# Patient Record
Sex: MALE | Race: WHITE | Employment: OTHER | RURAL
[De-identification: names, ages, dates, MRNs, and addresses within clinical notes are randomized per-mention and may not be internally consistent; named-entity substitution may affect disease eponyms.]

---

## 2017-03-07 RX ORDER — GABAPENTIN 600 MG/1
600 TABLET ORAL 3 TIMES DAILY
Qty: 90 TAB | Refills: 5 | Status: SHIPPED | OUTPATIENT
Start: 2017-03-07 | End: 2017-07-10 | Stop reason: SDUPTHER

## 2017-03-08 ENCOUNTER — TELEPHONE (OUTPATIENT)
Dept: FAMILY MEDICINE CLINIC | Age: 72
End: 2017-03-08

## 2017-03-08 NOTE — TELEPHONE ENCOUNTER
Patient takes Gabapentin 300 mg Capsules but 600 mg tablets were filled. Pt has no idea why its being changed. Please resend corrected  prescription into [facility] pharmacy.

## 2017-03-09 NOTE — TELEPHONE ENCOUNTER
He can break the tablets in half if he needs to blood from his complaints and registered with me he might actually need a higher dose of medication and the proper dose for what he is taking it for his 600 mg 3 times a day.

## 2017-03-10 DIAGNOSIS — F43.10 PTSD (POST-TRAUMATIC STRESS DISORDER): ICD-10-CM

## 2017-03-10 RX ORDER — CLONAZEPAM 1 MG/1
1 TABLET ORAL 2 TIMES DAILY
Qty: 180 TAB | Refills: 0 | OUTPATIENT
Start: 2017-03-10

## 2017-03-10 NOTE — TELEPHONE ENCOUNTER
Tried to call patient again and unable to reach at this time.   Refill request has been sent to Dr. Sarah Acevedo for clonazepam per wife's request.

## 2017-03-28 RX ORDER — TRAMADOL HYDROCHLORIDE 50 MG/1
50 TABLET ORAL
Qty: 30 TAB | Refills: 0 | OUTPATIENT
Start: 2017-03-28

## 2017-03-31 ENCOUNTER — OFFICE VISIT (OUTPATIENT)
Dept: FAMILY MEDICINE CLINIC | Age: 72
End: 2017-03-31

## 2017-03-31 VITALS
OXYGEN SATURATION: 98 % | RESPIRATION RATE: 18 BRPM | SYSTOLIC BLOOD PRESSURE: 139 MMHG | HEART RATE: 64 BPM | WEIGHT: 175 LBS | TEMPERATURE: 97.2 F | DIASTOLIC BLOOD PRESSURE: 78 MMHG | HEIGHT: 69 IN | BODY MASS INDEX: 25.92 KG/M2

## 2017-03-31 DIAGNOSIS — L60.0 INGROWN LEFT BIG TOENAIL: Primary | ICD-10-CM

## 2017-03-31 DIAGNOSIS — F43.10 PTSD (POST-TRAUMATIC STRESS DISORDER): ICD-10-CM

## 2017-03-31 DIAGNOSIS — M48.061 LUMBAR SPINAL STENOSIS: ICD-10-CM

## 2017-03-31 RX ORDER — NAPROXEN 500 MG/1
500 TABLET ORAL 2 TIMES DAILY WITH MEALS
Qty: 60 TAB | Refills: 5 | Status: SHIPPED | OUTPATIENT
Start: 2017-03-31 | End: 2017-07-10 | Stop reason: SDUPTHER

## 2017-03-31 RX ORDER — DULOXETIN HYDROCHLORIDE 60 MG/1
60 CAPSULE, DELAYED RELEASE ORAL DAILY
Qty: 30 CAP | Refills: 5 | Status: SHIPPED | OUTPATIENT
Start: 2017-03-31 | End: 2017-07-10 | Stop reason: SDUPTHER

## 2017-03-31 RX ORDER — PRAZOSIN HYDROCHLORIDE 1 MG/1
1 CAPSULE ORAL
Qty: 30 CAP | Refills: 5 | Status: SHIPPED | OUTPATIENT
Start: 2017-03-31 | End: 2017-07-10 | Stop reason: SDUPTHER

## 2017-03-31 RX ORDER — TRIPROLIDINE/PSEUDOEPHEDRINE 2.5MG-60MG
1500 TABLET ORAL DAILY
Qty: 180 TAB | Refills: 3
Start: 2017-03-31 | End: 2017-07-10 | Stop reason: SDUPTHER

## 2017-03-31 NOTE — MR AVS SNAPSHOT
Visit Information Date & Time Provider Department Dept. Phone Encounter #  
 3/31/2017  3:20 PM MD [facility] [phone] [id number] Your Appointments 6/13/2017  1:20 PM  
Follow Up with MD [facility]) Appt Note: 6 mo f/u  
 [address] [facility] [phone]  
  
   
 [address] [facility] [address] [phone] Upcoming Health Maintenance Date Due Hepatitis C Screening 1945 DTaP/Tdap/Td series (1 - Tdap) 6/20/1967 FOBT Q 1 YEAR AGE 50-75 6/20/1996 ZOSTER VACCINE AGE 60> 6/20/2006 GLAUCOMA SCREENING Q2Y 6/20/2011 Pneumococcal 65+ Low/Medium Risk (1 of 2 - PCV13) 6/20/2011 MEDICARE YEARLY EXAM 6/20/2011 INFLUENZA AGE 9 TO ADULT 8/1/2016 Allergies as of 3/31/2017  Review Complete On: 3/31/2017 By: Charley Talbot MD  
 Not on File Current Immunizations  Never Reviewed No immunizations on file. Not reviewed this visit Vitals BP Pulse Temp Resp Height(growth percentile) Weight(growth percentile) 139/78 (BP 1 Location: Left arm, BP Patient Position: Sitting) 64 97.2 °F (36.2 °C) 18 5' 8.5\" (1.74 m) 175 lb (79.4 kg) SpO2 BMI Smoking Status 98% 26.22 kg/m2 Current Every Day Smoker Vitals History BMI and BSA Data Body Mass Index Body Surface Area  
 26.22 kg/m 2 1.96 m 2 Preferred Pharmacy Pharmacy Name Phone [address], [address] AT [facility] OF  [address] [facility]. [facility] [phone] Your Updated Medication List  
  
   
This list is accurate as of: 3/31/17  4:29 PM.  Always use your most recent med list.  
  
  
  
  
 clonazePAM 1 mg tablet Commonly known as:  Marko Pineda Take 1 Tab by mouth two (2) times a day. Max Daily Amount: 2 mg. DULoxetine 60 mg capsule Commonly known as:  CYMBALTA Take 60 mg by mouth daily. gabapentin 600 mg tablet Commonly known as:  NEURONTIN Take 1 Tab by mouth three (3) times daily. prazosin 1 mg capsule Commonly known as:  MINIPRESS Take 1 Cap by mouth nightly. Indications: CHRONIC PTSD WITH TRAUMA NIGHTMARES  
  
 traMADol 50 mg tablet Commonly known as:  ULTRAM  
Take 50 mg by mouth every six (6) hours as needed for Pain. Introducing [facility]! Lila Brian introduces [employer] patient portal. Now you can access parts of your medical record, email your doctor's office, and request medication refills online. 1. In your internet browser, go to [email or web address] 2. Click on the First Time User? Click Here link in the Sign In box. You will see the New Member Sign Up page. 3. Enter your [employer] Access Code exactly as it appears below. You will not need to use this code after youve completed the sign-up process. If you do not sign up before the expiration date, you must request a new code. · [employer] Access Code: [email or web address] Expires: 6/29/2017  4:29 PM 
 
4. Enter the last four digits of your Social Security Number (xxxx) and Date of Birth (mm/dd/yyyy) as indicated and click Submit. You will be taken to the next sign-up page. 5. Create a [employer] ID. This will be your [employer] login ID and cannot be changed, so think of one that is secure and easy to remember. 6. Create a [employer] password. You can change your password at any time. 7. Enter your Password Reset Question and Answer. This can be used at a later time if you forget your password. 8. Enter your e-mail address. You will receive e-mail notification when new information is available in [address] 9. Click Sign Up. You can now view and download portions of your medical record. 10. Click the Download Summary menu link to download a portable copy of your medical information. If you have questions, please visit the Frequently Asked Questions section of the [facility] website. Remember, [facility] is NOT to be used for urgent needs. For medical emergencies, dial 911. Now available from your iPhone and Android! Please provide this summary of care documentation to your next provider. Your primary care clinician is listed as Radha Pizarro. If you have any questions after today's visit, please call [phone].

## 2017-03-31 NOTE — PROGRESS NOTES
Isreal Mejia is a 80 y.o. male who presents with the following:  Chief Complaint   Patient presents with    Skin Problem     on top of both hands    Back Pain    Anxiety    Arthritis       Skin Problem   The history is provided by the patient (Patient has an ingrown nail on the left foot would like to see a podiatrist). Pertinent negatives include no chest pain, no abdominal pain, no headaches and no shortness of breath. Back Pain    The history is provided by the patient (Patient has a history of lumbar spinal stenosis and chronic back pain and [facility] but told to get more to keep him on it especially with his past history of alcohol problems). Pertinent negatives include no chest pain, no fever, no numbness, no weight loss, no headaches, no abdominal pain, no abdominal swelling, no bowel incontinence, no perianal numbness, no bladder incontinence, no dysuria, no pelvic pain, no leg pain, no paresthesias, no paresis, no tingling and no weakness. Anxiety   The history is provided by the patient (Patient has chronic PTSD with anxiety and is currently on duloxetine and prazosin for which he needs refills. He still has nightmares. ). Pertinent negatives include no chest pain, no abdominal pain, no headaches and no shortness of breath. Arthritis   The history is provided by the patient (Patient has diffuse osteoarthritis and is rather stiff in the morning and achy but after he gets going he does fairly well). Pertinent negatives include no chest pain, no abdominal pain, no headaches and no shortness of breath. Not on File    Current Outpatient Prescriptions   Medication Sig    naproxen (NAPROSYN) 500 mg tablet Take 1 Tab by mouth two (2) times daily (with meals). Indications: Pain    Glucosamine HCl 1,500 mg tab Take 1,500 mg by mouth daily.  prazosin (MINIPRESS) 1 mg capsule Take 1 Cap by mouth nightly.  Indications: CHRONIC PTSD WITH TRAUMA NIGHTMARES    DULoxetine (CYMBALTA) 60 mg capsule Take 1 Cap by mouth daily.  gabapentin (NEURONTIN) 600 mg tablet Take 1 Tab by mouth three (3) times daily.  clonazePAM (KLONOPIN) 1 mg tablet Take 1 Tab by mouth two (2) times a day. Max Daily Amount: 2 mg. No current facility-administered medications for this visit. Past Medical History:   Diagnosis Date    Anxiety     Back pain     Blurred vision     Chest pain     Depression     Dizziness     Fast heart beat     Frequent headaches     Frequent urination     Joint pain     Joint swelling     Muscle pain     Recent change in weight     Sinus problem     Skin disease     SOB (shortness of breath)     Sore throat     Stiffness of joints, multiple sites     Stress     Tiredness     Trouble in sleeping     Weakness        Past Surgical History:   Procedure Laterality Date    HX HIP REPLACEMENT Bilateral 1993       Family History   Problem Relation Age of Onset    Cancer Mother     Cancer Maternal Aunt        Social History     Social History    Marital status: SINGLE     Spouse name: N/A    Number of children: N/A    Years of education: N/A     Social History Main Topics    Smoking status: Current Every Day Smoker     Packs/day: 1.00     Years: 50.00    Smokeless tobacco: Not on file    Alcohol use 16.8 oz/week     28 Cans of beer per week    Drug use: No    Sexual activity: No     Other Topics Concern    Not on file     Social History Narrative    No narrative on file       Review of Systems   Constitutional: Negative for chills, fever, malaise/fatigue and weight loss. HENT: Negative for congestion, hearing loss, sore throat and tinnitus. Eyes: Negative for blurred vision, pain and discharge. Respiratory: Negative for cough, shortness of breath and wheezing. Cardiovascular: Negative for chest pain, palpitations, orthopnea, claudication and leg swelling.    Gastrointestinal: Negative for abdominal pain, bowel incontinence, constipation and heartburn. Genitourinary: Negative for bladder incontinence, dysuria, frequency, pelvic pain and urgency. Musculoskeletal: Positive for back pain and joint pain. Negative for falls, myalgias and neck pain. Skin: Negative for itching and rash. Neurological: Negative for dizziness, tingling, tremors, sensory change, weakness, numbness, headaches and paresthesias. Endo/Heme/Allergies: Negative for environmental allergies and polydipsia. Psychiatric/Behavioral: Negative for depression and substance abuse. The patient is nervous/anxious. Visit Vitals    /78 (BP 1 Location: Left arm, BP Patient Position: Sitting)    Pulse 64    Temp 97.2 °F (36.2 °C)    Resp 18    Ht 5' 8.5\" (1.74 m)    Wt 175 lb (79.4 kg)    SpO2 98%    BMI 26.22 kg/m2     Physical Exam   Constitutional: He is oriented to person, place, and time and well-developed, well-nourished, and in no distress. HENT:   Head: Normocephalic and atraumatic. Nose: Nose normal.   Mouth/Throat: Oropharynx is clear and moist.   Eyes: Conjunctivae and EOM are normal. Pupils are equal, round, and reactive to light. Neck: Normal range of motion. Neck supple. No JVD present. No tracheal deviation present. No thyromegaly present. Cardiovascular: Normal rate, regular rhythm, normal heart sounds and intact distal pulses. Exam reveals no gallop and no friction rub. No murmur heard. Pulmonary/Chest: Effort normal and breath sounds normal. No respiratory distress. He has no wheezes. He has no rales. He exhibits no tenderness. Abdominal: Soft. Bowel sounds are normal. He exhibits no distension and no mass. There is no tenderness. There is no rebound and no guarding. Musculoskeletal: Normal range of motion. He exhibits no edema or tenderness.    The patient is quite stiff when he gets up from the chair and walks bent over at the waist.  His joints exhibit spurring but no gross swelling at this point in time although several can be palpated to have crepitus. Lymphadenopathy:     He has no cervical adenopathy. Neurological: He is alert and oriented to person, place, and time. He has normal reflexes. No cranial nerve deficit. He exhibits normal muscle tone. Gait normal. Coordination normal.   Skin: Skin is warm and dry. No rash noted. No erythema. Patient has left ingrown toenail on the big toenail   Psychiatric: Mood, memory, affect and judgment normal.   Vitals reviewed. ICD-10-CM ICD-9-CM    1. Ingrown left big toenail L60.0 703.0 REFERRAL TO PODIATRY   2. Lumbar spinal stenosis M48.06 724.02 naproxen (NAPROSYN) 500 mg tablet      Glucosamine HCl 1,500 mg tab   3. PTSD (post-traumatic stress disorder) F43.10 309.81 prazosin (MINIPRESS) 1 mg capsule      DULoxetine (CYMBALTA) 60 mg capsule       Orders Placed This Encounter    REFERRAL TO PODIATRY     Standing Status:   Future     Standing Expiration Date:   12/31/2017     Referral Priority:   Routine     Referral Type:   Consultation     Referral Reason:   Specialty Services Required    naproxen (NAPROSYN) 500 mg tablet     Sig: Take 1 Tab by mouth two (2) times daily (with meals). Indications: Pain     Dispense:  60 Tab     Refill:  5    Glucosamine HCl 1,500 mg tab     Sig: Take 1,500 mg by mouth daily. Dispense:  180 Tab     Refill:  3    prazosin (MINIPRESS) 1 mg capsule     Sig: Take 1 Cap by mouth nightly. Indications: CHRONIC PTSD WITH TRAUMA NIGHTMARES     Dispense:  30 Cap     Refill:  5    DULoxetine (CYMBALTA) 60 mg capsule     Sig: Take 1 Cap by mouth daily.      Dispense:  30 Cap     Refill:  5       Follow-up Disposition: Not on Flory Partida MD

## 2017-04-10 DIAGNOSIS — F43.10 PTSD (POST-TRAUMATIC STRESS DISORDER): ICD-10-CM

## 2017-04-11 ENCOUNTER — DOCUMENTATION ONLY (OUTPATIENT)
Dept: FAMILY MEDICINE CLINIC | Age: 72
End: 2017-04-11

## 2017-04-11 RX ORDER — CLONAZEPAM 1 MG/1
1 TABLET ORAL 2 TIMES DAILY
Qty: 180 TAB | Refills: 0 | OUTPATIENT
Start: 2017-04-11

## 2017-04-11 NOTE — PROGRESS NOTES
Spoke with Tay Holt regarding the patient's medication refill for Klonopin noticing that the patient has been compliant with his appts and he can have lab work when he comes back for his 3 month f/u appt in June and she stated to call in the medication with no refills. This was done.

## 2017-04-24 ENCOUNTER — TELEPHONE (OUTPATIENT)
Dept: FAMILY MEDICINE CLINIC | Age: 72
End: 2017-04-24
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
## 2017-04-25 ENCOUNTER — OFFICE VISIT (OUTPATIENT)
Dept: FAMILY MEDICINE CLINIC | Age: 72
End: 2017-04-25

## 2017-04-25 VITALS
WEIGHT: 171.2 LBS | SYSTOLIC BLOOD PRESSURE: 106 MMHG | OXYGEN SATURATION: 90 % | RESPIRATION RATE: 20 BRPM | HEIGHT: 69 IN | DIASTOLIC BLOOD PRESSURE: 95 MMHG | TEMPERATURE: 96 F | HEART RATE: 74 BPM | BODY MASS INDEX: 25.36 KG/M2

## 2017-04-25 DIAGNOSIS — R41.0 CONFUSION: ICD-10-CM

## 2017-04-25 DIAGNOSIS — J44.1 COPD EXACERBATION (HCC): Primary | ICD-10-CM

## 2017-04-25 DIAGNOSIS — F43.10 PTSD (POST-TRAUMATIC STRESS DISORDER): ICD-10-CM

## 2017-04-25 RX ORDER — LEVOFLOXACIN 500 MG/1
500 TABLET, FILM COATED ORAL DAILY
Qty: 10 TAB | Refills: 0 | Status: SHIPPED | OUTPATIENT
Start: 2017-04-25 | End: 2017-05-05

## 2017-04-25 RX ORDER — PREDNISONE 20 MG/1
TABLET ORAL
Qty: 30 TAB | Refills: 0 | Status: SHIPPED | OUTPATIENT
Start: 2017-04-25 | End: 2017-06-15 | Stop reason: ALTCHOICE

## 2017-04-25 RX ORDER — GUAIFENESIN 600 MG/1
1200 TABLET, EXTENDED RELEASE ORAL 2 TIMES DAILY
Qty: 40 TAB | Refills: 0 | Status: SHIPPED | OUTPATIENT
Start: 2017-04-25 | End: 2017-09-15 | Stop reason: ALTCHOICE

## 2017-04-25 NOTE — PROGRESS NOTES
Alona Buitrago is a 80 y.o. male who presents with the following:  Chief Complaint   Patient presents with    Dizziness    Cough    Altered mental status       Dizziness    The history is provided by the patient (Patient with long smoking history and COPD came back from her trip from [place] and developed a very deep wet cough with some chest discomfort and no fever but he has been confused as of late). Associated symptoms include chest pain, clumsiness, confusion, malaise/fatigue, congestion, dizziness and light-headedness. Pertinent negatives include no visual change, no palpitations, no fever, no abdominal pain, no bowel incontinence, no bladder incontinence, no headaches, no back pain, no focal weakness, no seizures, no slurred speech, no melena, no anal bleeding and no head injury. Cough   The history is provided by the patient (Patient has had cough for about a week and is now getting deeper and wetter). Associated symptoms include chest pain. Pertinent negatives include no abdominal pain, no headaches and no shortness of breath. Altered mental status    The history is provided by the patient (Patient developed confusion about the same time as he developed cough). Associated symptoms include confusion. Pertinent negatives include no seizures and no tingling. Not on File    Current Outpatient Prescriptions   Medication Sig    levoFLOXacin (LEVAQUIN) 500 mg tablet Take 1 Tab by mouth daily for 10 days.  predniSONE (DELTASONE) 20 mg tablet 5 po every day x 4 days then 4 on day 5, 3 on day 6, 2 on day 7, and 1 on day 8    guaiFENesin ER (MUCINEX) 600 mg ER tablet Take 2 Tabs by mouth two (2) times a day. Take 2 tabs bid po    naproxen (NAPROSYN) 500 mg tablet Take 1 Tab by mouth two (2) times daily (with meals). Indications: Pain    Glucosamine HCl 1,500 mg tab Take 1,500 mg by mouth daily.  prazosin (MINIPRESS) 1 mg capsule Take 1 Cap by mouth nightly.  Indications: CHRONIC PTSD WITH TRAUMA NIGHTMARES    DULoxetine (CYMBALTA) 60 mg capsule Take 1 Cap by mouth daily.  gabapentin (NEURONTIN) 600 mg tablet Take 1 Tab by mouth three (3) times daily.  clonazePAM (KLONOPIN) 1 mg tablet Take 1 Tab by mouth two (2) times a day. Max Daily Amount: 2 mg. No current facility-administered medications for this visit. Past Medical History:   Diagnosis Date    Anxiety     Back pain     Blurred vision     Chest pain     Depression     Dizziness     Fast heart beat     Frequent headaches     Frequent urination     Joint pain     Joint swelling     Muscle pain     Recent change in weight     Sinus problem     Skin disease     SOB (shortness of breath)     Sore throat     Stiffness of joints, multiple sites     Stress     Tiredness     Trouble in sleeping     Weakness        Past Surgical History:   Procedure Laterality Date    HX HIP REPLACEMENT Bilateral 1993       Family History   Problem Relation Age of Onset    Cancer Mother     Cancer Maternal Aunt        Social History     Social History    Marital status: SINGLE     Spouse name: N/A    Number of children: N/A    Years of education: N/A     Social History Main Topics    Smoking status: Current Every Day Smoker     Packs/day: 1.00     Years: 50.00    Smokeless tobacco: None    Alcohol use 16.8 oz/week     28 Cans of beer per week    Drug use: No    Sexual activity: No     Other Topics Concern    None     Social History Narrative       Review of Systems   Constitutional: Positive for malaise/fatigue. Negative for fever. HENT: Positive for congestion. Respiratory: Positive for cough. Negative for shortness of breath. Cardiovascular: Positive for chest pain. Negative for palpitations. Gastrointestinal: Negative for abdominal pain, anal bleeding, bowel incontinence and melena. Genitourinary: Negative for bladder incontinence. Musculoskeletal: Negative for back pain.    Neurological: Positive for dizziness and light-headedness. Negative for tingling, tremors, sensory change, speech change, focal weakness, seizures and headaches. Psychiatric/Behavioral: Positive for confusion. Visit Vitals    BP (!) 106/95 (BP 1 Location: Left arm, BP Patient Position: Sitting)    Pulse 74    Temp 96 °F (35.6 °C) (Oral)    Resp 20    Ht 5' 8.5\" (1.74 m)    Wt 171 lb 3.2 oz (77.7 kg)    SpO2 90%    BMI 25.65 kg/m2     Physical Exam   Constitutional: He is oriented to person, place, and time and well-developed, well-nourished, and in no distress. HENT:   Head: Normocephalic and atraumatic. Nose: Nose normal.   Mouth/Throat: Oropharynx is clear and moist.   Eyes: Conjunctivae and EOM are normal. Pupils are equal, round, and reactive to light. Neck: Normal range of motion. Neck supple. No JVD present. No tracheal deviation present. No thyromegaly present. Cardiovascular: Normal rate, regular rhythm, normal heart sounds and intact distal pulses. Exam reveals no gallop and no friction rub. No murmur heard. Pulmonary/Chest: Effort normal. No respiratory distress. He has wheezes. He has no rales. He exhibits no tenderness. Abdominal: Soft. Bowel sounds are normal. He exhibits no distension and no mass. There is no tenderness. There is no rebound and no guarding. Musculoskeletal: Normal range of motion. He exhibits no edema or tenderness. Lymphadenopathy:     He has no cervical adenopathy. Neurological: He is alert and oriented to person, place, and time. He has normal reflexes. No cranial nerve deficit. He exhibits normal muscle tone. Gait normal. Coordination normal.   Skin: Skin is warm and dry. No rash noted. No erythema. Psychiatric: Mood, memory, affect and judgment normal.   Vitals reviewed. ICD-10-CM ICD-9-CM    1.  COPD exacerbation (HCC) J44.1 491.21 XR CHEST PA LAT      levoFLOXacin (LEVAQUIN) 500 mg tablet      predniSONE (DELTASONE) 20 mg tablet      guaiFENesin ER (MUCINEX) 600 mg ER tablet   2. Confusion R41.0 298.9 levoFLOXacin (LEVAQUIN) 500 mg tablet      predniSONE (DELTASONE) 20 mg tablet      guaiFENesin ER (MUCINEX) 600 mg ER tablet   3. PTSD (post-traumatic stress disorder) F43.10 309.81 levoFLOXacin (LEVAQUIN) 500 mg tablet      predniSONE (DELTASONE) 20 mg tablet      guaiFENesin ER (MUCINEX) 600 mg ER tablet       Orders Placed This Encounter    XR CHEST PA LAT     Standing Status:   Future     Number of Occurrences:   1     Standing Expiration Date:   2018     Order Specific Question:   Reason for Exam     Answer:   copd exacerbation-cough-confusion     Order Specific Question:   Is Patient Allergic to Contrast Dye? Answer:   No    levoFLOXacin (LEVAQUIN) 500 mg tablet     Sig: Take 1 Tab by mouth daily for 10 days. Dispense:  10 Tab     Refill:  0    predniSONE (DELTASONE) 20 mg tablet     Si po every day x 4 days then 4 on day 5, 3 on day 6, 2 on day 7, and 1 on day 8     Dispense:  30 Tab     Refill:  0    guaiFENesin ER (MUCINEX) 600 mg ER tablet     Sig: Take 2 Tabs by mouth two (2) times a day. Take 2 tabs bid po     Dispense:  40 Tab     Refill:  0    if his symptoms do not resolve with antibiotics and prednisone and then pursue an MRI looking for a possibly small stroke. The patient has been advised to stop smoking. I will also talk to the patient and his wife about reducing his clonazepam during the daytime and possibly switching it to buspirone and just leave the bedtime dose taking it 1 2 nights and off 1 to avoid accumulation in his system.     Follow-up Disposition: Not on Critical access hospital, MD        We currently plan to get a CT of his head

## 2017-04-25 NOTE — MR AVS SNAPSHOT
Visit Information Date & Time Provider Department Dept. Phone Encounter #  
 4/25/2017 11:00 AM Pete La MD [facility] [phone] [id number] Your Appointments 6/13/2017  1:20 PM  
Follow Up with Pete La MD  
[facility] [address] [facility]) Appt Note: 6 mo f/u  
 [address] [facility] 67 [id number] [phone]  
  
   
 [address] [place] [id number] Upcoming Health Maintenance Date Due Hepatitis C Screening 1945 DTaP/Tdap/Td series (1 - Tdap) 6/20/1967 FOBT Q 1 YEAR AGE 50-75 6/20/1996 ZOSTER VACCINE AGE 60> 6/20/2006 GLAUCOMA SCREENING Q2Y 6/20/2011 Pneumococcal 65+ Low/Medium Risk (1 of 2 - PCV13) 6/20/2011 INFLUENZA AGE 9 TO ADULT 8/1/2016 MEDICARE YEARLY EXAM 4/26/2018 Allergies as of 4/25/2017  Review Complete On: 4/25/2017 By: Jennifer Mckeon LPN Not on File Current Immunizations  Never Reviewed No immunizations on file. Not reviewed this visit You Were Diagnosed With   
  
 Codes Comments COPD exacerbation ([facility]    -  Primary ICD-10-CM: J44.1 ICD-9-CM: 491.21 Confusion     ICD-10-CM: R41.0 ICD-9-CM: 298.9 PTSD (post-traumatic stress disorder)     ICD-10-CM: F43.10 ICD-9-CM: 309.81 Vitals BP Pulse Temp Resp Height(growth percentile) Weight(growth percentile) (!) 106/95 (BP 1 Location: Left arm, BP Patient Position: Sitting) 74 96 °F (35.6 °C) (Oral) 20 5' 8.5\" (1.74 m) 171 lb 3.2 oz (77.7 kg) SpO2 BMI Smoking Status 90% 25.65 kg/m2 Current Every Day Smoker BMI and BSA Data Body Mass Index Body Surface Area  
 25.65 kg/m 2 1.94 m 2 Preferred Pharmacy Pharmacy Name Phone [address], [address] [facility] AT [facility] OF  3 & HOLLI Calderon [phone] Your Updated Medication List  
  
   
 This list is accurate as of: 4/25/17 12:01 PM.  Always use your most recent med list.  
  
  
  
  
 clonazePAM 1 mg tablet Commonly known as:  Cheryn Brooms Take 1 Tab by mouth two (2) times a day. Max Daily Amount: 2 mg. DULoxetine 60 mg capsule Commonly known as:  CYMBALTA Take 1 Cap by mouth daily. gabapentin 600 mg tablet Commonly known as:  NEURONTIN Take 1 Tab by mouth three (3) times daily. Glucosamine HCl 1,500 mg Tab Take 1,500 mg by mouth daily. naproxen 500 mg tablet Commonly known as:  NAPROSYN Take 1 Tab by mouth two (2) times daily (with meals). Indications: Pain  
  
 prazosin 1 mg capsule Commonly known as:  MINIPRESS Take 1 Cap by mouth nightly. Indications: CHRONIC PTSD WITH TRAUMA NIGHTMARES To-Do List   
 06/25/2017 Imaging:  XR CHEST PA LAT Introducing [facility] & HE[facility]! Beba Yates introduces [employer] patient portal. Now you can access parts of your medical record, email your doctor's office, and request medication refills online. 1. In your internet browser, go to [email or web address] 2. Click on the First Time User? Click Here link in the Sign In box. You will see the New Member Sign Up page. 3. Enter your mPowa Access Code exactly as it appears below. You will not need to use this code after youve completed the sign-up process. If you do not sign up before the expiration date, you must request a new code. · mPowa Access Code: [email or web address] Expires: 6/29/2017  4:29 PM 
 
4. Enter the last four digits of your Social Security Number (xxxx) and Date of Birth (mm/dd/yyyy) as indicated and click Submit. You will be taken to the next sign-up page. 5. Create a Cognitive Matcht ID. This will be your mPowa login ID and cannot be changed, so think of one that is secure and easy to remember. 6. Create a mPowa password. You can change your password at any time. 7. Enter your Password Reset Question and Answer. This can be used at a later time if you forget your password. 8. Enter your e-mail address. You will receive e-mail notification when new information is available in [address] 9. Click Sign Up. You can now view and download portions of your medical record. 10. Click the Download Summary menu link to download a portable copy of your medical information. If you have questions, please visit the Frequently Asked Questions section of the [employer] website. Remember, [employer] is NOT to be used for urgent needs. For medical emergencies, dial 911. Now available from your iPhone and Android! Please provide this summary of care documentation to your next provider. Your primary care clinician is listed as Pallavi Hill. If you have any questions after today's visit, please call [phone].

## 2017-06-15 ENCOUNTER — OFFICE VISIT (OUTPATIENT)
Dept: FAMILY MEDICINE CLINIC | Age: 72
End: 2017-06-15

## 2017-06-15 VITALS
WEIGHT: 175 LBS | DIASTOLIC BLOOD PRESSURE: 76 MMHG | RESPIRATION RATE: 16 BRPM | BODY MASS INDEX: 25.92 KG/M2 | SYSTOLIC BLOOD PRESSURE: 136 MMHG | HEIGHT: 69 IN | HEART RATE: 63 BPM | TEMPERATURE: 96.3 F | OXYGEN SATURATION: 99 %

## 2017-06-15 DIAGNOSIS — F43.10 PTSD (POST-TRAUMATIC STRESS DISORDER): ICD-10-CM

## 2017-06-15 RX ORDER — CLONAZEPAM 1 MG/1
1 TABLET ORAL 2 TIMES DAILY
Qty: 180 TAB | Refills: 0 | Status: SHIPPED | OUTPATIENT
Start: 2017-06-15 | End: 2017-07-10 | Stop reason: SDUPTHER

## 2017-06-15 NOTE — PROGRESS NOTES
Esther Ayala is a 80 y.o. male who presents with the following:  Chief Complaint   Patient presents with    Anxiety       Anxiety   The history is provided by the patient (The patient suffers from chronic anxiety related to PTSD from [employer] duty. And he has been doing well on Klonopin 1 mg twice a day and has been met without any signs of toxicity or any symptoms of abuse. ). Pertinent negatives include no chest pain, no abdominal pain, no headaches and no shortness of breath. Not on File    Current Outpatient Prescriptions   Medication Sig    clonazePAM (KLONOPIN) 1 mg tablet Take 1 Tab by mouth two (2) times a day. Max Daily Amount: 2 mg.  guaiFENesin ER (MUCINEX) 600 mg ER tablet Take 2 Tabs by mouth two (2) times a day. Take 2 tabs bid po    naproxen (NAPROSYN) 500 mg tablet Take 1 Tab by mouth two (2) times daily (with meals). Indications: Pain    Glucosamine HCl 1,500 mg tab Take 1,500 mg by mouth daily.  prazosin (MINIPRESS) 1 mg capsule Take 1 Cap by mouth nightly. Indications: CHRONIC PTSD WITH TRAUMA NIGHTMARES    DULoxetine (CYMBALTA) 60 mg capsule Take 1 Cap by mouth daily.  gabapentin (NEURONTIN) 600 mg tablet Take 1 Tab by mouth three (3) times daily. No current facility-administered medications for this visit.         Past Medical History:   Diagnosis Date    Anxiety     Back pain     Blurred vision     Chest pain     Depression     Dizziness     Fast heart beat     Frequent headaches     Frequent urination     Joint pain     Joint swelling     Muscle pain     Recent change in weight     Sinus problem     Skin disease     SOB (shortness of breath)     Sore throat     Stiffness of joints, multiple sites     Stress     Tiredness     Trouble in sleeping     Weakness        Past Surgical History:   Procedure Laterality Date    HX HIP REPLACEMENT Bilateral 1993       Family History   Problem Relation Age of Onset    Cancer Mother     Cancer Maternal Aunt Social History     Social History    Marital status: SINGLE     Spouse name: N/A    Number of children: N/A    Years of education: N/A     Social History Main Topics    Smoking status: Current Every Day Smoker     Packs/day: 1.00     Years: 50.00    Smokeless tobacco: None    Alcohol use 16.8 oz/week     28 Cans of beer per week    Drug use: No    Sexual activity: No     Other Topics Concern    None     Social History Narrative       Review of Systems   Constitutional: Negative for malaise/fatigue. Respiratory: Negative for shortness of breath. Cardiovascular: Negative for chest pain. Gastrointestinal: Negative for abdominal pain. Neurological: Negative for dizziness, tremors, focal weakness, weakness and headaches. Psychiatric/Behavioral: Negative for substance abuse and suicidal ideas. The patient is nervous/anxious (Patient states he cannot get by without his clonazepam and we discussed dropping it back to half a milligram twice a day because of his age. ). Visit Vitals    /76 (BP 1 Location: Left arm, BP Patient Position: Sitting)    Pulse 63    Temp 96.3 °F (35.7 °C)    Resp 16    Ht 5' 8.5\" (1.74 m)    Wt 175 lb (79.4 kg)    SpO2 99%    BMI 26.22 kg/m2     Physical Exam   Constitutional: He is oriented to person, place, and time and well-developed, well-nourished, and in no distress. HENT:   Head: Normocephalic and atraumatic. Nose: Nose normal.   Mouth/Throat: Oropharynx is clear and moist.   Eyes: Conjunctivae and EOM are normal. Pupils are equal, round, and reactive to light. Neck: Normal range of motion. Neck supple. No JVD present. No tracheal deviation present. No thyromegaly present. Cardiovascular: Normal rate, regular rhythm, normal heart sounds and intact distal pulses. Exam reveals no gallop and no friction rub. No murmur heard. Pulmonary/Chest: Effort normal and breath sounds normal. No respiratory distress. He has no wheezes.  He has no rales. He exhibits no tenderness. Abdominal: Soft. Bowel sounds are normal. He exhibits no distension and no mass. There is no tenderness. There is no rebound and no guarding. Musculoskeletal: Normal range of motion. He exhibits no edema or tenderness. Lymphadenopathy:     He has no cervical adenopathy. Neurological: He is alert and oriented to person, place, and time. He has normal reflexes. No cranial nerve deficit. He exhibits normal muscle tone. Gait normal. Coordination normal.   Skin: Skin is warm and dry. No rash noted. No erythema. Psychiatric: Mood, memory and judgment normal.   Patient became very anxious when we started talking about reducing his clonazepam.   Vitals reviewed. ICD-10-CM ICD-9-CM    1. PTSD (post-traumatic stress disorder) F43.10 309.81 clonazePAM (KLONOPIN) 1 mg tablet       Orders Placed This Encounter    clonazePAM (KLONOPIN) 1 mg tablet     Sig: Take 1 Tab by mouth two (2) times a day. Max Daily Amount: 2 mg. Dispense:  180 Tab     Refill:  0    patient states he wants to start coming here to get his refills on his clonazepam as the trips to the [facility] become more more taxing. The  was reviewed and the patient has been getting his medication at an appropriate time a regular basis from the [facility] in the [facility]. The patient is an alcoholic but it is in remission and he has not had any more drinks.     Follow-up Disposition: Not on Priyanka Carcamo MD

## 2017-06-15 NOTE — MR AVS SNAPSHOT
Visit Information Date & Time Provider Department Dept. Phone Encounter #  
 6/15/2017 10:00 AM Viral Delgado MD [facility] [phone] [id number] Upcoming Health Maintenance Date Due Hepatitis C Screening 1945 DTaP/Tdap/Td series (1 - Tdap) 6/20/1967 FOBT Q 1 YEAR AGE 50-75 6/20/1996 ZOSTER VACCINE AGE 60> 6/20/2006 GLAUCOMA SCREENING Q2Y 6/20/2011 Pneumococcal 65+ Low/Medium Risk (1 of 2 - PCV13) 6/20/2011 INFLUENZA AGE 9 TO ADULT 8/1/2017 MEDICARE YEARLY EXAM 4/26/2018 Allergies as of 6/15/2017  Review Complete On: 6/15/2017 By: Viral Delgado MD  
 Not on File Current Immunizations  Never Reviewed No immunizations on file. Not reviewed this visit You Were Diagnosed With   
  
 Codes Comments PTSD (post-traumatic stress disorder)     ICD-10-CM: F43.10 ICD-9-CM: 309.81 Vitals BP Pulse Temp Resp Height(growth percentile) Weight(growth percentile) 136/76 (BP 1 Location: Left arm, BP Patient Position: Sitting) 63 96.3 °F (35.7 °C) 16 5' 8.5\" (1.74 m) 175 lb (79.4 kg) SpO2 BMI Smoking Status 99% 26.22 kg/m2 Current Every Day Smoker BMI and BSA Data Body Mass Index Body Surface Area  
 26.22 kg/m 2 1.96 m 2 Preferred Pharmacy Pharmacy Name Phone [address], [address] AT [facility] OF  3 & HOLLI RASHID [facility]. Mateus Deal [phone] Your Updated Medication List  
  
   
This list is accurate as of: 6/15/17 11:33 AM.  Always use your most recent med list.  
  
  
  
  
 clonazePAM 1 mg tablet Commonly known as:  Cheryn Brooms Take 1 Tab by mouth two (2) times a day. Max Daily Amount: 2 mg. DULoxetine 60 mg capsule Commonly known as:  CYMBALTA Take 1 Cap by mouth daily. gabapentin 600 mg tablet Commonly known as:  NEURONTIN Take 1 Tab by mouth three (3) times daily. Glucosamine HCl 1,500 mg Tab Take 1,500 mg by mouth daily. guaiFENesin  mg ER tablet Commonly known as:  [address] Take 2 Tabs by mouth two (2) times a day. Take 2 tabs bid po  
  
 naproxen 500 mg tablet Commonly known as:  NAPROSYN Take 1 Tab by mouth two (2) times daily (with meals). Indications: Pain  
  
 prazosin 1 mg capsule Commonly known as:  MINIPRESS Take 1 Cap by mouth nightly. Indications: CHRONIC PTSD WITH TRAUMA NIGHTMARES Prescriptions Printed Refills  
 clonazePAM (KLONOPIN) 1 mg tablet 0 Sig: Take 1 Tab by mouth two (2) times a day. Max Daily Amount: 2 mg. Class: Print Route: Oral  
  
Introducing [facility]! [facility] introduces [employer] patient portal. Now you can access parts of your medical record, email your doctor's office, and request medication refills online. 1. In your internet browser, go to [email or web address] 2. Click on the First Time User? Click Here link in the Sign In box. You will see the New Member Sign Up page. 3. Enter your [employer] Access Code exactly as it appears below. You will not need to use this code after youve completed the sign-up process. If you do not sign up before the expiration date, you must request a new code. · [employer] Access Code: [email or web address] Expires: 6/29/2017  4:29 PM 
 
4. Enter the last four digits of your Social Security Number (xxxx) and Date of Birth (mm/dd/yyyy) as indicated and click Submit. You will be taken to the next sign-up page. 5. Create a Insportantt ID. This will be your [employer] login ID and cannot be changed, so think of one that is secure and easy to remember. 6. Create a [employer] password. You can change your password at any time. 7. Enter your Password Reset Question and Answer. This can be used at a later time if you forget your password. 8. Enter your e-mail address. You will receive e-mail notification when new information is available in [address] 9. Click Sign Up. You can now view and download portions of your medical record. 10. Click the Download Summary menu link to download a portable copy of your medical information. If you have questions, please visit the Frequently Asked Questions section of the SKY [employer] website. Remember, [employer] is NOT to be used for urgent needs. For medical emergencies, dial 911. Now available from your iPhone and Android! Please provide this summary of care documentation to your next provider. Your primary care clinician is listed as Gracia Fraga. If you have any questions after today's visit, please call [phone].

## 2017-06-18 ENCOUNTER — TELEPHONE (OUTPATIENT)
Dept: FAMILY MEDICINE CLINIC | Age: 72
End: 2017-06-18

## 2017-06-18 NOTE — TELEPHONE ENCOUNTER
Caretaker called  Pt today disoriented, dizzy and not walking right, almost falling  Clonazepam was decreased at last visit per caretaker and she thinks sy are from that  On review of chart Clonazepam was not decreased from what I can see  advised her to monitor pt, make sure he is well hydrated  And if he is getting worse bring him to ER or to office tomorrow

## 2017-06-20 ENCOUNTER — OFFICE VISIT (OUTPATIENT)
Dept: FAMILY MEDICINE CLINIC | Age: 72
End: 2017-06-20

## 2017-06-20 VITALS
SYSTOLIC BLOOD PRESSURE: 123 MMHG | HEIGHT: 68 IN | RESPIRATION RATE: 18 BRPM | HEART RATE: 60 BPM | DIASTOLIC BLOOD PRESSURE: 65 MMHG | OXYGEN SATURATION: 98 % | WEIGHT: 176.25 LBS | BODY MASS INDEX: 26.71 KG/M2

## 2017-06-20 DIAGNOSIS — R42 DIZZINESS: Primary | ICD-10-CM

## 2017-06-20 NOTE — PROGRESS NOTES
HISTORY OF PRESENT ILLNESS  Toy Read is a 71 y.o. male. HPI  He is here with c/o dizziness for the past 2 days. Feels off balanced and feels that he has to hold on to ambulate. Room will also spin at times. No nausea or vomiting. Occ headache. His significant other has noticed that his speech is not as strong and is at times a little garbled but not slurring. No facial droop noted. He has had hip problems since childhood and walks with a limp. He has noticed that he is having more difficulty than usual ambulating. Has been using a walker at times. Had had bilateral hip replacements. His orthopedic at the [place] has recommended PT but he needs the referral done by his PCP for insurance to cover. Has a history of OA and chronic back problems. Review of Systems   Constitutional: Positive for malaise/fatigue. Negative for chills and fever. HENT: Negative for congestion and sore throat. Respiratory: Negative for cough. Cardiovascular: Negative for chest pain and palpitations. Gastrointestinal: Negative for abdominal pain and heartburn. Musculoskeletal: Positive for back pain and joint pain. Negative for myalgias. Neurological: Positive for dizziness and speech change. Negative for headaches. Past Medical History:   Diagnosis Date    Anxiety     Back pain     Blurred vision     Chest pain     Depression     Dizziness     Fast heart beat     Frequent headaches     Frequent urination     Joint pain     Joint swelling     Muscle pain     Recent change in weight     Sinus problem     Skin disease     SOB (shortness of breath)     Sore throat     Stiffness of joints, multiple sites     Stress     Tiredness     Trouble in sleeping     Weakness      Past Surgical History:   Procedure Laterality Date    HX HIP REPLACEMENT Bilateral 1993     No Known Allergies  Blood pressure 123/65, pulse 60, resp.  rate 18, height 5' 8\" (1.727 m), weight 176 lb 4 oz (79.9 kg), SpO2 98 %.      Physical Exam   Constitutional: He is oriented to person, place, and time. He appears well-developed and well-nourished. No distress. HENT:   Head: Normocephalic. Right Ear: External ear normal.   Nose: Nose normal.   Mouth/Throat: Oropharynx is clear and moist.   Left ear canal occluded with wax   Eyes: Conjunctivae are normal.   Neck: Neck supple. No carotid bruits   Cardiovascular: Normal rate, regular rhythm and normal heart sounds. Pulmonary/Chest: Effort normal and breath sounds normal. No respiratory distress. Abdominal: Soft. Musculoskeletal:   Difficulty getting from a sitting position to standing. When he walks he has an ataxic like gait and a limp is present. He has difficulty picking up his right foot but this is not a new finding. Legs appear to be unequal in length   Lymphadenopathy:     He has no cervical adenopathy. Neurological: He is alert and oriented to person, place, and time. No cranial nerve deficit. Finger to nose normal. Alternating hand movements are normal.    Vitals reviewed.       ASSESSMENT and PLAN  Dizziness   Schedule CT of the Head and Carotid Dopplers   RTO after testing for follow up   Start ASA 81 mg daily   Change positions slowly   RTO if no improvement   To the ER if any worsening symptoms  OA hips and spine   Schedule PT with [facility]   May benefit from a lift in the shoe

## 2017-06-20 NOTE — MR AVS SNAPSHOT
Visit Information Date & Time Provider Department Dept. Phone Encounter #  
 6/20/2017  6:00 PM MD [facility] [phone] [id number] Your Appointments 9/15/2017 11:00 AM  
Follow Up with MD [facility] [address]) Appt Note: 3 month f/u  
 [address] [facility] 67 [address] [phone]  
  
   
 [address] [facility] 67 [address] Upcoming Health Maintenance Date Due Hepatitis C Screening 1945 DTaP/Tdap/Td series (1 - Tdap) 6/20/1967 FOBT Q 1 YEAR AGE 50-75 6/20/1996 ZOSTER VACCINE AGE 60> 6/20/2006 GLAUCOMA SCREENING Q2Y 6/20/2011 Pneumococcal 65+ Low/Medium Risk (1 of 2 - PCV13) 6/20/2011 INFLUENZA AGE 9 TO ADULT 8/1/2017 MEDICARE YEARLY EXAM 4/26/2018 Allergies as of 6/20/2017  Review Complete On: 6/20/2017 By: Luis Hooker MD  
 No Known Allergies Current Immunizations  Never Reviewed No immunizations on file. Not reviewed this visit You Were Diagnosed With   
  
 Codes Comments Dizziness    -  Primary ICD-10-CM: R89 ICD-9-CM: 780.4 Vitals BP Pulse Resp Height(growth percentile) Weight(growth percentile) SpO2  
 123/65 60 18 5' 8\" (1.727 m) 176 lb 4 oz (79.9 kg) 98% BMI Smoking Status 26.8 kg/m2 Current Every Day Smoker Vitals History BMI and BSA Data Body Mass Index Body Surface Area  
 26.8 kg/m 2 1.96 m 2 Preferred Pharmacy Pharmacy Name Phone [address], [address] AT [facility] OF  3 & [address] RASHID [address]. Raji Winters [phone] Your Updated Medication List  
  
   
This list is accurate as of: 6/20/17  6:27 PM.  Always use your most recent med list.  
  
  
  
  
 clonazePAM 1 mg tablet Commonly known as:  Faith Flores Take 1 Tab by mouth two (2) times a day. Max Daily Amount: 2 mg. DULoxetine 60 mg capsule Commonly known as:  CYMBALTA Take 1 Cap by mouth daily. gabapentin 600 mg tablet Commonly known as:  NEURONTIN Take 1 Tab by mouth three (3) times daily. Glucosamine HCl 1,500 mg Tab Take 1,500 mg by mouth daily. guaiFENesin  mg ER tablet Commonly known as:  [employer] Take 2 Tabs by mouth two (2) times a day. Take 2 tabs bid po  
  
 naproxen 500 mg tablet Commonly known as:  NAPROSYN Take 1 Tab by mouth two (2) times daily (with meals). Indications: Pain  
  
 prazosin 1 mg capsule Commonly known as:  MINIPRESS Take 1 Cap by mouth nightly. Indications: CHRONIC PTSD WITH TRAUMA NIGHTMARES We Performed the Following CBC WITH AUTOMATED DIFF [25677 CPT(R)] METABOLIC PANEL, COMPREHENSIVE [54546 CPT(R)] TSH 3RD GENERATION [52439 CPT(R)] To-Do List   
 06/20/2017 Imaging:  CT HEAD WO CONT   
  
 06/20/2017 Imaging:  DUPLEX CAROTID BILATERAL Introducing [facility] & HEALTH SERVICES! Alanna Edouard introduces [employer] patient portal. Now you can access parts of your medical record, email your doctor's office, and request medication refills online. 1. In your internet browser, go to [email or web address] 2. Click on the First Time User? Click Here link in the Sign In box. You will see the New Member Sign Up page. 3. Enter your [employer] Access Code exactly as it appears below. You will not need to use this code after youve completed the sign-up process. If you do not sign up before the expiration date, you must request a new code. · [employer] Access Code: [email or web address] Expires: 6/29/2017  4:29 PM 
 
4. Enter the last four digits of your Social Security Number (xxxx) and Date of Birth (mm/dd/yyyy) as indicated and click Submit. You will be taken to the next sign-up page. 5. Create a [employer] ID.  This will be your [employer] login ID and cannot be changed, so think of one that is secure and easy to remember. 6. Create a B[employer] password. You can change your password at any time. 7. Enter your Password Reset Question and Answer. This can be used at a later time if you forget your password. 8. Enter your e-mail address. You will receive e-mail notification when new information is available in [address] 9. Click Sign Up. You can now view and download portions of your medical record. 10. Click the Download Summary menu link to download a portable copy of your medical information. If you have questions, please visit the Frequently Asked Questions section of the [employer] website. Remember, [employer] is NOT to be used for urgent needs. For medical emergencies, dial 911. Now available from your iPhone and Android! Please provide this summary of care documentation to your next provider. Your primary care clinician is listed as César Saab. If you have any questions after today's visit, please call [phone].

## 2017-06-21 LAB
ALBUMIN SERPL-MCNC: 4 G/DL (ref 3.5–4.8)
ALBUMIN/GLOB SERPL: 1.7 {RATIO} (ref 1.2–2.2)
ALP SERPL-CCNC: 89 IU/L (ref 39–117)
ALT SERPL-CCNC: 10 IU/L (ref 0–44)
AST SERPL-CCNC: 11 IU/L (ref 0–40)
BASOPHILS # BLD AUTO: 0 X10E3/UL (ref 0–0.2)
BASOPHILS NFR BLD AUTO: 1 %
BILIRUB SERPL-MCNC: 0.3 MG/DL (ref 0–1.2)
BUN SERPL-MCNC: 20 MG/DL (ref 8–27)
BUN/CREAT SERPL: 22 (ref 10–24)
CALCIUM SERPL-MCNC: 8.8 MG/DL (ref 8.6–10.2)
CHLORIDE SERPL-SCNC: 104 MMOL/L (ref 96–106)
CO2 SERPL-SCNC: 22 MMOL/L (ref 18–29)
CREAT SERPL-MCNC: 0.92 MG/DL (ref 0.76–1.27)
EOSINOPHIL # BLD AUTO: 0.2 X10E3/UL (ref 0–0.4)
EOSINOPHIL NFR BLD AUTO: 3 %
ERYTHROCYTE [DISTWIDTH] IN BLOOD BY AUTOMATED COUNT: 14 % (ref 12.3–15.4)
GLOBULIN SER CALC-MCNC: 2.3 G/DL (ref 1.5–4.5)
GLUCOSE SERPL-MCNC: 130 MG/DL (ref 65–99)
HCT VFR BLD AUTO: 40.4 % (ref 37.5–51)
HGB BLD-MCNC: 13.6 G/DL (ref 12.6–17.7)
IMM GRANULOCYTES # BLD: 0 X10E3/UL (ref 0–0.1)
IMM GRANULOCYTES NFR BLD: 1 %
LYMPHOCYTES # BLD AUTO: 1.5 X10E3/UL (ref 0.7–3.1)
LYMPHOCYTES NFR BLD AUTO: 22 %
MCH RBC QN AUTO: 32.2 PG (ref 26.6–33)
MCHC RBC AUTO-ENTMCNC: 33.7 G/DL (ref 31.5–35.7)
MCV RBC AUTO: 96 FL (ref 79–97)
MONOCYTES # BLD AUTO: 0.3 X10E3/UL (ref 0.1–0.9)
MONOCYTES NFR BLD AUTO: 5 %
NEUTROPHILS # BLD AUTO: 4.4 X10E3/UL (ref 1.4–7)
NEUTROPHILS NFR BLD AUTO: 68 %
PLATELET # BLD AUTO: 202 X10E3/UL (ref 150–379)
POTASSIUM SERPL-SCNC: 4.4 MMOL/L (ref 3.5–5.2)
PROT SERPL-MCNC: 6.3 G/DL (ref 6–8.5)
RBC # BLD AUTO: 4.23 X10E6/UL (ref 4.14–5.8)
SODIUM SERPL-SCNC: 144 MMOL/L (ref 134–144)
TSH SERPL DL<=0.005 MIU/L-ACNC: 0.68 UIU/ML (ref 0.45–4.5)
WBC # BLD AUTO: 6.5 X10E3/UL (ref 3.4–10.8)

## 2017-06-23 LAB
HBA1C MFR BLD: 6.1 % (ref 4.8–5.6)
SPECIMEN STATUS REPORT, ROLRST: NORMAL

## 2017-06-24 NOTE — PROGRESS NOTES
His A1c was borderline high so he is pre diabetic.  Need to watch his sugar intake and recheck in 3 months

## 2017-06-28 ENCOUNTER — DOCUMENTATION ONLY (OUTPATIENT)
Dept: FAMILY MEDICINE CLINIC | Age: 72
End: 2017-06-28

## 2017-06-28 NOTE — PROGRESS NOTES
Spoke with Misti Gregory at [facility] and since the patient is further than 40 mile radius he is approved for any and all tests with in the time frame of authorization originally given to the practice. Auth # is [id number] good until 08/07/17.

## 2017-07-10 DIAGNOSIS — M48.061 LUMBAR SPINAL STENOSIS: ICD-10-CM

## 2017-07-10 DIAGNOSIS — F43.10 PTSD (POST-TRAUMATIC STRESS DISORDER): ICD-10-CM

## 2017-07-10 RX ORDER — DULOXETIN HYDROCHLORIDE 60 MG/1
60 CAPSULE, DELAYED RELEASE ORAL DAILY
Qty: 30 CAP | Refills: 5 | Status: SHIPPED | OUTPATIENT
Start: 2017-07-10 | End: 2017-11-20 | Stop reason: SDUPTHER

## 2017-07-10 RX ORDER — NAPROXEN 500 MG/1
500 TABLET ORAL 2 TIMES DAILY WITH MEALS
Qty: 60 TAB | Refills: 5 | Status: SHIPPED | OUTPATIENT
Start: 2017-07-10 | End: 2017-12-21 | Stop reason: SDUPTHER

## 2017-07-10 RX ORDER — GABAPENTIN 600 MG/1
600 TABLET ORAL 3 TIMES DAILY
Qty: 90 TAB | Refills: 5 | Status: SHIPPED | OUTPATIENT
Start: 2017-07-10 | End: 2017-12-21 | Stop reason: SDUPTHER

## 2017-07-10 RX ORDER — PRAZOSIN HYDROCHLORIDE 1 MG/1
1 CAPSULE ORAL
Qty: 30 CAP | Refills: 5 | Status: SHIPPED | OUTPATIENT
Start: 2017-07-10 | End: 2017-09-15 | Stop reason: SINTOL

## 2017-07-10 RX ORDER — TRIPROLIDINE/PSEUDOEPHEDRINE 2.5MG-60MG
1500 TABLET ORAL DAILY
Qty: 180 TAB | Refills: 3 | Status: SHIPPED | OUTPATIENT
Start: 2017-07-10 | End: 2017-09-15

## 2017-07-11 RX ORDER — CLONAZEPAM 1 MG/1
1 TABLET ORAL 2 TIMES DAILY
Qty: 180 TAB | Refills: 0 | Status: SHIPPED | OUTPATIENT
Start: 2017-07-11 | End: 2017-09-17 | Stop reason: SDUPTHER

## 2017-09-15 ENCOUNTER — OFFICE VISIT (OUTPATIENT)
Dept: FAMILY MEDICINE CLINIC | Age: 72
End: 2017-09-15

## 2017-09-15 VITALS
SYSTOLIC BLOOD PRESSURE: 130 MMHG | RESPIRATION RATE: 18 BRPM | HEIGHT: 68 IN | OXYGEN SATURATION: 95 % | DIASTOLIC BLOOD PRESSURE: 76 MMHG | BODY MASS INDEX: 26.16 KG/M2 | HEART RATE: 61 BPM | WEIGHT: 172.6 LBS | TEMPERATURE: 96.5 F

## 2017-09-15 DIAGNOSIS — I73.9 CLAUDICATION OF RIGHT LOWER EXTREMITY (HCC): ICD-10-CM

## 2017-09-15 DIAGNOSIS — F43.10 PTSD (POST-TRAUMATIC STRESS DISORDER): Primary | ICD-10-CM

## 2017-09-15 NOTE — MR AVS SNAPSHOT
Visit Information Date & Time Provider Department Dept. Phone Encounter #  
 9/15/2017 11:00 AM Era Hale MD [facility] Primary Care [phone] [id number] Upcoming Health Maintenance Date Due Hepatitis C Screening 1945 DTaP/Tdap/Td series (1 - Tdap) 6/20/1967 FOBT Q 1 YEAR AGE 50-75 6/20/1996 ZOSTER VACCINE AGE 60> 4/20/2006 GLAUCOMA SCREENING Q2Y 6/20/2011 Pneumococcal 65+ Low/Medium Risk (1 of 2 - PCV13) 6/20/2011 INFLUENZA AGE 9 TO ADULT 8/1/2017 MEDICARE YEARLY EXAM 4/26/2018 Allergies as of 9/15/2017  Review Complete On: 9/15/2017 By: Era Hale MD  
 No Known Allergies Current Immunizations  Never Reviewed No immunizations on file. Not reviewed this visit Vitals BP Pulse Temp Resp Height(growth percentile) Weight(growth percentile) 130/76 (BP 1 Location: Left arm, BP Patient Position: Sitting) 61 96.5 °F (35.8 °C) (Oral) 18 5' 8\" (1.727 m) 172 lb 9.6 oz (78.3 kg) SpO2 BMI Smoking Status 95% 26.24 kg/m2 Current Every Day Smoker Vitals History BMI and BSA Data Body Mass Index Body Surface Area  
 26.24 kg/m 2 1.94 m 2 Preferred Pharmacy Pharmacy Name Phone [address], [address] AT [facility] OF  [address] [phone] Your Updated Medication List  
  
   
This list is accurate as of: 9/15/17 11:24 AM.  Always use your most recent med list.  
  
  
  
  
 clonazePAM 1 mg tablet Commonly known as:  Jerrlyn Heys Take 1 Tab by mouth two (2) times a day. Max Daily Amount: 2 mg. DULoxetine 60 mg capsule Commonly known as:  CYMBALTA Take 1 Cap by mouth daily. gabapentin 600 mg tablet Commonly known as:  NEURONTIN Take 1 Tab by mouth three (3) times daily. Glucosamine HCl 1,500 mg Tab Take 1,500 mg by mouth daily. naproxen 500 mg tablet Commonly known as:  NAPROSYN  
 Take 1 Tab by mouth two (2) times daily (with meals). Indications: Pain  
  
 prazosin 1 mg capsule Commonly known as:  MINIPRESS Take 1 Cap by mouth nightly. Indications: CHRONIC PTSD WITH TRAUMA NIGHTMARES Introducing [facility]! Jerrica Summers introduces OSSIANIX patient portal. Now you can access parts of your medical record, email your doctor's office, and request medication refills online. 1. In your internet browser, go to [email or web address] 2. Click on the First Time User? Click Here link in the Sign In box. You will see the New Member Sign Up page. 3. Enter your OSSIANIX Access Code exactly as it appears below. You will not need to use this code after youve completed the sign-up process. If you do not sign up before the expiration date, you must request a new code. · OSSIANIX Access Code: [email or web address] Expires: 9/28/2017  7:53 AM 
 
4. Enter the last four digits of your Social Security Number (xxxx) and Date of Birth (mm/dd/yyyy) as indicated and click Submit. You will be taken to the next sign-up page. 5. Create a OSSIANIX ID. This will be your OSSIANIX login ID and cannot be changed, so think of one that is secure and easy to remember. 6. Create a OSSIANIX password. You can change your password at any time. 7. Enter your Password Reset Question and Answer. This can be used at a later time if you forget your password. 8. Enter your e-mail address. You will receive e-mail notification when new information is available in [address] 9. Click Sign Up. You can now view and download portions of your medical record. 10. Click the Download Summary menu link to download a portable copy of your medical information. If you have questions, please visit the Frequently Asked Questions section of the OSSIANIX website. Remember, OSSIANIX is NOT to be used for urgent needs. For medical emergencies, dial 911. Now available from your iPhone and Android! Please provide this summary of care documentation to your next provider. Your primary care clinician is listed as Taisha Cruz. If you have any questions after today's visit, please call [phone].

## 2017-09-15 NOTE — PROGRESS NOTES
Jorje Turcios is a 71 y.o. male who presents with the following:  Chief Complaint   Patient presents with    COPD    Post Traumatic Stress Disorder       COPD   The history is provided by the patient (Patient continues to smoke does have COPD but is not coughing or wheezing at this time). Pertinent negatives include no chest pain, no abdominal pain, no headaches and no shortness of breath. Post Traumatic Stress Disorder   The history is provided by the patient (Patient states the prazosin was giving him nightmares so he stopped it and is doing better on just the clonazepam twice a day. The patient is in a month early ). Pertinent negatives include no chest pain, no abdominal pain, no headaches and no shortness of breath. No Known Allergies    Current Outpatient Prescriptions   Medication Sig    clonazePAM (KLONOPIN) 1 mg tablet Take 1 Tab by mouth two (2) times a day. Max Daily Amount: 2 mg.  naproxen (NAPROSYN) 500 mg tablet Take 1 Tab by mouth two (2) times daily (with meals). Indications: Pain    DULoxetine (CYMBALTA) 60 mg capsule Take 1 Cap by mouth daily.  gabapentin (NEURONTIN) 600 mg tablet Take 1 Tab by mouth three (3) times daily.  prazosin (MINIPRESS) 1 mg capsule Take 1 Cap by mouth nightly. Indications: CHRONIC PTSD WITH TRAUMA NIGHTMARES    Glucosamine HCl 1,500 mg tab Take 1,500 mg by mouth daily. No current facility-administered medications for this visit.         Past Medical History:   Diagnosis Date    Anxiety     Back pain     Blurred vision     Chest pain     Depression     Dizziness     Fast heart beat     Frequent headaches     Frequent urination     Joint pain     Joint swelling     Muscle pain     Recent change in weight     Sinus problem     Skin disease     SOB (shortness of breath)     Sore throat     Stiffness of joints, multiple sites     Stress     Tiredness     Trouble in sleeping     Weakness        Past Surgical History:   Procedure Laterality Date    HX HIP REPLACEMENT Bilateral 1993       Family History   Problem Relation Age of Onset    Cancer Mother     Cancer Maternal Aunt        Social History     Social History    Marital status: SINGLE     Spouse name: N/A    Number of children: N/A    Years of education: N/A     Social History Main Topics    Smoking status: Current Every Day Smoker     Packs/day: 1.00     Years: 50.00    Smokeless tobacco: Never Used    Alcohol use 16.8 oz/week     28 Cans of beer per week    Drug use: No    Sexual activity: No     Other Topics Concern    None     Social History Narrative       Review of Systems   Constitutional: Negative for malaise/fatigue. HENT: Negative for congestion. Respiratory: Negative for cough and shortness of breath. Cardiovascular: Negative for chest pain. Gastrointestinal: Negative for abdominal pain. Musculoskeletal: Positive for myalgias (Patient complains of cramping especially in the right calf after he has been walking a ways and he still smoking). Neurological: Negative for headaches. Visit Vitals    /76 (BP 1 Location: Left arm, BP Patient Position: Sitting)    Pulse 61    Temp 96.5 °F (35.8 °C) (Oral)    Resp 18    Ht 5' 8\" (1.727 m)    Wt 172 lb 9.6 oz (78.3 kg)    SpO2 95%    BMI 26.24 kg/m2     Physical Exam   Constitutional: He is oriented to person, place, and time and well-developed, well-nourished, and in no distress. HENT:   Head: Normocephalic and atraumatic. Nose: Nose normal.   Mouth/Throat: Oropharynx is clear and moist.   Eyes: Conjunctivae and EOM are normal. Pupils are equal, round, and reactive to light. Neck: Normal range of motion. Neck supple. No JVD present. No tracheal deviation present. No thyromegaly present. Cardiovascular: Normal rate, regular rhythm, normal heart sounds and intact distal pulses. Exam reveals no gallop and no friction rub. No murmur heard.   Pulmonary/Chest: Effort normal and breath sounds normal. No respiratory distress. He has no wheezes. He has no rales. He exhibits no tenderness. Abdominal: Soft. Bowel sounds are normal. He exhibits no distension and no mass. There is no tenderness. There is no rebound and no guarding. Musculoskeletal: Normal range of motion. He exhibits no edema or tenderness. Pulses are decreased in the lower extremities   Lymphadenopathy:     He has no cervical adenopathy. Neurological: He is alert and oriented to person, place, and time. He has normal reflexes. No cranial nerve deficit. He exhibits normal muscle tone. Gait normal. Coordination normal.   Skin: Skin is warm and dry. No rash noted. No erythema. Psychiatric: Mood, affect and judgment normal.   Vitals reviewed. ICD-10-CM ICD-9-CM    1. PTSD (post-traumatic stress disorder) F43.10 309.81 DUPLEX LOWER EXT ARTERY BILAT   2.  Claudication of right lower extremity (HCC) I73.9 443.9 DUPLEX LOWER EXT ARTERY BILAT       Orders Placed This Encounter    DUPLEX LOWER EXT ARTERY BILAT     Standing Status:   Future     Standing Expiration Date:   10/15/2018   I told the patient to give us a call when he is running out of his clonazepam in October or when he comes in for his flu shot in October and we will lined him up for 2 months of clonazepam which will get him back on track    Follow-up Disposition: Not on Asmita Richardson MD

## 2017-09-17 DIAGNOSIS — F43.10 PTSD (POST-TRAUMATIC STRESS DISORDER): ICD-10-CM

## 2017-09-17 DIAGNOSIS — M48.061 LUMBAR SPINAL STENOSIS: ICD-10-CM

## 2017-09-19 RX ORDER — CLONAZEPAM 1 MG/1
TABLET ORAL
Qty: 180 TAB | Refills: 0 | Status: SHIPPED | OUTPATIENT
Start: 2017-09-19 | End: 2017-09-30 | Stop reason: SDUPTHER

## 2017-09-19 RX ORDER — NAPROXEN 500 MG/1
TABLET ORAL
Qty: 60 TAB | Refills: 0 | Status: SHIPPED | OUTPATIENT
Start: 2017-09-19 | End: 2017-10-17 | Stop reason: SDUPTHER

## 2017-09-20 ENCOUNTER — DOCUMENTATION ONLY (OUTPATIENT)
Dept: FAMILY MEDICINE CLINIC | Age: 72
End: 2017-09-20

## 2017-09-30 DIAGNOSIS — F43.10 PTSD (POST-TRAUMATIC STRESS DISORDER): ICD-10-CM

## 2017-10-02 ENCOUNTER — TELEPHONE (OUTPATIENT)
Dept: FAMILY MEDICINE CLINIC | Age: 72
End: 2017-10-02

## 2017-10-02 RX ORDER — CLONAZEPAM 1 MG/1
TABLET ORAL
Qty: 180 TAB | Refills: 0 | Status: SHIPPED | OUTPATIENT
Start: 2017-10-02 | End: 2017-10-02

## 2017-10-02 NOTE — TELEPHONE ENCOUNTER
Pts wife called, we sent RX for Clonazepam to [facility] on 9/20/17 for refill. He has not received it yet and will be out tomorrow, told wife we will call them and see if they received fax and if it is in route to them. She also wanted a refill on Prazosin, I told her on 9/15 he told Dr. Rogers Mccullough he wasn't taking that anymore because of the nightmares, she states he has started again because he is not sleeping.

## 2017-10-03 NOTE — TELEPHONE ENCOUNTER
Spoke with the [place] and they stated they did not have the patient on file.  was checked and patient has not filled since Aug. 2017.   Faxed rx to [facility] for patient to make aware

## 2017-10-17 DIAGNOSIS — M48.061 LUMBAR SPINAL STENOSIS: ICD-10-CM

## 2017-10-17 RX ORDER — NAPROXEN 500 MG/1
TABLET ORAL
Qty: 60 TAB | Refills: 0 | Status: SHIPPED | OUTPATIENT
Start: 2017-10-17 | End: 2017-12-03 | Stop reason: SDUPTHER

## 2017-11-01 ENCOUNTER — OFFICE VISIT (OUTPATIENT)
Dept: FAMILY MEDICINE CLINIC | Age: 72
End: 2017-11-01

## 2017-11-01 VITALS
OXYGEN SATURATION: 96 % | DIASTOLIC BLOOD PRESSURE: 62 MMHG | HEIGHT: 68 IN | TEMPERATURE: 96.3 F | RESPIRATION RATE: 18 BRPM | HEART RATE: 95 BPM | BODY MASS INDEX: 25.76 KG/M2 | SYSTOLIC BLOOD PRESSURE: 116 MMHG | WEIGHT: 170 LBS

## 2017-11-01 DIAGNOSIS — Z71.6 ENCOUNTER FOR TOBACCO USE CESSATION COUNSELING: ICD-10-CM

## 2017-11-01 DIAGNOSIS — Z72.0 TOBACCO ABUSE: Primary | ICD-10-CM

## 2017-11-01 RX ORDER — VARENICLINE TARTRATE 1 MG/1
TABLET, FILM COATED ORAL
Qty: 60 TAB | Refills: 7 | Status: SHIPPED | OUTPATIENT
Start: 2017-11-01 | End: 2017-12-21 | Stop reason: ALTCHOICE

## 2017-11-01 RX ORDER — VARENICLINE TARTRATE 25 MG
KIT ORAL
Qty: 1 DOSE PACK | Refills: 0 | Status: SHIPPED | OUTPATIENT
Start: 2017-11-01 | End: 2017-12-21 | Stop reason: ALTCHOICE

## 2017-11-01 NOTE — PROGRESS NOTES
Mely Booth is a 71 y.o. male who presents with the following:  Chief Complaint   Patient presents with    Nicotine Dependence     wants to discuss smoking cestation       Nicotine Dependence   The history is provided by the patient (Patient desires counseling for stopping nicotine products and would like to try Chantix). Pertinent negatives include no chest pain, no abdominal pain, no headaches and no shortness of breath. No Known Allergies    Current Outpatient Prescriptions   Medication Sig    varenicline (CHANTIX STARTER MAHAD) 0.5 mg (11)- 1 mg (42) DsPk Take as directed by the instructions sheet    varenicline (CHANTIX) 1 mg tablet 1 pill in the morning and 1 pill in the evening    naproxen (NAPROSYN) 500 mg tablet TAKE 1 TABLET BY MOUTH TWICE DAILY WITH MEALS FOR PAIN    gabapentin (NEURONTIN) 600 mg tablet TAKE 1 TABLET BY MOUTH THREE TIMES DAILY    naproxen (NAPROSYN) 500 mg tablet Take 1 Tab by mouth two (2) times daily (with meals). Indications: Pain    DULoxetine (CYMBALTA) 60 mg capsule Take 1 Cap by mouth daily.  gabapentin (NEURONTIN) 600 mg tablet Take 1 Tab by mouth three (3) times daily. No current facility-administered medications for this visit.         Past Medical History:   Diagnosis Date    Anxiety     Back pain     Blurred vision     Chest pain     Depression     Dizziness     Fast heart beat     Frequent headaches     Frequent urination     Joint pain     Joint swelling     Muscle pain     Recent change in weight     Sinus problem     Skin disease     SOB (shortness of breath)     Sore throat     Stiffness of joints, multiple sites     Stress     Tiredness     Trouble in sleeping     Weakness        Past Surgical History:   Procedure Laterality Date    HX HIP REPLACEMENT Bilateral 1993       Family History   Problem Relation Age of Onset    Cancer Mother     Cancer Maternal Aunt        Social History     Social History    Marital status: SINGLE Spouse name: N/A    Number of children: N/A    Years of education: N/A     Social History Main Topics    Smoking status: Current Every Day Smoker     Packs/day: 1.00     Years: 50.00    Smokeless tobacco: Never Used    Alcohol use 16.8 oz/week     28 Cans of beer per week    Drug use: No    Sexual activity: No     Other Topics Concern    None     Social History Narrative       Review of Systems   Respiratory: Negative for shortness of breath. Cardiovascular: Negative for chest pain. Gastrointestinal: Negative for abdominal pain. Neurological: Negative for headaches. Visit Vitals    /62 (BP 1 Location: Left arm)    Pulse 95    Temp 96.3 °F (35.7 °C)    Resp 18    Ht 5' 8\" (1.727 m)    Wt 170 lb (77.1 kg)    SpO2 96%    BMI 25.85 kg/m2     Physical Exam   Constitutional: He is oriented to person, place, and time and well-developed, well-nourished, and in no distress. HENT:   Head: Normocephalic and atraumatic. Nose: Nose normal.   Mouth/Throat: Oropharynx is clear and moist.   Eyes: Conjunctivae and EOM are normal. Pupils are equal, round, and reactive to light. Neck: Normal range of motion. Neck supple. No JVD present. No tracheal deviation present. No thyromegaly present. Cardiovascular: Normal rate, regular rhythm, normal heart sounds and intact distal pulses. Exam reveals no gallop and no friction rub. No murmur heard. Pulmonary/Chest: Effort normal and breath sounds normal. No respiratory distress. He has no wheezes. He has no rales. He exhibits no tenderness. Abdominal: Soft. Bowel sounds are normal. He exhibits no distension and no mass. There is no tenderness. There is no rebound and no guarding. Musculoskeletal: Normal range of motion. He exhibits no edema or tenderness. Lymphadenopathy:     He has no cervical adenopathy. Neurological: He is alert and oriented to person, place, and time. He has normal reflexes. No cranial nerve deficit.  He exhibits normal muscle tone. Gait normal. Coordination normal.   Skin: Skin is warm and dry. No rash noted. No erythema. Psychiatric: Mood, memory, affect and judgment normal.   Vitals reviewed. ICD-10-CM ICD-9-CM    1. Tobacco abuse Z72.0 305.1 varenicline (CHANTIX STARTER MAHAD) 0.5 mg (11)- 1 mg (42) DsPk      varenicline (CHANTIX) 1 mg tablet   2. Encounter for tobacco use cessation counseling Z71.6 V65.42        Orders Placed This Encounter    varenicline (CHANTIX STARTER MAHAD) 0.5 mg (11)- 1 mg (42) DsPk     Sig: Take as directed by the instructions sheet     Dispense:  1 Dose Pack     Refill:  0    varenicline (CHANTIX) 1 mg tablet     Si pill in the morning and 1 pill in the evening     Dispense:  60 Tab     Refill:  7   Patient was counseled on various methods of stopping smoking and he is elected to try Chantix. I have gone over with him the instructions for the starter pack and how to take the first 11 white pills which are 0.5 mg each and after completing these to go to the 1 mg Blue tablets and remain on these until he is confident that he will not need to smoke again. The patient is told that he may smoke the first 7 days he is on the medication but starting on day 8 he is to stop all smoking. We also discussed the patient's PTSD and that could contribute to him having more nightmares for which we would have to try prazosin to stop these. Additionally, the patient was told should he develop severe depression on the medication he would have to stop it.   Follow-up Disposition: Not on Odalis Toledo MD

## 2017-11-01 NOTE — MR AVS SNAPSHOT
Visit Information Date & Time Provider Department Dept. Phone Encounter #  
 11/1/2017  1:00 PM William Tovar MD [place] [id number] Primary Care [phone] [id number] Upcoming Health Maintenance Date Due Hepatitis C Screening 1945 FOBT Q 1 YEAR AGE 50-75 6/20/1996 ZOSTER VACCINE AGE 60> 4/20/2006 GLAUCOMA SCREENING Q2Y 6/20/2011 MEDICARE YEARLY EXAM 4/26/2018 Pneumococcal 65+ Low/Medium Risk (2 of 2 - PPSV23) 9/15/2018 DTaP/Tdap/Td series (2 - Td) 9/15/2027 Allergies as of 11/1/2017  Review Complete On: 11/1/2017 By: William Tovar MD  
 No Known Allergies Current Immunizations  Never Reviewed No immunizations on file. Not reviewed this visit You Were Diagnosed With   
  
 Codes Comments Tobacco abuse    -  Primary ICD-10-CM: Z72.0 ICD-9-CM: 305.1 Vitals BP Pulse Temp Resp Height(growth percentile) Weight(growth percentile) 116/62 (BP 1 Location: Left arm) 95 96.3 °F (35.7 °C) 18 5' 8\" (1.727 m) 170 lb (77.1 kg) SpO2 BMI Smoking Status 96% 25.85 kg/m2 Current Every Day Smoker Vitals History BMI and BSA Data Body Mass Index Body Surface Area  
 25.85 kg/m 2 1.92 m 2 Preferred Pharmacy Pharmacy Name Phone [address], [address] AT [facility] OF  [address]. Shiva Bender [phone] Your Updated Medication List  
  
   
This list is accurate as of: 11/1/17  1:44 PM.  Always use your most recent med list.  
  
  
  
  
 DULoxetine 60 mg capsule Commonly known as:  CYMBALTA Take 1 Cap by mouth daily. * gabapentin 600 mg tablet Commonly known as:  NEURONTIN Take 1 Tab by mouth three (3) times daily. * gabapentin 600 mg tablet Commonly known as:  NEURONTIN  
TAKE 1 TABLET BY MOUTH THREE TIMES DAILY  
  
 * naproxen 500 mg tablet Commonly known as:  NAPROSYN  
 Take 1 Tab by mouth two (2) times daily (with meals). Indications: Pain * naproxen 500 mg tablet Commonly known as:  NAPROSYN  
TAKE 1 TABLET BY MOUTH TWICE DAILY WITH MEALS FOR PAIN  
  
 * varenicline 0.5 mg (11)- 1 mg (42) Dspk Commonly known as:  CHANTIX STARTER MAHAD Take as directed by the instructions sheet * varenicline 1 mg tablet Commonly known as:  [place] Julianne 1 pill in the morning and 1 pill in the evening * Notice: This list has 6 medication(s) that are the same as other medications prescribed for you. Read the directions carefully, and ask your doctor or other care provider to review them with you. Prescriptions Printed Refills  
 varenicline (CHANTIX STARTER MAHAD) 0.5 mg (11)- 1 mg (42) DsPk 0 Sig: Take as directed by the instructions sheet Class: Print  
 varenicline (CHANTIX) 1 mg tablet 7 Si pill in the morning and 1 pill in the evening Class: Print Introducing [facility]! Lyndsey Bruce introduces Sparkbrowser patient portal. Now you can access parts of your medical record, email your doctor's office, and request medication refills online. 1. In your internet browser, go to [email or web address] 2. Click on the First Time User? Click Here link in the Sign In box. You will see the New Member Sign Up page. 3. Enter your Sparkbrowser Access Code exactly as it appears below. You will not need to use this code after youve completed the sign-up process. If you do not sign up before the expiration date, you must request a new code. · Sparkbrowser Access Code: [email or web address] Expires: 2018  8:56 AM 
 
4. Enter the last four digits of your Social Security Number (xxxx) and Date of Birth (mm/dd/yyyy) as indicated and click Submit. You will be taken to the next sign-up page. 5. Create a Sparkbrowser ID. This will be your Sparkbrowser login ID and cannot be changed, so think of one that is secure and easy to remember. 6. Create a Lola Pirindola password. You can change your password at any time. 7. Enter your Password Reset Question and Answer. This can be used at a later time if you forget your password. 8. Enter your e-mail address. You will receive e-mail notification when new information is available in [address] 9. Click Sign Up. You can now view and download portions of your medical record. 10. Click the Download Summary menu link to download a portable copy of your medical information. If you have questions, please visit the Frequently Asked Questions section of the Lola Pirindola website. Remember, Lola Pirindola is NOT to be used for urgent needs. For medical emergencies, dial 911. Now available from your iPhone and Android! Please provide this summary of care documentation to your next provider. Your primary care clinician is listed as Brianda Tomas. If you have any questions after today's visit, please call [phone].

## 2017-11-03 DIAGNOSIS — F43.10 PTSD (POST-TRAUMATIC STRESS DISORDER): ICD-10-CM

## 2017-11-07 RX ORDER — PRAZOSIN HYDROCHLORIDE 1 MG/1
CAPSULE ORAL
Qty: 30 CAP | Refills: 0 | Status: SHIPPED | OUTPATIENT
Start: 2017-11-07 | End: 2017-12-09 | Stop reason: SDUPTHER

## 2017-11-20 DIAGNOSIS — F43.10 PTSD (POST-TRAUMATIC STRESS DISORDER): ICD-10-CM

## 2017-11-20 RX ORDER — DULOXETIN HYDROCHLORIDE 60 MG/1
CAPSULE, DELAYED RELEASE ORAL
Qty: 30 CAP | Refills: 0 | Status: SHIPPED | OUTPATIENT
Start: 2017-11-20 | End: 2017-12-24 | Stop reason: SDUPTHER

## 2017-12-09 DIAGNOSIS — F43.10 PTSD (POST-TRAUMATIC STRESS DISORDER): ICD-10-CM

## 2017-12-12 RX ORDER — PRAZOSIN HYDROCHLORIDE 1 MG/1
CAPSULE ORAL
Qty: 30 CAP | Refills: 0 | Status: SHIPPED | OUTPATIENT
Start: 2017-12-12 | End: 2017-12-21 | Stop reason: SINTOL

## 2017-12-21 ENCOUNTER — OFFICE VISIT (OUTPATIENT)
Dept: FAMILY MEDICINE CLINIC | Age: 72
End: 2017-12-21

## 2017-12-21 VITALS
RESPIRATION RATE: 18 BRPM | SYSTOLIC BLOOD PRESSURE: 94 MMHG | DIASTOLIC BLOOD PRESSURE: 53 MMHG | TEMPERATURE: 96.8 F | OXYGEN SATURATION: 97 % | HEIGHT: 68 IN | WEIGHT: 172.5 LBS | HEART RATE: 61 BPM | BODY MASS INDEX: 26.14 KG/M2

## 2017-12-21 DIAGNOSIS — Z98.890 S/P ROTATOR CUFF REPAIR: ICD-10-CM

## 2017-12-21 DIAGNOSIS — R55 VASOVAGAL SYNCOPE: ICD-10-CM

## 2017-12-21 DIAGNOSIS — M25.511 ACUTE PAIN OF RIGHT SHOULDER: ICD-10-CM

## 2017-12-21 DIAGNOSIS — Z91.81 RISK FOR FALLS: ICD-10-CM

## 2017-12-21 DIAGNOSIS — F43.10 PTSD (POST-TRAUMATIC STRESS DISORDER): ICD-10-CM

## 2017-12-21 DIAGNOSIS — F32.9 REACTIVE DEPRESSION: Primary | ICD-10-CM

## 2017-12-21 RX ORDER — GABAPENTIN 600 MG/1
600 TABLET ORAL 2 TIMES DAILY
Qty: 90 TAB | Refills: 1
Start: 2017-12-21 | End: 2018-09-10

## 2017-12-21 RX ORDER — CLONAZEPAM 1 MG/1
1 TABLET ORAL
Qty: 90 TAB | Refills: 1
Start: 2017-12-21 | End: 2018-01-31 | Stop reason: SDUPTHER

## 2017-12-21 NOTE — PROGRESS NOTES
Geraldo Knowles is a 71 y.o. male who presents with the following:  Chief Complaint   Patient presents with    Loss of Consciousness     passed out (X 3 over last couple of weeks), had rotator cuff surgery in Nov, Followed by Neuro and will be doing MRI    Dizziness     weakness and unsteady gait    Anxiety    Memory Loss     recently    Sleep Problem     increased sleep    Depression       Loss of Consciousness    The history is provided by the patient (syncopal spell that was orthostatic and vasovagal anound lunch in past few days). Associated symptoms include malaise/fatigue, dizziness and light-headedness. Pertinent negatives include no visual change, no chest pain, no palpitations, no clumsiness, no fever, no abdominal pain, no bladder incontinence, no congestion, no headaches, no seizures, no slurred speech and no head injury. Dizziness    The history is provided by the patient (lightheaded-related to pain and prazocin). Associated symptoms include malaise/fatigue, dizziness and light-headedness. Pertinent negatives include no visual change, no chest pain, no palpitations, no clumsiness, no fever, no abdominal pain, no bladder incontinence, no congestion, no headaches, no seizures, no slurred speech and no head injury. Anxiety   The history is provided by the patient (pt with ptsd was placed on clonazepam bid). Pertinent negatives include no chest pain, no abdominal pain, no headaches and no shortness of breath. Memory Loss    The history is provided by the patient (worsened on bid clonazepam). Pertinent negatives include no seizures and no tingling. Sleep Problem   The history is provided by the patient (trouble getting and staying asleep). Pertinent negatives include no chest pain, no abdominal pain, no headaches and no shortness of breath. Depression   The history is provided by the patient (pt feeling a little blue post surgery not resolving as fast as he hoped).  Pertinent negatives include no chest pain, no abdominal pain, no headaches and no shortness of breath. No Known Allergies    Current Outpatient Prescriptions   Medication Sig    gabapentin (NEURONTIN) 600 mg tablet Take 1 Tab by mouth two (2) times a day.  clonazePAM (KLONOPIN) 1 mg tablet Take 1 Tab by mouth nightly. Max Daily Amount: 1 mg.  naproxen (NAPROSYN) 500 mg tablet TAKE 1 TABLET BY MOUTH TWICE DAILY WITH MEALS FOR PAIN    DULoxetine (CYMBALTA) 60 mg capsule TAKE 1 CAPSULE BY MOUTH DAILY     No current facility-administered medications for this visit. Past Medical History:   Diagnosis Date    Anxiety     Back pain     Blurred vision     Chest pain     Depression     Dizziness     Fast heart beat     Frequent headaches     Frequent urination     Joint pain     Joint swelling     Muscle pain     Recent change in weight     Sinus problem     Skin disease     SOB (shortness of breath)     Sore throat     Stiffness of joints, multiple sites     Stress     Tiredness     Trouble in sleeping     Weakness        Past Surgical History:   Procedure Laterality Date    HX HIP REPLACEMENT Bilateral 1993    HX ROTATOR CUFF REPAIR Right 11/16/2017       Family History   Problem Relation Age of Onset    Cancer Mother     Cancer Maternal Aunt        Social History     Social History    Marital status: SINGLE     Spouse name: N/A    Number of children: N/A    Years of education: N/A     Social History Main Topics    Smoking status: Former Smoker     Packs/day: 1.00     Years: 50.00     Types: Cigarettes     Quit date: 11/23/2017    Smokeless tobacco: Never Used    Alcohol use 16.8 oz/week     28 Cans of beer per week    Drug use: No    Sexual activity: No     Other Topics Concern    None     Social History Narrative       Review of Systems   Constitutional: Positive for malaise/fatigue. Negative for chills, fever and weight loss.    HENT: Negative for congestion, hearing loss, sore throat and tinnitus. Eyes: Negative for blurred vision, pain and discharge. Respiratory: Negative for cough, shortness of breath and wheezing. Cardiovascular: Negative for chest pain, palpitations, orthopnea, claudication and leg swelling. Gastrointestinal: Negative for abdominal pain, constipation and heartburn. Genitourinary: Negative for bladder incontinence, dysuria, frequency and urgency. Musculoskeletal: Negative for falls, joint pain and myalgias. Skin: Negative for itching and rash. Neurological: Positive for dizziness, loss of consciousness and light-headedness. Negative for tingling, tremors, seizures and headaches. Endo/Heme/Allergies: Negative for environmental allergies and polydipsia. Psychiatric/Behavioral: Positive for depression and memory loss. Negative for substance abuse. The patient is nervous/anxious. Visit Vitals    BP 94/53 (BP 1 Location: Left arm, BP Patient Position: Standing)    Pulse 61    Temp 96.8 °F (36 °C) (Oral)    Resp 18    Ht 5' 8\" (1.727 m)    Wt 172 lb 8 oz (78.2 kg)    SpO2 97%    BMI 26.23 kg/m2     Physical Exam   Constitutional: He is oriented to person, place, and time and well-developed, well-nourished, and in no distress. HENT:   Head: Normocephalic and atraumatic. Nose: Nose normal.   Mouth/Throat: Oropharynx is clear and moist.   Eyes: Conjunctivae and EOM are normal. Pupils are equal, round, and reactive to light. Neck: Normal range of motion. Neck supple. No JVD present. No tracheal deviation present. No thyromegaly present. Cardiovascular: Normal rate, regular rhythm, normal heart sounds and intact distal pulses. Exam reveals no gallop and no friction rub. No murmur heard. Pulmonary/Chest: Effort normal and breath sounds normal. No respiratory distress. He has no wheezes. He has no rales. He exhibits no tenderness. Abdominal: Soft. Bowel sounds are normal. He exhibits no distension and no mass. There is no tenderness.  There is no rebound and no guarding. Musculoskeletal: Normal range of motion. He exhibits deformity (right arm in a sling). He exhibits no edema or tenderness. Lymphadenopathy:     He has no cervical adenopathy. Neurological: He is alert and oriented to person, place, and time. He has normal reflexes. No cranial nerve deficit. He exhibits normal muscle tone. Gait normal. Coordination normal.   Skin: Skin is warm and dry. No rash noted. No erythema. Psychiatric: Mood, memory, affect and judgment normal.   Vitals reviewed. ICD-10-CM ICD-9-CM    1. Reactive depression F32.9 300.4 clonazePAM (KLONOPIN) 1 mg tablet   2. Vasovagal syncope R55 780.2    3. Acute pain of right shoulder M25.511 719.41 gabapentin (NEURONTIN) 600 mg tablet   4. S/P rotator cuff repair Z98.890 V45.89    5. PTSD (post-traumatic stress disorder) F43.10 309.81    6. Risk for falls Z91.81 V15.88        Orders Placed This Encounter    gabapentin (NEURONTIN) 600 mg tablet     Sig: Take 1 Tab by mouth two (2) times a day. Dispense:  90 Tab     Refill:  1    clonazePAM (KLONOPIN) 1 mg tablet     Sig: Take 1 Tab by mouth nightly. Max Daily Amount: 1 mg.      Dispense:  90 Tab     Refill:  1   continue other meds as noted    Follow-up Disposition: Not on Jr Horton MD

## 2017-12-21 NOTE — MR AVS SNAPSHOT
Visit Information Date & Time Provider Department Dept. Phone Encounter #  
 12/21/2017  1:20 PM Nando Otoole MD [facility] [phone] [id number] Upcoming Health Maintenance Date Due Hepatitis C Screening 1945 FOBT Q 1 YEAR AGE 50-75 6/20/1996 ZOSTER VACCINE AGE 60> 4/20/2006 GLAUCOMA SCREENING Q2Y 6/20/2011 MEDICARE YEARLY EXAM 4/26/2018 Pneumococcal 65+ Low/Medium Risk (2 of 2 - PPSV23) 9/15/2018 DTaP/Tdap/Td series (2 - Td) 9/15/2027 Allergies as of 12/21/2017  Review Complete On: 12/21/2017 By: Nando Otoole MD  
 No Known Allergies Current Immunizations  Never Reviewed No immunizations on file. Not reviewed this visit You Were Diagnosed With   
  
 Codes Comments Reactive depression    -  Primary ICD-10-CM: F32.9 ICD-9-CM: 300.4 Vasovagal syncope     ICD-10-CM: R55 
ICD-9-CM: 780. 2 Vitals BP Pulse Temp Resp Height(growth percentile) Weight(growth percentile) 94/53 (BP 1 Location: Left arm, BP Patient Position: Standing) 61 96.8 °F (36 °C) (Oral) 18 5' 8\" (1.727 m) 172 lb 8 oz (78.2 kg) SpO2 BMI Smoking Status 97% 26.23 kg/m2 Former Smoker Vitals History BMI and BSA Data Body Mass Index Body Surface Area  
 26.23 kg/m 2 1.94 m 2 Preferred Pharmacy Pharmacy Name Phone [address], [address] AT [facility]  [address] Joanna Calderon [phone] Your Updated Medication List  
  
   
This list is accurate as of: 12/21/17  2:11 PM.  Always use your most recent med list.  
  
  
  
  
 DULoxetine 60 mg capsule Commonly known as:  CYMBALTA TAKE 1 CAPSULE BY MOUTH DAILY * gabapentin 600 mg tablet Commonly known as:  NEURONTIN Take 1 Tab by mouth three (3) times daily. * gabapentin 600 mg tablet Commonly known as:  NEURONTIN  
TAKE 1 TABLET BY MOUTH THREE TIMES DAILY * naproxen 500 mg tablet Commonly known as:  NAPROSYN Take 1 Tab by mouth two (2) times daily (with meals). Indications: Pain * naproxen 500 mg tablet Commonly known as:  NAPROSYN  
TAKE 1 TABLET BY MOUTH TWICE DAILY WITH MEALS FOR PAIN  
  
 prazosin 1 mg capsule Commonly known as:  MINIPRESS  
TAKE 1 CAPSULE BY MOUTH NIGHLTY * varenicline 0.5 mg (11)- 1 mg (42) Dspk Commonly known as:  CHANTIX STARTER MAHAD Take as directed by the instructions sheet * varenicline 1 mg tablet Commonly known as:  Ezio Barge 1 pill in the morning and 1 pill in the evening * Notice: This list has 6 medication(s) that are the same as other medications prescribed for you. Read the directions carefully, and ask your doctor or other care provider to review them with you. Introducing [facility]! Yamil Carter introduces Tem[employer] AI patient portal. Now you can access parts of your medical record, email your doctor's office, and request medication refills online. 1. In your internet browser, go to [email or web address] 2. Click on the First Time User? Click Here link in the Sign In box. You will see the New Member Sign Up page. 3. Enter your Tempo AI Access Code exactly as it appears below. You will not need to use this code after youve completed the sign-up process. If you do not sign up before the expiration date, you must request a new code. · Tempo AI Access Code: [email or web address] Expires: 1/24/2018  7:56 AM 
 
4. Enter the last four digits of your Social Security Number (xxxx) and Date of Birth (mm/dd/yyyy) as indicated and click Submit. You will be taken to the next sign-up page. 5. Create a Tempo AI ID. This will be your Tempo AI login ID and cannot be changed, so think of one that is secure and easy to remember. 6. Create a Tempo AI password. You can change your password at any time. 7. Enter your Password Reset Question and Answer.  This can be used at a later time if you forget your password. 8. Enter your e-mail address. You will receive e-mail notification when new information is available in [address] 9. Click Sign Up. You can now view and download portions of your medical record. 10. Click the Download Summary menu link to download a portable copy of your medical information. If you have questions, please visit the Frequently Asked Questions section of the Iddiction website. Remember, Iddiction is NOT to be used for urgent needs. For medical emergencies, dial 911. Now available from your iPhone and Android! Please provide this summary of care documentation to your next provider. Your primary care clinician is listed as Lady Blair. If you have any questions after today's visit, please call [phone].

## 2018-01-18 ENCOUNTER — OFFICE VISIT (OUTPATIENT)
Dept: FAMILY MEDICINE CLINIC | Age: 73
End: 2018-01-18

## 2018-01-18 VITALS
TEMPERATURE: 96.2 F | HEIGHT: 68 IN | OXYGEN SATURATION: 67 % | WEIGHT: 176.38 LBS | BODY MASS INDEX: 26.73 KG/M2 | DIASTOLIC BLOOD PRESSURE: 85 MMHG | RESPIRATION RATE: 18 BRPM | SYSTOLIC BLOOD PRESSURE: 132 MMHG | HEART RATE: 67 BPM

## 2018-01-18 DIAGNOSIS — F32.9 REACTIVE DEPRESSION: Primary | ICD-10-CM

## 2018-01-18 DIAGNOSIS — F10.21 ALCOHOLISM IN REMISSION (HCC): ICD-10-CM

## 2018-01-18 DIAGNOSIS — F17.200 SMOKER: ICD-10-CM

## 2018-01-18 PROBLEM — J44.1 COPD EXACERBATION (HCC): Status: RESOLVED | Noted: 2017-04-25 | Resolved: 2018-01-18

## 2018-01-18 NOTE — MR AVS SNAPSHOT
[address] [id number] [phone] Patient: Vega Cisneros MRN: [id number] Visit Information Date & Time Provider Department Dept. Phone Encounter #  
 1/18/2018 10:40 AM Maggie Krishnan MD [facility] [phone] [id number] Upcoming Health Maintenance Date Due Hepatitis C Screening 1945 FOBT Q 1 YEAR AGE 50-75 6/20/1996 GLAUCOMA SCREENING Q2Y 6/20/2011 MEDICARE YEARLY EXAM 4/26/2018 Pneumococcal 65+ Low/Medium Risk (2 of 2 - PPSV23) 9/15/2018 DTaP/Tdap/Td series (2 - Td) 9/15/2027 Allergies as of 1/18/2018  Review Complete On: 1/18/2018 By: Maggie Krishnan MD  
 No Known Allergies Current Immunizations  Never Reviewed No immunizations on file. Not reviewed this visit You Were Diagnosed With   
  
 Codes Comments Reactive depression    -  Primary ICD-10-CM: F32.9 ICD-9-CM: 300.4 Vitals BP Pulse Temp Resp Height(growth percentile) Weight(growth percentile) 132/85 (BP 1 Location: Left arm, BP Patient Position: Sitting) 67 96.2 °F (35.7 °C) (Oral) 18 5' 8\" (1.727 m) 176 lb 6 oz (80 kg) SpO2 BMI Smoking Status (!) 67% 26.82 kg/m2 Former Smoker Vitals History BMI and BSA Data Body Mass Index Body Surface Area  
 26.82 kg/m 2 1.96 m 2 Preferred Pharmacy Pharmacy Name Phone [address], [address] [facility] AT [facility] OF  [address] & [address] [phone] Your Updated Medication List  
  
   
This list is accurate as of: 1/18/18 11:25 AM.  Always use your most recent med list.  
  
  
  
  
 clonazePAM 1 mg tablet Commonly known as:  Scott Radish Take 1 Tab by mouth nightly. Max Daily Amount: 1 mg. DULoxetine 60 mg capsule Commonly known as:  CYMBALTA TAKE 1 CAPSULE BY MOUTH DAILY  
  
 gabapentin 600 mg tablet Commonly known as:  NEURONTIN  
 Take 1 Tab by mouth two (2) times a day. naproxen 500 mg tablet Commonly known as:  NAPROSYN  
TAKE 1 TABLET BY MOUTH TWICE DAILY WITH MEALS FOR PAIN Introducing [facility]! Pratibha Rizzo introduces Ungalli patient portal. Now you can access parts of your medical record, email your doctor's office, and request medication refills online. 1. In your internet browser, go to [email or web address] 2. Click on the First Time User? Click Here link in the Sign In box. You will see the New Member Sign Up page. 3. Enter your Ungalli Access Code exactly as it appears below. You will not need to use this code after youve completed the sign-up process. If you do not sign up before the expiration date, you must request a new code. · Ungalli Access Code: [email or web address] Expires: 1/24/2018  7:56 AM 
 
4. Enter the last four digits of your Social Security Number (xxxx) and Date of Birth (mm/dd/yyyy) as indicated and click Submit. You will be taken to the next sign-up page. 5. Create a Ungalli ID. This will be your Ungalli login ID and cannot be changed, so think of one that is secure and easy to remember. 6. Create a Ungalli password. You can change your password at any time. 7. Enter your Password Reset Question and Answer. This can be used at a later time if you forget your password. 8. Enter your e-mail address. You will receive e-mail notification when new information is available in [address] 9. Click Sign Up. You can now view and download portions of your medical record. 10. Click the Download Summary menu link to download a portable copy of your medical information. If you have questions, please visit the Frequently Asked Questions section of the Ungalli website. Remember, Ungalli is NOT to be used for urgent needs. For medical emergencies, dial 911. Now available from your iPhone and Android! Please provide this summary of care documentation to your next provider. Your primary care clinician is listed as Nile Stanton. If you have any questions after today's visit, please call [phone].

## 2018-01-18 NOTE — PROGRESS NOTES
Broderick No is a 71 y.o. male who presents with the following:  Chief Complaint   Patient presents with    Post Traumatic Stress Disorder     4 week F/U    Shoulder Pain    Nicotine Dependence     discuss chantix       Post Traumatic Stress Disorder   The history is provided by the patient (Patient is doing much better from his depressive symptoms on the duloxetine and the reduced dose of clonazepam down to 1 nightly. Patient is having no despondency and no crying and is not drinking.). Pertinent negatives include no chest pain, no abdominal pain, no headaches and no shortness of breath. Shoulder Pain    The history is provided by the patient (Patient's right shoulder pain from a rotator cuff injury is doing better working with physical therapy and will try to improve the range of motion and this as it is decreased. ). Nicotine Dependence   The history is provided by the patient (Patient is stuck at 4 cigarettes a day and cannot get below this. Have talked to him about alternative methods from the Chantix to try to get him off cigarettes because of his COPD.). Pertinent negatives include no chest pain, no abdominal pain, no headaches and no shortness of breath. No Known Allergies    Current Outpatient Prescriptions   Medication Sig    DULoxetine (CYMBALTA) 60 mg capsule TAKE 1 CAPSULE BY MOUTH DAILY    gabapentin (NEURONTIN) 600 mg tablet Take 1 Tab by mouth two (2) times a day.  clonazePAM (KLONOPIN) 1 mg tablet Take 1 Tab by mouth nightly. Max Daily Amount: 1 mg.  naproxen (NAPROSYN) 500 mg tablet TAKE 1 TABLET BY MOUTH TWICE DAILY WITH MEALS FOR PAIN     No current facility-administered medications for this visit.         Past Medical History:   Diagnosis Date    Anxiety     Back pain     Blurred vision     Chest pain     Depression     Dizziness     Fast heart beat     Frequent headaches     Frequent urination     Joint pain     Joint swelling     Muscle pain     Recent change in weight     Sinus problem     Skin disease     SOB (shortness of breath)     Sore throat     Stiffness of joints, multiple sites     Stress     Tiredness     Trouble in sleeping     Weakness        Past Surgical History:   Procedure Laterality Date    HX HIP REPLACEMENT Bilateral 1993    HX ROTATOR CUFF REPAIR Right 11/16/2017       Family History   Problem Relation Age of Onset    Cancer Mother     Cancer Maternal Aunt        Social History     Social History    Marital status: SINGLE     Spouse name: N/A    Number of children: N/A    Years of education: N/A     Social History Main Topics    Smoking status: Former Smoker     Packs/day: 1.00     Years: 50.00     Types: Cigarettes     Quit date: 11/23/2017    Smokeless tobacco: Never Used    Alcohol use 16.8 oz/week     28 Cans of beer per week    Drug use: No    Sexual activity: No     Other Topics Concern    None     Social History Narrative       Review of Systems   Respiratory: Positive for cough. Negative for hemoptysis, shortness of breath and wheezing. Cardiovascular: Negative for chest pain. Gastrointestinal: Negative for abdominal pain. Neurological: Negative for headaches. Visit Vitals    /85 (BP 1 Location: Left arm, BP Patient Position: Sitting)    Pulse 67    Temp 96.2 °F (35.7 °C) (Oral)    Resp 18    Ht 5' 8\" (1.727 m)    Wt 176 lb 6 oz (80 kg)    SpO2 (!) 67%    BMI 26.82 kg/m2     Physical Exam   Constitutional: He is oriented to person, place, and time and well-developed, well-nourished, and in no distress. HENT:   Head: Normocephalic and atraumatic. Nose: Nose normal.   Mouth/Throat: Oropharynx is clear and moist.   Eyes: Conjunctivae and EOM are normal. Pupils are equal, round, and reactive to light. Neck: Normal range of motion. Neck supple. No JVD present. No tracheal deviation present. No thyromegaly present.    Cardiovascular: Normal rate, regular rhythm, normal heart sounds and intact distal pulses. Exam reveals no gallop and no friction rub. No murmur heard. Pulmonary/Chest: Effort normal. No respiratory distress. He has no wheezes. He has no rales. He exhibits no tenderness. Abdominal: Soft. Bowel sounds are normal. He exhibits no distension and no mass. There is no tenderness. There is no rebound and no guarding. Musculoskeletal: Normal range of motion. He exhibits no edema or tenderness. Lymphadenopathy:     He has no cervical adenopathy. Neurological: He is alert and oriented to person, place, and time. He has normal reflexes. No cranial nerve deficit. He exhibits normal muscle tone. Gait normal. Coordination normal.   Skin: Skin is warm and dry. No rash noted. No erythema. Psychiatric: Mood, memory, affect and judgment normal.   Vitals reviewed. ICD-10-CM ICD-9-CM    1. Reactive depression F32.9 300.4    2. Smoker F17.200 305.1    3. [facility] F10.21 303.93        No orders of the defined types were placed in this encounter. Spoken with the patient about smoking cessation using Nicorette gum to totally replace all smoking and to keep it as low a dose as possible i.e. 4 per day. After 8 months the patient is to contact me about starting Chantix again and the first week he will chew gum normally using the Nicorette but starting the second week he will switch to dentine entirely and finished the month of Chantix.     Follow-up Disposition: Not on Solis Adamson MD

## 2018-01-31 DIAGNOSIS — F32.9 REACTIVE DEPRESSION: ICD-10-CM

## 2018-01-31 RX ORDER — CLONAZEPAM 1 MG/1
TABLET ORAL
Qty: 180 TAB | Refills: 0 | Status: SHIPPED | OUTPATIENT
Start: 2018-01-31 | End: 2018-07-31 | Stop reason: SDUPTHER

## 2018-02-02 DIAGNOSIS — M48.061 LUMBAR SPINAL STENOSIS: ICD-10-CM

## 2018-02-02 RX ORDER — NAPROXEN 500 MG/1
TABLET ORAL
Qty: 60 TAB | Refills: 0 | Status: SHIPPED | OUTPATIENT
Start: 2018-02-02 | End: 2018-03-02 | Stop reason: SDUPTHER

## 2018-02-28 ENCOUNTER — OFFICE VISIT (OUTPATIENT)
Dept: FAMILY MEDICINE CLINIC | Age: 73
End: 2018-02-28

## 2018-02-28 VITALS
WEIGHT: 172.8 LBS | DIASTOLIC BLOOD PRESSURE: 76 MMHG | RESPIRATION RATE: 18 BRPM | HEART RATE: 63 BPM | SYSTOLIC BLOOD PRESSURE: 154 MMHG | HEIGHT: 68 IN | OXYGEN SATURATION: 100 % | BODY MASS INDEX: 26.19 KG/M2

## 2018-02-28 DIAGNOSIS — H61.23 CERUMEN DEBRIS ON TYMPANIC MEMBRANE OF BOTH EARS: Primary | ICD-10-CM

## 2018-02-28 NOTE — MR AVS SNAPSHOT
[address] [phone] Patient: Abram Dyer MRN: [id number] AIZ:[id number]/[id number] Visit Information Date & Time Provider Department Dept. Phone Encounter #  
 2/28/2018  1:00 PM Augustus Castañeda MD [address] [place] [address] [id number] Your Appointments 4/18/2018  1:20 PM  
Follow Up with Augustus Castañeda MD  
[address] [facility] CARE [facility] ([address]) Appt Note: 3 mo f/u  
 [address]  
[phone] [address] Upcoming Health Maintenance Date Due Hepatitis C Screening 1945 FOBT Q 1 YEAR AGE 50-75 6/20/1996 GLAUCOMA SCREENING Q2Y 6/20/2011 MEDICARE YEARLY EXAM 4/26/2018 Pneumococcal 65+ Low/Medium Risk (2 of 2 - PPSV23) 9/15/2018 DTaP/Tdap/Td series (2 - Td) 9/15/2027 Allergies as of 2/28/2018  Review Complete On: 2/28/2018 By: Augustus Castañeda MD  
 No Known Allergies Current Immunizations  Never Reviewed No immunizations on file. Not reviewed this visit You Were Diagnosed With   
  
 Codes Comments Cerumen debris on tympanic membrane of both ears    -  Primary ICD-10-CM: H61.23 
ICD-9-CM: 380.4 Vitals BP Pulse Resp Height(growth percentile) Weight(growth percentile) SpO2  
 154/76 (BP 1 Location: Left arm, BP Patient Position: Sitting) 63 18 5' 8\" (1.727 m) 172 lb 12.8 oz (78.4 kg) 100% BMI Smoking Status 26.27 kg/m2 Former Smoker Vitals History BMI and BSA Data Body Mass Index Body Surface Area  
 26.27 kg/m 2 1.94 m 2 Preferred Pharmacy Pharmacy Name Phone [address], [address] AT [facility] OF  [address] [place] [phone] Your Updated Medication List  
  
   
 This list is accurate as of 2/28/18  2:05 PM.  Always use your most recent med list.  
  
  
  
  
 clonazePAM 1 mg tablet Commonly known as:  KlonoPIN  
TAKE 1 TABLET BY MOUTH TWICE DAILY. MAX DAILY AMOUNT 2MG DULoxetine 60 mg capsule Commonly known as:  CYMBALTA TAKE 1 CAPSULE BY MOUTH DAILY  
  
 gabapentin 600 mg tablet Commonly known as:  NEURONTIN Take 1 Tab by mouth two (2) times a day. naproxen 500 mg tablet Commonly known as:  NAPROSYN  
TAKE 1 TABLET BY MOUTH TWICE DAILY WITH MEALS FOR PAIN We Performed the Following REMOVAL IMPACTED CERUMEN IRRIGATION/LVG UNILAT [id number] CPT(R)] Introducing [facility] & HEALTH SERVICES! Ann Marie Cottrell introduces Connesta patient portal. Now you can access parts of your medical record, email your doctor's office, and request medication refills online. 1. In your internet browser, go to [email or web address] 2. Click on the First Time User? Click Here link in the Sign In box. You will see the New Member Sign Up page. 3. Enter your Connesta Access Code exactly as it appears below. You will not need to use this code after youve completed the sign-up process. If you do not sign up before the expiration date, you must request a new code. · Connesta Access Code: [email or web address] Expires: 5/29/2018  2:05 PM 
 
4. Enter the last four digits of your Social Security Number (xxxx) and Date of Birth (mm/dd/yyyy) as indicated and click Submit. You will be taken to the next sign-up page. 5. Create a Cyber-Raint ID. This will be your Connesta login ID and cannot be changed, so think of one that is secure and easy to remember. 6. Create a Connesta password. You can change your password at any time. 7. Enter your Password Reset Question and Answer. This can be used at a later time if you forget your password. 8. Enter your e-mail address. You will receive e-mail notification when new information is available in [address] 9. Click Sign Up. You can now view and download portions of your medical record. 10. Click the Download Summary menu link to download a portable copy of your medical information. If you have questions, please visit the Frequently Asked Questions section of the Meldium website. Remember, Meldium is NOT to be used for urgent needs. For medical emergencies, dial 911. Now available from your iPhone and Android! Please provide this summary of care documentation to your next provider. Your primary care clinician is listed as Wylene Mcardle. If you have any questions after today's visit, please call [phone].

## 2018-02-28 NOTE — PROGRESS NOTES
Martha Hernandez is a 71 y.o. male who presents with the following:  Chief Complaint   Patient presents with    Ear Fullness     x1wk       Ear Fullness    The history is provided by the patient (Patient states he has a full feeling in each ear and decreased hearing out of the left suggesting wax buildup). Associated symptoms include hearing loss. Pertinent negatives include no abdominal pain and no cough. No Known Allergies    Current Outpatient Prescriptions   Medication Sig    naproxen (NAPROSYN) 500 mg tablet TAKE 1 TABLET BY MOUTH TWICE DAILY WITH MEALS FOR PAIN    clonazePAM (KLONOPIN) 1 mg tablet TAKE 1 TABLET BY MOUTH TWICE DAILY. MAX DAILY AMOUNT 2MG    DULoxetine (CYMBALTA) 60 mg capsule TAKE 1 CAPSULE BY MOUTH DAILY    gabapentin (NEURONTIN) 600 mg tablet Take 1 Tab by mouth two (2) times a day. No current facility-administered medications for this visit.         Past Medical History:   Diagnosis Date    Anxiety     Back pain     Blurred vision     Chest pain     Depression     Dizziness     Fast heart beat     Frequent headaches     Frequent urination     Joint pain     Joint swelling     Muscle pain     Recent change in weight     Sinus problem     Skin disease     SOB (shortness of breath)     Sore throat     Stiffness of joints, multiple sites     Stress     Tiredness     Trouble in sleeping     Weakness        Past Surgical History:   Procedure Laterality Date    HX HIP REPLACEMENT Bilateral 1993    HX ROTATOR CUFF REPAIR Right 11/16/2017       Family History   Problem Relation Age of Onset    Cancer Mother     Cancer Maternal Aunt        Social History     Social History    Marital status: SINGLE     Spouse name: N/A    Number of children: N/A    Years of education: N/A     Social History Main Topics    Smoking status: Former Smoker     Packs/day: 1.00     Years: 50.00     Types: Cigarettes     Quit date: 11/23/2017    Smokeless tobacco: Never Used    Alcohol use 16.8 oz/week     28 Cans of beer per week    Drug use: No    Sexual activity: No     Other Topics Concern    None     Social History Narrative       Review of Systems   Constitutional: Negative for fever. HENT: Positive for hearing loss. Negative for ear pain. Respiratory: Negative for cough. Cardiovascular: Negative for chest pain. Gastrointestinal: Negative for abdominal pain. Neurological: Negative for dizziness. Visit Vitals    /76 (BP 1 Location: Left arm, BP Patient Position: Sitting)    Pulse 63    Resp 18    Ht 5' 8\" (1.727 m)    Wt 172 lb 12.8 oz (78.4 kg)    SpO2 100%    BMI 26.27 kg/m2     Physical Exam   Constitutional: He is oriented to person, place, and time and well-developed, well-nourished, and in no distress. HENT:   Head: Normocephalic and atraumatic. Nose: Nose normal.   Mouth/Throat: Oropharynx is clear and moist.   Patient with bilateral cerumen impactions   Eyes: Conjunctivae and EOM are normal. Pupils are equal, round, and reactive to light. Neck: Normal range of motion. Neck supple. No JVD present. No tracheal deviation present. No thyromegaly present. Cardiovascular: Normal rate, regular rhythm, normal heart sounds and intact distal pulses. Exam reveals no gallop and no friction rub. No murmur heard. Pulmonary/Chest: Effort normal and breath sounds normal. No respiratory distress. He has no wheezes. He has no rales. He exhibits no tenderness. Abdominal: Soft. Bowel sounds are normal. He exhibits no distension and no mass. There is no tenderness. There is no rebound and no guarding. Musculoskeletal: Normal range of motion. He exhibits no edema or tenderness. Lymphadenopathy:     He has no cervical adenopathy. Neurological: He is alert and oriented to person, place, and time. He has normal reflexes. No cranial nerve deficit. He exhibits normal muscle tone. Gait normal. Coordination normal.   Skin: Skin is warm and dry. No rash noted. No erythema. Psychiatric: Mood, memory, affect and judgment normal.   Vitals reviewed. ICD-10-CM ICD-9-CM    1. Cerumen debris on tympanic membrane of both ears H61.23 380.4 REMOVAL IMPACTED CERUMEN IRRIGATION/LVG UNILAT       Orders Placed This Encounter    REMOVAL IMPACTED CERUMEN IRRIGATION/LVG UNILAT   Patient's ears were both irrigated free of excessive wax.     Follow-up Disposition: Not on Fawad Msoes MD

## 2018-03-02 DIAGNOSIS — M48.061 LUMBAR SPINAL STENOSIS: ICD-10-CM

## 2018-03-07 RX ORDER — NAPROXEN 500 MG/1
TABLET ORAL
Qty: 60 TAB | Refills: 0 | Status: SHIPPED | OUTPATIENT
Start: 2018-03-07 | End: 2018-04-05 | Stop reason: SDUPTHER

## 2018-04-19 ENCOUNTER — OFFICE VISIT (OUTPATIENT)
Dept: FAMILY MEDICINE CLINIC | Age: 73
End: 2018-04-19

## 2018-04-19 VITALS
OXYGEN SATURATION: 97 % | WEIGHT: 171 LBS | RESPIRATION RATE: 18 BRPM | HEIGHT: 68 IN | BODY MASS INDEX: 25.91 KG/M2 | DIASTOLIC BLOOD PRESSURE: 65 MMHG | HEART RATE: 67 BPM | SYSTOLIC BLOOD PRESSURE: 126 MMHG | TEMPERATURE: 97.8 F

## 2018-04-19 DIAGNOSIS — F43.10 PTSD (POST-TRAUMATIC STRESS DISORDER): Primary | ICD-10-CM

## 2018-04-19 DIAGNOSIS — N40.1 BPH WITH OBSTRUCTION/LOWER URINARY TRACT SYMPTOMS: ICD-10-CM

## 2018-04-19 DIAGNOSIS — N13.8 BPH WITH OBSTRUCTION/LOWER URINARY TRACT SYMPTOMS: ICD-10-CM

## 2018-04-19 DIAGNOSIS — F10.21 ALCOHOLISM IN REMISSION (HCC): ICD-10-CM

## 2018-04-19 RX ORDER — FINASTERIDE 5 MG/1
5 TABLET, FILM COATED ORAL DAILY
Qty: 90 TAB | Refills: 1 | Status: SHIPPED | OUTPATIENT
Start: 2018-04-19 | End: 2018-10-09 | Stop reason: SDUPTHER

## 2018-04-19 RX ORDER — PRAZOSIN HYDROCHLORIDE 1 MG/1
1 CAPSULE ORAL
Qty: 90 CAP | Refills: 1 | Status: ON HOLD | OUTPATIENT
Start: 2018-04-19 | End: 2018-10-16

## 2018-04-19 RX ORDER — DONEPEZIL HYDROCHLORIDE 5 MG/1
5 TABLET, FILM COATED ORAL
COMMUNITY
Start: 2018-02-08 | End: 2018-08-07

## 2018-04-19 RX ORDER — TAMSULOSIN HYDROCHLORIDE 0.4 MG/1
0.4 CAPSULE ORAL DAILY
Qty: 90 CAP | Refills: 1 | Status: SHIPPED | OUTPATIENT
Start: 2018-04-19 | End: 2018-09-14 | Stop reason: SINTOL

## 2018-04-19 NOTE — MR AVS SNAPSHOT
[address] [facility] 
 
 
 [address] [place] [address] [phone] Patient: Dea Morton MRN: [id number] PRINCE:[birth year] Visit Information Date & Time Provider Department Dept. Phone Encounter #  
 4/19/2018  3:20 PM Basil Campbell MD [address] [place] [address] [id number] Your Appointments 7/19/2018 11:00 AM  
Follow Up with Basil Campbell MD  
[facility] ([address]) Appt Note: 3 month f/u  
 [address]  
[phone] [address] Upcoming Health Maintenance Date Due Hepatitis C Screening 1945 FOBT Q 1 YEAR AGE 50-75 6/20/1996 GLAUCOMA SCREENING Q2Y 6/20/2011 MEDICARE YEARLY EXAM 4/26/2018 Pneumococcal 65+ Low/Medium Risk (2 of 2 - PPSV23) 9/15/2018 DTaP/Tdap/Td series (2 - Td) 9/15/2027 Allergies as of 4/19/2018  Review Complete On: 4/19/2018 By: Kimberly Swanson RN No Known Allergies Current Immunizations  Never Reviewed No immunizations on file. Not reviewed this visit You Were Diagnosed With   
  
 Codes Comments PTSD (post-traumatic stress disorder)    -  Primary ICD-10-CM: F43.10 ICD-9-CM: 309.81 Alcoholism in remission [facility])     ICD-10-CM: G72.17 ICD-9-CM: 303.93   
 BPH with obstruction/lower urinary tract symptoms     ICD-10-CM: N40.1, N13.8 ICD-9-CM: 600.01, 599.69 Vitals BP Pulse Temp Resp Height(growth percentile) Weight(growth percentile) 126/65 (BP 1 Location: Left arm) 67 97.8 °F (36.6 °C) 18 5' 8\" (1.727 m) 171 lb (77.6 kg) SpO2 BMI Smoking Status 97% 26 kg/m2 Former Smoker Vitals History BMI and BSA Data Body Mass Index Body Surface Area  
 26 kg/m 2 1.93 m 2 Preferred Pharmacy Pharmacy Name Phone [address] [facility] AT [facility] OF [address] [facility] [phone] Your Updated Medication List  
  
   
This list is accurate as of 4/19/18  4:31 PM.  Always use your most recent med list.  
  
  
  
  
 clonazePAM 1 mg tablet Commonly known as:  KlonoPIN  
TAKE 1 TABLET BY MOUTH TWICE DAILY. MAX DAILY AMOUNT 2MG  
  
 donepezil 5 mg tablet Commonly known as:  ARICEPT Take 5 mg by mouth. DULoxetine 60 mg capsule Commonly known as:  CYMBALTA TAKE 1 CAPSULE BY MOUTH DAILY  
  
 finasteride 5 mg tablet Commonly known as:  PROSCAR Take 1 Tab by mouth daily. For the prostate  
  
 gabapentin 600 mg tablet Commonly known as:  NEURONTIN Take 1 Tab by mouth two (2) times a day. naproxen 500 mg tablet Commonly known as:  NAPROSYN  
TAKE 1 TABLET BY MOUTH TWICE DAILY WITH MEALS FOR PAIN  
  
 prazosin 1 mg capsule Commonly known as:  MINIPRESS Take 1 Cap by mouth nightly. Indications: CHRONIC PTSD WITH TRAUMA NIGHTMARES  
  
 tamsulosin 0.4 mg capsule Commonly known as:  FLOMAX Take 1 Cap by mouth daily. for enlarged prostate Prescriptions Sent to Pharmacy Refills  
 tamsulosin (FLOMAX) 0.4 mg capsule 1 Sig: Take 1 Cap by mouth daily. for enlarged prostate Class: Normal  
 Pharmacy: [facility] [address]  Ph #: [phone] Route: Oral  
 finasteride (PROSCAR) 5 mg tablet 1 Sig: Take 1 Tab by mouth daily. For the prostate Class: Normal  
 Pharmacy: [facility] [address]  Ph #: [phone] Route: Oral  
 prazosin (MINIPRESS) 1 mg capsule 1 Sig: Take 1 Cap by mouth nightly. Indications: CHRONIC PTSD WITH TRAUMA NIGHTMARES  Class: Normal  
 Pharmacy: [facility] [address] [facility] [address] [facility] #: [phone] Route: Oral  
  
We Performed the Following COLLECTION VENOUS BLOOD,VENIPUNCTURE [id number] CPT(R)] PSA W/ REFLX FREE PSA [92692 CPT(R)] Introducing [facility] & HEALTH SERVICES! [employer] introduces Dolphin Geeks patient portal. Now you can access parts of your medical record, email your doctor's office, and request medication refills online. 1. In your internet browser, go to [email or web address] 2. Click on the First Time User? Click Here link in the Sign In box. You will see the New Member Sign Up page. 3. Enter your Dolphin Geeks Access Code exactly as it appears below. You will not need to use this code after youve completed the sign-up process. If you do not sign up before the expiration date, you must request a new code. · Dolphin Geeks Access Code: [email or web address] Expires: 5/29/2018  3:05 PM 
 
4. Enter the last four digits of your Social Security Number (xxxx) and Date of Birth (mm/dd/yyyy) as indicated and click Submit. You will be taken to the next sign-up page. 5. Create a Dolphin Geeks ID. This will be your Dolphin Geeks login ID and cannot be changed, so think of one that is secure and easy to remember. 6. Create a Dolphin Geeks password. You can change your password at any time. 7. Enter your Password Reset Question and Answer. This can be used at a later time if you forget your password. 8. Enter your e-mail address. You will receive e-mail notification when new information is available in [address] 9. Click Sign Up. You can now view and download portions of your medical record. 10. Click the Download Summary menu link to download a portable copy of your medical information. If you have questions, please visit the Frequently Asked Questions section of the Dolphin Geeks website. Remember, Dolphin Geeks is NOT to be used for urgent needs. For medical emergencies, dial 911. Now available from your iPhone and Android! Please provide this summary of care documentation to your next provider. Your primary care clinician is listed as Lusi Thurston. If you have any questions after today's visit, please call [phone].

## 2018-04-19 NOTE — PROGRESS NOTES
Celsa Blair is a 71 y.o. male who presents with the following:  Chief Complaint   Patient presents with    Depression    Anxiety    Urinary Frequency       Depression   The history is provided by the patient (Patient is still having PTSD nightmares but he has not taken his prazosin because he started getting lightheaded. He is off the medicine and still getting lightheaded. ). Pertinent negatives include no chest pain, no abdominal pain, no headaches and no shortness of breath. Anxiety   The history is provided by the patient (Patient is still taking his 1 mg clonazepam twice daily which helps moderate his anxiety but does not help stop the dreams at night). Pertinent negatives include no chest pain, no abdominal pain, no headaches and no shortness of breath. Urinary Frequency    The history is provided by the patient (The patient is now started having to get up 4 times at night with a very small urinary stream.). Associated symptoms include frequency and urgency. Pertinent negatives include no chills, no hematuria and no abdominal pain. No Known Allergies    Current Outpatient Prescriptions   Medication Sig    donepezil (ARICEPT) 5 mg tablet Take 5 mg by mouth.  tamsulosin (FLOMAX) 0.4 mg capsule Take 1 Cap by mouth daily. for enlarged prostate    finasteride (PROSCAR) 5 mg tablet Take 1 Tab by mouth daily. For the prostate    prazosin (MINIPRESS) 1 mg capsule Take 1 Cap by mouth nightly. Indications: CHRONIC PTSD WITH TRAUMA NIGHTMARES    naproxen (NAPROSYN) 500 mg tablet TAKE 1 TABLET BY MOUTH TWICE DAILY WITH MEALS FOR PAIN    clonazePAM (KLONOPIN) 1 mg tablet TAKE 1 TABLET BY MOUTH TWICE DAILY. MAX DAILY AMOUNT 2MG    DULoxetine (CYMBALTA) 60 mg capsule TAKE 1 CAPSULE BY MOUTH DAILY    gabapentin (NEURONTIN) 600 mg tablet Take 1 Tab by mouth two (2) times a day. No current facility-administered medications for this visit.         Past Medical History:   Diagnosis Date    Anxiety     Back pain     Blurred vision     Chest pain     Depression     Dizziness     Fast heart beat     Frequent headaches     Frequent urination     Joint pain     Joint swelling     Muscle pain     Recent change in weight     Sinus problem     Skin disease     SOB (shortness of breath)     Sore throat     Stiffness of joints, multiple sites     Stress     Tiredness     Trouble in sleeping     Weakness        Past Surgical History:   Procedure Laterality Date    HX HIP REPLACEMENT Bilateral 1993    HX ROTATOR CUFF REPAIR Right 11/16/2017       Family History   Problem Relation Age of Onset    Cancer Mother     Cancer Maternal Aunt        Social History     Social History    Marital status: SINGLE     Spouse name: N/A    Number of children: N/A    Years of education: N/A     Social History Main Topics    Smoking status: Former Smoker     Packs/day: 1.00     Years: 50.00     Types: Cigarettes     Quit date: 11/23/2017    Smokeless tobacco: Never Used    Alcohol use 16.8 oz/week     28 Cans of beer per week    Drug use: No    Sexual activity: No     Other Topics Concern    None     Social History Narrative       Review of Systems   Constitutional: Negative for chills, fever, malaise/fatigue and weight loss. HENT: Negative for congestion, hearing loss, sore throat and tinnitus. Eyes: Negative for blurred vision, pain and discharge. Respiratory: Negative for cough, shortness of breath and wheezing. Cardiovascular: Negative for chest pain, palpitations, orthopnea, claudication and leg swelling. Gastrointestinal: Negative for abdominal pain, constipation and heartburn. Genitourinary: Positive for frequency and urgency. Negative for dysuria and hematuria. Musculoskeletal: Negative for falls, joint pain and myalgias. Skin: Negative for itching and rash. Neurological: Negative for dizziness, tingling, tremors and headaches.    Endo/Heme/Allergies: Negative for environmental allergies and polydipsia. Psychiatric/Behavioral: Positive for depression. Negative for substance abuse. The patient is nervous/anxious. Patient still has nightmares. Visit Vitals    /65 (BP 1 Location: Left arm)    Pulse 67    Temp 97.8 °F (36.6 °C)    Resp 18    Ht 5' 8\" (1.727 m)    Wt 171 lb (77.6 kg)    SpO2 97%    BMI 26 kg/m2     Physical Exam   Constitutional: He is oriented to person, place, and time and well-developed, well-nourished, and in no distress. HENT:   Head: Normocephalic and atraumatic. Nose: Nose normal.   Mouth/Throat: Oropharynx is clear and moist.   Eyes: Conjunctivae and EOM are normal. Pupils are equal, round, and reactive to light. Neck: Normal range of motion. Neck supple. No JVD present. No tracheal deviation present. No thyromegaly present. Cardiovascular: Normal rate, regular rhythm, normal heart sounds and intact distal pulses. Exam reveals no gallop and no friction rub. No murmur heard. Pulmonary/Chest: Effort normal and breath sounds normal. No respiratory distress. He has no wheezes. He has no rales. He exhibits no tenderness. Abdominal: Soft. Bowel sounds are normal. He exhibits no distension and no mass. There is no tenderness. There is no rebound and no guarding. Genitourinary: Rectum normal and penis normal. No discharge found. Genitourinary Comments: Patient does have some BPH but no particularly hard nodules. Musculoskeletal: Normal range of motion. He exhibits no edema or tenderness. Lymphadenopathy:     He has no cervical adenopathy. Neurological: He is alert and oriented to person, place, and time. He has normal reflexes. No cranial nerve deficit. He exhibits normal muscle tone. Gait normal. Coordination normal.   Skin: Skin is warm and dry. No rash noted. No erythema. Psychiatric: Mood, memory, affect and judgment normal.   Vitals reviewed. ICD-10-CM ICD-9-CM    1.  PTSD (post-traumatic stress disorder) F43.10 309.81    2. Alcoholism in remission ([facility] [address] F10.21 303.93    3. BPH with obstruction/lower urinary tract symptoms N40.1 600.01 PSA W/ REFLX FREE PSA    N13.8 599.69        Orders Placed This Encounter    PSA W/ REFLX FREE PSA    donepezil (ARICEPT) 5 mg tablet     Sig: Take 5 mg by mouth.  tamsulosin (FLOMAX) 0.4 mg capsule     Sig: Take 1 Cap by mouth daily. for enlarged prostate     Dispense:  90 Cap     Refill:  1    finasteride (PROSCAR) 5 mg tablet     Sig: Take 1 Tab by mouth daily. For the prostate     Dispense:  90 Tab     Refill:  1    prazosin (MINIPRESS) 1 mg capsule     Sig: Take 1 Cap by mouth nightly. Indications: CHRONIC PTSD WITH TRAUMA NIGHTMARES     Dispense:  90 Cap     Refill:  1   The patient is still smoking but he is not drinking any.     Follow-up Disposition: Not on Yohana Douglas MD

## 2018-04-20 LAB
PSA SERPL-MCNC: 1.1 NG/ML (ref 0–4)
REFLEX CRITERIA: NORMAL

## 2018-05-29 ENCOUNTER — OFFICE VISIT (OUTPATIENT)
Dept: FAMILY MEDICINE CLINIC | Age: 73
End: 2018-05-29

## 2018-05-29 VITALS
TEMPERATURE: 95.8 F | HEIGHT: 68 IN | SYSTOLIC BLOOD PRESSURE: 106 MMHG | BODY MASS INDEX: 25.46 KG/M2 | DIASTOLIC BLOOD PRESSURE: 61 MMHG | OXYGEN SATURATION: 96 % | RESPIRATION RATE: 18 BRPM | HEART RATE: 65 BPM | WEIGHT: 168 LBS

## 2018-05-29 DIAGNOSIS — F43.10 PTSD (POST-TRAUMATIC STRESS DISORDER): ICD-10-CM

## 2018-05-29 DIAGNOSIS — F10.21 ALCOHOLISM IN REMISSION (HCC): ICD-10-CM

## 2018-05-29 DIAGNOSIS — Z91.81 RISK FOR FALLS: ICD-10-CM

## 2018-05-29 DIAGNOSIS — M48.061 SPINAL STENOSIS OF LUMBAR REGION, UNSPECIFIED WHETHER NEUROGENIC CLAUDICATION PRESENT: Primary | ICD-10-CM

## 2018-05-29 RX ORDER — OFLOXACIN 3 MG/ML
1 SOLUTION/ DROPS OPHTHALMIC
COMMUNITY
Start: 2018-05-13 | End: 2018-07-19 | Stop reason: SDUPTHER

## 2018-05-29 RX ORDER — PREDNISOLONE ACETATE 10 MG/ML
1 SUSPENSION/ DROPS OPHTHALMIC
COMMUNITY
Start: 2018-05-13 | End: 2018-07-19 | Stop reason: SDUPTHER

## 2018-05-29 RX ORDER — BROMFENAC SODIUM 0.7 MG/ML
SOLUTION/ DROPS OPHTHALMIC
Refills: 0 | COMMUNITY
Start: 2018-05-07 | End: 2018-09-10

## 2018-05-29 RX ORDER — OFLOXACIN 3 MG/ML
SOLUTION/ DROPS OPHTHALMIC
Refills: 0 | COMMUNITY
Start: 2018-05-07 | End: 2018-09-10

## 2018-05-29 RX ORDER — PREDNISOLONE ACETATE 10 MG/ML
SUSPENSION/ DROPS OPHTHALMIC
Refills: 1 | COMMUNITY
Start: 2018-05-07 | End: 2018-09-10

## 2018-05-29 NOTE — PROGRESS NOTES
David Delong is a 71 y.o. male who presents with the following:  Chief Complaint   Patient presents with    Form Completion     dmv form       LOW BACK PAIN   The history is provided by the patient (With lumbar spinal stenosis with chronic low back pain which makes it difficult to walk is requesting a handicap sticker to make shopping easier. ). Pertinent negatives include no chest pain and no abdominal pain. 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 Associated symptoms include confusion and weakness. Pertinent negatives include no somnolence, no seizures, no unresponsiveness and no agitation. No Known Allergies    Current Outpatient Prescriptions   Medication Sig    prednisoLONE acetate (PRED FORTE) 1 % ophthalmic suspension 1 Drop.  ofloxacin (FLOXIN) 0.3 % ophthalmic solution 1 Drop.  donepezil (ARICEPT) 5 mg tablet Take 5 mg by mouth.  tamsulosin (FLOMAX) 0.4 mg capsule Take 1 Cap by mouth daily. for enlarged prostate    finasteride (PROSCAR) 5 mg tablet Take 1 Tab by mouth daily. For the prostate    prazosin (MINIPRESS) 1 mg capsule Take 1 Cap by mouth nightly. Indications: CHRONIC PTSD WITH TRAUMA NIGHTMARES    naproxen (NAPROSYN) 500 mg tablet TAKE 1 TABLET BY MOUTH TWICE DAILY WITH MEALS FOR PAIN    clonazePAM (KLONOPIN) 1 mg tablet TAKE 1 TABLET BY MOUTH TWICE DAILY. MAX DAILY AMOUNT 2MG    DULoxetine (CYMBALTA) 60 mg capsule TAKE 1 CAPSULE BY MOUTH DAILY    gabapentin (NEURONTIN) 600 mg tablet Take 1 Tab by mouth two (2) times a day.  PROLENSA 0.07 % ophthalmic solution INSTILL 1 DROP IN THE OD QD    prednisoLONE acetate (PRED FORTE) 1 % ophthalmic suspension SHAKE LQ AND INT 1 GTT IN OD QID    ofloxacin (FLOXIN) 0.3 % ophthalmic solution INT 1 GTT IN OD QID     No current facility-administered medications for this visit.         Past Medical History: Diagnosis Date    Anxiety     Back pain     Blurred vision     Chest pain     Depression     Dizziness     Fast heart beat     Frequent headaches     Frequent urination     Joint pain     Joint swelling     Muscle pain     Recent change in weight     Sinus problem     Skin disease     SOB (shortness of breath)     Sore throat     Stiffness of joints, multiple sites     Stress     Tiredness     Trouble in sleeping     Weakness        Past Surgical History:   Procedure Laterality Date    HX HIP REPLACEMENT Bilateral 1993    HX ROTATOR CUFF REPAIR Right 11/16/2017       Family History   Problem Relation Age of Onset    Cancer Mother     Cancer Maternal Aunt        Social History     Social History    Marital status: SINGLE     Spouse name: N/A    Number of children: N/A    Years of education: N/A     Social History Main Topics    Smoking status: Former Smoker     Packs/day: 1.00     Years: 50.00     Types: Cigarettes     Quit date: 11/23/2017    Smokeless tobacco: Never Used    Alcohol use 16.8 oz/week     28 Cans of beer per week    Drug use: No    Sexual activity: No     Other Topics Concern    None     Social History Narrative       Review of Systems   Constitutional: Negative for fever and weight loss. HENT: Negative for congestion. Respiratory: Negative for cough. Cardiovascular: Negative for chest pain. Gastrointestinal: Negative for abdominal pain. Musculoskeletal: Positive for back pain. Skin: Negative for rash. Neurological: Positive for weakness. Negative for seizures. Psychiatric/Behavioral: Positive for confusion and memory loss. Negative for agitation. The patient is nervous/anxious.         Visit Vitals    /61 (BP 1 Location: Left arm)    Pulse 65    Temp 95.8 °F (35.4 °C)    Resp 18    Ht 5' 8\" (1.727 m)    Wt 168 lb (76.2 kg)    SpO2 96%    BMI 25.54 kg/m2     Physical Exam   Constitutional: He is oriented to person, place, and time and well-developed, well-nourished, and in no distress. HENT:   Head: Normocephalic and atraumatic. Nose: Nose normal.   Mouth/Throat: Oropharynx is clear and moist.   Eyes: Conjunctivae and EOM are normal. Pupils are equal, round, and reactive to light. Neck: Normal range of motion. Neck supple. No JVD present. No tracheal deviation present. No thyromegaly present. Cardiovascular: Normal rate, regular rhythm, normal heart sounds and intact distal pulses. Exam reveals no gallop and no friction rub. No murmur heard. Pulmonary/Chest: Effort normal and breath sounds normal. No respiratory distress. He has no wheezes. He has no rales. He exhibits no tenderness. Abdominal: Soft. Bowel sounds are normal. He exhibits no distension and no mass. There is no tenderness. There is no rebound and no guarding. Musculoskeletal: Normal range of motion. He exhibits tenderness (Lumbar area). He exhibits no edema. Lymphadenopathy:     He has no cervical adenopathy. Neurological: He is alert and oriented to person, place, and time. He has normal reflexes. No cranial nerve deficit. He exhibits normal muscle tone. Coordination normal.   Skin: Skin is warm and dry. No rash noted. No erythema. Psychiatric: Mood and affect normal.   Vitals reviewed. ICD-10-CM ICD-9-CM    1. Spinal stenosis of lumbar region, unspecified whether neurogenic claudication present M48.061 724.02    2. Risk for falls Z91.81 V15.88    3. PTSD (post-traumatic stress disorder) F43.10 309.81    4. [facility] in [facility] ([facility] F10.21 303.93        Orders Placed This Encounter    PROLENSA 0.07 % ophthalmic solution     Sig: INSTILL 1 DROP IN THE OD QD     Refill:  0    prednisoLONE acetate (PRED FORTE) 1 % ophthalmic suspension     Sig: SHAKE LQ AND INT 1 GTT IN OD QID     Refill:  1    prednisoLONE acetate (PRED FORTE) 1 % ophthalmic suspension     Si Drop.     ofloxacin (FLOXIN) 0.3 % ophthalmic solution     Sig: INT 1 GTT IN OD QID Refill:  0    ofloxacin (FLOXIN) 0.3 % ophthalmic solution     Si Drop.        Follow-up Disposition: Not on Prudy MD Steven

## 2018-05-29 NOTE — PROGRESS NOTES
1. Have you been to the ER, urgent care clinic since your last visit? Hospitalized since your last visit?no    2. Have you seen or consulted any other health care providers outside of the [facility] since your last visit? Include any pap smears or colon screening.  yes

## 2018-05-29 NOTE — MR AVS SNAPSHOT
[address] [phone] Patient: Pieter Leslie MRN: [id number] XEC:[id number] Visit Information Date & Time Provider Department Dept. Phone Encounter #  
 5/29/2018  9:40 AM Ricardo Melissa  [address] [facility] [phone] [id number] Your Appointments 7/19/2018 11:00 AM  
Follow Up with Ricardo Melissa MD  
[facility] ([facility]) Appt Note: 3 month f/u  
 [address] [facility]  
[phone] [facility] Upcoming Health Maintenance Date Due Hepatitis C Screening 1945 FOBT Q 1 YEAR AGE 50-75 6/20/1996 GLAUCOMA SCREENING Q2Y 6/20/2011 Influenza Age 5 to Adult 8/1/2018 Pneumococcal 65+ Low/Medium Risk (2 of 2 - PPSV23) 9/15/2018 DTaP/Tdap/Td series (2 - Td) 9/15/2027 Allergies as of 5/29/2018  Review Complete On: 5/29/2018 By: Ricardo Melissa MD  
 No Known Allergies Current Immunizations  Never Reviewed No immunizations on file. Not reviewed this visit Vitals BP Pulse Temp Resp Height(growth percentile) Weight(growth percentile) 106/61 (BP 1 Location: Left arm) 65 95.8 °F (35.4 °C) 18 5' 8\" (1.727 m) 168 lb (76.2 kg) SpO2 BMI Smoking Status 96% 25.54 kg/m2 Former Smoker Vitals History BMI and BSA Data Body Mass Index Body Surface Area 25.54 kg/m 2 1.91 m 2 Preferred Pharmacy Pharmacy Name Phone [address], [address] AT [facility] OF  [address] [phone] Your Updated Medication List  
  
   
This list is accurate as of 5/29/18 10:24 AM.  Always use your most recent med list.  
  
  
  
  
 clonazePAM 1 mg tablet Commonly known as:  KlonoPIN  
TAKE 1 TABLET BY MOUTH TWICE DAILY. MAX DAILY AMOUNT 2MG donepezil 5 mg tablet Commonly known as:  ARICEPT Take 5 mg by mouth. DULoxetine 60 mg capsule Commonly known as:  CYMBALTA TAKE 1 CAPSULE BY MOUTH DAILY  
  
 finasteride 5 mg tablet Commonly known as:  PROSCAR Take 1 Tab by mouth daily. For the prostate  
  
 gabapentin 600 mg tablet Commonly known as:  NEURONTIN Take 1 Tab by mouth two (2) times a day. naproxen 500 mg tablet Commonly known as:  NAPROSYN  
TAKE 1 TABLET BY MOUTH TWICE DAILY WITH MEALS FOR PAIN  
  
 * ofloxacin 0.3 % ophthalmic solution Commonly known as:  FLOXIN  
INT 1 GTT IN OD QID * ofloxacin 0.3 % ophthalmic solution Commonly known as:  FLOXIN  
1 Drop.  
  
 prazosin 1 mg capsule Commonly known as:  MINIPRESS Take 1 Cap by mouth nightly. Indications: CHRONIC PTSD WITH TRAUMA NIGHTMARES * prednisoLONE acetate 1 % ophthalmic suspension Commonly known as:  PRED FORTE SHAKE LQ AND INT 1 GTT IN OD QID * prednisoLONE acetate 1 % ophthalmic suspension Commonly known as:  PRED FORTE  
1 Drop. PROLENSA 0.07 % ophthalmic solution Generic drug:  bromfenac INSTILL 1 DROP IN THE OD QD  
  
 tamsulosin 0.4 mg capsule Commonly known as:  FLOMAX Take 1 Cap by mouth daily. for enlarged prostate * Notice: This list has 4 medication(s) that are the same as other medications prescribed for you. Read the directions carefully, and ask your doctor or other care provider to review them with you. Introducing [facility]! [employer] introduces [employer] patient portal. Now you can access parts of your medical record, email your doctor's office, and request medication refills online. 1. In your internet browser, go to [email or web address] 2. Click on the First Time User? Click Here link in the Sign In box. You will see the New Member Sign Up page. 3. Enter your [employer] Access Code exactly as it appears below.  You will not need to use this code after youve completed the sign-up process. If you do not sign up before the expiration date, you must request a new code. · [employer] Access Code: [email or web address] Expires: 5/29/2018  3:05 PM 
 
4. Enter the last four digits of your Social Security Number (xxxx) and Date of Birth (mm/dd/yyyy) as indicated and click Submit. You will be taken to the next sign-up page. 5. Create a Integrate ID. This will be your Integr[employer] login ID and cannot be changed, so think of one that is secure and easy to remember. 6. Create a [employer] password. You can change your password at any time. 7. Enter your Password Reset Question and Answer. This can be used at a later time if you forget your password. 8. Enter your e-mail address. You will receive e-mail notification when new information is available in [address]. 9. Click Sign Up. You can now view and download portions of your medical record. 10. Click the Download Summary menu link to download a portable copy of your medical information. If you have questions, please visit the Frequently Asked Questions section of the Integr[employer] website. Remember, [employer] is NOT to be used for urgent needs. For medical emergencies, dial 911. Now available from your iPhone and Android! Please provide this summary of care documentation to your next provider. Your primary care clinician is listed as Rose Jenkins. If you have any questions after today's visit, please call [phone].

## 2018-06-26 RX ORDER — GABAPENTIN 600 MG/1
TABLET ORAL
Qty: 90 TAB | Refills: 0 | Status: SHIPPED | OUTPATIENT
Start: 2018-06-26 | End: 2018-07-19 | Stop reason: SDUPTHER

## 2018-07-10 ENCOUNTER — OFFICE VISIT (OUTPATIENT)
Dept: FAMILY MEDICINE CLINIC | Age: 73
End: 2018-07-10

## 2018-07-10 ENCOUNTER — TELEPHONE (OUTPATIENT)
Dept: FAMILY MEDICINE CLINIC | Age: 73
End: 2018-07-10

## 2018-07-10 VITALS
DIASTOLIC BLOOD PRESSURE: 63 MMHG | TEMPERATURE: 98 F | SYSTOLIC BLOOD PRESSURE: 110 MMHG | HEART RATE: 59 BPM | HEIGHT: 68 IN | WEIGHT: 172.38 LBS | BODY MASS INDEX: 26.13 KG/M2 | OXYGEN SATURATION: 96 %

## 2018-07-10 DIAGNOSIS — R55 VASOVAGAL SYNCOPE: Primary | ICD-10-CM

## 2018-07-10 NOTE — TELEPHONE ENCOUNTER
Dental office called patient is there and upon checking in he proceeded to fall backwards and passed out. She said bp was 126/65 and 10 minutes later was 122/69. She wanted to know if they should proceed with dental appt and I told her to advise patient to go to er.

## 2018-07-10 NOTE — PROGRESS NOTES
1. Have you been to the ER, urgent care clinic since your last visit? Hospitalized since your last visit? No    2. Have you seen or consulted any other health care providers outside of the [address] [place] [address] since your last visit? Include any pap smears or colon screening.  Yes - Ophthalmology - Dr Ally Alfred

## 2018-07-10 NOTE — PROGRESS NOTES
Geraldo Knowles is a 68 y.o. male who presents with the following:  Chief Complaint   Patient presents with    Loss of Consciousness     Today at Dental office along with multiple syncope episodes        Loss of Consciousness    The history is provided by the patient (While in the dentist office the patient jerked his head around and then became syncopal.  Patient is having arthritic symptoms in his neck with radiculopathy into his hands). He lost consciousness for a period of 1 to 5 minutes (The patient seems to have this in common with each episode that he suddenly turns his head when his neck with rapidity resulting in a short syncopal episode. ). Pertinent negatives include no chest pain, no palpitations, no clumsiness, no confusion, no diaphoresis, no malaise/fatigue, no abdominal pain, no congestion, no headaches, no seizures and no slurred speech. No Known Allergies    Current Outpatient Prescriptions   Medication Sig    DULoxetine (CYMBALTA) 60 mg capsule TAKE 1 CAPSULE BY MOUTH DAILY    gabapentin (NEURONTIN) 600 mg tablet TAKE 1 TABLET BY MOUTH THREE TIMES DAILY    PROLENSA 0.07 % ophthalmic solution INSTILL 1 DROP IN THE OD QD    prednisoLONE acetate (PRED FORTE) 1 % ophthalmic suspension SHAKE LQ AND INT 1 GTT IN OD QID    prednisoLONE acetate (PRED FORTE) 1 % ophthalmic suspension 1 Drop.  ofloxacin (FLOXIN) 0.3 % ophthalmic solution INT 1 GTT IN OD QID    ofloxacin (FLOXIN) 0.3 % ophthalmic solution 1 Drop.  donepezil (ARICEPT) 5 mg tablet Take 5 mg by mouth.  tamsulosin (FLOMAX) 0.4 mg capsule Take 1 Cap by mouth daily. for enlarged prostate    finasteride (PROSCAR) 5 mg tablet Take 1 Tab by mouth daily. For the prostate    naproxen (NAPROSYN) 500 mg tablet TAKE 1 TABLET BY MOUTH TWICE DAILY WITH MEALS FOR PAIN    clonazePAM (KLONOPIN) 1 mg tablet TAKE 1 TABLET BY MOUTH TWICE DAILY. MAX DAILY AMOUNT 2MG    gabapentin (NEURONTIN) 600 mg tablet Take 1 Tab by mouth two (2) times a day.  prazosin (MINIPRESS) 1 mg capsule Take 1 Cap by mouth nightly. Indications: CHRONIC PTSD WITH TRAUMA NIGHTMARES     No current facility-administered medications for this visit. Past Medical History:   Diagnosis Date    Anxiety     Back pain     Blurred vision     Chest pain     Depression     Dizziness     Fast heart beat     Frequent headaches     Frequent urination     Joint pain     Joint swelling     Muscle pain     Recent change in weight     Sinus problem     Skin disease     SOB (shortness of breath)     Sore throat     Stiffness of joints, multiple sites     Stress     Tiredness     Trouble in sleeping     Weakness        Past Surgical History:   Procedure Laterality Date    HX HIP REPLACEMENT Bilateral 1993    HX ROTATOR CUFF REPAIR Right 11/16/2017       Family History   Problem Relation Age of Onset    Cancer Mother     Cancer Maternal Aunt        Social History     Social History    Marital status: SINGLE     Spouse name: N/A    Number of children: N/A    Years of education: N/A     Social History Main Topics    Smoking status: Former Smoker     Packs/day: 1.00     Years: 50.00     Types: Cigarettes     Quit date: 11/23/2017    Smokeless tobacco: Never Used    Alcohol use 16.8 oz/week     28 Cans of beer per week    Drug use: No    Sexual activity: No     Other Topics Concern    None     Social History Narrative       Review of Systems   Constitutional: Negative for diaphoresis and malaise/fatigue. HENT: Negative for congestion. Cardiovascular: Negative for chest pain and palpitations. Gastrointestinal: Negative for abdominal pain. Skin: Negative for rash. Neurological: Positive for tingling (Into his hands) and loss of consciousness. Negative for seizures and headaches. Psychiatric/Behavioral: Negative for confusion.        Visit Vitals    /63    Pulse (!) 59    Temp 98 °F (36.7 °C) (Oral)    Ht 5' 8\" (1.727 m)    Wt 172 lb 6 oz (78.2 kg)    SpO2 96%    BMI 26.21 kg/m2     Physical Exam   Constitutional: He is oriented to person, place, and time and well-developed, well-nourished, and in no distress. HENT:   Head: Normocephalic and atraumatic. Nose: Nose normal.   Mouth/Throat: Oropharynx is clear and moist.   Eyes: Conjunctivae and EOM are normal. Pupils are equal, round, and reactive to light. Neck: Normal range of motion. Neck supple. No JVD present. No tracheal deviation present. No thyromegaly present. Cardiovascular: Normal rate, regular rhythm, normal heart sounds and intact distal pulses. Exam reveals no gallop and no friction rub. No murmur heard. Pulmonary/Chest: Effort normal and breath sounds normal. No respiratory distress. He has no wheezes. He has no rales. He exhibits no tenderness. Abdominal: Soft. Bowel sounds are normal. He exhibits no distension and no mass. There is no tenderness. There is no rebound and no guarding. Musculoskeletal: Normal range of motion. He exhibits no edema or tenderness. Lymphadenopathy:     He has no cervical adenopathy. Neurological: He is alert and oriented to person, place, and time. He has normal reflexes. No cranial nerve deficit. He exhibits normal muscle tone. Gait normal. Coordination normal.   Skin: Skin is warm and dry. No rash noted. No erythema. Psychiatric: Mood, memory, affect and judgment normal.   Vitals reviewed. ICD-10-CM ICD-9-CM    1. Vasovagal syncope R55 780.2 HEPATITIS C AB      METABOLIC PANEL, COMPREHENSIVE      CBC WITH AUTOMATED DIFF      AMB POC EKG ROUTINE W/ 12 LEADS, INTER & REP   I am wondering if the rapid movement in his neck considering the arthritis he has there and the radiculopathy may also be affecting his vertebral arteries inducing a short period of syncope.   The patient is going to see a neurologist at [facility] and there is a planned MRI and I hope they are able to get the neck as well as the head    Orders Placed This Encounter  HEPATITIS C AB    METABOLIC PANEL, COMPREHENSIVE    CBC WITH AUTOMATED DIFF    AMB POC EKG ROUTINE W/ 12 LEADS, INTER & REP     Order Specific Question:   Reason for Exam:     Answer:   Syncopal episodes       Follow-up Disposition: Not on Jaime Martínez MD

## 2018-07-11 LAB
ALBUMIN SERPL-MCNC: 3.9 G/DL (ref 3.5–4.8)
ALBUMIN/GLOB SERPL: 1.9 {RATIO} (ref 1.2–2.2)
ALP SERPL-CCNC: 99 IU/L (ref 39–117)
ALT SERPL-CCNC: 15 IU/L (ref 0–44)
AST SERPL-CCNC: 15 IU/L (ref 0–40)
BASOPHILS # BLD AUTO: 0.1 X10E3/UL (ref 0–0.2)
BASOPHILS NFR BLD AUTO: 1 %
BILIRUB SERPL-MCNC: 0.3 MG/DL (ref 0–1.2)
BUN SERPL-MCNC: 16 MG/DL (ref 8–27)
BUN/CREAT SERPL: 19 (ref 10–24)
CALCIUM SERPL-MCNC: 8.8 MG/DL (ref 8.6–10.2)
CHLORIDE SERPL-SCNC: 104 MMOL/L (ref 96–106)
CO2 SERPL-SCNC: 25 MMOL/L (ref 20–29)
CREAT SERPL-MCNC: 0.86 MG/DL (ref 0.76–1.27)
EOSINOPHIL # BLD AUTO: 0.1 X10E3/UL (ref 0–0.4)
EOSINOPHIL NFR BLD AUTO: 2 %
ERYTHROCYTE [DISTWIDTH] IN BLOOD BY AUTOMATED COUNT: 14.2 % (ref 12.3–15.4)
GLOBULIN SER CALC-MCNC: 2.1 G/DL (ref 1.5–4.5)
GLUCOSE SERPL-MCNC: 95 MG/DL (ref 65–99)
HCT VFR BLD AUTO: 38.6 % (ref 37.5–51)
HCV AB S/CO SERPL IA: <0.1 S/CO RATIO (ref 0–0.9)
HGB BLD-MCNC: 12.7 G/DL (ref 13–17.7)
IMM GRANULOCYTES # BLD: 0 X10E3/UL (ref 0–0.1)
IMM GRANULOCYTES NFR BLD: 0 %
LYMPHOCYTES # BLD AUTO: 1.6 X10E3/UL (ref 0.7–3.1)
LYMPHOCYTES NFR BLD AUTO: 27 %
MCH RBC QN AUTO: 31.4 PG (ref 26.6–33)
MCHC RBC AUTO-ENTMCNC: 32.9 G/DL (ref 31.5–35.7)
MCV RBC AUTO: 95 FL (ref 79–97)
MONOCYTES # BLD AUTO: 0.3 X10E3/UL (ref 0.1–0.9)
MONOCYTES NFR BLD AUTO: 5 %
NEUTROPHILS # BLD AUTO: 3.7 X10E3/UL (ref 1.4–7)
NEUTROPHILS NFR BLD AUTO: 65 %
PLATELET # BLD AUTO: 187 X10E3/UL (ref 150–379)
POTASSIUM SERPL-SCNC: 4.5 MMOL/L (ref 3.5–5.2)
PROT SERPL-MCNC: 6 G/DL (ref 6–8.5)
RBC # BLD AUTO: 4.05 X10E6/UL (ref 4.14–5.8)
SODIUM SERPL-SCNC: 141 MMOL/L (ref 134–144)
WBC # BLD AUTO: 5.7 X10E3/UL (ref 3.4–10.8)

## 2018-07-19 ENCOUNTER — DOCUMENTATION ONLY (OUTPATIENT)
Dept: FAMILY MEDICINE CLINIC | Age: 73
End: 2018-07-19

## 2018-07-19 ENCOUNTER — OFFICE VISIT (OUTPATIENT)
Dept: FAMILY MEDICINE CLINIC | Age: 73
End: 2018-07-19

## 2018-07-19 VITALS
DIASTOLIC BLOOD PRESSURE: 61 MMHG | RESPIRATION RATE: 18 BRPM | SYSTOLIC BLOOD PRESSURE: 97 MMHG | OXYGEN SATURATION: 96 % | BODY MASS INDEX: 25.37 KG/M2 | HEART RATE: 63 BPM | HEIGHT: 68 IN | WEIGHT: 167.38 LBS

## 2018-07-19 DIAGNOSIS — F43.10 PTSD (POST-TRAUMATIC STRESS DISORDER): Primary | ICD-10-CM

## 2018-07-19 DIAGNOSIS — F32.9 REACTIVE DEPRESSION: ICD-10-CM

## 2018-07-19 DIAGNOSIS — M79.603 UPPER EXTREMITY PAIN, INFERIOR, UNSPECIFIED LATERALITY: ICD-10-CM

## 2018-07-19 DIAGNOSIS — F10.21 ALCOHOLISM IN REMISSION (HCC): ICD-10-CM

## 2018-07-19 NOTE — PROGRESS NOTES
Tacho Fallon is a 68 y.o. male who presents with the following:  Chief Complaint   Patient presents with    Muscle Pain     right arm    Anxiety       Muscle Pain    The history is provided by the patient (Patient is having some mild pain in the right shoulder that he is just finished physical therapy postoperative for his rotator cuff and he continues to have recurrent falls such that he was concerned he had reinjured the rotator cuff.). The problem has been gradually improving. The pain is present in the right elbow. Pertinent negatives include no numbness, full range of motion, no stiffness and no tingling. Associated symptoms comments: Patient is having pain in the right shoulder but he has full range of motion with a to any increased pain. He does have tenderness over the lateral epicondyles of the right elbow possibly related to 1 of his falls but he is seeing a neurosurgeon and her relation to this. .   Anxiety   The history is provided by the patient (Patient states his PTSD is doing about the same and is managing. ). Pertinent negatives include no chest pain, no abdominal pain, no headaches and no shortness of breath. No Known Allergies    Current Outpatient Prescriptions   Medication Sig    DULoxetine (CYMBALTA) 60 mg capsule TAKE 1 CAPSULE BY MOUTH DAILY    PROLENSA 0.07 % ophthalmic solution INSTILL 1 DROP IN THE OD QD    prednisoLONE acetate (PRED FORTE) 1 % ophthalmic suspension SHAKE LQ AND INT 1 GTT IN OD QID    ofloxacin (FLOXIN) 0.3 % ophthalmic solution INT 1 GTT IN OD QID    donepezil (ARICEPT) 5 mg tablet Take 5 mg by mouth.  tamsulosin (FLOMAX) 0.4 mg capsule Take 1 Cap by mouth daily. for enlarged prostate    finasteride (PROSCAR) 5 mg tablet Take 1 Tab by mouth daily. For the prostate    prazosin (MINIPRESS) 1 mg capsule Take 1 Cap by mouth nightly.  Indications: CHRONIC PTSD WITH TRAUMA NIGHTMARES    naproxen (NAPROSYN) 500 mg tablet TAKE 1 TABLET BY MOUTH TWICE DAILY WITH MEALS FOR PAIN    clonazePAM (KLONOPIN) 1 mg tablet TAKE 1 TABLET BY MOUTH TWICE DAILY. MAX DAILY AMOUNT 2MG    gabapentin (NEURONTIN) 600 mg tablet Take 1 Tab by mouth two (2) times a day. No current facility-administered medications for this visit. Past Medical History:   Diagnosis Date    Anxiety     Back pain     Blurred vision     Chest pain     Depression     Dizziness     Fast heart beat     Frequent headaches     Frequent urination     Joint pain     Joint swelling     Muscle pain     Recent change in weight     Sinus problem     Skin disease     SOB (shortness of breath)     Sore throat     Stiffness of joints, multiple sites     Stress     Tiredness     Trouble in sleeping     Weakness        Past Surgical History:   Procedure Laterality Date    HX HIP REPLACEMENT Bilateral 1993    HX ROTATOR CUFF REPAIR Right 11/16/2017       Family History   Problem Relation Age of Onset    Cancer Mother     Cancer Maternal Aunt        Social History     Social History    Marital status: SINGLE     Spouse name: N/A    Number of children: N/A    Years of education: N/A     Social History Main Topics    Smoking status: Former Smoker     Packs/day: 1.00     Years: 50.00     Types: Cigarettes     Quit date: 11/23/2017    Smokeless tobacco: Never Used    Alcohol use 16.8 oz/week     28 Cans of beer per week    Drug use: No    Sexual activity: No     Other Topics Concern    None     Social History Narrative       Review of Systems   Constitutional: Negative for chills, fever and malaise/fatigue. Respiratory: Negative for shortness of breath. Cardiovascular: Negative for chest pain. Gastrointestinal: Negative for abdominal pain. Musculoskeletal: Positive for joint pain. Negative for stiffness. Neurological: Negative for tingling, numbness and headaches.        Visit Vitals    BP 97/61 (BP 1 Location: Left arm)    Pulse 63    Resp 18    Ht 5' 8\" (1.727 m)    Wt 167 lb 6 oz (75.9 kg)    SpO2 96%    BMI 25.45 kg/m2     Physical Exam   Constitutional: He is oriented to person, place, and time and well-developed, well-nourished, and in no distress. HENT:   Head: Normocephalic and atraumatic. Nose: Nose normal.   Mouth/Throat: Oropharynx is clear and moist.   Eyes: Conjunctivae and EOM are normal. Pupils are equal, round, and reactive to light. Neck: Normal range of motion. Neck supple. No JVD present. No tracheal deviation present. No thyromegaly present. Cardiovascular: Normal rate, regular rhythm, normal heart sounds and intact distal pulses. Exam reveals no gallop and no friction rub. No murmur heard. Pulmonary/Chest: Effort normal and breath sounds normal. No respiratory distress. He has no wheezes. He has no rales. He exhibits no tenderness. Abdominal: Soft. Bowel sounds are normal. He exhibits no distension and no mass. There is no tenderness. There is no rebound and no guarding. Musculoskeletal: Normal range of motion. He exhibits tenderness (Patient is tender over the lateral epicondyles of the right elbow and is somewhat slightly tender in the infra spinatus area but he has full range of motion of both the elbow and shoulder on the right.). He exhibits no edema. Lymphadenopathy:     He has no cervical adenopathy. Neurological: He is alert and oriented to person, place, and time. He has normal reflexes. No cranial nerve deficit. He exhibits normal muscle tone. Gait normal. Coordination normal.   Skin: Skin is warm and dry. No rash noted. No erythema. Psychiatric: Mood, memory, affect and judgment normal.   Vitals reviewed. ICD-10-CM ICD-9-CM    1. PTSD (post-traumatic stress disorder) F43.10 309.81    2. [facility] F10.21 303.93    3. 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 Upper extremity pain, inferior, unspecified laterality M79.603 729.5        No orders of the defined types were placed in this encounter.   I have advised the patient again that it would be in his best interest to use a walker to help steady himself and prevent falls. I reminded him again if he is having warning symptoms such as lightheadedness or dizziness he needs to sit down in the floor or in a chair to avoid a fall.     Follow-up Disposition: Not on Michelle Bread, MD

## 2018-07-19 NOTE — MR AVS SNAPSHOT
[address] [phone] Patient: Adarsh Dallas MRN: [id number] Visit Information Date & Time Provider Department Dept. Phone Encounter #  
 7/19/2018 11:00 AM Bonita Espinosa MD [address] [place] [address] [id number] Your Appointments 10/19/2018 10:40 AM  
Follow Up with Bonita Espinosa MD  
[facility] Appt Note: 3 month f/u  
 [address] [facility]  
[phone] [address] [facility] Upcoming Health Maintenance Date Due FOBT Q 1 YEAR AGE 50-75 6/20/1996 GLAUCOMA SCREENING Q2Y 6/20/2011 Influenza Age 5 to Adult 8/1/2018 Pneumococcal 65+ Low/Medium Risk (2 of 2 - PPSV23) 9/15/2018 DTaP/Tdap/Td series (2 - Td) 9/15/2027 Allergies as of 7/19/2018  Review Complete On: 7/19/2018 By: Bonita Espinosa MD  
 No Known Allergies Current Immunizations  Never Reviewed No immunizations on file. Not reviewed this visit You Were Diagnosed With   
  
 Codes Comments PTSD (post-traumatic stress disorder)    -  Primary ICD-10-CM: F43.10 ICD-9-CM: 309.81 Alcoholism in remission [facility])     ICD-10-CM: U23.26 ICD-9-CM: 303.93 Reactive depression     ICD-10-CM: F32.9 ICD-9-CM: 300.4 Upper extremity pain, inferior, unspecified laterality     ICD-10-CM: M79.603 ICD-9-CM: 729.5 Vitals BP Pulse Resp Height(growth percentile) Weight(growth percentile) SpO2  
 97/61 (BP 1 Location: Left arm) 63 18 5' 8\" (1.727 m) 167 lb 6 oz (75.9 kg) 96% BMI Smoking Status 25.45 kg/m2 Former Smoker Vitals History BMI and BSA Data Body Mass Index Body Surface Area  
 25.45 kg/m 2 1.91 m 2 Preferred Pharmacy Pharmacy Name Phone [facility] [facility] AT [facility] [address] [facility] [phone] Your Updated Medication List  
  
   
This list is accurate as of 7/19/18 11:46 AM.  Always use your most recent med list.  
  
  
  
  
 clonazePAM 1 mg tablet Commonly known as:  KlonoPIN  
TAKE 1 TABLET BY MOUTH TWICE DAILY. MAX DAILY AMOUNT 2MG  
  
 donepezil 5 mg tablet Commonly known as:  ARICEPT Take 5 mg by mouth. DULoxetine 60 mg capsule Commonly known as:  CYMBALTA TAKE 1 CAPSULE BY MOUTH DAILY  
  
 finasteride 5 mg tablet Commonly known as:  PROSCAR Take 1 Tab by mouth daily. For the prostate  
  
 gabapentin 600 mg tablet Commonly known as:  NEURONTIN Take 1 Tab by mouth two (2) times a day. naproxen 500 mg tablet Commonly known as:  NAPROSYN  
TAKE 1 TABLET BY MOUTH TWICE DAILY WITH MEALS FOR PAIN  
  
 ofloxacin 0.3 % ophthalmic solution Commonly known as:  FLOXIN  
INT 1 GTT IN OD QID  
  
 prazosin 1 mg capsule Commonly known as:  MINIPRESS Take 1 Cap by mouth nightly. Indications: CHRONIC PTSD WITH TRAUMA NIGHTMARES  
  
 prednisoLONE acetate 1 % ophthalmic suspension Commonly known as:  PRED FORTE SHAKE LQ AND INT 1 GTT IN OD QID  
  
 PROLENSA 0.07 % ophthalmic solution Generic drug:  bromfenac INSTILL 1 DROP IN THE OD QD  
  
 tamsulosin 0.4 mg capsule Commonly known as:  FLOMAX Take 1 Cap by mouth daily. for enlarged prostate Introducing [facility]! [facility] introduces mYwindow patient portal. Now you can access parts of your medical record, email your doctor's office, and request medication refills online. 1. In your internet browser, go to [email or web address] 2. Click on the First Time User? Click Here link in the Sign In box. You will see the New Member Sign Up page. 3. Enter your mYwindow Access Code exactly as it appears below.  You will not need to use this code after youve completed the sign-up process. If you do not sign up before the expiration date, you must request a new code. · [employer] Access Code: [email or web address] Expires: 10/17/2018 11:46 AM 
 
4. Enter the last four digits of your Social Security Number (xxxx) and Date of Birth (mm/dd/yyyy) as indicated and click Submit. You will be taken to the next sign-up page. 5. Create a [employer] ID. This will be your [employer] login ID and cannot be changed, so think of one that is secure and easy to remember. 6. Create a [employer] password. You can change your password at any time. 7. Enter your Password Reset Question and Answer. This can be used at a later time if you forget your password. 8. Enter your e-mail address. You will receive e-mail notification when new information is available in [address] 9. Click Sign Up. You can now view and download portions of your medical record. 10. Click the Download Summary menu link to download a portable copy of your medical information. If you have questions, please visit the Frequently Asked Questions section of the [employer] website. Remember, [employer] is NOT to be used for urgent needs. For medical emergencies, dial 911. Now available from your iPhone and Android! Please provide this summary of care documentation to your next provider. Your primary care clinician is listed as Isaac Wise. If you have any questions after today's visit, please call [phone].

## 2018-07-24 RX ORDER — GABAPENTIN 600 MG/1
TABLET ORAL
Qty: 90 TAB | Refills: 0 | Status: SHIPPED | OUTPATIENT
Start: 2018-07-24 | End: 2018-09-10

## 2018-07-31 DIAGNOSIS — F32.9 REACTIVE DEPRESSION: ICD-10-CM

## 2018-07-31 RX ORDER — CLONAZEPAM 1 MG/1
TABLET ORAL
Qty: 180 TAB | Refills: 0 | Status: SHIPPED | OUTPATIENT
Start: 2018-07-31 | End: 2018-11-08 | Stop reason: DRUGHIGH

## 2018-09-11 ENCOUNTER — OFFICE VISIT (OUTPATIENT)
Dept: CARDIOLOGY CLINIC | Age: 73
End: 2018-09-11

## 2018-09-11 ENCOUNTER — DOCUMENTATION ONLY (OUTPATIENT)
Dept: CARDIOLOGY CLINIC | Age: 73
End: 2018-09-11

## 2018-09-11 VITALS
SYSTOLIC BLOOD PRESSURE: 110 MMHG | WEIGHT: 162 LBS | OXYGEN SATURATION: 98 % | HEIGHT: 71 IN | DIASTOLIC BLOOD PRESSURE: 62 MMHG | HEART RATE: 56 BPM | BODY MASS INDEX: 22.68 KG/M2 | RESPIRATION RATE: 16 BRPM

## 2018-09-11 DIAGNOSIS — M48.061 SPINAL STENOSIS OF LUMBAR REGION, UNSPECIFIED WHETHER NEUROGENIC CLAUDICATION PRESENT: ICD-10-CM

## 2018-09-11 DIAGNOSIS — F43.10 PTSD (POST-TRAUMATIC STRESS DISORDER): ICD-10-CM

## 2018-09-11 DIAGNOSIS — R56.9 SEIZURE (HCC): ICD-10-CM

## 2018-09-11 DIAGNOSIS — G62.9 NEUROPATHY: ICD-10-CM

## 2018-09-11 DIAGNOSIS — R41.3 MEMORY DEFICIT: ICD-10-CM

## 2018-09-11 DIAGNOSIS — I95.1 ORTHOSTATIC HYPOTENSION: ICD-10-CM

## 2018-09-11 DIAGNOSIS — F17.200 SMOKER: ICD-10-CM

## 2018-09-11 DIAGNOSIS — R00.1 BRADYCARDIA: ICD-10-CM

## 2018-09-11 DIAGNOSIS — R55 SYNCOPE, UNSPECIFIED SYNCOPE TYPE: Primary | ICD-10-CM

## 2018-09-11 DIAGNOSIS — N40.1 BPH WITH OBSTRUCTION/LOWER URINARY TRACT SYMPTOMS: ICD-10-CM

## 2018-09-11 DIAGNOSIS — N13.8 BPH WITH OBSTRUCTION/LOWER URINARY TRACT SYMPTOMS: ICD-10-CM

## 2018-09-11 RX ORDER — FLUDROCORTISONE ACETATE 0.1 MG/1
0.1 TABLET ORAL DAILY
Qty: 30 TAB | Refills: 3 | Status: SHIPPED | OUTPATIENT
Start: 2018-09-11 | End: 2018-10-27

## 2018-09-11 NOTE — PROGRESS NOTES
Hien Joseph is a 68 y.o. male is here for cardiac evaluation, referred from hospitals  ER from visit there yesterday with recurrent syncope (vs seizure). Followed by Dr. Haroon Aguilera and Dr. Shae Monet (Neurology in [place]). Also seen at [facility], [facility], and orthopedics. Complex medical hx: Major concern for today's visit  syncope. He had muliple episodes of sudden loss of consciousness without any warning or aura lasting for 10-15 seconds and immediate return to baseline without any impairment. No seizure like activity. His last event occurred at dentist office while checking in where he suddenly looked towards right and passed out. Similar event occurred at home while lifting the groceries. He also complains of random dizziness and lightheaded. Denies any chest pain or any history of cardiac disease or stroke-like symptoms. Memory Impairment is stable and Tolerating Aricept well. Seen several times by Dr Shae Monet for neuropathy, gait disturbance, memory issues/alzheimer's with w/u including EMG's ( CTS and generalized peripheral neuropathy), MRI with generalized atrophy and chronic microvascular changes. . Paraesthesia are not well controlled on Gabapentin. Neurology has ordered out-pt Holter monitor and Echo, not yet done. ? Recent carotid dopplers. Seen in ER yesterday at hospitals after recurrent episode of syncope. EKG with sinus bradycardia HR 51, otherwise normal. Was orthostatic in ER and given IVF's, recurrent episode while in ED. Wife at bedside who explains when he was standing with her he started shaking and starring off. She was able to direct him to get to the ground and she states he never was completely unconscious but had bilateral generalized shaking for about one minute. The patient denies chest pain/ shortness of breath, orthopnea, PND, LE edema, palpitations. Patient Active Problem List  
 Diagnosis Date Noted  Syncope 09/11/2018  Seizure ([address] 09/11/2018  Neuropathy 09/11/2018  Memory deficit 09/11/2018  BPH with obstruction/lower urinary tract symptoms 04/19/2018  Smoker 01/18/2018  Reactive depression 12/21/2017  Ingrown left big toenail 03/31/2017  PTSD (post-traumatic stress disorder) 12/13/2016  Lumbar spinal stenosis 12/13/2016  Alcoholism in remission ([facility] 12/13/2016  Risk for falls 12/13/2016 Karissa Carlson MD 
Past Medical History:  
Diagnosis Date  Anxiety  Back pain  Blurred vision  Chest pain  Depression  Dizziness  Fast heart beat  Frequent headaches  Frequent urination  Joint pain  Joint swelling  Muscle pain  Neuropathy  Recent change in weight  Sinus problem  Skin disease  SOB (shortness of breath)  Sore throat  Stiffness of joints, multiple sites  Stress  Syncope  Tiredness  Trouble in sleeping  Weakness Past Surgical History:  
Procedure Laterality Date  HX HIP REPLACEMENT Bilateral 1993  HX ROTATOR CUFF REPAIR Right 11/16/2017 No Known Allergies Family History Problem Relation Age of Onset  Cancer Mother  Cancer Maternal Aunt Social History Social History  Marital status: SINGLE Spouse name: N/A  
 Number of children: N/A  
 Years of education: N/A Occupational History  Not on file. Social History Main Topics  Smoking status: Former Smoker Years: 50.00 Types: Cigarettes  Smokeless tobacco: Never Used Comment: 0.5-1 pack per day  Alcohol use 16.8 oz/week 28 Cans of beer per week  Drug use: No  
 Sexual activity: No  
 
Other Topics Concern  Not on file Social History Narrative Current Outpatient Prescriptions Medication Sig  
 gabapentin (NEURONTIN) 600 mg tablet Take 600 mg by mouth daily.  clonazePAM (KLONOPIN) 1 mg tablet TAKE 1 TABLET BY MOUTH TWICE DAILY  DULoxetine (CYMBALTA) 60 mg capsule TAKE 1 CAPSULE BY MOUTH DAILY  tamsulosin (FLOMAX) 0.4 mg capsule Take 1 Cap by mouth daily. for enlarged prostate  finasteride (PROSCAR) 5 mg tablet Take 1 Tab by mouth daily. For the prostate  prazosin (MINIPRESS) 1 mg capsule Take 1 Cap by mouth nightly. Indications: CHRONIC PTSD WITH TRAUMA NIGHTMARES  naproxen (NAPROSYN) 500 mg tablet TAKE 1 TABLET BY MOUTH TWICE DAILY WITH MEALS FOR PAIN No current facility-administered medications for this visit. Review of Symptoms: 
 
CONST  No weight change. No fever, chills, sweats ENT No visual changes, URI sx, sore throat CV  See HPI  
RESP  No cough, or sputum, wheezing. Also see HPI  
GI  No abdominal pain or change in bowel habits. No heartburn or dysphagia. No melena or rectal bleeding.   No dysuria, urgency, frequency, hematuria MSKEL  No joint pain, swelling. No muscle pain. SKIN  No rash or lesions. NEURO  No headache, syncope, or seizure. No weakness, loss of sensation, or paresthesias. PSYCH  No low mood or depression No anxiety. HE/LYMPH  No easy bruising, abnormal bleeding, or enlarged glands. Physical ExamPhysical Exam:   
Visit Vitals  Resp 16  
 Ht 5' 11\" (1.803 m)  Wt 162 lb (73.5 kg)  BMI 22.59 kg/m2 Extended / Orthostatic Vitals: 
Patient Position 2: Supine BP 2: 162/80 Pulse 2: 62 Patient Position 3: Standing BP 3: (!) 76/50 Pulse 3: 76 Gen: NAD HEENT:  PERRL, throat clear Neck: no adenopathy, no thyromegaly, no JVD Heart:  Regular,Nl S1S2,  no murmur, gallop or rub.  
Lungs:  clear Abdomen:   Soft, non-tender, bowel sounds are active.  
Extremities:  No edema Pulse: symmetric Neuro: A&O times 3, No focal neuro deficits Cardiographics ECG: from yesterday--sinus bradycardia HR 51, otherwise normal 
 
Labs:  
Lab Results Component Value Date/Time  Sodium 141 09/10/2018 03:15 PM  
 Sodium 141 07/10/2018 03:24 PM  
 Sodium 144 06/20/2017 06:28 PM  
 Potassium 4.7 09/10/2018 03:15 PM  
 Potassium 4.5 07/10/2018 03:24 PM  
 Potassium 4.4 06/20/2017 06:28 PM  
 Chloride 105 09/10/2018 03:15 PM  
 Chloride 104 07/10/2018 03:24 PM  
 Chloride 104 06/20/2017 06:28 PM  
 CO2 27 09/10/2018 03:15 PM  
 CO2 25 07/10/2018 03:24 PM  
 CO2 22 06/20/2017 06:28 PM  
 Anion gap 9 09/10/2018 03:15 PM  
 Glucose 75 09/10/2018 03:15 PM  
 Glucose 95 07/10/2018 03:24 PM  
 Glucose 130 (H) 06/20/2017 06:28 PM  
 BUN 21 (H) 09/10/2018 03:15 PM  
 BUN 16 07/10/2018 03:24 PM  
 BUN 20 06/20/2017 06:28 PM  
 Creatinine 1.13 09/10/2018 03:15 PM  
 Creatinine 0.86 07/10/2018 03:24 PM  
 Creatinine 0.92 06/20/2017 06:28 PM  
 BUN/Creatinine ratio 19 09/10/2018 03:15 PM  
 BUN/Creatinine ratio 19 07/10/2018 03:24 PM  
 BUN/Creatinine ratio 22 06/20/2017 06:28 PM  
 GFR est AA >60 09/10/2018 03:15 PM  
 GFR est  07/10/2018 03:24 PM  
 GFR est AA 96 06/20/2017 06:28 PM  
 GFR est non-AA >60 09/10/2018 03:15 PM  
 GFR est non-AA 87 07/10/2018 03:24 PM  
 GFR est non-AA 83 06/20/2017 06:28 PM  
 Calcium 8.4 (L) 09/10/2018 03:15 PM  
 Calcium 8.8 07/10/2018 03:24 PM  
 Calcium 8.8 06/20/2017 06:28 PM  
 Bilirubin, total 0.6 09/10/2018 03:15 PM  
 Bilirubin, total 0.3 07/10/2018 03:24 PM  
 Bilirubin, total 0.3 06/20/2017 06:28 PM  
 AST (SGOT) 13 (L) 09/10/2018 03:15 PM  
 AST (SGOT) 15 07/10/2018 03:24 PM  
 AST (SGOT) 11 06/20/2017 06:28 PM  
 Alk. phosphatase 79 09/10/2018 03:15 PM  
 Alk. phosphatase 99 07/10/2018 03:24 PM  
 Alk.  phosphatase 89 06/20/2017 06:28 PM  
 Protein, total 6.4 09/10/2018 03:15 PM  
 Protein, total 6.0 07/10/2018 03:24 PM  
 Protein, total 6.3 06/20/2017 06:28 PM  
 Albumin 3.7 09/10/2018 03:15 PM  
 Albumin 3.9 07/10/2018 03:24 PM  
 Albumin 4.0 06/20/2017 06:28 PM  
 Globulin 2.7 09/10/2018 03:15 PM  
 A-G Ratio 1.4 09/10/2018 03:15 PM  
 A-G Ratio 1.9 07/10/2018 03:24 PM  
 A-G Ratio 1.7 06/20/2017 06:28 PM  
 ALT (SGPT) 18 09/10/2018 03:15 PM  
 ALT (SGPT) 15 07/10/2018 03:24 PM  
 ALT (SGPT) 10 06/20/2017 06:28 PM  
 
 
Assessment:  
  
  
Patient Active Problem List  
 Diagnosis Date Noted  Syncope 09/11/2018  Seizure ([facility] 09/11/2018  Neuropathy 09/11/2018  Memory deficit 09/11/2018  BPH with obstruction/lower urinary tract symptoms 04/19/2018  Smoker 01/18/2018  Reactive depression 12/21/2017  Ingrown left big toenail 03/31/2017  PTSD (post-traumatic stress disorder) 12/13/2016  Lumbar spinal stenosis 12/13/2016  Alcoholism in remission ([facility] 12/13/2016  Risk for falls 12/13/2016  
 
 here for cardiac evaluation, referred from [facility]  ER from visit there yesterday with recurrent syncope (vs seizure). Followed by Dr. Gracie Mesa and Dr. Clay Ryder (Neurology in [place]). Also seen at [facility], [facility], and orthopedics. Complex medical hx: Major concern for today's visit  syncope. He had muliple episodes of sudden loss of consciousness without any warning or aura lasting for 10-15 seconds and immediate return to baseline without any impairment. No seizure like activity. His last event occurred at dentist office while checking in where he suddenly looked towards right and passed out. Similar event occurred at home while lifting the groceries. He also complains of random dizziness and lightheaded. Denies any chest pain or any history of cardiac disease or stroke-like symptoms. Memory Impairment is stable and Tolerating Aricept well. Seen several times by Dr Clay Ryder for neuropathy, gait disturbance, memory issues/alzheimer's with w/u including EMG's ( CTS and generalized peripheral neuropathy), MRI with generalized atrophy and chronic microvascular changes. . Paraesthesia are not well controlled on Gabapentin. Neurology has ordered out-pt Holter monitor and Echo, not yet done. ? Recent carotid dopplers. Seen in ER yesterday at [facility] after recurrent episode of syncope.   EKG with sinus bradycardia HR 51, otherwise normal. Was orthostatic in ER and given IVF's, recurrent episode while in ED. Wife at bedside who explains when he was standing with her he started shaking and starring off. She was able to direct him to get to the ground and she states he never was completely unconscious but had bilateral generalized shaking for about one minute. Has orthostatic hypotension, r/o neurocardiogenic, r/o severe lv, autonomic dysfunction. Is on multiple meds which may be contributing. Plan:  
 
Echo/doppler Carotid dopples Holter monitor x 48 hrs Push fluids, avoid strenous activities Florinef 0.1mg every day Discussed at length with pt and spouse Sandhya Wilson MD

## 2018-09-11 NOTE — MR AVS SNAPSHOT
[address] [facility] [address] 
 
 
 [address] [facility] 67 [address] [phone] Patient: Yoselin Howard MRN: [id number] Visit Information Date & Time Provider Department Dept. Phone Encounter #  
 9/11/2018  9:20 AM Ángel Giraldo, [address] [place] [address] Cardiology [facility] [phone] [id number] Your Appointments 9/12/2018  4:00 PM  
PROCEDURE with [address], [facility] [address] ([address]) Appt Note: 48 hr holter per hawk $cp  
 [address] [facility] 67 [address] [phone]  
  
   
 [address] [address]  
  
    
 10/19/2018 10:40 AM  
Follow Up with Marya Corey MD  
[facility] PRIMARY CARE AT [address]) Appt Note: 3 month f/u; 3 mo f/u  
 Zach Richardson [address] 67 [id number] [phone] 3000 [facility] Upcoming Health Maintenance Date Due FOBT Q 1 YEAR AGE 50-75 6/20/1996 GLAUCOMA SCREENING Q2Y 6/20/2011 Influenza Age 5 to Adult 8/1/2018 Pneumococcal 65+ Low/Medium Risk (2 of 2 - PPSV23) 9/15/2018 DTaP/Tdap/Td series (2 - Td) 9/15/2027 Allergies as of 9/11/2018  Review Complete On: 9/11/2018 By: Ángel Giraldo MD  
 No Known Allergies Current Immunizations  Never Reviewed No immunizations on file. Not reviewed this visit You Were Diagnosed With   
  
 Codes Comments Syncope, unspecified syncope type    -  Primary ICD-10-CM: R55 
ICD-9-CM: 780.2 Orthostatic hypotension     ICD-10-CM: I95.1 ICD-9-CM: 458.0 Bradycardia     ICD-10-CM: R00.1 ICD-9-CM: 427.89 Seizure ([address]     ICD-10-CM: R56.9 ICD-9-CM: 780.39 PTSD (post-traumatic stress disorder)     ICD-10-CM: F43.10 ICD-9-CM: 309.81 Memory deficit     ICD-10-CM: R41.3 ICD-9-CM: 780.93 Neuropathy     ICD-10-CM: G62.9 ICD-9-CM: 355.9 BPH with obstruction/lower urinary tract symptoms     ICD-10-CM: N40.1, N13.8 ICD-9-CM: 600.01, 599.69 Smoker     ICD-10-CM: H15.549 ICD-9-CM: 305.1 Spinal stenosis of lumbar region, unspecified whether neurogenic claudication present     ICD-10-CM: M48.061 
ICD-9-CM: 724.02 Vitals BP Pulse Resp Height(growth percentile) Weight(growth percentile) SpO2  
 110/62 (BP 1 Location: Left arm, BP Patient Position: Sitting) (!) 56 16 5' 11\" (1.803 m) 162 lb (73.5 kg) 98% BMI Smoking Status 22.59 kg/m2 Former Smoker Vitals History BMI and BSA Data Body Mass Index Body Surface Area  
 22.59 kg/m 2 1.92 m 2 Preferred Pharmacy Pharmacy Name Phone [facility], [address] AT [facility] OF  [address] & [address] Herb Maria [phone] Your Updated Medication List  
  
   
This list is accurate as of 9/11/18 10:46 AM.  Always use your most recent med list.  
  
  
  
  
 clonazePAM 1 mg tablet Commonly known as:  KlonoPIN  
TAKE 1 TABLET BY MOUTH TWICE DAILY DULoxetine 60 mg capsule Commonly known as:  CYMBALTA TAKE 1 CAPSULE BY MOUTH DAILY  
  
 finasteride 5 mg tablet Commonly known as:  PROSCAR Take 1 Tab by mouth daily. For the prostate  
  
 fludrocortisone 0.1 mg tablet Commonly known as:  FLORINEF Take 1 Tab by mouth daily. gabapentin 600 mg tablet Commonly known as:  NEURONTIN Take 600 mg by mouth daily. naproxen 500 mg tablet Commonly known as:  NAPROSYN  
TAKE 1 TABLET BY MOUTH TWICE DAILY WITH MEALS FOR PAIN  
  
 prazosin 1 mg capsule Commonly known as:  MINIPRESS Take 1 Cap by mouth nightly. Indications: CHRONIC PTSD WITH TRAUMA NIGHTMARES  
  
 tamsulosin 0.4 mg capsule Commonly known as:  FLOMAX Take 1 Cap by mouth daily. for enlarged prostate Prescriptions Sent to Pharmacy  Refills  
 fludrocortisone (FLORINEF) 0.1 mg tablet 3  
 Sig: Take 1 Tab by mouth daily. Class: Normal  
 Pharmacy: [email or web address] [address] Ph #: [phone] Route: Oral  
  
To-Do List   
 Around 09/11/2018 ECHO:  2D ECHO COMPLETE ADULT (TTE) W OR WO CONTR Around 09/11/2018 Imaging:  DUPLEX CAROTID BILATERAL Around 09/11/2018 ECG:  ECG HOLTER MONITOR, UP TO 48 HRS   
  
 09/18/2018 2:00 PM  
  Appointment with [address] at [place] [address] ([phone]) GENERAL INSTRUCTIONS - If you have a hand-written prescription for this exam, you must bring it with you on the day of your exam. - Bring all relevant prior images from facilities outside of [facility]. Failure to provide images may delay reading by Radiologist. - Children under the age of 15 may not be left unattended. - [address] [place] [address] patients must have an armband and be accompanied by a caregiver or family member. APPOINTMENT CANCELLATION - To reschedule or cancel an appointment, please contact the Scheduling Department at [phone].  
  
 09/18/2018 3:00 PM  
  Appointment with [address] at [place] [address] ([phone]) GENERAL INSTRUCTIONS - If you have a hand-written prescription for this exam, you must bring it with you on the day of your exam. - Bring all relevant prior images from facilities outside of [facility]. Failure to provide images may delay reading by Radiologist. - Children under the age of 15 may not be left unattended. - [address] [place] [address] patients must have an armband and be accompanied by a caregiver or family member. APPOINTMENT CANCELLATION - To reschedule or cancel an appointment, please contact the Scheduling Department at [phone]. Introducing [facility] & HEALTH SERVICES!    
 [facility] introduces [employer] patient portal. Now you can access parts of your medical record, email your doctor's office, and request medication refills online. 1. In your internet browser, go to [email or web address] [email or web address] 2. Click on the First Time User? Click Here link in the Sign In box. You will see the New Member Sign Up page. 3. Enter your [employer] Access Code exactly as it appears below. You will not need to use this code after youve completed the sign-up process. If you do not sign up before the expiration date, you must request a new code. · [employer] Access Code: [email or web address] Expires: 10/17/2018 11:46 AM 
 
4. Enter the last four digits of your Social Security Number (xxxx) and Date of Birth (mm/dd/yyyy) as indicated and click Submit. You will be taken to the next sign-up page. 5. Create a [employer] ID. This will be your [employer] login ID and cannot be changed, so think of one that is secure and easy to remember. 6. Create a [employer] password. You can change your password at any time. 7. Enter your Password Reset Question and Answer. This can be used at a later time if you forget your password. 8. Enter your e-mail address. You will receive e-mail notification when new information is available in [address] 9. Click Sign Up. You can now view and download portions of your medical record. 10. Click the Download Summary menu link to download a portable copy of your medical information. If you have questions, please visit the Frequently Asked Questions section of the [employer] website. Remember, [employer] is NOT to be used for urgent needs. For medical emergencies, dial 911. Now available from your iPhone and Android! Please provide this summary of care documentation to your next provider. Your primary care clinician is listed as Rhunette Bence. If you have any questions after today's visit, please call [phone].

## 2018-09-11 NOTE — PROGRESS NOTES
Verified patient with two patient identifiers. Medications reviewed/approved by Dr. Butch Hope. Chief Complaint Patient presents with  Slow Heart Rate New patient evaluation  Dizziness 1. Have you been to the ER, urgent care clinic since your last visit? Hospitalized since your last visit? New pt. 
 
2. Have you seen or consulted any other health care providers outside of the [address] [place] [address] since your last visit? Include any pap smears or colon screening. New pt.

## 2018-09-12 ENCOUNTER — CLINICAL SUPPORT (OUTPATIENT)
Dept: CARDIOLOGY CLINIC | Age: 73
End: 2018-09-12

## 2018-09-12 DIAGNOSIS — R00.1 BRADYCARDIA: ICD-10-CM

## 2018-09-12 DIAGNOSIS — R55 SYNCOPE, UNSPECIFIED SYNCOPE TYPE: ICD-10-CM

## 2018-09-12 DIAGNOSIS — I95.1 ORTHOSTATIC HYPOTENSION: ICD-10-CM

## 2018-09-12 RX ORDER — DONEPEZIL HYDROCHLORIDE 5 MG/1
5 TABLET, FILM COATED ORAL
COMMUNITY
End: 2018-12-12 | Stop reason: SDUPTHER

## 2018-09-14 PROBLEM — H35.3130 AGE-RELATED MACULAR DEGENERATION, DRY, BOTH EYES: Status: ACTIVE | Noted: 2018-09-14

## 2018-09-14 PROBLEM — Z96.643 S/P HIP REPLACEMENT, BILATERAL: Status: ACTIVE | Noted: 2017-10-18

## 2018-09-14 PROBLEM — Z98.890 S/P RIGHT ROTATOR CUFF REPAIR: Status: ACTIVE | Noted: 2017-12-01

## 2018-09-14 PROBLEM — F32.9 REACTIVE DEPRESSION: Status: RESOLVED | Noted: 2017-12-21 | Resolved: 2018-09-14

## 2018-09-18 ENCOUNTER — TELEPHONE (OUTPATIENT)
Dept: CARDIOLOGY CLINIC | Age: 73
End: 2018-09-18

## 2018-09-18 NOTE — TELEPHONE ENCOUNTER
----- Message from David Barney MD sent at 9/18/2018  2:05 PM EDT -----  Regarding: Holter  Bradycardia with up to 4.05 sec pauses--with his recurrent syncope needs referral to Dr. Theodora Hernández for possible PPM.  Thanks [facility]  Advise echo shows normal LV pumping function, some \"stiffness\" of pumping muscle, valves with only mild abnormalities.  Carotid dopplers show mild bilateral plaque only. [facility]     Spoke with the patient and the wife. Verified patient with two patient identifiers. Results given and questions answered. Patient and his wife verbalized understanding. The pt and the wife would like that I wait on the referral to Dr. Theodora Hernández until the pt is seen by Dr. Oliver Johnson on Thursday. I clarified the immediate attention for this matter. NO DRIVING. Pt and the pts wife verbalized understanding.

## 2018-09-20 ENCOUNTER — OFFICE VISIT (OUTPATIENT)
Dept: CARDIOLOGY CLINIC | Age: 73
End: 2018-09-20

## 2018-09-20 VITALS
RESPIRATION RATE: 12 BRPM | OXYGEN SATURATION: 96 % | WEIGHT: 166 LBS | HEART RATE: 56 BPM | SYSTOLIC BLOOD PRESSURE: 108 MMHG | HEIGHT: 71 IN | BODY MASS INDEX: 23.24 KG/M2 | DIASTOLIC BLOOD PRESSURE: 58 MMHG

## 2018-09-20 DIAGNOSIS — G62.9 NEUROPATHY: ICD-10-CM

## 2018-09-20 DIAGNOSIS — I95.1 ORTHOSTATIC HYPOTENSION: ICD-10-CM

## 2018-09-20 DIAGNOSIS — M48.061 SPINAL STENOSIS OF LUMBAR REGION, UNSPECIFIED WHETHER NEUROGENIC CLAUDICATION PRESENT: ICD-10-CM

## 2018-09-20 DIAGNOSIS — R00.1 BRADYCARDIA: ICD-10-CM

## 2018-09-20 DIAGNOSIS — I49.5 SINOATRIAL NODE DYSFUNCTION (HCC): ICD-10-CM

## 2018-09-20 DIAGNOSIS — R55 SYNCOPE, UNSPECIFIED SYNCOPE TYPE: Primary | ICD-10-CM

## 2018-09-20 RX ORDER — ASPIRIN 325 MG
325 TABLET, DELAYED RELEASE (ENTERIC COATED) ORAL
COMMUNITY
End: 2019-09-03 | Stop reason: ALTCHOICE

## 2018-09-20 NOTE — MR AVS SNAPSHOT
[address] [facility] [address] 
 
 
 [address] [facility] 67 [address] [phone] Patient: Tacho Fallon MRN: [id number] Visit Information Date & Time Provider Department Dept. Phone Encounter #  
 9/20/2018  9:40 AM Vonnie Taryn [address] Cardiology [facility]  Your Appointments 10/19/2018 10:40 AM  
Follow Up with Sherwin Cedeño MD  
[facility] PRIMARY CARE AT [address] [facility]) Appt Note: 3 month f/u; 3 mo f/u  
 [facility] [place] [address] [facility] [phone] [id number] [phone] 3000 32[facility] Upcoming Health Maintenance Date Due FOBT Q 1 YEAR AGE 50-75 6/20/1996 GLAUCOMA SCREENING Q2Y 6/20/2011 Pneumococcal 65+ Low/Medium Risk (2 of 2 - PPSV23) 9/15/2018 Influenza Age 5 to Adult 3/14/2019* DTaP/Tdap/Td series (2 - Td) 9/15/2027 *Topic was postponed. The date shown is not the original due date. Allergies as of 9/20/2018  Review Complete On: 9/20/2018 By: Olga Christian No Known Allergies Current Immunizations  Never Reviewed No immunizations on file. Not reviewed this visit You Were Diagnosed With   
  
 Codes Comments Syncope, unspecified syncope type    -  Primary ICD-10-CM: R55 
ICD-9-CM: 780.2 Bradycardia     ICD-10-CM: R00.1 ICD-9-CM: 427.89 Sinoatrial node dysfunction (HCC)     ICD-10-CM: I49.5 ICD-9-CM: 427.81 Orthostatic hypotension     ICD-10-CM: I95.1 ICD-9-CM: 458.0 Neuropathy     ICD-10-CM: G62.9 ICD-9-CM: 355.9 Spinal stenosis of lumbar region, unspecified whether neurogenic claudication present     ICD-10-CM: M48.061 
ICD-9-CM: 724.02 Vitals BP Pulse Resp Height(growth percentile) Weight(growth percentile) SpO2  
 108/58 (BP 1 Location: Left arm, BP Patient Position: Sitting) (!) 56 12 5' 11\" (1.803 m) 166 lb (75.3 kg) 96% BMI Smoking Status 23.15 kg/m2 Current Every Day Smoker Vitals History BMI and BSA Data Body Mass Index Body Surface Area  
 23.15 kg/m 2 1.94 m 2 Preferred Pharmacy Pharmacy Name Phone [facility], [address] AT [facility] OF  [address] & [address] [phone] Your Updated Medication List  
  
   
This list is accurate as of 9/20/18 10:02 AM.  Always use your most recent med list.  
  
  
  
  
 aspirin delayed-release 325 mg tablet Take  by mouth three (3) days a week. clonazePAM 1 mg tablet Commonly known as:  KlonoPIN  
TAKE 1 TABLET BY MOUTH TWICE DAILY  
  
 donepezil 5 mg tablet Commonly known as:  ARICEPT Take  by mouth nightly. DULoxetine 60 mg capsule Commonly known as:  CYMBALTA TAKE 1 CAPSULE BY MOUTH DAILY  
  
 finasteride 5 mg tablet Commonly known as:  PROSCAR Take 1 Tab by mouth daily. For the prostate  
  
 fludrocortisone 0.1 mg tablet Commonly known as:  FLORINEF Take 1 Tab by mouth daily. gabapentin 600 mg tablet Commonly known as:  NEURONTIN Take 600 mg by mouth daily. naproxen 500 mg tablet Commonly known as:  NAPROSYN  
TAKE 1 TABLET BY MOUTH TWICE DAILY WITH MEALS FOR PAIN  
  
 prazosin 1 mg capsule Commonly known as:  MINIPRESS Take 1 Cap by mouth nightly. Indications: CHRONIC PTSD WITH TRAUMA NIGHTMARES  
  
 VITAMIN B-12 PO Take  by mouth daily. Introducing [facility] & [facility] SERVICES! [employer] introduces [employer] patient portal. Now you can access parts of your medical record, email your doctor's office, and request medication refills online. 1. In your internet browser, go to [email or web address] 2. Click on the First Time User? Click Here link in the Sign In box. You will see the New Member Sign Up page. 3. Enter your [employer] Access Code exactly as it appears below.  You will not need to use this code after youve completed the sign-up process. If you do not sign up before the expiration date, you must request a new code. · [employer] Access Code: [email or web address] Expires: 10/17/2018 11:46 AM 
 
4. Enter the last four digits of your Social Security Number (xxxx) and Date of Birth (mm/dd/yyyy) as indicated and click Submit. You will be taken to the next sign-up page. 5. Create a [employer] ID. This will be your [employer] login ID and cannot be changed, so think of one that is secure and easy to remember. 6. Create a [employer] password. You can change your password at any time. 7. Enter your Password Reset Question and Answer. This can be used at a later time if you forget your password. 8. Enter your e-mail address. You will receive e-mail notification when new information is available in [address] 9. Click Sign Up. You can now view and download portions of your medical record. 10. Click the Download Summary menu link to download a portable copy of your medical information. If you have questions, please visit the Frequently Asked Questions section of the [employer] website. Remember, [employer] is NOT to be used for urgent needs. For medical emergencies, dial 911. Now available from your iPhone and Android! Please provide this summary of care documentation to your next provider. Your primary care clinician is listed as Geovani Yang. If you have any questions after today's visit, please call [phone].

## 2018-09-20 NOTE — PROGRESS NOTES
PATIENT ID VERIFIED WITH TWO PATIENT IDENTIFIERS. PATIENT MEDICATIONS REVIEWED AND APPROVED BY DR. Felipa Skiff. MEDICATIONS THAT WERE REMOVED FROM THIS VISIT HAVE BEEN APPROVED BY DR. Felipa Skiff. Chief Complaint   Patient presents with    Results     Follow up cardiac testing--echo and 48 hr holter monitor       1. Have you been to the ER, urgent care clinic since your last visit? Hospitalized since your last visit? NO      2. Have you seen or consulted any other health care providers outside of the [facility] since your last visit?   NO

## 2018-09-20 NOTE — PROGRESS NOTES
Yasmin Martin is a 68 y.o. male is here for f/u to discuss test results. Recurrent syncope (vs seizure). HOLTER monitor with sinus lv, 3 pauses > 3 secs with one pause >4 secs. ECHO with LVEF 60-65, valves ok. Followed by Dr. Robin Aguilar and Dr. Tru Pedroza (Neurology in [place]). Also seen at [facility], [facility], and orthopedics. Complex medical hx: Major concern for today's visit  syncope. He had muliple episodes of sudden loss of consciousness without any warning or aura lasting for 10-15 seconds and immediate return to baseline without any impairment. No seizure like activity. His last event occurred at dentist office while checking in where he suddenly looked towards right and passed out. Similar event occurred at home while lifting the groceries. He also complains of random dizziness and lightheaded. Denies any chest pain or any history of cardiac disease or stroke-like symptoms. Memory Impairment is stable and Tolerating Aricept well. Seen several times by Dr Tru Pedroza for neuropathy, gait disturbance, memory issues/alzheimer's with w/u including EMG's ( CTS and generalized peripheral neuropathy), MRI with generalized atrophy and chronic microvascular changes. . Paraesthesia are not well controlled on Gabapentin. Neurology has ordered out-pt Holter monitor and Echo, not yet done. ? Recent carotid dopplers. Seen in ER yesterday at [facility] after recurrent episode of syncope. EKG with sinus bradycardia HR 51, otherwise normal. Was orthostatic in ER and given IVF's, recurrent episode while in ED. Wife at bedside who explains when he was standing with her he started shaking and starring off. She was able to direct him to get to the ground and she states he never was completely unconscious but had bilateral generalized shaking for about one minute. The patient denies chest pain/ shortness of breath, orthopnea, PND, LE edema, palpitations, syncope, presyncope or fatigue.        Patient Active Problem List    Diagnosis Date Noted    Age-related macular degeneration, dry, both eyes 09/14/2018    Syncope 09/11/2018    Neuropathy 09/11/2018    Memory deficit 09/11/2018    BPH with obstruction/lower urinary tract symptoms 04/19/2018    Smoker 01/18/2018    S/P right rotator cuff repair 12/01/2017    S/P hip replacement, bilateral 10/18/2017    Ingrown left big toenail 03/31/2017    PTSD (post-traumatic stress disorder) 12/13/2016    Lumbar spinal stenosis 12/13/2016    Alcoholism in remission ([facility] [address] 12/13/2016    Risk for falls 12/13/2016      Angela Farnsworth MD  Past Medical History:   Diagnosis Date    Anxiety     Back pain     Blurred vision     Chest pain     Depression     Dizziness     Fast heart beat     Frequent headaches     Frequent urination     Joint pain     Joint swelling     Muscle pain     Neuropathy     Recent change in weight     Sinus problem     Skin disease     SOB (shortness of breath)     Sore throat     Stiffness of joints, multiple sites     Stress     Syncope     Tiredness     Trouble in sleeping     Weakness       Past Surgical History:   Procedure Laterality Date    HX HIP REPLACEMENT Bilateral 1993    HX ROTATOR CUFF REPAIR Right 11/16/2017     No Known Allergies   Family History   Problem Relation Age of Onset    Cancer Mother     Cancer Maternal Aunt       Social History     Social History    Marital status: SINGLE     Spouse name: N/A    Number of children: N/A    Years of education: N/A     Occupational History    Not on file.      Social History Main Topics    Smoking status: Current Every Day Smoker     Years: 50.00     Types: Cigarettes    Smokeless tobacco: Never Used      Comment: 0.5-1 pack per day    Alcohol use 16.8 oz/week     28 Cans of beer per week    Drug use: No    Sexual activity: No     Other Topics Concern    Not on file     Social History Narrative      Current Outpatient Prescriptions   Medication Sig    cyanocobalamin, vitamin B-12, (VITAMIN B-12 PO) Take  by mouth daily.  aspirin delayed-release 325 mg tablet Take  by mouth three (3) days a week.  donepezil (ARICEPT) 5 mg tablet Take  by mouth nightly.  fludrocortisone (FLORINEF) 0.1 mg tablet Take 1 Tab by mouth daily.  gabapentin (NEURONTIN) 600 mg tablet Take 600 mg by mouth daily.  clonazePAM (KLONOPIN) 1 mg tablet TAKE 1 TABLET BY MOUTH TWICE DAILY (Patient taking differently: --PATIENT ONLY TAKING ONCE A DAY)    DULoxetine (CYMBALTA) 60 mg capsule TAKE 1 CAPSULE BY MOUTH DAILY    finasteride (PROSCAR) 5 mg tablet Take 1 Tab by mouth daily. For the prostate    prazosin (MINIPRESS) 1 mg capsule Take 1 Cap by mouth nightly. Indications: CHRONIC PTSD WITH TRAUMA NIGHTMARES    naproxen (NAPROSYN) 500 mg tablet TAKE 1 TABLET BY MOUTH TWICE DAILY WITH MEALS FOR PAIN (Patient taking differently: PATIENT ONLY TAKING ONCE A DAY)     No current facility-administered medications for this visit. Review of Symptoms:    CONST  No weight change. No fever, chills, sweats    ENT No visual changes, URI sx, sore throat    CV  See HPI   RESP  No cough, or sputum, wheezing. Also see HPI   GI  No abdominal pain or change in bowel habits. No heartburn or dysphagia. No melena or rectal bleeding.   No dysuria, urgency, frequency, hematuria   MSKEL  No joint pain, swelling. No muscle pain. SKIN  No rash or lesions. NEURO  No headache, syncope, or seizure. No weakness, loss of sensation, or paresthesias. PSYCH  No low mood or depression  No anxiety. HE/LYMPH  No easy bruising, abnormal bleeding, or enlarged glands.         Physical ExamPhysical Exam:    Visit Vitals    /58 (BP 1 Location: Left arm, BP Patient Position: Sitting)    Pulse (!) 56    Resp 12    Ht 5' 11\" (1.803 m)    Wt 166 lb (75.3 kg)    SpO2 96%    BMI 23.15 kg/m2     Gen: NAD  HEENT:  PERRL, throat clear  Neck: no adenopathy, no thyromegaly, no JVD   Heart:  Regular,Nl S1S2,  no murmur, gallop or rub.   Lungs:  clear  Abdomen:   Soft, non-tender, bowel sounds are active.   Extremities:  No edema  Pulse: symmetric  Neuro: A&O times 3, No focal neuro deficits    Cardiographics    l  Labs:     Lab Results   Component Value Date/Time    Sodium 141 09/10/2018 03:15 PM    Sodium 141 07/10/2018 03:24 PM    Sodium 144 06/20/2017 06:28 PM    Potassium 4.7 09/10/2018 03:15 PM    Potassium 4.5 07/10/2018 03:24 PM    Potassium 4.4 06/20/2017 06:28 PM    Chloride 105 09/10/2018 03:15 PM    Chloride 104 07/10/2018 03:24 PM    Chloride 104 06/20/2017 06:28 PM    CO2 27 09/10/2018 03:15 PM    CO2 25 07/10/2018 03:24 PM    CO2 22 06/20/2017 06:28 PM    Anion gap 9 09/10/2018 03:15 PM    Glucose 75 09/10/2018 03:15 PM    Glucose 95 07/10/2018 03:24 PM    Glucose 130 (H) 06/20/2017 06:28 PM    BUN 21 (H) 09/10/2018 03:15 PM    BUN 16 07/10/2018 03:24 PM    BUN 20 06/20/2017 06:28 PM    Creatinine 1.13 09/10/2018 03:15 PM    Creatinine 0.86 07/10/2018 03:24 PM    Creatinine 0.92 06/20/2017 06:28 PM    BUN/Creatinine ratio 19 09/10/2018 03:15 PM    BUN/Creatinine ratio 19 07/10/2018 03:24 PM    BUN/Creatinine ratio 22 06/20/2017 06:28 PM    GFR est AA >60 09/10/2018 03:15 PM    GFR est  07/10/2018 03:24 PM    GFR est AA 96 06/20/2017 06:28 PM    GFR est non-AA >60 09/10/2018 03:15 PM    GFR est non-AA 87 07/10/2018 03:24 PM    GFR est non-AA 83 06/20/2017 06:28 PM    Calcium 8.4 (L) 09/10/2018 03:15 PM    Calcium 8.8 07/10/2018 03:24 PM    Calcium 8.8 06/20/2017 06:28 PM    Bilirubin, total 0.6 09/10/2018 03:15 PM    Bilirubin, total 0.3 07/10/2018 03:24 PM    Bilirubin, total 0.3 06/20/2017 06:28 PM    AST (SGOT) 13 (L) 09/10/2018 03:15 PM    AST (SGOT) 15 07/10/2018 03:24 PM    AST (SGOT) 11 06/20/2017 06:28 PM    Alk. phosphatase 79 09/10/2018 03:15 PM    Alk. phosphatase 99 07/10/2018 03:24 PM    Alk.  phosphatase 89 06/20/2017 06:28 PM    Protein, total 6.4 09/10/2018 03:15 PM    Protein, total 6.0 07/10/2018 03:24 PM    Protein, total 6.3 06/20/2017 06:28 PM    Albumin 3.7 09/10/2018 03:15 PM    Albumin 3.9 07/10/2018 03:24 PM    Albumin 4.0 06/20/2017 06:28 PM    Globulin 2.7 09/10/2018 03:15 PM    A-G Ratio 1.4 09/10/2018 03:15 PM    A-G Ratio 1.9 07/10/2018 03:24 PM    A-G Ratio 1.7 06/20/2017 06:28 PM    ALT (SGPT) 18 09/10/2018 03:15 PM    ALT (SGPT) 15 07/10/2018 03:24 PM    ALT (SGPT) 10 06/20/2017 06:28 PM     No results found for: CPK, CPKX, CPX  No results found for: CHOL, CHOLX, CHLST, CHOLV, 843092, HDL, LDL, LDLC, DLDLP, TGLX, TRIGL, TRIGP, CHHD, CHHDX  No results found for this or any previous visit. Assessment:         Patient Active Problem List    Diagnosis Date Noted    Age-related macular degeneration, dry, both eyes 09/14/2018    Syncope 09/11/2018    Neuropathy 09/11/2018    Memory deficit 09/11/2018    BPH with obstruction/lower urinary tract symptoms 04/19/2018    Smoker 01/18/2018    S/P right rotator cuff repair 12/01/2017    S/P hip replacement, bilateral 10/18/2017    Ingrown left big toenail 03/31/2017    PTSD (post-traumatic stress disorder) 12/13/2016    Lumbar spinal stenosis 12/13/2016    Alcoholism in remission ([facility] [address] 12/13/2016    Risk for falls 12/13/2016     Recurrent syncope (vs seizure). HOLTER monitor with sinus lv, 3 pauses > 3 secs with one pause >4 secs. ECHO with LVEF 60-65, valves ok. Plan:     Refer to EP Dr. Estela Young 56 for evaluation for PPM--discussed with pt and spouse.   Discussed Holter findings and Echo    Sandhya Wilson MD

## 2018-10-02 ENCOUNTER — OFFICE VISIT (OUTPATIENT)
Dept: CARDIOLOGY CLINIC | Age: 73
End: 2018-10-02

## 2018-10-02 VITALS
WEIGHT: 165.6 LBS | HEART RATE: 46 BPM | SYSTOLIC BLOOD PRESSURE: 140 MMHG | OXYGEN SATURATION: 99 % | RESPIRATION RATE: 18 BRPM | BODY MASS INDEX: 23.18 KG/M2 | DIASTOLIC BLOOD PRESSURE: 70 MMHG | HEIGHT: 71 IN

## 2018-10-02 DIAGNOSIS — I49.5 SSS (SICK SINUS SYNDROME) (HCC): Primary | ICD-10-CM

## 2018-10-02 DIAGNOSIS — I10 ESSENTIAL HYPERTENSION: ICD-10-CM

## 2018-10-02 DIAGNOSIS — R55 SYNCOPE, UNSPECIFIED SYNCOPE TYPE: ICD-10-CM

## 2018-10-02 DIAGNOSIS — R00.1 BRADYCARDIA: ICD-10-CM

## 2018-10-02 RX ORDER — GUAIFENESIN 600 MG/1
600 TABLET, EXTENDED RELEASE ORAL 2 TIMES DAILY
COMMUNITY
End: 2018-10-27

## 2018-10-02 NOTE — PROGRESS NOTES
1. Have you been to the ER, urgent care clinic since your last visit? Hospitalized since your last visit? No    2. Have you seen or consulted any other health care providers outside of the [facility] since your last visit? Include any pap smears or colon screening. No    Chief Complaint   Patient presents with    Results     NP, ref by Dr. Rosalinda Brady, SSS.  C/O synope/seizure once.

## 2018-10-02 NOTE — MR AVS SNAPSHOT
[address] [facility] 
[phone] Patient: Michelle Su MRN: [id number] LEP:[birth year] Visit Information Date & Time Provider Department Dept. Phone Encounter #  
 10/2/2018  1:00 PM Dennis Moss, [address] [place] [address] [facility] [phone] [id number] Follow-up Instructions Return in about 4 weeks (around 10/30/2018). Routing History Follow-up and Disposition History Your Appointments 10/19/2018 10:40 AM  
Follow Up with Palma Zavala MD  
[facility] AT [address] [place] [address] [address] [facility]) Appt Note: 3 month f/u; 3 mo f/u  
 [facility] [place] [address] [facility] [phone]  
[phone] [address] [place] [address] [phone]  
  
    
 11/6/2018  3:00 PM  
PROCEDURE with PACEMAKER, [facility] [address] [facility]) Appt Note: Per Chandler, 3 week DCPM check. DOS 10/16/2018-[facility] [id number] [facility]  
[phone] [id number] [facility] Upcoming Health Maintenance Date Due Shingrix Vaccine Age 50> (1 of 2) 6/20/1996 FOBT Q 1 YEAR AGE 50-75 6/20/1996 GLAUCOMA SCREENING Q2Y 6/20/2011 Pneumococcal 65+ Low/Medium Risk (2 of 2 - PPSV23) 9/15/2018 Influenza Age 5 to Adult 3/14/2019* DTaP/Tdap/Td series (2 - Td) 9/15/2027 *Topic was postponed. The date shown is not the original due date. Allergies as of 10/2/2018  Review Complete On: 10/2/2018 By: Dennis Moss MD  
 No Known Allergies Current Immunizations  Never Reviewed No immunizations on file. Not reviewed this visit You Were Diagnosed With   
  
 Codes Comments SSS (sick sinus syndrome) (HCC)    -  Primary ICD-10-CM: I49.5 ICD-9-CM: 427.81 Bradycardia     ICD-10-CM: R00.1 ICD-9-CM: 427.89 Syncope, unspecified syncope type     ICD-10-CM: R55 
ICD-9-CM: 780.2 Essential hypertension     ICD-10-CM: I10 
ICD-9-CM: 401.9 Vitals BP Pulse Resp Height(growth percentile) Weight(growth percentile) SpO2  
 140/70 (BP 1 Location: Right arm, BP Patient Position: Sitting) (!) 46 18 5' 11\" (1.803 m) 165 lb 9.6 oz (75.1 kg) 99% BMI Smoking Status 23.1 kg/m2 Current Every Day Smoker Vitals History BMI and BSA Data Body Mass Index Body Surface Area  
 23.1 kg/m 2 1.94 m 2 Preferred Pharmacy Pharmacy Name Phone [address] [facility] AT [facility] [address] [facility] Margarito Walters [phone] Your Updated Medication List  
  
   
This list is accurate as of 10/2/18  2:00 PM.  Always use your most recent med list.  
  
  
  
  
 aspirin delayed-release 325 mg tablet Take  by mouth three (3) days a week. clonazePAM 1 mg tablet Commonly known as:  KlonoPIN  
TAKE 1 TABLET BY MOUTH TWICE DAILY  
  
 donepezil 5 mg tablet Commonly known as:  ARICEPT Take  by mouth nightly. DULoxetine 60 mg capsule Commonly known as:  CYMBALTA TAKE 1 CAPSULE BY MOUTH DAILY  
  
 finasteride 5 mg tablet Commonly known as:  PROSCAR Take 1 Tab by mouth daily. For the prostate  
  
 fludrocortisone 0.1 mg tablet Commonly known as:  FLORINEF Take 1 Tab by mouth daily. gabapentin 600 mg tablet Commonly known as:  NEURONTIN Take 600 mg by mouth daily. MUCINEX 600 mg ER tablet Generic drug:  guaiFENesin ER Take 600 mg by mouth two (2) times a day. naproxen 500 mg tablet Commonly known as:  NAPROSYN  
TAKE 1 TABLET BY MOUTH TWICE DAILY WITH MEALS FOR PAIN  
  
 prazosin 1 mg capsule Commonly known as:  MINIPRESS Take 1 Cap by mouth nightly. Indications: CHRONIC PTSD WITH TRAUMA NIGHTMARES  
  
 VITAMIN B-12 PO Take  by mouth daily. We Performed the Following AMB POC EKG ROUTINE W/ 12 LEADS, INTER & REP [72644 CPT(R)] CBC WITH AUTOMATED DIFF [68926 CPT(R)] METABOLIC PANEL, COMPREHENSIVE [55289 CPT(R)] PROTHROMBIN TIME + INR [32846 CPT(R)] Follow-up Instructions Return in about 4 weeks (around 10/30/2018). To-Do List   
 Around 10/02/2018 Imaging:  XR CHEST PA LAT   
  
 10/16/2018 8:00 AM  
  Appointment with [address] [place] [address] at [address] [facility] ([phone]) NPO AFTER MIDNIGHT! ROUTINE CASES:  Please arrive 2 hour prior to your scheduled appointment time. If your scheduled appointment is for 0730, 0800, 0815, please arrive by 0645. NON ROUTINE CASES:  PATIENTS WHO REQUIRE LABS, X-RAY, EKG, or MEDS:  PLEASE ARRIVE 3 HOURS PRIOR TO YOUR SCHEDULED APPOINTMENT. If you require hydration prior to your procedure, PLEASE ARRIVE 4 HOURS PRIOR TO YOUR APPOINTMENT  **** IT IS THE OFFICE SCHEDULARS RESPONSBILITY TO NOTIFY THE CATH LAB SCHEDULAR IF THE PATIENT WILL REQUIRE ANY ADDITIONAL TIME FOR PREP FROM ROUTINE CASE ***** Introducing \[facility]\"" & HEALTH SERVICES! [facility] introduces [employer] patient portal. Now you can access parts of your medical record, email your doctor's office, and request medication refills online. 1. In your internet browser, go to [email or web address] 2. Click on the First Time User? Click Here link in the Sign In box. You will see the New Member Sign Up page. 3. Enter your [employer] Access Code exactly as it appears below. You will not need to use this code after youve completed the sign-up process. If you do not sign up before the expiration date, you must request a new code. · [employer] Access Code: [email or web address] Expires: 10/17/2018 11:46 AM 
 
4. Enter the last four digits of your Social Security Number (xxxx) and Date of Birth (mm/dd/yyyy) as indicated and click Submit. You will be taken to the next sign-up page. 5. Create a [employer] ID.  This will be your [employer] login ID and cannot be changed, so think of one that is secure and easy to remember. 6. Create a [employer] password. You can change your password at any time. 7. Enter your Password Reset Question and Answer. This can be used at a later time if you forget your password. 8. Enter your e-mail address. You will receive e-mail notification when new information is available in [address] 9. Click Sign Up. You can now view and download portions of your medical record. 10. Click the Download Summary menu link to download a portable copy of your medical information. If you have questions, please visit the Frequently Asked Questions section of the [employer] website. Remember, [employer] is NOT to be used for urgent needs. For medical emergencies, dial 911. Now available from your iPhone and Android! Please provide this summary of care documentation to your next provider. Your primary care clinician is listed as Suyapa Barrera. If you have any questions after today's visit, please call [phone].

## 2018-10-02 NOTE — PROGRESS NOTES
Subjective:      Jazmine Lora is a 68 y.o. male is here for EP consult for SSS noted on recent holter monitor which was worn for syncope, dizziness and fatigue. The patient denies chest pain/ shortness of breath, orthopnea, PND, LE edema, palpitations.     Patient Active Problem List    Diagnosis Date Noted    Bradycardia 10/02/2018    Age-related macular degeneration, dry, both eyes 09/14/2018    Syncope 09/11/2018    Neuropathy 09/11/2018    Memory deficit 09/11/2018    BPH with obstruction/lower urinary tract symptoms 04/19/2018    Smoker 01/18/2018    S/P right rotator cuff repair 12/01/2017    S/P hip replacement, bilateral 10/18/2017    Ingrown left big toenail 03/31/2017    PTSD (post-traumatic stress disorder) 12/13/2016    Lumbar spinal stenosis 12/13/2016    Alcoholism in remission ([facility] [address] 12/13/2016    Risk for falls 12/13/2016      Chelsea Domingo MD  Past Medical History:   Diagnosis Date    Anxiety     Back pain     Blurred vision     Chest pain     Depression     Dizziness     Fast heart beat     Frequent headaches     Frequent urination     Joint pain     Joint swelling     Muscle pain     Neuropathy     Recent change in weight     Sinus problem     Skin disease     SOB (shortness of breath)     Sore throat     Stiffness of joints, multiple sites     Stress     Syncope     Tiredness     Trouble in sleeping     Weakness       Past Surgical History:   Procedure Laterality Date    HX HIP REPLACEMENT Bilateral 1993    HX ROTATOR CUFF REPAIR Right 11/16/2017     No Known Allergies   Family History   Problem Relation Age of Onset    Cancer Mother     Cancer Maternal Aunt     negative for cardiac disease  Social History     Social History    Marital status: SINGLE     Spouse name: N/A    Number of children: N/A    Years of education: N/A     Social History Main Topics    Smoking status: Current Every Day Smoker     Packs/day: 0.50     Years: 50.00 Types: Cigarettes    Smokeless tobacco: Never Used      Comment: 0.5-1 pack per day    Alcohol use 16.8 oz/week     28 Cans of beer per week      Comment: None now    Drug use: No    Sexual activity: No     Other Topics Concern    None     Social History Narrative     Current Outpatient Prescriptions   Medication Sig    guaiFENesin ER (MUCINEX) 600 mg ER tablet Take 600 mg by mouth two (2) times a day.  cyanocobalamin, vitamin B-12, (VITAMIN B-12 PO) Take  by mouth daily.  aspirin delayed-release 325 mg tablet Take  by mouth three (3) days a week.  donepezil (ARICEPT) 5 mg tablet Take  by mouth nightly.  gabapentin (NEURONTIN) 600 mg tablet Take 600 mg by mouth daily.  clonazePAM (KLONOPIN) 1 mg tablet TAKE 1 TABLET BY MOUTH TWICE DAILY (Patient taking differently: --PATIENT ONLY TAKING ONCE A DAY)    DULoxetine (CYMBALTA) 60 mg capsule TAKE 1 CAPSULE BY MOUTH DAILY    finasteride (PROSCAR) 5 mg tablet Take 1 Tab by mouth daily. For the prostate    naproxen (NAPROSYN) 500 mg tablet TAKE 1 TABLET BY MOUTH TWICE DAILY WITH MEALS FOR PAIN (Patient taking differently: PATIENT ONLY TAKING ONCE A DAY)    fludrocortisone (FLORINEF) 0.1 mg tablet Take 1 Tab by mouth daily.  prazosin (MINIPRESS) 1 mg capsule Take 1 Cap by mouth nightly. Indications: CHRONIC PTSD WITH TRAUMA NIGHTMARES     No current facility-administered medications for this visit. Vitals:    10/02/18 1255   BP: 140/70   Pulse: (!) 46   Resp: 18   SpO2: 99%   Weight: 165 lb 9.6 oz (75.1 kg)   Height: 5' 11\" (1.803 m)       I have reviewed the nurses notes, vitals, problem list, allergy list, medical history, family, social history and medications. Review of Symptoms:    General: +fatigue, Pt denies excessive weight gain or loss. Pt is able to conduct ADL's  HEENT: +dizziness, Denies blurred vision, headaches, epistaxis and difficulty swallowing.   Respiratory: Denies shortness of breath, PATEL, wheezing or stridor. Cardiovascular: Denies precordial pain, palpitations, edema or PND  Gastrointestinal: Denies poor appetite, indigestion, abdominal pain or blood in stool  Urinary: Denies dysuria, pyuria  Musculoskeletal: Denies pain or swelling from muscles or joints  Neurologic: Denies tremor, paresthesias, or sensory motor disturbance  Skin: Denies rash, itching or texture change. Psych: Denies depression      Physical Exam:      General: Well developed, in no acute distress. HEENT: Eyes - PERRL, no jvd  Heart:  Normal S1/S2 negative S3 or S4. Regular, no murmur, gallop or rub.   Respiratory: Clear bilaterally x 4, no wheezing or rales  Abdomen:   Soft, non-tender, bowel sounds are active.   Extremities:  No edema, normal cap refill, no cyanosis. Musculoskeletal: No clubbing  Neuro: A&Ox3, speech clear, gait stable. Skin: Skin color is normal. No rashes or lesions.  Non diaphoretic  Vascular: 2+ pulses symmetric in all extremities    Cardiographics    Ekg: sinus bradycardia    Results for orders placed or performed during the hospital encounter of 09/10/18   EKG, 12 LEAD, INITIAL   Result Value Ref Range    Ventricular Rate 51 BPM    Atrial Rate 51 BPM    P-R Interval 170 ms    QRS Duration 86 ms    Q-T Interval 430 ms    QTC Calculation (Bezet) 396 ms    Calculated P Axis 51 degrees    Calculated R Axis 71 degrees    Calculated T Axis 61 degrees    Diagnosis       Sinus bradycardia  Otherwise normal ECG  No previous ECGs available  Confirmed by Aramis Phillips M.D., --- ([id number]) on 9/13/2018 2:30:35   AM           Lab Results   Component Value Date/Time    WBC 5.8 09/10/2018 03:15 PM    HGB 13.1 09/10/2018 03:15 PM    HCT 39.4 09/10/2018 03:15 PM    PLATELET 847 23/89/3887 03:15 PM    MCV 98.0 09/10/2018 03:15 PM      Lab Results   Component Value Date/Time    Sodium 141 09/10/2018 03:15 PM    Potassium 4.7 09/10/2018 03:15 PM    Chloride 105 09/10/2018 03:15 PM    CO2 27 09/10/2018 03:15 PM    Anion gap 9 09/10/2018 03:15 PM    Glucose 75 09/10/2018 03:15 PM    BUN 21 (H) 09/10/2018 03:15 PM    Creatinine 1.13 09/10/2018 03:15 PM    BUN/Creatinine ratio 19 09/10/2018 03:15 PM    GFR est AA >60 09/10/2018 03:15 PM    GFR est non-AA >60 09/10/2018 03:15 PM    Calcium 8.4 (L) 09/10/2018 03:15 PM    Bilirubin, total 0.6 09/10/2018 03:15 PM    AST (SGOT) 13 (L) 09/10/2018 03:15 PM    Alk. phosphatase 79 09/10/2018 03:15 PM    Protein, total 6.4 09/10/2018 03:15 PM    Albumin 3.7 09/10/2018 03:15 PM    Globulin 2.7 09/10/2018 03:15 PM    A-G Ratio 1.4 09/10/2018 03:15 PM    ALT (SGPT) 18 09/10/2018 03:15 PM         Assessment:     Assessment:        ICD-10-CM ICD-9-CM    1. Bradycardia R00.1 427.89 AMB POC EKG ROUTINE W/ 12 LEADS, INTER & REP   2. Syncope, unspecified syncope type R55 780.2    3. SSS (sick sinus syndrome) ([facility]) I49.5 427.81    4. Essential hypertension I10 401.9      Orders Placed This Encounter    AMB POC EKG ROUTINE W/ 12 LEADS, INTER & REP     Order Specific Question:   Reason for Exam:     Answer:   routine    guaiFENesin ER (MUCINEX) 600 mg ER tablet     Sig: Take 600 mg by mouth two (2) times a day. Plan:   Mr. Jessi Hughes is here for EP consult for SSS. He wore holter monitor which was placed for recurrent syncope. Monitor demonstrated 4 second pauses and bradycardia. He denies syncope while wearing monitor but does report feeling fatigued and dizzy at times. EKG today demonstrates sinus bradycardia. He is a candidate for a pacemaker. I discussed the risks/benefits/alternatives of the procedure with the patient. Risks include (but are not limited to) bleeding, infection, cva/mi/tamponade/death. The patient understands and agrees to proceed. Thank you for this interesting consultation. We will schedule. Continue medical management for htn, syncope. Thank you for allowing me to participate in Tico Mcgee 's care. Abel Hoff, INDIRA    Patient seen and examined by me with nurse practitioner.   I personally performed all components of the history, physical, and medical decision making and agree with the assessment and plan with minor modifications as noted. Pt with syncope and 4 sec pauses. Candidate for a pacemaker.  Pt understands and agrees to proceed    Erum Watson MD, Amos Baumann

## 2018-10-09 RX ORDER — FINASTERIDE 5 MG/1
TABLET, FILM COATED ORAL
Qty: 90 TAB | Refills: 0 | Status: SHIPPED | OUTPATIENT
Start: 2018-10-09 | End: 2018-10-27

## 2018-10-16 ENCOUNTER — APPOINTMENT (OUTPATIENT)
Dept: GENERAL RADIOLOGY | Age: 73
End: 2018-10-16
Attending: INTERNAL MEDICINE
Payer: OTHER GOVERNMENT

## 2018-10-16 ENCOUNTER — HOSPITAL ENCOUNTER (OUTPATIENT)
Dept: NON INVASIVE DIAGNOSTICS | Age: 73
Discharge: HOME | End: 2018-10-16
Attending: INTERNAL MEDICINE | Admitting: INTERNAL MEDICINE
Payer: OTHER GOVERNMENT

## 2018-10-16 VITALS
OXYGEN SATURATION: 95 % | HEART RATE: 61 BPM | BODY MASS INDEX: 22.4 KG/M2 | SYSTOLIC BLOOD PRESSURE: 169 MMHG | TEMPERATURE: 97.8 F | RESPIRATION RATE: 12 BRPM | HEIGHT: 71 IN | WEIGHT: 160 LBS | DIASTOLIC BLOOD PRESSURE: 71 MMHG

## 2018-10-16 PROCEDURE — C1785 PMKR, DUAL, RATE-RESP: HCPCS

## 2018-10-16 PROCEDURE — A4565 SLINGS: HCPCS

## 2018-10-16 PROCEDURE — 71045 X-RAY EXAM CHEST 1 VIEW: CPT

## 2018-10-16 PROCEDURE — 77030018836 HC SOL IRR NACL ICUM -A

## 2018-10-16 PROCEDURE — 74011250636 HC RX REV CODE- 250/636: Performed by: INTERNAL MEDICINE

## 2018-10-16 PROCEDURE — C1894 INTRO/SHEATH, NON-LASER: HCPCS

## 2018-10-16 PROCEDURE — 77030002996 HC SUT SLK J&J -A

## 2018-10-16 PROCEDURE — 74011250636 HC RX REV CODE- 250/636

## 2018-10-16 PROCEDURE — 74011000250 HC RX REV CODE- 250

## 2018-10-16 PROCEDURE — C1898 LEAD, PMKR, OTHER THAN TRANS: HCPCS

## 2018-10-16 PROCEDURE — 77030018729 HC ELECTRD DEFIB PAD CARD -B

## 2018-10-16 PROCEDURE — 77030018673

## 2018-10-16 PROCEDURE — C1893 INTRO/SHEATH, FIXED,NON-PEEL: HCPCS

## 2018-10-16 PROCEDURE — 77030037713 HC CLOSR DEV INCIS ZIP STRY -B

## 2018-10-16 PROCEDURE — 77030031139 HC SUT VCRL2 J&J -A

## 2018-10-16 PROCEDURE — 33208 INSRT HEART PM ATRIAL & VENT: CPT

## 2018-10-16 RX ORDER — GABAPENTIN 600 MG/1
TABLET ORAL
Qty: 90 TAB | Refills: 0 | Status: SHIPPED | OUTPATIENT
Start: 2018-10-16 | End: 2018-11-08 | Stop reason: DRUGHIGH

## 2018-10-16 RX ORDER — MIDAZOLAM HYDROCHLORIDE 1 MG/ML
INJECTION, SOLUTION INTRAMUSCULAR; INTRAVENOUS
Status: COMPLETED
Start: 2018-10-16 | End: 2018-10-16

## 2018-10-16 RX ORDER — FENTANYL CITRATE 50 UG/ML
INJECTION, SOLUTION INTRAMUSCULAR; INTRAVENOUS
Status: COMPLETED
Start: 2018-10-16 | End: 2018-10-16

## 2018-10-16 RX ORDER — SODIUM CHLORIDE 0.9 % (FLUSH) 0.9 %
5-10 SYRINGE (ML) INJECTION EVERY 8 HOURS
Status: DISCONTINUED | OUTPATIENT
Start: 2018-10-16 | End: 2018-10-16 | Stop reason: HOSPADM

## 2018-10-16 RX ORDER — HEPARIN SODIUM 200 [USP'U]/100ML
1000 INJECTION, SOLUTION INTRAVENOUS ONCE
Status: COMPLETED | OUTPATIENT
Start: 2018-10-16 | End: 2018-10-16

## 2018-10-16 RX ORDER — ACETAMINOPHEN 325 MG/1
650 TABLET ORAL
Status: DISCONTINUED | OUTPATIENT
Start: 2018-10-16 | End: 2018-10-16 | Stop reason: HOSPADM

## 2018-10-16 RX ORDER — CEFAZOLIN SODIUM/WATER 2 G/20 ML
SYRINGE (ML) INTRAVENOUS
Status: COMPLETED
Start: 2018-10-16 | End: 2018-10-16

## 2018-10-16 RX ORDER — FENTANYL CITRATE 50 UG/ML
12.5-5 INJECTION, SOLUTION INTRAMUSCULAR; INTRAVENOUS
Status: DISCONTINUED | OUTPATIENT
Start: 2018-10-16 | End: 2018-10-16 | Stop reason: HOSPADM

## 2018-10-16 RX ORDER — CEFAZOLIN SODIUM/WATER 2 G/20 ML
2 SYRINGE (ML) INTRAVENOUS ONCE
Status: COMPLETED | OUTPATIENT
Start: 2018-10-16 | End: 2018-10-16

## 2018-10-16 RX ORDER — LIDOCAINE HYDROCHLORIDE 10 MG/ML
INJECTION INFILTRATION; PERINEURAL
Status: COMPLETED
Start: 2018-10-16 | End: 2018-10-16

## 2018-10-16 RX ORDER — PROPOFOL 10 MG/ML
INJECTION, EMULSION INTRAVENOUS
Status: DISCONTINUED
Start: 2018-10-16 | End: 2018-10-16 | Stop reason: HOSPADM

## 2018-10-16 RX ORDER — BACITRACIN 50000 [IU]/1
INJECTION, POWDER, FOR SOLUTION INTRAMUSCULAR
Status: COMPLETED
Start: 2018-10-16 | End: 2018-10-16

## 2018-10-16 RX ORDER — HYDROCODONE BITARTRATE AND ACETAMINOPHEN 5; 325 MG/1; MG/1
1 TABLET ORAL
Status: DISCONTINUED | OUTPATIENT
Start: 2018-10-16 | End: 2018-10-16 | Stop reason: HOSPADM

## 2018-10-16 RX ORDER — SODIUM CHLORIDE 0.9 % (FLUSH) 0.9 %
5-10 SYRINGE (ML) INJECTION AS NEEDED
Status: DISCONTINUED | OUTPATIENT
Start: 2018-10-16 | End: 2018-10-16 | Stop reason: HOSPADM

## 2018-10-16 RX ORDER — MIDAZOLAM HYDROCHLORIDE 1 MG/ML
1-5 INJECTION, SOLUTION INTRAMUSCULAR; INTRAVENOUS
Status: DISCONTINUED | OUTPATIENT
Start: 2018-10-16 | End: 2018-10-16 | Stop reason: HOSPADM

## 2018-10-16 RX ORDER — LIDOCAINE HYDROCHLORIDE 10 MG/ML
1-40 INJECTION INFILTRATION; PERINEURAL
Status: DISCONTINUED | OUTPATIENT
Start: 2018-10-16 | End: 2018-10-16 | Stop reason: HOSPADM

## 2018-10-16 RX ORDER — NALOXONE HYDROCHLORIDE 0.4 MG/ML
0.4 INJECTION, SOLUTION INTRAMUSCULAR; INTRAVENOUS; SUBCUTANEOUS AS NEEDED
Status: DISCONTINUED | OUTPATIENT
Start: 2018-10-16 | End: 2018-10-16 | Stop reason: HOSPADM

## 2018-10-16 RX ORDER — BACITRACIN 50000 [IU]/1
50000 INJECTION, POWDER, FOR SOLUTION INTRAMUSCULAR ONCE
Status: COMPLETED | OUTPATIENT
Start: 2018-10-16 | End: 2018-10-16

## 2018-10-16 RX ADMIN — MIDAZOLAM HYDROCHLORIDE 2 MG: 1 INJECTION, SOLUTION INTRAMUSCULAR; INTRAVENOUS at 08:25

## 2018-10-16 RX ADMIN — MIDAZOLAM HYDROCHLORIDE 1 MG: 1 INJECTION, SOLUTION INTRAMUSCULAR; INTRAVENOUS at 08:10

## 2018-10-16 RX ADMIN — HEPARIN SODIUM 1000 UNITS: 200 INJECTION, SOLUTION INTRAVENOUS at 08:22

## 2018-10-16 RX ADMIN — MIDAZOLAM 2 MG: 1 INJECTION INTRAMUSCULAR; INTRAVENOUS at 08:25

## 2018-10-16 RX ADMIN — Medication 2 G: at 08:15

## 2018-10-16 RX ADMIN — LIDOCAINE HYDROCHLORIDE 30 ML: 10 INJECTION INFILTRATION; PERINEURAL at 08:18

## 2018-10-16 RX ADMIN — MIDAZOLAM HYDROCHLORIDE 2 MG: 1 INJECTION, SOLUTION INTRAMUSCULAR; INTRAVENOUS at 08:00

## 2018-10-16 RX ADMIN — FENTANYL CITRATE 50 MCG: 50 INJECTION, SOLUTION INTRAMUSCULAR; INTRAVENOUS at 08:15

## 2018-10-16 RX ADMIN — MIDAZOLAM HYDROCHLORIDE 3 MG: 1 INJECTION, SOLUTION INTRAMUSCULAR; INTRAVENOUS at 08:20

## 2018-10-16 RX ADMIN — FENTANYL CITRATE 50 MCG: 50 INJECTION, SOLUTION INTRAMUSCULAR; INTRAVENOUS at 08:20

## 2018-10-16 RX ADMIN — MIDAZOLAM 2 MG: 1 INJECTION INTRAMUSCULAR; INTRAVENOUS at 08:32

## 2018-10-16 RX ADMIN — BACITRACIN 50000 UNITS: 5000 INJECTION, POWDER, FOR SOLUTION INTRAMUSCULAR at 08:30

## 2018-10-16 RX ADMIN — MIDAZOLAM 3 MG: 1 INJECTION INTRAMUSCULAR; INTRAVENOUS at 08:20

## 2018-10-16 RX ADMIN — MIDAZOLAM HYDROCHLORIDE 2 MG: 1 INJECTION, SOLUTION INTRAMUSCULAR; INTRAVENOUS at 08:15

## 2018-10-16 RX ADMIN — MIDAZOLAM HYDROCHLORIDE 2 MG: 1 INJECTION, SOLUTION INTRAMUSCULAR; INTRAVENOUS at 08:32

## 2018-10-16 RX ADMIN — BACITRACIN 50000 UNITS: 50000 INJECTION, POWDER, FOR SOLUTION INTRAMUSCULAR at 08:30

## 2018-10-16 RX ADMIN — LIDOCAINE HYDROCHLORIDE 30 ML: 10 INJECTION, SOLUTION INFILTRATION; PERINEURAL at 08:18

## 2018-10-16 NOTE — DISCHARGE INSTRUCTIONS
[address] [place] [address]  [phone]        NEW PACEMAKER IMPLANT DISCHARGE INSTRUCTIONS    Patient ID:  Yesi Mcmillan  [id number]  87 y.o.  1945    Admit Date: 10/16/2018    Discharge Date: 10/16/2018     Admitting Physician: Mely Philip MD     Discharge Physician: Mely Philip MD    Admission Diagnoses:   sss    Discharge Diagnoses: Active Problems:    Bradycardia (10/2/2018)        Discharge Condition: Good    Cardiology Procedures this Admission:  Pacemaker insertion. Disposition: home    Reference discharge instructions provided by nursing for diet and activity. Follow-up with device clinic in two weeks. Call [phone] to make an appointment. Signed:  CINTHIA Mcclain  10/16/2018  9:24 AM      DISCHARGE INSTRUCTIONS FOR PATIENTS WITH PACEMAKERS    1. Remember to call for an appointment for 2 weeks [phone] to check healing and implant programming. 2. Medic Alert Bracelets are available from your pharmacist to wear at all times if you choose to wear one. 3. Carry your ID card for pacemaker with you at all times. This card will be given to you in the hospital or mailed to you. 4. The pacemaker will bulge slightly under your skin. The bulge will decrease in size over the next few weeks. Please notify the doctor's office if you notice any of the following around your site:   A.  A bruise that does not go away. B.  Soreness or yellow, green, or brown drainage from the site. C. Any swelling from the site. D. If you have a fever of 100 degrees or higher that lasts for a few days. INCISION CARE       1.  Leave dressing over your site until it starts to fall off, usually in a few weeks. 2.  You may shower after 3 days as long as your incision isnt submerged or directly sprayed upon until well healed. 3.  For comfort, wear loose fitting clothing.   4.  Report any signs of infection, fever, pain, swelling, redness, oozing, or heat at site especially if these symptoms increase after the first 3 to 4 days. ACTIVITY PRECAUTIONS     1. Avoid rough contact with the implant site. 2. No driving for 14 days. 3. Avoid lifting your arm over your head, carrying anything on the affected side, or lifting over 10 pounds for 30 days. For the first 2 days only bend your arm at the elbow. 4. Any extreme activity such as golf, weight lifting or exercise biking should be restricted for 60 days. 5. Do not carry objects by holding them against your implant site. 6.  No shooting rifles or any type of gun with the affected shoulder permanently. SPECIAL PRECAUTIONS     1. You should avoid all strong magnetic fields, such as arc welding, large transformers, large motors. 2.  You may or may not (depending on your device) have an MRI which uses a strong magnet to take pictures. 3.  Treatments or surgery that requires diathermy or electrocautery should be discussed with your doctor before scheduled. 4. Avoid radio frequency transmitters, including radar. 5. Advise dentist or other medical personnel you see that you have a pacemaker. 6.  Cell phones and microwave oven use is okay. 7.  If you plan to move or take a trip to a new area, the doctor's office will give you a name of a doctor to contact for any problems. ANTIBIOTIC THERAPY    During the first 8 weeks after your pacemaker insertion, you may need antibiotics before any dental work or certain tests or operations. Let the dentist or doctor who is caring for you know that you have had an implanted device.

## 2018-10-16 NOTE — IP AVS SNAPSHOT
[address] [facility] [address] 
[phone] Patient: Elma Armas MRN: [id number] Rastafari:[id number] About your hospitalization You were admitted on:  October 16, 2018 You last received care in the:  [facility] CARDIAC CATH LAB You were discharged on:  October 16, 2018 Why you were hospitalized Your primary diagnosis was:  Not on File Your diagnoses also included:  Bradycardia Follow-up Information None Your Scheduled Appointments Friday October 19, 2018 10:40 AM EDT Follow Up with Presley Leong MD  
[facility] PRIMARY CARE AT [address] [facility] [address] [place]) [facility] [place] [address] [id number] [phone] Tuesday November 06, 2018  3:00 PM EST PROCEDURE with PACEMAKER, [facility] [address] [place]) [address]  
[phone] Discharge Orders None A check bony indicates which time of day the medication should be taken. My Medications ASK your doctor about these medications Instructions Each Dose to Equal  
 Morning Noon Evening Bedtime  
 aspirin delayed-release 325 mg tablet Your last dose was: Your next dose is: Take  by mouth three (3) days a week. clonazePAM 1 mg tablet Commonly known as:  [place] Jain Your last dose was: Your next dose is: TAKE 1 TABLET BY MOUTH TWICE DAILY  
     
   
   
   
  
 donepezil 5 mg tablet Commonly known as:  ARICEPT Your last dose was: Your next dose is: Take  by mouth nightly. DULoxetine 60 mg capsule Commonly known as:  CYMBALTA Your last dose was: Your next dose is: TAKE 1 CAPSULE BY MOUTH DAILY  
     
   
   
   
  
 finasteride 5 mg tablet Commonly known as:  PROSCAR  
   
 Your last dose was: Your next dose is: TAKE 1 TABLET BY MOUTH DAILY(PROSTATE)  
     
   
   
   
  
 fludrocortisone 0.1 mg tablet Commonly known as:  FLORINEF Your last dose was: Your next dose is: Take 1 Tab by mouth daily. 0.1 mg  
    
   
   
   
  
 * gabapentin 600 mg tablet Commonly known as:  NEURONTIN What changed:  Another medication with the same name was added. Make sure you understand how and when to take each. Your last dose was: Your next dose is: Take 600 mg by mouth daily. 600 mg  
    
   
   
   
  
 * gabapentin 600 mg tablet Commonly known as:  NEURONTIN What changed: You were already taking a medication with the same name, and this prescription was added. Make sure you understand how and when to take each. Your last dose was: Your next dose is: TAKE 1 TABLET BY MOUTH THREE TIMES DAILY MUCINEX 600 mg ER tablet Generic drug:  guaiFENesin ER Your last dose was: Your next dose is: Take 600 mg by mouth two (2) times a day. 600 mg  
    
   
   
   
  
 naproxen 500 mg tablet Commonly known as:  NAPROSYN Your last dose was: Your next dose is: TAKE 1 TABLET BY MOUTH TWICE DAILY WITH MEALS FOR PAIN  
     
   
   
   
  
 VITAMIN B-12 PO Your last dose was: Your next dose is: Take  by mouth daily. * Notice: This list has 2 medication(s) that are the same as other medications prescribed for you. Read the directions carefully, and ask your doctor or other care provider to review them with you. Where to Get Your Medications These medications were sent to [address], [address] [place]  [address] [facility] [address] [address] Phone:  [phone] gabapentin 600 mg tablet Discharge Instructions [address] [facility], [address]  [phone] NEW PACEMAKER IMPLANT DISCHARGE INSTRUCTIONS Patient ID: Malou Garcia [id number] 
89 y.o. 
1945 Admit Date: 10/16/2018 Discharge Date: 10/16/2018 Admitting Physician: Melanie Washington MD  
 
Discharge Physician: Melanie Washington MD 
 
Admission Diagnoses:  
sss Discharge Diagnoses: Active Problems: 
  Bradycardia (10/2/2018) Discharge Condition: Good Cardiology Procedures this Admission:  Pacemaker insertion. Disposition: home Reference discharge instructions provided by nursing for diet and activity. Follow-up with device clinic in two weeks. Call [phone] to make an appointment. Signed: 
CINTHIA Styles 
10/16/2018 
9:24 AM 
 
 
DISCHARGE INSTRUCTIONS FOR PATIENTS WITH PACEMAKERS 1. Remember to call for an appointment for 2 weeks [phone] to check healing and implant programming. 2. Medic Alert Bracelets are available from your pharmacist to wear at all times if you choose to wear one. 3. Carry your ID card for pacemaker with you at all times. This card will be given to you in the hospital or mailed to you. 4. The pacemaker will bulge slightly under your skin. The bulge will decrease in size over the next few weeks. Please notify the doctor's office if you notice any of the following around your site: A.  A bruise that does not go away. B.  Soreness or yellow, green, or brown drainage from the site. C. Any swelling from the site. D. If you have a fever of 100 degrees or higher that lasts for a few days. INCISION CARE 1.  Leave dressing over your site until it starts to fall off, usually in a few weeks. 2.  You may shower after 3 days as long as your incision isnt submerged or directly sprayed upon until well healed. 3.  For comfort, wear loose fitting clothing. 4.  Report any signs of infection, fever, pain, swelling, redness, oozing, or heat at site especially if these symptoms increase after the first 3 to 4 days. ACTIVITY PRECAUTIONS 1. Avoid rough contact with the implant site. 2. No driving for 14 days. 3. Avoid lifting your arm over your head, carrying anything on the affected side, or lifting over 10 pounds for 30 days. For the first 2 days only bend your arm at the elbow. 4. Any extreme activity such as golf, weight lifting or exercise biking should be restricted for 60 days. 5. Do not carry objects by holding them against your implant site. 6.  No shooting rifles or any type of gun with the affected shoulder permanently. SPECIAL PRECAUTIONS 1. You should avoid all strong magnetic fields, such as arc welding, large transformers, large motors. 2.  You may or may not (depending on your device) have an MRI which uses a strong magnet to take pictures. 3.  Treatments or surgery that requires diathermy or electrocautery should be discussed with your doctor before scheduled. 4. Avoid radio frequency transmitters, including radar. 5. Advise dentist or other medical personnel you see that you have a pacemaker. 6.  Cell phones and microwave oven use is okay. 7.  If you plan to move or take a trip to a new area, the doctor's office will give you a name of a doctor to contact for any problems. ANTIBIOTIC THERAPY During the first 8 weeks after your pacemaker insertion, you may need antibiotics before any dental work or certain tests or operations. Let the dentist or doctor who is caring for you know that you have had an implanted device. Introducing [facility]! [employer] introduces Care.com patient portal. Now you can access parts of your medical record, email your doctor's office, and request medication refills online. 1. In your internet browser, go to [email or web address] My Friends. [employer]. [employer]/Meet My Friends 2. Click on the First Time User? Click Here link in the Sign In box. You will see the New Member Sign Up page. 3. Enter your [employer] Access Code exactly as it appears below. You will not need to use this code after youve completed the sign-up process. If you do not sign up before the expiration date, you must request a new code. · [employer] Access Code: [email or web address] Expires: 10/17/2018 11:46 AM 
 
4. Enter the last four digits of your Social Security Number (xxxx) and Date of Birth (mm/dd/yyyy) as indicated and click Submit. You will be taken to the next sign-up page. 5. Create a [employer] ID. This will be your [employer] login ID and cannot be changed, so think of one that is secure and easy to remember. 6. Create a [employer] password. You can change your password at any time. 7. Enter your Password Reset Question and Answer. This can be used at a later time if you forget your password. 8. Enter your e-mail address. You will receive e-mail notification when new information is available in [address] 9. Click Sign Up. You can now view and download portions of your medical record. 10. Click the Download Summary menu link to download a portable copy of your medical information. If you have questions, please visit the Frequently Asked Questions section of the [employer] website. Remember, [employer] is NOT to be used for urgent needs. For medical emergencies, dial 911. Now available from your iPhone and Android! Introducing Ander Julian As a [employer] patient, I wanted to make you aware of our electronic visit tool called Ander Julian. [employer] 24/7 allows you to connect within minutes with a medical provider 24 hours a day, seven days a week via a mobile device or tablet or logging into a secure website from your computer. You can access Ander Julian from anywhere in the [place].  
 
A virtual visit might be right for you when you have a simple condition and feel like you just dont want to get out of bed, or cant get away from work for an appointment, when your regular [facility] provider is not available (evenings, weekends or holidays), or when youre out of town and need minor care. Electronic visits cost only $49 and if the [facility] 24/7 provider determines a prescription is needed to treat your condition, one can be electronically transmitted to a nearby pharmacy*. Please take a moment to enroll today if you have not already done so. The enrollment process is free and takes just a few minutes. To enroll, please download the Thomas Kalpesh 24/7 jessie to your tablet or phone, or visit [email or web address] ([employer] org to enroll on your computer. And, as an [address] [place] [address] patient with a [employer] account, the results of your visits will be scanned into your electronic medical record and your primary care provider will be able to view the scanned results. We urge you to continue to see your regular [facility] provider for your ongoing medical care. And while your primary care provider may not be the one available when you seek a LK FREEMANaishafin virtual visit, the peace of mind you get from getting a real diagnosis real time can be priceless. For more information on localbacon, view our Frequently Asked Questions (FAQs) at [email or web address] ([employer] org. Sincerely, 
 
Alen Suggs MD 
Chief Medical Officer Agusto8 Allie Saenz *:  certain medications cannot be prescribed via localbacon Providers Seen During Your Hospitalization Provider Specialty Primary office phone Erum Watson MD Cardiology [phone] Your Primary Care Physician (PCP) Primary Care Physician Office Phone Office Fax Araceli Mata, [address] [facility] [phone] You are allergic to the following No active allergies Recent Documentation Height Weight BMI Smoking Status 1.803 m 72.6 kg 22.32 kg/m2 Current Every Day Smoker Emergency Contacts Name Discharge Info Relation Home Work Mobile Divya Ba DISCHARGE CAREGIVER [3] Other Relative [6] [phone] Patient Belongings The following personal items are in your possession at time of discharge: 
  Dental Appliances: None         Home Medications: None   Jewelry: None  Clothing: Footwear, Pants, Shirt, Undergarments    Other Valuables: None Please provide this summary of care documentation to your next provider. Signatures-by signing, you are acknowledging that this After Visit Summary has been reviewed with you and you have received a copy. Patient Signature:  ____________________________________________________________ Date:  ____________________________________________________________  
  
Surprise Poot Provider Signature:  ____________________________________________________________ Date:  ____________________________________________________________

## 2018-10-16 NOTE — PROGRESS NOTES
Ambulate with pt in palacios to BR. Gait steady. Left anterior chest wall dressing dry and intact. Pt denies c/o. DC instructions reviewed with pt and significant other. Both verbalize understanding. SL dc'd without difficulty. Pt dc'd to home with significant other via car.

## 2018-10-16 NOTE — PROGRESS NOTES
Pt received in CCL recovery s/p PPI placement with Dr Margrett Spatz. No bleeding, hematoma, or other complications noted at site . Pt denies pain at this time. Pt placed on monitor x 3, vss, ppp. Will continue to monitor.

## 2018-10-16 NOTE — H&P (VIEW-ONLY)
Subjective:  
  
Ita Larson is a 68 y.o. male is here for EP consult for SSS noted on recent holter monitor which was worn for syncope, dizziness and fatigue. The patient denies chest pain/ shortness of breath, orthopnea, PND, LE edema, palpitations. Patient Active Problem List  
 Diagnosis Date Noted  Bradycardia 10/02/2018  Age-related macular degeneration, dry, both eyes 09/14/2018  Syncope 09/11/2018  Neuropathy 09/11/2018  Memory deficit 09/11/2018  BPH with obstruction/lower urinary tract symptoms 04/19/2018  Smoker 01/18/2018  S/P right rotator cuff repair 12/01/2017  S/P hip replacement, bilateral 10/18/2017  Ingrown left big toenail 03/31/2017  PTSD (post-traumatic stress disorder) 12/13/2016  Lumbar spinal stenosis 12/13/2016  Alcoholism in remission ([facility] [address] 12/13/2016  Risk for falls 12/13/2016 Shawnee Arellano MD 
Past Medical History:  
Diagnosis Date  Anxiety  Back pain  Blurred vision  Chest pain  Depression  Dizziness  Fast heart beat  Frequent headaches  Frequent urination  Joint pain  Joint swelling  Muscle pain  Neuropathy  Recent change in weight  Sinus problem  Skin disease  SOB (shortness of breath)  Sore throat  Stiffness of joints, multiple sites  Stress  Syncope  Tiredness  Trouble in sleeping  Weakness Past Surgical History:  
Procedure Laterality Date  HX HIP REPLACEMENT Bilateral 1993  HX ROTATOR CUFF REPAIR Right 11/16/2017 No Known Allergies Family History Problem Relation Age of Onset  Cancer Mother  Cancer Maternal Aunt   
 negative for cardiac disease Social History Social History  Marital status: SINGLE Spouse name: N/A  
 Number of children: N/A  
 Years of education: N/A Social History Main Topics  Smoking status: Current Every Day Smoker Packs/day: 0.50 Years: 50.00 Types: Cigarettes  Smokeless tobacco: Never Used Comment: 0.5-1 pack per day  Alcohol use 16.8 oz/week 28 Cans of beer per week Comment: None now  Drug use: No  
 Sexual activity: No  
 
Other Topics Concern  None Social History Narrative Current Outpatient Prescriptions Medication Sig  
 guaiFENesin ER (MUCINEX) 600 mg ER tablet Take 600 mg by mouth two (2) times a day.  cyanocobalamin, vitamin B-12, (VITAMIN B-12 PO) Take  by mouth daily.  aspirin delayed-release 325 mg tablet Take  by mouth three (3) days a week.  donepezil (ARICEPT) 5 mg tablet Take  by mouth nightly.  gabapentin (NEURONTIN) 600 mg tablet Take 600 mg by mouth daily.  clonazePAM (KLONOPIN) 1 mg tablet TAKE 1 TABLET BY MOUTH TWICE DAILY (Patient taking differently: --PATIENT ONLY TAKING ONCE A DAY)  DULoxetine (CYMBALTA) 60 mg capsule TAKE 1 CAPSULE BY MOUTH DAILY  finasteride (PROSCAR) 5 mg tablet Take 1 Tab by mouth daily. For the prostate  naproxen (NAPROSYN) 500 mg tablet TAKE 1 TABLET BY MOUTH TWICE DAILY WITH MEALS FOR PAIN (Patient taking differently: PATIENT ONLY TAKING ONCE A DAY)  fludrocortisone (FLORINEF) 0.1 mg tablet Take 1 Tab by mouth daily.  prazosin (MINIPRESS) 1 mg capsule Take 1 Cap by mouth nightly. Indications: CHRONIC PTSD WITH TRAUMA NIGHTMARES No current facility-administered medications for this visit. Vitals:  
 10/02/18 1255 BP: 140/70 Pulse: (!) 46 Resp: 18 SpO2: 99% Weight: 165 lb 9.6 oz (75.1 kg) Height: 5' 11\" (1.803 m) I have reviewed the nurses notes, vitals, problem list, allergy list, medical history, family, social history and medications. Review of Symptoms: 
 
General: +fatigue, Pt denies excessive weight gain or loss. Pt is able to conduct ADL's HEENT: +dizziness, Denies blurred vision, headaches, epistaxis and difficulty swallowing. Respiratory: Denies shortness of breath, PATEL, wheezing or stridor. Cardiovascular: Denies precordial pain, palpitations, edema or PND Gastrointestinal: Denies poor appetite, indigestion, abdominal pain or blood in stool Urinary: Denies dysuria, pyuria Musculoskeletal: Denies pain or swelling from muscles or joints Neurologic: Denies tremor, paresthesias, or sensory motor disturbance Skin: Denies rash, itching or texture change. Psych: Denies depression Physical Exam:   
 
General: Well developed, in no acute distress. HEENT: Eyes - PERRL, no jvd Heart:  Normal S1/S2 negative S3 or S4. Regular, no murmur, gallop or rub.  
Respiratory: Clear bilaterally x 4, no wheezing or rales Abdomen:   Soft, non-tender, bowel sounds are active.  
Extremities:  No edema, normal cap refill, no cyanosis. Musculoskeletal: No clubbing Neuro: A&Ox3, speech clear, gait stable. Skin: Skin color is normal. No rashes or lesions. Non diaphoretic Vascular: 2+ pulses symmetric in all extremities Cardiographics Ekg: sinus bradycardia Results for orders placed or performed during the hospital encounter of 09/10/18 EKG, 12 LEAD, INITIAL Result Value Ref Range Ventricular Rate 51 BPM  
 Atrial Rate 51 BPM  
 P-R Interval 170 ms QRS Duration 86 ms  
 Q-T Interval 430 ms QTC Calculation (Bezet) 396 ms Calculated P Axis 51 degrees Calculated R Axis 71 degrees Calculated T Axis 61 degrees Diagnosis Sinus bradycardia Otherwise normal ECG No previous ECGs available Confirmed by Augustin Lux M.D., --- ([id number]) on 9/13/2018 2:30:35 AM 
  
 
 
 
Lab Results Component Value Date/Time WBC 5.8 09/10/2018 03:15 PM  
 HGB 13.1 09/10/2018 03:15 PM  
 HCT 39.4 09/10/2018 03:15 PM  
 PLATELET 632 78/77/2423 03:15 PM  
 MCV 98.0 09/10/2018 03:15 PM  
  
Lab Results Component Value Date/Time  Sodium 141 09/10/2018 03:15 PM  
 Potassium 4.7 09/10/2018 03:15 PM  
 Chloride 105 09/10/2018 03:15 PM  
 CO2 27 09/10/2018 03:15 PM  
 Anion gap 9 09/10/2018 03:15 PM  
 Glucose 75 09/10/2018 03:15 PM  
 BUN 21 (H) 09/10/2018 03:15 PM  
 Creatinine 1.13 09/10/2018 03:15 PM  
 BUN/Creatinine ratio 19 09/10/2018 03:15 PM  
 GFR est AA >60 09/10/2018 03:15 PM  
 GFR est non-AA >60 09/10/2018 03:15 PM  
 Calcium 8.4 (L) 09/10/2018 03:15 PM  
 Bilirubin, total 0.6 09/10/2018 03:15 PM  
 AST (SGOT) 13 (L) 09/10/2018 03:15 PM  
 Alk. phosphatase 79 09/10/2018 03:15 PM  
 Protein, total 6.4 09/10/2018 03:15 PM  
 Albumin 3.7 09/10/2018 03:15 PM  
 Globulin 2.7 09/10/2018 03:15 PM  
 A-G Ratio 1.4 09/10/2018 03:15 PM  
 ALT (SGPT) 18 09/10/2018 03:15 PM  
  
 
 Assessment: 
 
 Assessment: ICD-10-CM ICD-9-CM 1. Bradycardia R00.1 427.89 AMB POC EKG ROUTINE W/ 12 LEADS, INTER & REP 2. Syncope, unspecified syncope type R55 780.2 3. SSS (sick sinus syndrome) ([facility]) I49.5 427.81   
4. Essential hypertension I10 401.9 Orders Placed This Encounter  AMB POC EKG ROUTINE W/ 12 LEADS, INTER & REP Order Specific Question:   Reason for Exam: Answer:   routine  guaiFENesin ER (MUCINEX) 600 mg ER tablet Sig: Take 600 mg by mouth two (2) times a day. Plan: Mr. Kylah Martin is here for EP consult for SSS. He wore holter monitor which was placed for recurrent syncope. Monitor demonstrated 4 second pauses and bradycardia. He denies syncope while wearing monitor but does report feeling fatigued and dizzy at times. EKG today demonstrates sinus bradycardia. He is a candidate for a pacemaker. I discussed the risks/benefits/alternatives of the procedure with the patient. Risks include (but are not limited to) bleeding, infection, cva/mi/tamponade/death. The patient understands and agrees to proceed. Thank you for this interesting consultation. We will schedule. Continue medical management for htn, syncope. Thank you for allowing me to participate in Tricia Castillo 's care.  
 
Mandy Farrell NP 
 
 Patient seen and examined by me with nurse practitioner. I personally performed all components of the history, physical, and medical decision making and agree with the assessment and plan with minor modifications as noted. Pt with syncope and 4 sec pauses. Candidate for a pacemaker. Pt understands and agrees to proceed Benton Pineda MD, Gina Latif

## 2018-10-16 NOTE — PROCEDURES
[address] [place] [address]  [phone]    Indications and Pre-Procedure Diagnosis:  Adan Pitt is a 68 y.o. male with sick sinus and near syncope is referred for dual chamber pacemaker. Post Procedure Diagnosis:    Sick sinus syndrome  Near syncope    Pacemaker Implant Procedure and Findings:  Informed consent was obtained and the patient was premedicated with cefazolin. The procedure was performed under local anesthesia. Continuous pulse oximetry and cuff pressure were monitored. During the procedure, the patient received Versed and Fentanyl for sedation per nursing personnel. The left deltopectoral area was prepped and draped in the usual sterile fashion and was liberally infiltrated with 1% lidocaine. An incision was made over the left subpectoral area and a generator pocket was manually dissected. Access was achieved in the left axillary vein under fluoroscopic guidance and using the seldinger technique. Through the left axillary vein, pacing leads were positioned in appropriate regions in the right heart chambers where satisfactory pacing and sensing parameters were measured. Stability of the leads was assessed with deep breathing and there was no diaphragmatic pacing at 10V output. The leads were anchored using the sleeves and a pulse generator pocket fashioned using blunt dissection. The leads were then connected to the pulse generator. The pulse generator pocket was then liberally infiltrated with bacitracin solution, and the device implanted with a single silk fixation suture in the header to prevent migration. The wound was closed in layers using intermittent 2-0 Vicryl and Zipline suture sleeve. A bio-occlusive dressing were applied to the skin. Fluoroscopy and total procedure times were 1 and 30 minutes respectively. Estimated blood loss <10 ml. Sharp count: correct. Specimen(s) collected: none. The following procedure related complication occurred: none.  The following problems were encountered: none. Findings: successful pacemaker placement. Device Data Measurements:  Lead Sensing (mV) Threshold (V)Pulse Width (ms) Impedance (Ohms)  RA 2  1.2  0.5   776  RV 7.3  0.6  0.5   1068      Final Programmed Parameters  Bradycardia pacing rate  60 bpm  Pacing Mode    AAIR-DDDR  Pacing Output    3.5 V@ 0.5 ms    Supplies Summary available in the chart  [employer]    Thank you for allowing me to participate in this patients care.     Margaret Muñiz MD, Jason Nassar

## 2018-10-16 NOTE — INTERVAL H&P NOTE
H&P Update: 
Marine Perdomo was seen and examined. History and physical has been reviewed. The patient has been examined.  There have been no significant clinical changes since the completion of the originally dated History and Physical. 
 
Signed By: Maximino Trujillo MD   
 October 16, 2018 8:07 AM

## 2018-10-16 NOTE — PROGRESS NOTES
Cardiac Cath Lab Recovery Arrival Note: 
 
 
Jenny Query arrived to Cardiac Cath Lab, Recovery Area. Staff introduced to patient. Patient identifiers verified with NAME and DATE OF BIRTH. Procedure verified with patient. Consent forms reviewed and signed by patient or authorized representative and verified. Allergies verified. Patient and family oriented to department. Patient and family informed of procedure and plan of care. Questions answered with review. Patient prepped for procedure, per orders from physician, prior to arrival. 
 
Patient on cardiac monitor, non-invasive blood pressure, SPO2 monitor. On RA. Patient is A&Ox 3. Patient reports no complaints. Patient in stretcher, in low position, with side rails up, call bell within reach, patient instructed to call if assistance as needed. Patient prep in: [address], [place] [address].   
Prep by: ALOK DIANA

## 2018-10-16 NOTE — IP AVS SNAPSHOT
[address] [facility] 
[phone] Patient: Moody Riggins MRN: [id number] IIJ:[id number] A check bony indicates which time of day the medication should be taken. My Medications ASK your doctor about these medications Instructions Each Dose to Equal  
 Morning Noon Evening Bedtime  
 aspirin delayed-release 325 mg tablet Your last dose was: Your next dose is: Take  by mouth three (3) days a week. clonazePAM 1 mg tablet Commonly known as:  Trula Brothers Your last dose was: Your next dose is: TAKE 1 TABLET BY MOUTH TWICE DAILY  
     
   
   
   
  
 donepezil 5 mg tablet Commonly known as:  ARICEPT Your last dose was: Your next dose is: Take  by mouth nightly. DULoxetine 60 mg capsule Commonly known as:  CYMBALTA Your last dose was: Your next dose is: TAKE 1 CAPSULE BY MOUTH DAILY  
     
   
   
   
  
 finasteride 5 mg tablet Commonly known as:  PROSCAR Your last dose was: Your next dose is: TAKE 1 TABLET BY MOUTH DAILY(PROSTATE)  
     
   
   
   
  
 fludrocortisone 0.1 mg tablet Commonly known as:  FLORINEF Your last dose was: Your next dose is: Take 1 Tab by mouth daily. 0.1 mg  
    
   
   
   
  
 * gabapentin 600 mg tablet Commonly known as:  NEURONTIN What changed:  Another medication with the same name was added. Make sure you understand how and when to take each. Your last dose was: Your next dose is: Take 600 mg by mouth daily. 600 mg  
    
   
   
   
  
 * gabapentin 600 mg tablet Commonly known as:  NEURONTIN What changed: You were already taking a medication with the same name, and this prescription was added.  Make sure you understand how and when to take each. Your last dose was: Your next dose is: TAKE 1 TABLET BY MOUTH THREE TIMES DAILY MUCINEX 600 mg ER tablet Generic drug:  guaiFENesin ER Your last dose was: Your next dose is: Take 600 mg by mouth two (2) times a day. 600 mg  
    
   
   
   
  
 naproxen 500 mg tablet Commonly known as:  NAPROSYN Your last dose was: Your next dose is: TAKE 1 TABLET BY MOUTH TWICE DAILY WITH MEALS FOR PAIN  
     
   
   
   
  
 VITAMIN B-12 PO Your last dose was: Your next dose is: Take  by mouth daily. * Notice: This list has 2 medication(s) that are the same as other medications prescribed for you. Read the directions carefully, and ask your doctor or other care provider to review them with you. Where to Get Your Medications These medications were sent to [address], [address] [id number] Phone:  [phone]  
  gabapentin 600 mg tablet

## 2018-10-24 ENCOUNTER — HOSPITAL ENCOUNTER (INPATIENT)
Age: 73
LOS: 1 days | Discharge: HOME | DRG: 921 | End: 2018-10-27
Attending: EMERGENCY MEDICINE | Admitting: INTERNAL MEDICINE
Payer: MEDICARE

## 2018-10-24 ENCOUNTER — APPOINTMENT (OUTPATIENT)
Dept: GENERAL RADIOLOGY | Age: 73
DRG: 921 | End: 2018-10-24
Attending: INTERNAL MEDICINE
Payer: MEDICARE

## 2018-10-24 DIAGNOSIS — T14.8XXA HEMATOMA: ICD-10-CM

## 2018-10-24 DIAGNOSIS — T82.9XXA PACEMAKER COMPLICATIONS, INITIAL ENCOUNTER: Primary | ICD-10-CM

## 2018-10-24 PROBLEM — Z87.898 HX OF SYNCOPE: Status: ACTIVE | Noted: 2018-10-24

## 2018-10-24 PROBLEM — Z95.0 PACEMAKER: Status: ACTIVE | Noted: 2018-10-24

## 2018-10-24 PROBLEM — T82.837A PACEMAKER POCKET HEMATOMA, INITIAL ENCOUNTER: Status: ACTIVE | Noted: 2018-10-24

## 2018-10-24 PROCEDURE — 99283 EMERGENCY DEPT VISIT LOW MDM: CPT

## 2018-10-24 PROCEDURE — 96374 THER/PROPH/DIAG INJ IV PUSH: CPT

## 2018-10-24 PROCEDURE — 74011250636 HC RX REV CODE- 250/636: Performed by: NURSE PRACTITIONER

## 2018-10-24 PROCEDURE — 74011250636 HC RX REV CODE- 250/636: Performed by: EMERGENCY MEDICINE

## 2018-10-24 PROCEDURE — 71046 X-RAY EXAM CHEST 2 VIEWS: CPT

## 2018-10-24 PROCEDURE — 74011250636 HC RX REV CODE- 250/636: Performed by: INTERNAL MEDICINE

## 2018-10-24 PROCEDURE — 74011250637 HC RX REV CODE- 250/637: Performed by: INTERNAL MEDICINE

## 2018-10-24 PROCEDURE — 96375 TX/PRO/DX INJ NEW DRUG ADDON: CPT

## 2018-10-24 RX ORDER — FENTANYL CITRATE 50 UG/ML
100 INJECTION, SOLUTION INTRAMUSCULAR; INTRAVENOUS
Status: COMPLETED | OUTPATIENT
Start: 2018-10-24 | End: 2018-10-24

## 2018-10-24 RX ORDER — MORPHINE SULFATE 10 MG/ML
2 INJECTION, SOLUTION INTRAMUSCULAR; INTRAVENOUS
Status: DISCONTINUED | OUTPATIENT
Start: 2018-10-24 | End: 2018-10-27 | Stop reason: HOSPADM

## 2018-10-24 RX ORDER — FINASTERIDE 5 MG/1
5 TABLET, FILM COATED ORAL DAILY
Status: DISCONTINUED | OUTPATIENT
Start: 2018-10-25 | End: 2018-10-25

## 2018-10-24 RX ORDER — ACETAMINOPHEN 325 MG/1
650 TABLET ORAL
Status: DISCONTINUED | OUTPATIENT
Start: 2018-10-24 | End: 2018-10-27 | Stop reason: HOSPADM

## 2018-10-24 RX ORDER — CLONAZEPAM 0.5 MG/1
1 TABLET ORAL 2 TIMES DAILY
Status: DISCONTINUED | OUTPATIENT
Start: 2018-10-24 | End: 2018-10-24

## 2018-10-24 RX ORDER — HYDROMORPHONE HYDROCHLORIDE 1 MG/ML
1 INJECTION, SOLUTION INTRAMUSCULAR; INTRAVENOUS; SUBCUTANEOUS
Status: COMPLETED | OUTPATIENT
Start: 2018-10-24 | End: 2018-10-24

## 2018-10-24 RX ORDER — DONEPEZIL HYDROCHLORIDE 5 MG/1
5 TABLET, FILM COATED ORAL
Status: DISCONTINUED | OUTPATIENT
Start: 2018-10-24 | End: 2018-10-27 | Stop reason: HOSPADM

## 2018-10-24 RX ORDER — GABAPENTIN 300 MG/1
600 CAPSULE ORAL 3 TIMES DAILY
Status: DISCONTINUED | OUTPATIENT
Start: 2018-10-24 | End: 2018-10-27 | Stop reason: HOSPADM

## 2018-10-24 RX ORDER — DULOXETIN HYDROCHLORIDE 30 MG/1
60 CAPSULE, DELAYED RELEASE ORAL DAILY
Status: DISCONTINUED | OUTPATIENT
Start: 2018-10-25 | End: 2018-10-27 | Stop reason: HOSPADM

## 2018-10-24 RX ORDER — OXYCODONE AND ACETAMINOPHEN 5; 325 MG/1; MG/1
1 TABLET ORAL
Status: DISCONTINUED | OUTPATIENT
Start: 2018-10-24 | End: 2018-10-27 | Stop reason: HOSPADM

## 2018-10-24 RX ORDER — CLONAZEPAM 1 MG/1
1 TABLET ORAL DAILY
Status: DISCONTINUED | OUTPATIENT
Start: 2018-10-25 | End: 2018-10-27 | Stop reason: HOSPADM

## 2018-10-24 RX ORDER — MORPHINE SULFATE 4 MG/ML
2 INJECTION INTRAVENOUS ONCE
Status: COMPLETED | OUTPATIENT
Start: 2018-10-24 | End: 2018-10-24

## 2018-10-24 RX ADMIN — FENTANYL CITRATE 100 MCG: 50 INJECTION, SOLUTION INTRAMUSCULAR; INTRAVENOUS at 12:32

## 2018-10-24 RX ADMIN — MORPHINE SULFATE 2 MG: 4 INJECTION INTRAVENOUS at 14:43

## 2018-10-24 RX ADMIN — HYDROMORPHONE HYDROCHLORIDE 1 MG: 1 INJECTION, SOLUTION INTRAMUSCULAR; INTRAVENOUS; SUBCUTANEOUS at 13:39

## 2018-10-24 RX ADMIN — OXYCODONE AND ACETAMINOPHEN 1 TABLET: 5; 325 TABLET ORAL at 15:40

## 2018-10-24 RX ADMIN — GABAPENTIN 600 MG: 300 CAPSULE ORAL at 21:00

## 2018-10-24 RX ADMIN — MORPHINE SULFATE 2 MG: 10 INJECTION INTRAVENOUS at 20:52

## 2018-10-24 RX ADMIN — GABAPENTIN 600 MG: 300 CAPSULE ORAL at 15:40

## 2018-10-24 RX ADMIN — OXYCODONE AND ACETAMINOPHEN 1 TABLET: 5; 325 TABLET ORAL at 19:52

## 2018-10-24 NOTE — ED NOTES
TRANSFER - OUT REPORT: 
 
Verbal report given to Alejandrina Perez RN (name) on Shahab Clemons  being transferred to [facility] (unit) for routine progression of care Report consisted of patients Situation, Background, Assessment and  
Recommendations(SBAR). Information from the following report(s) SBAR was reviewed with the receiving nurse. Lines:  
Peripheral IV 10/24/18 Right Antecubital (Active) Site Assessment Clean, dry, & intact 10/24/2018 12:21 PM  
Phlebitis Assessment 0 10/24/2018 12:21 PM  
Infiltration Assessment 0 10/24/2018 12:21 PM  
Dressing Status Clean, dry, & intact 10/24/2018 12:21 PM  
  
 
Opportunity for questions and clarification was provided. Pt transported with cardiac monitor and accompanied by RN.

## 2018-10-24 NOTE — ED NOTES
Assumed care of patient. Patient placed in position of comfort. Call bell in reach. Skin warm and dry. Respirations even and unlabored. In no apparent distress at this time. Presents to the ED via EMS from OSH due to large hematoma to Lt chest pacemaker site (approximately 16 cm x 18 cm). Pacemaker placed last week-swelling started today. Bleeding appears controlled at this time-pressure dressing in place upon arrival. Pressure dressing removed and ice applied by Dr. Noy Castanon not replace dressing at this time, per provider. Hematoma border marked. Pt initially reported 8/10 pain, after transferring from EMS stretcher to ED stretcher-reports 6/10 pain with ice pack in place. A&O x 4. Cardiac monitor x 3. Will continue to monitor.

## 2018-10-24 NOTE — H&P
Cardiac Electrophysiology Progress Note [address], [place], [address] [facility]  [phone] 
 
10/24/2018 12:57 PM 
 
Admit Date: 10/24/2018 Admit Diagnosis: No admission diagnoses are documented for this encounter. Subjective:  
 
Adan Pitt  Is a pleasant 68 y.o. male who is s/p dual chamber PM  10/16/18 at [id number] [address] for SSS, syncope who  presented by ambulance to the [facility] ED with cc of swelling and bleeding at site of his pacemaker. He and his SO said he was fine last night but awoke this morning with  Significant swelling and pain over the pacemaker site, and just as the ambulance arrived the he began to bleed spontaneously from the wound. He notes that he did wake up at night with his left arm behind his head. When EMS arrived, his SBP was > 200 mmHg. He was transferred to [address] [place] [address]. He notes discomfort around PM site, no dyspnea, chest pain, dizziness, syncope or edema. Visit Vitals /82 (BP 1 Location: Right arm, BP Patient Position: At rest) Pulse 67 Temp 98 °F (36.7 °C) Resp 20 SpO2 97% No current facility-administered medications for this encounter. Current Outpatient Medications Medication Sig  
 gabapentin (NEURONTIN) 600 mg tablet TAKE 1 TABLET BY MOUTH THREE TIMES DAILY  finasteride (PROSCAR) 5 mg tablet TAKE 1 TABLET BY MOUTH DAILY(PROSTATE)  guaiFENesin ER (MUCINEX) 600 mg ER tablet Take 600 mg by mouth two (2) times a day.  cyanocobalamin, vitamin B-12, (VITAMIN B-12 PO) Take  by mouth daily.  aspirin delayed-release 325 mg tablet Take  by mouth three (3) days a week.  donepezil (ARICEPT) 5 mg tablet Take  by mouth nightly.  fludrocortisone (FLORINEF) 0.1 mg tablet Take 1 Tab by mouth daily.  gabapentin (NEURONTIN) 600 mg tablet Take 600 mg by mouth daily.  clonazePAM (KLONOPIN) 1 mg tablet TAKE 1 TABLET BY MOUTH TWICE DAILY (Patient taking differently: --PATIENT ONLY TAKING ONCE A DAY)  DULoxetine (CYMBALTA) 60 mg capsule TAKE 1 CAPSULE BY MOUTH DAILY  naproxen (NAPROSYN) 500 mg tablet TAKE 1 TABLET BY MOUTH TWICE DAILY WITH MEALS FOR PAIN (Patient taking differently: PATIENT ONLY TAKING ONCE A DAY) Objective:  
  
Visit Vitals /82 (BP 1 Location: Right arm, BP Patient Position: At rest) Pulse 67 Temp 98 °F (36.7 °C) Resp 20 SpO2 97% Physical Exam: Abdomen: soft, non-tender Extremities: extremities normal 
Heart: regular rate and rhythm Chest: left subclavian pm site, with Zipline intact, slight blood discharge at both ends of zip line. Large softball size hematoma noted around PM site. Lungs: clear to auscultation bilaterally Pulses: 2+ and symmetric upper extremities. Data Review:  
Labs:   
Recent Labs 10/24/18 
2874 WBC 8.1 HGB 13.0 HCT 40.0 * Recent Labs 10/24/18 
3224   
K 4.3  CO2 29 * BUN 21* CREA 0.98  
CA 8.9 ALB 3.5 TBILI 0.5 SGOT 16 ALT 18 INR 1.0 No results for input(s): TROIQ, CPK, CKMB in the last 72 hours. No intake or output data in the 24 hours ending 10/24/18 1301 Telemetry: sinus rhythm, ventricular rate 69 Assessment:  
 
Active Problems: PTSD (post-traumatic stress disorder) (12/13/2016) Pacemaker pocket hematoma, initial encounter (10/24/2018) Pacemaker (10/24/2018) Hx of syncope (10/24/2018) Plan:  
 
Rachael Harvey is a pleasant 68year old male with hx of syncope and SSS, S/P dual chamber [employer] PM 10/16/18. Large hematoma noted around left subclavian PM site. Normal H/H. Will admit to obs, obtain chest xray and interrogate device. Will apply pressure dressing. CINTHIA Mcmahan Patient seen and examined by me with nurse practitioner.   I personally performed all components of the history, physical, and medical decision making and agree with the assessment and plan with minor modifications as noted. Woke up this am with high bp and bleeding/hematoma from the site. H/h stable. Will admit to obs and apply pressure dressing. Repeat h/h in the am. Cont supportive care Gertrudis Piper MD, [facility] 
 
10/24/2018 12:57 PM

## 2018-10-24 NOTE — ED PROVIDER NOTES
EMERGENCY DEPARTMENT HISTORY AND PHYSICAL EXAM 
 
 
Date: 10/24/2018 Patient Name: Kvng Hardin History of Presenting Illness Chief Complaint Patient presents with  Pacemaker Problem Presents to the ED via EMS from [facility] due to large hematoma to Lt chest pacemaker site. Pacemaker placed last week-swelling started today. History Provided By: Patient and Records HPI: Kvng Hardin, 68 y.o. male with PMHx significant for pacemaker and anxiety, presents via EMS from [facility] to the ED with cc of bleeding from pacemaker site with expanding hematoma. Pt was seen at [facility] this morning, and was transferred to [facility] ED due to progressive expansion of hematoma; pt's Cardiologist aware. At time of examination, pt reports persistent pain over pacemaker site. Per records, pt had device placed (10/16/18). Pt specifically denies SOB, nausea, vomiting, fever, or chills. There are no other complaints, changes, or physical findings at this time. PCP: Iza Damon MD 
 
Current Facility-Administered Medications Medication Dose Route Frequency Provider Last Rate Last Dose  donepezil (ARICEPT) tablet 5 mg  5 mg Oral QHS Saran Mcdermott MD      
 [START ON 10/25/2018] DULoxetine (CYMBALTA) capsule 60 mg  60 mg Oral DAILY Saran Mcdermott MD      
 gabapentin (NEURONTIN) capsule 600 mg  600 mg Oral TID Maricel Mcdermott MD      
 [START ON 10/25/2018] finasteride (PROSCAR) tablet 5 mg  5 mg Oral DAILY Saran Mcdermott MD      
 [START ON 10/25/2018] clonazePAM (KlonoPIN) tablet 1 mg  1 mg Oral DAILY Malou Lopez DO      
 HYDROmorphone (PF) (DILAUDID) injection 1 mg  1 mg IntraVENous NOW Malou Lopez DO      
 
Current Outpatient Medications Medication Sig Dispense Refill  gabapentin (NEURONTIN) 600 mg tablet TAKE 1 TABLET BY MOUTH THREE TIMES DAILY 90 Tab 0  
 finasteride (PROSCAR) 5 mg tablet TAKE 1 TABLET BY MOUTH DAILY(PROSTATE) 90 Tab 0  
  guaiFENesin ER (MUCINEX) 600 mg ER tablet Take 600 mg by mouth two (2) times a day.  cyanocobalamin, vitamin B-12, (VITAMIN B-12 PO) Take  by mouth daily.  aspirin delayed-release 325 mg tablet Take  by mouth three (3) days a week.  donepezil (ARICEPT) 5 mg tablet Take  by mouth nightly.  fludrocortisone (FLORINEF) 0.1 mg tablet Take 1 Tab by mouth daily. 30 Tab 3  
 gabapentin (NEURONTIN) 600 mg tablet Take 600 mg by mouth daily.  clonazePAM (KLONOPIN) 1 mg tablet TAKE 1 TABLET BY MOUTH TWICE DAILY (Patient taking differently: --PATIENT ONLY TAKING ONCE A DAY) 180 Tab 0  
 DULoxetine (CYMBALTA) 60 mg capsule TAKE 1 CAPSULE BY MOUTH DAILY 30 Cap 5  
 naproxen (NAPROSYN) 500 mg tablet TAKE 1 TABLET BY MOUTH TWICE DAILY WITH MEALS FOR PAIN (Patient taking differently: PATIENT ONLY TAKING ONCE A DAY) 60 Tab 5 Past History Past Medical History: 
Past Medical History:  
Diagnosis Date  Anxiety  Back pain  Blurred vision  Chest pain  Depression  Dizziness  Fast heart beat  Frequent headaches  Frequent urination  Joint pain  Joint swelling  Muscle pain  Neuropathy  Recent change in weight  Sinus problem  Skin disease  SOB (shortness of breath)  Sore throat  SSS (sick sinus syndrome) ([facility] [address]  Stiffness of joints, multiple sites  Stress  Syncope  Tiredness  Trouble in sleeping  Weakness Past Surgical History: 
Past Surgical History:  
Procedure Laterality Date  HX HIP REPLACEMENT Bilateral 1993  HX PACEMAKER PLACEMENT  10/16/2018  HX ROTATOR CUFF REPAIR Right 11/16/2017 Family History: 
Family History Problem Relation Age of Onset  Cancer Mother  Cancer Maternal Aunt Social History: 
Social History Tobacco Use  Smoking status: Current Every Day Smoker Packs/day: 0.50 Years: 50.00 Pack years: 25.00 Types: Cigarettes  Smokeless tobacco: Never Used  Tobacco comment: 0.5-1 pack per day Substance Use Topics  Alcohol use: Yes Alcohol/week: 16.8 oz Types: 28 Cans of beer per week Comment: None now  Drug use: No  
 
 
Allergies: 
No Known Allergies Review of Systems Review of Systems Constitutional: Negative. Negative for appetite change, chills, fatigue and fever. HENT: Negative. Negative for congestion, rhinorrhea, sinus pressure and sore throat. Eyes: Negative. Respiratory: Negative. Negative for cough, choking, chest tightness, shortness of breath and wheezing. Cardiovascular: Negative. Negative for chest pain, palpitations and leg swelling.  
     +pain, hematoma, and bleeding over pacer site Gastrointestinal: Negative for abdominal pain, constipation, diarrhea, nausea and vomiting. Endocrine: Negative. Genitourinary: Negative. Negative for difficulty urinating, dysuria, flank pain and urgency. Musculoskeletal: Negative. Skin: Negative. Neurological: Negative. Negative for dizziness, speech difficulty, weakness, light-headedness, numbness and headaches. Psychiatric/Behavioral: Negative. All other systems reviewed and are negative. Physical Exam  
Physical Exam  
Constitutional: He is oriented to person, place, and time. He appears well-developed and well-nourished. He appears distressed (appears uncomfortable). HENT:  
Head: Normocephalic and atraumatic. Mouth/Throat: Oropharynx is clear and moist. No oropharyngeal exudate. Eyes: Conjunctivae and EOM are normal. Pupils are equal, round, and reactive to light. Neck: Normal range of motion. Neck supple. No JVD present. No tracheal deviation present. Cardiovascular: Normal rate, regular rhythm, normal heart sounds and intact distal pulses. No murmur heard. Pulmonary/Chest: Effort normal and breath sounds normal. No stridor. No respiratory distress. He has no wheezes. He has no rales.  He exhibits tenderness. Surgical site- dry, dried blood to skin, large hematoma underlying about size of small(mini) football, + ttp Abdominal: Soft. He exhibits no distension. There is no tenderness. There is no rebound and no guarding. Musculoskeletal: Normal range of motion. He exhibits no edema or tenderness. Neurological: He is alert and oriented to person, place, and time. No cranial nerve deficit. No gross motor or sensory deficits Skin: Skin is warm and dry. He is not diaphoretic. Psychiatric: He has a normal mood and affect. His behavior is normal.  
Nursing note and vitals reviewed. Diagnostic Study Results Labs - Recent Results (from the past 12 hour(s)) CBC W/O DIFF Collection Time: 10/24/18  9:06 AM  
Result Value Ref Range WBC 8.1 4.1 - 11.1 K/uL  
 RBC 4.06 (L) 4.10 - 5.70 M/uL  
 HGB 13.0 12.1 - 17.0 g/dL HCT 40.0 36.6 - 50.3 % MCV 98.5 80.0 - 99.0 FL  
 MCH 32.0 26.0 - 34.0 PG  
 MCHC 32.5 30.0 - 36.5 g/dL  
 RDW 13.1 11.5 - 14.5 % PLATELET 816 (L) 089 - 400 K/uL MPV 9.2 8.9 - 12.9 FL  
 NRBC 0.0 0  WBC ABSOLUTE NRBC 0.00 0.00 - 0.01 K/uL METABOLIC PANEL, COMPREHENSIVE Collection Time: 10/24/18  9:06 AM  
Result Value Ref Range Sodium 143 136 - 145 mmol/L Potassium 4.3 3.5 - 5.1 mmol/L Chloride 106 97 - 108 mmol/L  
 CO2 29 21 - 32 mmol/L Anion gap 8 5 - 15 mmol/L Glucose 108 (H) 65 - 100 mg/dL BUN 21 (H) 6 - 20 MG/DL Creatinine 0.98 0.70 - 1.30 MG/DL  
 BUN/Creatinine ratio 21 (H) 12 - 20 GFR est AA >60 >60 ml/min/1.73m2 GFR est non-AA >60 >60 ml/min/1.73m2 Calcium 8.9 8.5 - 10.1 MG/DL Bilirubin, total 0.5 0.2 - 1.0 MG/DL  
 ALT (SGPT) 18 12 - 78 U/L  
 AST (SGOT) 16 15 - 37 U/L Alk. phosphatase 91 45 - 117 U/L Protein, total 6.5 6.4 - 8.2 g/dL Albumin 3.5 3.5 - 5.0 g/dL Globulin 3.0 2.0 - 4.0 g/dL A-G Ratio 1.2 1.1 - 2.2 PTT Collection Time: 10/24/18  9:06 AM  
Result Value Ref Range aPTT 23.5 22.1 - 32.0 sec  
 aPTT, therapeutic range     58.0 - 77.0 SECS  
PROTHROMBIN TIME + INR Collection Time: 10/24/18  9:06 AM  
Result Value Ref Range INR 1.0 0.9 - 1.1 Prothrombin time 10.1 9.0 - 11.1 sec Medical Decision Making I am the first provider for this patient. I reviewed the vital signs, available nursing notes, past medical history, past surgical history, family history and social history. Vital Signs-Reviewed the patient's vital signs. Patient Vitals for the past 12 hrs: 
 Temp Pulse Resp BP SpO2  
10/24/18 1231  67 20 144/82 97 % 10/24/18 1205 98 °F (36.7 °C) 73 22 161/83 96 % Pulse Oximetry Analysis - 96% on RA Records Reviewed: Nursing Notes, Old Medical Records, Previous Radiology Studies and Previous Laboratory Studies Provider Notes (Medical Decision Making): DDx: expanding hematoma, post-op complication ED Course:  
Initial assessment performed. The patients presenting problems have been discussed, and they are in agreement with the care plan formulated and outlined with them. I have encouraged them to ask questions as they arise throughout their visit. Consult Note: 
1:08 PM 
Jeanine Wasserman DO spoke with Sly Del Rio MD 
Specialty: Cardiology Discussed pts hx, disposition, and available diagnostic and imaging results. Reviewed care plans. Consultant agrees with plans as outlined. He will admit pt. Disposition: 
ADMIT NOTE: 
1:08 PM 
The patient is being admitted to the hospital.  The results of their tests and reasons for their admission have been discussed with the patient and/or available family. They convey agreement and understanding for the need to be admitted and for their admission diagnosis. PLAN: 
1. Admit to Cardiology Diagnosis Clinical Impression: 1. Pacemaker complications, initial encounter 2. Hematoma Attestations: This note is prepared by Jovanni Del Rio, acting as Scribe for Enmanuel Apodaca DO. Enmanuel Apodaca DO: The scribe's documentation has been prepared under my direction and personally reviewed by me in its entirety. I confirm that the note above accurately reflects all work, treatment, procedures, and medical decision making performed by me.

## 2018-10-25 LAB
ALBUMIN SERPL-MCNC: 3.5 G/DL (ref 3.5–5)
ALBUMIN/GLOB SERPL: 1.3 {RATIO} (ref 1.1–2.2)
ALP SERPL-CCNC: 73 U/L (ref 45–117)
ALT SERPL-CCNC: 15 U/L (ref 12–78)
ANION GAP SERPL CALC-SCNC: 8 MMOL/L (ref 5–15)
AST SERPL-CCNC: 15 U/L (ref 15–37)
BASOPHILS # BLD: 0 K/UL (ref 0–0.1)
BASOPHILS NFR BLD: 0 % (ref 0–1)
BILIRUB SERPL-MCNC: 0.9 MG/DL (ref 0.2–1)
BUN SERPL-MCNC: 26 MG/DL (ref 6–20)
BUN/CREAT SERPL: 24 (ref 12–20)
CALCIUM SERPL-MCNC: 8.2 MG/DL (ref 8.5–10.1)
CHLORIDE SERPL-SCNC: 103 MMOL/L (ref 97–108)
CO2 SERPL-SCNC: 26 MMOL/L (ref 21–32)
CREAT SERPL-MCNC: 1.09 MG/DL (ref 0.7–1.3)
DIFFERENTIAL METHOD BLD: ABNORMAL
EOSINOPHIL # BLD: 0.1 K/UL (ref 0–0.4)
EOSINOPHIL NFR BLD: 1 % (ref 0–7)
ERYTHROCYTE [DISTWIDTH] IN BLOOD BY AUTOMATED COUNT: 13.2 % (ref 11.5–14.5)
GLOBULIN SER CALC-MCNC: 2.6 G/DL (ref 2–4)
GLUCOSE SERPL-MCNC: 134 MG/DL (ref 65–100)
HCT VFR BLD AUTO: 32.8 % (ref 36.6–50.3)
HGB BLD-MCNC: 11 G/DL (ref 12.1–17)
IMM GRANULOCYTES # BLD: 0.1 K/UL (ref 0–0.04)
IMM GRANULOCYTES NFR BLD AUTO: 1 % (ref 0–0.5)
LYMPHOCYTES # BLD: 2 K/UL (ref 0.8–3.5)
LYMPHOCYTES NFR BLD: 23 % (ref 12–49)
MCH RBC QN AUTO: 32.6 PG (ref 26–34)
MCHC RBC AUTO-ENTMCNC: 33.5 G/DL (ref 30–36.5)
MCV RBC AUTO: 97.3 FL (ref 80–99)
MONOCYTES # BLD: 0.7 K/UL (ref 0–1)
MONOCYTES NFR BLD: 7 % (ref 5–13)
NEUTS SEG # BLD: 6 K/UL (ref 1.8–8)
NEUTS SEG NFR BLD: 69 % (ref 32–75)
NRBC # BLD: 0 K/UL (ref 0–0.01)
NRBC BLD-RTO: 0 PER 100 WBC
PLATELET # BLD AUTO: 157 K/UL (ref 150–400)
PMV BLD AUTO: 10.1 FL (ref 8.9–12.9)
POTASSIUM SERPL-SCNC: 4.1 MMOL/L (ref 3.5–5.1)
PROT SERPL-MCNC: 6.1 G/DL (ref 6.4–8.2)
RBC # BLD AUTO: 3.37 M/UL (ref 4.1–5.7)
SODIUM SERPL-SCNC: 137 MMOL/L (ref 136–145)
WBC # BLD AUTO: 8.8 K/UL (ref 4.1–11.1)

## 2018-10-25 PROCEDURE — 80053 COMPREHEN METABOLIC PANEL: CPT | Performed by: INTERNAL MEDICINE

## 2018-10-25 PROCEDURE — 74011250637 HC RX REV CODE- 250/637: Performed by: INTERNAL MEDICINE

## 2018-10-25 PROCEDURE — 36415 COLL VENOUS BLD VENIPUNCTURE: CPT | Performed by: INTERNAL MEDICINE

## 2018-10-25 PROCEDURE — 74011250636 HC RX REV CODE- 250/636: Performed by: INTERNAL MEDICINE

## 2018-10-25 PROCEDURE — 99218 HC RM OBSERVATION: CPT

## 2018-10-25 PROCEDURE — 85025 COMPLETE CBC W/AUTO DIFF WBC: CPT | Performed by: INTERNAL MEDICINE

## 2018-10-25 PROCEDURE — 74011250637 HC RX REV CODE- 250/637: Performed by: EMERGENCY MEDICINE

## 2018-10-25 RX ORDER — SODIUM CHLORIDE 9 MG/ML
100 INJECTION, SOLUTION INTRAVENOUS CONTINUOUS
Status: DISPENSED | OUTPATIENT
Start: 2018-10-25 | End: 2018-10-25

## 2018-10-25 RX ADMIN — GABAPENTIN 600 MG: 300 CAPSULE ORAL at 17:50

## 2018-10-25 RX ADMIN — CLONAZEPAM 1 MG: 1 TABLET ORAL at 09:04

## 2018-10-25 RX ADMIN — SODIUM CHLORIDE 100 ML/HR: 900 INJECTION, SOLUTION INTRAVENOUS at 09:06

## 2018-10-25 RX ADMIN — ACETAMINOPHEN 650 MG: 325 TABLET ORAL at 07:25

## 2018-10-25 RX ADMIN — DONEPEZIL HYDROCHLORIDE 5 MG: 5 TABLET, FILM COATED ORAL at 22:41

## 2018-10-25 RX ADMIN — GABAPENTIN 600 MG: 300 CAPSULE ORAL at 09:04

## 2018-10-25 RX ADMIN — OXYCODONE AND ACETAMINOPHEN 1 TABLET: 5; 325 TABLET ORAL at 02:28

## 2018-10-25 RX ADMIN — DULOXETINE 60 MG: 30 CAPSULE, DELAYED RELEASE ORAL at 09:04

## 2018-10-25 RX ADMIN — SODIUM CHLORIDE 250 ML: 900 INJECTION, SOLUTION INTRAVENOUS at 09:06

## 2018-10-25 RX ADMIN — GABAPENTIN 600 MG: 300 CAPSULE ORAL at 22:41

## 2018-10-25 NOTE — PROGRESS NOTES
1415: Pt arrived to the floor via stretcher. Pt has hematoma to left chest. Pain 8/10, VSS. Family at bedside. 1450: Pain med given at 1445. Jennifer, NP at bedside. Hand held pressure applied x10 mins and pressure dsg. applied. Pt tolerated procedure well. Pt now resting comfortably. Will continue to monitor.

## 2018-10-25 NOTE — PROGRESS NOTES
Bedside and Verbal shift change report given to Martine Doe RN (oncoming nurse) by Radha Pryor RN (offgoing nurse). Report included the following information SBAR, Kardex, Procedure Summary, Intake/Output, MAR, Accordion, Recent Results and Cardiac Rhythm NSR.

## 2018-10-25 NOTE — PROGRESS NOTES
Cardiac Electrophysiology Progress Note [id number] [address]  [phone] 
 
10/25/2018 8:23 AM 
 
Admit Date: 10/24/2018 Admit Diagnosis: Bradycardia Subjective:  
 
Julia Horvath   denies chest pain, chest pressure/discomfort, dyspnea, lower extremity edema. His pain at PM site is 2-3/10 which is much improved. Was orthostatic last night. Hemoglobin down to 11 this am. Chest remains wrapped. Visit Vitals BP 91/59 (BP 1 Location: Right arm, BP Patient Position: At rest) Pulse 60 Temp 97.2 °F (36.2 °C) Resp 18 SpO2 94% Current Facility-Administered Medications Medication Dose Route Frequency  donepezil (ARICEPT) tablet 5 mg  5 mg Oral QHS  DULoxetine (CYMBALTA) capsule 60 mg  60 mg Oral DAILY  gabapentin (NEURONTIN) capsule 600 mg  600 mg Oral TID  clonazePAM (KlonoPIN) tablet 1 mg  1 mg Oral DAILY  oxyCODONE-acetaminophen (PERCOCET) 5-325 mg per tablet 1 Tab  1 Tab Oral Q4H PRN  
 acetaminophen (TYLENOL) tablet 650 mg  650 mg Oral Q4H PRN  
 morphine 10 mg/ml injection 2 mg  2 mg IntraVENous Q4H PRN Objective:  
  
Visit Vitals BP 91/59 (BP 1 Location: Right arm, BP Patient Position: At rest) Pulse 60 Temp 97.2 °F (36.2 °C) Resp 18 SpO2 94% Physical Exam: Abdomen: soft, non-tender Extremities: extremities normal 
Heart: regular rate and rhythm Chest: pressure dressing intact with ACE wrap around torso. Lungs: clear to auscultation bilaterally Pulses: 2+ and symmetric Data Review:  
Labs:   
Recent Labs 10/25/18 
0247 10/24/18 
9810 WBC 8.8 8.1 HGB 11.0* 13.0 HCT 32.8* 40.0  143* Recent Labs 10/25/18 
0247 10/24/18 
9370  143  
K 4.1 4.3  106 CO2 26 29 * 108* BUN 26* 21* CREA 1.09 0.98  
CA 8.2* 8.9 ALB 3.5 3.5 TBILI 0.9 0.5 SGOT 15 16 ALT 15 18 INR  --  1.0 No results for input(s): TROIQ, CPK, CKMB in the last 72 hours. Intake/Output Summary (Last 24 hours) at 10/25/2018 3440 Last data filed at 10/25/2018 1025 Gross per 24 hour Intake 720 ml Output  Net 720 ml Telemetry: A paced 60 Assessment:  
 
Active Problems: PTSD (post-traumatic stress disorder) (12/13/2016) Bradycardia (10/2/2018) Pacemaker pocket hematoma, initial encounter (10/24/2018) Pacemaker (10/24/2018) Hx of syncope (10/24/2018) Plan:  
 
Mary Alexander is a pleasant 68year old male with hx of syncope and SSS, S/P dual chamber Medtronic PM 10/16/18. Large hematoma noted around left subclavian PM site. Chest xray with no pneumothorax or pleural effusion; pm in place. Was orthostatic last night. Hemoglobin down to 11 this am (from 13). Will give 250cc bolus of NS and follow with a liter. Will leave pressure dressing intact at this time. Continue to observe over night. CINTHIA Stinson Patient seen and examined by me with nurse practitioner. I personally performed all components of the history, physical, and medical decision making and agree with the assessment and plan with minor modifications as noted. Will continue pressure dressing for another 24 hours. Hydrate for hypotension. Observe another 24 hours Isabela Hamilton MD, [facility] 
 
 
 
10/25/2018 
8:23 AM

## 2018-10-25 NOTE — PROGRESS NOTES
0215-Patient rang out to go to the bathroom and upon standing and beginning to walk the patient experienced severe dizziness and his knees began to weaken. Assisted by another RN getting the patient back to bed as patient started to collapse. Once patient was able to sit up in the bed vials were assessed BP 96/54 HR 71 sitting, Standing  BP 63/41 HR 74.  Will notify MD.

## 2018-10-25 NOTE — PROGRESS NOTES
Bedside and Verbal shift change report given to Ander Li (oncoming nurse) by Miles Alberts (offgoing nurse). Report included the following information SBAR, Kardex, Intake/Output, MAR, Accordion and Recent Results.

## 2018-10-25 NOTE — PROGRESS NOTES
1200: orthostatic blood pressures 112/60 at rest 
 
126/78 sitting 82/46 standing, no dizzyness, no nausea, no lightheadedness Pt slightly wobbly on feet, stand-by assist. Pt was adamant about ambulating to the bathroom to void. Pt was able to walk to the bathroom and void 500mL with no symptoms. Pt sat in chair while we made his bed then got back into bed. Pt stated he felt fine other than feeling a little unsteady on his feet 1640: BP 83/66. Ambulated to bathroom, no complaints or symptoms other than BP. PT was wobbly on feet, but pt and wife states that is his basline.

## 2018-10-25 NOTE — PROGRESS NOTES
Problem: Falls - Risk of 
Goal: *Absence of Falls Document Ginny Quintanillaeens Fall Risk and appropriate interventions in the flowsheet. Outcome: Progressing Towards Goal 
Fall Risk Interventions: 
  
 
  
 
Medication Interventions: Patient to call before getting OOB, Teach patient to arise slowly

## 2018-10-26 LAB
ANION GAP SERPL CALC-SCNC: 8 MMOL/L (ref 5–15)
BUN SERPL-MCNC: 21 MG/DL (ref 6–20)
BUN/CREAT SERPL: 28 (ref 12–20)
CALCIUM SERPL-MCNC: 8.2 MG/DL (ref 8.5–10.1)
CHLORIDE SERPL-SCNC: 109 MMOL/L (ref 97–108)
CO2 SERPL-SCNC: 26 MMOL/L (ref 21–32)
CREAT SERPL-MCNC: 0.76 MG/DL (ref 0.7–1.3)
ERYTHROCYTE [DISTWIDTH] IN BLOOD BY AUTOMATED COUNT: 13.2 % (ref 11.5–14.5)
GLUCOSE SERPL-MCNC: 105 MG/DL (ref 65–100)
HCT VFR BLD AUTO: 29.5 % (ref 36.6–50.3)
HGB BLD-MCNC: 9.9 G/DL (ref 12.1–17)
MCH RBC QN AUTO: 32.7 PG (ref 26–34)
MCHC RBC AUTO-ENTMCNC: 33.6 G/DL (ref 30–36.5)
MCV RBC AUTO: 97.4 FL (ref 80–99)
NRBC # BLD: 0 K/UL (ref 0–0.01)
NRBC BLD-RTO: 0 PER 100 WBC
PLATELET # BLD AUTO: 124 K/UL (ref 150–400)
PMV BLD AUTO: 10.1 FL (ref 8.9–12.9)
POTASSIUM SERPL-SCNC: 4.1 MMOL/L (ref 3.5–5.1)
RBC # BLD AUTO: 3.03 M/UL (ref 4.1–5.7)
SODIUM SERPL-SCNC: 143 MMOL/L (ref 136–145)
WBC # BLD AUTO: 6.6 K/UL (ref 4.1–11.1)

## 2018-10-26 PROCEDURE — 99218 HC RM OBSERVATION: CPT

## 2018-10-26 PROCEDURE — 65660000000 HC RM CCU STEPDOWN

## 2018-10-26 PROCEDURE — 74011250637 HC RX REV CODE- 250/637: Performed by: INTERNAL MEDICINE

## 2018-10-26 PROCEDURE — 74011250637 HC RX REV CODE- 250/637: Performed by: EMERGENCY MEDICINE

## 2018-10-26 PROCEDURE — 85027 COMPLETE CBC AUTOMATED: CPT | Performed by: NURSE PRACTITIONER

## 2018-10-26 PROCEDURE — L3650 SO 8 ABD RESTRAINT PRE OTS: HCPCS

## 2018-10-26 PROCEDURE — 80048 BASIC METABOLIC PNL TOTAL CA: CPT | Performed by: NURSE PRACTITIONER

## 2018-10-26 PROCEDURE — 36415 COLL VENOUS BLD VENIPUNCTURE: CPT | Performed by: NURSE PRACTITIONER

## 2018-10-26 RX ADMIN — DULOXETINE 60 MG: 30 CAPSULE, DELAYED RELEASE ORAL at 08:30

## 2018-10-26 RX ADMIN — GABAPENTIN 600 MG: 300 CAPSULE ORAL at 21:11

## 2018-10-26 RX ADMIN — GABAPENTIN 600 MG: 300 CAPSULE ORAL at 08:30

## 2018-10-26 RX ADMIN — OXYCODONE AND ACETAMINOPHEN 1 TABLET: 5; 325 TABLET ORAL at 08:30

## 2018-10-26 RX ADMIN — GABAPENTIN 600 MG: 300 CAPSULE ORAL at 15:43

## 2018-10-26 RX ADMIN — ACETAMINOPHEN 650 MG: 325 TABLET ORAL at 15:43

## 2018-10-26 RX ADMIN — CLONAZEPAM 1 MG: 1 TABLET ORAL at 08:30

## 2018-10-26 RX ADMIN — DONEPEZIL HYDROCHLORIDE 5 MG: 5 TABLET, FILM COATED ORAL at 21:11

## 2018-10-26 NOTE — PROGRESS NOTES
Cardiac Electrophysiology Progress Note [address] [facility], [place], [address] [facility]  [phone] 
 
10/26/2018 8:42 AM 
 
Admit Date: 10/24/2018 Admit Diagnosis: Bradycardia Bradycardia, pacemaker hematoma Subjective:  
 
Jenny Query   denies chest pain, chest pressure/discomfort, dyspnea, lower extremity edema. Is not having any pain. Bps improved with NS bolus yesterday. H/H still trending down. Visit Vitals /73 (BP 1 Location: Right arm, BP Patient Position: At rest) Pulse 66 Temp 97.2 °F (36.2 °C) Resp 16 SpO2 97% Current Facility-Administered Medications Medication Dose Route Frequency  donepezil (ARICEPT) tablet 5 mg  5 mg Oral QHS  DULoxetine (CYMBALTA) capsule 60 mg  60 mg Oral DAILY  gabapentin (NEURONTIN) capsule 600 mg  600 mg Oral TID  clonazePAM (KlonoPIN) tablet 1 mg  1 mg Oral DAILY  oxyCODONE-acetaminophen (PERCOCET) 5-325 mg per tablet 1 Tab  1 Tab Oral Q4H PRN  
 acetaminophen (TYLENOL) tablet 650 mg  650 mg Oral Q4H PRN  
 morphine 10 mg/ml injection 2 mg  2 mg IntraVENous Q4H PRN Objective:  
  
Visit Vitals /73 (BP 1 Location: Right arm, BP Patient Position: At rest) Pulse 66 Temp 97.2 °F (36.2 °C) Resp 16 SpO2 97% Physical Exam: Abdomen: soft, non-tender Extremities: extremities normal 
Heart: regular rate and rhythm Lungs: clear to auscultation bilaterally Pulses: 2+ and symmetric Data Review:  
Labs:   
Recent Labs 10/26/18 
0405 10/25/18 
0247 10/24/18 
4820 WBC 6.6 8.8 8.1 HGB 9.9* 11.0* 13.0 HCT 29.5* 32.8* 40.0 * 157 143* Recent Labs 10/26/18 
0405 10/25/18 
0247 10/24/18 
3996  137 143  
K 4.1 4.1 4.3 * 103 106 CO2 26 26 29 * 134* 108* BUN 21* 26* 21* CREA 0.76 1.09 0.98  
CA 8.2* 8.2* 8.9 ALB  --  3.5 3.5 TBILI  --  0.9 0.5 SGOT  --  15 16 ALT  --  15 18 INR  --   --  1.0 No results for input(s): TROIQ, CPK, CKMB in the last 72 hours. Intake/Output Summary (Last 24 hours) at 10/26/2018 5373 Last data filed at 10/26/2018 0193 Gross per 24 hour Intake 1770 ml Output 2400 ml Net -630 ml Telemetry: SR 63, occ A paced. Assessment:  
 
Active Problems: PTSD (post-traumatic stress disorder) (12/13/2016) Bradycardia (10/2/2018) Pacemaker pocket hematoma, initial encounter (10/24/2018) Pacemaker (10/24/2018) Hx of syncope (10/24/2018) Plan:  
 
Vincenzo Quinones is a pleasant 68year old male with hx of syncope and SSS, S/P dual chamber Medtronic PM 10/16/18.  Large hematoma noted around left subclavian PM site 10/24/18. Chest xray with no pneumothorax or pleural effusion; pm in place. He is without pain this morning. Bps improved with NS bolus yesterday. Hemoglobin continues to trend down. Will take pressure dressing off today. Check H/H mildred am. If he continues to drop, will transfuse. Discussed with patient and significant other. Jennifer Anna, CINTHIA Patient seen and examined by me with nurse practitioner. I personally performed all components of the history, physical, and medical decision making and agree with the assessment and plan with minor modifications as noted. Continue to observe h/h.   
 
Casimiro Kirby MD, [facility] 
 
 
10/26/2018 
8:42 AM

## 2018-10-26 NOTE — PHYSICIAN ADVISORY
Letter of Status Determination:  
Recommend hospitalization status upgraded from OBSERVATION  to INPATIENT  Status Pt Name:  Cinthia Coronado MR#  
[facility]ICA # [id number] / 
[id number] Payor: Kristina Solitario / Plan: Kassi Mehta / Product Type: [email or web address] /   
[employer]#  [id number] [facility]  2159/01  @ [facility] Hospitalization date  10/24/2018 11:53 AM  
Current Attending Physician  Jennifer Weiner, CINTHIA Principal diagnosis  <principal problem not specified> Clinicals  68 y.o. y.o  male hospitalized with above diagnosis Cinthia Coronado, 68 y.o. male with PMHx significant for pacemaker and anxiety, presents via EMS from [facility] to the ED with cc of bleeding from pacemaker site with expanding hematoma. Pt was seen at [facility] this morning, and was transferred to [facility] ED due to progressive expansion of hematoma; pt's Cardiologist aware. At time of examination, pt reports persistent pain over pacemaker site. Per records, pt had device placed (10/16/18). Pt with a hematoma size of a \"mini football\". When EMS arrived, his SBP was > 200 mmHg. Large hematoma noted around left subclavian PM site. Per nursing note below 10/26/-Patient rang out to go to the bathroom and upon standing and beginning to walk the patient experienced severe dizziness and his knees began to weaken. Assisted by another RN getting the patient back to bed as patient started to collapse. Once patient was able to sit up in the bed vials were assessed BP 96/54 HR 71 sitting, Standing  BP 63/41 HR 74. Will notify MD. 
 
10/25/18 1200: orthostatic blood pressures 
  
112/60 at rest 
  
126/78 sitting 
  
82/46 standing,  
  
Pt slightly wobbly on feet, stand-by assist. Pt was adamant about ambulating to the bathroom to void.   
1640: BP 83/66. Ambulated to bathroom with assitance H and H continue to drop, from 13 on admission to 9.9 on 10/26 am  
May need transfusion [place] (MCG) criteria Does  NOT apply symptomatic anemia STATUS DETERMINATION  INPATIENT The final decision of the patient's hospitalization status depends on the attending physician's judgment Additional comments Payor: Shea Torres / Plan: Jovita Whyte / Product Type: [employer] Programs /   
  
 
Mónica Tucker MD 
Cell: [phone] Physician Advisor

## 2018-10-26 NOTE — PROGRESS NOTES
2020-  Assisted pt to sitting on side of bed. Then ambulated with pt to bathroom to void. This nurse held on to pt while ambulating due to pt having unsteady gait. Pt held on to grab bar in bathroom while voiding. Pt then was ambulated mendoza to bed. Pt's wife remains at bedside. Pressure dressing noted to left subclavian region. Will continue to monitor.

## 2018-10-27 VITALS
OXYGEN SATURATION: 98 % | DIASTOLIC BLOOD PRESSURE: 69 MMHG | SYSTOLIC BLOOD PRESSURE: 139 MMHG | RESPIRATION RATE: 18 BRPM | HEART RATE: 61 BPM | TEMPERATURE: 97.7 F

## 2018-10-27 LAB
ALBUMIN SERPL-MCNC: 3 G/DL (ref 3.5–5)
ALBUMIN/GLOB SERPL: 1 {RATIO} (ref 1.1–2.2)
ALP SERPL-CCNC: 99 U/L (ref 45–117)
ALT SERPL-CCNC: 19 U/L (ref 12–78)
ANION GAP SERPL CALC-SCNC: 7 MMOL/L (ref 5–15)
AST SERPL-CCNC: 17 U/L (ref 15–37)
BILIRUB SERPL-MCNC: 0.3 MG/DL (ref 0.2–1)
BUN SERPL-MCNC: 16 MG/DL (ref 6–20)
BUN/CREAT SERPL: 23 (ref 12–20)
CALCIUM SERPL-MCNC: 8.1 MG/DL (ref 8.5–10.1)
CHLORIDE SERPL-SCNC: 110 MMOL/L (ref 97–108)
CO2 SERPL-SCNC: 26 MMOL/L (ref 21–32)
CREAT SERPL-MCNC: 0.69 MG/DL (ref 0.7–1.3)
ERYTHROCYTE [DISTWIDTH] IN BLOOD BY AUTOMATED COUNT: 13.2 % (ref 11.5–14.5)
GLOBULIN SER CALC-MCNC: 2.9 G/DL (ref 2–4)
GLUCOSE SERPL-MCNC: 126 MG/DL (ref 65–100)
HCT VFR BLD AUTO: 28.7 % (ref 36.6–50.3)
HGB BLD-MCNC: 9.4 G/DL (ref 12.1–17)
MCH RBC QN AUTO: 33 PG (ref 26–34)
MCHC RBC AUTO-ENTMCNC: 32.8 G/DL (ref 30–36.5)
MCV RBC AUTO: 100.7 FL (ref 80–99)
NRBC # BLD: 0 K/UL (ref 0–0.01)
NRBC BLD-RTO: 0 PER 100 WBC
PLATELET # BLD AUTO: 122 K/UL (ref 150–400)
PMV BLD AUTO: 10.6 FL (ref 8.9–12.9)
POTASSIUM SERPL-SCNC: 3.8 MMOL/L (ref 3.5–5.1)
PROT SERPL-MCNC: 5.9 G/DL (ref 6.4–8.2)
RBC # BLD AUTO: 2.85 M/UL (ref 4.1–5.7)
SODIUM SERPL-SCNC: 143 MMOL/L (ref 136–145)
WBC # BLD AUTO: 5.5 K/UL (ref 4.1–11.1)

## 2018-10-27 PROCEDURE — 36415 COLL VENOUS BLD VENIPUNCTURE: CPT | Performed by: NURSE PRACTITIONER

## 2018-10-27 PROCEDURE — 74011250637 HC RX REV CODE- 250/637: Performed by: INTERNAL MEDICINE

## 2018-10-27 PROCEDURE — 85027 COMPLETE CBC AUTOMATED: CPT | Performed by: NURSE PRACTITIONER

## 2018-10-27 PROCEDURE — 80053 COMPREHEN METABOLIC PANEL: CPT | Performed by: NURSE PRACTITIONER

## 2018-10-27 PROCEDURE — 74011250637 HC RX REV CODE- 250/637: Performed by: EMERGENCY MEDICINE

## 2018-10-27 RX ADMIN — GABAPENTIN 600 MG: 300 CAPSULE ORAL at 08:36

## 2018-10-27 RX ADMIN — CLONAZEPAM 1 MG: 1 TABLET ORAL at 08:36

## 2018-10-27 RX ADMIN — DULOXETINE 60 MG: 30 CAPSULE, DELAYED RELEASE ORAL at 08:36

## 2018-10-27 NOTE — DISCHARGE SUMMARY
[id number] [address]  [phone]     Cardiology Discharge Summary     Patient ID:  Brad Gutiérrez  [id number]  17 y.o.  1945    Admit Date: 10/24/2018    Discharge Date: 10/27/2018     Admitting Physician: Carlos Manuel Varela MD     Discharge Physician: Carlos Manuel Varela MD    Admission Diagnoses:   Bradycardia;Bradycardia;Bradycardia    Discharge Diagnoses:    Active Problems:    PTSD (post-traumatic stress disorder) (12/13/2016)      Bradycardia (10/2/2018)      Pacemaker pocket hematoma, initial encounter (10/24/2018)      Pacemaker (10/24/2018)      Hx of syncope (10/24/2018)        Discharge Condition: Good    Cardiology Procedures this Admission:  none    Patient Active Problem List    Diagnosis Date Noted    Pacemaker pocket hematoma, initial encounter 10/24/2018    Pacemaker 10/24/2018    Hx of syncope 10/24/2018    Bradycardia 10/02/2018    Age-related macular degeneration, dry, both eyes 09/14/2018    Syncope 09/11/2018    Neuropathy 09/11/2018    Memory deficit 09/11/2018    BPH with obstruction/lower urinary tract symptoms 04/19/2018    Smoker 01/18/2018    S/P right rotator cuff repair 12/01/2017    S/P hip replacement, bilateral 10/18/2017    Ingrown left big toenail 03/31/2017    PTSD (post-traumatic stress disorder) 12/13/2016    Lumbar spinal stenosis 12/13/2016    Alcoholism in remission ([facility] [address] 12/13/2016    Risk for falls 12/13/2016      Past Medical History:   Diagnosis Date    Anxiety     Back pain     Blurred vision     Chest pain     Depression     Dizziness     Fast heart beat     Frequent headaches     Frequent urination     Joint pain     Joint swelling     Muscle pain     Neuropathy     Recent change in weight     Sinus problem     Skin disease     SOB (shortness of breath)     Sore throat     SSS (sick sinus syndrome) (HCC)     Stiffness of joints, multiple sites     Stress     Syncope     Tiredness     Trouble in sleeping     Weakness       Social History     Socioeconomic History    Marital status: SINGLE     Spouse name: Not on file    Number of children: Not on file    Years of education: Not on file    Highest education level: Not on file   Social Needs    Financial resource strain: Not on file    Food insecurity - worry: Not on file    Food insecurity - inability: Not on file    Transportation needs - medical: Not on file   [employer] needs - non-medical: Not on file   Occupational History    Not on file   Tobacco Use    Smoking status: Current Every Day Smoker     Packs/day: 0.50     Years: 50.00     Pack years: 25.00     Types: Cigarettes    Smokeless tobacco: Never Used    Tobacco comment: 0.5-1 pack per day   Substance and Sexual Activity    Alcohol use: Yes     Alcohol/week: 16.8 oz     Types: 28 Cans of beer per week     Comment: None now    Drug use: No    Sexual activity: No   Other Topics Concern    Not on file   Social History Narrative    Not on file     No Known Allergies   Family History   Problem Relation Age of Onset    Cancer Mother     Cancer Maternal [facility] Course: pt had a pacemaker implanted earlier this month and woke up a week later with bleeding from the pacemaker site and a hematoma. He was taken to the [facility] ER and transferred here. He had orthostatic hypotension and was hydrated. He had a pressure dressing placed for over 48 hours and was observed. His h/h did drop to 9.4 but he is no longer hypotensive and is now asymptomatic. His hematoma is smaller. Consults: None    Visit Vitals  /69   Pulse 61   Temp 97.7 °F (36.5 °C)   Resp 18   SpO2 98%       Physical Exam  Abdomen: soft, non-tender.  Bowel sounds normal.   Extremities: extremities normal, no edema, pulses   Heart: regular rate and rhythm, no murmur, S3/JVD  Lungs: clear to auscultation bilaterally  Neck: supple, symmetrical, trachea midline,   Neurologic: Grossly normal  Chest - tennis ball sized hematoma under pacer site with ecchymoses on the left arm    Labs:   Recent Labs     10/27/18  0244 10/26/18  0405 10/25/18  0247   WBC 5.5 6.6 8.8   HGB 9.4* 9.9* 11.0*   HCT 28.7* 29.5* 32.8*   * 124* 157     Recent Labs     10/27/18  0244 10/26/18  0405 10/25/18  0247 10/24/18  0906    143 137 143   K 3.8 4.1 4.1 4.3   * 109* 103 106   CO2 26 26 26 29   * 105* 134* 108*   BUN 16 21* 26* 21*   CREA 0.69* 0.76 1.09 0.98   CA 8.1* 8.2* 8.2* 8.9   ALB 3.0*  --  3.5 3.5   TBILI 0.3  --  0.9 0.5   SGOT 17  --  15 16   ALT 19  --  15 18   INR  --   --   --  1.0       No results for input(s): TROIQ, CPK, CKMB in the last 72 hours. EKG: a paced    Disposition: home    Patient Instructions:      Medication List      CHANGE how you take these medications    clonazePAM 1 mg tablet  Commonly known as:  KlonoPIN  TAKE 1 TABLET BY MOUTH TWICE DAILY  What changed:  See the new instructions. naproxen 500 mg tablet  Commonly known as:  NAPROSYN  TAKE 1 TABLET BY MOUTH TWICE DAILY WITH MEALS FOR PAIN  What changed:  See the new instructions. CONTINUE taking these medications    aspirin delayed-release 325 mg tablet     donepezil 5 mg tablet  Commonly known as:  ARICEPT     DULoxetine 60 mg capsule  Commonly known as:  CYMBALTA  TAKE 1 CAPSULE BY MOUTH DAILY     gabapentin 600 mg tablet  Commonly known as:  NEURONTIN  TAKE 1 TABLET BY MOUTH THREE TIMES DAILY     VITAMIN B-12 PO        STOP taking these medications    finasteride 5 mg tablet  Commonly known as:  PROSCAR     fludrocortisone 0.1 mg tablet  Commonly known as:  FLORINEF     MUCINEX 600 mg ER tablet  Generic drug:  guaiFENesin ER            Referenced discharge instructions provided by nursing for diet and activity.     Follow up with: f/u next week    Signed:  Fernando Pro MD  10/27/2018  8:36 AM

## 2018-10-27 NOTE — PROGRESS NOTES
Placed immobilizer sling on patient. Have assisted him to the bathroom x2 thus far. Wife staying in room. Area over pacemaker has no new swelling and reportedly is reduced from prior size. Patient alert and w/o complaint.

## 2018-10-27 NOTE — DISCHARGE INSTRUCTIONS
Pacemaker Placement: What to Expect at [address] [facility] placement is surgery to put a pacemaker in your chest. This surgery may be done if you have bradycardia (a slow heart rate). A pacemaker is a small, battery-powered device. It sends electrical signals to the heart. These signals work to keep the heartbeat steady. Thin wires, called leads, carry the signals from the pacemaker to the heart. A pacemaker can prevent or reduce dizziness, fainting, and shortness of breath caused by a slow or unsteady heartbeat. Your chest may be sore where the doctor made the cut (incision) and put in the pacemaker. You also may have a bruise and mild swelling. These symptoms usually get better in 1 to 2 weeks. You may feel a hard ridge along the incision. This usually gets softer in the months after surgery. You may be able to see or feel the outline of the pacemaker under your skin. You will probably be able to go back to work or your usual routine 1 to 2 weeks after surgery. Pacemaker batteries usually last 5 to 15 years. Your doctor will talk to you about how often you will need to have your pacemaker checked. You can safely use most household and office electronics. This includes things such as kitchen appliances, electric power tools, and computers. You will need to stay away from things with strong magnetic and electrical fields. This includes things such as an MRI machine (unless your pacemaker is safe for an MRI), welding equipment, and power generators. You can use a cell phone, but keep it at least 6 inches away from your pacemaker. Check with your doctor about what you need to stay away from, what you need to use with care, and what is okay to use. This care sheet gives you a general idea about how long it will take for you to recover. But each person recovers at a different pace. Follow the steps below to get better as quickly as possible. How can you care for yourself at home?   Activity    · Rest when you feel tired.     · Be active. Walking is a good choice.     · For 4 to 6 weeks:  ? Avoid activities that strain your chest or upper arm muscles. This includes pushing a  or vacuum, or mopping floors. It also includes swimming, or swinging a golf club or tennis racquet. ? Do not raise your arm (the one on the side of your body where the pacemaker is located) above your shoulder. ? Allow your body to heal. Don't move quickly or lift anything heavy until you are feeling better.     · Many people are able to return to work within 1 to 2 weeks after surgery.     · Ask your doctor when it is okay for you to have sex. Diet    · You can eat your normal diet. If your stomach is upset, try bland, low-fat foods like plain rice, broiled chicken, toast, and yogurt. Medicines    · Your doctor will tell you if and when you can restart your medicines. He or she will also give you instructions about taking any new medicines.     · If you take aspirin or some other blood thinner, be sure to talk to your doctor. He or she will tell you if and when to start taking this medicine again. Make sure that you understand exactly what your doctor wants you to do.     · Be safe with medicines. Read and follow all instructions on the label. ? If the doctor gave you a prescription medicine for pain, take it as prescribed. ? If you are not taking a prescription pain medicine, ask your doctor if you can take an over-the-counter medicine. ? Do not take aspirin, ibuprofen (Advil, Motrin), naproxen (Aleve), or other nonsteroidal anti-inflammatory drugs (NSAIDs) unless your doctor says it is okay.     · If your doctor prescribed antibiotics, take them as directed. Do not stop taking them just because you feel better. You need to take the full course of antibiotics.    Incision care    · If you have strips of tape on the incision, leave the tape on for a week or until it falls off.     · Keep the incision dry while it heals. Your doctor may recommend sponge baths for about 7 days, but do not get the incision wet. Your doctor will let you know when you may take showers. After a shower, pat the incision dry.     · Don't use hydrogen peroxide or alcohol on the incision, which can slow healing. You may cover the area with a gauze bandage if it oozes fluid or rubs against clothing. Change the bandage every day.     · Do not take a bath or get into a hot tub for the first 2 weeks, or until your doctor tells you it is okay. Other instructions    · Keep a medical ID card with you at all times that says you have a pacemaker. The card should include the  and model information.     · Wear medical alert jewelry stating that you have a pacemaker. You can buy this at most [employer].     · Check your pulse as directed by your doctor.     · Have your pacemaker checked as often as your doctor recommends. In some cases, this may be done over the phone or the Internet. Your doctor will give you instructions about how to do this. Follow-up care is a key part of your treatment and safety. Be sure to make and go to all appointments, and call your doctor if you are having problems. It's also a good idea to know your test results and keep a list of the medicines you take. When should you call for help? Call 911 anytime you think you may need emergency care.  For example, call if:    · You passed out (lost consciousness).     · You have trouble breathing.    Call your doctor now or seek immediate medical care if:    · You are dizzy or light-headed, or you feel like you may faint.     · You have pain that does not get better after you take pain medicine.     · You hear an alarm or feel a vibration from your pacemaker.     · You have loose stitches, or your incision comes open.     · Bright red blood has soaked through the bandage over your incision.     · You have signs of infection, such as:  ? Increased pain, swelling, warmth, or redness. ? Red streaks leading from the incision. ? Pus draining from the incision. ? A fever.    Watch closely for changes in your health, and be sure to contact your doctor if:    · You have any problems with your pacemaker. Where can you learn more? Go to http://gris-fariha.info/. Enter G550 in the search box to learn more about \"Pacemaker Placement: What to Expect at Home. \"  Current as of: December 6, 2017  Content Version: 11.8  © 0433-3210 Healthwise, [employer]. Care instructions adapted under license by [employer] (which disclaims liability or warranty for this information). If you have questions about a medical condition or this instruction, always ask your healthcare professional. [address] any warranty or liability for your use of this information.

## 2018-10-27 NOTE — PROGRESS NOTES
Bedside shift change report given to [address] [place] [address] (oncoming nurse) by Fide Gudino RN (offgoing nurse). Report included the following information SBAR, Kardex, OR Summary, Procedure Summary, Intake/Output, MAR, Accordion, Recent Results, Med Rec Status and Cardiac Rhythm SR, Paced.

## 2018-11-01 ENCOUNTER — CLINICAL SUPPORT (OUTPATIENT)
Dept: CARDIOLOGY CLINIC | Age: 73
End: 2018-11-01

## 2018-11-01 ENCOUNTER — OFFICE VISIT (OUTPATIENT)
Dept: CARDIOLOGY CLINIC | Age: 73
End: 2018-11-01

## 2018-11-01 VITALS
OXYGEN SATURATION: 98 % | HEIGHT: 71 IN | DIASTOLIC BLOOD PRESSURE: 84 MMHG | SYSTOLIC BLOOD PRESSURE: 138 MMHG | HEART RATE: 78 BPM | WEIGHT: 172.4 LBS | BODY MASS INDEX: 24.14 KG/M2 | RESPIRATION RATE: 18 BRPM

## 2018-11-01 DIAGNOSIS — I10 ESSENTIAL HYPERTENSION: ICD-10-CM

## 2018-11-01 DIAGNOSIS — Z95.0 CARDIAC PACEMAKER IN SITU: Primary | ICD-10-CM

## 2018-11-01 DIAGNOSIS — I49.5 SSS (SICK SINUS SYNDROME) (HCC): ICD-10-CM

## 2018-11-01 DIAGNOSIS — R00.1 BRADYCARDIA: ICD-10-CM

## 2018-11-01 DIAGNOSIS — R55 SYNCOPE, UNSPECIFIED SYNCOPE TYPE: Primary | ICD-10-CM

## 2018-11-01 DIAGNOSIS — I49.5 SINOATRIAL NODE DYSFUNCTION (HCC): ICD-10-CM

## 2018-11-01 NOTE — PROGRESS NOTES
Subjective:      Kvng Hardin is a 68 y.o. male is here for 2 week follow up from PM.  The patient denies chest pain/ shortness of breath, orthopnea, PND, LE edema, palpitations, syncope, presyncope or fatigue.      Patient Active Problem List    Diagnosis Date Noted    Pacemaker pocket hematoma, initial encounter 10/24/2018    Pacemaker 10/24/2018    Hx of syncope 10/24/2018    Bradycardia 10/02/2018    Age-related macular degeneration, dry, both eyes 09/14/2018    Syncope 09/11/2018    Neuropathy 09/11/2018    Memory deficit 09/11/2018    BPH with obstruction/lower urinary tract symptoms 04/19/2018    Smoker 01/18/2018    S/P right rotator cuff repair 12/01/2017    S/P hip replacement, bilateral 10/18/2017    Ingrown left big toenail 03/31/2017    PTSD (post-traumatic stress disorder) 12/13/2016    Lumbar spinal stenosis 12/13/2016    Alcoholism in remission ([facility] [address] 12/13/2016    Risk for falls 12/13/2016      Homar Molina MD  Past Medical History:   Diagnosis Date    Anxiety     Back pain     Blurred vision     Chest pain     Depression     Dizziness     Fast heart beat     Frequent headaches     Frequent urination     Joint pain     Joint swelling     Muscle pain     Neuropathy     Recent change in weight     Sinus problem     Skin disease     SOB (shortness of breath)     Sore throat     SSS (sick sinus syndrome) (HCC)     Stiffness of joints, multiple sites     Stress     Syncope     Tiredness     Trouble in sleeping     Weakness       Past Surgical History:   Procedure Laterality Date    HX HIP REPLACEMENT Bilateral 1993    HX PACEMAKER PLACEMENT  10/16/2018    HX ROTATOR CUFF REPAIR Right 11/16/2017     No Known Allergies   Family History   Problem Relation Age of Onset    Cancer Mother     Cancer Maternal Aunt     negative for cardiac disease  Social History     Socioeconomic History    Marital status: SINGLE     Spouse name: Not on file    Number of children: Not on file    Years of education: Not on file    Highest education level: Not on file   Social Needs    Financial resource strain: Not on file    Food insecurity - worry: Not on file    Food insecurity - inability: Not on file    Transportation needs - medical: Not on file   [employer] needs - non-medical: Not on file   Occupational History    Not on file   Tobacco Use    Smoking status: Current Every Day Smoker     Packs/day: 0.50     Years: 50.00     Pack years: 25.00     Types: Cigarettes    Smokeless tobacco: Never Used    Tobacco comment: 0.5-1 pack per day   Substance and Sexual Activity    Alcohol use: Yes     Alcohol/week: 16.8 oz     Types: 28 Cans of beer per week     Comment: None now    Drug use: No    Sexual activity: No   Other Topics Concern    Not on file   Social History Narrative    Not on file     Current Outpatient Medications   Medication Sig    gabapentin (NEURONTIN) 600 mg tablet TAKE 1 TABLET BY MOUTH THREE TIMES DAILY    cyanocobalamin, vitamin B-12, (VITAMIN B-12 PO) Take  by mouth daily.  donepezil (ARICEPT) 5 mg tablet Take  by mouth nightly.  clonazePAM (KLONOPIN) 1 mg tablet TAKE 1 TABLET BY MOUTH TWICE DAILY (Patient taking differently: --PATIENT ONLY TAKING ONCE A DAY)    DULoxetine (CYMBALTA) 60 mg capsule TAKE 1 CAPSULE BY MOUTH DAILY    naproxen (NAPROSYN) 500 mg tablet TAKE 1 TABLET BY MOUTH TWICE DAILY WITH MEALS FOR PAIN (Patient taking differently: PATIENT ONLY TAKING ONCE A DAY)    aspirin delayed-release 325 mg tablet Take  by mouth three (3) days a week. No current facility-administered medications for this visit. Vitals:    11/01/18 1309   BP: 138/84   Pulse: 78   Resp: 18   SpO2: 98%   Weight: 172 lb 6.4 oz (78.2 kg)   Height: 5' 11\" (1.803 m)       I have reviewed the nurses notes, vitals, problem list, allergy list, medical history, family, social history and medications.     Review of Symptoms:    General: Pt denies excessive weight gain or loss. Pt is able to conduct ADL's  HEENT: Denies blurred vision, headaches, epistaxis and difficulty swallowing. Respiratory: Denies shortness of breath, PATEL, wheezing or stridor. Cardiovascular: Denies precordial pain, palpitations, edema or PND  Gastrointestinal: Denies poor appetite, indigestion, abdominal pain or blood in stool  Urinary: Denies dysuria, pyuria  Musculoskeletal: Denies pain or swelling from muscles or joints  Neurologic: Denies tremor, paresthesias, or sensory motor disturbance  Skin: Denies rash, itching or texture change. Psych: Denies depression      Physical Exam:      General: Well developed, in no acute distress. HEENT: Eyes - PERRL, no jvd  Heart:  Normal S1/S2 negative S3 or S4. Regular, no murmur, gallop or rub.   Respiratory: Clear bilaterally x 4, no wheezing or rales  Extremities:  No edema, normal cap refill, no cyanosis. Musculoskeletal: No clubbing  Neuro: A&Ox3, speech clear, gait stable. Skin: Skin color is normal. No rashes or lesions.  Non diaphoretic  Vascular: 2+ pulses symmetric in all extremities    Cardiographics      Results for orders placed or performed during the hospital encounter of 09/10/18   EKG, 12 LEAD, INITIAL   Result Value Ref Range    Ventricular Rate 51 BPM    Atrial Rate 51 BPM    P-R Interval 170 ms    QRS Duration 86 ms    Q-T Interval 430 ms    QTC Calculation (Bezet) 396 ms    Calculated P Axis 51 degrees    Calculated R Axis 71 degrees    Calculated T Axis 61 degrees    Diagnosis       Sinus bradycardia  Otherwise normal ECG  No previous ECGs available  Confirmed by Viet Mcgill M.D., --- ([id number]) on 9/13/2018 2:30:35   AM           Lab Results   Component Value Date/Time    WBC 5.5 10/27/2018 02:44 AM    HGB 9.4 (L) 10/27/2018 02:44 AM    HCT 28.7 (L) 10/27/2018 02:44 AM    PLATELET 071 (L) 81/69/3574 02:44 AM    .7 (H) 10/27/2018 02:44 AM      Lab Results   Component Value Date/Time    Sodium 143 10/27/2018 02:44 AM    Potassium 3.8 10/27/2018 02:44 AM    Chloride 110 (H) 10/27/2018 02:44 AM    CO2 26 10/27/2018 02:44 AM    Anion gap 7 10/27/2018 02:44 AM    Glucose 126 (H) 10/27/2018 02:44 AM    BUN 16 10/27/2018 02:44 AM    Creatinine 0.69 (L) 10/27/2018 02:44 AM    BUN/Creatinine ratio 23 (H) 10/27/2018 02:44 AM    GFR est AA >60 10/27/2018 02:44 AM    GFR est non-AA >60 10/27/2018 02:44 AM    Calcium 8.1 (L) 10/27/2018 02:44 AM    Bilirubin, total 0.3 10/27/2018 02:44 AM    AST (SGOT) 17 10/27/2018 02:44 AM    Alk. phosphatase 99 10/27/2018 02:44 AM    Protein, total 5.9 (L) 10/27/2018 02:44 AM    Albumin 3.0 (L) 10/27/2018 02:44 AM    Globulin 2.9 10/27/2018 02:44 AM    A-G Ratio 1.0 (L) 10/27/2018 02:44 AM    ALT (SGPT) 19 10/27/2018 02:44 AM      Lab Results   Component Value Date/Time    TSH 0.680 06/20/2017 06:28 PM        Assessment:             ICD-10-CM ICD-9-CM    1. Syncope, unspecified syncope type R55 780.2    2. Bradycardia R00.1 427.89    3. Essential hypertension I10 401.9    4. Sinoatrial node dysfunction (HCC) I49.5 427.81      No orders of the defined types were placed in this encounter. Plan:     Georgia Oropeza a pleasant 68year old male with hx of syncope and SSS, S/P dual chamber [employer] PM 10/16/18. He is 46% AV paced and <0.1% RVP.   Large hematoma noted around left subclavian PM site 10/24/18.  Chest xray with no pneumothorax or pleural effusion; pm in place. He was kept inpatient x 2 days with pressure dressing/ace wrap. Since discharge he is feeling better and his left subclavian pm site is approximated well, no drainage. Size of hematoma (half a softball) is still significant but improving. Ok to drive. We will follow up remotely and see him back in 1 year. Continue medical management for PM, HTN and SSS. Thank you for allowing me to participate in Flavia Aguilera 's care.       Canelo Qureshi NP

## 2018-11-01 NOTE — PROGRESS NOTES
1. Have you been to the ER, urgent care clinic since your last visit? Hospitalized since your last visit? ED [facility] 10-24-18, hematoma at PM pocket. 2. Have you seen or consulted any other health care providers outside of the [facility] since your last visit? Include any pap smears or colon screening.  No

## 2018-11-08 ENCOUNTER — APPOINTMENT (OUTPATIENT)
Dept: CT IMAGING | Age: 73
DRG: 262 | End: 2018-11-08
Attending: EMERGENCY MEDICINE
Payer: MEDICARE

## 2018-11-08 ENCOUNTER — HOSPITAL ENCOUNTER (INPATIENT)
Age: 73
LOS: 5 days | Discharge: NURSING, REHAB OR LONG-TERM CARE | DRG: 262 | End: 2018-11-14
Attending: EMERGENCY MEDICINE | Admitting: INTERNAL MEDICINE
Payer: MEDICARE

## 2018-11-08 DIAGNOSIS — M48.02 DEGENERATIVE CERVICAL SPINAL STENOSIS: ICD-10-CM

## 2018-11-08 DIAGNOSIS — T14.8XXA HEMATOMA: ICD-10-CM

## 2018-11-08 DIAGNOSIS — R27.8 SENSORY ATAXIA: ICD-10-CM

## 2018-11-08 DIAGNOSIS — R26.89 STUMBLING GAIT: ICD-10-CM

## 2018-11-08 DIAGNOSIS — R27.0 ATAXIA: ICD-10-CM

## 2018-11-08 DIAGNOSIS — M48.061 DEGENERATIVE LUMBAR SPINAL STENOSIS: ICD-10-CM

## 2018-11-08 DIAGNOSIS — G60.8 IDIOPATHIC SMALL AND LARGE FIBER SENSORY NEUROPATHY: ICD-10-CM

## 2018-11-08 DIAGNOSIS — F10.21 ALCOHOLISM IN REMISSION (HCC): ICD-10-CM

## 2018-11-08 DIAGNOSIS — Z71.89 COUNSELING REGARDING ADVANCED CARE PLANNING AND GOALS OF CARE: ICD-10-CM

## 2018-11-08 DIAGNOSIS — Z98.890 HISTORY OF LUMBAR LAMINECTOMY: ICD-10-CM

## 2018-11-08 DIAGNOSIS — R41.3 MEMORY DISTURBANCE: ICD-10-CM

## 2018-11-08 DIAGNOSIS — M48.061 SPINAL STENOSIS OF LUMBAR REGION, UNSPECIFIED WHETHER NEUROGENIC CLAUDICATION PRESENT: ICD-10-CM

## 2018-11-08 DIAGNOSIS — R41.82 ALTERED MENTAL STATUS, UNSPECIFIED ALTERED MENTAL STATUS TYPE: ICD-10-CM

## 2018-11-08 DIAGNOSIS — T82.9XXD PACEMAKER COMPLICATIONS, SUBSEQUENT ENCOUNTER: Primary | ICD-10-CM

## 2018-11-08 DIAGNOSIS — R41.3 MEMORY DEFICIT: ICD-10-CM

## 2018-11-08 DIAGNOSIS — R00.1 BRADYCARDIA: ICD-10-CM

## 2018-11-08 LAB — UR CULT HOLD, URHOLD: NORMAL

## 2018-11-08 PROCEDURE — 81001 URINALYSIS AUTO W/SCOPE: CPT

## 2018-11-08 PROCEDURE — 74011250636 HC RX REV CODE- 250/636: Performed by: NURSE PRACTITIONER

## 2018-11-08 PROCEDURE — 70450 CT HEAD/BRAIN W/O DYE: CPT

## 2018-11-08 PROCEDURE — 74011250637 HC RX REV CODE- 250/637: Performed by: NURSE PRACTITIONER

## 2018-11-08 PROCEDURE — 99218 HC RM OBSERVATION: CPT

## 2018-11-08 PROCEDURE — 99285 EMERGENCY DEPT VISIT HI MDM: CPT

## 2018-11-08 RX ORDER — DONEPEZIL HYDROCHLORIDE 5 MG/1
5 TABLET, FILM COATED ORAL
Status: DISCONTINUED | OUTPATIENT
Start: 2018-11-08 | End: 2018-11-14 | Stop reason: HOSPADM

## 2018-11-08 RX ORDER — GABAPENTIN 600 MG/1
600 TABLET ORAL DAILY
COMMUNITY
End: 2018-12-12 | Stop reason: SDUPTHER

## 2018-11-08 RX ORDER — SODIUM CHLORIDE 0.9 % (FLUSH) 0.9 %
5-10 SYRINGE (ML) INJECTION EVERY 8 HOURS
Status: DISCONTINUED | OUTPATIENT
Start: 2018-11-08 | End: 2018-11-14 | Stop reason: HOSPADM

## 2018-11-08 RX ORDER — DULOXETIN HYDROCHLORIDE 30 MG/1
60 CAPSULE, DELAYED RELEASE ORAL DAILY
Status: DISCONTINUED | OUTPATIENT
Start: 2018-11-09 | End: 2018-11-14 | Stop reason: HOSPADM

## 2018-11-08 RX ORDER — CLONAZEPAM 1 MG/1
1 TABLET ORAL 2 TIMES DAILY
Status: DISCONTINUED | OUTPATIENT
Start: 2018-11-08 | End: 2018-11-12

## 2018-11-08 RX ORDER — NAPROXEN 500 MG/1
500 TABLET ORAL DAILY
COMMUNITY
End: 2018-11-28 | Stop reason: SDUPTHER

## 2018-11-08 RX ORDER — ACETAMINOPHEN 500 MG
1000 TABLET ORAL
COMMUNITY
End: 2018-11-28 | Stop reason: ALTCHOICE

## 2018-11-08 RX ORDER — OXYCODONE AND ACETAMINOPHEN 5; 325 MG/1; MG/1
1 TABLET ORAL
Status: DISCONTINUED | OUTPATIENT
Start: 2018-11-08 | End: 2018-11-14 | Stop reason: HOSPADM

## 2018-11-08 RX ORDER — GABAPENTIN 300 MG/1
600 CAPSULE ORAL DAILY
Status: DISCONTINUED | OUTPATIENT
Start: 2018-11-09 | End: 2018-11-14 | Stop reason: HOSPADM

## 2018-11-08 RX ORDER — CLONAZEPAM 1 MG/1
1 TABLET ORAL DAILY
COMMUNITY
End: 2018-12-12 | Stop reason: SDUPTHER

## 2018-11-08 RX ORDER — LANOLIN ALCOHOL/MO/W.PET/CERES
500 CREAM (GRAM) TOPICAL DAILY
Status: DISCONTINUED | OUTPATIENT
Start: 2018-11-09 | End: 2018-11-14 | Stop reason: HOSPADM

## 2018-11-08 RX ORDER — FENTANYL CITRATE 50 UG/ML
25 INJECTION, SOLUTION INTRAMUSCULAR; INTRAVENOUS ONCE
Status: COMPLETED | OUTPATIENT
Start: 2018-11-08 | End: 2018-11-08

## 2018-11-08 RX ORDER — SODIUM CHLORIDE 0.9 % (FLUSH) 0.9 %
5-10 SYRINGE (ML) INJECTION AS NEEDED
Status: DISCONTINUED | OUTPATIENT
Start: 2018-11-08 | End: 2018-11-14 | Stop reason: HOSPADM

## 2018-11-08 RX ORDER — ACETAMINOPHEN 325 MG/1
650 TABLET ORAL
Status: DISCONTINUED | OUTPATIENT
Start: 2018-11-08 | End: 2018-11-14 | Stop reason: HOSPADM

## 2018-11-08 RX ADMIN — DONEPEZIL HYDROCHLORIDE 5 MG: 5 TABLET, FILM COATED ORAL at 22:00

## 2018-11-08 RX ADMIN — Medication 10 ML: at 15:45

## 2018-11-08 RX ADMIN — Medication 10 ML: at 19:27

## 2018-11-08 RX ADMIN — CLONAZEPAM 1 MG: 1 TABLET ORAL at 18:11

## 2018-11-08 RX ADMIN — FENTANYL CITRATE 25 MCG: 50 INJECTION, SOLUTION INTRAMUSCULAR; INTRAVENOUS at 15:42

## 2018-11-08 NOTE — PROGRESS NOTES
Date of previous inpatient admission/ ED visit? Pt arrived via AMB transfer from [facility].   
 
10/16/18: Outpatient Bradycardia visit. 10/24/18 to 10/27/18 Inpatient Stay at [facility] for Dx of Pacemaker Pocket Hematoma. What brought the patient back to ED? Pacemaker Problem. Pt had Pacemaker Placed 3 weeks ago and had bleeding that started last night. Hematoma enlarged since he returned home and it was painful. Did patient decline recommended services during last admission/ ED visit (if yes, what)? No  
 
Has patient seen a provider since their last inpatient admission/ED visit (if yes, when)? Pt did go to Cardiology Follow up on 11/1/18. CM Interventions: 
From previous inpatient admission/ED visit:  Pt was not seen by CM per chart. Pt discharged on Saturday, 10/27/18. From current inpatient admission/ED visit:  CM will continue to monitor discharge plan and assist MD as needed. [place], [facility]  
[phone]

## 2018-11-08 NOTE — ED PROVIDER NOTES
EMERGENCY DEPARTMENT HISTORY AND PHYSICAL EXAM 
 
 
Date: 11/8/2018 Patient Name: Kelsie Perdomo History of Presenting Illness Chief Complaint Patient presents with  Bleeding/Bruising History Provided By: Patient HPI: Kelsie Perdomo, 68 y.o. male with PMHx significant for SSS, s/p pacemaker placement, anxiety, presents via EMS as transfer from [facility] to the ED for evaluation of pacemaker problem. Per chart review, pt had a pacemaker placed to the left upper chest on 10/16/218. Pt was seen at [facility] today for expanding hematoma at the pacemaker site with bleeding that began last night. He was transferred to [facility] for cardiology consult. Per chart review, pt was admitted to [facility] on 10/24 for the same complaint and was discharged home after cardiology consult and the hematoma improved. Pt reports mild nausea en route to the ED, but attributes this to facing backwards while in the ambulance. He otherwise denies any recent illness of fever, chills, cough, rhinorrhea, or sore throat. Per sig other pt has seemed to be slightly more confused currently on Aricept. Low grade temp but no recent cough, urinary issues, or other sources of infection. There are no other complaints, changes, or physical findings at this time. PCP: Jennifer Moore., MD 
 
Current Facility-Administered Medications Medication Dose Route Frequency Provider Last Rate Last Dose  fentaNYL citrate (PF) injection 25 mcg  25 mcg IntraVENous ONCE Celena Oconnor NP      
 sodium chloride (NS) flush 5-10 mL  5-10 mL IntraVENous Q8H Celena Oconnor NP      
 sodium chloride (NS) flush 5-10 mL  5-10 mL IntraVENous PRN Celena Moody NP      
 acetaminophen (TYLENOL) tablet 650 mg  650 mg Oral Q4H PRN Mary Rajput NP Current Outpatient Medications Medication Sig Dispense Refill  gabapentin (NEURONTIN) 600 mg tablet TAKE 1 TABLET BY MOUTH THREE TIMES DAILY 90 Tab 0  
  cyanocobalamin, vitamin B-12, (VITAMIN B-12 PO) Take  by mouth daily.  aspirin delayed-release 325 mg tablet Take  by mouth three (3) days a week.  donepezil (ARICEPT) 5 mg tablet Take  by mouth nightly.  clonazePAM (KLONOPIN) 1 mg tablet TAKE 1 TABLET BY MOUTH TWICE DAILY (Patient taking differently: --PATIENT ONLY TAKING ONCE A DAY) 180 Tab 0  
 DULoxetine (CYMBALTA) 60 mg capsule TAKE 1 CAPSULE BY MOUTH DAILY 30 Cap 5  
 naproxen (NAPROSYN) 500 mg tablet TAKE 1 TABLET BY MOUTH TWICE DAILY WITH MEALS FOR PAIN (Patient taking differently: PATIENT ONLY TAKING ONCE A DAY) 60 Tab 5 Past History Past Medical History: 
Past Medical History:  
Diagnosis Date  Anxiety  Back pain  Blurred vision  Chest pain  Depression  Dizziness  Fast heart beat  Frequent headaches  Frequent urination  Joint pain  Joint swelling  Muscle pain  Neuropathy  Recent change in weight  Sinus problem  Skin disease  SOB (shortness of breath)  Sore throat  SSS (sick sinus syndrome) ([address]  Stiffness of joints, multiple sites  Stress  Syncope  Tiredness  Trouble in sleeping  Weakness Past Surgical History: 
Past Surgical History:  
Procedure Laterality Date  HX HIP REPLACEMENT Bilateral 1993  HX PACEMAKER PLACEMENT  10/16/2018  HX ROTATOR CUFF REPAIR Right 11/16/2017 Family History: 
Family History Problem Relation Age of Onset  Cancer Mother  Cancer Maternal Aunt Social History: 
Social History Tobacco Use  Smoking status: Current Every Day Smoker Packs/day: 0.50 Years: 50.00 Pack years: 25.00 Types: Cigarettes  Smokeless tobacco: Never Used  Tobacco comment: 0.5-1 pack per day Substance Use Topics  Alcohol use: Yes Alcohol/week: 16.8 oz Types: 28 Cans of beer per week Comment: None now  Drug use: No  
 
 
Allergies: 
No Known Allergies Review of Systems Review of Systems Constitutional: Negative. Negative for appetite change, chills, fatigue and fever. HENT: Negative. Negative for congestion, rhinorrhea, sinus pressure and sore throat. Eyes: Negative. Respiratory: Negative. Negative for cough, choking, chest tightness, shortness of breath and wheezing. Cardiovascular: Negative. Negative for chest pain, palpitations and leg swelling. Gastrointestinal: Positive for nausea. Negative for abdominal pain, constipation, diarrhea and vomiting. Endocrine: Negative. Genitourinary: Negative. Negative for difficulty urinating, dysuria, flank pain and urgency. Musculoskeletal:  
     + hematoma left upper chest  
Skin: Negative. Neurological: Negative. Negative for dizziness, speech difficulty, weakness, light-headedness, numbness and headaches. Psychiatric/Behavioral: Negative. All other systems reviewed and are negative. Physical Exam  
Physical Exam  
Constitutional: He is oriented to person, place, and time. He appears well-developed and well-nourished. No distress. Appears fatigued HENT:  
Head: Normocephalic and atraumatic. Mouth/Throat: Oropharynx is clear and moist. No oropharyngeal exudate. Eyes: Conjunctivae and EOM are normal. Pupils are equal, round, and reactive to light. Neck: Normal range of motion. Neck supple. No JVD present. No tracheal deviation present. Cardiovascular: Normal rate, regular rhythm, normal heart sounds and intact distal pulses. No murmur heard. Pulmonary/Chest: Effort normal and breath sounds normal. No stridor. No respiratory distress. He has no wheezes. He has no rales. He exhibits tenderness. Pacemaker site small area of dehiscence with dark blood oozing from the site with underlying hematoma approx size of softball, no warmth or surrounding erythema Abdominal: Soft. He exhibits no distension. There is no tenderness. There is no rebound and no guarding. Musculoskeletal: Normal range of motion. He exhibits no edema or tenderness. Neurological: He is alert and oriented to person, place, and time. No cranial nerve deficit. No gross motor or sensory deficits Skin: Skin is warm and dry. He is not diaphoretic. Psychiatric: He has a normal mood and affect. His behavior is normal.  
Nursing note and vitals reviewed. Diagnostic Study Results Labs -Reviewed from [facility] Results (from the past 12 hour(s)) CBC WITH AUTOMATED DIFF Collection Time: 11/08/18  7:39 AM  
Result Value Ref Range WBC 4.9 4.1 - 11.1 K/uL  
 RBC 3.43 (L) 4.10 - 5.70 M/uL  
 HGB 11.2 (L) 12.1 - 17.0 g/dL HCT 34.2 (L) 36.6 - 50.3 % MCV 99.7 (H) 80.0 - 99.0 FL  
 MCH 32.7 26.0 - 34.0 PG  
 MCHC 32.7 30.0 - 36.5 g/dL  
 RDW 13.8 11.5 - 14.5 % PLATELET 902 208 - 645 K/uL MPV 9.2 8.9 - 12.9 FL  
 NRBC 0.0 0  WBC ABSOLUTE NRBC 0.00 0.00 - 0.01 K/uL NEUTROPHILS 69 32 - 75 % LYMPHOCYTES 16 12 - 49 % MONOCYTES 12 5 - 13 % EOSINOPHILS 2 0 - 7 % BASOPHILS 0 0 - 1 % IMMATURE GRANULOCYTES 1 (H) 0.0 - 0.5 % ABS. NEUTROPHILS 3.3 1.8 - 8.0 K/UL  
 ABS. LYMPHOCYTES 0.8 0.8 - 3.5 K/UL  
 ABS. MONOCYTES 0.6 0.0 - 1.0 K/UL  
 ABS. EOSINOPHILS 0.1 0.0 - 0.4 K/UL  
 ABS. BASOPHILS 0.0 0.0 - 0.1 K/UL  
 ABS. IMM. GRANS. 0.0 0.00 - 0.04 K/UL  
 DF AUTOMATED    
PTT Collection Time: 11/08/18  7:39 AM  
Result Value Ref Range aPTT 25.3 22.1 - 32.0 sec  
 aPTT, therapeutic range     58.0 - 77.0 SECS  
PROTHROMBIN TIME + INR Collection Time: 11/08/18  7:39 AM  
Result Value Ref Range INR 1.0 0.9 - 1.1 Prothrombin time 10.1 9.0 - 11.1 sec METABOLIC PANEL, COMPREHENSIVE Collection Time: 11/08/18  7:39 AM  
Result Value Ref Range Sodium 141 136 - 145 mmol/L Potassium 3.9 3.5 - 5.1 mmol/L Chloride 105 97 - 108 mmol/L  
 CO2 28 21 - 32 mmol/L Anion gap 8 5 - 15 mmol/L Glucose 109 (H) 65 - 100 mg/dL BUN 15 6 - 20 MG/DL Creatinine 0.80 0.70 - 1.30 MG/DL  
 BUN/Creatinine ratio 19 12 - 20 GFR est AA >60 >60 ml/min/1.73m2 GFR est non-AA >60 >60 ml/min/1.73m2 Calcium 8.5 8.5 - 10.1 MG/DL Bilirubin, total 0.7 0.2 - 1.0 MG/DL  
 ALT (SGPT) 19 12 - 78 U/L  
 AST (SGOT) 14 (L) 15 - 37 U/L Alk. phosphatase 92 45 - 117 U/L Protein, total 6.5 6.4 - 8.2 g/dL Albumin 3.3 (L) 3.5 - 5.0 g/dL Globulin 3.2 2.0 - 4.0 g/dL A-G Ratio 1.0 (L) 1.1 - 2.2 Radiologic Studies -  
CT Results  (Last 48 hours) 11/08/18 1502  CT HEAD WO CONT Final result Impression:  IMPRESSION: No acute intracranial process identified. Narrative:  EXAM:  CT HEAD WO CONT INDICATION:   Confusion/delirium, altered LOC, unexplained COMPARISON: None. CONTRAST:  None. TECHNIQUE: Unenhanced CT of the head was performed using 5 mm images. Brain and  
bone windows were generated. CT dose reduction was achieved through use of a  
standardized protocol tailored for this examination and automatic exposure  
control for dose modulation. FINDINGS:  
The ventricles and sulci are normal in size, shape and configuration and  
midline. There is no significant white matter disease. There is minimal  
bifrontal volume loss. There is no intracranial hemorrhage, extra-axial  
collection, mass, mass effect or midline shift. The basilar cisterns are open. No acute infarct is identified. The bone windows demonstrate no abnormalities. The visualized portions of the paranasal sinuses and mastoid air cells are  
clear. CXR Results  (Last 48 hours) 11/08/18 0804  XR CHEST PORT Final result Impression:  Impression: No acute process. Narrative: Indication: Chest pain, recent pacemaker placement Comparison: 10/24/2018 Portable exam of the chest obtained at 755 demonstrates normal heart size. Pacer leads are unchanged in position. There is no acute process in the lung fields. Degenerative changes are seen in the thoracic spine. Medical Decision Making I am the first provider for this patient. I reviewed the vital signs, available nursing notes, past medical history, past surgical history, family history and social history. Vital Signs-Reviewed the patient's vital signs. Patient Vitals for the past 12 hrs: 
 Temp Pulse Resp BP SpO2  
11/08/18 1530  60 14 160/75 94 % 11/08/18 1400 98.8 °F (37.1 °C) 60 16 126/78 95 % 11/08/18 1330  60 13 128/76 94 % 11/08/18 1315    144/84 96 % 11/08/18 1311 98.9 °F (37.2 °C) 75 16 144/84 96 % Pulse Oximetry Analysis - 96% on RA Cardiac Monitor:  
Rate: 75 bpm 
Rhythm: Paced Records Reviewed: Nursing Notes, Old Medical Records, Previous electrocardiograms, Ambulance Run Sheet, Previous Radiology Studies and Previous Laboratory Studies Provider Notes (Medical Decision Making): DDx: hematoma ED Course:  
Initial assessment performed. The patients presenting problems have been discussed, and they are in agreement with the care plan formulated and outlined with them. I have encouraged them to ask questions as they arise throughout their visit. CONSULT NOTE:  
2:00 PM 
Jef Watts DO spoke with Mayra Hoskins MD  
Specialty: cardiology Discussed pt's hx, disposition, and available diagnostic and imaging results. Reviewed care plans. Consultant agrees with plans as outlined. Dr. Nathalie Gaviria will come evaluate. Written by Emil Simon ED Scribe, as dictated by Jef Watts DO. 
 
Critical Care Time: 0 Disposition: 
ADMIT NOTE: 
3:43 PM 
The patient is being admitted to the hospital by Mayra Hoskins MD.  The results of their tests and reasons for their admission have been discussed with the patient and/or available family.   They convey agreement and understanding for the need to be admitted and for their admission diagnosis. PLAN: 
1. Admit to cardiology Diagnosis Clinical Impression: 1. Pacemaker complications, subsequent encounter 2. Hematoma Attestations: This note is prepared by Charleen Muñiz, acting as Scribe for Annelise Dockery, [address] DO: The scribe's documentation has been prepared under my direction and personally reviewed by me in its entirety. I confirm that the note above accurately reflects all work, treatment, procedures, and medical decision making performed by me.

## 2018-11-08 NOTE — ED NOTES
TRANSFER - OUT REPORT: 
 
Verbal report given to Divina Ortiz RN (name) on Ajay Blackwell  being transferred to [facility](unit) for routine progression of care Report consisted of patients Situation, Background, Assessment and  
Recommendations(SBAR). Information from the following report(s) SBAR, Kardex, ED Summary, [facility] and Recent Results was reviewed with the receiving nurse. Lines:  
Peripheral IV 11/08/18 Right Antecubital (Active) Site Assessment Clean, dry, & intact 11/8/2018  6:02 PM  
Phlebitis Assessment 0 11/8/2018  6:02 PM  
Infiltration Assessment 0 11/8/2018  6:02 PM  
Dressing Status Clean, dry, & intact 11/8/2018  6:02 PM  
Dressing Type Transparent 11/8/2018  6:02 PM  
Hub Color/Line Status Pink;Flushed 11/8/2018  6:02 PM  
  
 
Opportunity for questions and clarification was provided. Patient transported with: 
 Monitor Registered Nurse

## 2018-11-08 NOTE — ED NOTES
RN from [facility] at the bedside to apply pressure dressing to pt's upper left chest area.

## 2018-11-08 NOTE — PROGRESS NOTES
Pharmacy Clarification of the Prior to Admission Medication Regimen Retrospective to the Admission Medication Reconciliation The patient was interviewed regarding clarification of the prior to admission medication regimen and was questioned regarding use of any other inhalers, topical products, over the counter medications, herbal medications, vitamin products or ophthalmic/nasal/otic medication use. Information Obtained From: Yolis Nunez Recommendations/Findings: The following amendments were made to the patient's active medication list on file at [facility]:  
 
1) Additions:  
? acetaminophen (TYLENOL) 500 mg tablet 2) Removals: NONE 3) Changes: 
? clonazePAM (KLONOPIN) 1 mg tablet (Old regimen: 1 mg BID /New regimen: 1 mg daily) ? gabapentin (NEURONTIN) 600 mg tablet (Old regimen: 600 mg TID /New regimen: 600 mg daily) ? naproxen (NAPROSYN) 500 mg tablet (Old regimen: 500 mg BID /New regimen: 500 mg daily) 4) Pertinent Pharmacy Findings: 
? aspirin delayed-release 325 mg tablet: Patient stated he tool his 'first one yesterday (11/7/18)', but was unable to confirm which 3 days of the week he takes this agent. PTA medication list was corrected to the following:  
 
Prior to Admission Medications Prescriptions Last Dose Informant Patient Reported? Taking? DULoxetine (CYMBALTA) 60 mg capsule 11/7/2018 at Unknown time Self No Yes Sig: TAKE 1 CAPSULE BY MOUTH DAILY  
acetaminophen (TYLENOL) 500 mg tablet 11/5/2018 at Unknown time Self Yes Yes Sig: Take 1,000 mg by mouth every six (6) hours as needed for Pain. aspirin delayed-release 325 mg tablet 11/7/2018 at Unknown time Self Yes Yes Sig: Take 325 mg by mouth three (3) days a week. clonazePAM (KLONOPIN) 1 mg tablet 11/7/2018 at Unknown time Self Yes Yes Sig: Take 1 mg by mouth daily. cyanocobalamin, vitamin B-12, (VITAMIN B-12 PO) 11/7/2018 at Unknown time Self Yes Yes Sig: Take 1 Tab by mouth daily. donepezil (ARICEPT) 5 mg tablet 11/7/2018 at Unknown time Self Yes Yes Sig: Take 5 mg by mouth nightly.  
gabapentin (NEURONTIN) 600 mg tablet 11/7/2018 at Unknown time Self Yes Yes Sig: Take 600 mg by mouth daily. naproxen (NAPROSYN) 500 mg tablet 11/7/2018 at Unknown time Self Yes Yes Sig: Take 500 mg by mouth daily. Facility-Administered Medications: None Thank you, 
Josef Delaney CPhT Medication History Pharmacy Technician

## 2018-11-08 NOTE — PROGRESS NOTES
Patient settled from the ED. VSS. On call paged for PRN pain medicine to help get through the night. Currently 8/10 shooting, sharp pain. No pain medicine ordered at this time.

## 2018-11-08 NOTE — ED NOTES
Patient resting comfortably in stretcher with call bell in reach, side rails x1. No further needs expressed. Wife at bedside.

## 2018-11-08 NOTE — ED NOTES
Patient resting comfortably in stretcher with call bell in reach, side rails x1. No further needs expressed.

## 2018-11-08 NOTE — H&P
[address]  [phone] CARDIOLOGY HISTORY AND PHYSICAL Date of  Admission: 11/8/2018  1:01 PM  
 
Admission type:Emergency Subjective:  
  
Shahab Clemons is a 68 y.o. male with PMH SSS s/p pacemaker, neuropathy, anxiety, depression who was transferred from [facility] for pacemaker problem. Mr. Chaim Cadena presented to [facility] for increasing hematoma and bleeding from his pacemaker site. He had a pacemaker placed on 10/16/18 and was discharged without difficulty. His significant other states Mr. Lee started having swelling 8 days after placement. He was admitted to the hospital for 3 days for pacemaker hematoma from 10/24-10/27. This morning 11/8, the patient began bleeding from his pacemaker site. Mr. Salvador Waite significant other also states that the patient was stumbling when walking yesterday, memory difficulty, and today and has had cottonmouth speech. Mr. Chaim Cadena endorses general weakness, but has no focal weakness. He has soreness at his pacemaker site and occasionally feels like it is warm. No chest pain, SOB, palpitations, leg swelling. Mr. Chaim Cadena follows with Dr. Kale Wilson and Dr. Fadumo Hennessy. Last ECHO 09/18 with EF 60-65%; NWMA; grade 2 diastolic dysfunction. Patient Active Problem List  
 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 
Past Medical History:  
Diagnosis Date  Anxiety  Back pain  Blurred vision  Chest pain  Depression  Dizziness  Fast heart beat  Frequent headaches  Frequent urination  Joint pain  Joint swelling  Muscle pain  Neuropathy  Recent change in weight  Sinus problem  Skin disease  SOB (shortness of breath)  Sore throat  SSS (sick sinus syndrome) ([email or web address]  Stiffness of joints, multiple sites  Stress  Syncope  Tiredness  Trouble in sleeping  Weakness Social History Socioeconomic History  Marital status: SINGLE Spouse name: Not on file  Number of children: Not on file  Years of education: Not on file  Highest education level: Not on file Social Needs  Financial resource strain: Not on file  Food insecurity - worry: Not on file  Food insecurity - inability: Not on file  Transportation needs - medical: Not on file  Transportation needs - non-medical: Not on file Occupational History  Not on file Tobacco Use  Smoking status: Current Every Day Smoker Packs/day: 0.50 Years: 50.00 Pack years: 25.00 Types: Cigarettes  Smokeless tobacco: Never Used  Tobacco comment: 0.5-1 pack per day Substance and Sexual Activity  Alcohol use: Yes Alcohol/week: 16.8 oz Types: 28 Cans of beer per week Comment: None now  Drug use: No  
 Sexual activity: No  
Other Topics Concern  Not on file Social History Narrative  Not on file No Known Allergies Family History Problem Relation Age of Onset  Cancer Mother  Cancer Maternal Aunt Current Facility-Administered Medications Medication Dose Route Frequency  fentaNYL citrate (PF) injection 25 mcg  25 mcg IntraVENous ONCE  
 
 Current Outpatient Medications Medication Sig  
 gabapentin (NEURONTIN) 600 mg tablet TAKE 1 TABLET BY MOUTH THREE TIMES DAILY  cyanocobalamin, vitamin B-12, (VITAMIN B-12 PO) Take  by mouth daily.  aspirin delayed-release 325 mg tablet Take  by mouth three (3) days a week.  donepezil (ARICEPT) 5 mg tablet Take  by mouth nightly.  clonazePAM (KLONOPIN) 1 mg tablet TAKE 1 TABLET BY MOUTH TWICE DAILY (Patient taking differently: --PATIENT ONLY TAKING ONCE A DAY)  DULoxetine (CYMBALTA) 60 mg capsule TAKE 1 CAPSULE BY MOUTH DAILY  naproxen (NAPROSYN) 500 mg tablet TAKE 1 TABLET BY MOUTH TWICE DAILY WITH MEALS FOR PAIN (Patient taking differently: PATIENT ONLY TAKING ONCE A DAY) Review of Symptoms: 
Constitutional: lethargic Eyes: negative Ears, nose, mouth, throat, and face: R eye itches Respiratory: negative Cardiovascular: L pacemaker site swelling, pain, bleeding Gastrointestinal: negative Genitourinary:frequent urination Musculoskeletal:left arm muscle spasm Neurological: cottonmouth speech Behvioral/Psych: anxious Endocrine: negative Objective:  
Physical Exam: 
General Appearance:  pleasant, elderly  male resting in bed in NAD. R eyelid appears slightly dropped/swollen and R eye red Chest:   Clear Cardiovascular:  Regular rate and rhythm, no murmur.  
Abdomen:   Soft, non-tender, bowel sounds are active.  
Extremities: palpable distal pulses. No edema. Skin:  Warm and dry. L infraclavicular site with large hematoma. Ecchymosis to left axilla. Dark red blood draining from incision. No redness or warmth at site. Visit Vitals /78 (BP Patient Position: At rest) Pulse 60 Temp 98.8 °F (37.1 °C) Resp 16 Ht 5' 11\" (1.803 m) Wt 72.6 kg (160 lb 0 oz) SpO2 95% BMI 22.32 kg/m² Cardiographics Telemetry: SR 
 
Labs: 
Recent Labs 11/08/18 
6356 WBC 4.9 HGB 11.2* HCT 34.2*  
 Recent Labs 11/08/18 
7330   
K 3.9  CO2 28 * BUN 15  
CREA 0.80 CA 8.5 ALB 3.3* TBILI 0.7 SGOT 14* ALT 19 INR 1.0 No results for input(s): TROIQ, CPK, CKMB in the last 72 hours. Assessment:  
  
 Active Problems: 
  Pacemaker complications, subsequent encounter (11/8/2018) Stumbling gait (11/8/2018) Plan:  
 
Rachael Harvey is a 68 y.o. male who has had a recurrence of bleeding from his pacemaker site/hematoma. Site does not appear infected. Labs stable, with improvement in H/H from prior check. CXR negative. Also some c/o stumbling gait, speech changes (per significant other), memory concerns, and generalized weak feeling. · No focal weakness, but due to gait and speech complaint, CT ordered. · Consult hospitalist to help evaluate seemingly unrelated neurologic complaints · Pressure dressing to hematoma. Re-evaluate in the morning to determine if exploration needed. · Admit for observation. Continue home meds. Jasmin Coffey NP 
DNP, RN, [facility] Patient seen and examined by me with nurse practitioner. I personally performed all components of the history, physical, and medical decision making and agree with the assessment and plan with minor modifications as noted. Pt with hematoma with stable h/h but some drainage from the site. Had fever overnight. In light of drainage, mental status changes and fever, will proceed with device removal and pocket evacuation. I discussed the risks/benefits/alternatives of the procedure with the patient's POA and patient. Risks include (but are not limited to) bleeding, heart block, infection, cva/mi/tamponade/death. The patient's POA understands and agrees to proceed. Thank you for this interesting consultation.  
 
 
Mary Sahu MD, Jessica Parks

## 2018-11-08 NOTE — ED NOTES
Assumed care of patient from Pastora Gibbs RN. Patient resting comfortably on stretcher with call bell within reach. Rates pain 7/10. Patient's stretcher in lowest position, with side rails up x2. Patient on the monitor x3. Patient updated on plan of care at this time. Patient's belongings are in close proximity. Patient assessed for all personal needs that may be completed by RN. No further needs noted at this time.

## 2018-11-09 ENCOUNTER — APPOINTMENT (OUTPATIENT)
Dept: NON INVASIVE DIAGNOSTICS | Age: 73
DRG: 262 | End: 2018-11-09
Payer: MEDICARE

## 2018-11-09 ENCOUNTER — ANESTHESIA EVENT (OUTPATIENT)
Dept: NON INVASIVE DIAGNOSTICS | Age: 73
DRG: 262 | End: 2018-11-09
Payer: MEDICARE

## 2018-11-09 ENCOUNTER — APPOINTMENT (OUTPATIENT)
Dept: GENERAL RADIOLOGY | Age: 73
DRG: 262 | End: 2018-11-09
Attending: INTERNAL MEDICINE
Payer: MEDICARE

## 2018-11-09 ENCOUNTER — ANESTHESIA (OUTPATIENT)
Dept: NON INVASIVE DIAGNOSTICS | Age: 73
DRG: 262 | End: 2018-11-09
Payer: MEDICARE

## 2018-11-09 PROBLEM — M48.061 DEGENERATIVE LUMBAR SPINAL STENOSIS: Status: ACTIVE | Noted: 2018-11-09

## 2018-11-09 PROBLEM — M48.02 DEGENERATIVE CERVICAL SPINAL STENOSIS: Status: ACTIVE | Noted: 2018-11-09

## 2018-11-09 PROBLEM — R41.3 MEMORY DISTURBANCE: Status: ACTIVE | Noted: 2018-11-09

## 2018-11-09 PROBLEM — R50.9 FEVER: Status: ACTIVE | Noted: 2018-11-09

## 2018-11-09 PROBLEM — R27.0 ATAXIA: Status: ACTIVE | Noted: 2018-11-09

## 2018-11-09 PROBLEM — G60.8 IDIOPATHIC SMALL AND LARGE FIBER SENSORY NEUROPATHY: Status: ACTIVE | Noted: 2018-11-09

## 2018-11-09 PROBLEM — R27.8 SENSORY ATAXIA: Status: ACTIVE | Noted: 2018-11-09

## 2018-11-09 PROBLEM — R41.82 ALTERED MENTAL STATUS, UNSPECIFIED: Status: ACTIVE | Noted: 2018-11-09

## 2018-11-09 PROBLEM — Z98.890 HISTORY OF LUMBAR LAMINECTOMY: Status: ACTIVE | Noted: 2018-11-09

## 2018-11-09 LAB
ANION GAP SERPL CALC-SCNC: 9 MMOL/L (ref 5–15)
APPEARANCE UR: CLEAR
BACTERIA URNS QL MICRO: NEGATIVE /HPF
BILIRUB UR QL CFM: NEGATIVE
BUN SERPL-MCNC: 19 MG/DL (ref 6–20)
BUN/CREAT SERPL: 22 (ref 12–20)
CALCIUM SERPL-MCNC: 8.5 MG/DL (ref 8.5–10.1)
CHLORIDE SERPL-SCNC: 105 MMOL/L (ref 97–108)
CO2 SERPL-SCNC: 24 MMOL/L (ref 21–32)
COLOR UR: ABNORMAL
CREAT SERPL-MCNC: 0.87 MG/DL (ref 0.7–1.3)
EPITH CASTS URNS QL MICRO: ABNORMAL /LPF
ERYTHROCYTE [DISTWIDTH] IN BLOOD BY AUTOMATED COUNT: 13.7 % (ref 11.5–14.5)
GLUCOSE SERPL-MCNC: 122 MG/DL (ref 65–100)
GLUCOSE UR STRIP.AUTO-MCNC: 100 MG/DL
HCT VFR BLD AUTO: 34.1 % (ref 36.6–50.3)
HGB BLD-MCNC: 11.5 G/DL (ref 12.1–17)
HGB UR QL STRIP: ABNORMAL
HYALINE CASTS URNS QL MICRO: ABNORMAL /LPF (ref 0–5)
KETONES UR QL STRIP.AUTO: ABNORMAL MG/DL
LACTATE SERPL-SCNC: 1.2 MMOL/L (ref 0.4–2)
LEUKOCYTE ESTERASE UR QL STRIP.AUTO: NEGATIVE
MCH RBC QN AUTO: 33.1 PG (ref 26–34)
MCHC RBC AUTO-ENTMCNC: 33.7 G/DL (ref 30–36.5)
MCV RBC AUTO: 98.3 FL (ref 80–99)
NITRITE UR QL STRIP.AUTO: NEGATIVE
NRBC # BLD: 0 K/UL (ref 0–0.01)
NRBC BLD-RTO: 0 PER 100 WBC
PH UR STRIP: 6 [PH] (ref 5–8)
PLATELET # BLD AUTO: 171 K/UL (ref 150–400)
PMV BLD AUTO: 10.3 FL (ref 8.9–12.9)
POTASSIUM SERPL-SCNC: 4.1 MMOL/L (ref 3.5–5.1)
PROT UR STRIP-MCNC: 30 MG/DL
RBC # BLD AUTO: 3.47 M/UL (ref 4.1–5.7)
RBC #/AREA URNS HPF: ABNORMAL /HPF (ref 0–5)
SODIUM SERPL-SCNC: 138 MMOL/L (ref 136–145)
SP GR UR REFRACTOMETRY: 1.03 (ref 1–1.03)
UROBILINOGEN UR QL STRIP.AUTO: 1 EU/DL (ref 0.2–1)
WBC # BLD AUTO: 8.4 K/UL (ref 4.1–11.1)
WBC URNS QL MICRO: ABNORMAL /HPF (ref 0–4)

## 2018-11-09 PROCEDURE — 77030031139 HC SUT VCRL2 J&J -A

## 2018-11-09 PROCEDURE — 77030011992 HC AIRWY NASOPHGL TELE -A: Performed by: ANESTHESIOLOGY

## 2018-11-09 PROCEDURE — 76060000032 HC ANESTHESIA 0.5 TO 1 HR

## 2018-11-09 PROCEDURE — 77030013797 HC KT TRNSDUC PRSSR EDWD -A

## 2018-11-09 PROCEDURE — 74011250637 HC RX REV CODE- 250/637: Performed by: INTERNAL MEDICINE

## 2018-11-09 PROCEDURE — 87186 SC STD MICRODIL/AGAR DIL: CPT

## 2018-11-09 PROCEDURE — 77030018729 HC ELECTRD DEFIB PAD CARD -B

## 2018-11-09 PROCEDURE — 33233 REMOVAL OF PM GENERATOR: CPT

## 2018-11-09 PROCEDURE — 74011250636 HC RX REV CODE- 250/636: Performed by: INTERNAL MEDICINE

## 2018-11-09 PROCEDURE — 74011000250 HC RX REV CODE- 250

## 2018-11-09 PROCEDURE — 02PA3MZ REMOVAL OF CARDIAC LEAD FROM HEART, PERCUTANEOUS APPROACH: ICD-10-PCS | Performed by: INTERNAL MEDICINE

## 2018-11-09 PROCEDURE — 74011000258 HC RX REV CODE- 258: Performed by: INTERNAL MEDICINE

## 2018-11-09 PROCEDURE — 80048 BASIC METABOLIC PNL TOTAL CA: CPT

## 2018-11-09 PROCEDURE — 77030018836 HC SOL IRR NACL ICUM -A

## 2018-11-09 PROCEDURE — 99218 HC RM OBSERVATION: CPT

## 2018-11-09 PROCEDURE — 85027 COMPLETE CBC AUTOMATED: CPT

## 2018-11-09 PROCEDURE — 74011250636 HC RX REV CODE- 250/636

## 2018-11-09 PROCEDURE — 74011250636 HC RX REV CODE- 250/636: Performed by: NURSE PRACTITIONER

## 2018-11-09 PROCEDURE — 94760 N-INVAS EAR/PLS OXIMETRY 1: CPT

## 2018-11-09 PROCEDURE — 65660000000 HC RM CCU STEPDOWN

## 2018-11-09 PROCEDURE — 71045 X-RAY EXAM CHEST 1 VIEW: CPT

## 2018-11-09 PROCEDURE — A4565 SLINGS: HCPCS

## 2018-11-09 PROCEDURE — 0JPT3PZ REMOVAL OF CARDIAC RHYTHM RELATED DEVICE FROM TRUNK SUBCUTANEOUS TISSUE AND FASCIA, PERCUTANEOUS APPROACH: ICD-10-PCS | Performed by: INTERNAL MEDICINE

## 2018-11-09 PROCEDURE — 83605 ASSAY OF LACTIC ACID: CPT

## 2018-11-09 PROCEDURE — 87205 SMEAR GRAM STAIN: CPT

## 2018-11-09 PROCEDURE — 74011250637 HC RX REV CODE- 250/637: Performed by: NURSE PRACTITIONER

## 2018-11-09 PROCEDURE — 36415 COLL VENOUS BLD VENIPUNCTURE: CPT

## 2018-11-09 PROCEDURE — 33223 RELOCATE POCKET FOR DEFIB: CPT

## 2018-11-09 PROCEDURE — 77030018673

## 2018-11-09 PROCEDURE — 77010033678 HC OXYGEN DAILY

## 2018-11-09 PROCEDURE — 87040 BLOOD CULTURE FOR BACTERIA: CPT

## 2018-11-09 PROCEDURE — 87077 CULTURE AEROBIC IDENTIFY: CPT

## 2018-11-09 RX ORDER — KETAMINE HYDROCHLORIDE 10 MG/ML
INJECTION, SOLUTION INTRAMUSCULAR; INTRAVENOUS AS NEEDED
Status: DISCONTINUED | OUTPATIENT
Start: 2018-11-09 | End: 2018-11-09 | Stop reason: HOSPADM

## 2018-11-09 RX ORDER — SODIUM CHLORIDE 0.9 % (FLUSH) 0.9 %
5-10 SYRINGE (ML) INJECTION AS NEEDED
Status: DISCONTINUED | OUTPATIENT
Start: 2018-11-09 | End: 2018-11-14 | Stop reason: HOSPADM

## 2018-11-09 RX ORDER — SODIUM CHLORIDE 9 MG/ML
100 INJECTION, SOLUTION INTRAVENOUS CONTINUOUS
Status: DISCONTINUED | OUTPATIENT
Start: 2018-11-09 | End: 2018-11-11

## 2018-11-09 RX ORDER — SODIUM CHLORIDE 900 MG/100ML
INJECTION INTRAVENOUS
Status: DISCONTINUED
Start: 2018-11-09 | End: 2018-11-14 | Stop reason: HOSPADM

## 2018-11-09 RX ORDER — HEPARIN SODIUM 200 [USP'U]/100ML
INJECTION, SOLUTION INTRAVENOUS
Status: COMPLETED
Start: 2018-11-09 | End: 2018-11-10

## 2018-11-09 RX ORDER — LIDOCAINE HYDROCHLORIDE 10 MG/ML
INJECTION INFILTRATION; PERINEURAL
Status: COMPLETED
Start: 2018-11-09 | End: 2018-11-09

## 2018-11-09 RX ORDER — ATROPINE SULFATE 0.1 MG/ML
INJECTION INTRAVENOUS
Status: DISPENSED
Start: 2018-11-09 | End: 2018-11-10

## 2018-11-09 RX ORDER — MIDAZOLAM HYDROCHLORIDE 1 MG/ML
1-5 INJECTION, SOLUTION INTRAMUSCULAR; INTRAVENOUS
Status: CANCELLED | OUTPATIENT
Start: 2018-11-09

## 2018-11-09 RX ORDER — PROPOFOL 10 MG/ML
INJECTION, EMULSION INTRAVENOUS AS NEEDED
Status: DISCONTINUED | OUTPATIENT
Start: 2018-11-09 | End: 2018-11-09 | Stop reason: HOSPADM

## 2018-11-09 RX ORDER — SODIUM CHLORIDE 0.9 % (FLUSH) 0.9 %
5-10 SYRINGE (ML) INJECTION EVERY 8 HOURS
Status: DISCONTINUED | OUTPATIENT
Start: 2018-11-09 | End: 2018-11-14 | Stop reason: HOSPADM

## 2018-11-09 RX ORDER — FENTANYL CITRATE 50 UG/ML
INJECTION, SOLUTION INTRAMUSCULAR; INTRAVENOUS
Status: COMPLETED
Start: 2018-11-09 | End: 2018-11-09

## 2018-11-09 RX ORDER — VANCOMYCIN HYDROCHLORIDE 1 G/20ML
INJECTION, POWDER, LYOPHILIZED, FOR SOLUTION INTRAVENOUS
Status: COMPLETED
Start: 2018-11-09 | End: 2018-11-09

## 2018-11-09 RX ORDER — HEPARIN SODIUM 200 [USP'U]/100ML
500 INJECTION, SOLUTION INTRAVENOUS ONCE
Status: CANCELLED | OUTPATIENT
Start: 2018-11-09 | End: 2018-11-09

## 2018-11-09 RX ORDER — BACITRACIN 50000 [IU]/1
INJECTION, POWDER, FOR SOLUTION INTRAMUSCULAR
Status: COMPLETED
Start: 2018-11-09 | End: 2018-11-09

## 2018-11-09 RX ORDER — FENTANYL CITRATE 50 UG/ML
12.5-5 INJECTION, SOLUTION INTRAMUSCULAR; INTRAVENOUS
Status: CANCELLED | OUTPATIENT
Start: 2018-11-09

## 2018-11-09 RX ORDER — ACETAMINOPHEN 325 MG/1
650 TABLET ORAL
Status: DISCONTINUED | OUTPATIENT
Start: 2018-11-09 | End: 2018-11-14 | Stop reason: HOSPADM

## 2018-11-09 RX ORDER — HYDROCODONE BITARTRATE AND ACETAMINOPHEN 5; 325 MG/1; MG/1
1 TABLET ORAL
Status: DISCONTINUED | OUTPATIENT
Start: 2018-11-09 | End: 2018-11-14 | Stop reason: HOSPADM

## 2018-11-09 RX ORDER — MIDAZOLAM HYDROCHLORIDE 1 MG/ML
INJECTION, SOLUTION INTRAMUSCULAR; INTRAVENOUS AS NEEDED
Status: DISCONTINUED | OUTPATIENT
Start: 2018-11-09 | End: 2018-11-09 | Stop reason: HOSPADM

## 2018-11-09 RX ORDER — BACITRACIN 50000 [IU]/1
50000 INJECTION, POWDER, FOR SOLUTION INTRAMUSCULAR ONCE
Status: CANCELLED | OUTPATIENT
Start: 2018-11-09 | End: 2018-11-09

## 2018-11-09 RX ORDER — NALOXONE HYDROCHLORIDE 0.4 MG/ML
0.4 INJECTION, SOLUTION INTRAMUSCULAR; INTRAVENOUS; SUBCUTANEOUS AS NEEDED
Status: DISCONTINUED | OUTPATIENT
Start: 2018-11-09 | End: 2018-11-14 | Stop reason: HOSPADM

## 2018-11-09 RX ORDER — VANCOMYCIN/0.9 % SOD CHLORIDE 1.5G/250ML
1500 PLASTIC BAG, INJECTION (ML) INTRAVENOUS ONCE
Status: COMPLETED | OUTPATIENT
Start: 2018-11-09 | End: 2018-11-10

## 2018-11-09 RX ORDER — FENTANYL CITRATE 50 UG/ML
INJECTION, SOLUTION INTRAMUSCULAR; INTRAVENOUS AS NEEDED
Status: DISCONTINUED | OUTPATIENT
Start: 2018-11-09 | End: 2018-11-09 | Stop reason: HOSPADM

## 2018-11-09 RX ORDER — MIDAZOLAM HYDROCHLORIDE 1 MG/ML
INJECTION, SOLUTION INTRAMUSCULAR; INTRAVENOUS
Status: DISPENSED
Start: 2018-11-09 | End: 2018-11-09

## 2018-11-09 RX ORDER — PROPOFOL 10 MG/ML
INJECTION, EMULSION INTRAVENOUS
Status: DISCONTINUED | OUTPATIENT
Start: 2018-11-09 | End: 2018-11-09 | Stop reason: HOSPADM

## 2018-11-09 RX ORDER — LIDOCAINE HYDROCHLORIDE 10 MG/ML
1-40 INJECTION INFILTRATION; PERINEURAL
Status: CANCELLED | OUTPATIENT
Start: 2018-11-09

## 2018-11-09 RX ORDER — ONDANSETRON 2 MG/ML
4 INJECTION INTRAMUSCULAR; INTRAVENOUS
Status: DISCONTINUED | OUTPATIENT
Start: 2018-11-09 | End: 2018-11-14 | Stop reason: HOSPADM

## 2018-11-09 RX ADMIN — KETAMINE HYDROCHLORIDE 10 MG: 10 INJECTION, SOLUTION INTRAMUSCULAR; INTRAVENOUS at 10:49

## 2018-11-09 RX ADMIN — CLONAZEPAM 1 MG: 1 TABLET ORAL at 17:44

## 2018-11-09 RX ADMIN — HEPARIN SODIUM IN SODIUM CHLORIDE 1000 UNITS: 200 INJECTION INTRAVENOUS at 11:07

## 2018-11-09 RX ADMIN — FENTANYL CITRATE 50 MCG: 50 INJECTION, SOLUTION INTRAMUSCULAR; INTRAVENOUS at 11:10

## 2018-11-09 RX ADMIN — PIPERACILLIN SODIUM,TAZOBACTAM SODIUM 3.38 G: 3; .375 INJECTION, POWDER, FOR SOLUTION INTRAVENOUS at 21:00

## 2018-11-09 RX ADMIN — SODIUM CHLORIDE 250 ML: 900 INJECTION, SOLUTION INTRAVENOUS at 08:14

## 2018-11-09 RX ADMIN — GABAPENTIN 600 MG: 300 CAPSULE ORAL at 09:12

## 2018-11-09 RX ADMIN — LIDOCAINE HYDROCHLORIDE 30 ML: 10 INJECTION, SOLUTION INFILTRATION; PERINEURAL at 11:09

## 2018-11-09 RX ADMIN — FENTANYL CITRATE 50 MCG: 50 INJECTION, SOLUTION INTRAMUSCULAR; INTRAVENOUS at 10:50

## 2018-11-09 RX ADMIN — KETAMINE HYDROCHLORIDE 10 MG: 10 INJECTION, SOLUTION INTRAMUSCULAR; INTRAVENOUS at 10:51

## 2018-11-09 RX ADMIN — MIDAZOLAM HYDROCHLORIDE 1 MG: 1 INJECTION, SOLUTION INTRAMUSCULAR; INTRAVENOUS at 10:48

## 2018-11-09 RX ADMIN — KETAMINE HYDROCHLORIDE 10 MG: 10 INJECTION, SOLUTION INTRAMUSCULAR; INTRAVENOUS at 10:45

## 2018-11-09 RX ADMIN — PIPERACILLIN SODIUM,TAZOBACTAM SODIUM 3.38 G: 3; .375 INJECTION, POWDER, FOR SOLUTION INTRAVENOUS at 15:33

## 2018-11-09 RX ADMIN — FENTANYL CITRATE 50 MCG: 50 INJECTION, SOLUTION INTRAMUSCULAR; INTRAVENOUS at 11:08

## 2018-11-09 RX ADMIN — DONEPEZIL HYDROCHLORIDE 5 MG: 5 TABLET, FILM COATED ORAL at 23:47

## 2018-11-09 RX ADMIN — PROPOFOL 50 MG: 10 INJECTION, EMULSION INTRAVENOUS at 10:46

## 2018-11-09 RX ADMIN — CYANOCOBALAMIN TAB 500 MCG 500 MCG: 500 TAB at 09:12

## 2018-11-09 RX ADMIN — MIDAZOLAM HYDROCHLORIDE 1 MG: 1 INJECTION, SOLUTION INTRAMUSCULAR; INTRAVENOUS at 10:43

## 2018-11-09 RX ADMIN — ONDANSETRON 4 MG: 2 INJECTION INTRAMUSCULAR; INTRAVENOUS at 09:10

## 2018-11-09 RX ADMIN — FENTANYL CITRATE 50 MCG: 50 INJECTION, SOLUTION INTRAMUSCULAR; INTRAVENOUS at 10:47

## 2018-11-09 RX ADMIN — PROPOFOL 50 MG: 10 INJECTION, EMULSION INTRAVENOUS at 11:10

## 2018-11-09 RX ADMIN — OXYCODONE AND ACETAMINOPHEN 1 TABLET: 5; 325 TABLET ORAL at 01:29

## 2018-11-09 RX ADMIN — ACETAMINOPHEN 650 MG: 325 TABLET ORAL at 02:59

## 2018-11-09 RX ADMIN — PROPOFOL 50 MG: 10 INJECTION, EMULSION INTRAVENOUS at 11:07

## 2018-11-09 RX ADMIN — BACITRACIN 50000 UNITS: 5000 INJECTION, POWDER, FOR SOLUTION INTRAMUSCULAR at 11:20

## 2018-11-09 RX ADMIN — Medication 10 ML: at 01:30

## 2018-11-09 RX ADMIN — VANCOMYCIN HYDROCHLORIDE 1000 MG: 1 INJECTION, POWDER, LYOPHILIZED, FOR SOLUTION INTRAVENOUS at 10:45

## 2018-11-09 RX ADMIN — SODIUM CHLORIDE 250 ML: 900 INJECTION, SOLUTION INTRAVENOUS at 17:08

## 2018-11-09 RX ADMIN — CLONAZEPAM 1 MG: 1 TABLET ORAL at 09:12

## 2018-11-09 RX ADMIN — PROPOFOL 75 MCG/KG/MIN: 10 INJECTION, EMULSION INTRAVENOUS at 10:45

## 2018-11-09 RX ADMIN — DULOXETINE 60 MG: 30 CAPSULE, DELAYED RELEASE ORAL at 09:12

## 2018-11-09 RX ADMIN — VANCOMYCIN HYDROCHLORIDE 1500 MG: 10 INJECTION, POWDER, LYOPHILIZED, FOR SOLUTION INTRAVENOUS at 16:41

## 2018-11-09 RX ADMIN — SODIUM CHLORIDE 100 ML/HR: 900 INJECTION, SOLUTION INTRAVENOUS at 09:23

## 2018-11-09 RX ADMIN — KETAMINE HYDROCHLORIDE 10 MG: 10 INJECTION, SOLUTION INTRAMUSCULAR; INTRAVENOUS at 11:10

## 2018-11-09 NOTE — PROGRESS NOTES
Orders received, chart reviewed. Spoke to RN who requests therapy hold this AM, stating that pt is hypotensive, confused, and with plans to have PPM removed this afternoon. Will defer per RN request however continue to follow and initiate therapy evaluation as appropriate. Thank you Anna Tolentino, PT, DPT

## 2018-11-09 NOTE — PROGRESS NOTES
TRANSFER - IN REPORT: 
 
Verbal report received from MODESTA Rowland on Vincenzo Atkinsr  being received from EP for routine progression of care. Report consisted of patients Situation, Background, Assessment and Recommendations(SBAR). Information from the following report(s) SBAR, Procedure Summary and MAR was reviewed with the receiving clinician. Opportunity for questions and clarification was provided. Assessment completed upon patients arrival to [address] [place] [address] and care assumed. Cardiac Cath Lab Recovery Arrival Note: 
 
Vincenzo Quinones arrived to [facility] recovery area. Patient procedure= Pacemaker extraction. Patient on cardiac monitor, non-invasive blood pressure, SPO2 monitor. On  O2 @ 4 lpm via NC.  IV  of NS on pump at 20 ml/hr. Patient status doing well without problems. Patient is A&Ox 3. Patient reports no complaints. PROCEDURE SITE CHECK: 
 
Procedure site:without any bleeding and no pain/discomfort reported at procedure site. No change in patient status. Continue to monitor patient and status.

## 2018-11-09 NOTE — PROGRESS NOTES
Orders received, chart reviewed. Spoke to RN who requests therapy hold this AM, stating that pt is hypotensive, confused, and with plans to have PPM removed this afternoon. Will defer per RN request however continue to follow and initiate therapy evaluation as appropriate. Thank you

## 2018-11-09 NOTE — PROGRESS NOTES
1930 - Bedside report from [facility]. Percocet PO for 8/10 L upper chest hematoma pain. Voiding qs. Wife present, supportive. 2100- Pt anxious and restless, which is at times his norm as of the last several months per significant other. Pt has seen neurology in the past for investigation of this and other issues including mentation, gait issues and frequent falls. Voids often which is his norm. 2230 - Resting in bed without distress currently calm and pain has improved. Noted earlier that pt had few drops of bloody uop on commode rim, hx of prostate issues. Overall urine color is light yellow. Drinking large amounts of water. Pt has frequency and presented to ER this am and to unit this evening w low grade temps. In lieu of other mentation issues and wife complaints of pt having malaise and being \"out of it\", will obtain UA w reflex at next void. 0000 - NPO. Pt resting in bed. 
 
0130 - 100.7 temp, 8/10 pain L upper chest.  Percocet PO.   
 
0200 - Spoke w hospitalist regarding increasing low grade temps since admission, currently 100.7 orally. Pt having chills and rigors. Disoriented and impulsive. Order for blood cultures and lactic workup obtained. 80 - Labs sent. Dr Norris Covert in with pt for hospitalist consult. Temp now 102.3 orally. Additional tylenol 650 mg administered per dr Norris Covercrista.

## 2018-11-09 NOTE — PROGRESS NOTES
Hospitalist Progress Note NAME: Mary Alexander :  1945 MRN:  [id number] Assessment / Plan: 
Presumed infected PM pocket hematoma in setting of SSS with recent PM implantation: 
-fever up to 102, lactic acid normal 
Pt s/p PM implant 10/16 for SSS, then admitted 10/24- after extremely large hematoma developed (the size of a football per his significant other), now re-admitted due to spontaneous bleeding from PM pocket 
-PPM was taken out today 
-follow cultures 
-ID was consulted Encephalopathy / ataxia / speech changes on baseline dementia  
-started ~ 1 year ago when start having syncopal episodes but really got worse in the last 2-3 weeks Current underlying infection may be contributing Wife is very emotional and tearful.  
- Pt has been seeing Dr. Petra Castillo (neurology in [facility]) for this issues -PT/OT  
-head CT: normal  
-will ask neurology to help with management and work up  
-cont home Aricept  
- con't home cymbalta, klonipin, aricept neurontin 
 
h/o lumbar spinal stenosis H/o congenital hip dysplasia with bilateral replacements 
h/o EtOH use (none recently) 
 
 
 
 
  
Code Status: Full (significant other of >20yrs is decision maker if needed) DVT Prophylaxis: per primary team 
 
Recommended Disposition: TBD per primary team   
Need PT/OT for proper disposition. Wife is not sure if she will be able to handle him at home anymore Subjective: Chief Complaint / Reason for Physician Visit: following PPM site infection/ encephalopathy Found pt in bed. He was trying climb out of bed Wife at bedside. Pt is confused Discussed with RN events overnight. Review of Systems: 
Symptom Y/N Comments  Symptom Y/N Comments Fever/Chills    Chest Pain Poor Appetite    Edema Cough    Abdominal Pain Sputum    Joint Pain SOB/PATEL    Pruritis/Rash Nausea/vomit    Tolerating PT/OT Diarrhea    Tolerating Diet Constipation    Other Could NOT obtain due to: Confused Objective: VITALS:  
Last 24hrs VS reviewed since prior progress note. Most recent are: 
Patient Vitals for the past 24 hrs: 
 Temp Pulse Resp BP SpO2  
11/09/18 1435 98.1 °F (36.7 °C)      
11/09/18 1400  73 12 132/62   
11/09/18 1330  70 12 123/56 91 % 11/09/18 1300  71 12 118/60 95 % 11/09/18 1245  71 12 123/59 97 % 11/09/18 1230  74 13 107/74 94 % 11/09/18 1215  72 11 124/61 (!) 87 % 11/09/18 1200  73 20 147/76   
11/09/18 1155  74 14 150/79 100 % 11/09/18 1150  73 12 148/76 95 % 11/09/18 1145  74 15 131/80 94 % 11/09/18 1136 97 °F (36.1 °C) 77 12 147/85 95 % 11/09/18 1131  73 14 142/73 97 % 11/09/18 1027 97.5 °F (36.4 °C)  16  94 % 11/09/18 0915  65  108/60   
11/09/18 0910  64  119/47   
11/09/18 0905  65  (!) 70/34   
11/09/18 0900  63  113/56   
11/09/18 0855  64  109/53   
11/09/18 0850  63  98/54   
11/09/18 0845  62  95/46   
11/09/18 0840  61  104/50   
11/09/18 0835  65  120/44   
11/09/18 0830  62  (!) 84/45   
11/09/18 0825  62  94/45   
11/09/18 0823  62  (!) 84/42   
11/09/18 0813  64  (!) 86/52   
11/09/18 0812  62  (!) 81/45   
11/09/18 0718 99.1 °F (37.3 °C) 63 18 (!) 86/52 96 % 11/09/18 0434 (!) 101.8 °F (38.8 °C)      
11/09/18 0251 (!) 102.3 °F (39.1 °C) 77 18 108/62 96 % 11/09/18 0150 (!) 100.7 °F (38.2 °C) 70 18 112/67 96 % 11/08/18 2344 100.1 °F (37.8 °C) 69 18 107/57 95 % 11/08/18 1937 100 °F (37.8 °C) 68 18 133/87 98 % 11/08/18 1850 99.5 °F (37.5 °C) 70 18 (!) 122/95 97 % 11/08/18 1810 99 °F (37.2 °C) 72 18 168/88 96 % 11/08/18 1730  67 16  94 % 11/08/18 1700  65 16 174/89 94 % 11/08/18 1530  60 14 160/75 94 % Intake/Output Summary (Last 24 hours) at 11/9/2018 1456 Last data filed at 11/9/2018 8740 Gross per 24 hour Intake 1440 ml Output 1650 ml Net -210 ml PHYSICAL EXAM: 
 General: WD, WN. Alert, cooperative, no acute distress   
EENT:  EOMI. Anicteric sclerae. MMM Resp:  CTA bilaterally, no wheezing or rales. No accessory muscle use CV:  Regular  rhythm,  No edema GI:  Soft, Non distended, Non tender.  +Bowel sounds Neurologic:  Alert and oriented X 1, confused, trying to get out of bed Psych:   Poor insight. Not anxious nor agitated Skin:  No rashes. No jaundice Reviewed most current lab test results and cultures  YES Reviewed most current radiology test results   YES Review and summation of old records today    NO Reviewed patient's current orders and MAR    YES 
PMH/SH reviewed - no change compared to H&P 
________________________________________________________________________ Care Plan discussed with: 
  Comments Patient y Family  y Wife bedside RN y   
Care Manager Consultant Multidiciplinary team rounds were held today with , nursing, pharmacist and clinical coordinator. Patient's plan of care was discussed; medications were reviewed and discharge planning was addressed. ________________________________________________________________________ Total NON critical care TIME:  25  Minutes Total CRITICAL CARE TIME Spent:   Minutes non procedure based Comments >50% of visit spent in counseling and coordination of care    
________________________________________________________________________ Clement Stiles MD  
 
Procedures: see electronic medical records for all procedures/Xrays and details which were not copied into this note but were reviewed prior to creation of Plan. LABS: 
I reviewed today's most current labs and imaging studies. Pertinent labs include: 
Recent Labs 11/09/18 
9844 11/08/18 
4501 WBC 8.4 4.9 HGB 11.5* 11.2* HCT 34.1* 34.2*  
 164 Recent Labs 11/09/18 
0807 11/08/18 
4192  141  
K 4.1 3.9  105 CO2 24 28 * 109* BUN 19 15 CREA 0.87 0.80 CA 8.5 8.5 ALB  --  3.3* TBILI  --  0.7 SGOT  --  14* ALT  --  19 INR  --  1.0 Signed: Zoey Pizarro MD

## 2018-11-09 NOTE — CONSULTS
Infectious Disease Consult Note    Reason for Consult: bleeding from pacer site, possible infection   Date of Consultation: November 9, 2018  Date of Admission: 11/8/2018  Referring Physician: Amadeo Funk MD       HPI:      Mr Fernando Raymundo is a 68 chayo old gentleman with hx of hip replacement, lumbar surgery , rotator cuff surgery, who had dual chamber pacer inserted on 10/16/18 for SSS. His significant other says that he was fine post op for about 8 days. Noted swelling and bleeding from pacer site and came to the hospital again 10/24/18. Seen by EP and large hematoma noted around L subclavian pacer site per notes. Treated with pressure dressing. Pressure dressing removed 10/27/18 per notes. His partner says that the with the pressure dressing the swelling went down but it was significantly swollen on discharge. He had fevers last evening and all of sudden she saw blood all over the floor and rushed him to the ER. Today, he had pocket evacuation and device removal. Pocket was cultured per procedural note. \"The pocket hematoma was evacuated - approximately 100 cc of coagulum were removed\". During my evaluation he was sleeping and not recovered from anesthesia fully yet. Unable to get any history from him. His partner says that he did not have any antibiotics prior to admission. She denied any erythema being noted/pururlent discharge from site. From chart review, hypotensive in 02-20'R systolic 26/8 and febrile up to 102. 3.   Labs with slight anemia and without coagulopathy, normal renal/liver function, normal lactate           Past Medical History:  Past Medical History:   Diagnosis Date    Anxiety     Back pain     Blurred vision     Chest pain     Depression     Dizziness     Fast heart beat     Frequent headaches     Frequent urination     Hip dysplasia, congenital     Joint pain     Joint swelling     Lumbar spinal stenosis     Muscle pain     Neuropathy     Recent change in weight  Sinus problem     Skin disease     SOB (shortness of breath)     Sore throat     SSS (sick sinus syndrome) (HCC)     Stiffness of joints, multiple sites     Stress     Syncope     Tiredness     Trouble in sleeping     Weakness          Surgical History:  Past Surgical History:   Procedure Laterality Date    HX HIP REPLACEMENT Bilateral 1993    HX PACEMAKER PLACEMENT  10/16/2018    HX ROTATOR CUFF REPAIR Right 11/16/2017         Family History:   Family History   Problem Relation Age of Onset    Cancer Mother     Cancer Maternal Aunt          Social History:     · Living Situation: at home wt partner of >25 years   · Tobacco: + smoker   · Alcohol:Quit after last lumbar surgery per partner    Retired was in [employer] before per partner   Unable to obtain rest of social history now       Allergies:  No Known Allergies      Review of Systems:    Unable to obtain now     Medications:    Current Facility-Administered Medications:     ondansetron (ZOFRAN) injection 4 mg, 4 mg, IntraVENous, Q6H PRN, Verona Kamara MD, 4 mg at 11/09/18 0910    nitroglycerin (NITROBID) 2 % ointment 1 Inch, 1 Inch, Topical, Q6H PRN, Verona Kamara MD    0.9% sodium chloride infusion, 100 mL/hr, IntraVENous, CONTINUOUS, Jennifer Doyle, ANP, Last Rate: 100 mL/hr at 11/09/18 0923, 100 mL/hr at 11/09/18 0923    midazolam (PF) (VERSED) 1 mg/mL injection, , , ,     ADDaptor, , , ,     0.9% sodium chloride (MBP/ADV) infusion, , , ,     sodium chloride (NS) flush 5-10 mL, 5-10 mL, IntraVENous, Q8H, Saran Mcdermott MD    sodium chloride (NS) flush 5-10 mL, 5-10 mL, IntraVENous, PRN, Saran Mcdermott MD    naloxone (NARCAN) injection 0.4 mg, 0.4 mg, IntraVENous, PRN, Saran Mcdermott MD    acetaminophen (TYLENOL) tablet 650 mg, 650 mg, Oral, Q4H PRN, Saran Mcdermott MD    HYDROcodone-acetaminophen (NORCO) 5-325 mg per tablet 1 Tab, 1 Tab, Oral, Q4H PRN, Saran Mcdermott MD    atropine 0.1 mg/mL injection, , , ,     piperacillin-tazobactam (ZOSYN) 3.375 g in 0.9% sodium chloride (MBP/ADV) 100 mL, 3.375 g, IntraVENous, ONCE **FOLLOWED BY** piperacillin-tazobactam (ZOSYN) 3.375 g in 0.9% sodium chloride (MBP/ADV) 100 mL, 3.375 g, IntraVENous, Q8H, Saran Mcdermott MD    vancomycin (VANCOCIN) 1500 mg in  ml infusion, 1,500 mg, IntraVENous, ONCE, Saran Mcdermott MD    clonazePAM (KlonoPIN) tablet 1 mg, 1 mg, Oral, BID, Maddie Oconnora L, NP, 1 mg at 11/09/18 0912    cyanocobalamin (VITAMIN B12) tablet 500 mcg, 500 mcg, Oral, DAILY, Maddie Oconnora L, NP, 500 mcg at 11/09/18 0912    donepezil (ARICEPT) tablet 5 mg, 5 mg, Oral, QHS, Maddie Oconnora L, NP, 5 mg at 11/08/18 2200    DULoxetine (CYMBALTA) capsule 60 mg, 60 mg, Oral, DAILY, Angie Oconnornna L, NP, 60 mg at 11/09/18 0912    gabapentin (NEURONTIN) capsule 600 mg, 600 mg, Oral, DAILY, Maddie Oconnora L, NP, 600 mg at 11/09/18 0912    sodium chloride (NS) flush 5-10 mL, 5-10 mL, IntraVENous, Q8H, Maddie Oconnora L, NP, 10 mL at 11/09/18 0130    sodium chloride (NS) flush 5-10 mL, 5-10 mL, IntraVENous, PRN, Francisg Angie Santizonna L, NP    acetaminophen (TYLENOL) tablet 650 mg, 650 mg, Oral, Q4H PRN, Maddie Oconnora L, NP, 650 mg at 11/09/18 0259    oxyCODONE-acetaminophen (PERCOCET) 5-325 mg per tablet 1 Tab, 1 Tab, Oral, Q6H PRN, Rajeev Villatoro MD, 1 Tab at 11/09/18 0129      Physical Exam:    Vitals:   Patient Vitals for the past 24 hrs:   Temp Pulse Resp BP SpO2   11/09/18 1245  71 12 123/59 97 %   11/09/18 1230  74 13 107/74 94 %   11/09/18 1215  72 11 124/61 (!) 87 %   11/09/18 1200  73 20 147/76    11/09/18 1155  74 14 150/79 100 %   11/09/18 1150  73 12 148/76 95 %   11/09/18 1145  74 15 131/80 94 %   11/09/18 1136 97 °F (36.1 °C) 77 12 147/85 95 %   11/09/18 1131  73 14 142/73 97 %   11/09/18 1027 97.5 °F (36.4 °C)  16  94 %   11/09/18 0915  65  108/60    11/09/18 0910  64  119/47    11/09/18 0905  65  (!) 70/34    11/09/18 0900  63  113/56    11/09/18 0855  64  109/53    11/09/18 0850  63  98/54    11/09/18 0845  62  95/46    11/09/18 0840  61  104/50    11/09/18 0835  65  120/44    11/09/18 0830  62  (!) 84/45    11/09/18 0825  62  94/45    11/09/18 0823  62  (!) 84/42    11/09/18 0813  64  (!) 86/52    11/09/18 0812  62  (!) 81/45    11/09/18 0718 99.1 °F (37.3 °C) 63 18 (!) 86/52 96 %   11/09/18 0434 (!) 101.8 °F (38.8 °C)       11/09/18 0251 (!) 102.3 °F (39.1 °C) 77 18 108/62 96 %   11/09/18 0150 (!) 100.7 °F (38.2 °C) 70 18 112/67 96 %   11/08/18 2344 100.1 °F (37.8 °C) 69 18 107/57 95 %   11/08/18 1937 100 °F (37.8 °C) 68 18 133/87 98 %   11/08/18 1850 99.5 °F (37.5 °C) 70 18 (!) 122/95 97 %   11/08/18 1810 99 °F (37.2 °C) 72 18 168/88 96 %   11/08/18 1730  67 16  94 %   11/08/18 1700  65 16 174/89 94 %   11/08/18 1530  60 14 160/75 94 %   ·   · GEN: NAD,snoring loudly   · HEENT:  no scleral icterus,  no thrush, dry oral mucosa   · CV: S1, S2 heard regularly, + dressing over L chest wall, no expanding erythema beyond dressing, no blood noted on dressing during my exam Lungs: Clear to auscultation bilaterally  · Abdomen: soft, non distended, no facial grimace to palpation    · Extremities: no edema  · Neuro: unable to assess   · Skin no rash on visualized areas   · Psych: unable to assess         Labs:   Recent Results (from the past 24 hour(s))   URINALYSIS W/MICROSCOPIC    Collection Time: 11/08/18 11:38 PM   Result Value Ref Range    Color DARK YELLOW      Appearance CLEAR CLEAR      Specific gravity 1.027 1.003 - 1.030      pH (UA) 6.0 5.0 - 8.0      Protein 30 (A) NEG mg/dL    Glucose 100 (A) NEG mg/dL    Ketone TRACE (A) NEG mg/dL    Blood MODERATE (A) NEG      Urobilinogen 1.0 0.2 - 1.0 EU/dL    Nitrites NEGATIVE  NEG      Leukocyte Esterase NEGATIVE  NEG      WBC 0-4 0 - 4 /hpf    RBC 10-20 0 - 5 /hpf    Epithelial cells FEW FEW /lpf    Bacteria NEGATIVE  NEG /hpf    Hyaline cast 0-2 0 - 5 /lpf   URINE CULTURE HOLD SAMPLE    Collection Time: 11/08/18 11:38 PM   Result Value Ref Range    Urine culture hold        URINE ON HOLD IN MICROBIOLOGY DEPT FOR 3 DAYS. IF UNPRESERVED URINE IS SUBMITTED, IT CANNOT BE USED FOR ADDITIONAL TESTING AFTER 24 HRS, RECOLLECTION WILL BE REQUIRED. BILIRUBIN, CONFIRM    Collection Time: 11/08/18 11:38 PM   Result Value Ref Range    Bilirubin UA, confirm NEGATIVE  NEG     CBC W/O DIFF    Collection Time: 11/09/18  2:08 AM   Result Value Ref Range    WBC 8.4 4.1 - 11.1 K/uL    RBC 3.47 (L) 4.10 - 5.70 M/uL    HGB 11.5 (L) 12.1 - 17.0 g/dL    HCT 34.1 (L) 36.6 - 50.3 %    MCV 98.3 80.0 - 99.0 FL    MCH 33.1 26.0 - 34.0 PG    MCHC 33.7 30.0 - 36.5 g/dL    RDW 13.7 11.5 - 14.5 %    PLATELET 463 245 - 910 K/uL    MPV 10.3 8.9 - 12.9 FL    NRBC 0.0 0  WBC    ABSOLUTE NRBC 0.00 0.00 - 4.56 K/uL   METABOLIC PANEL, BASIC    Collection Time: 11/09/18  2:08 AM   Result Value Ref Range    Sodium 138 136 - 145 mmol/L    Potassium 4.1 3.5 - 5.1 mmol/L    Chloride 105 97 - 108 mmol/L    CO2 24 21 - 32 mmol/L    Anion gap 9 5 - 15 mmol/L    Glucose 122 (H) 65 - 100 mg/dL    BUN 19 6 - 20 MG/DL    Creatinine 0.87 0.70 - 1.30 MG/DL    BUN/Creatinine ratio 22 (H) 12 - 20      GFR est AA >60 >60 ml/min/1.73m2    GFR est non-AA >60 >60 ml/min/1.73m2    Calcium 8.5 8.5 - 10.1 MG/DL   CULTURE, BLOOD, PAIRED    Collection Time: 11/09/18  2:08 AM   Result Value Ref Range    Special Requests: NO SPECIAL REQUESTS      Culture result: NO GROWTH AFTER 4 HOURS     LACTIC ACID    Collection Time: 11/09/18  2:13 AM   Result Value Ref Range    Lactic acid 1.2 0.4 - 2.0 MMOL/L       Microbiology Data:       Blood: pending      Pacer pocket : pending         Imaging:   CXR 11/9  FINDINGS:  There is interval pacemaker removal with no apparent pneumothorax or other acute  finding or significant change.     The lungs are clear. Heart is normal in size.  There is no overt pulmonary edema. There is no evident adenopathy or pleural effusion. No new finding.     IMPRESSION  IMPRESSION: No pneumothorax. No acute disease    CT head 11/8  FINDINGS:  The ventricles and sulci are normal in size, shape and configuration and  midline. There is no significant white matter disease. There is minimal  bifrontal volume loss. There is no intracranial hemorrhage, extra-axial  collection, mass, mass effect or midline shift. The basilar cisterns are open. No acute infarct is identified. The bone windows demonstrate no abnormalities. The visualized portions of the paranasal sinuses and mastoid air cells are  clear.     IMPRESSION  IMPRESSION: No acute intracranial process identified. TTE 9/17/18   PROCEDURE: This was a routine study. The study included complete 2D  imaging, complete spectral Doppler, and color Doppler. The heart rate was  55 bpm, at the start of the study. Images were obtained from the  parasternal, apical, and subcostal acoustic windows. Image quality was  adequate. LEFT VENTRICLE: Size was normal. Systolic function was normal. Ejection  fraction was estimated in the range of 60 % to 65 %. There were no  regional wall motion abnormalities. Wall thickness was normal. DOPPLER:  Features were consistent with a pseudonormal left ventricular filling  pattern, with concomitant abnormal relaxation and increased filling  pressure (grade 2 diastolic dysfunction). RIGHT VENTRICLE: The size was normal. Systolic function was normal.    LEFT ATRIUM: The atrium was mildly dilated. ATRIAL SEPTUM: The atrial septum appeared intact. RIGHT ATRIUM: Size was normal.    MITRAL VALVE: Normal valve structure. No echocardiographic evidence for  prolapse. DOPPLER: The transmitral velocity was within the normal range. There was trivial regurgitation. AORTIC VALVE: Normal valve structure. DOPPLER: Transaortic velocity was  within the normal range. There was no stenosis.  There was no regurgitation. TRICUSPID VALVE: Normal valve structure. DOPPLER: There was trivial  regurgitation. Pulmonary artery systolic pressure was within the normal  range. There was no pulmonary hypertension. PULMONIC VALVE: Not well visualized, but normal Doppler findings. DOPPLER:  The transpulmonic velocity was within the normal range. There was no  stenosis. There was trivial regurgitation. AORTA: The root exhibited normal size. PERICARDIUM: There was no pericardial effusion. Carotid duplex  9/17/18  IMPRESSION  IMPRESSION:     1.  16-49% stenosis in the right ICA. 2.  16-49% stenosis in the left ICA. 3.  Bilateral antegrade vertebral artery flow. Procedures:   Pacer insertion 10/16/18  Pacer removal 11/9/18     Assessment / Plan:       Mr Piper Duran is a 68 chayo old gentleman with hx of hip replacement, lumbar surgery , rotator cuff surgery, who had dual chamber pacer inserted on 10/16/18 for SSS. His significant other says that he was fine post op for about 8 days. Noted swelling and bleeding from pacer site and came to the hospital again 10/24/18. Seen by EP and large hematoma noted around L subclavian pacer site per notes. Treated with pressure dressing. Pressure dressing removed 10/27/18 per notes. His partner says that the with the pressure dressing the swelling went down but it was significantly swollen on discharge. He had fevers last evening and all of sudden she saw blood all over the floor and rushed him to the ER. Today, he had pocket evacuation and device removal. Pocket was cultured per procedural note. \"The pocket hematoma was evacuated - approximately 100 cc of coagulum were removed\". During my evaluation he was sleeping and not recovered from anesthesia fully yet. Unable to get any history from him. His partner says that he did not have any antibiotics prior to admission. She denied any erythema being noted/pururlent discharge from site.       From chart review, hypotensive in 20-44'E systolic 18/0 and febrile up to 102. 3.   Labs with slight anemia and without coagulopathy, normal renal/liver function, normal lactate       1) Pacer hematoma/bleeding  Evaluating for possible pacer site/pocket/deeper infection   Await blood cx and cx from pacer pocket   Started Vancomycin + Zosyn ( renally dose by pharmacy)   May need MINH   Risk for aspiration     2) S/P hip replacement   Lumbar surgery and possible hardware ( unclear details)      3) Anemia: Being monitored closely     4) dVT px : not on any anticoagulation currently in the  setting of bleeding from pacer on presentation     Dell Oro DO  2:51 PM

## 2018-11-09 NOTE — ANESTHESIA PREPROCEDURE EVALUATION
Anesthetic History No history of anesthetic complications Review of Systems / Medical History Patient summary reviewed, nursing notes reviewed and pertinent labs reviewed Pulmonary Within defined limits Neuro/Psych Psychiatric history Cardiovascular Dysrhythmias Pacemaker Exercise tolerance: <4 METS 
  
GI/Hepatic/Renal 
Within defined limits Endo/Other Within defined limits Other Findings Comments: Hx alcoholism Physical Exam 
 
Airway Mallampati: II 
TM Distance: > 6 cm Neck ROM: normal range of motion Mouth opening: Normal 
 
 Cardiovascular Regular rate and rhythm,  S1 and S2 normal,  no murmur, click, rub, or gallop Dental 
No notable dental hx Pulmonary Breath sounds clear to auscultation Abdominal 
GI exam deferred Other Findings Anesthetic Plan ASA: 3 Anesthesia type: MAC Anesthetic plan and risks discussed with: Patient

## 2018-11-09 NOTE — PROGRESS NOTES
Elwin Nissen, RN recovered patient in room post device removal.   
 
Assumed care of leatha for post procedure care at [phone]

## 2018-11-09 NOTE — PROGRESS NOTES
Reason for Admission:   Pacemaker complications RRAT Score:     14 Do you (patient/family) have any concerns for transition/discharge? Pt declining physically Plan for utilizing home health:     TBD Likelihood of readmission? Moderate Transition of Care Plan:    TBD Likely rehab 
 
CM met with pt and his SO, Lisa Tomas, to complete assessment and discuss d/c planning. Pt is a 67 yo male admitted for pacemaker complications. Pt was previously admitted at [facility] 10/24-10/27 for his pacemaker. Pt lives with SO in 1 story home that is handicap accessible (wider doorways). Pt uses a RW but has had trouble with mobilty, according to SO. Pt requires assistance with bathing and dressing. Pt is not currently driving. SO works part time during the day. Pt and SO interested in rehab. PT/OT evals pending. CM will continue to follow and assist with d/c planning. Care Management Interventions PCP Verified by CM: Yes(Dr. Molina ) Mode of Transport at Discharge: Other (see comment) Transition of Care Consult (CM Consult): Discharge Planning Discharge Durable Medical Equipment: No 
Physical Therapy Consult: Yes Occupational Therapy Consult: Yes Speech Therapy Consult: No 
Current Support Network: Lives with Spouse, Own Home Confirm Follow Up Transport: Family Plan discussed with Pt/Family/Caregiver: Yes Discharge Location Discharge Placement: (TBD) PAVEL Sams Care Manager

## 2018-11-09 NOTE — PROGRESS NOTES
Cardiac Electrophysiology Progress Note [address], [place], [address] [facility]  [phone] 
 
11/9/2018 9:11 AM 
 
Admit Date: 11/8/2018 Admit Diagnosis: Pacemaker complications, subsequent encounter Fever Subjective:  
 
Jenny Barber was admitted yesterday for  increasing hematoma and bleeding from his pacemaker site. He had a pacemaker placed on 10/16/18 and was discharged without difficulty. His significant other states Mr. Lee started having swelling 8 days after placement. He was admitted to the hospital for 3 days for pacemaker hematoma from 10/24-10/27. Yesterday morning around 5AM,  the patient began bleeding from his pacemaker site. Mr. Simón Roca significant other also states that the patient was stumbling when walking yesterday, memory difficulty, and today and has had cottonmouth speech. Mr. Royden Saint endorses general weakness, but has no focal weakness. Jenny Query   denies chest pain, chest pressure/discomfort, dyspnea, palpitations, lower extremity edema. Visit Vitals /50 Pulse 61 Temp 99.1 °F (37.3 °C) Resp 18 Ht 5' 11\" (1.803 m) Wt 160 lb 0 oz (72.6 kg) SpO2 96% BMI 22.32 kg/m² Current Facility-Administered Medications Medication Dose Route Frequency  ondansetron (ZOFRAN) injection 4 mg  4 mg IntraVENous Q6H PRN  
 nitroglycerin (NITROBID) 2 % ointment 1 Inch  1 Inch Topical Q6H PRN  
 clonazePAM (KlonoPIN) tablet 1 mg  1 mg Oral BID  cyanocobalamin (VITAMIN B12) tablet 500 mcg  500 mcg Oral DAILY  donepezil (ARICEPT) tablet 5 mg  5 mg Oral QHS  DULoxetine (CYMBALTA) capsule 60 mg  60 mg Oral DAILY  gabapentin (NEURONTIN) capsule 600 mg  600 mg Oral DAILY  sodium chloride (NS) flush 5-10 mL  5-10 mL IntraVENous Q8H  
 sodium chloride (NS) flush 5-10 mL  5-10 mL IntraVENous PRN  
 acetaminophen (TYLENOL) tablet 650 mg  650 mg Oral Q4H PRN  
 oxyCODONE-acetaminophen (PERCOCET) 5-325 mg per tablet 1 Tab  1 Tab Oral Q6H PRN Objective:  
  
Visit Vitals /50 Pulse 61 Temp 99.1 °F (37.3 °C) Resp 18 Ht 5' 11\" (1.803 m) Wt 160 lb 0 oz (72.6 kg) SpO2 96% BMI 22.32 kg/m² Physical Exam: Abdomen: soft, non-tender Extremities: extremities normal, purple ecchymosis left arm, left chest, MAL Chest: wrapped with ACE bandage around thoracic region and left shoulder. Heart: regular rate and rhythm Lungs: clear to auscultation bilaterally Pulses: 2+ and symmetric Data Review:  
Labs:   
Recent Labs 11/09/18 
5932 11/08/18 
3475 WBC 8.4 4.9 HGB 11.5* 11.2* HCT 34.1* 34.2*  
 164 Recent Labs 11/09/18 
2826 11/08/18 
8114  141  
K 4.1 3.9  105 CO2 24 28 * 109* BUN 19 15 CREA 0.87 0.80 CA 8.5 8.5 ALB  --  3.3* TBILI  --  0.7 SGOT  --  14* ALT  --  19 INR  --  1.0 No results for input(s): TROIQ, CPK, CKMB in the last 72 hours. Intake/Output Summary (Last 24 hours) at 11/9/2018 2529 Last data filed at 11/9/2018 2120 Gross per 24 hour Intake 1440 ml Output 1650 ml Net -210 ml Telemetry: sinus 70s, occ A pacing Assessment:  
 
Active Problems: 
  Pacemaker complications, subsequent encounter (11/8/2018) Stumbling gait (11/8/2018) Fever (11/9/2018) Plan:  
 
Damon Merino was admitted yesterday for  increasing hematoma and bleeding from his pacemaker site, stumbling when walking yesterday, memory difficulty, and today and has had cottonmouth speech. He is currently febrile, hypotensive with no elevation in WBC, hemoglobin stable. Urine negative; blood cultures pending. At this time, we will consult ID and plan for device extraction today. Discussed with patient and significant other. Pt verbalizes understanding and is in agreement with the plan. Jennifer Linda, ANP Patient seen and examined by me with nurse practitioner.   I personally performed all components of the history, physical, and medical decision making and agree with the assessment and plan with minor modifications as noted. Will proceed with device extraction/pocket evacuation in light of fever overnight, mental status changes and hypotension. I discussed the risks/benefits/alternatives of the procedure with the patient. Risks include (but are not limited to) bleeding, heart block, infection, cva/mi/tamponade/death. The patient understands and agrees to proceed.   
Peter Pena MD, [facility] 
 
 
 
11/9/2018 
9:11 AM

## 2018-11-09 NOTE — CONSULTS
Hospitalist Consultation Note    NAME:  Jazlyn Leiva   :   1945   MRN:   [id number]     ATTENDING: Benton Pineda MD  PCP:  Mateo Mckeon MD    Date/Time:  2018 3:23 AM      Recommendations/Plan:     Presumed infected PM pocket hematoma in setting of SSS with recent PM implantation:  Temp 80 tonight with anxiety, not feeling well understandably. Pt s/p PM implant 10/16 for SSS, then admitted 10/24- after extremely large hematoma developed (the size of a football per his significant other), no re-admitted yesterday due to spontaneous bleeding from PM pocket  - CXR no acute process  - UA no bacteria  - have asked nursing to send paired blood cultures tonight  - would recommend that primary team obtain fluid culture from his PM hematoma first thing in the morning and then start Pt on antibiotics to cover likely infectious source. Consult for \"stumbling gait, speech changes (per significant other), memory concerns, and generalized weak feeling\" in setting of chronic gait instability (has h/o lumbar spinal stenosis and congenital hip dysplasia with bilateral replacements) and dementia with h/o EtOH use (none recently): feel that these are exacerbations of his chronic sx due to complicated post PM implant course and evolving infection. Pt has been seeing Dr. Sherrie Bellamy (neurology in [place] for this issues prior to admission and for greater than a year, Pt has appt with a new neurologist in [place] as well but has not been well enough post-hospitalization for f/u on chronic neuro issues. - CT head no acute intracranial process identified  - gait instability present for years; significant other would like Pt to be seen by PT/OT and referral for inpatient rehab to be placed for post-hospitalization care as they are uncomfortable with him going home given his complex health issues the last couple of weeks. Have placed PT/OT and CM consults. - con't home aricept.   Suspect any worsening of his chronic memory issues are due to active infection. - con't home cymbalta, klonipin, aricept neurontin  - consider neurology consultation if primary team would truly like additional w/u for these issues at this time but I really suspect that any worsening of his chronic issues is due to his infection and their anxiety about current health issues. Code Status: Full (significant other of >20yrs is decision maker if needed)  DVT Prophylaxis: per primary team        Subjective:   REQUESTING PHYSICIAN: [employer]:  \"stumbling gait, speech changes (per significant other), memory concerns, and generalized weak feeling\"    Anival Joshi is a 68 y.o.  male who I was asked to see for above. History obtained from both Pt and his significant other at bedside. They are both feeling anxious at this time due to his recent health changes since PM implantation. It was apparently complicated by a massive hematoma (SO says it looked like a football was sitting on his chest) for which he was admitted and had pressure dressing. He returned home after this readmission but continued to do poorly. He has had ongoing gait issues for >1 year and is already followed by neurology for this in [address] [place] [address]. He has also recently dealth with rotator cuff problems. He does complain of dry mouth. He has been a little more confused than baseline, but is fully awake and oriented, able to participate in discussions of his health issues at this time. He apparently work up early yesterday morning with a \"wet sensation\" and SO found him covered in blood - both bright and dark. He was readmitted for PM pocket complication. They are not aware of a fever prior to admission today. No recent cough or URI sx. He does have pain r/t the pacemaker pocket. No stool changes. No new edema. He endorses feeling very anxious at this time.     Past Medical History:   Diagnosis Date    Anxiety     Back pain     Blurred vision     Chest pain     Depression     Dizziness     Fast heart beat     Frequent headaches     Frequent urination     Hip dysplasia, congenital     Joint pain     Joint swelling     Lumbar spinal stenosis     Muscle pain     Neuropathy     Recent change in weight     Sinus problem     Skin disease     SOB (shortness of breath)     Sore throat     SSS (sick sinus syndrome) (HCC)     Stiffness of joints, multiple sites     Stress     Syncope     Tiredness     Trouble in sleeping     Weakness       Past Surgical History:   Procedure Laterality Date    HX HIP REPLACEMENT Bilateral 1993    HX PACEMAKER PLACEMENT  10/16/2018    HX ROTATOR CUFF REPAIR Right 11/16/2017     Social History     Tobacco Use    Smoking status: Current Every Day Smoker     Packs/day: 0.50     Years: 50.00     Pack years: 25.00     Types: Cigarettes    Smokeless tobacco: Never Used    Tobacco comment: 0.5-1 pack per day   Substance Use Topics    Alcohol use: Yes     Alcohol/week: 16.8 oz     Types: 28 Cans of beer per week     Comment: None now      Family History   Problem Relation Age of Onset    Cancer Mother     Cancer Maternal Aunt        No Known Allergies   Prior to Admission medications    Medication Sig Start Date End Date Taking? Authorizing Provider   clonazePAM (KLONOPIN) 1 mg tablet Take 1 mg by mouth daily. Yes Other, MD Yani   gabapentin (NEURONTIN) 600 mg tablet Take 600 mg by mouth daily. Yes Other, MD Yani   naproxen (NAPROSYN) 500 mg tablet Take 500 mg by mouth daily. Yes Yani Marquez MD   acetaminophen (TYLENOL) 500 mg tablet Take 1,000 mg by mouth every six (6) hours as needed for Pain. Yes Other, MD Yani   cyanocobalamin, vitamin B-12, (VITAMIN B-12 PO) Take 1 Tab by mouth daily. Yes Provider, Historical   aspirin delayed-release 325 mg tablet Take 325 mg by mouth three (3) days a week. Yes Provider, Historical   donepezil (ARICEPT) 5 mg tablet Take 5 mg by mouth nightly. Yes Provider, Historical   DULoxetine (CYMBALTA) 60 mg capsule TAKE 1 CAPSULE BY MOUTH DAILY 7/10/18  Yes Anjali Molina MD       REVIEW OF SYSTEMS:     Total of 12 systems reviewed as follows:   I am not able to complete the review of systems because: The patient is intubated and sedated    The patient has altered mental status due to his acute medical problems    The patient has baseline aphasia from prior stroke(s)    The patient has baseline dementia and is not reliable historian                 POSITIVE= underlined text  Negative = text not underlined  General:  fever, chills, sweats, generalized weakness, weight loss/gain,      loss of appetite   Eyes:    blurred vision, eye pain, loss of vision, double vision  ENT:    rhinorrhea, pharyngitis   Respiratory:   cough, sputum production, SOB, wheezing, PATEL, pleuritic pain   Cardiology:   chest pain, palpitations, orthopnea, PND, edema, syncope   Gastrointestinal:  abdominal pain , N/V, dysphagia, diarrhea, constipation, bleeding   Genitourinary:  frequency, urgency, dysuria, hematuria, incontinence   Muskuloskeletal :  arthralgia, myalgia   Hematology:  easy bruising, nose or gum bleeding, lymphadenopathy   Dermatological: rash, ulceration, pruritis   Endocrine:   hot flashes or polydipsia   Neurological:  headache, dizziness, confusion, focal weakness, paresthesia,     Speech difficulties, memory loss, gait disturbance  Psychological: Feelings of anxiety, depression, agitation    Objective:   VITALS:    Visit Vitals  /62 (BP 1 Location: Right arm, BP Patient Position: At rest)   Pulse 77   Temp (!) 102.3 °F (39.1 °C)   Resp 18   Ht 5' 11\" (1.803 m)   Wt 72.6 kg (160 lb 0 oz)   SpO2 96%   BMI 22.32 kg/m²     Temp (24hrs), Av.6 °F (37.6 °C), Min:98.1 °F (36.7 °C), Max:102.3 °F (39.1 °C)      PHYSICAL EXAM:   General:    Alert, cooperative, no distress, appears stated age.      HEENT: Atraumatic, anicteric sclerae, pink conjunctivae     No oral ulcers, mucosa moist, throat clear  Neck:  Supple, symmetrical,  thyroid: non tender  Lungs:   Clear to auscultation bilaterally. No Wheezing or Rhonchi. No rales. Chest wall:  Compression dressing over L upper chest. No Accessory muscle use. Heart:   Regular  rhythm,  No  murmur   No edema  Abdomen:   Soft, non-tender. Not distended. Bowel sounds normal  Extremities: No cyanosis. No clubbing  Skin:     Not pale. Not Jaundiced  No rashes. Scattered ecchymoses on L UE, L chest and L flank. Psych:  Fair insight. Not depressed. Not anxious or agitated. Neurologic: EOMs intact. No facial asymmetry. No aphasia or slurred speech. Symmetrical strength, Alert and oriented X 4.     _______________________________________________________________________  Care Plan discussed with:    Comments   Patient x    Family  x Significant other at bedside   RN x    Care Manager                    Consultant:      ____________________________________________________________________  TOTAL TIME:     50 mins    Comments    x Reviewed previous records   >50% of visit spent in counseling and coordination of care x Discussion with patient and/or family and questions answered       Critical Care Provided     Minutes non procedure based  ________________________________________________________________________  Signed: Padmini Cannon MD      Procedures: see electronic medical records for all procedures/Xrays and details which were not copied into this note but were reviewed prior to creation of Plan.     LAB DATA REVIEWED:    Recent Results (from the past 24 hour(s))   CBC WITH AUTOMATED DIFF    Collection Time: 11/08/18  7:39 AM   Result Value Ref Range    WBC 4.9 4.1 - 11.1 K/uL    RBC 3.43 (L) 4.10 - 5.70 M/uL    HGB 11.2 (L) 12.1 - 17.0 g/dL    HCT 34.2 (L) 36.6 - 50.3 %    MCV 99.7 (H) 80.0 - 99.0 FL    MCH 32.7 26.0 - 34.0 PG    MCHC 32.7 30.0 - 36.5 g/dL    RDW 13.8 11.5 - 14.5 %    PLATELET 136 495 - 820 K/uL    MPV 9.2 8.9 - 12.9 FL    NRBC 0.0 0  WBC    ABSOLUTE NRBC 0.00 0.00 - 0.01 K/uL    NEUTROPHILS 69 32 - 75 %    LYMPHOCYTES 16 12 - 49 %    MONOCYTES 12 5 - 13 %    EOSINOPHILS 2 0 - 7 %    BASOPHILS 0 0 - 1 %    IMMATURE GRANULOCYTES 1 (H) 0.0 - 0.5 %    ABS. NEUTROPHILS 3.3 1.8 - 8.0 K/UL    ABS. LYMPHOCYTES 0.8 0.8 - 3.5 K/UL    ABS. MONOCYTES 0.6 0.0 - 1.0 K/UL    ABS. EOSINOPHILS 0.1 0.0 - 0.4 K/UL    ABS. BASOPHILS 0.0 0.0 - 0.1 K/UL    ABS. IMM. GRANS. 0.0 0.00 - 0.04 K/UL    DF AUTOMATED     PTT    Collection Time: 11/08/18  7:39 AM   Result Value Ref Range    aPTT 25.3 22.1 - 32.0 sec    aPTT, therapeutic range     58.0 - 77.0 SECS   PROTHROMBIN TIME + INR    Collection Time: 11/08/18  7:39 AM   Result Value Ref Range    INR 1.0 0.9 - 1.1      Prothrombin time 10.1 9.0 - 29.2 sec   METABOLIC PANEL, COMPREHENSIVE    Collection Time: 11/08/18  7:39 AM   Result Value Ref Range    Sodium 141 136 - 145 mmol/L    Potassium 3.9 3.5 - 5.1 mmol/L    Chloride 105 97 - 108 mmol/L    CO2 28 21 - 32 mmol/L    Anion gap 8 5 - 15 mmol/L    Glucose 109 (H) 65 - 100 mg/dL    BUN 15 6 - 20 MG/DL    Creatinine 0.80 0.70 - 1.30 MG/DL    BUN/Creatinine ratio 19 12 - 20      GFR est AA >60 >60 ml/min/1.73m2    GFR est non-AA >60 >60 ml/min/1.73m2    Calcium 8.5 8.5 - 10.1 MG/DL    Bilirubin, total 0.7 0.2 - 1.0 MG/DL    ALT (SGPT) 19 12 - 78 U/L    AST (SGOT) 14 (L) 15 - 37 U/L    Alk.  phosphatase 92 45 - 117 U/L    Protein, total 6.5 6.4 - 8.2 g/dL    Albumin 3.3 (L) 3.5 - 5.0 g/dL    Globulin 3.2 2.0 - 4.0 g/dL    A-G Ratio 1.0 (L) 1.1 - 2.2     URINALYSIS W/MICROSCOPIC    Collection Time: 11/08/18 11:38 PM   Result Value Ref Range    Color DARK YELLOW      Appearance CLEAR CLEAR      Specific gravity 1.027 1.003 - 1.030      pH (UA) 6.0 5.0 - 8.0      Protein 30 (A) NEG mg/dL    Glucose 100 (A) NEG mg/dL    Ketone TRACE (A) NEG mg/dL    Blood MODERATE (A) NEG      Urobilinogen 1.0 0.2 - 1.0 EU/dL    Nitrites NEGATIVE  NEG Leukocyte Esterase NEGATIVE  NEG      WBC 0-4 0 - 4 /hpf    RBC 10-20 0 - 5 /hpf    Epithelial cells FEW FEW /lpf    Bacteria NEGATIVE  NEG /hpf    Hyaline cast 0-2 0 - 5 /lpf   URINE CULTURE HOLD SAMPLE    Collection Time: 11/08/18 11:38 PM   Result Value Ref Range    Urine culture hold        URINE ON HOLD IN MICROBIOLOGY DEPT FOR 3 DAYS. IF UNPRESERVED URINE IS SUBMITTED, IT CANNOT BE USED FOR ADDITIONAL TESTING AFTER 24 HRS, RECOLLECTION WILL BE REQUIRED.    BILIRUBIN, CONFIRM    Collection Time: 11/08/18 11:38 PM   Result Value Ref Range    Bilirubin UA, confirm NEGATIVE  NEG     CBC W/O DIFF    Collection Time: 11/09/18  2:08 AM   Result Value Ref Range    WBC 8.4 4.1 - 11.1 K/uL    RBC 3.47 (L) 4.10 - 5.70 M/uL    HGB 11.5 (L) 12.1 - 17.0 g/dL    HCT 34.1 (L) 36.6 - 50.3 %    MCV 98.3 80.0 - 99.0 FL    MCH 33.1 26.0 - 34.0 PG    MCHC 33.7 30.0 - 36.5 g/dL    RDW 13.7 11.5 - 14.5 %    PLATELET 449 060 - 084 K/uL    MPV 10.3 8.9 - 12.9 FL    NRBC 0.0 0  WBC    ABSOLUTE NRBC 0.00 0.00 - 5.08 K/uL   METABOLIC PANEL, BASIC    Collection Time: 11/09/18  2:08 AM   Result Value Ref Range    Sodium 138 136 - 145 mmol/L    Potassium 4.1 3.5 - 5.1 mmol/L    Chloride 105 97 - 108 mmol/L    CO2 24 21 - 32 mmol/L    Anion gap 9 5 - 15 mmol/L    Glucose 122 (H) 65 - 100 mg/dL    BUN 19 6 - 20 MG/DL    Creatinine 0.87 0.70 - 1.30 MG/DL    BUN/Creatinine ratio 22 (H) 12 - 20      GFR est AA >60 >60 ml/min/1.73m2    GFR est non-AA >60 >60 ml/min/1.73m2    Calcium 8.5 8.5 - 10.1 MG/DL   LACTIC ACID    Collection Time: 11/09/18  2:13 AM   Result Value Ref Range    Lactic acid 1.2 0.4 - 2.0 MMOL/L       _____________________________  Hospitalist: Anabela Jensen MD

## 2018-11-09 NOTE — PROGRESS NOTES
Pt hypotensive but un-symptomatic. Called Uzair Hicks NP received order for 250cc bolus. Will continue to monitor

## 2018-11-09 NOTE — ANESTHESIA POSTPROCEDURE EVALUATION
* No procedures listed *. Anesthesia Post Evaluation Patient location during evaluation: PACU Note status: Adequate. Level of consciousness: responsive to verbal stimuli and sleepy but conscious Pain management: satisfactory to patient Airway patency: patent Anesthetic complications: no 
Cardiovascular status: acceptable Respiratory status: acceptable Hydration status: acceptable Comments: +Post-Anesthesia Evaluation and Assessment Patient: Malou Garcia MRN: [id number]  SSN: [id number] YOB: 1945  Age: 68 y.o. Sex: male Cardiovascular Function/Vital Signs /59   Pulse 71   Temp 36.1 °C (97 °F)   Resp 12   Ht 5' 11\" (1.803 m)   Wt 72.6 kg (160 lb 0 oz)   SpO2 97%   BMI 22.32 kg/m² Patient is status post * No procedures listed *. Nausea/Vomiting: Controlled. Postoperative hydration reviewed and adequate. Pain: 
Pain Scale 1: Numeric (0 - 10) (11/09/18 1230) Pain Intensity 1: 0 (11/09/18 1230) Managed. Neurological Status: At baseline. Mental Status and Level of Consciousness: Arousable. Pulmonary Status:  
O2 Device: Nasal cannula (11/09/18 1200) Adequate oxygenation and airway patent. Complications related to anesthesia: None Post-anesthesia assessment completed. No concerns. Signed By: Melissa Louie MD  
 11/9/2018 Post anesthesia nausea and vomiting:  controlled Visit Vitals /59 Pulse 71 Temp 36.1 °C (97 °F) Resp 12 Ht 5' 11\" (1.803 m) Wt 72.6 kg (160 lb 0 oz) SpO2 97% BMI 22.32 kg/m²

## 2018-11-09 NOTE — PROGRESS NOTES
0813: Pt hypotensive, spoke with Jc Cuff, Np advise to give 250cc NS bolus. 0725: Bolus hung 
 
0820: Jc Cuff, NP in to see patient 0845: Will continue to monitor for changes. 4095: Pt BP dropped to 70/34. Pt placed in supine position with HOB flat. Pt c/o of nausea. 1978: Pt give Zofran 4mg IVP. Bolus completed and pt placed on maintenance IV fluids at 100/hr. 
 
 
0915: O2 sats 83%-88%. Pt placed in 2L o2 NC and O2 sats increased to 97%.

## 2018-11-09 NOTE — PROGRESS NOTES
Pharmacy Automatic Renal Dosing Protocol - Antimicrobials Indication for Antimicrobials: Possible pacer infection Current Regimen of Each Antimicrobial: 
Zosyn 3.375 mg IV every 6 hours (Start Date ; Day # 1) Vancomycin 1500 mg IV load followed by 750 mg IV every 8 hours (Start Date ; Day # 1) Previous Antimicrobial Therapy: 
None Goal Level: VANCOMYCIN TROUGH GOAL RANGE Vancomycin Trough: 15 - 20 mcg/mL Date Dose & Interval Measured (mcg/mL) Extrapolated (mcg/mL) Significant Cultures:  
 Blood: NGTD x 4 hours- pending  Wound: pending Paralysis, amputations, malnutrition: None documented Labs: 
Recent Labs 18 
3936 18 
8752 CREA 0.87 0.80 BUN 19 15 WBC 8.4 4.9 Temp (24hrs), Av.6 °F (37.6 °C), Min:97 °F (36.1 °C), Max:102.3 °F (39.1 °C) Creatinine Clearance (mL/min) or Dialysis: 79 mL/min Impression/Plan: · Will order vancomycin 1500 mg IV load followed by 750 mg IV every 8 hours for an estimated trough of 18.9 mcg/mL. · Will change Zosyn to every 8 hours extended infusion per protocol. · Antimicrobial stop date pending Pharmacy will follow daily and adjust medications as appropriate for renal function and/or serum levels. Thank you, Jennifer England, PHARMD 
 
Recommended duration of therapy 
[email or web address]//[facility]/Pharmacy/Clinical%20Companion/[email or web address]. docx Renal Dosing 
[email or web address]/[place]/[facility]/Pharmacy/Clinical%20Companion/Renal%[email or web address]. pdf

## 2018-11-09 NOTE — PROCEDURES
[address]  [phone]    Indications and Pre-Procedure Diagnosis:  Charito Perales is a 68 y.o. male with pacemaker complicated by hematoma, fever and mental status changes is referred for pocket evacuation and device removal of dual chamber pacemaker. Post Procedure Diagnosis:    Sick sinus syndrome  Hematoma  Post procedural fever  Mental status changes    Pacemaker Explant and pocket evacuation Procedure and Findings:  Informed consent was obtained and the patient was premedicated with vancomycin. The procedure was performed under local anesthesia. Continuous pulse oximetry and cuff pressure were monitored. During the procedure, the patient received Versed, Fentanyl, Propofol and Ketamine for sedation per anesthesia personnel. The left deltopectoral area was prepped and draped in the usual sterile fashion and was liberally infiltrated with 1% lidocaine. An incision was made over the left subpectoral area and the device was removed from the pocket. The pocket was cultured and sent to the lab. The pocket hematoma was evacuated - approximately 100 cc of coagulum were removed. The suture sleeves were cut and the leads were disconnected from the pulse generator. The pulse generator was removed from the body. A stylet was advanced to the tip of the right ventricular lead and the screw was retracted. The right ventricular lead was manually extracted with gentle traction under fluoroscopic guidance. A stylet was advanced to the tip of the right atrial lead and the screw was retracted. The right atrial lead was manually extracted with gentle traction under fluoroscopic guidance. The pulse generator pocket was then liberally infiltrated with bacitracin solution. The wound was closed in layers using intermittent 2-0 Vicryl and continuous 4-0 vicryl ending with a sub cuticular closure. A bio-occlusive dressing were applied to the skin.  Fluoroscopy and total procedure times were 0.5 and 20 minutes respectively. Estimated blood loss <10 ml. Sharp count: correct. Specimen(s) collected: none. The following procedure related complication occurred: none. The following problems were encountered: none. Findings: successful lead extraction/pacer removal and pocket evacuation. Supplies Summary available in the chart      Thank you for allowing me to participate in this patients care.     Sloane Varghese MD, Hector Randolph

## 2018-11-09 NOTE — PROGRESS NOTES
Bedside shift change report given to Maurice Nava RN (oncoming nurse) by Angela Rivas RN  (offgoing nurse). Report included the following information SBAR, Kardex and Recent Results.

## 2018-11-09 NOTE — PROGRESS NOTES
Problem: Patient Education: Go to Patient Education Activity Goal: Patient/Family Education Outcome: Progressing Towards Goal 
Plan of care discussed w pt and SO.

## 2018-11-10 ENCOUNTER — APPOINTMENT (OUTPATIENT)
Dept: GENERAL RADIOLOGY | Age: 73
DRG: 262 | End: 2018-11-10
Attending: INTERNAL MEDICINE
Payer: MEDICARE

## 2018-11-10 LAB
ANION GAP SERPL CALC-SCNC: 6 MMOL/L (ref 5–15)
ATRIAL RATE: 62 BPM
BUN SERPL-MCNC: 17 MG/DL (ref 6–20)
BUN/CREAT SERPL: 22 (ref 12–20)
CALCIUM SERPL-MCNC: 7.8 MG/DL (ref 8.5–10.1)
CALCULATED R AXIS, ECG10: 70 DEGREES
CALCULATED T AXIS, ECG11: 61 DEGREES
CHLORIDE SERPL-SCNC: 111 MMOL/L (ref 97–108)
CK SERPL-CCNC: 156 U/L (ref 39–308)
CO2 SERPL-SCNC: 25 MMOL/L (ref 21–32)
CREAT SERPL-MCNC: 0.76 MG/DL (ref 0.7–1.3)
DIAGNOSIS, 93000: NORMAL
ERYTHROCYTE [SEDIMENTATION RATE] IN BLOOD: 36 MM/HR (ref 0–20)
EST. AVERAGE GLUCOSE BLD GHB EST-MCNC: NORMAL MG/DL
FOLATE SERPL-MCNC: 15.6 NG/ML (ref 5–21)
GLUCOSE SERPL-MCNC: 116 MG/DL (ref 65–100)
HBA1C MFR BLD: 4.9 % (ref 4.2–6.3)
MAGNESIUM SERPL-MCNC: 2.1 MG/DL (ref 1.6–2.4)
POTASSIUM SERPL-SCNC: 4.4 MMOL/L (ref 3.5–5.1)
Q-T INTERVAL, ECG07: 426 MS
QRS DURATION, ECG06: 160 MS
QTC CALCULATION (BEZET), ECG08: 435 MS
SODIUM SERPL-SCNC: 142 MMOL/L (ref 136–145)
TSH SERPL DL<=0.05 MIU/L-ACNC: 0.5 UIU/ML (ref 0.36–3.74)
VENTRICULAR RATE, ECG03: 63 BPM
VIT B12 SERPL-MCNC: 636 PG/ML (ref 193–986)

## 2018-11-10 PROCEDURE — 74011000258 HC RX REV CODE- 258: Performed by: INTERNAL MEDICINE

## 2018-11-10 PROCEDURE — 87040 BLOOD CULTURE FOR BACTERIA: CPT

## 2018-11-10 PROCEDURE — 93005 ELECTROCARDIOGRAM TRACING: CPT

## 2018-11-10 PROCEDURE — 83520 IMMUNOASSAY QUANT NOS NONAB: CPT

## 2018-11-10 PROCEDURE — 83516 IMMUNOASSAY NONANTIBODY: CPT

## 2018-11-10 PROCEDURE — 83735 ASSAY OF MAGNESIUM: CPT

## 2018-11-10 PROCEDURE — 82607 VITAMIN B-12: CPT

## 2018-11-10 PROCEDURE — 82746 ASSAY OF FOLIC ACID SERUM: CPT

## 2018-11-10 PROCEDURE — 85652 RBC SED RATE AUTOMATED: CPT

## 2018-11-10 PROCEDURE — 74011250636 HC RX REV CODE- 250/636: Performed by: INTERNAL MEDICINE

## 2018-11-10 PROCEDURE — 74011250637 HC RX REV CODE- 250/637: Performed by: NURSE PRACTITIONER

## 2018-11-10 PROCEDURE — 86256 FLUORESCENT ANTIBODY TITER: CPT

## 2018-11-10 PROCEDURE — 83519 RIA NONANTIBODY: CPT

## 2018-11-10 PROCEDURE — 80048 BASIC METABOLIC PNL TOTAL CA: CPT

## 2018-11-10 PROCEDURE — 82306 VITAMIN D 25 HYDROXY: CPT

## 2018-11-10 PROCEDURE — 86038 ANTINUCLEAR ANTIBODIES: CPT

## 2018-11-10 PROCEDURE — 94760 N-INVAS EAR/PLS OXIMETRY 1: CPT

## 2018-11-10 PROCEDURE — 71045 X-RAY EXAM CHEST 1 VIEW: CPT

## 2018-11-10 PROCEDURE — 83036 HEMOGLOBIN GLYCOSYLATED A1C: CPT

## 2018-11-10 PROCEDURE — 82550 ASSAY OF CK (CPK): CPT

## 2018-11-10 PROCEDURE — 77010033678 HC OXYGEN DAILY

## 2018-11-10 PROCEDURE — 84443 ASSAY THYROID STIM HORMONE: CPT

## 2018-11-10 PROCEDURE — 74011250636 HC RX REV CODE- 250/636: Performed by: NURSE PRACTITIONER

## 2018-11-10 PROCEDURE — 82784 ASSAY IGA/IGD/IGG/IGM EACH: CPT

## 2018-11-10 PROCEDURE — 65660000000 HC RM CCU STEPDOWN

## 2018-11-10 PROCEDURE — 36415 COLL VENOUS BLD VENIPUNCTURE: CPT

## 2018-11-10 RX ADMIN — CLONAZEPAM 1 MG: 1 TABLET ORAL at 09:46

## 2018-11-10 RX ADMIN — Medication 10 ML: at 23:10

## 2018-11-10 RX ADMIN — SODIUM CHLORIDE 500 ML: 900 INJECTION, SOLUTION INTRAVENOUS at 00:15

## 2018-11-10 RX ADMIN — VANCOMYCIN HYDROCHLORIDE 750 MG: 750 INJECTION, POWDER, LYOPHILIZED, FOR SOLUTION INTRAVENOUS at 17:01

## 2018-11-10 RX ADMIN — SODIUM CHLORIDE 100 ML/HR: 900 INJECTION, SOLUTION INTRAVENOUS at 19:20

## 2018-11-10 RX ADMIN — GABAPENTIN 600 MG: 300 CAPSULE ORAL at 09:46

## 2018-11-10 RX ADMIN — PIPERACILLIN SODIUM,TAZOBACTAM SODIUM 3.38 G: 3; .375 INJECTION, POWDER, FOR SOLUTION INTRAVENOUS at 22:13

## 2018-11-10 RX ADMIN — Medication 10 ML: at 13:42

## 2018-11-10 RX ADMIN — VANCOMYCIN HYDROCHLORIDE 750 MG: 750 INJECTION, POWDER, LYOPHILIZED, FOR SOLUTION INTRAVENOUS at 09:51

## 2018-11-10 RX ADMIN — CLONAZEPAM 1 MG: 1 TABLET ORAL at 17:00

## 2018-11-10 RX ADMIN — CYANOCOBALAMIN TAB 500 MCG 500 MCG: 500 TAB at 09:46

## 2018-11-10 RX ADMIN — VANCOMYCIN HYDROCHLORIDE 750 MG: 750 INJECTION, POWDER, LYOPHILIZED, FOR SOLUTION INTRAVENOUS at 00:44

## 2018-11-10 RX ADMIN — DONEPEZIL HYDROCHLORIDE 5 MG: 5 TABLET, FILM COATED ORAL at 22:12

## 2018-11-10 RX ADMIN — PIPERACILLIN SODIUM,TAZOBACTAM SODIUM 3.38 G: 3; .375 INJECTION, POWDER, FOR SOLUTION INTRAVENOUS at 13:41

## 2018-11-10 RX ADMIN — DULOXETINE 60 MG: 30 CAPSULE, DELAYED RELEASE ORAL at 09:46

## 2018-11-10 RX ADMIN — PIPERACILLIN SODIUM,TAZOBACTAM SODIUM 3.38 G: 3; .375 INJECTION, POWDER, FOR SOLUTION INTRAVENOUS at 05:45

## 2018-11-10 RX ADMIN — Medication 10 ML: at 23:11

## 2018-11-10 NOTE — PROGRESS NOTES
Hospitalist Progress Note NAME: Geraldo Small :  1945 MRN:  [id number] Assessment / Plan: 
Infected PM pocket hematoma in setting of SSS with recent PM implantation Gram positive bacteremia  
-fever down. Pt s/p PM implant 10/16 for SSS, then admitted 10/24- after extremely large hematoma developed (the size of a football per his significant other), now re-admitted due to spontaneous bleeding from PM pocket 
-PPM was taken out today 
-follow cultures: [facility]  +, 11/10 NTD  
-ID following Encephalopathy / ataxia / speech changes on baseline dementia  
-started ~ 1 year ago when start having syncopal episodes but really got worse in the last 2-3 weeks 
-seen by neurology: Follow MRI and blood work - Pt has been seeing Dr. Anselm Soulier (neurology in [place] for this issues -PT/OT  
-head CT: normal  
-cont home Aricept  
- con't home cymbalta, klonipin, aricept neurontin 
 
h/o lumbar spinal stenosis H/o congenital hip dysplasia with bilateral replacements 
h/o EtOH use (none recently) 
 
 
 
 
  
Code Status: Full (significant other of >20yrs is decision maker if needed) DVT Prophylaxis: per primary team 
 
Recommended Disposition: TBD per primary team   
Need PT/OT for proper disposition. Wife is not sure if she will be able to handle him at home anymore Subjective: Chief Complaint / Reason for Physician Visit: following PPM site infection/ encephalopathy MS: much better today, aao x 3 , calm and cooperative L side chest TTP Discussed with RN events overnight. Review of Systems: 
Symptom Y/N Comments  Symptom Y/N Comments Fever/Chills n   Chest Pain n   
Poor Appetite    Edema Cough    Abdominal Pain n   
Sputum    Joint Pain SOB/PATEL n   Pruritis/Rash Nausea/vomit    Tolerating PT/OT Diarrhea    Tolerating Diet Constipation    Other Could NOT obtain due to:    
 
Objective: VITALS:  
 Last 24hrs VS reviewed since prior progress note. Most recent are: 
Patient Vitals for the past 24 hrs: 
 Temp Pulse Resp BP SpO2  
11/10/18 1130 98.2 °F (36.8 °C) 61 17 134/71 93 % 11/10/18 0800 98.8 °F (37.1 °C) 63 16 117/73 97 % 11/10/18 0708  64 14 120/59 96 % 11/10/18 0400 98.7 °F (37.1 °C) 65 16 109/72 93 % 11/10/18 0200  69 14 90/53 94 % 11/10/18 0040  69 15 111/63 92 % 11/10/18 0002  70 18 (!) 82/50 95 % 11/10/18 0001 100.3 °F (37.9 °C) 70 19 (!) 84/49 94 % 11/10/18 0000  70 18 (!) 88/46 95 % 11/09/18 2100    105/57   
11/09/18 2000  67 16 95/60 96 % 11/09/18 1903 99.9 °F (37.7 °C) 69 16 99/47 93 % 11/09/18 1837 99.9 °F (37.7 °C)      
11/09/18 1800  71 20 96/57 94 % 11/09/18 1713  69 15 94/49 (!) 86 % 11/09/18 1700  69 13 (!) 84/45 90 % 11/09/18 1636  73 15 104/55 91 % 11/09/18 1600  74 15 (!) 87/42 95 % 11/09/18 1500 98.1 °F (36.7 °C) 73 16 122/67 100 % 11/09/18 1435 98.1 °F (36.7 °C)      
11/09/18 1400  73 12 132/62   
11/09/18 1330  70 12 123/56 91 % 11/09/18 1300  71 12 118/60 95 % Intake/Output Summary (Last 24 hours) at 11/10/2018 1255 Last data filed at 11/10/2018 1035 Gross per 24 hour Intake 3481.67 ml Output 2275 ml Net 1206.67 ml PHYSICAL EXAM: 
General: WD, WN. Alert, cooperative, no acute distress   
EENT:  EOMI. Anicteric sclerae. MMM Resp:  CTA bilaterally, no wheezing or rales. No accessory muscle use CV:  Regular  rhythm,  No edema GI:  Soft, Non distended, Non tender.  +Bowel sounds Neurologic:  Alert and oriented X 3, normal speech Psych:   Fair/poor  insight. Not anxious nor agitated Skin:  No rashes. No jaundice Reviewed most current lab test results and cultures  YES Reviewed most current radiology test results   YES Review and summation of old records today    NO Reviewed patient's current orders and MAR    YES 
PMH/SH reviewed - no change compared to H&P 
 ________________________________________________________________________ Care Plan discussed with: 
  Comments Patient y Family RN y   
Care Manager Consultant Multidiciplinary team rounds were held today with , nursing, pharmacist and clinical coordinator. Patient's plan of care was discussed; medications were reviewed and discharge planning was addressed. ________________________________________________________________________ Total NON critical care TIME:  25  Minutes Total CRITICAL CARE TIME Spent:   Minutes non procedure based Comments >50% of visit spent in counseling and coordination of care    
________________________________________________________________________ Anuj Gar MD  
 
Procedures: see electronic medical records for all procedures/Xrays and details which were not copied into this note but were reviewed prior to creation of Plan. LABS: 
I reviewed today's most current labs and imaging studies. Pertinent labs include: 
Recent Labs 11/09/18 
3457 11/08/18 
1381 WBC 8.4 4.9 HGB 11.5* 11.2* HCT 34.1* 34.2*  
 164 Recent Labs 11/10/18 
18 11/09/18 
5808 11/08/18 
8195  138 141  
K 4.4 4.1 3.9 * 105 105 CO2 25 24 28 * 122* 109* BUN 17 19 15 CREA 0.76 0.87 0.80 CA 7.8* 8.5 8.5 MG 2.1  --   --   
ALB  --   --  3.3* TBILI  --   --  0.7 SGOT  --   --  14* ALT  --   --  19 INR  --   --  1.0 Signed: Anuj Gar MD

## 2018-11-10 NOTE — PROGRESS NOTES
Problem: Falls - Risk of 
Goal: *Absence of Falls Document Genevive Camera Fall Risk and appropriate interventions in the flowsheet. Outcome: Progressing Towards Goal 
Fall Risk Interventions: 
Mobility Interventions: Assess mobility with egress test, Bed/chair exit alarm, Communicate number of staff needed for ambulation/transfer, OT consult for ADLs, Patient to call before getting OOB, PT Consult for mobility concerns, PT Consult for assist device competence, Strengthening exercises (ROM-active/passive), Utilize walker, cane, or other assistive device Mentation Interventions: Adequate sleep, hydration, pain control, Bed/chair exit alarm, Door open when patient unattended, Evaluate medications/consider consulting pharmacy, Eyeglasses and hearing aids, Increase mobility, More frequent rounding, Reorient patient, Room close to nurse's station, Self-releasing belt, Toileting rounds, Update white board Medication Interventions: Assess postural VS orthostatic hypotension, Bed/chair exit alarm, Evaluate medications/consider consulting pharmacy, Patient to call before getting OOB, Teach patient to arise slowly Elimination Interventions: Bed/chair exit alarm, Call light in reach, Patient to call for help with toileting needs, Toilet paper/wipes in reach, Toileting schedule/hourly rounds, Urinal in reach History of Falls Interventions: Bed/chair exit alarm, Consult care management for discharge planning, Door open when patient unattended, Evaluate medications/consider consulting pharmacy, Investigate reason for fall, Room close to nurse's station

## 2018-11-10 NOTE — PROGRESS NOTES
11/10/2018 9:22 AM 
 
Admit Date: 11/8/2018 Admit Diagnosis: Pacemaker complications, subsequent encounter;Fever Subjective:  
 
Cinthia Screen   denies chest pain, chest pressure/discomfort, dyspnea, palpitations, irregular heart beats, near-syncope, syncope, fatigue, orthopnea. More awake. Overnight heart rate dropped in 30's transiently. Visit Vitals /73 (BP 1 Location: Right arm, BP Patient Position: At rest) Pulse 63 Temp 98.8 °F (37.1 °C) Resp 10 Ht 5' 11\" (1.803 m) Wt 160 lb 0 oz (72.6 kg) SpO2 97% BMI 22.32 kg/m² Current Facility-Administered Medications Medication Dose Route Frequency  ondansetron (ZOFRAN) injection 4 mg  4 mg IntraVENous Q6H PRN  
 nitroglycerin (NITROBID) 2 % ointment 1 Inch  1 Inch Topical Q6H PRN  
 0.9% sodium chloride infusion  100 mL/hr IntraVENous CONTINUOUS  
 ADDaptor      
 0.9% sodium chloride (MBP/ADV) infusion  sodium chloride (NS) flush 5-10 mL  5-10 mL IntraVENous Q8H  
 sodium chloride (NS) flush 5-10 mL  5-10 mL IntraVENous PRN  
 naloxone (NARCAN) injection 0.4 mg  0.4 mg IntraVENous PRN  
 acetaminophen (TYLENOL) tablet 650 mg  650 mg Oral Q4H PRN  
 HYDROcodone-acetaminophen (NORCO) 5-325 mg per tablet 1 Tab  1 Tab Oral Q4H PRN  piperacillin-tazobactam (ZOSYN) 3.375 g in 0.9% sodium chloride (MBP/ADV) 100 mL  3.375 g IntraVENous Q8H  
 vancomycin (VANCOCIN) 750 mg in 0.9% sodium chloride 250 mL ([id number])  750 mg IntraVENous Q8H  
 clonazePAM (KlonoPIN) tablet 1 mg  1 mg Oral BID  cyanocobalamin (VITAMIN B12) tablet 500 mcg  500 mcg Oral DAILY  donepezil (ARICEPT) tablet 5 mg  5 mg Oral QHS  DULoxetine (CYMBALTA) capsule 60 mg  60 mg Oral DAILY  gabapentin (NEURONTIN) capsule 600 mg  600 mg Oral DAILY  sodium chloride (NS) flush 5-10 mL  5-10 mL IntraVENous Q8H  
 sodium chloride (NS) flush 5-10 mL  5-10 mL IntraVENous PRN  
  acetaminophen (TYLENOL) tablet 650 mg  650 mg Oral Q4H PRN  
 oxyCODONE-acetaminophen (PERCOCET) 5-325 mg per tablet 1 Tab  1 Tab Oral Q6H PRN Objective:  
  
Visit Vitals /73 (BP 1 Location: Right arm, BP Patient Position: At rest) Pulse 63 Temp 98.8 °F (37.1 °C) Resp 10 Ht 5' 11\" (1.803 m) Wt 160 lb 0 oz (72.6 kg) SpO2 97% BMI 22.32 kg/m² Physical Exam: Abdomen: soft, non-tender. Bowel sounds normal.  
Extremities: no cyanosis or edema Heart: regular rate and rhythm, S1, S2 normal, no murmur, click, rub or gallop Lungs: clear to auscultation bilaterally Neurologic: Grossly normal 
 
Data Review:  
Labs:   
Recent Results (from the past 24 hour(s)) CULTURE, WOUND W GRAM STAIN Collection Time: 11/09/18 11:30 AM  
Result Value Ref Range Special Requests: NO SPECIAL REQUESTS    
 GRAM STAIN NO WBC'S SEEN    
 GRAM STAIN NO ORGANISMS SEEN Culture result: PENDING   
MYELIN ASSOC GLYCOPROTEIN AB, IGM Collection Time: 11/10/18 12:57 AM  
Result Value Ref Range Anti-MAG Ab, IgM PENDING Titer GM1 ANTIBODY IGG, IGM Collection Time: 11/10/18 12:57 AM  
Result Value Ref Range GM1 Ab, IgM PENDING titer GM1 IgG PENDING titer METABOLIC PANEL, BASIC Collection Time: 11/10/18  4:45 AM  
Result Value Ref Range Sodium 142 136 - 145 mmol/L Potassium 4.4 3.5 - 5.1 mmol/L Chloride 111 (H) 97 - 108 mmol/L  
 CO2 25 21 - 32 mmol/L Anion gap 6 5 - 15 mmol/L Glucose 116 (H) 65 - 100 mg/dL BUN 17 6 - 20 MG/DL Creatinine 0.76 0.70 - 1.30 MG/DL  
 BUN/Creatinine ratio 22 (H) 12 - 20 GFR est AA >60 >60 ml/min/1.73m2 GFR est non-AA >60 >60 ml/min/1.73m2 Calcium 7.8 (L) 8.5 - 10.1 MG/DL  
SED RATE (ESR) Collection Time: 11/10/18  4:45 AM  
Result Value Ref Range Sed rate, automated 36 (H) 0 - 20 mm/hr CK Collection Time: 11/10/18  4:45 AM  
Result Value Ref Range   39 - 308 U/L  
MAGNESIUM  
 Collection Time: 11/10/18  4:45 AM  
Result Value Ref Range Magnesium 2.1 1.6 - 2.4 mg/dL TSH 3RD GENERATION Collection Time: 11/10/18  4:45 AM  
Result Value Ref Range TSH 0.50 0.36 - 3.74 uIU/mL EKG, 12 LEAD, INITIAL Collection Time: 11/10/18  4:51 AM  
Result Value Ref Range Ventricular Rate 87 BPM  
 Atrial Rate 62 BPM  
 P-R Interval 48 ms QRS Duration 156 ms  
 Q-T Interval 500 ms QTC Calculation (Bezet) 601 ms Calculated R Axis 89 degrees Calculated T Axis 147 degrees Diagnosis Demand pacemaker, interpretation is based on intrinsic rhythm Sinus rhythm with short CA with premature supraventricular complexes with  
occasional and consecutive premature ventricular complexes and fusion 
complexes Nonspecific intraventricular block Lateral infarct , age undetermined Inferior infarct , age undetermined When compared with ECG of 10-SEP-2018 15:13, 
Significant changes have occurred Telemetry: normal sinus rhythm Assessment:  
 
Active Problems: 
  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  
 
Pocket hematoma, bacteremia: s/p PPM removal. On Abx. ID evaluation noted. Will schedule for [facility]  Monday. Ataxia: neurology following. Evaluation noted.

## 2018-11-10 NOTE — PROGRESS NOTES
Orders received and chart reviewed pt had bedrest orders and now has bedrest orders with activity allowed with OT/PT orders. Pt has had pacemaker removed yesterday and has SSS and there is no clarification from cardiology on if pt can mobilize with therapist.  Current orders for OT and PT are from the hospitalist.  Will need clearance from cardiology prior to start of OT services. Nurse notified. Will defer and follow up at a later time.

## 2018-11-10 NOTE — PROGRESS NOTES
Patient presenting more lethargic than earlier. VS as follows. Errol Calvert MD notified via telemed with prompt response and orders received for a 500cc one time bolus and Chest Xray for AM. Will continue to monitor. 11/10/18 0001 Vitals Temp 100.3 °F (37.9 °C) Temp Source Oral  
Pulse (Heart Rate) 70 Heart Rate Source Monitor Resp Rate 19  
O2 Sat (%) 94 % Level of Consciousness Responds to Voice BP (!) 84/49 MAP (Monitor) (!) 57 BP 1 Location Right arm BP Patient Position At rest  
Cardiac Rhythm NSR  
MEWS Score 3

## 2018-11-10 NOTE — PROGRESS NOTES
ID follow up note G+C in clusters in blood 4/4 bottles Recommend [facility] On Vancomycin + Zosyn for now with close renal monitoring Once cultures fully know, will taper antibiotics to target therapy Jose M Sullivan DO 
7:27 AM

## 2018-11-10 NOTE — PROGRESS NOTES
Bedside report received from Shu Sr RN Assessment, Background, Procedure summary, Intake/Output, MAR, and recent results discussed. Care assumed. Patient alert and  appropriate in conversation and answers all orientation questions correctly. Verified with Dr. Harry Andrade that patient able to get out of bed with physical therapy as long as sling remains in place to left arm . Verbal report given to oncoming nurse Amanda Duncan Report consisted of patients Situation, Background, Assessment, and  
Recommendations Information was reviewed with the receiving nurse. Opportunity for questions and clarification was provided.    
 
Steven Underwood RN

## 2018-11-10 NOTE — PROGRESS NOTES
-Please complete MRI History and Safety Screening Form for this patient using [facility] only under Orders Requiring a Screening Form: 
 

## 2018-11-10 NOTE — PROGRESS NOTES
Physical Therapy Orders received and chart reviewed pt had bedrest orders from cardiologist and now has bedrest orders with activity allowed with OT/PT orders from neurologist.  Pt has had pacemaker removed yesterday and has SSS and there is no clarification from cardiology on if pt can mobilize with therapist.  Current orders for OT and PT are from the hospitalist placed prior to both bedrest orders. Will need clearance from cardiology prior to start of PT services. Nurse notified and attempting to contact cardiology. Will defer and follow up at a later time. Thank you, Preston Matson PT

## 2018-11-10 NOTE — PROGRESS NOTES
Patient HR dropped into 30s (lowest noted 32) on tele. Patient pale c/o nausea. HR back up to 60s NSR. Marilyn Gee MD notified. Instructed RN to monitor at this time. VS following episode as follows: 
 
 11/10/18 5106 Vitals Pulse (Heart Rate) 64 Resp Rate 14  
O2 Sat (%) 96 % /59 MAP (Monitor) 72

## 2018-11-10 NOTE — PROGRESS NOTES
Critical result called from lab @2139 regarding blood cx drawn on 11/9 @0208: gram + cocci in clusters. Critical lab reported to Telemed @ [phone]

## 2018-11-10 NOTE — CONSULTS
Consult  REFERRED BY:  Mariela Molina MD    CHIEF COMPLAINT: Unsteady gait and frequent falls      Subjective:     Marine Perdomo is a 68 y.o. right-handed  male seen as a new patient to me at the request of Dr. Nga Rodriguez for evaluation of new problem of unsteady gait and frequent falls that seem to be slowly progressive over the last several months in addition to slowly progressive memory loss. Patient has a known history of unsteady gait for 3 or 4 years at least, for which he had a lumbar laminectomy done 2 years ago, without improvement in his gait. He is been to physical therapy, and tried on a cane but he cannot coordinate a cane due to his mental impairment. He has a history of dementia and is currently being evaluated by a Richard Dennis 1946 neurologist for Alzheimer's disease and is on Aricept 5 mg a day for the last year. His PCPs note 2 years ago showed that he was unsteady then, needing a walker to ambulate but not using it and falling frequently. He also complains of neck pain, and some headache, and had a normal CT of the head except for mild atrophic changes on admission yesterday. He does not give a history of clear strokes in the past.  He has risk factors for vascular disease including smoking, hypertension, cholesterol, but denies diabetes though his blood sugars are mildly elevated. He has no family history of similar problems, but does have a brother who has Alzheimer's disease. Patient has a history of heavy drinking for years in the past, but has been off the alcohol for 3 years. 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 His exam shows a mild neuropathy with more involvement of his position sensation so we will check his U52 levels and folic acid levels and anti-neuronal antibody titers.   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 Rule out treatable causes, so metabolic studies all sent off, MRI scans ordered of the cervical and lumbar spine and the brain, and he may need thoracic too, and PT and OT ordered in addition. Discussed this case with the patient and his wife in detail, and most of the information came from the wife since the patient is status post surgery and has a memory problems as above. Patient had pacemaker removed because of infection and hematoma at the pacemaker site, and is currently on antibiotic therapy for that and can get an MRI now. Past Medical History:   Diagnosis Date    Anxiety     Back pain     Blurred vision     Chest pain     Depression     Dizziness     Fast heart beat     Frequent headaches     Frequent urination     Hip dysplasia, congenital     Joint pain     Joint swelling     Lumbar spinal stenosis     Muscle pain     Neuropathy     Recent change in weight     Sinus problem     Skin disease     SOB (shortness of breath)     Sore throat     SSS (sick sinus syndrome) (HCC)     Stiffness of joints, multiple sites     Stress     Syncope     Tiredness     Trouble in sleeping     Weakness       Past Surgical History:   Procedure Laterality Date    HX HIP REPLACEMENT Bilateral 1993    HX PACEMAKER PLACEMENT  10/16/2018    HX ROTATOR CUFF REPAIR Right 11/16/2017     Family History   Problem Relation Age of Onset    Cancer Mother     Cancer Maternal Aunt       Social History     Tobacco Use    Smoking status: Current Every Day Smoker     Packs/day: 0.50     Years: 50.00     Pack years: 25.00     Types: Cigarettes    Smokeless tobacco: Never Used    Tobacco comment: 0.5-1 pack per day   Substance Use Topics    Alcohol use:  Yes     Alcohol/week: 16.8 oz     Types: 28 Cans of beer per week     Comment: None now         Current Facility-Administered Medications:     ondansetron (ZOFRAN) injection 4 mg, 4 mg, IntraVENous, Q6H PRN, Kathy Alvarez MD, 4 mg at 11/09/18 0910    nitroglycerin (NITROBID) 2 % ointment 1 Inch, 1 Inch, Topical, Q6H PRN, Kathy Alvarez MD    0.9% sodium chloride infusion, 100 mL/hr, IntraVENous, CONTINUOUS, Jennifer Doyle, ANP, Last Rate: 100 mL/hr at 11/09/18 0923, 100 mL/hr at 11/09/18 0923    midazolam (PF) (VERSED) 1 mg/mL injection, , , ,     ADDaptor, , , ,     0.9% sodium chloride (MBP/ADV) infusion, , , ,     sodium chloride (NS) flush 5-10 mL, 5-10 mL, IntraVENous, Q8H, Saran Mcdermott MD    sodium chloride (NS) flush 5-10 mL, 5-10 mL, IntraVENous, PRN, Saran Mcdermott MD    naloxone (NARCAN) injection 0.4 mg, 0.4 mg, IntraVENous, PRN, Charlee Mcdermott MD    acetaminophen (TYLENOL) tablet 650 mg, 650 mg, Oral, Q4H PRN, Charlee Mcdermott MD    HYDROcodone-acetaminophen (NORCO) 5-325 mg per tablet 1 Tab, 1 Tab, Oral, Q4H PRN, Saran Mcdermott MD    atropine 0.1 mg/mL injection, , , ,     [COMPLETED] piperacillin-tazobactam (ZOSYN) 3.375 g in 0.9% sodium chloride (MBP/ADV) 100 mL, 3.375 g, IntraVENous, ONCE, Last Rate: 200 mL/hr at 11/09/18 1533, 3.375 g at 11/09/18 1533 **FOLLOWED BY** piperacillin-tazobactam (ZOSYN) 3.375 g in 0.9% sodium chloride (MBP/ADV) 100 mL, 3.375 g, IntraVENous, Q8H, Saran Mcdermott MD    [START ON 11/10/2018] vancomycin (VANCOCIN) 750 mg in 0.9% sodium chloride 250 mL ([id number]), 750 mg, IntraVENous, Q8H, Fabienne Guadalupe R, DO    clonazePAM (KlonoPIN) tablet 1 mg, 1 mg, Oral, BID, Celena Oconnor L, NP, 1 mg at 11/09/18 1744    cyanocobalamin (VITAMIN B12) tablet 500 mcg, 500 mcg, Oral, DAILY, Celena Oconnor, NP, 500 mcg at 11/09/18 0912    donepezil (ARICEPT) tablet 5 mg, 5 mg, Oral, QHS, Celena Oconnor, NP, 5 mg at 11/08/18 2200    DULoxetine (CYMBALTA) capsule 60 mg, 60 mg, Oral, DAILY, Celena Oconnor, NP, 60 mg at 11/09/18 5444    gabapentin (NEURONTIN) capsule 600 mg, 600 mg, Oral, DAILY, Elvin, Celena L, NP, 600 mg at 11/09/18 0912    sodium chloride (NS) flush 5-10 mL, 5-10 mL, IntraVENous, Q8H, Elvin, Celena L, NP, 10 mL at 11/09/18 0130    sodium chloride (NS) flush 5-10 mL, 5-10 mL, IntraVENous, PRN, Para Anshu, Celena L, NP    acetaminophen (TYLENOL) tablet 650 mg, 650 mg, Oral, Q4H PRN, [place], Celena L, NP, 650 mg at 11/09/18 0259    oxyCODONE-acetaminophen (PERCOCET) 5-325 mg per tablet 1 Tab, 1 Tab, Oral, Q6H PRN, Rajeev Villatoro MD, 1 Tab at 11/09/18 0129        No Known Allergies   MRI Results (most recent):  No results found for this or any previous visit. No results found for this or any previous visit. Review of Systems:  Review of systems not obtained due to patient factors. Vitals:    11/09/18 1700 11/09/18 1713 11/09/18 1800 11/09/18 1837   BP: (!) 84/45 94/49 96/57    Pulse: 69 69 71    Resp: 13 15 20    Temp:    99.9 °F (37.7 °C)   SpO2: 90% (!) 86% 94%    Weight:       Height:         Objective:     I      NEUROLOGICAL EXAM:    Appearance: The patient is well developed, well nourished, provides a poor history and is in no acute distress, as patient is still coming out of surgery. Mental Status: Oriented to place and person, and the president, but not the date, cognitive function is only slow and probably mildly abnormal and speech is fluent and no aphasia or dysarthria. Mood and affect appropriate. Cranial Nerves:   Intact visual fields. Fundi are benign. KARINA, EOM's full, no nystagmus, no ptosis. Facial sensation is normal. Corneal reflexes are not tested. Facial movement is symmetric. Hearing is abnormal bilaterally. Palate is midline with normal sternocleidomastoid and trapezius muscles are normal. Tongue is midline.   Neck without meningismus or bruits  Temporal arteries are not tender or enlarged  TMJ areas are not tender on palpation   Motor:  4/5 strength in upper and lower proximal and distal muscles. Normal bulk and tone. No fasciculations. Reflexes:   Deep tendon reflexes 1+/4 and symmetrical.  No babinski or clonus present   Sensory:   Abnormal to touch, pinprick and vibration and temperature in both feet to midcalf level. DSS is intact   Gait:  Not testable gait since patient is postop and still sedated. Tremor:   No tremor noted. Cerebellar:  No cerebellar signs present on finger-nose-finger exam.  Could not test Romberg and tandem   Neurovascular:  Normal heart sounds and regular rhythm, peripheral pulses decreased, and no carotid bruits. Assessment:       ICD-10-CM ICD-9-CM    1. Pacemaker complications, subsequent encounter T82. 9XXD V58.89      996.72    2. Hematoma T14. 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)      Memory disturbance (11/9/2018)      Altered mental status, unspecified (11/9/2018)      Degenerative cervical spinal stenosis (11/9/2018)      History of lumbar laminectomy (11/9/2018)      Degenerative lumbar spinal stenosis (11/9/2018)        Plan:     Patient with complicated multifactorial gait disturbance most likely, we will try to rule out a treatable cause we will check MRI of the cervical spine, lumbar spine, brain, and complete metabolic screen for all metabolic parameters to rule out neuropathy or other causes of ataxia like B12 deficiency  Discussed with the patient and his wife in detail, they agree with plans and therapy. Have ordered PT and OT in addition he may need EMG studies eventually as an outpatient.   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 We will follow closely with you, and continue active medical care as you are.     Signed By: Misty Langston MD     November 9, 2018       CC: Dorota Womack MD  FAX: [phone]

## 2018-11-11 LAB
ANION GAP SERPL CALC-SCNC: 5 MMOL/L (ref 5–15)
ATRIAL RATE: 54 BPM
BACTERIA SPEC CULT: ABNORMAL
BUN SERPL-MCNC: 12 MG/DL (ref 6–20)
BUN/CREAT SERPL: 18 (ref 12–20)
CALCIUM SERPL-MCNC: 7.7 MG/DL (ref 8.5–10.1)
CALCULATED P AXIS, ECG09: 46 DEGREES
CALCULATED R AXIS, ECG10: 53 DEGREES
CALCULATED T AXIS, ECG11: 47 DEGREES
CHLORIDE SERPL-SCNC: 113 MMOL/L (ref 97–108)
CO2 SERPL-SCNC: 26 MMOL/L (ref 21–32)
CREAT SERPL-MCNC: 0.67 MG/DL (ref 0.7–1.3)
DATE LAST DOSE: ABNORMAL
DIAGNOSIS, 93000: NORMAL
GLUCOSE SERPL-MCNC: 99 MG/DL (ref 65–100)
GRAM STN SPEC: ABNORMAL
GRAM STN SPEC: ABNORMAL
P-R INTERVAL, ECG05: 166 MS
POTASSIUM SERPL-SCNC: 3.6 MMOL/L (ref 3.5–5.1)
Q-T INTERVAL, ECG07: 432 MS
QRS DURATION, ECG06: 84 MS
QTC CALCULATION (BEZET), ECG08: 409 MS
REPORTED DOSE,DOSE: ABNORMAL UNITS
REPORTED DOSE/TIME,TMG: 100
SERVICE CMNT-IMP: ABNORMAL
SODIUM SERPL-SCNC: 144 MMOL/L (ref 136–145)
VANCOMYCIN TROUGH SERPL-MCNC: 15.6 UG/ML (ref 5–10)
VENTRICULAR RATE, ECG03: 54 BPM

## 2018-11-11 PROCEDURE — 74011000258 HC RX REV CODE- 258: Performed by: INTERNAL MEDICINE

## 2018-11-11 PROCEDURE — 65660000000 HC RM CCU STEPDOWN

## 2018-11-11 PROCEDURE — 74011250637 HC RX REV CODE- 250/637: Performed by: NURSE PRACTITIONER

## 2018-11-11 PROCEDURE — 80202 ASSAY OF VANCOMYCIN: CPT

## 2018-11-11 PROCEDURE — 74011250636 HC RX REV CODE- 250/636: Performed by: INTERNAL MEDICINE

## 2018-11-11 PROCEDURE — 74011250637 HC RX REV CODE- 250/637: Performed by: INTERNAL MEDICINE

## 2018-11-11 PROCEDURE — 80048 BASIC METABOLIC PNL TOTAL CA: CPT

## 2018-11-11 PROCEDURE — 93005 ELECTROCARDIOGRAM TRACING: CPT

## 2018-11-11 PROCEDURE — 36415 COLL VENOUS BLD VENIPUNCTURE: CPT

## 2018-11-11 PROCEDURE — 74011250636 HC RX REV CODE- 250/636: Performed by: HOSPITALIST

## 2018-11-11 PROCEDURE — 74011250636 HC RX REV CODE- 250/636: Performed by: NURSE PRACTITIONER

## 2018-11-11 RX ORDER — LANOLIN ALCOHOL/MO/W.PET/CERES
9 CREAM (GRAM) TOPICAL
Status: DISCONTINUED | OUTPATIENT
Start: 2018-11-11 | End: 2018-11-14 | Stop reason: HOSPADM

## 2018-11-11 RX ORDER — LORAZEPAM 2 MG/ML
1 INJECTION INTRAMUSCULAR
Status: DISCONTINUED | OUTPATIENT
Start: 2018-11-11 | End: 2018-11-14 | Stop reason: HOSPADM

## 2018-11-11 RX ORDER — HALOPERIDOL 5 MG/ML
2 INJECTION INTRAMUSCULAR ONCE
Status: COMPLETED | OUTPATIENT
Start: 2018-11-11 | End: 2018-11-11

## 2018-11-11 RX ORDER — ZIPRASIDONE HYDROCHLORIDE 20 MG/1
20 CAPSULE ORAL
Status: DISPENSED | OUTPATIENT
Start: 2018-11-11 | End: 2018-11-12

## 2018-11-11 RX ADMIN — CLONAZEPAM 1 MG: 1 TABLET ORAL at 08:28

## 2018-11-11 RX ADMIN — GABAPENTIN 600 MG: 300 CAPSULE ORAL at 08:28

## 2018-11-11 RX ADMIN — ACETAMINOPHEN 650 MG: 325 TABLET ORAL at 06:48

## 2018-11-11 RX ADMIN — DULOXETINE 60 MG: 30 CAPSULE, DELAYED RELEASE ORAL at 08:28

## 2018-11-11 RX ADMIN — Medication 10 ML: at 14:30

## 2018-11-11 RX ADMIN — DONEPEZIL HYDROCHLORIDE 5 MG: 5 TABLET, FILM COATED ORAL at 22:23

## 2018-11-11 RX ADMIN — LORAZEPAM 1 MG: 2 INJECTION INTRAMUSCULAR; INTRAVENOUS at 20:05

## 2018-11-11 RX ADMIN — Medication 10 ML: at 01:42

## 2018-11-11 RX ADMIN — ACETAMINOPHEN 650 MG: 325 TABLET ORAL at 22:51

## 2018-11-11 RX ADMIN — Medication 10 ML: at 20:45

## 2018-11-11 RX ADMIN — CLONAZEPAM 1 MG: 1 TABLET ORAL at 17:27

## 2018-11-11 RX ADMIN — Medication 10 ML: at 22:22

## 2018-11-11 RX ADMIN — CYANOCOBALAMIN TAB 500 MCG 500 MCG: 500 TAB at 08:28

## 2018-11-11 RX ADMIN — VANCOMYCIN HYDROCHLORIDE 750 MG: 750 INJECTION, POWDER, LYOPHILIZED, FOR SOLUTION INTRAVENOUS at 17:00

## 2018-11-11 RX ADMIN — VANCOMYCIN HYDROCHLORIDE 750 MG: 750 INJECTION, POWDER, LYOPHILIZED, FOR SOLUTION INTRAVENOUS at 01:43

## 2018-11-11 RX ADMIN — VANCOMYCIN HYDROCHLORIDE 750 MG: 750 INJECTION, POWDER, LYOPHILIZED, FOR SOLUTION INTRAVENOUS at 08:27

## 2018-11-11 RX ADMIN — HALOPERIDOL LACTATE 2 MG: 5 INJECTION, SOLUTION INTRAMUSCULAR at 20:44

## 2018-11-11 RX ADMIN — LORAZEPAM 1 MG: 2 INJECTION INTRAMUSCULAR; INTRAVENOUS at 14:14

## 2018-11-11 RX ADMIN — ACETAMINOPHEN 650 MG: 325 TABLET ORAL at 12:44

## 2018-11-11 RX ADMIN — SODIUM CHLORIDE 100 ML/HR: 900 INJECTION, SOLUTION INTRAVENOUS at 01:41

## 2018-11-11 RX ADMIN — ZIPRASIDONE HYDROCHLORIDE 20 MG: 20 CAPSULE ORAL at 22:51

## 2018-11-11 RX ADMIN — Medication 10 ML: at 01:41

## 2018-11-11 RX ADMIN — PIPERACILLIN SODIUM,TAZOBACTAM SODIUM 3.38 G: 3; .375 INJECTION, POWDER, FOR SOLUTION INTRAVENOUS at 04:59

## 2018-11-11 NOTE — PROGRESS NOTES
Physical Therapy Received PT eval orders, pt cleared for mobility with sling. However, nurse recommending deferring pt at this time due to pt agitation. Will monitor and eval as appropriate.  
 
Brenda Clonts, PT

## 2018-11-11 NOTE — PROGRESS NOTES
Occupational Therapy Discussed patient with nursing in morning who cleared patient for mobility with sling on LUE per Dr. Rocio Huntley. When following up later in afternoon for OT eval, nurse present with patient in chair. Nursing recommend deferring pt at this time due to pt agitation. Will continue to follow for OT eval as appropriate. Thank you, Bela Stone, OT

## 2018-11-11 NOTE — PROGRESS NOTES
New PIV 20g inserted x 1 attempt, pt tolerated well 
 
3248 Patient assisted to bathroom 2001 Chg bath, linens and gown changed

## 2018-11-11 NOTE — PROGRESS NOTES
11/11/2018 7:24 AM 
 
Admit Date: 11/8/2018 Admit Diagnosis: Pacemaker complications, subsequent encounter;Fever Subjective:  
 
[place] [employer] [employer] overnight confused. Better this morning. No nursing events. Visit Vitals /74 Pulse (!) 56 Temp 98.1 °F (36.7 °C) Resp 20 Ht 5' 11\" (1.803 m) Wt 160 lb 0 oz (72.6 kg) SpO2 93% BMI 22.32 kg/m² Current Facility-Administered Medications Medication Dose Route Frequency  Vancomycin trough draw reminder note  1 Each Other ONCE  
 ondansetron (ZOFRAN) injection 4 mg  4 mg IntraVENous Q6H PRN  
 nitroglycerin (NITROBID) 2 % ointment 1 Inch  1 Inch Topical Q6H PRN  
 0.9% sodium chloride infusion  100 mL/hr IntraVENous CONTINUOUS  
 ADDaptor      
 0.9% sodium chloride (MBP/ADV) infusion  sodium chloride (NS) flush 5-10 mL  5-10 mL IntraVENous Q8H  
 sodium chloride (NS) flush 5-10 mL  5-10 mL IntraVENous PRN  
 naloxone (NARCAN) injection 0.4 mg  0.4 mg IntraVENous PRN  
 acetaminophen (TYLENOL) tablet 650 mg  650 mg Oral Q4H PRN  
 HYDROcodone-acetaminophen (NORCO) 5-325 mg per tablet 1 Tab  1 Tab Oral Q4H PRN  piperacillin-tazobactam (ZOSYN) 3.375 g in 0.9% sodium chloride (MBP/ADV) 100 mL  3.375 g IntraVENous Q8H  
 vancomycin (VANCOCIN) 750 mg in 0.9% sodium chloride 250 mL ([id number])  750 mg IntraVENous Q8H  
 clonazePAM (KlonoPIN) tablet 1 mg  1 mg Oral BID  cyanocobalamin (VITAMIN B12) tablet 500 mcg  500 mcg Oral DAILY  donepezil (ARICEPT) tablet 5 mg  5 mg Oral QHS  DULoxetine (CYMBALTA) capsule 60 mg  60 mg Oral DAILY  gabapentin (NEURONTIN) capsule 600 mg  600 mg Oral DAILY  sodium chloride (NS) flush 5-10 mL  5-10 mL IntraVENous Q8H  
 sodium chloride (NS) flush 5-10 mL  5-10 mL IntraVENous PRN  
 acetaminophen (TYLENOL) tablet 650 mg  650 mg Oral Q4H PRN  
 oxyCODONE-acetaminophen (PERCOCET) 5-325 mg per tablet 1 Tab  1 Tab Oral Q6H PRN Objective:  
  
Visit Vitals /74 Pulse (!) 56 Temp 98.1 °F (36.7 °C) Resp 20 Ht 5' 11\" (1.803 m) Wt 160 lb 0 oz (72.6 kg) SpO2 93% BMI 22.32 kg/m² Physical Exam: Abdomen: soft, non-tender. Bowel sounds normal.  
Extremities: no cyanosis or edema Heart: regular rate and rhythm, S1, S2 normal, no murmur, click, rub or gallop Lungs: clear to auscultation bilaterally Neurologic: Grossly normal 
 
Data Review:  
Labs:   
Recent Results (from the past 24 hour(s)) EKG, 12 LEAD, INITIAL Collection Time: 11/11/18  1:50 AM  
Result Value Ref Range Ventricular Rate 54 BPM  
 Atrial Rate 54 BPM  
 P-R Interval 166 ms  
 QRS Duration 84 ms Q-T Interval 432 ms QTC Calculation (Bezet) 409 ms Calculated P Axis 46 degrees Calculated R Axis 53 degrees Calculated T Axis 47 degrees Diagnosis Sinus bradycardia When compared with ECG of 10-NOV-2018 04:52, 
Previous ECG has undetermined rhythm, needs review Questionable change in QRS duration Criteria for Inferior infarct are no longer present METABOLIC PANEL, BASIC Collection Time: 11/11/18  2:00 AM  
Result Value Ref Range Sodium 144 136 - 145 mmol/L Potassium 3.6 3.5 - 5.1 mmol/L Chloride 113 (H) 97 - 108 mmol/L  
 CO2 26 21 - 32 mmol/L Anion gap 5 5 - 15 mmol/L Glucose 99 65 - 100 mg/dL BUN 12 6 - 20 MG/DL Creatinine 0.67 (L) 0.70 - 1.30 MG/DL  
 BUN/Creatinine ratio 18 12 - 20 GFR est AA >60 >60 ml/min/1.73m2 GFR est non-AA >60 >60 ml/min/1.73m2 Calcium 7.7 (L) 8.5 - 10.1 MG/DL Telemetry: normal sinus rhythm Assessment:  
 
Active Problems: 
  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  
 
Pocket hematoma, bacteremia: s/p PPM removal. On Abx. ID evaluation noted. Will schedule for [facility] tomorrow. DC IV fluid.

## 2018-11-11 NOTE — PROGRESS NOTES
ID note Spoke with micro lab staff to identify and work up CoNS in blood cultures DC Zosyn Continue renally dosed Vancomycin Antibiotics to be adjusted based on CoNS susceptibilities Await [facility] tomorrow Fabienne Guadalupe,  
10:04 AM

## 2018-11-11 NOTE — PROGRESS NOTES
Pharmacy Automatic Renal Dosing Protocol - Antimicrobials Indication for Antimicrobials: Possible pacer infection Current Regimen of Each Antimicrobial: 
Zosyn 3.375 mg IV every 6 hours (Start Date  Day # 3) Vancomycin 750 mg IV every 8 hours (Start Date  Day # 3) Previous Antimicrobial Therapy: 
None Vancomycin trough goal level:  15-20 Date Dose & Interval Measured (mcg/mL) Extrapolated (mcg/mL)  
 @ 8:38am 750mg IV q8h 15.6 15.8 Significant Cultures: 
 WCx - moderate Staph Epi - final 
11/10 BCx - ng - pending 11/10 /18 PBCx - possible Staph species CoN 
 UCx - held Paralysis, amputations, malnutrition: None documented Labs: 
Recent Labs 18 
0200 11/10/18 
0445 18 
0809 CREA 0.67* 0.76 0.87 BUN 12 17 19 WBC  --   --  8.4 Temp (24hrs), Av.9 °F (36.6 °C), Min:97.4 °F (36.3 °C), Max:98.2 °F (36.8 °C) Creatinine Clearance (mL/min) or Dialysis:  
Estimated Creatinine Clearance: 102.3 mL/min (A) (based on SCr of 0.67 mg/dL (L)). Estimated Creatinine Clearance (using IBW):106.1 mL/min Impression/Plan: · Afebrile, WBC wnl, SCr stable · Vancomycin therapeutic within goal range thus continue Vancomycin regimen · Zosyn discontinue based on culture results · BMP already ordered daily · Antimicrobial stop date pending Pharmacy will follow daily and adjust medications as appropriate for renal function and/or serum levels.  
 
Thank you, 
Carrie Officer, [facility]

## 2018-11-11 NOTE — PROGRESS NOTES
Hospitalist Progress Note NAME: Jenny Query :  1945 MRN:  [id number] Assessment / Plan: 
Infected PM pocket hematoma in setting of SSS with recent PM implantation Gram positive bacteremia  
-post PPM site looks puffier today/ not tender / no erythema or warmth Pt s/p PM implant 10/16 for SSS, then admitted 10/24- after extremely large hematoma developed (the size of a football per his significant other), now re-admitted due to spontaneous bleeding from PM pocket 
-PPM was taken out   
-follow cultures: [facility] [place]  coag negative staph, 11/10 NTD 
[facility] in am   
-ID following Encephalopathy / ataxia / speech changes on baseline dementia  
-started ~ 1 year ago when start having syncopal episodes but really got worse in the last 2-3 weeks -neurology follows  
-blood work: B12/folate/hba1c/tsh - normal 
Follow the rest of blood work send out by neuro - Pt has been seeing Dr. Bryan Cantu (ne[place]rology in [place] for this issues -PT/OT when cleared by cardiology  
-head CT: normal  
-cont home Aricept  
- con't home cymbalta, klonipin, aricept neurontin 
 
h/o lumbar spinal stenosis H/o congenital hip dysplasia with bilateral replacements 
h/o EtOH use (none recently) 
 
 
 
 
  
Code Status: Full (significant other of >20yrs is decision maker if needed) DVT Prophylaxis: per primary team 
 
Recommended Disposition: TBD per primary team   
Need PT/OT for proper disposition. Wife is not sure if she will be able to handle him at home anymore Subjective: Chief Complaint / Reason for Physician Visit: following PPM site infection/ encephalopathy MS: remain stable, aao x 3 , calm and cooperative PPM site looks puffier today Discussed with RN events overnight. Review of Systems: 
Symptom Y/N Comments  Symptom Y/N Comments Fever/Chills n   Chest Pain n   
Poor Appetite    Edema Cough    Abdominal Pain n   
Sputum    Joint Pain SOB/PATEL n   Pruritis/Rash Nausea/vomit    Tolerating PT/OT Diarrhea    Tolerating Diet Constipation    Other Could NOT obtain due to:    
 
Objective: VITALS:  
Last 24hrs VS reviewed since prior progress note. Most recent are: 
Patient Vitals for the past 24 hrs: 
 Temp Pulse Resp BP SpO2  
11/11/18 0400  (!) 56 20  93 % 11/11/18 0206 98.1 °F (36.7 °C) (!) 58 16 150/74 92 % 11/10/18 2311 98.2 °F (36.8 °C) (!) 59 14 168/73 93 % 11/10/18 1916 97.4 °F (36.3 °C) 61 18 (!) 133/96 95 % 11/10/18 1913 97.4 °F (36.3 °C)      
11/10/18 1600  65  126/73 92 % 11/10/18 1510 97.9 °F (36.6 °C) 64 18 108/59 96 % 11/10/18 1400  64  128/76 94 % 11/10/18 1200  60  131/63 93 % 11/10/18 1130 98.2 °F (36.8 °C) 61 17 134/71 93 % Intake/Output Summary (Last 24 hours) at 11/11/2018 1045 Last data filed at 11/11/2018 7456 Gross per 24 hour Intake 3270 ml Output 3075 ml Net 195 ml PHYSICAL EXAM: 
General: WD, WN. Alert, cooperative, no acute distress   
EENT:  EOMI. Anicteric sclerae. MMM Resp:  CTA bilaterally, no wheezing or rales. No accessory muscle use CV:  Regular  rhythm,  No edema GI:  Soft, Non distended, Non tender.  +Bowel sounds Neurologic:  Alert and oriented X 3, normal speech Psych:   Fair/poor  insight. Not anxious nor agitated Skin:  No rashes. No jaundice Reviewed most current lab test results and cultures  YES Reviewed most current radiology test results   YES Review and summation of old records today    NO Reviewed patient's current orders and MAR    YES 
PMH/SH reviewed - no change compared to H&P 
________________________________________________________________________ Care Plan discussed with: 
  Comments Patient y Family RN y   
Care Manager Consultant Multidiciplinary team rounds were held today with , nursing, pharmacist and clinical coordinator.   Patient's plan of care was discussed; medications were reviewed and discharge planning was addressed. ________________________________________________________________________ Total NON critical care TIME:  25  Minutes Total CRITICAL CARE TIME Spent:   Minutes non procedure based Comments >50% of visit spent in counseling and coordination of care    
________________________________________________________________________ Bel Chen MD  
 
Procedures: see electronic medical records for all procedures/Xrays and details which were not copied into this note but were reviewed prior to creation of Plan. LABS: 
I reviewed today's most current labs and imaging studies. Pertinent labs include: 
Recent Labs 11/09/18 
1401 WBC 8.4 HGB 11.5* HCT 34.1*  
 Recent Labs 11/11/18 
0200 11/10/18 
0445 11/09/18 
3391  142 138  
K 3.6 4.4 4.1 * 111* 105 CO2 26 25 24 GLU 99 116* 122* BUN 12 17 19 CREA 0.67* 0.76 0.87 CA 7.7* 7.8* 8.5 MG  --  2.1  --   
 
 
Signed: Bel Chen MD

## 2018-11-12 ENCOUNTER — APPOINTMENT (OUTPATIENT)
Dept: MRI IMAGING | Age: 73
DRG: 262 | End: 2018-11-12
Attending: PSYCHIATRY & NEUROLOGY
Payer: MEDICARE

## 2018-11-12 LAB
ANION GAP SERPL CALC-SCNC: 6 MMOL/L (ref 5–15)
BACTERIA SPEC CULT: ABNORMAL
BACTERIA SPEC CULT: ABNORMAL
BUN SERPL-MCNC: 10 MG/DL (ref 6–20)
BUN/CREAT SERPL: 18 (ref 12–20)
CALCIUM SERPL-MCNC: 8.6 MG/DL (ref 8.5–10.1)
CHLORIDE SERPL-SCNC: 112 MMOL/L (ref 97–108)
CO2 SERPL-SCNC: 27 MMOL/L (ref 21–32)
CREAT SERPL-MCNC: 0.56 MG/DL (ref 0.7–1.3)
ERYTHROCYTE [DISTWIDTH] IN BLOOD BY AUTOMATED COUNT: 13.2 % (ref 11.5–14.5)
GLUCOSE SERPL-MCNC: 105 MG/DL (ref 65–100)
HCT VFR BLD AUTO: 30.9 % (ref 36.6–50.3)
HGB BLD-MCNC: 10.3 G/DL (ref 12.1–17)
MCH RBC QN AUTO: 32.4 PG (ref 26–34)
MCHC RBC AUTO-ENTMCNC: 33.3 G/DL (ref 30–36.5)
MCV RBC AUTO: 97.2 FL (ref 80–99)
NRBC # BLD: 0 K/UL (ref 0–0.01)
NRBC BLD-RTO: 0 PER 100 WBC
PLATELET # BLD AUTO: 179 K/UL (ref 150–400)
PMV BLD AUTO: 10.4 FL (ref 8.9–12.9)
POTASSIUM SERPL-SCNC: 3.4 MMOL/L (ref 3.5–5.1)
RBC # BLD AUTO: 3.18 M/UL (ref 4.1–5.7)
SERVICE CMNT-IMP: ABNORMAL
SODIUM SERPL-SCNC: 145 MMOL/L (ref 136–145)
WBC # BLD AUTO: 4.4 K/UL (ref 4.1–11.1)

## 2018-11-12 PROCEDURE — 72148 MRI LUMBAR SPINE W/O DYE: CPT

## 2018-11-12 PROCEDURE — 72141 MRI NECK SPINE W/O DYE: CPT

## 2018-11-12 PROCEDURE — 99152 MOD SED SAME PHYS/QHP 5/>YRS: CPT

## 2018-11-12 PROCEDURE — 70553 MRI BRAIN STEM W/O & W/DYE: CPT

## 2018-11-12 PROCEDURE — 36415 COLL VENOUS BLD VENIPUNCTURE: CPT

## 2018-11-12 PROCEDURE — 74011250637 HC RX REV CODE- 250/637: Performed by: INTERNAL MEDICINE

## 2018-11-12 PROCEDURE — 74011250637 HC RX REV CODE- 250/637: Performed by: NURSE PRACTITIONER

## 2018-11-12 PROCEDURE — 85027 COMPLETE CBC AUTOMATED: CPT

## 2018-11-12 PROCEDURE — 74011250636 HC RX REV CODE- 250/636: Performed by: HOSPITALIST

## 2018-11-12 PROCEDURE — 80048 BASIC METABOLIC PNL TOTAL CA: CPT

## 2018-11-12 PROCEDURE — 94760 N-INVAS EAR/PLS OXIMETRY 1: CPT

## 2018-11-12 PROCEDURE — 77010033678 HC OXYGEN DAILY

## 2018-11-12 PROCEDURE — 99153 MOD SED SAME PHYS/QHP EA: CPT

## 2018-11-12 PROCEDURE — 65660000000 HC RM CCU STEPDOWN

## 2018-11-12 PROCEDURE — 74011250637 HC RX REV CODE- 250/637: Performed by: HOSPITALIST

## 2018-11-12 PROCEDURE — 74011250636 HC RX REV CODE- 250/636: Performed by: INTERNAL MEDICINE

## 2018-11-12 PROCEDURE — 74011000250 HC RX REV CODE- 250: Performed by: INTERNAL MEDICINE

## 2018-11-12 PROCEDURE — 74011250636 HC RX REV CODE- 250/636

## 2018-11-12 PROCEDURE — 74011000250 HC RX REV CODE- 250

## 2018-11-12 PROCEDURE — L3650 SO 8 ABD RESTRAINT PRE OTS: HCPCS

## 2018-11-12 PROCEDURE — 93325 DOPPLER ECHO COLOR FLOW MAPG: CPT

## 2018-11-12 RX ORDER — LIDOCAINE HYDROCHLORIDE 20 MG/ML
15 SOLUTION OROPHARYNGEAL ONCE
Status: COMPLETED | OUTPATIENT
Start: 2018-11-12 | End: 2018-11-12

## 2018-11-12 RX ORDER — CLONAZEPAM 1 MG/1
1 TABLET ORAL 2 TIMES DAILY
Status: DISCONTINUED | OUTPATIENT
Start: 2018-11-12 | End: 2018-11-14 | Stop reason: HOSPADM

## 2018-11-12 RX ORDER — FENTANYL CITRATE 50 UG/ML
INJECTION, SOLUTION INTRAMUSCULAR; INTRAVENOUS
Status: COMPLETED
Start: 2018-11-12 | End: 2018-11-12

## 2018-11-12 RX ORDER — MIDAZOLAM HYDROCHLORIDE 1 MG/ML
.5-2 INJECTION, SOLUTION INTRAMUSCULAR; INTRAVENOUS
Status: DISCONTINUED | OUTPATIENT
Start: 2018-11-12 | End: 2018-11-12

## 2018-11-12 RX ORDER — POTASSIUM CHLORIDE 750 MG/1
20 TABLET, FILM COATED, EXTENDED RELEASE ORAL
Status: COMPLETED | OUTPATIENT
Start: 2018-11-12 | End: 2018-11-12

## 2018-11-12 RX ORDER — MIDAZOLAM HYDROCHLORIDE 1 MG/ML
INJECTION, SOLUTION INTRAMUSCULAR; INTRAVENOUS
Status: COMPLETED
Start: 2018-11-12 | End: 2018-11-12

## 2018-11-12 RX ORDER — FENTANYL CITRATE 50 UG/ML
25-50 INJECTION, SOLUTION INTRAMUSCULAR; INTRAVENOUS
Status: DISCONTINUED | OUTPATIENT
Start: 2018-11-12 | End: 2018-11-12

## 2018-11-12 RX ORDER — LIDOCAINE HYDROCHLORIDE 20 MG/ML
SOLUTION OROPHARYNGEAL
Status: COMPLETED
Start: 2018-11-12 | End: 2018-11-12

## 2018-11-12 RX ORDER — IBUPROFEN 200 MG
1 TABLET ORAL EVERY 24 HOURS
Status: DISCONTINUED | OUTPATIENT
Start: 2018-11-12 | End: 2018-11-14 | Stop reason: HOSPADM

## 2018-11-12 RX ADMIN — GABAPENTIN 600 MG: 300 CAPSULE ORAL at 13:39

## 2018-11-12 RX ADMIN — VANCOMYCIN HYDROCHLORIDE 750 MG: 750 INJECTION, POWDER, LYOPHILIZED, FOR SOLUTION INTRAVENOUS at 20:06

## 2018-11-12 RX ADMIN — Medication 10 ML: at 23:15

## 2018-11-12 RX ADMIN — DULOXETINE 60 MG: 30 CAPSULE, DELAYED RELEASE ORAL at 13:39

## 2018-11-12 RX ADMIN — LIDOCAINE HYDROCHLORIDE 15 ML: 20 SOLUTION OROPHARYNGEAL at 09:41

## 2018-11-12 RX ADMIN — CYANOCOBALAMIN TAB 500 MCG 500 MCG: 500 TAB at 13:39

## 2018-11-12 RX ADMIN — MIDAZOLAM HYDROCHLORIDE 1 MG: 1 INJECTION, SOLUTION INTRAMUSCULAR; INTRAVENOUS at 10:07

## 2018-11-12 RX ADMIN — VANCOMYCIN HYDROCHLORIDE 750 MG: 750 INJECTION, POWDER, LYOPHILIZED, FOR SOLUTION INTRAVENOUS at 11:32

## 2018-11-12 RX ADMIN — MIDAZOLAM HYDROCHLORIDE 1 MG: 1 INJECTION, SOLUTION INTRAMUSCULAR; INTRAVENOUS at 09:54

## 2018-11-12 RX ADMIN — MIDAZOLAM HYDROCHLORIDE 1 MG: 1 INJECTION, SOLUTION INTRAMUSCULAR; INTRAVENOUS at 09:47

## 2018-11-12 RX ADMIN — CLONAZEPAM 1 MG: 1 TABLET ORAL at 23:14

## 2018-11-12 RX ADMIN — CLONAZEPAM 1 MG: 1 TABLET ORAL at 13:38

## 2018-11-12 RX ADMIN — MELATONIN TAB 3 MG 9 MG: 3 TAB at 23:13

## 2018-11-12 RX ADMIN — LORAZEPAM 1 MG: 2 INJECTION INTRAMUSCULAR; INTRAVENOUS at 20:29

## 2018-11-12 RX ADMIN — Medication 10 ML: at 13:39

## 2018-11-12 RX ADMIN — Medication 10 ML: at 03:34

## 2018-11-12 RX ADMIN — VANCOMYCIN HYDROCHLORIDE 750 MG: 750 INJECTION, POWDER, LYOPHILIZED, FOR SOLUTION INTRAVENOUS at 01:55

## 2018-11-12 RX ADMIN — LIDOCAINE HYDROCHLORIDE 15 ML: 20 SOLUTION ORAL; TOPICAL at 09:41

## 2018-11-12 RX ADMIN — DONEPEZIL HYDROCHLORIDE 5 MG: 5 TABLET, FILM COATED ORAL at 23:17

## 2018-11-12 RX ADMIN — FENTANYL CITRATE 25 MCG: 50 INJECTION, SOLUTION INTRAMUSCULAR; INTRAVENOUS at 09:48

## 2018-11-12 RX ADMIN — POTASSIUM CHLORIDE 20 MEQ: 750 TABLET, FILM COATED, EXTENDED RELEASE ORAL at 13:39

## 2018-11-12 RX ADMIN — BENZOCAINE, BUTAMBEN, AND TETRACAINE HYDROCHLORIDE 1 SPRAY: .028; .004; .004 AEROSOL, SPRAY TOPICAL at 09:42

## 2018-11-12 RX ADMIN — Medication 10 ML: at 03:33

## 2018-11-12 RX ADMIN — ACETAMINOPHEN 650 MG: 325 TABLET ORAL at 23:13

## 2018-11-12 RX ADMIN — Medication 10 ML: at 20:30

## 2018-11-12 NOTE — PROGRESS NOTES
Cardiac Electrophysiology Progress Note [address], [place], [address] [facility]  [phone] 
 
11/12/2018 10:30 AM 
 
Admit Date: 11/8/2018 Admit Diagnosis: Pacemaker complications, subsequent encounter Fever Subjective:  
 
Jazlyn Leiva  Currently sedated and unable to answer. Visit Vitals /66 Pulse (!) 57 Temp 98.7 °F (37.1 °C) Resp 16 Ht 5' 11\" (1.803 m) Wt 160 lb 0 oz (72.6 kg) SpO2 95% BMI 22.32 kg/m² Current Facility-Administered Medications Medication Dose Route Frequency  LORazepam (ATIVAN) injection 1 mg  1 mg IntraVENous Q6H PRN  
 melatonin tablet 9 mg  9 mg Oral QHS  ziprasidone (GEODON) capsule 20 mg  20 mg Oral Q12H PRN  
 ondansetron (ZOFRAN) injection 4 mg  4 mg IntraVENous Q6H PRN  
 nitroglycerin (NITROBID) 2 % ointment 1 Inch  1 Inch Topical Q6H PRN  
 ADDaptor      
 0.9% sodium chloride (MBP/ADV) infusion  sodium chloride (NS) flush 5-10 mL  5-10 mL IntraVENous Q8H  
 sodium chloride (NS) flush 5-10 mL  5-10 mL IntraVENous PRN  
 naloxone (NARCAN) injection 0.4 mg  0.4 mg IntraVENous PRN  
 acetaminophen (TYLENOL) tablet 650 mg  650 mg Oral Q4H PRN  
 HYDROcodone-acetaminophen (NORCO) 5-325 mg per tablet 1 Tab  1 Tab Oral Q4H PRN  
 vancomycin (VANCOCIN) 750 mg in 0.9% sodium chloride 250 mL ([id number])  750 mg IntraVENous Q8H  
 clonazePAM (KlonoPIN) tablet 1 mg  1 mg Oral BID  cyanocobalamin (VITAMIN B12) tablet 500 mcg  500 mcg Oral DAILY  donepezil (ARICEPT) tablet 5 mg  5 mg Oral QHS  DULoxetine (CYMBALTA) capsule 60 mg  60 mg Oral DAILY  gabapentin (NEURONTIN) capsule 600 mg  600 mg Oral DAILY  sodium chloride (NS) flush 5-10 mL  5-10 mL IntraVENous Q8H  
 sodium chloride (NS) flush 5-10 mL  5-10 mL IntraVENous PRN  
 acetaminophen (TYLENOL) tablet 650 mg  650 mg Oral Q4H PRN  
 oxyCODONE-acetaminophen (PERCOCET) 5-325 mg per tablet 1 Tab  1 Tab Oral Q6H PRN Objective: Visit Vitals /66 Pulse (!) 57 Temp 98.7 °F (37.1 °C) Resp 16 Ht 5' 11\" (1.803 m) Wt 160 lb 0 oz (72.6 kg) SpO2 95% BMI 22.32 kg/m² Physical Exam: Abdomen: soft, non-tender Extremities: extremities normal 
Chest: hematoma to left subclavian site. Heart: regular rate and rhythm Lungs: clear to auscultation bilaterally Pulses: 2+ and symmetric Data Review:  
Labs:   
No results for input(s): WBC, HGB, HCT, PLT, HGBEXT, HCTEXT, PLTEXT in the last 72 hours. Recent Labs 11/12/18 
0332 11/11/18 
0200 11/10/18 
0445  144 142  
K 3.4* 3.6 4.4  
* 113* 111* CO2 27 26 25 * 99 116* BUN 10 12 17 CREA 0.56* 0.67* 0.76 CA 8.6 7.7* 7.8*  
MG  --   --  2.1 Recent Labs 11/10/18 
0445  Intake/Output Summary (Last 24 hours) at 11/12/2018 1030 Last data filed at 11/12/2018 1022 Gross per 24 hour Intake 1230 ml Output 2275 ml Net -1045 ml Telemetry: SR 50s Assessment:  
 
Active Problems: 
  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  
 
Jenny Query is s/p PM 10/16/18 for syncope and pauses, with explant 11/9/18 for pocket hematoma and fevers/bacteremia. HR mostly 50- 60s however HR has dropped down into 30s this weekend. Per nursing staff, he was agitated this weekend. Currently followed by ID and Neuro. MINH this morning negative for vegetation. Will reapply pressure dressing. Continue to follow.   
 
Jennifer Cabrera Aas, ANP 
 
 Patient seen and examined by me with nurse practitioner. I personally performed all components of the history, physical, and medical decision making and agree with the assessment and plan with minor modifications as noted. MINH negative for vegation. Continue with neuro w/u. Appreciate ID input Callum Cueto MD, [facility] 
 
 
 
11/12/2018 10:30 AM

## 2018-11-12 NOTE — PROGRESS NOTES
Paged on call hospitalist, the patient has been confused since admission but tonight he has been combative. He has order for Ativan Q6 hour today, first dose given. About to give the second dose. The patient has been complaining of not getting sleep \"for three days and three nights\". \"All I want to sleep\". The other nurses  corroborated this. I took care of him last night and he didn't sleep at all. 2130 The patient is back in bed, vital signs stable, nasal cannula 2 liters per minute in place, patient denies any pain or shortness of breath. Sitter in room, 
 
7290 New PIV inserted to right FA 20g x 1 attempt, patient tolerated well, no complication. 4006 The patient won't cooperate with EKG test. The patient would not lie still and pull at the leads and refuse to have his EKG done

## 2018-11-12 NOTE — PROGRESS NOTES
Hospitalist Progress Note NAME: Ajay Blackwell :  1945 MRN:  [id number] Assessment / Plan: 
Infected PM pocket hematoma in setting of SSS with recent PM implantation Gram positive bacteremia  
-post PPM site looks even more puffier today/ not tender / no erythema or warmth Concern for bleeding, h/h stat Pt s/p PM implant 10/16 for SSS, then admitted 10/24- after extremely large hematoma developed (the size of a football per his significant other), now re-admitted due to spontaneous bleeding from PM pocket 
-PPM was taken out   
-follow cultures: [facility]  coag negative staph, 11/10 NTD 
MINH today  
-ID following  
-monitor h/h for bleeding Encephalopathy / ataxia / speech changes on baseline dementia  
-started ~ 1 year ago when start having syncopal episodes but really got worse in the last 2-3 weeks Significant agitation here  
-ativan prn for now 
-will ask psych to see to help with meds  
palliative care consulted to address goals of care and clarify B[facility]  
-neurology follows : need MRI  
-blood work: B12/folate/hba1c/tsh - normal 
Follow the rest of blood work send out by neuro - Pt has been seeing Dr. Raul Lora (ne[place]rology in [place] for this issues -PT/OT when cleared by primary team cardiology  
-head CT: normal  
- con't home cymbalta, klonipin, aricept neurontin Hypokalemia 
-supplement as needed 
-follow in am with Mg  
 
h/o lumbar spinal stenosis H/o congenital hip dysplasia with bilateral replacements 
h/o EtOH use (none recently) 
 
 
 
 
  
Code Status: Full (significant other of >20yrs is decision maker if needed) DVT Prophylaxis: per primary team 
 
Recommended Disposition: TBD per primary team   
Need PT/OT for proper disposition. He lives with significant other Chestine Gentle. She  is not sure if she will be able to handle him at home anymore Subjective: Chief Complaint / Reason for Physician Visit: following PPM site infection/ encephalopathy MS: got very agitated last night again , required ativan/ haldol/ Geodon PPM site looks even more puffier today Discussed with RN events overnight. Review of Systems: 
Symptom Y/N Comments  Symptom Y/N Comments Fever/Chills n   Chest Pain n   
Poor Appetite    Edema Cough    Abdominal Pain n   
Sputum    Joint Pain SOB/PATEL n   Pruritis/Rash Nausea/vomit    Tolerating PT/OT Diarrhea    Tolerating Diet Constipation    Other Could NOT obtain due to:    
 
Objective: VITALS:  
Last 24hrs VS reviewed since prior progress note. Most recent are: 
Patient Vitals for the past 24 hrs: 
 Temp Pulse Resp BP SpO2  
11/12/18 1030  (!) 57 14 117/69 95 % 11/12/18 1025  (!) 57 16 141/66 95 % 11/12/18 1020  (!) 58 15 151/64 95 % 11/12/18 1015  (!) 59 18 152/75 96 % 11/12/18 1010  (!) 56 18 151/72 98 % 11/12/18 1005  60 16 142/75 98 % 11/12/18 1000  (!) 59 16 150/68 97 % 11/12/18 0955  (!) 59 16 165/71 96 % 11/12/18 0950  61 16 165/81 96 % 11/12/18 0945  65 16 165/57 96 % 11/12/18 0930  (!) 59 15 161/61 94 % 11/12/18 0923  64 18 152/69 98 % 11/12/18 0722 98.7 °F (37.1 °C) (!) 48 14 126/71 99 % 11/12/18 0305 98.1 °F (36.7 °C) (!) 53 14 126/69 99 % 11/11/18 2311 98.2 °F (36.8 °C) (!) 58 18 148/59 100 % 11/11/18 2231 98.1 °F (36.7 °C) 63 16 131/66 97 % 11/11/18 2109  66  140/62 99 % 11/11/18 1930 97.9 °F (36.6 °C) 70 18 170/81 95 % 11/11/18 1740  73 18 158/75 96 % 11/11/18 1618 97.8 °F (36.6 °C) 65 20 190/82 95 % 11/11/18 1117 98.1 °F (36.7 °C) (!) 59 18 155/84 97 % Intake/Output Summary (Last 24 hours) at 11/12/2018 1036 Last data filed at 11/12/2018 1022 Gross per 24 hour Intake 1230 ml Output 2275 ml Net -1045 ml PHYSICAL EXAM: 
General: WD, WN. Alert, cooperative, no acute distress   
EENT:  EOMI. Anicteric sclerae. MMM Resp:  CTA bilaterally, no wheezing or rales. No accessory muscle use CV:  Regular  rhythm,  No edema Chest :            PPM site: swollen/ not erythema/warmth/tender GI:  Soft, Non distended, Non tender.  +Bowel sounds Neurologic:  Alert and oriented X 1, confused , normal speech Psych:   Fair/poor  insight. Not anxious nor agitated Skin:  No rashes. No jaundice Reviewed most current lab test results and cultures  YES Reviewed most current radiology test results   YES Review and summation of old records today    NO Reviewed patient's current orders and MAR    YES 
PMH/SH reviewed - no change compared to H&P 
________________________________________________________________________ Care Plan discussed with: 
  Comments Patient y Family RN y   
Care Manager Consultant Multidiciplinary team rounds were held today with , nursing, pharmacist and clinical coordinator. Patient's plan of care was discussed; medications were reviewed and discharge planning was addressed. ________________________________________________________________________ Total NON critical care TIME:  35  Minutes Total CRITICAL CARE TIME Spent:   Minutes non procedure based Comments >50% of visit spent in counseling and coordination of care y Coordination of care   
________________________________________________________________________ Zoey Pizarro MD  
 
Procedures: see electronic medical records for all procedures/Xrays and details which were not copied into this note but were reviewed prior to creation of Plan. LABS: 
I reviewed today's most current labs and imaging studies. Pertinent labs include: No results for input(s): WBC, HGB, HCT, PLT, HGBEXT, HCTEXT, PLTEXT, HGBEXT, HCTEXT, PLTEXT in the last 72 hours. Recent Labs 11/12/18 
0332 11/11/18 
0200 11/10/18 
0445  144 142  
K 3.4* 3.6 4.4  
* 113* 111* CO2 27 26 25 * 99 116* BUN 10 12 17 CREA 0.56* 0.67* 0.76 CA 8.6 7.7* 7.8*  
 MG  --   --  2.1 Signed: Naveen Mcarthur MD

## 2018-11-12 NOTE — PROGRESS NOTES
Bedside report received from [facility] Assessment, Background, Procedure summary, Intake/Output, MAR, and recent results discussed. Care assumed. Patient is much more confused today oriented to person and place. Requires reorientation frequently. Patient becoming increasingly agitated attempting to get out of bed several times. Patient is confused and unsteady and at high risk of falling. Bed alarm remains in place but patient has poor safety awareness, is impulsive and becoming increasingly difficult to reorient. Patient spoke with wife over the phone x3 through out the day. Attempted to obtain tele-sitter devices but devices were unavailable. Spoke with Nursing Supervisor Deborah Guerra and obtained sitter to remain at bedside. Patient continues to attempt to get out of bed. Patient up to hallway and speaking loudly, refuses to return to room/ bed. Patient very unsteady and disoriented and becoming very agitated Informed Dr. Katya Hameed of patient's situation. Also told her patient complaining of itching, disoriented, appears to be hallucinating at times and is very  Restless and pulling on lines/ tubes. Patient is only oriented to self at this time. Nursing Supervisor Melva walking by at the time and was told patient may become a code [place] shortly due to agitation and attempting to get OOB, pulling on lines/ tubes. Suggested patient may be ETOH due to symptoms and previous history. MD states the \"patient reports he does not drink. \" 1mg ativan IV q6 ordered and administered. If CIWA was being measured patient would be a 26 Slight improvement but no significantl effect on agitation following administration of ativan. CIWA would have improved to 17 Verbal report given to oncoming nurse Report consisted of patients Situation, Background, Assessment, and  
Recommendations Information was reviewed with the receiving nurse. Opportunity for questions and clarification was provided. Harman Cancino RN Following report patient became very agitated, removed clothing and started yelling in hallway. Refused to return to room or sit. Patient very unsteady tech/ nurse remains at patient''s side despite being told to go away and pushed aside. Dr. Camille Lindsay contacted Ativan and haldol administered by night shift nurse. Patient's wife contacted. Upon further conversation she revealed patietn drinks several ~4 non-alcoholic beers and occasional alcohol daily at home. Refrained from applying restraints due to patietn's increased agitation and RANDY incision. Dr. Bogdan Quiñones notified. CIWA scale initiated.

## 2018-11-12 NOTE — PROGRESS NOTES
Infectious Disease Progress Note Subjective: Ms Mehreen Cervantes had some bleeding over pacer site and pressure dressing being applied by team  
His friend says that has been anxious . ROS: 10 point ROS obtained and pertinent positives include above. All others negative. Objective: 
 
Vitals:  
Patient Vitals for the past 24 hrs: 
 Temp Pulse Resp BP SpO2  
11/12/18 1040  (!) 42 18 134/56 95 % 11/12/18 1035  (!) 57 14 134/79 95 % 11/12/18 1030  (!) 57 14 117/69 95 % 11/12/18 1025  (!) 57 16 141/66 95 % 11/12/18 1020  (!) 58 15 151/64 95 % 11/12/18 1015  (!) 59 18 152/75 96 % 11/12/18 1010  (!) 56 18 151/72 98 % 11/12/18 1005  60 16 142/75 98 % 11/12/18 1000  (!) 59 16 150/68 97 % 11/12/18 0955  (!) 59 16 165/71 96 % 11/12/18 0950  61 16 165/81 96 % 11/12/18 0945  65 16 165/57 96 % 11/12/18 0930  (!) 59 15 161/61 94 % 11/12/18 0923  64 18 152/69 98 % 11/12/18 0722 98.7 °F (37.1 °C) (!) 48 14 126/71 99 % 11/12/18 0305 98.1 °F (36.7 °C) (!) 53 14 126/69 99 % 11/11/18 2311 98.2 °F (36.8 °C) (!) 58 18 148/59 100 % 11/11/18 2231 98.1 °F (36.7 °C) 63 16 131/66 97 % 11/11/18 2109  66  140/62 99 % 11/11/18 1930 97.9 °F (36.6 °C) 70 18 170/81 95 % 11/11/18 1740  73 18 158/75 96 % 11/11/18 1618 97.8 °F (36.6 °C) 65 20 190/82 95 % Physical Exam: 
Gen: No apparent distress HEENT:  , no scleral icterus, no thrush, CV: S1,2 heard regularly, no lower extremity edema  , + dressing over chest 
Lungs: Clear to auscultation Abdomen: soft, non tender, non distended Skin: no rash Musculoskeletal:  No joint edema, erythema or tenderness noted knees , ankles Medications: 
 
Current Facility-Administered Medications:  
  potassium chloride SR (KLOR-CON 10) tablet 20 mEq, 20 mEq, Oral, NOW, Rebeka Petit MD 
  LORazepam (ATIVAN) injection 1 mg, 1 mg, IntraVENous, Q6H PRN, Beatriz Bonilla MD, 1 mg at 11/11/18 2005   melatonin tablet 9 mg, 9 mg, Oral, QHS, Shun Stuart MD 
  ziprasidone (GEODON) capsule 20 mg, 20 mg, Oral, Q12H PRN, Karyn Velázquez MD, 20 mg at 11/11/18 2251   ondansetron (ZOFRAN) injection 4 mg, 4 mg, IntraVENous, Q6H PRN, Raymond Adams MD, 4 mg at 11/09/18 0910 
  nitroglycerin (NITROBID) 2 % ointment 1 Inch, 1 Inch, Topical, Q6H PRN, Raymond Adams MD 
  ADDaptor, , , ,  
  0.9% sodium chloride (MBP/ADV) infusion, , , ,  
  sodium chloride (NS) flush 5-10 mL, 5-10 mL, IntraVENous, Q8H, Saran Mcdermott MD, 10 mL at 11/12/18 4627   sodium chloride (NS) flush 5-10 mL, 5-10 mL, IntraVENous, PRN, Saran Mcdermott MD, 10 mL at 11/10/18 2311 
  naloxone (NARCAN) injection 0.4 mg, 0.4 mg, IntraVENous, PRN, Tremaine Mcdermott MD 
  acetaminophen (TYLENOL) tablet 650 mg, 650 mg, Oral, Q4H PRN, Saran Mcdermott MD, 650 mg at 11/11/18 2251 
  HYDROcodone-acetaminophen (NORCO) 5-325 mg per tablet 1 Tab, 1 Tab, Oral, Q4H PRN, Saran Mcdermott MD 
  vancomycin (VANCOCIN) 750 mg in 0.9% sodium chloride 250 mL ([id number]), 750 mg, IntraVENous, Q8H, Fabienne Guadalupe DO, Last Rate: 125 mL/hr at 11/12/18 1132, 750 mg at 11/12/18 1132   clonazePAM (KlonoPIN) tablet 1 mg, 1 mg, Oral, BID, Celena Oconnor NP, 1 mg at 11/11/18 1727   cyanocobalamin (VITAMIN B12) tablet 500 mcg, 500 mcg, Oral, DAILY, [place] Oconnor NP, 500 mcg at 11/11/18 0027   donepezil (ARICEPT) tablet 5 mg, 5 mg, Oral, QHS, Maddie Oconnora L, NP, 5 mg at 11/11/18 2223   DULoxetine (CYMBALTA) capsule 60 mg, 60 mg, Oral, DAILY, [place], [place] L, NP, 60 mg at 11/11/18 0828 
  gabapentin (NEURONTIN) capsule 600 mg, 600 mg, Oral, DAILY, [place] Oconnor, NP, 600 mg at 11/11/18 3548   sodium chloride (NS) flush 5-10 mL, 5-10 mL, IntraVENous, Q8H, [place] Oconnor, NP, 10 mL at 11/12/18 8595   sodium chloride (NS) flush 5-10 mL, 5-10 mL, IntraVENous, PRN, Fernando Escalera, NP 
   acetaminophen (TYLENOL) tablet 650 mg, 650 mg, Oral, Q4H PRN, [place] Oconnor NP, 650 mg at 11/11/18 2067   oxyCODONE-acetaminophen (PERCOCET) 5-325 mg per tablet 1 Tab, 1 Tab, Oral, Q6H PRN, Tresea Dandy V, MD, 1 Tab at 11/09/18 0129 Labs: 
Recent Results (from the past 24 hour(s)) METABOLIC PANEL, BASIC Collection Time: 11/12/18  3:32 AM  
Result Value Ref Range Sodium 145 136 - 145 mmol/L Potassium 3.4 (L) 3.5 - 5.1 mmol/L Chloride 112 (H) 97 - 108 mmol/L  
 CO2 27 21 - 32 mmol/L Anion gap 6 5 - 15 mmol/L Glucose 105 (H) 65 - 100 mg/dL BUN 10 6 - 20 MG/DL Creatinine 0.56 (L) 0.70 - 1.30 MG/DL  
 BUN/Creatinine ratio 18 12 - 20 GFR est AA >60 >60 ml/min/1.73m2 GFR est non-AA >60 >60 ml/min/1.73m2 Calcium 8.6 8.5 - 10.1 MG/DL Micro:  
   
Blood 11/9/18 Component Value Ref Range & Units Status Special Requests: NO SPECIAL REQUESTS    Preliminary Culture result: (Abnormal)    Preliminary Abnormal  
POSSIBLE STAPHYLOCOCCUS SPECIES, COAGULASE NEGATIVE GROWING IN ALL 4 BOTTLES DRAWN (L AC AND R AC SITE) Culture result: (Abnormal)    Preliminary Wound 11/9/18 Special Requests: NO SPECIAL REQUESTS    Final  
GRAM STAIN NO WBC'S SEEN    Final  
GRAM STAIN NO ORGANISMS SEEN    Final  
Culture result: (Abnormal)    Final  
MODERATE STAPHYLOCOCCUS EPIDERMIDIS Abnormal    
Susceptibility Staphylococcus epidermidis AMANDA Ampicillin ($) <=2 ug/mL R Ampicillin/sulbactam ($) <=8/4 ug/mL S Cefazolin ($) <=4 ug/mL S Ciprofloxacin ($) <=1 ug/mL S Clindamycin ($) <=0.5 ug/mL S Daptomycin ($$$$$) <=0.5 ug/mL S Erythromycin ($$$$) <=0.5 ug/mL S Gentamicin ($) <=4 ug/mL S Levofloxacin ($) <=1 ug/mL S Linezolid ($$$$$) 2 ug/mL S Oxacillin <=0.25 ug/mL S Penicillin G ($$) 8 ug/mL R Rifampin ($$$$) <=1 ug/mL S1 Tetracycline <=4 ug/mL S Trimeth/Sulfa <=0.5/9.5 u... S Vancomycin ($) 2 ug/mL S   
 
 
 Blood 11/10 Component Value Ref Range & Units Status Special Requests: NO SPECIAL REQUESTS    Preliminary Culture result: NO GROWTH 2 DAYS    Preliminary Result History Imaging: CXR 11/9 FINDINGS: 
There is interval pacemaker removal with no apparent pneumothorax or other acute 
finding or significant change. 
  
The lungs are clear. Heart is normal in size. There is no overt pulmonary edema. There is no evident adenopathy or pleural effusion. No new finding. 
  
IMPRESSION IMPRESSION: No pneumothorax. No acute disease 
  
CT head 11/8 FINDINGS: 
The ventricles and sulci are normal in size, shape and configuration and 
midline. There is no significant white matter disease. There is minimal 
bifrontal volume loss. There is no intracranial hemorrhage, extra-axial 
collection, mass, mass effect or midline shift.  The basilar cisterns are open. No acute infarct is identified. The bone windows demonstrate no abnormalities. The visualized portions of the paranasal sinuses and mastoid air cells are 
clear. 
  
IMPRESSION IMPRESSION: No acute intracranial process identified. 
  
TTE 9/17/18 PROCEDURE: This was a routine study. The study included complete 2D 
imaging, complete spectral Doppler, and color Doppler. The heart rate was 55 bpm, at the start of the study. Images were obtained from the 
parasternal, apical, and subcostal acoustic windows. Image quality was 
adequate. LEFT VENTRICLE: Size was normal. Systolic function was normal. Ejection 
fraction was estimated in the range of 60 % to 65 %. There were no 
regional wall motion abnormalities. Wall thickness was normal. DOPPLER: 
Features were consistent with a pseudonormal left ventricular filling 
pattern, with concomitant abnormal relaxation and increased filling 
pressure (grade 2 diastolic dysfunction). RIGHT VENTRICLE: The size was normal. Systolic function was normal. 
 
LEFT ATRIUM: The atrium was mildly dilated. ATRIAL SEPTUM: The atrial septum appeared intact. RIGHT ATRIUM: Size was normal. 
 
MITRAL VALVE: Normal valve structure. No echocardiographic evidence for 
prolapse. DOPPLER: The transmitral velocity was within the normal range. There was trivial regurgitation. AORTIC VALVE: Normal valve structure. DOPPLER: Transaortic velocity was 
within the normal range. There was no stenosis. There was no regurgitation. TRICUSPID VALVE: Normal valve structure. DOPPLER: There was trivial 
regurgitation. Pulmonary artery systolic pressure was within the normal 
range. There was no pulmonary hypertension. PULMONIC VALVE: Not well visualized, but normal Doppler findings. DOPPLER: 
The transpulmonic velocity was within the normal range. There was no 
stenosis. There was trivial regurgitation. AORTA: The root exhibited normal size. PERICARDIUM: There was no pericardial effusion. 
  
  
Carotid duplex  9/17/18 IMPRESSION IMPRESSION: 
  
1.  16-49% stenosis in the right ICA. 2.  16-49% stenosis in the left ICA. 3.  Bilateral antegrade vertebral artery flow. 
  
Procedures:  
Pacer insertion 10/16/18 Pacer removal 11/9/18  
  
Assessment / Plan:  
  
  
Mr Eleni Sanches is a 68 chayo old gentleman with hx of hip replacement, lumbar surgery , rotator cuff surgery, who had dual chamber pacer inserted on 10/16/18 for SSS. His significant other says that he was fine post op for about 8 days. Noted swelling and bleeding from pacer site and came to the hospital again 10/24/18. Seen by EP and large hematoma noted around L subclavian pacer site per notes. Treated with pressure dressing. Pressure dressing removed 10/27/18 per notes. His partner says that the with the pressure dressing the swelling went down but it was significantly swollen on discharge. He had fevers last evening and all of sudden she saw blood all over the floor and rushed him to the ER.   Today, he had pocket evacuation and device removal. Pocket was cultured per procedural note. \"The pocket hematoma was evacuated - approximately 100 cc of coagulum were removed\". During my evaluation he was sleeping and not recovered from anesthesia fully yet. Unable to get any history from him. His partner says that he did not have any antibiotics prior to admission. She denied any erythema being noted/pururlent discharge from site.   
  
From chart review, hypotensive in 25-32'D systolic 41/3 and febrile up to 102. 3. Labs with slight anemia and without coagulopathy, normal renal/liver function, normal lactate  
  
  
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  
Vancomycin , awaiting ID on blood cx wt suscetibilities CoNS is a known pathogen in device and prosthesis infections Monitor renal function closely Pacer pocket wt MSSE Pacer removed Vancomycin trough goal 15-20, pharmacy to dose  
  
  
2) S/P hip replacement Lumbar surgery and possible hardware Risk for seeding  
  
 
D/W with his significant other today Lala Guerrier,   
11:47 AM

## 2018-11-12 NOTE — PROGRESS NOTES
OT note: 
Chart reviewed and spoke with nursing and pt remains agitated and is going for cardio version this am.  Will defer and continue to follow

## 2018-11-12 NOTE — ACP (ADVANCE CARE PLANNING)
Adv Care Plan Note    Patient and significant other, Marilia Epstein, report patient had a DDNR form in [place] but when they moved to [place] they never completed one here. We made plan to complete one prior to discharge home. Patient would like to complete AMD, appointing his significant other of 30 years, Marilia Epstein, as [facility]. We agreed to meet tomorrow at 10am to complete this paperwork.  (Patient has had a busy day, is tired, and prefers to wait until tomorrow)

## 2018-11-12 NOTE — PROGRESS NOTES
Call for Acute agitation Afebrile, normal WBC 11/9 , no acute electrolyte abnormalities Ct head 11/8 No acute intracranial process identified. ua done 11/8 No indication for ct head at this time no deficits Use Geodon for  now-  
Cont with ativan PRN per primary team. 
Cont with sitter.  
Have primary team  reassess in am

## 2018-11-12 NOTE — PROGRESS NOTES
ASA 3 and Mallampati 3 per Dr. Donell Awad 
 
Pt sedated with 3mg Versed and 25mcg Fentanyl for MINH (monitored sedation from 0947 to 1016)

## 2018-11-12 NOTE — PROGRESS NOTES
Palliative Medicine Family Meeting Participants:  Patient, patient partner Darnell Jones, Palliative Team (Dr. Nubia Hirsch, Heidi Mark) Discussion: Met with patient and partner/caregiver Chesley Osler in patient room. Mr. Citlali Cameron was lying in bed awake. 1. Introduced palliative team to patient. Chesley Osler was familiar - her mother had hospice at end of life. 2. Discussed GOC/AMD. Patient and Chesley Osler had both completed these documents when they lived in [place] but Chesley Osler wasn't sure where documents are now. Chesley Osler is MPOA. Mr. Citlali Cameron is a DNR. Agreed to renew paperwork tomorrow. 3. Social: Mr. Citlali Cameron and Chesley Osler both have kids from separate marriages. They have been together 30 years and live in [place]. None of their children are local. 
 
Outcome/Plan: 1. Change in-patient code status to DNR 
2. Follow up with patient and family tomorrow at 10 am to complete AMD/DDNR DAVON Lopez Palliative Medicine:  [phone])

## 2018-11-12 NOTE — ACP (ADVANCE CARE PLANNING)
Primary Decision Maker (Health Care Agent): Priti Mcwilliams  Relationship to patient:partner/significant other  Phone number:[phone]   [x] Named in a scanned document   [] Legal Next of Kin  [] Guardian      Patient has previously completed AMD paperwork but documents are not available. They appoint Camden Renae as MPOA and indicate that patient is DDNR.  Will renew this paper work on Tuesday with palliative team.

## 2018-11-12 NOTE — CONSULTS
Palliative Medicine Consult  [place]: [phone] ([phone])    Patient Name: Rachael Harvey  YOB: 1945    Date of Initial Consult: 11/12/18  Reason for Consult: care decisions/ AMD  Requesting Provider: Dr. Romi Alarcon  Primary Care Physician: Wilber Zepeda MD     SUMMARY:   Rachael Harvey is a 68 y.o. with a past history of early Alzheimer's Dementia (on aricept 1 year),  Lumbar laminectomy, gait disturbance (multifactorial), past hx heavy etoh abuse, bilateral hip replacements 1993, SSS s/p pacemaker placement 10/16/18 w hematoma in pocket 8 days later, who was admitted on 11/8/2018 from home for removal of pacemaker due to fever/ gram +bacteremia (staph epidermidis). Current medical issues leading to Palliative Medicine involvement include:  Asked to assist with completion of AMD, education about code status. Patient lives with significant other, Lukas Wilson over 30 years. Both have children from previous marriages. They live in [place] [id number] before that [facility]. PALLIATIVE DIAGNOSES:   1. Gram + bacteremia  2. S/p pacemaker removal  3. SSS, bradycardia  4. Early dementia (Alzheimer's, etoh)  5. Gait disturbance       PLAN:   1. Palliative Medicine introduced to patient and his significant other, Lukas Wilson at bedside. 1. Please see also Maddy Burdick note LCSW  2. Made plan to return tomorrow to complete paperwork when he has a bit more energy. 3. Patient had a DDNR in [place] and would like to complete one for [place]. He also plans to appoint Karrie Byrd as mPOA. (they had done this many years ago in [place] but aren't sure where paperwork is located)   2. Initial consult note routed to primary continuity provider  3.  Communicated plan of care with: Palliative IDT       GOALS OF CARE / TREATMENT PREFERENCES:     GOALS OF CARE:  Patient/Health Care Proxy Stated Goals: Rehabilitationrecovery from acute issues      TREATMENT PREFERENCES:   Code Status: DNR    Advance Care Planning:  Advance Care Planning 11/8/2018   Patient's Healthcare Decision Maker is: Legal Next of Kin   Primary Decision Maker Name -   Primary Decision Maker Relationship to Patient -   Confirm Advance Directive None   Patient Would Like to Complete Advance Directive No       Medical Interventions: Limited additional interventions   Other Instructions: Other:    As far as possible, the palliative care team has discussed with patient / health care proxy about goals of care / treatment preferences for patient. HISTORY:     History obtained from: chart, wife    CHIEF COMPLAINT: admitted with pacemaker infection    HPI/SUBJECTIVE:    The patient is:   [x] Verbal and participatory  [] Non-participatory due to:     68year old male admitted for removal of pacemaker. It was placed on 10/16/18-- patient initially did well for about 8-9 days. He then woke up one more with a huge hematoma in the pacemaker pocket and oozing out of the wound. He is unsure what happened in the night, but he did wake up with his arm up above his head. Patient then developed fever and was admitted for removal of the pacemaker.        Clinical Pain Assessment (nonverbal scale for severity on nonverbal patients):   Clinical Pain Assessment  Severity: 0          Duration: for how long has pt been experiencing pain (e.g., 2 days, 1 month, years)  Frequency: how often pain is an issue (e.g., several times per day, once every few days, constant)     FUNCTIONAL ASSESSMENT:     Palliative Performance Scale (PPS):  PPS: 50       PSYCHOSOCIAL/SPIRITUAL SCREENING:     Palliative IDT has assessed this patient for cultural preferences / practices and a referral made as appropriate to needs (Cultural Services, Patient Advocacy, Ethics, etc.)    Advance Care Planning:  Advance Care Planning 11/8/2018   Patient's Healthcare Decision Maker is: Legal Next of Kin   Primary Decision Maker Name -   Primary Decision Maker Relationship to Patient -   Confirm Advance Directive None   Patient Would Like to Complete Advance Directive No       Any spiritual / Gnosticist concerns:  [] Yes /  [x] No    Caregiver Burnout:  [] Yes /  [x] No /  [] No Caregiver Present      Anticipatory grief assessment:   [x] Normal  / [] Maladaptive       ESAS Anxiety: Anxiety: 0    ESAS Depression: Depression: 0        REVIEW OF SYSTEMS:     Positive and pertinent negative findings in ROS are noted above in HPI. The following systems were [] reviewed / [] unable to be reviewed as noted in HPI  Other findings are noted below. Systems: constitutional, ears/nose/mouth/throat, respiratory, gastrointestinal, genitourinary, musculoskeletal, integumentary, neurologic, psychiatric, endocrine. Positive findings noted below. Modified ESAS Completed by: provider   Fatigue: 8 Drowsiness: 5   Depression: 0 Pain: 0   Anxiety: 0 Nausea: 0   Anorexia: 0 Dyspnea: 0     Constipation: No     Stool Occurrence(s): 1        PHYSICAL EXAM:     From RN flowsheet:  Wt Readings from Last 3 Encounters:   11/10/18 72.6 kg (160 lb 0 oz)   11/12/18 72.6 kg (160 lb)   11/08/18 72.6 kg (160 lb)     Blood pressure 127/59, pulse (!) 57, temperature 98.5 °F (36.9 °C), resp. rate 16, height 5' 11\" (1.803 m), weight 72.6 kg (160 lb 0 oz), SpO2 99 %. Pain Scale 1: Numeric (0 - 10)  Pain Intensity 1: 0     Pain Location 1: Incisional  Pain Orientation 1: Anterior, Left  Pain Description 1: Aching  Pain Intervention(s) 1: Medication (see MAR)  Last bowel movement, if known:     Constitutional: sleepy but arousable NAD  Sling in place left arm  Dressing dry over pacemaker area  Patient is conversant  Oriented and shows insight into conversation  Expresses concern about our meeting time tomorrow so his wife won't miss her dental appointment.        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)      Memory disturbance (11/9/2018)      Altered mental status, unspecified (11/9/2018)      Degenerative cervical spinal stenosis (11/9/2018)      History of lumbar laminectomy (11/9/2018)      Degenerative lumbar spinal stenosis (11/9/2018)      Past Medical History:   Diagnosis Date    Anxiety     Back pain     Blurred vision     Chest pain     Depression     Dizziness     Fast heart beat     Frequent headaches     Frequent urination     Hip dysplasia, congenital     Joint pain     Joint swelling     Lumbar spinal stenosis     Muscle pain     Neuropathy     Recent change in weight     Sinus problem     Skin disease     SOB (shortness of breath)     Sore throat     SSS (sick sinus syndrome) (HCC)     Stiffness of joints, multiple sites     Stress     Syncope     Tiredness     Trouble in sleeping     Weakness       Past Surgical History:   Procedure Laterality Date    HX HIP REPLACEMENT Bilateral 1993    HX PACEMAKER PLACEMENT  10/16/2018    HX ROTATOR CUFF REPAIR Right 11/16/2017      Family History   Problem Relation Age of Onset    Cancer Mother     Cancer Maternal Aunt       History reviewed, no pertinent family history. Social History     Tobacco Use    Smoking status: Current Every Day Smoker     Packs/day: 0.50     Years: 50.00     Pack years: 25.00     Types: Cigarettes    Smokeless tobacco: Never Used    Tobacco comment: 0.5-1 pack per day   Substance Use Topics    Alcohol use:  Yes     Alcohol/week: 16.8 oz     Types: 28 Cans of beer per week     Comment: None now     No Known Allergies   Current Facility-Administered Medications   Medication Dose Route Frequency    LORazepam (ATIVAN) injection 1 mg  1 mg IntraVENous Q6H PRN    melatonin tablet 9 mg  9 mg Oral QHS    ziprasidone (GEODON) capsule 20 mg  20 mg Oral Q12H PRN    ondansetron (ZOFRAN) injection 4 mg  4 mg IntraVENous Q6H PRN    nitroglycerin (NITROBID) 2 % ointment 1 Inch  1 Inch Topical Q6H PRN    ADDaptor        0.9% sodium chloride (MBP/ADV) infusion        sodium chloride (NS) flush 5-10 mL  5-10 mL IntraVENous Q8H    sodium chloride (NS) flush 5-10 mL  5-10 mL IntraVENous PRN    naloxone (NARCAN) injection 0.4 mg  0.4 mg IntraVENous PRN    acetaminophen (TYLENOL) tablet 650 mg  650 mg Oral Q4H PRN    HYDROcodone-acetaminophen (NORCO) 5-325 mg per tablet 1 Tab  1 Tab Oral Q4H PRN    vancomycin (VANCOCIN) 750 mg in 0.9% sodium chloride 250 mL (Ofcl1Mim)  750 mg IntraVENous Q8H    clonazePAM (KlonoPIN) tablet 1 mg  1 mg Oral BID    cyanocobalamin (VITAMIN B12) tablet 500 mcg  500 mcg Oral DAILY    donepezil (ARICEPT) tablet 5 mg  5 mg Oral QHS    DULoxetine (CYMBALTA) capsule 60 mg  60 mg Oral DAILY    gabapentin (NEURONTIN) capsule 600 mg  600 mg Oral DAILY    sodium chloride (NS) flush 5-10 mL  5-10 mL IntraVENous Q8H    sodium chloride (NS) flush 5-10 mL  5-10 mL IntraVENous PRN    acetaminophen (TYLENOL) tablet 650 mg  650 mg Oral Q4H PRN    oxyCODONE-acetaminophen (PERCOCET) 5-325 mg per tablet 1 Tab  1 Tab Oral Q6H PRN          LAB AND IMAGING FINDINGS:     Lab Results   Component Value Date/Time    WBC 4.4 11/12/2018 11:36 AM    HGB 10.3 (L) 11/12/2018 11:36 AM    PLATELET 076 49/10/5379 11:36 AM     Lab Results   Component Value Date/Time    Sodium 145 11/12/2018 03:32 AM    Potassium 3.4 (L) 11/12/2018 03:32 AM    Chloride 112 (H) 11/12/2018 03:32 AM    CO2 27 11/12/2018 03:32 AM    BUN 10 11/12/2018 03:32 AM    Creatinine 0.56 (L) 11/12/2018 03:32 AM    Calcium 8.6 11/12/2018 03:32 AM    Magnesium 2.1 11/10/2018 04:45 AM      Lab Results   Component Value Date/Time    AST (SGOT) 14 (L) 11/08/2018 07:39 AM    Alk.  phosphatase 92 11/08/2018 07:39 AM    Protein, total 6.5 11/08/2018 07:39 AM    Albumin 3.3 (L) 11/08/2018 07:39 AM    Globulin 3.2 11/08/2018 07:39 AM     Lab Results Component Value Date/Time    INR 1.0 11/08/2018 07:39 AM    Prothrombin time 10.1 11/08/2018 07:39 AM    aPTT 25.3 11/08/2018 07:39 AM      No results found for: IRON, FE, TIBC, IBCT, PSAT, FERR   No results found for: PH, PCO2, PO2  No components found for: [place]   Lab Results   Component Value Date/Time     11/10/2018 04:45 AM                Total time:   Counseling / coordination time, spent as noted above:   > 50% counseling / coordination?:     Prolonged service was provided for  []30 min   []75 min in face to face time in the presence of the patient, spent as noted above. Time Start:   Time End:   Note: this can only be billed with 37600 (initial) or 48528 (follow up). If multiple start / stop times, list each separately.

## 2018-11-12 NOTE — PROGRESS NOTES
TRANSFER - OUT REPORT: 
 
Verbal report given to [facility] RN(name) on Cinthia Screen  being transferred to IVCU/pt room(unit) for routine progression of care  (post MINH) Report consisted of patients Situation, Background, Assessment and  
Recommendations(SBAR). Information from the following report(s) SBAR, Procedure Summary and MAR was reviewed with the receiving nurse. Lines:  
Peripheral IV 11/10/18 Left Forearm (Active) Site Assessment Clean, dry, & intact 11/12/2018  7:22 AM  
Phlebitis Assessment 0 11/12/2018  7:22 AM  
Infiltration Assessment 0 11/12/2018  7:22 AM  
Dressing Status Clean, dry, & intact 11/12/2018  7:22 AM  
Dressing Type Transparent 11/11/2018  7:30 PM  
Hub Color/Line Status Pink;Flushed;Patent 11/11/2018  7:30 PM  
   
Peripheral IV 11/10/18 Right Forearm (Active) Site Assessment Clean, dry, & intact 11/12/2018  7:22 AM  
Phlebitis Assessment 0 11/12/2018  7:22 AM  
Infiltration Assessment 0 11/12/2018  7:22 AM  
Dressing Status Clean, dry, & intact 11/12/2018  7:22 AM  
Dressing Type Transparent 11/11/2018  7:30 PM  
Hub Color/Line Status Pink;Flushed;Patent 11/11/2018  7:30 PM  
   
Peripheral IV 11/12/18 Right Forearm (Active) Site Assessment Clean, dry, & intact 11/12/2018  7:22 AM  
Phlebitis Assessment 0 11/12/2018  7:22 AM  
Infiltration Assessment 0 11/12/2018  7:22 AM  
Dressing Status Clean, dry, & intact 11/12/2018  7:22 AM  
Dressing Type Transparent 11/12/2018  3:35 AM  
Hub Color/Line Status Pink;Flushed;Patent 11/12/2018  3:35 AM  
  
 
Opportunity for questions and clarification was provided. Patient transported with: 
Sitter/ Tech

## 2018-11-12 NOTE — PROGRESS NOTES
Patient arrived to Non-Invasive Cardiology Lab for MINH Procedure. Staff introduced to patient. Patient identifiers verified with Name and Date of Birth. Procedure verified with patient. Consent forms reviewed and signed by patient or authorized representative and verified. Allergies verified. Patient informed of procedure and plan of care. Questions answered with review. Patient on cardiac monitor, non-invasive blood pressure, SPO2 monitor. On oxygen via nasal cannula at 2LPM. Patient is A&Ox3. Patient reports no complaints. Patient on stretcher, in low position, with side rails up. Patient instructed to call for assistance as needed. Family in waiting room.

## 2018-11-12 NOTE — PROGRESS NOTES
Physical Therapy Note: 
 
Orders acknowledged, chart reviewed, discussed with OT following OT conversation with RN. RN advising defer PT and OT visits due to pt agitation (several ATLAS codes overnight) and pt pending cardioversion. Will continue to follow and complete PT evaluation when pt stable and appropriate.

## 2018-11-13 LAB
ANION GAP SERPL CALC-SCNC: 7 MMOL/L (ref 5–15)
BUN SERPL-MCNC: 13 MG/DL (ref 6–20)
BUN/CREAT SERPL: 18 (ref 12–20)
CALCIUM SERPL-MCNC: 8.1 MG/DL (ref 8.5–10.1)
CHLORIDE SERPL-SCNC: 111 MMOL/L (ref 97–108)
CO2 SERPL-SCNC: 25 MMOL/L (ref 21–32)
CREAT SERPL-MCNC: 0.72 MG/DL (ref 0.7–1.3)
ERYTHROCYTE [DISTWIDTH] IN BLOOD BY AUTOMATED COUNT: 13.3 % (ref 11.5–14.5)
GLUCOSE SERPL-MCNC: 101 MG/DL (ref 65–100)
HCT VFR BLD AUTO: 30.8 % (ref 36.6–50.3)
HGB BLD-MCNC: 10.2 G/DL (ref 12.1–17)
MAGNESIUM SERPL-MCNC: 2.2 MG/DL (ref 1.6–2.4)
MCH RBC QN AUTO: 32.7 PG (ref 26–34)
MCHC RBC AUTO-ENTMCNC: 33.1 G/DL (ref 30–36.5)
MCV RBC AUTO: 98.7 FL (ref 80–99)
NRBC # BLD: 0 K/UL (ref 0–0.01)
NRBC BLD-RTO: 0 PER 100 WBC
PHOSPHATE SERPL-MCNC: 4.1 MG/DL (ref 2.6–4.7)
PLATELET # BLD AUTO: 173 K/UL (ref 150–400)
PMV BLD AUTO: 10.1 FL (ref 8.9–12.9)
POTASSIUM SERPL-SCNC: 3.7 MMOL/L (ref 3.5–5.1)
RBC # BLD AUTO: 3.12 M/UL (ref 4.1–5.7)
SODIUM SERPL-SCNC: 143 MMOL/L (ref 136–145)
WBC # BLD AUTO: 4.4 K/UL (ref 4.1–11.1)

## 2018-11-13 PROCEDURE — 85027 COMPLETE CBC AUTOMATED: CPT

## 2018-11-13 PROCEDURE — 36415 COLL VENOUS BLD VENIPUNCTURE: CPT

## 2018-11-13 PROCEDURE — 76450000000

## 2018-11-13 PROCEDURE — 84100 ASSAY OF PHOSPHORUS: CPT

## 2018-11-13 PROCEDURE — G8987 SELF CARE CURRENT STATUS: HCPCS

## 2018-11-13 PROCEDURE — 74011250637 HC RX REV CODE- 250/637: Performed by: NURSE PRACTITIONER

## 2018-11-13 PROCEDURE — 97161 PT EVAL LOW COMPLEX 20 MIN: CPT | Performed by: PHYSICAL THERAPIST

## 2018-11-13 PROCEDURE — 80048 BASIC METABOLIC PNL TOTAL CA: CPT

## 2018-11-13 PROCEDURE — 74011250637 HC RX REV CODE- 250/637: Performed by: INTERNAL MEDICINE

## 2018-11-13 PROCEDURE — 97535 SELF CARE MNGMENT TRAINING: CPT

## 2018-11-13 PROCEDURE — 97165 OT EVAL LOW COMPLEX 30 MIN: CPT

## 2018-11-13 PROCEDURE — 74011250636 HC RX REV CODE- 250/636: Performed by: INTERNAL MEDICINE

## 2018-11-13 PROCEDURE — 74011250637 HC RX REV CODE- 250/637: Performed by: HOSPITALIST

## 2018-11-13 PROCEDURE — 94760 N-INVAS EAR/PLS OXIMETRY 1: CPT

## 2018-11-13 PROCEDURE — 77010033678 HC OXYGEN DAILY

## 2018-11-13 PROCEDURE — 83735 ASSAY OF MAGNESIUM: CPT

## 2018-11-13 PROCEDURE — 97530 THERAPEUTIC ACTIVITIES: CPT | Performed by: PHYSICAL THERAPIST

## 2018-11-13 PROCEDURE — G8988 SELF CARE GOAL STATUS: HCPCS

## 2018-11-13 PROCEDURE — 65660000000 HC RM CCU STEPDOWN

## 2018-11-13 PROCEDURE — 74011250636 HC RX REV CODE- 250/636: Performed by: HOSPITALIST

## 2018-11-13 RX ADMIN — GABAPENTIN 600 MG: 300 CAPSULE ORAL at 10:49

## 2018-11-13 RX ADMIN — MELATONIN TAB 3 MG 9 MG: 3 TAB at 21:20

## 2018-11-13 RX ADMIN — LORAZEPAM 1 MG: 2 INJECTION INTRAMUSCULAR; INTRAVENOUS at 21:16

## 2018-11-13 RX ADMIN — DONEPEZIL HYDROCHLORIDE 5 MG: 5 TABLET, FILM COATED ORAL at 21:20

## 2018-11-13 RX ADMIN — CLONAZEPAM 1 MG: 1 TABLET ORAL at 10:49

## 2018-11-13 RX ADMIN — VANCOMYCIN HYDROCHLORIDE 750 MG: 750 INJECTION, POWDER, LYOPHILIZED, FOR SOLUTION INTRAVENOUS at 04:52

## 2018-11-13 RX ADMIN — Medication 10 ML: at 21:21

## 2018-11-13 RX ADMIN — CLONAZEPAM 1 MG: 1 TABLET ORAL at 18:35

## 2018-11-13 RX ADMIN — Medication 10 ML: at 04:57

## 2018-11-13 RX ADMIN — DULOXETINE 60 MG: 30 CAPSULE, DELAYED RELEASE ORAL at 10:49

## 2018-11-13 RX ADMIN — VANCOMYCIN HYDROCHLORIDE 750 MG: 750 INJECTION, POWDER, LYOPHILIZED, FOR SOLUTION INTRAVENOUS at 12:17

## 2018-11-13 RX ADMIN — CYANOCOBALAMIN TAB 500 MCG 500 MCG: 500 TAB at 10:49

## 2018-11-13 RX ADMIN — ACETAMINOPHEN 650 MG: 325 TABLET ORAL at 23:51

## 2018-11-13 NOTE — CONSULTS
Palliative Medicine Consult  [place]: [phone] ([phone])    Patient Name: Damon Merino  YOB: 1945    Date of Initial Consult: 11/12/18  Reason for Consult: care decisions/ AMD  Requesting Provider: Dr. Rhoda Cates  Primary Care Physician: Chong Hackett MD     SUMMARY:   Damon Merino is a 68 y.o. with a past history of early Alzheimer's Dementia (on aricept 1 year),  Lumbar laminectomy, gait disturbance (multifactorial), past hx heavy etoh abuse, bilateral hip replacements 1993, SSS s/p pacemaker placement 10/16/18 w hematoma in pocket 8 days later, who was admitted on 11/8/2018 from home for removal of pacemaker due to fever/ gram +bacteremia (staph epidermidis). Current medical issues leading to Palliative Medicine involvement include:  Asked to assist with completion of AMD, education about code status. Patient lives with significant other, Chrissie Sosa over 30 years. Both have children from previous marriages. They live in [place] [id number] before that [facility]. PALLIATIVE DIAGNOSES:   1. Gram + bacteremia  2. S/p pacemaker removal  3. SSS, bradycardia  4. Early dementia (Alzheimer's, etoh)  5. Gait disturbance       PLAN:   1. AMD completed today  1. Patient appointed his life partner, Chrissie MARS and his brother as alternate. 2. DDNR discussed, patient signed this for himself today. Answered questions from Jamaal Villegas about this -- she understands that DDNR is suspended sometimes during procedures. 2. Plans in place for rehab at [facility] prior to replacement of pacemaker.   3. Communicated plan of care with: Palliative IDT       GOALS OF CARE / TREATMENT PREFERENCES:     GOALS OF CARE:  Patient/Health Care Proxy Stated Goals: Rehabilitationrecovery from acute issues      TREATMENT PREFERENCES:   Code Status: DNR    Advance Care Planning:  Advance Care Planning 11/13/2018   Patient's Healthcare Decision Maker is: Named in scanned ACP document   Primary Decision Maker Name -   Primary Decision Maker Relationship to Patient -   Confirm Advance Directive Yes, on file   Patient Would Like to Complete Advance Directive -   Does the patient have other document types Do Not Resuscitate       Medical Interventions: Limited additional interventions   Other Instructions: Other:    As far as possible, the palliative care team has discussed with patient / health care proxy about goals of care / treatment preferences for patient. HISTORY:     History obtained from: chart, wife    CHIEF COMPLAINT: admitted with pacemaker infection    HPI/SUBJECTIVE:    The patient is:   [x] Verbal and participatory  [] Non-participatory due to:     68year old male admitted for removal of pacemaker. It was placed on 10/16/18-- patient initially did well for about 8-9 days. He then woke up one more with a huge hematoma in the pacemaker pocket and oozing out of the wound. He is unsure what happened in the night, but he did wake up with his arm up above his head. Patient then developed fever and was admitted for removal of the pacemaker.        Clinical Pain Assessment (nonverbal scale for severity on nonverbal patients):   Clinical Pain Assessment  Severity: 0          Duration: for how long has pt been experiencing pain (e.g., 2 days, 1 month, years)  Frequency: how often pain is an issue (e.g., several times per day, once every few days, constant)     FUNCTIONAL ASSESSMENT:     Palliative Performance Scale (PPS):  PPS: 50       PSYCHOSOCIAL/SPIRITUAL SCREENING:     Palliative IDT has assessed this patient for cultural preferences / practices and a referral made as appropriate to needs (Cultural Services, Patient Advocacy, Ethics, etc.)    Advance Care Planning:  Advance Care Planning 11/13/2018   Patient's Healthcare Decision Maker is: Named in scanned ACP document   Primary Decision Maker Name -   Primary Decision Maker Relationship to Patient -   Confirm Advance Directive Yes, on file Patient Would Like to Complete Advance Directive -   Does the patient have other document types Do Not Resuscitate       Any spiritual / Anabaptist concerns:  [] Yes /  [x] No    Caregiver Burnout:  [] Yes /  [x] No /  [] No Caregiver Present      Anticipatory grief assessment:   [x] Normal  / [] Maladaptive       ESAS Anxiety: Anxiety: 0    ESAS Depression: Depression: 0        REVIEW OF SYSTEMS:     Positive and pertinent negative findings in ROS are noted above in HPI. The following systems were [] reviewed / [] unable to be reviewed as noted in HPI  Other findings are noted below. Systems: constitutional, ears/nose/mouth/throat, respiratory, gastrointestinal, genitourinary, musculoskeletal, integumentary, neurologic, psychiatric, endocrine. Positive findings noted below. Modified ESAS Completed by: provider   Fatigue: 8 Drowsiness: 5   Depression: 0 Pain: 0   Anxiety: 0 Nausea: 0   Anorexia: 0 Dyspnea: 0     Constipation: No     Stool Occurrence(s): 1        PHYSICAL EXAM:     From RN flowsheet:  Wt Readings from Last 3 Encounters:   11/10/18 72.6 kg (160 lb 0 oz)   11/12/18 72.6 kg (160 lb)   11/08/18 72.6 kg (160 lb)     Blood pressure 92/55, pulse (!) 51, temperature 97.3 °F (36.3 °C), resp. rate 16, height 5' 11\" (1.803 m), weight 72.6 kg (160 lb 0 oz), SpO2 96 %. Pain Scale 1: Numeric (0 - 10)  Pain Intensity 1: 0     Pain Location 1: Incisional  Pain Orientation 1: Anterior, Left  Pain Description 1: Aching  Pain Intervention(s) 1: Medication (see MAR)  Last bowel movement, if known:     Constitutional: sleepy but arousable NAD  Sling in place left arm  Dressing dry over pacemaker area  Patient is conversant  Oriented and shows insight into conversation  Expresses concern about our meeting time tomorrow so his wife won't miss her dental appointment.        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)      Memory disturbance (11/9/2018)      Altered mental status, unspecified (11/9/2018)      Degenerative cervical spinal stenosis (11/9/2018)      History of lumbar laminectomy (11/9/2018)      Degenerative lumbar spinal stenosis (11/9/2018)      Past Medical History:   Diagnosis Date    Anxiety     Back pain     Blurred vision     Chest pain     Depression     Dizziness     Fast heart beat     Frequent headaches     Frequent urination     Hip dysplasia, congenital     Joint pain     Joint swelling     Lumbar spinal stenosis     Muscle pain     Neuropathy     Recent change in weight     Sinus problem     Skin disease     SOB (shortness of breath)     Sore throat     SSS (sick sinus syndrome) (HCC)     Stiffness of joints, multiple sites     Stress     Syncope     Tiredness     Trouble in sleeping     Weakness       Past Surgical History:   Procedure Laterality Date    HX HIP REPLACEMENT Bilateral 1993    HX PACEMAKER PLACEMENT  10/16/2018    HX ROTATOR CUFF REPAIR Right 11/16/2017      Family History   Problem Relation Age of Onset    Cancer Mother     Cancer Maternal Aunt       History reviewed, no pertinent family history. Social History     Tobacco Use    Smoking status: Current Every Day Smoker     Packs/day: 0.50     Years: 50.00     Pack years: 25.00     Types: Cigarettes    Smokeless tobacco: Never Used    Tobacco comment: 0.5-1 pack per day   Substance Use Topics    Alcohol use:  Yes     Alcohol/week: 16.8 oz     Types: 28 Cans of beer per week     Comment: None now     No Known Allergies   Current Facility-Administered Medications   Medication Dose Route Frequency    nicotine (NICODERM CQ) 21 mg/24 hr patch 1 Patch  1 Patch TransDERmal Q24H    clonazePAM (KlonoPIN) tablet 1 mg  1 mg Oral BID    LORazepam (ATIVAN) injection 1 mg  1 mg IntraVENous Q6H PRN    melatonin tablet 9 mg  9 mg Oral QHS    ondansetron (ZOFRAN) injection 4 mg  4 mg IntraVENous Q6H PRN    nitroglycerin (NITROBID) 2 % ointment 1 Inch  1 Inch Topical Q6H PRN    ADDaptor        0.9% sodium chloride (MBP/ADV) infusion        sodium chloride (NS) flush 5-10 mL  5-10 mL IntraVENous Q8H    sodium chloride (NS) flush 5-10 mL  5-10 mL IntraVENous PRN    naloxone (NARCAN) injection 0.4 mg  0.4 mg IntraVENous PRN    acetaminophen (TYLENOL) tablet 650 mg  650 mg Oral Q4H PRN    HYDROcodone-acetaminophen (NORCO) 5-325 mg per tablet 1 Tab  1 Tab Oral Q4H PRN    vancomycin (VANCOCIN) 750 mg in 0.9% sodium chloride 250 mL ([id number])  750 mg IntraVENous Q8H    cyanocobalamin (VITAMIN B12) tablet 500 mcg  500 mcg Oral DAILY    donepezil (ARICEPT) tablet 5 mg  5 mg Oral QHS    DULoxetine (CYMBALTA) capsule 60 mg  60 mg Oral DAILY    gabapentin (NEURONTIN) capsule 600 mg  600 mg Oral DAILY    sodium chloride (NS) flush 5-10 mL  5-10 mL IntraVENous Q8H    sodium chloride (NS) flush 5-10 mL  5-10 mL IntraVENous PRN    acetaminophen (TYLENOL) tablet 650 mg  650 mg Oral Q4H PRN    oxyCODONE-acetaminophen (PERCOCET) 5-325 mg per tablet 1 Tab  1 Tab Oral Q6H PRN          LAB AND IMAGING FINDINGS:     Lab Results   Component Value Date/Time    WBC 4.4 11/13/2018 04:58 AM    HGB 10.2 (L) 11/13/2018 04:58 AM    PLATELET 524 97/86/7896 04:58 AM     Lab Results   Component Value Date/Time    Sodium 143 11/13/2018 04:58 AM    Potassium 3.7 11/13/2018 04:58 AM    Chloride 111 (H) 11/13/2018 04:58 AM    CO2 25 11/13/2018 04:58 AM    BUN 13 11/13/2018 04:58 AM    Creatinine 0.72 11/13/2018 04:58 AM    Calcium 8.1 (L) 11/13/2018 04:58 AM    Magnesium 2.2 11/13/2018 04:58 AM    Phosphorus 4.1 11/13/2018 04:58 AM      Lab Results   Component Value Date/Time    AST (SGOT) 14 (L) 11/08/2018 07:39 AM    Alk.  phosphatase 92 11/08/2018 07:39 AM    Protein, total 6.5 11/08/2018 07:39 AM    Albumin 3.3 (L) 11/08/2018 07:39 AM    Globulin 3.2 11/08/2018 07:39 AM     Lab Results   Component Value Date/Time    INR 1.0 11/08/2018 07:39 AM    Prothrombin time 10.1 11/08/2018 07:39 AM    aPTT 25.3 11/08/2018 07:39 AM      No results found for: IRON, FE, TIBC, IBCT, PSAT, FERR   No results found for: PH, PCO2, PO2  No components found for: [place]   Lab Results   Component Value Date/Time     11/10/2018 04:45 AM                Total time:   Counseling / coordination time, spent as noted above:   > 50% counseling / coordination?:     Prolonged service was provided for  []30 min   []75 min in face to face time in the presence of the patient, spent as noted above. Time Start:   Time End:   Note: this can only be billed with 06920 (initial) or 52610 (follow up). If multiple start / stop times, list each separately.

## 2018-11-13 NOTE — PROGRESS NOTES
Cardiac Electrophysiology Progress Note [id number] [address]  [phone] 
 
11/13/2018 8:28 AM 
 
Admit Date: 11/8/2018 Admit Diagnosis: Pacemaker complications, subsequent encounter Fever Subjective:  
 
Dari Haji   denies chest pain, chest pressure/discomfort, dyspnea, irregular heart beats. His partner, Vernon Jean, states \"he's back today! \". Visit Vitals /62 (BP 1 Location: Right arm, BP Patient Position: At rest) Pulse (!) 51 Temp 97.3 °F (36.3 °C) Resp 16 Ht 5' 11\" (1.803 m) Wt 160 lb 0 oz (72.6 kg) SpO2 96% BMI 22.32 kg/m² Current Facility-Administered Medications Medication Dose Route Frequency  nicotine (NICODERM CQ) 21 mg/24 hr patch 1 Patch  1 Patch TransDERmal Q24H  clonazePAM (KlonoPIN) tablet 1 mg  1 mg Oral BID  LORazepam (ATIVAN) injection 1 mg  1 mg IntraVENous Q6H PRN  
 melatonin tablet 9 mg  9 mg Oral QHS  ondansetron (ZOFRAN) injection 4 mg  4 mg IntraVENous Q6H PRN  
 nitroglycerin (NITROBID) 2 % ointment 1 Inch  1 Inch Topical Q6H PRN  
 ADDaptor      
 0.9% sodium chloride (MBP/ADV) infusion  sodium chloride (NS) flush 5-10 mL  5-10 mL IntraVENous Q8H  
 sodium chloride (NS) flush 5-10 mL  5-10 mL IntraVENous PRN  
 naloxone (NARCAN) injection 0.4 mg  0.4 mg IntraVENous PRN  
 acetaminophen (TYLENOL) tablet 650 mg  650 mg Oral Q4H PRN  
 HYDROcodone-acetaminophen (NORCO) 5-325 mg per tablet 1 Tab  1 Tab Oral Q4H PRN  
 vancomycin (VANCOCIN) 750 mg in 0.9% sodium chloride 250 mL ([id number])  750 mg IntraVENous Q8H  
 cyanocobalamin (VITAMIN B12) tablet 500 mcg  500 mcg Oral DAILY  donepezil (ARICEPT) tablet 5 mg  5 mg Oral QHS  DULoxetine (CYMBALTA) capsule 60 mg  60 mg Oral DAILY  gabapentin (NEURONTIN) capsule 600 mg  600 mg Oral DAILY  sodium chloride (NS) flush 5-10 mL  5-10 mL IntraVENous Q8H  
 sodium chloride (NS) flush 5-10 mL  5-10 mL IntraVENous PRN  
  acetaminophen (TYLENOL) tablet 650 mg  650 mg Oral Q4H PRN  
 oxyCODONE-acetaminophen (PERCOCET) 5-325 mg per tablet 1 Tab  1 Tab Oral Q6H PRN Objective:  
  
Visit Vitals /62 (BP 1 Location: Right arm, BP Patient Position: At rest) Pulse (!) 51 Temp 97.3 °F (36.3 °C) Resp 16 Ht 5' 11\" (1.803 m) Wt 160 lb 0 oz (72.6 kg) SpO2 96% BMI 22.32 kg/m² Physical Exam: Abdomen: soft, non-tender Extremities: extremities normal 
Heart: regular rate and rhythm Lungs: clear to auscultation bilaterally Pulses: 2+ and symmetric Data Review:  
Labs:   
Recent Labs 11/13/18 0458 11/12/18 
1136 WBC 4.4 4.4 HGB 10.2* 10.3* HCT 30.8* 30.9*  
 179 Recent Labs 11/13/18 0458 11/12/18 
0332 11/11/18 
0200  145 144  
K 3.7 3.4* 3.6 * 112* 113* CO2 25 27 26 * 105* 99 BUN 13 10 12 CREA 0.72 0.56* 0.67* CA 8.1* 8.6 7.7* MG 2.2  --   --   
PHOS 4.1  --   -- No results for input(s): TROIQ, CPK, CKMB in the last 72 hours. Intake/Output Summary (Last 24 hours) at 11/13/2018 3431 Last data filed at 11/13/2018 1773 Gross per 24 hour Intake 600 ml Output 1375 ml Net -775 ml Telemetry: sinus bradycardia, ventricular rate 56 Assessment:  
 
Active Problems: 
  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  
 
Chandaa Nice is s/p PM 10/16/18 for syncope and pauses, with explant 11/9/18 for pocket hematoma and fevers/bacteremia.   HR mostly 50- 60s however HR has dropped down into 30s this weekend. No significant bradycardia last night. Currently followed by ID and Neuro. MINH 11/12/18 negative for vegetation. Has been afebrile x 24 hours. Labs stable. Met with Palliative Medicine yesterday. Will remove pressure dressing and observe today Continue to follow. CINTHIA Stephenson Patient seen and examined by me with nurse practitioner. I personally performed all components of the history, physical, and medical decision making and agree with the assessment and plan with minor modifications as noted. Doing much better. Will take off pressure dressing and observe overnight. Hopefully home tmrw Benton Rivas MD, [facility] 
 
 
  
11/13/2018 
8:28 AM

## 2018-11-13 NOTE — PROGRESS NOTES
The patient is off to MRI, the patient is resting in bed in NAD. 
 
2320 The patient is back from the MRI. CHG done; gown, bed pads changed. Bed alarm in place, patient's wife at the bedside

## 2018-11-13 NOTE — PROGRESS NOTES
Problem: Mobility Impaired (Adult and Pediatric) Goal: *Acute Goals and Plan of Care (Insert Text) Physical Therapy Goals Initiated 11/13/2018 1. Patient will move from supine to sit and sit to supine  in bed with supervision/set-up within 7 day(s). 2.  Patient will transfer from bed to chair and chair to bed with CGA using the least restrictive device within 7 day(s). 3.  Patient will perform sit to stand with supervision/set-up within 7 day(s). 4.  Patient will ambulate with contact guard assist for 50 feet with the least restrictive device within 7 day(s). physical Therapy EVALUATION Patient: Adan Pitt (28 y.o. male) Date: 11/13/2018 Primary Diagnosis: Pacemaker complications, subsequent encounter Fever Precautions:   Fall, Other (comment)(sling to L UE) ASSESSMENT : 
Based on the objective data described below, the patient presents with decreased functional mobility from baseline level of function. Prior to admit patient was independent with mobility and living with significant other. Per SO patient has baseline \"gait problems\" and was going to start PT for this soon. Patient to remain in sling at all times when up and moving and patient aware of precaution (though will likely need review) Currently needing modA for supine to sit and has good sitting balance. Sit to stand with Fredi x 2 and with initial posterior LOB when first rising to stand. Patient with reports of dizziness and noted BP drop:  121/66-->72/47 in standing. Returned to sitting and then transferred to chair where BP stabilized at 92/55. Mobilized on room air with SpO2 drop to 86% and replaced 2 L O2. Based on current level of function recommend inpt rehab setting to progress patient to more independent level of function. Patient has supportive family and can tolerate 3 hours of therapy a day. Patient will benefit from skilled intervention to address the above impairments. Patients rehabilitation potential is considered to be Good Factors which may influence rehabilitation potential include:  
[x]         None noted 
[]         Mental ability/status []         Medical condition 
[]         Home/family situation and support systems 
[]         Safety awareness 
[]         Pain tolerance/management 
[]         Other: PLAN : 
Recommendations and Planned Interventions: 
[x]           Bed Mobility Training             [x]    Neuromuscular Re-Education 
[x]           Transfer Training                   []    Orthotic/Prosthetic Training 
[x]           Gait Training                         []    Modalities [x]           Therapeutic Exercises           []    Edema Management/Control 
[x]           Therapeutic Activities            [x]    Patient and Family Training/Education 
[]           Other (comment): Frequency/Duration: Patient will be followed by physical therapy  5 times a week to address goals. Discharge Recommendations: Inpatient Rehab Further Equipment Recommendations for Discharge: TBD SUBJECTIVE:  
Patient stated I feel much better today.  OBJECTIVE DATA SUMMARY:  
HISTORY:   
Past Medical History:  
Diagnosis Date  Anxiety  Back pain  Blurred vision  Chest pain  Depression  Dizziness  Fast heart beat  Frequent headaches  Frequent urination  Hip dysplasia, congenital   
 Joint pain  Joint swelling  Lumbar spinal stenosis  Muscle pain  Neuropathy  Recent change in weight  Sinus problem  Skin disease  SOB (shortness of breath)  Sore throat  SSS (sick sinus syndrome) ([address]  Stiffness of joints, multiple sites  Stress  Syncope  Tiredness  Trouble in sleeping  Weakness Past Surgical History:  
Procedure Laterality Date  HX HIP REPLACEMENT Bilateral 1993  HX PACEMAKER PLACEMENT  10/16/2018  HX ROTATOR CUFF REPAIR Right 11/16/2017 Prior Level of Function/Home Situation: Independent with mobility but frequent falls at baseline and has baseline \"gait disturbance\" Personal factors and/or comorbidities impacting plan of care:  
 
Home Situation Home Environment: Private residence # Steps to Enter: 0 Wheelchair Ramp: Yes One/Two Story Residence: One story Living Alone: No 
Support Systems: Spouse/Significant Other/Partner Patient Expects to be Discharged to[de-identified] Rehabilitation facility Current DME Used/Available at Home: Walker, rolling Tub or Shower Type: Shower EXAMINATION/PRESENTATION/DECISION MAKING: Critical Behavior: 
Neurologic State: Alert Orientation Level: Disoriented to situation, Disoriented to time, Oriented to person, Oriented to place Cognition: Follows commands Hearing: Auditory Auditory Impairment: None Range Of Motion: 
AROM: Generally decreased, functional 
  
  
  
  
  
  
  
Strength:   
Strength: Generally decreased, functional 
  
  
  
  
  
  
Tone & Sensation:  
  
  
  
  
  
  
  
  
  
   
Coordination: 
  
Vision:  
  
Functional Mobility: 
Bed Mobility: 
  
Supine to Sit: Moderate assistance; Additional time;Assist x1 Scooting: Minimum assistance;Assist x1 Transfers: 
Sit to Stand: Minimum assistance;Assist x2(initial posterior LOB in standing) Stand to Sit: Minimum assistance;Assist x2 Balance:  
Sitting: Intact Standing: Impaired Standing - Static: Constant support; Fair 
Standing - Dynamic : PoorAmbulation/Gait Training:Distance (ft): 5 Feet (ft)(from bed to chair) Assistive Device: Gait belt; Other (comment)(HHA x 2) Ambulation - Level of Assistance: Minimal assistance;Assist x2 Gait Description (WDL): Exceptions to [facility] Gait Abnormalities: Ataxic;Decreased step clearance;Shuffling gait Base of Support: Narrowed Speed/Dhara: Pace decreased (<100 feet/min); Shuffled; Slow Step Length: Left shortened;Right shortened Functional Measure: Barthel Index: 
 
Bathin Bladder: 5 Bowels: 10 
Groomin Dressin Feedin Mobility: 0 Stairs: 0 Toilet Use: 5 Transfer (Bed to Chair and Back): 5 Total: 30 Barthel and G-code impairment scale: 
Percentage of impairment CH 
0% CI 
1-19% CJ 
20-39% CK 
40-59% CL 
60-79% CM 
80-99% CN 
100% Barthel Score 0-100 100 99-80 79-60 59-40 20-39 1-19 
 0 Barthel Score 0-20 20 17-19 13-16 9-12 5-8 1-4 0 The Barthel ADL Index: Guidelines 1. The index should be used as a record of what a patient does, not as a record of what a patient could do. 2. The main aim is to establish degree of independence from any help, physical or verbal, however minor and for whatever reason. 3. The need for supervision renders the patient not independent. 4. A patient's performance should be established using the best available evidence. Asking the patient, friends/relatives and nurses are the usual sources, but direct observation and common sense are also important. However direct testing is not needed. 5. Usually the patient's performance over the preceding 24-48 hours is important, but occasionally longer periods will be relevant. 6. Middle categories imply that the patient supplies over 50 per cent of the effort. 7. Use of aids to be independent is allowed. Ev Agosto., BarthelSULTANA. ([id number]). Functional evaluation: the Barthel Index. [address] [facility] (14)2. Beverly Priest, EARL.J.M.KLARISSA, Tiffany Myers., Iqra Chicas., [address] (). Measuring the change indisability after inpatient rehabilitation; comparison of the responsiveness of the Barthel Index and Functional [place] Measure. Journal of Neurology, Neurosurgery, and Psychiatry, 66(4), 110-123. TIMOTHY Chahal.EARL.A, LEONORA Gunderson, & Steven Dasilva M.A. (2004.) Assessment of post-stroke quality of life in cost-effectiveness studies: The usefulness of the Barthel Index and the EuroQoL-5D. [facility], 13, 314-32 G codes: In compliance with CMSs Claims Based Outcome Reporting, the following G-code set was chosen for this patient based on their primary functional limitation being treated: The outcome measure chosen to determine the severity of the functional limitation was the Barthel with a score of 30/100 which was correlated with the impairment scale. ? Mobility - Walking and Moving Around:  
  - CURRENT STATUS: CL - 60%-79% impaired, limited or restricted  - GOAL STATUS: CK - 40%-59% impaired, limited or restricted  - D/C STATUS:  ---------------To be determined---------------  
  
 
 
Pain: 
Pain Scale 1: Numeric (0 - 10) Pain Intensity 1: 0 Pain Location 1: Incisional 
  
Pain Description 1: Aching Pain Intervention(s) 1: Medication (see MAR) Activity Tolerance: Low BP in standing Please refer to the flowsheet for vital signs taken during this treatment. After treatment:  
[x]         Patient left in no apparent distress sitting up in chair 
[]         Patient left in no apparent distress in bed 
[x]         Call bell left within reach [x]         Nursing notified 
[x]         Caregiver present and sitter present in room 
[]         Bed alarm activated COMMUNICATION/EDUCATION:  
The patients plan of care was discussed with: Physical Therapist, Occupational Therapist and Registered Nurse. [x]         Fall prevention education was provided and the patient/caregiver indicated understanding. [x]         Patient/family have participated as able in goal setting and plan of care. [x]         Patient/family agree to work toward stated goals and plan of care. []         Patient understands intent and goals of therapy, but is neutral about his/her participation. []         Patient is unable to participate in goal setting and plan of care. Thank you for this referral. 
Tyler Ramos, PT, DPT Time Calculation: 28 mins

## 2018-11-13 NOTE — PROGRESS NOTES
Spiritual Care Assessment/Progress Note [address] 
 
 
NAME: Kvng Hardin      MRN: [id number] AGE: 68 y.o. SEX: male Adventism Affiliation: Amish Language: English  
 
11/13/2018     Total Time (in minutes): 7 Spiritual Assessment begun in [facility] through conversation with: 
  
    []Patient        [] Family    [] Friend(s) Spiritual beliefs: (Please include comment if needed) 
   [] Identifies with a momo tradition:     
   [] Supported by a momo community:        
   [] Claims no spiritual orientation:       
   [] Seeking spiritual identity:            
   [] Adheres to an individual form of spirituality:       
   [x] Not able to assess:                   
 
    
Identified resources for coping:  
   [] Prayer                           
   [] Music                  [] Guided Imagery 
   [] Family/friends                 [] Pet visits [] Devotional reading                         [x] Unknown 
   [] Other:                                     
 
 
Interventions offered during this visit: (See comments for more details) Plan of Care: 
 
 [] Support spiritual and/or cultural needs  
 [] Support AMD and/or advance care planning process    
 [] Support grieving process 
 [] Coordinate Rites and/or Rituals  
 [] Coordination with community clergy 
 [x] No spiritual needs identified at this time 
 [] Detailed Plan of Care below (See Comments)  [] Make referral to Music Therapy 
[] Make referral to Pet Therapy    
[] Make referral to Addiction services 
[] Make referral to [facility] 
[] Make referral to Spiritual Care Partner 
[] No future visits requested       
[x] Follow up visits as needed Comments: The patient presented in a chair with head tilted back appearing sound asleep. The nurse/helper sitter was in the room with the patient and confirmed that the patient is sleeping. [address] Mike Malik EdD, [facility] [place] Page [phone])

## 2018-11-13 NOTE — PROGRESS NOTES
Problem: Self Care Deficits Care Plan (Adult) Goal: *Acute Goals and Plan of Care (Insert Text) Occupational Therapy Goals Initiated 11/13/2018 1. Patient will perform grooming at the sink with supervision/set-up within 7 day(s). 2.  Patient will perform bathing at the sink with moderate assistance  within 7 day(s). 3.  Patient will perform lower body dressing with moderate assistance  within 7 day(s). 4.  Patient will perform toilet transfers with minimal assistance/contact guard assist within 7 day(s). 5.  Patient will perform all aspects of toileting with supervision/set-up within 7 day(s). 6.  Patient will participate in upper extremity therapeutic exercise/activities with supervision/set-up for 5 minutes within 7 day(s). 7.  Patient will utilize energy conservation techniques during functional activities with verbal cues within 7 day(s). Occupational Therapy EVALUATION Patient: William Mota (3 y.o. male) Date: 11/13/2018 Primary Diagnosis: Pacemaker complications, subsequent encounter Fever Precautions:   Fall, Other (comment)(sling to L UE) ASSESSMENT : 
Based on the objective data described below, the patient presents with generalized weakness, decreased endurance, strength, functional mobility, and ADLs. Pt was living at home with family and was independent with ADLs prior. Pt was cleared to be seen for therapy and all VSS. Pts sling was adjusted and pt was to keep his sling on when up and he was educated on this. Pt was able to come supine to sit, with min assist of 2 and was able to stand with min assist of 2 and has a posterior lean. And his BP dropped once pt stood, 72/47  and he stated that he was not feeling right but not dizzy. Pt was sat back down on bed and then BP was 92/55. Pt was also tried on RA and he did well at first and then O2% dropped to  86% and was put back on 2 liters.  Pt was left sitting in recliner and family in room and recommend that pt have further therapy at In pt rehab. Patient will benefit from skilled intervention to address the above impairments. Patients rehabilitation potential is considered to be Good Factors which may influence rehabilitation potential include:  
[x]             None noted []             Mental ability/status []             Medical condition []             Home/family situation and support systems []             Safety awareness []             Pain tolerance/management 
[]             Other: PLAN : 
Recommendations and Planned Interventions: 
[x]               Self Care Training                  []        Therapeutic Activities [x]               Functional Mobility Training    []        Cognitive Retraining 
[x]               Therapeutic Exercises           [x]        Endurance Activities [x]               Balance Training                   []        Neuromuscular Re-Education []               Visual/Perceptual Training     [x]   Home Safety Training 
[x]               Patient Education                 [x]        Family Training/Education []               Other (comment): Frequency/Duration: Patient will be followed by occupational therapy 4 times a week to address goals. Discharge Recommendations: In pt rehab Further Equipment Recommendations for Discharge: tbd SUBJECTIVE:  
Patient stated I feel funny but I dont feel dizzy.  OBJECTIVE DATA SUMMARY:  
HISTORY:  
Past Medical History:  
Diagnosis Date  Anxiety  Back pain  Blurred vision  Chest pain  Depression  Dizziness  Fast heart beat  Frequent headaches  Frequent urination  Hip dysplasia, congenital   
 Joint pain  Joint swelling  Lumbar spinal stenosis  Muscle pain  Neuropathy  Recent change in weight  Sinus problem  Skin disease  SOB (shortness of breath)  Sore throat  SSS (sick sinus syndrome) ([address]  Stiffness of joints, multiple sites  Stress  Syncope  Tiredness  Trouble in sleeping  Weakness Past Surgical History:  
Procedure Laterality Date  HX HIP REPLACEMENT Bilateral 1993  HX PACEMAKER PLACEMENT  10/16/2018  HX ROTATOR CUFF REPAIR Right 11/16/2017 Prior Level of Function/Environment/Context: pt lives with family and was independent with ADLs Occupations in which the patient is/was successful, what are the barriers preventing that success:  
Performance Patterns (routines, roles, habits, and rituals):  
Personal Interests and/or values:  
Expanded or extensive additional review of patient history:  
 
Home Situation Home Environment: Private residence # Steps to Enter: 0 Wheelchair Ramp: Yes One/Two Story Residence: One story Living Alone: No 
Support Systems: Spouse/Significant Other/Partner Patient Expects to be Discharged to[de-identified] Rehabilitation facility Current DME Used/Available at Home: Walker, rolling Tub or Shower Type: Shower Hand dominance: RightEXAMINATION OF PERFORMANCE DEFICITS: 
Cognitive/Behavioral Status: 
Neurologic State: Alert Orientation Level: Disoriented to situation;Disoriented to time Skin: in good health Edema: none noted Hearing: Auditory Auditory Impairment: None Vision/Perceptual:   
    
    
    
 intact Range of Motion: 
 
AROM: Generally decreased, functional 
  
  
  
  
  
  
  
Strength: 
 
Strength: Generally decreased, functional 
  
  
  
  
Coordination: 
  
Fine Motor Skills-Upper: Left Intact; Right Intact Gross Motor Skills-Upper: Left Intact; Right Intact Tone & Sensation: 
 
  
 intact Balance: 
Sitting: Intact Standing: Impaired Standing - Static: Constant support; Fair 
Standing - Dynamic : Poor Functional Mobility and Transfers for ADLs:Bed Mobility: 
Supine to Sit: Moderate assistance; Additional time;Assist x1 Scooting: Minimum assistance;Assist x1 
 Transfers: 
Sit to Stand: Minimum assistance;Assist x2(initial posterior LOB in standing) Stand to Sit: Minimum assistance;Assist x2 Bed to Chair: Minimum assistance;Assist x2 ADL Assessment: 
Feeding: Setup Oral Facial Hygiene/Grooming: Setup Bathing: Maximum assistance;Minimum assistance Upper Body Dressing: Maximum assistance;Minimum assistance Lower Body Dressing: Maximum assistance;Minimum assistance Functional Measure: 
Barthel Index: 
 
Bathin Bladder: 5 Bowels: 10 
Groomin Dressin Feedin Mobility: 0 Stairs: 0 Toilet Use: 5 Transfer (Bed to Chair and Back): 5 Total: 30 Barthel and G-code impairment scale: 
Percentage of impairment CH 
0% CI 
1-19% CJ 
20-39% CK 
40-59% CL 
60-79% CM 
80-99% CN 
100% Barthel Score 0-100 100 99-80 79-60 59-40 20-39 1-19 
 0 Barthel Score 0-20 20 17-19 13-16 9-12 5-8 1-4 0 The Barthel ADL Index: Guidelines 1. The index should be used as a record of what a patient does, not as a record of what a patient could do. 2. The main aim is to establish degree of independence from any help, physical or verbal, however minor and for whatever reason. 3. The need for supervision renders the patient not independent. 4. A patient's performance should be established using the best available evidence. Asking the patient, friends/relatives and nurses are the usual sources, but direct observation and common sense are also important. However direct testing is not needed. 5. Usually the patient's performance over the preceding 24-48 hours is important, but occasionally longer periods will be relevant. 6. Middle categories imply that the patient supplies over 50 per cent of the effort. 7. Use of aids to be independent is allowed. María Elena Paiz., Barthel, D.W. (2103). Functional evaluation: the Barthel Index. [address] [facility] (14)2.  
MIGUELINA La, Yesica Purcell, Chanda Mark.Juan. (1999). Measuring the change indisability after inpatient rehabilitation; comparison of the responsiveness of the Barthel Index and Functional [place] Measure. Journal of Neurology, Neurosurgery, and Psychiatry, 66(4), 278-929. CHERYL Arnold, LEONORA Gunderson, & Lolis Suarez M.A. (2004.) Assessment of post-stroke quality of life in cost-effectiveness studies: The usefulness of the Barthel Index and the EuroQoL-5D. [facility], 13 050-66 G codes: In compliance with CMSs Claims Based Outcome Reporting, the following G-code set was chosen for this patient based on their primary functional limitation being treated: The outcome measure chosen to determine the severity of the functional limitation was the Barthel with a score of 30/100 which was correlated with the impairment scale. ? Self Care:  
  - CURRENT STATUS: CL - 60%-79% impaired, limited or restricted  - GOAL STATUS: CK - 40%-59% impaired, limited or restricted  - D/C STATUS:  ---------------To be determined--------------- Occupational Therapy Evaluation Charge Determination History Examination Decision-Making MEDIUM Complexity : Expanded review of history including physical, cognitive and psychosocial  history  MEDIUM Complexity : 3-5 performance deficits relating to physical, cognitive , or psychosocial skils that result in activity limitations and / or participation restrictions MEDIUM Complexity : Patient may present with comorbidities that affect occupational performnce. Miniml to moderate modification of tasks or assistance (eg, physical or verbal ) with assesment(s) is necessary to enable patient to complete evaluation Based on the above components, the patient evaluation is determined to be of the following complexity level: MEDIUM Pain: 
  
Pain Intensity 1: 0 Activity Tolerance:  
fair Please refer to the flowsheet for vital signs taken during this treatment. After treatment:  
[x] Patient left in no apparent distress sitting up in chair 
[] Patient left in no apparent distress in bed 
[x] Call bell left within reach [x] Nursing notified 
[x] Caregiver present 
[] Bed alarm activated COMMUNICATION/EDUCATION:  
The patients plan of care was discussed with: Physical Therapist and Registered Nurse. [x] Home safety education was provided and the patient/caregiver indicated understanding. [x] Patient/family have participated as able in goal setting and plan of care. [x] Patient/family agree to work toward stated goals and plan of care. [] Patient understands intent and goals of therapy, but is neutral about his/her participation. [] Patient is unable to participate in goal setting and plan of care. This patients plan of care is appropriate for delegation to [facility]. Thank you for this referral. 
Maria Isabel Dos Santos OT Time Calculation: 26 mins

## 2018-11-13 NOTE — ACP (ADVANCE CARE PLANNING)
Primary Decision Maker (Health Care Agent): Marilia Epstein  Relationship to patient: Life partner  Phone number: [phone]  [x] Named in a scanned document   [] Legal Next of Kin  [] Guardian    Secondary Decision Maker ([address]): Maye Jonas  Relationship to patient: Brother  Phone number: [phone]  [x] Named in a scanned document   [] Legal Next of Kin  [] Guardian    ACP documents you current have include:  [x] Advance Directive or Living Will  [x] Durable Do Not Resuscitate  [] Physician Orders for Scope of Treatment (POST)  [] Medical Power of   [] Other    Dr. Melisa Lemon and I met with Patient and his life partner Marilia Epstein ([facility]) to complete AMD and DDNR. Mr. Ramiro Amado was sitting up in bedside chair with full decisions making capacity. 1. MPOA: Patient appointed the above named family as his primary and secondary healthcare agents. 2. Health care instructions: Mr. Ramiro Amado indicated he does NOT want life prolonging interventions if death is imminent. If he is unaware of his surroundings, Mr. Ramiro Amado would want to try treatments for 3 days and if there is no improvement to his condition at that time, he would like to withdraw support. Mr. Ramiro Amado also verbalized pain free/comfort as an important goal in his care and he reported that the verbiage on the AMD healthcare instructions represented this wish. DDNR: Patient completed DDNR. Palliative team instructed patient and partner to revisit AMD from time to time or if there is a health event. Mr Ramiro Amado also instructed to discuss health care wishes with his brother (secondary agent).

## 2018-11-13 NOTE — PROGRESS NOTES
[place] [phone] ([phone]) [facility], located within [facility]: [phone] ([phone]) Code Status: DNR Advance Care Planning: 
Primary Decision [facility] ([address]): Anderson Salvage Relationship to patient: Life partner Phone number: [phone] [x] Named in a scanned document  
[] Legal Next of Kin 
[] Guardian Secondary Decision Maker (First Alternate Health Care Agent): Klaudia Tatum Relationship to patient: Brother Phone number: [phone] [x] Named in a scanned document  
[] Legal Next of Kin 
[] Guardian ACP documents you current have include: 
[x] Advance Directive or Living Will 
[x] Durable Do Not Resuscitate 
[] Physician Orders for Scope of Treatment (POST) [] Medical Power of  
[] Other Patient / Family Encounter Documentation Participants (names): Mr. Luis Null (life partner/MPOA), Palliative Team (Dr. aDe Saucedo, Qamar Verma) Narrative:  
Dr. Dae Saucedo and I met with Patient Mr. Lee and his life partner Lukas Wilson (MPOA) to complete AMD and DDNR. Mr. Leobardo Rios was sitting up in bedside chair with full decisions making capacity. 1. MPOA: Patient appointed the above named individuals as his primary and secondary healthcare agents. Discussed that patient's adult children would not be appropriate MPOA. 2. Health care instructions: Mr. Leobardo Rios indicated he does NOT want life prolonging interventions if death is imminent. In the scenario in which he is unaware of his surroundings, Mr. Leobardo Rios would want to try treatments for 3 days and if there is no improvement to his condition at that time, he would like to withdraw support. Mr. Leobardo Rios also verbalized pain free/comfort as an important goal in his care and he reported that the verbiage on the AMD healthcare instructions represented this wish. 
 
3 . DDNR: Patient completed DDNR.  
 
Palliative team instructed patient and partner to revisit AMD from time to time or if there is a health event. Discussed where to post DDNR/file AMD. Mr Anabela Carr was also instructed to discuss healthcare wishes with his brother (secondary agent). Copies of both documents on patient chart to be scanned. Patient wife has originals. Goals of Care / Plan:  
 
Patient and partner grateful for visit and discussion. They expect patient to be discharged to [address] [place] [address] and look at this hospital admission as a fluke. Thank you for the opportunity to be involved in the care of Mr. Anabela Carr and his family. Stephanie Agarwal, [facility] Palliative Medicine  [phone]

## 2018-11-13 NOTE — PROGRESS NOTES
Infectious Disease Progress Note Subjective: Ms Gigi Chanel doing better today Sitting up in chair D/W RN in the room He denied any pain, diarrhea, fever, chills ROS: 10 point ROS obtained and pertinent positives include above. All others negative. Objective: 
 
Vitals:  
Patient Vitals for the past 24 hrs: 
 Temp Pulse Resp BP SpO2  
11/13/18 0910    92/55   
11/13/18 0900    (!) 72/47   
11/13/18 0629 97.3 °F (36.3 °C) (!) 51 16 126/62 96 % 11/13/18 0355 98.4 °F (36.9 °C) (!) 56 16 119/67 96 % 11/12/18 2300 99.1 °F (37.3 °C) 60 18 128/67 93 % 11/12/18 1901 97.8 °F (36.6 °C) (!) 57 18 102/59 96 % 11/12/18 1857 97.2 °F (36.2 °C) (!) 56 16 102/59 95 % 11/12/18 1626 98.5 °F (36.9 °C) (!) 57 16 127/59 99 % Physical Exam: 
Gen: No apparent distress HEENT:  , no scleral icterus, no thrush, CV: S1,2 heard regularly, no lower extremity edema  , + pressure dressing over chest 
Lungs: Clear to auscultation Abdomen: soft, non tender, non distended Skin: no rash Musculoskeletal:  No joint edema, erythema or tenderness noted knees , ankles Medications: 
 
Current Facility-Administered Medications:  
  nicotine (NICODERM CQ) 21 mg/24 hr patch 1 Patch, 1 Patch, TransDERmal, Q24H, Vaibhav Matthews MD, 1 Patch at 11/12/18 2313   clonazePAM (KlonoPIN) tablet 1 mg, 1 mg, Oral, BID, Saran Mcdermott MD, 1 mg at 11/13/18 1049   LORazepam (ATIVAN) injection 1 mg, 1 mg, IntraVENous, Q6H PRN, Vaibhav Matthews MD, 1 mg at 11/12/18 2029 
  melatonin tablet 9 mg, 9 mg, Oral, QHS, Shun Stuart MD, 9 mg at 11/12/18 2313   ondansetron (ZOFRAN) injection 4 mg, 4 mg, IntraVENous, Q6H PRN, Adrian Aquino MD, 4 mg at 11/09/18 0910 
  nitroglycerin (NITROBID) 2 % ointment 1 Inch, 1 Inch, Topical, Q6H PRN, Adrian Aquino MD 
  ADDaptor, , , ,  
  0.9% sodium chloride (MBP/ADV) infusion, , , ,  
  sodium chloride (NS) flush 5-10 mL, 5-10 mL, IntraVENous, Q8H, Baldemar, Narayan Ace MD, 10 mL at 11/13/18 0457   sodium chloride (NS) flush 5-10 mL, 5-10 mL, IntraVENous, PRN, Saran Mcdermott MD, 10 mL at 11/10/18 2311 
  naloxone (NARCAN) injection 0.4 mg, 0.4 mg, IntraVENous, PRN, Saran Mcdermott MD 
  acetaminophen (TYLENOL) tablet 650 mg, 650 mg, Oral, Q4H PRN, Saran Mcdermott MD, 650 mg at 11/12/18 2313 
  HYDROcodone-acetaminophen (NORCO) 5-325 mg per tablet 1 Tab, 1 Tab, Oral, Q4H PRN, Saran Mcdermott MD 
  cyanocobalamin (VITAMIN B12) tablet 500 mcg, 500 mcg, Oral, DAILY, Celena Oconnor, NP, 500 mcg at 11/13/18 1049 
  donepezil (ARICEPT) tablet 5 mg, 5 mg, Oral, QHS, Celena Oconnor, NP, 5 mg at 11/12/18 2317   DULoxetine (CYMBALTA) capsule 60 mg, 60 mg, Oral, DAILY, Maddie Oconnora L, NP, 60 mg at 11/13/18 1049 
  gabapentin (NEURONTIN) capsule 600 mg, 600 mg, Oral, DAILY, Maddie Oconnora L, NP, 600 mg at 11/13/18 1049 
  sodium chloride (NS) flush 5-10 mL, 5-10 mL, IntraVENous, Q8H, Maddie Oconnora CHIKA, NP, 10 mL at 11/13/18 0457   sodium chloride (NS) flush 5-10 mL, 5-10 mL, IntraVENous, PRN, Maddie Bowmana L, NP 
  acetaminophen (TYLENOL) tablet 650 mg, 650 mg, Oral, Q4H PRN, Celena Oconnor, NP, 650 mg at 11/11/18 4436   oxyCODONE-acetaminophen (PERCOCET) 5-325 mg per tablet 1 Tab, 1 Tab, Oral, Q6H PRN, Jeyson DE LA CRUZ MD, 1 Tab at 11/09/18 0129 Labs: 
Recent Results (from the past 24 hour(s)) METABOLIC PANEL, BASIC Collection Time: 11/13/18  4:58 AM  
Result Value Ref Range Sodium 143 136 - 145 mmol/L Potassium 3.7 3.5 - 5.1 mmol/L Chloride 111 (H) 97 - 108 mmol/L  
 CO2 25 21 - 32 mmol/L Anion gap 7 5 - 15 mmol/L Glucose 101 (H) 65 - 100 mg/dL BUN 13 6 - 20 MG/DL Creatinine 0.72 0.70 - 1.30 MG/DL  
 BUN/Creatinine ratio 18 12 - 20 GFR est AA >60 >60 ml/min/1.73m2 GFR est non-AA >60 >60 ml/min/1.73m2  Calcium 8.1 (L) 8.5 - 10.1 MG/DL  
CBC W/O DIFF  
 Collection Time: 11/13/18  4:58 AM  
Result Value Ref Range WBC 4.4 4.1 - 11.1 K/uL  
 RBC 3.12 (L) 4.10 - 5.70 M/uL  
 HGB 10.2 (L) 12.1 - 17.0 g/dL HCT 30.8 (L) 36.6 - 50.3 % MCV 98.7 80.0 - 99.0 FL  
 MCH 32.7 26.0 - 34.0 PG  
 MCHC 33.1 30.0 - 36.5 g/dL  
 RDW 13.3 11.5 - 14.5 % PLATELET 588 755 - 746 K/uL MPV 10.1 8.9 - 12.9 FL  
 NRBC 0.0 0  WBC ABSOLUTE NRBC 0.00 0.00 - 0.01 K/uL MAGNESIUM Collection Time: 11/13/18  4:58 AM  
Result Value Ref Range Magnesium 2.2 1.6 - 2.4 mg/dL PHOSPHORUS Collection Time: 11/13/18  4:58 AM  
Result Value Ref Range Phosphorus 4.1 2.6 - 4.7 MG/DL Micro:  
   
Blood 11/9/18 Component Value Ref Range & Units Status Special Requests: NO SPECIAL REQUESTS    Preliminary Culture result: (Abnormal)    Preliminary Abnormal  
POSSIBLE STAPHYLOCOCCUS SPECIES, COAGULASE NEGATIVE GROWING IN ALL 4 BOTTLES DRAWN (L AC AND R AC SITE) Culture result: (Abnormal)    Preliminary Wound 11/9/18 Special Requests: NO SPECIAL REQUESTS    Final  
GRAM STAIN NO WBC'S SEEN    Final  
GRAM STAIN NO ORGANISMS SEEN    Final  
Culture result: (Abnormal)    Final  
MODERATE STAPHYLOCOCCUS EPIDERMIDIS Abnormal    
Susceptibility Staphylococcus epidermidis AMANDA Ampicillin ($) <=2 ug/mL R Ampicillin/sulbactam ($) <=8/4 ug/mL S Cefazolin ($) <=4 ug/mL S Ciprofloxacin ($) <=1 ug/mL S Clindamycin ($) <=0.5 ug/mL S Daptomycin ($$$$$) <=0.5 ug/mL S Erythromycin ($$$$) <=0.5 ug/mL S Gentamicin ($) <=4 ug/mL S Levofloxacin ($) <=1 ug/mL S Linezolid ($$$$$) 2 ug/mL S Oxacillin <=0.25 ug/mL S Penicillin G ($$) 8 ug/mL R Rifampin ($$$$) <=1 ug/mL S1 Tetracycline <=4 ug/mL S Trimeth/Sulfa <=0.5/9.5 u... S Vancomycin ($) 2 ug/mL S Blood 11/10 Component Value Ref Range & Units Status Special Requests: NO SPECIAL REQUESTS    Preliminary Culture result: NO GROWTH 2 DAYS    Preliminary Result History Imaging: CXR 11/9 FINDINGS: 
There is interval pacemaker removal with no apparent pneumothorax or other acute 
finding or significant change. 
  
The lungs are clear. Heart is normal in size. There is no overt pulmonary edema. There is no evident adenopathy or pleural effusion. No new finding. 
  
IMPRESSION IMPRESSION: No pneumothorax. No acute disease 
  
CT head 11/8 FINDINGS: 
The ventricles and sulci are normal in size, shape and configuration and 
midline. There is no significant white matter disease. There is minimal 
bifrontal volume loss. There is no intracranial hemorrhage, extra-axial 
collection, mass, mass effect or midline shift.  The basilar cisterns are open. No acute infarct is identified. The bone windows demonstrate no abnormalities. The visualized portions of the paranasal sinuses and mastoid air cells are 
clear. 
  
IMPRESSION IMPRESSION: No acute intracranial process identified. 
  
TTE 9/17/18 PROCEDURE: This was a routine study. The study included complete 2D 
imaging, complete spectral Doppler, and color Doppler. The heart rate was 55 bpm, at the start of the study. Images were obtained from the 
parasternal, apical, and subcostal acoustic windows. Image quality was 
adequate. LEFT VENTRICLE: Size was normal. Systolic function was normal. Ejection 
fraction was estimated in the range of 60 % to 65 %. There were no 
regional wall motion abnormalities. Wall thickness was normal. DOPPLER: 
Features were consistent with a pseudonormal left ventricular filling 
pattern, with concomitant abnormal relaxation and increased filling 
pressure (grade 2 diastolic dysfunction). RIGHT VENTRICLE: The size was normal. Systolic function was normal. 
 
LEFT ATRIUM: The atrium was mildly dilated. ATRIAL SEPTUM: The atrial septum appeared intact. RIGHT ATRIUM: Size was normal. 
 
MITRAL VALVE: Normal valve structure. No echocardiographic evidence for prolapse. DOPPLER: The transmitral velocity was within the normal range. There was trivial regurgitation. AORTIC VALVE: Normal valve structure. DOPPLER: Transaortic velocity was 
within the normal range. There was no stenosis. There was no regurgitation. TRICUSPID VALVE: Normal valve structure. DOPPLER: There was trivial 
regurgitation. Pulmonary artery systolic pressure was within the normal 
range. There was no pulmonary hypertension. PULMONIC VALVE: Not well visualized, but normal Doppler findings. DOPPLER: 
The transpulmonic velocity was within the normal range. There was no 
stenosis. There was trivial regurgitation. AORTA: The root exhibited normal size. PERICARDIUM: There was no pericardial effusion. 
  
  
Carotid duplex  9/17/18 IMPRESSION IMPRESSION: 
  
1.  16-49% stenosis in the right ICA. 2.  16-49% stenosis in the left ICA. 3.  Bilateral antegrade vertebral artery flow. 
  
Procedures:  
Pacer insertion 10/16/18 Pacer removal 11/9/18  
  
Assessment / Plan:  
  
  
Mr Leobardo Rios is a 68 chayo old gentleman with hx of hip replacement, lumbar surgery , rotator cuff surgery, who had dual chamber pacer inserted on 10/16/18 for SSS. His significant other says that he was fine post op for about 8 days. Noted swelling and bleeding from pacer site and came to the hospital again 10/24/18. Seen by EP and large hematoma noted around L subclavian pacer site per notes. Treated with pressure dressing. Pressure dressing removed 10/27/18 per notes. His partner says that the with the pressure dressing the swelling went down but it was significantly swollen on discharge. He had fevers last evening and all of sudden she saw blood all over the floor and rushed him to the ER. Today, he had pocket evacuation and device removal. Pocket was cultured per procedural note.  \"The pocket hematoma was evacuated - approximately 100 cc of coagulum were removed\". During my evaluation he was sleeping and not recovered from anesthesia fully yet. Unable to get any history from him. His partner says that he did not have any antibiotics prior to admission. She denied any erythema being noted/pururlent discharge from site.   
  
From chart review, hypotensive in 98-89'D systolic 11/3 and febrile up to 102. 3. Labs with slight anemia and without coagulopathy, normal renal/liver function, normal lactate  
  
  
1) MSSE bacteremia with Pacer pocket infection hematoma/bleeding S/P pacer removal on admission [facility] per Dr Sylvester Star negative for vegetations Stop Vancomycin Cefazolin 2gm IV Q 8 hours till 11/24/18 . If renal function worsens, adjust dosing per pharmacy If plan for re implantation, can do so after 72 hours of negative blood cultures from 11/10/18 as no endocarditis on [facility] Discussed with his significant other and Mr Jania Crook and they will not be able to safely do IV antibiotics at home per significant other, infusion center is > hour away per her As far as antibiotics arranged to be given in a safe/effective manner with weekly monitoring , and if he has no further complications and issues, does not need routine ID follow up Check weekly CBC wt diff, CMP and fax to me for review at [phone] Antibiotics side effects including GI, renal , hematological, risk for CDI , hematological side effects explained   
2) S/P hip replacement Lumbar surgery and possible hardware Risk for seeding If symptomatic, needs imaging to assess for prosthetic/joint infection  
  
Will sign off. Contact with questions/concerns Ovidio Ragland DO  
1:55 PM

## 2018-11-14 ENCOUNTER — HOSPITAL ENCOUNTER (OUTPATIENT)
Dept: REHABILITATION | Age: 73
End: 2018-11-24
Attending: PHYSICAL MEDICINE & REHABILITATION | Admitting: PHYSICAL MEDICINE & REHABILITATION

## 2018-11-14 ENCOUNTER — TELEPHONE (OUTPATIENT)
Dept: FAMILY MEDICINE CLINIC | Age: 73
End: 2018-11-14

## 2018-11-14 VITALS
WEIGHT: 160 LBS | HEART RATE: 73 BPM | DIASTOLIC BLOOD PRESSURE: 73 MMHG | SYSTOLIC BLOOD PRESSURE: 147 MMHG | TEMPERATURE: 98.2 F | BODY MASS INDEX: 22.4 KG/M2 | OXYGEN SATURATION: 97 % | HEIGHT: 71 IN | RESPIRATION RATE: 16 BRPM

## 2018-11-14 DIAGNOSIS — T88.8XXA OTHER SPECIFIED COMPLICATIONS OF SURGICAL AND MEDICAL CARE, NOT ELSEWHERE CLASSIFIED, INITIAL ENCOUNTER: ICD-10-CM

## 2018-11-14 LAB
ANION GAP SERPL CALC-SCNC: 7 MMOL/L (ref 5–15)
ATRIAL RATE: 57 BPM
BASOPHILS # BLD: 0 K/UL (ref 0–0.1)
BASOPHILS NFR BLD: 1 % (ref 0–1)
BUN SERPL-MCNC: 13 MG/DL (ref 6–20)
BUN/CREAT SERPL: 19 (ref 12–20)
CALCIUM SERPL-MCNC: 8.3 MG/DL (ref 8.5–10.1)
CALCULATED P AXIS, ECG09: 41 DEGREES
CALCULATED R AXIS, ECG10: 48 DEGREES
CALCULATED T AXIS, ECG11: 43 DEGREES
CHLORIDE SERPL-SCNC: 111 MMOL/L (ref 97–108)
CO2 SERPL-SCNC: 24 MMOL/L (ref 21–32)
CREAT SERPL-MCNC: 0.68 MG/DL (ref 0.7–1.3)
DIAGNOSIS, 93000: NORMAL
DIFFERENTIAL METHOD BLD: ABNORMAL
EOSINOPHIL # BLD: 0.3 K/UL (ref 0–0.4)
EOSINOPHIL NFR BLD: 7 % (ref 0–7)
ERYTHROCYTE [DISTWIDTH] IN BLOOD BY AUTOMATED COUNT: 13.4 % (ref 11.5–14.5)
GLUCOSE SERPL-MCNC: 102 MG/DL (ref 65–100)
HCT VFR BLD AUTO: 31 % (ref 36.6–50.3)
HGB BLD-MCNC: 10.3 G/DL (ref 12.1–17)
IMM GRANULOCYTES # BLD: 0 K/UL (ref 0–0.04)
IMM GRANULOCYTES NFR BLD AUTO: 1 % (ref 0–0.5)
LYMPHOCYTES # BLD: 1.4 K/UL (ref 0.8–3.5)
LYMPHOCYTES NFR BLD: 30 % (ref 12–49)
MCH RBC QN AUTO: 33 PG (ref 26–34)
MCHC RBC AUTO-ENTMCNC: 33.2 G/DL (ref 30–36.5)
MCV RBC AUTO: 99.4 FL (ref 80–99)
MONOCYTES # BLD: 0.5 K/UL (ref 0–1)
MONOCYTES NFR BLD: 9 % (ref 5–13)
NEUTS SEG # BLD: 2.5 K/UL (ref 1.8–8)
NEUTS SEG NFR BLD: 52 % (ref 32–75)
NRBC # BLD: 0 K/UL (ref 0–0.01)
NRBC BLD-RTO: 0 PER 100 WBC
P-R INTERVAL, ECG05: 168 MS
PLATELET # BLD AUTO: 193 K/UL (ref 150–400)
PMV BLD AUTO: 10.5 FL (ref 8.9–12.9)
POTASSIUM SERPL-SCNC: 3.7 MMOL/L (ref 3.5–5.1)
Q-T INTERVAL, ECG07: 438 MS
QRS DURATION, ECG06: 86 MS
QTC CALCULATION (BEZET), ECG08: 426 MS
RBC # BLD AUTO: 3.12 M/UL (ref 4.1–5.7)
SODIUM SERPL-SCNC: 142 MMOL/L (ref 136–145)
VENTRICULAR RATE, ECG03: 57 BPM
WBC # BLD AUTO: 4.8 K/UL (ref 4.1–11.1)

## 2018-11-14 PROCEDURE — 74011250637 HC RX REV CODE- 250/637: Performed by: INTERNAL MEDICINE

## 2018-11-14 PROCEDURE — 74011250637 HC RX REV CODE- 250/637: Performed by: NURSE PRACTITIONER

## 2018-11-14 PROCEDURE — 97530 THERAPEUTIC ACTIVITIES: CPT

## 2018-11-14 PROCEDURE — 97535 SELF CARE MNGMENT TRAINING: CPT

## 2018-11-14 PROCEDURE — 85025 COMPLETE CBC W/AUTO DIFF WBC: CPT

## 2018-11-14 PROCEDURE — 74011250636 HC RX REV CODE- 250/636: Performed by: INTERNAL MEDICINE

## 2018-11-14 PROCEDURE — 74011250636 HC RX REV CODE- 250/636: Performed by: HOSPITALIST

## 2018-11-14 PROCEDURE — 80048 BASIC METABOLIC PNL TOTAL CA: CPT

## 2018-11-14 PROCEDURE — 77010033678 HC OXYGEN DAILY

## 2018-11-14 PROCEDURE — 36415 COLL VENOUS BLD VENIPUNCTURE: CPT

## 2018-11-14 PROCEDURE — 93005 ELECTROCARDIOGRAM TRACING: CPT

## 2018-11-14 PROCEDURE — 97116 GAIT TRAINING THERAPY: CPT

## 2018-11-14 RX ORDER — GUAIFENESIN 600 MG/1
600 TABLET, EXTENDED RELEASE ORAL EVERY 12 HOURS
Qty: 30 TAB | Refills: 0 | Status: SHIPPED
Start: 2018-11-14 | End: 2019-01-03 | Stop reason: SDUPTHER

## 2018-11-14 RX ORDER — CEFAZOLIN SODIUM/WATER 2 G/20 ML
2 SYRINGE (ML) INTRAVENOUS EVERY 8 HOURS
Status: DISCONTINUED | OUTPATIENT
Start: 2018-11-14 | End: 2018-11-14 | Stop reason: HOSPADM

## 2018-11-14 RX ORDER — GUAIFENESIN 600 MG/1
600 TABLET, EXTENDED RELEASE ORAL EVERY 12 HOURS
Status: DISCONTINUED | OUTPATIENT
Start: 2018-11-14 | End: 2018-11-14 | Stop reason: HOSPADM

## 2018-11-14 RX ORDER — CEFAZOLIN SODIUM/WATER 2 G/20 ML
2 SYRINGE (ML) INTRAVENOUS EVERY 8 HOURS
Qty: 1 SYRINGE | Refills: 30 | Status: SHIPPED
Start: 2018-11-14 | End: 2018-11-28 | Stop reason: ALTCHOICE

## 2018-11-14 RX ORDER — CEFAZOLIN SODIUM 1 G/3ML
2 INJECTION, POWDER, FOR SOLUTION INTRAMUSCULAR; INTRAVENOUS EVERY 8 HOURS
Status: DISCONTINUED | OUTPATIENT
Start: 2018-11-14 | End: 2018-11-14

## 2018-11-14 RX ADMIN — Medication 10 ML: at 06:46

## 2018-11-14 RX ADMIN — Medication 10 ML: at 14:59

## 2018-11-14 RX ADMIN — GABAPENTIN 600 MG: 300 CAPSULE ORAL at 08:59

## 2018-11-14 RX ADMIN — Medication 2 G: at 14:56

## 2018-11-14 RX ADMIN — Medication 10 ML: at 06:47

## 2018-11-14 RX ADMIN — LORAZEPAM 1 MG: 2 INJECTION INTRAMUSCULAR; INTRAVENOUS at 03:41

## 2018-11-14 RX ADMIN — DULOXETINE 60 MG: 30 CAPSULE, DELAYED RELEASE ORAL at 08:59

## 2018-11-14 RX ADMIN — CYANOCOBALAMIN TAB 500 MCG 500 MCG: 500 TAB at 08:59

## 2018-11-14 RX ADMIN — CLONAZEPAM 1 MG: 1 TABLET ORAL at 08:59

## 2018-11-14 RX ADMIN — GUAIFENESIN 600 MG: 600 TABLET, EXTENDED RELEASE ORAL at 11:58

## 2018-11-14 NOTE — DISCHARGE SUMMARY
[address] [place] [address], [address] [facility]  [phone]        Patient ID:  Moody Riggins  [id number]  10 y.o.  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 He was s/p PM implant 10/16 for SSS, then admitted 10/24-27 after extremely large hematoma developed (the size of a football per his significant other), now re-admitted due to spontaneous bleeding from PM pocket. Mr. Ezzie Osgood significant other also states that the patient was stumbling when walking 11/7/18 with  memory difficulty, and [place] speech. Mr. Anabela Carr endorsed general weakness, but   no focal weakness. Pt with hematoma with stable h/h but some drainage from the site. In light of drainage, mental status changes and fever, Dr Margrett Spatz proceed with device removal and pocket evacuation 11/9/18. Consulted ID and neuro. Transient lowering of HR in the 30s over the weekend but has remained in sinus 50-60 BPM otherwise. [facility] 11/12/18 without vegetations, normal EF. He has been afebrile x 48 hours with improvement in mental status.   Left subclavian pocket without hematoma or drainage. He will be discharged today to [facility].         Consults: ID and Neurology    Discharge Exam:  Visit Vitals  /56 (BP Patient Position: Sitting)   Pulse 61   Temp 98 °F (36.7 °C)   Resp 16   Ht 5' 11\" (1.803 m)   Wt 160 lb 0 oz (72.6 kg)   SpO2 97%   BMI 22.32 kg/m²       Abdomen: soft, non-tender  Extremities: extremities normal  Heart: regular rate and rhythm  Lungs: clear to auscultation bilaterally  Neck: no JVD  Neurologic: Grossly normal  Pulses: 2+ and symmetric    Disposition: SNF    Patient Instructions:   Current Discharge Medication List      CONTINUE these medications which have NOT CHANGED    Details   clonazePAM (KLONOPIN) 1 mg tablet Take 1 mg by mouth daily. gabapentin (NEURONTIN) 600 mg tablet Take 600 mg by mouth daily. naproxen (NAPROSYN) 500 mg tablet Take 500 mg by mouth daily. acetaminophen (TYLENOL) 500 mg tablet Take 1,000 mg by mouth every six (6) hours as needed for Pain. cyanocobalamin, vitamin B-12, (VITAMIN B-12 PO) Take 1 Tab by mouth daily. aspirin delayed-release 325 mg tablet Take 325 mg by mouth three (3) days a week. donepezil (ARICEPT) 5 mg tablet Take 5 mg by mouth nightly. DULoxetine (CYMBALTA) 60 mg capsule TAKE 1 CAPSULE BY MOUTH DAILY  Qty: 30 Cap, Refills: 5    Associated Diagnoses: PTSD (post-traumatic stress disorder)             Referenced discharge instructions provided by nursing for diet and activity. Follow-up with Dr Jackie Garcia or his Nurse Practitioners in 1-2 weeks. CINTHIA Livingston  11/14/2018  2:18 PM    Patient seen and examined by me with nurse practitioner. I personally performed all components of the history, physical, and medical decision making and agree with the assessment and plan with minor modifications as noted. Fever, mental status changes. Took out pacemaker. Observed and mental status back to baseline. To rehab.     David Wells MD, Morena Quiroz

## 2018-11-14 NOTE — PROGRESS NOTES
1920 (11/13) - Received shift change report at bedside. Pt. A&O x 3. Alert, calm and asymptomatic. Stable vital signs. 2116 - Pt. Request for Ativan to help him relax and able to rest. 
 
2145 - Pt. Care tech gave CHG bath and notice drainage from PM site. Family member was anxious and very concern about further bleeding to PM incision site. RN and charge nurse Александр Cuellar RN) were notified. Noticed saturation of dressing with sanguineous drainage. RN help charge nurse remove compression dressing and the saturated dressing. Found 70% of steri strips came off. The area around PM incision site was clean by CHG (surgical skin prep). Charge nurse apply new steri strips, FELFA dressing and Tegaderm dressing. Pt. Report no pain, SOB or dizziness. No drainage or bleeding after dressing change. 2351 - Pt. Report soreness around PM incision site 3/10 and request for Tylenol. 3036(88/93) - Pt. Appears anxious and request for Ativan to help him rest. 
 
0500 - Pt. Bed alarm went off. RN went in to check on pt. Family reported that when she woke up by the bed alarm, she saw pt. next to the bed. Pt. Was holding on to side rail. Family and RN help get in back bed. RN call nursing supervisor for a sitter for pt.

## 2018-11-14 NOTE — PROGRESS NOTES
Problem: Falls - Risk of 
Goal: *Absence of Falls Document Markel Mariscal Fall Risk and appropriate interventions in the flowsheet. Outcome: Progressing Towards Goal 
Fall Risk Interventions: 
Mobility Interventions: Assess mobility with egress test, Bed/chair exit alarm, OT consult for ADLs, Patient to call before getting OOB, PT Consult for mobility concerns, PT Consult for assist device competence, Strengthening exercises (ROM-active/passive) Mentation Interventions: Adequate sleep, hydration, pain control, Bed/chair exit alarm, Door open when patient unattended, Evaluate medications/consider consulting pharmacy, Eyeglasses and hearing aids, Familiar objects from home, Family/sitter at bedside, Gait belt with transfers/ambulation, HELP ([address]) if available, Increase mobility, More frequent rounding, Reorient patient, Room close to nurse's station, Self-releasing belt, Toileting rounds, Update white board Medication Interventions: Assess postural VS orthostatic hypotension, Evaluate medications/consider consulting pharmacy, Patient to call before getting OOB, Teach patient to arise slowly Elimination Interventions: Bed/chair exit alarm, Call light in reach, Toilet paper/wipes in reach, Toileting schedule/hourly rounds, Urinal in reach History of Falls Interventions: Bed/chair exit alarm, Door open when patient unattended, Investigate reason for fall, Room close to nurse's station, Evaluate medications/consider consulting pharmacy

## 2018-11-14 NOTE — PROGRESS NOTES
Pt taken to [facility] via wheelchair with all belongings. New IV site to right [facility] in place, needed for Antibiotic therapy. Pt's spouse remains with pt.

## 2018-11-14 NOTE — DISCHARGE SUMMARY
[address] [place] [address]  [phone]           Patient ID:  Dari Haji  [id number]  89 y.o.  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  He was s/p PM implant 10/16 for SSS, then admitted 10/24-27 after extremely large hematoma developed (the size of a football per his significant other), now re-admitted due to spontaneous bleeding from PM pocket. Mr. Abbi Aguilar significant other also states that the patient was stumbling when walking 11/7/18 with  memory difficulty, and [place] speech.  Mr. Lee endorsed general weakness, but   no focal weakness.   Pt with hematoma with stable h/h but some drainage from the site. In light of drainage, mental status changes and fever, Dr Beatriz Hamilton proceed with device removal and pocket evacuation 11/9/18. Consulted ID and neuro. Transient lowering of HR in the 30s over the weekend but has remained in sinus 50-60 BPM otherwise. [facility] 11/12/18 without vegetations, normal EF. He has been afebrile x 48 hours with improvement in mental status.   Left subclavian pocket without hematoma or drainage. He will be discharged today to [facility].         Consults: ID and Neurology        Disposition: SNF        Discharge Exam:  Visit Vitals  /73   Pulse 73   Temp 98.2 °F (36.8 °C)   Resp 16   Ht 5' 11\" (1.803 m)   Wt 160 lb 0 oz (72.6 kg)   SpO2 97%   BMI 22.32 kg/m²       Abdomen: soft, non-tender  Extremities: extremities normal  Heart: regular rate and rhythm  Lungs: clear to auscultation bilaterally  Neck: no JVD  Neurologic: Grossly normal  Pulses: 2+ and symmetric    Disposition: SNF    Patient Instructions:   Current Discharge Medication List      START taking these medications    Details   ceFAZolin (ANCEF) in sterile water 2 gram/20 mL 2 g IV syringe 20 mL by IntraVENous route every eight (8) hours. Qty: 1 Syringe, Refills: 30    Comments: Dispense Q8 hours for 10 days. guaiFENesin ER (MUCINEX) 600 mg ER tablet Take 1 Tab by mouth every twelve (12) hours. Qty: 30 Tab, Refills: 0         CONTINUE these medications which have NOT CHANGED    Details   clonazePAM (KLONOPIN) 1 mg tablet Take 1 mg by mouth daily. gabapentin (NEURONTIN) 600 mg tablet Take 600 mg by mouth daily. naproxen (NAPROSYN) 500 mg tablet Take 500 mg by mouth daily. acetaminophen (TYLENOL) 500 mg tablet Take 1,000 mg by mouth every six (6) hours as needed for Pain. cyanocobalamin, vitamin B-12, (VITAMIN B-12 PO) Take 1 Tab by mouth daily. aspirin delayed-release 325 mg tablet Take 325 mg by mouth three (3) days a week. donepezil (ARICEPT) 5 mg tablet Take 5 mg by mouth nightly. DULoxetine (CYMBALTA) 60 mg capsule TAKE 1 CAPSULE BY MOUTH DAILY  Qty: 30 Cap, Refills: 5    Associated Diagnoses: PTSD (post-traumatic stress disorder)             Referenced discharge instructions provided by nursing for diet and activity. Follow-up with Dr Jewell Ocasio or his Nurse Practitioners in 1-2 weeks.             CINTHIA Tang  11/14/2018  3:40 PM    Patient seen and examined by me with nurse practitioner. I personally performed all components of the history, physical, and medical decision making and agree with the assessment and plan with minor modifications as noted. Mental status changes improved after device removal/hematoma evac. Pt/ot consult and being tx/[facility] to [facility].      Callum Cueto MD, [facility]

## 2018-11-14 NOTE — PROGRESS NOTES
Hospitalist Progress Note NAME: Brad Gutiérrez :  1945 MRN:  [id number] Assessment / Plan: 
Infected PM pocket hematoma in setting of SSS with recent PM implantation CNS bacteremia POA 
-post PPM site bandaged S/p PM implant 10/16 for SSS, then admitted 10/24- after extremely large hematoma developed (the size of a football per his significant other), now re-admitted due to spontaneous bleeding from PM pocket 
-PPM was taken out   
-follow cultures: [facility]  coag negative staph, 11/10 NTD 
-[facility] negative for vegetations -ID following, antibioitics per their recommendations 
-monitor h/h for bleeding Encephalopathy / ?   POA Baseline dementia POA Ataxia POA Cervical and lumbar spinal stenosis POA Ctarted ~ 1 year ago when start having syncopal episodes but really got worse in the last 2-3 weeks Significant agitation earlier, clam today 
palliative care meet with patient to address goals of care and clarify BON [facility] Neurology followed,  
Brain MRI with atrophy, no acute changes C spine MRI with Multilevel degenerative disc disease and degenerative changes. Multilevel neuroforaminal narrowing Moderate/severe neuroforaminal narrowing at C3-C4, C4-C5, and C6-C7. Severe spinal canal stenosis at C4-C5. 
    Nonspecific focal edema versus small fluid collection in the soft tissues 
         posterior to the C7 spinous process. L-spine MRI Multilevel degenerative disc disease and degenerative changes. Multilevel neuroforaminal narrowing ranging from mild to moderate/severe 
      worse on the right at the L2-L3 and L4-L5 levels. Borderline mild spinal canal stenosis at L2-L3 and L3-L4. Blood work: B12/folate/hba1c/All normal 
Pt has been seeing Dr. Jerry Whitaker (neurology in [place] for this issues PT/OT when cleared by primary team cardiology Con't home cymbalta, klonipin, aricept neurontin ? Whether some of the gait changes related to the C-spine central stenosis Hypokalemia 
supplement as needed H/o congenital hip dysplasia with bilateral replacements 1993 
h/o EtOH use (none recently) 
  
Code Status: Full (significant other of >20yrs is decision maker if needed) DVT Prophylaxis: per primary team 
 
Recommended Disposition: TBD per primary team   
Need PT/OT for proper disposition. He lives with significant other Lele. She  is not sure if she will be able to handle him at home anymore Subjective: Chief Complaint / Reason for Physician Visit: following PPM site infection/ encephalopathy Calm, not agitated No complaints at present Seen with S.O at bedside They are hoping he can go to [facility] or rehab Discussed with RN events overnight. Review of Systems: 
Symptom Y/N Comments  Symptom Y/N Comments Fever/Chills n   Chest Pain n   
Poor Appetite    Edema Cough    Abdominal Pain n   
Sputum    Joint Pain SOB/PATEL n   Pruritis/Rash Nausea/vomit n   Tolerating PT/OT Diarrhea n   Tolerating Diet y Constipation    Other Could NOT obtain due to:    
 
Objective: VITALS:  
Last 24hrs VS reviewed since prior progress note. Most recent are: 
Patient Vitals for the past 24 hrs: 
 Temp Pulse Resp BP SpO2  
11/13/18 1908 98.1 °F (36.7 °C) 64 16 123/78 96 % 11/13/18 1500 97.5 °F (36.4 °C) (!) 57 16 102/47   
11/13/18 1004 97.5 °F (36.4 °C) 62 16 105/49 96 % 11/13/18 0910    92/55   
11/13/18 0900    (!) 72/47   
11/13/18 0629 97.3 °F (36.3 °C) (!) 51 16 126/62 96 % 11/13/18 0355 98.4 °F (36.9 °C) (!) 56 16 119/67 96 % 11/12/18 2300 99.1 °F (37.3 °C) 60 18 128/67 93 % Intake/Output Summary (Last 24 hours) at 11/13/2018 1957 Last data filed at 11/13/2018 1806 Gross per 24 hour Intake 650 ml Output 860 ml Net -210 ml PHYSICAL EXAM: 
General: WD, WN. Alert, cooperative, no acute distress   
EENT:  EOMI. Anicteric sclerae. MMM Resp: CTA bilaterally, no wheezing or rales. No accessory muscle use CV:  Regular  rhythm,  No edema Chest :            PPM site: swollen/ not erythema/warmth/tender GI:  Soft, Non distended, Non tender.  +Bowel sounds Neurologic:  Alert and oriented X 2, confused , normal speech Psych:   Fair/poor  insight. Not anxious nor agitated Skin:  No rashes. No jaundice Reviewed most current lab test results and cultures  YES Reviewed most current radiology test results   YES Review and summation of old records today    NO Reviewed patient's current orders and MAR    YES 
PMH/SH reviewed - no change compared to H&P 
________________________________________________________________________ Care Plan discussed with: 
  Comments Patient y Family  y    
RN y   
Care Manager Consultant Multidiciplinary team rounds were held today with , nursing, pharmacist and clinical coordinator. Patient's plan of care was discussed; medications were reviewed and discharge planning was addressed. ________________________________________________________________________ Total NON critical care TIME:  25  Minutes Total CRITICAL CARE TIME Spent:   Minutes non procedure based Comments >50% of visit spent in counseling and coordination of care y Coordination of care   
________________________________________________________________________ Anna Joshi MD  
 
Procedures: see electronic medical records for all procedures/Xrays and details which were not copied into this note but were reviewed prior to creation of Plan. LABS: 
I reviewed today's most current labs and imaging studies. Pertinent labs include: 
Recent Labs 11/13/18 
0458 11/12/18 
1136 WBC 4.4 4.4 HGB 10.2* 10.3* HCT 30.8* 30.9*  
 179 Recent Labs 11/13/18 
0458 11/12/18 
0332 11/11/18 
0200  145 144  
K 3.7 3.4* 3.6 * 112* 113* CO2 25 27 26 * 105* 99 BUN 13 10 12 CREA 0.72 0.56* 0.67* CA 8.1* 8.6 7.7* MG 2.2  --   --   
PHOS 4.1  --   --   
 
 
Signed: Elyse Ramirez MD

## 2018-11-14 NOTE — BH NOTES
9:04AM 
PT/OT recommending IP rehab. CM discussed with pt and SO, Divya. Both in agreement and would like referral sent to [facility]. FOC on pt's chart. Referral sent through [employer]. Waiting for response. 11:39AM 
PC from [facility] at [facility] requesting parameters from cardiac team regarding pt's HR and any limitations. CM page to cardiac team. Waiting for response. 12:04AM 
CM discussion with cardiac NP requesting HR parameters. NP to discuss with MD and place nursing misc order. CM notified [facility]. 1:57PM 
PC from [facility]. Pt accepted and bed available. CM faxed HR order to [facility]. Page to cardiology to update on bed availability. Waiting for return call. 2:29PM 
Plan for pt to d/c to [facility] this afternoon. Nursing call report to [phone] or [phone]. Per [facility], gladys to leave in peripheral access for IV abx. Family updated and in agreement with plan. Pt ready for d/c from CM perspective. 3:39PM 
PC from [facility] asking for clarification on d/c summary about abx med. Information relayed to cardiac team. Information being added and addressed. Pt to d/c to [facility]. CM available for any additional needs. PAVEL Fonseca Care Manager

## 2018-11-14 NOTE — PROGRESS NOTES
Palliative Medicine Social Work Chart reviewed. [facility] today noted. Goals are clear. Thank you for the opportunity to be involved in the care of Mr. Reina Cunningham and his family. Marni Noble [facility] Palliative Medicine  [phone]

## 2018-11-14 NOTE — PROGRESS NOTES
Bedside shift change report given to Ashlee Ni RN (oncoming nurse) by Agnieszka Uribe RN (offgoing nurse). Report included the following information SBAR, Kardex, Procedure Summary, Intake/Output, MAR, Accordion, Recent Results, Med Rec Status, Cardiac Rhythm NSR, Sinus Mordecai Calender, Alarm Parameters , Pre Procedure Checklist, Procedure Verification and Quality Measures.

## 2018-11-14 NOTE — PROGRESS NOTES
Problem: Self Care Deficits Care Plan (Adult) Goal: *Acute Goals and Plan of Care (Insert Text) Occupational Therapy Goals Initiated 11/13/2018 1. Patient will perform grooming at the sink with supervision/set-up within 7 day(s). 2.  Patient will perform bathing at the sink with moderate assistance  within 7 day(s). 3.  Patient will perform lower body dressing with moderate assistance  within 7 day(s). 4.  Patient will perform toilet transfers with minimal assistance/contact guard assist within 7 day(s). 5.  Patient will perform all aspects of toileting with supervision/set-up within 7 day(s). 6.  Patient will participate in upper extremity therapeutic exercise/activities with supervision/set-up for 5 minutes within 7 day(s). 7.  Patient will utilize energy conservation techniques during functional activities with verbal cues within 7 day(s). Occupational Therapy TREATMENT Patient: Debra Patrick (39 y.o. male) Date: 11/14/2018 Diagnosis: Pacemaker complications, subsequent encounter Fever <principal problem not specified> Precautions: Fall, Other (comment)(sling to L UE) Chart, occupational therapy assessment, plan of care, and goals were reviewed. ASSESSMENT: 
Pt was cleared to be seen and had been up in recliner this am and was back in bed but willing to get up. Pt was CGA to SBA for bed mobility, and BP taken with SBP 90's, once pt stood his SBP was in 70's but pt was asymptomatic. He stood for approx 3 minutes while dr was talking to him and BP was taken 2 times and continues to increase and last BP before walking was SBP of 92. Pt was able to walk in halls and then to sink to brush his teeth, comb his hair and wash his face. He needed CGA for walking due to his impaired balance. Pts SBP was in 80's at the end of session in standing. Pt was left sitting up in recliner and notified nursing that his BP did increase with standing.   Recommend that pt have further therapy at discharge at [facility] pt rehab. Progression toward goals: 
[x]       Improving appropriately and progressing toward goals 
[]       Improving slowly and progressing toward goals 
[]       Not making progress toward goals and plan of care will be adjusted PLAN: 
Patient continues to benefit from skilled intervention to address the above impairments. Continue treatment per established plan of care. Discharge Recommendations:  Inpatient Rehab Further Equipment Recommendations for Discharge:  tbd SUBJECTIVE:  
Patient stated I feel good. I'm not dizzy at all .  OBJECTIVE DATA SUMMARY:  
Cognitive/Behavioral Status: 
Neurologic State: Alert Orientation Level: Oriented to person;Oriented to place;Oriented to situation Cognition: Follows commands Perception: Appears intact Perseveration: No perseveration noted Safety/Judgement: Fall prevention Functional Mobility and Transfers for ADLs:Bed Mobility: 
Supine to Sit: Minimum assistance Scooting: Contact guard assistance Transfers: 
Sit to Stand: Minimum assistance;Contact guard assistance Functional Transfers Bathroom Mobility: Contact guard assistance Toilet Transfer : Contact guard assistance Bed to Chair: Contact guard assistance Balance: 
Sitting: Intact Standing: Impaired Standing - Static: Fair Standing - Dynamic : Fair ADL Intervention: 
Feeding Feeding Assistance: Supervision/set-up Grooming Grooming Assistance: Contact guard assistance Washing Face: Contact guard assistance Washing Hands: Contact guard assistance Brushing Teeth: Contact guard assistance 
 
 pt is mod to max for ADLs due to having the sling on LUE and he is LUE dominant Toileting Toileting Assistance: Minimum assistance Bladder Hygiene: Minimum assistance Bowel Hygiene: Minimum assistance Cognitive Retraining Safety/Judgement: Fall prevention Pain: 
Pain Scale 1: Numeric (0 - 10) Pain Intensity 1: 0 
  
  
  
  
 Activity Tolerance:  
Good Please refer to the flowsheet for vital signs taken during this treatment. After treatment:  
[x] Patient left in no apparent distress sitting up in chair 
[] Patient left in no apparent distress in bed 
[x] Call bell left within reach [x] Nursing notified 
[x] Caregiver present 
[] Bed alarm activated COMMUNICATION/COLLABORATION:  
The patients plan of care was discussed with: Physical Therapist, Registered Nurse and family MORRIS Jacob Time Calculation: 33 mins

## 2018-11-14 NOTE — PROGRESS NOTES
Cardiac Electrophysiology Progress Note [address], [place], [address] [facility]  [phone] 
 
11/14/2018 9:08 AM 
 
Admit Date: 11/8/2018 Admit Diagnosis: Pacemaker complications, subsequent encounter Fever Subjective:  
 
Migue Harrison   denies chest pain, chest pressure/discomfort, dyspnea, palpitations, lower extremity edema. He is up in the chair eating. He reports that he is feeling well. Walked in the [place] briefly yesterday with RN and [employer]. Visit Vitals /86 Pulse 69 Temp 97.6 °F (36.4 °C) Resp 16 Ht 5' 11\" (1.803 m) Wt 160 lb 0 oz (72.6 kg) SpO2 100% BMI 22.32 kg/m² Current Facility-Administered Medications Medication Dose Route Frequency  nicotine (NICODERM CQ) 21 mg/24 hr patch 1 Patch  1 Patch TransDERmal Q24H  clonazePAM (KlonoPIN) tablet 1 mg  1 mg Oral BID  LORazepam (ATIVAN) injection 1 mg  1 mg IntraVENous Q6H PRN  
 melatonin tablet 9 mg  9 mg Oral QHS  ondansetron (ZOFRAN) injection 4 mg  4 mg IntraVENous Q6H PRN  
 nitroglycerin (NITROBID) 2 % ointment 1 Inch  1 Inch Topical Q6H PRN  
 ADDaptor      
 0.9% sodium chloride (MBP/ADV) infusion  sodium chloride (NS) flush 5-10 mL  5-10 mL IntraVENous Q8H  
 sodium chloride (NS) flush 5-10 mL  5-10 mL IntraVENous PRN  
 naloxone (NARCAN) injection 0.4 mg  0.4 mg IntraVENous PRN  
 acetaminophen (TYLENOL) tablet 650 mg  650 mg Oral Q4H PRN  
 HYDROcodone-acetaminophen (NORCO) 5-325 mg per tablet 1 Tab  1 Tab Oral Q4H PRN  
 cyanocobalamin (VITAMIN B12) tablet 500 mcg  500 mcg Oral DAILY  donepezil (ARICEPT) tablet 5 mg  5 mg Oral QHS  DULoxetine (CYMBALTA) capsule 60 mg  60 mg Oral DAILY  gabapentin (NEURONTIN) capsule 600 mg  600 mg Oral DAILY  sodium chloride (NS) flush 5-10 mL  5-10 mL IntraVENous Q8H  
 sodium chloride (NS) flush 5-10 mL  5-10 mL IntraVENous PRN  
 acetaminophen (TYLENOL) tablet 650 mg  650 mg Oral Q4H PRN  
  oxyCODONE-acetaminophen (PERCOCET) 5-325 mg per tablet 1 Tab  1 Tab Oral Q6H PRN Objective:  
  
Visit Vitals /86 Pulse 69 Temp 97.6 °F (36.4 °C) Resp 16 Ht 5' 11\" (1.803 m) Wt 160 lb 0 oz (72.6 kg) SpO2 100% BMI 22.32 kg/m² Physical Exam: Abdomen: soft, non-tender Extremities: extremities normal 
Heart: regular rate and rhythm Lungs: clear to auscultation bilaterally Pulses: 2+ and symmetric Data Review:  
Labs:   
Recent Labs 11/14/18 
0323 11/13/18 
0458 11/12/18 
1136 WBC 4.8 4.4 4.4 HGB 10.3* 10.2* 10.3* HCT 31.0* 30.8* 30.9*  
 173 179 Recent Labs 11/14/18 
1046 11/13/18 
5032 11/12/18 
8696  143 145  
K 3.7 3.7 3.4*  
* 111* 112* CO2 24 25 27 * 101* 105* BUN 13 13 10 CREA 0.68* 0.72 0.56* CA 8.3* 8.1* 8.6 MG  --  2.2  --   
PHOS  --  4.1  -- No results for input(s): TROIQ, CPK, CKMB in the last 72 hours. Intake/Output Summary (Last 24 hours) at 11/14/2018 4856 Last data filed at 11/14/2018 2299 Gross per 24 hour Intake 1250 ml Output 1690 ml Net -440 ml Telemetry: sinus rhythm, ventricular rate 69 Assessment:  
 
Active Problems: 
  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  
 
Shahab Clemons is feeling well today. Remains afebrile, H/H stable, no pauses on tele overnight. Left subclavian PM site dressing dry and intact. Ok to start PT/OT; precautions with left arm. Case Management hoping for [facility] today. CINTHIA Olivares Patient seen and examined by me with nurse practitioner. I personally performed all components of the history, physical, and medical decision making and agree with the assessment and plan with minor modifications as noted. Doing better. Cont med rx for dementia. PT/OT to proceed.  
 
Ramana Echeverria MD, [facility] 
 
 
 
11/14/2018 
9:08 AM

## 2018-11-14 NOTE — PROGRESS NOTES
Problem: Mobility Impaired (Adult and Pediatric) Goal: *Acute Goals and Plan of Care (Insert Text) Physical Therapy Goals Initiated 11/13/2018 1. Patient will move from supine to sit and sit to supine  in bed with supervision/set-up within 7 day(s). 2.  Patient will transfer from bed to chair and chair to bed with CGA using the least restrictive device within 7 day(s). 3.  Patient will perform sit to stand with supervision/set-up within 7 day(s). 4.  Patient will ambulate with contact guard assist for 50 feet with the least restrictive device within 7 day(s). physical Therapy TREATMENT Patient: Jenny Barber (52 y.o. male) Date: 11/14/2018 Diagnosis: Pacemaker complications, subsequent encounter Fever <principal problem not specified> Precautions: Fall, Other (comment)(sling to L UE) Chart, physical therapy assessment, plan of care and goals were reviewed. ASSESSMENT: 
Pt cleared by nurse to mobilize. Pt received in bed supine. Pt agreeable to therapy. Pt performed supine ti sit at min A. Pts BP taken with position changes due to low BP yesterday during session. Pt BP initially decreasing but stabilizing once standing upright x5 mins while talking to doctor. Pt denied any symptoms while upright. Pt ambulated 90ft and 35ft with no device at min A x2. Pt very unstable with path deviations and at times with scissoring and a LOB. Pt easily distracted during ambulation requiring cueing to focus on ambulation . Pt BP still low but stabilizing with mobility. Pt with improved mobility tolerance today. Pt left up in chair with all needs met. Pt will greatly benefit to IP rehab to improve balance and endurance for safe mobility before retuning home. Progression toward goals: 
[]    Improving appropriately and progressing toward goals [x]    Improving slowly and progressing toward goals 
[]    Not making progress toward goals and plan of care will be adjusted PLAN: 
 Patient continues to benefit from skilled intervention to address the above impairments. Continue treatment per established plan of care. Discharge Recommendations:  Inpatient Rehab Further Equipment Recommendations for Discharge:  TBD by rehab SUBJECTIVE:  
Patient stated I'm feeling better today.  OBJECTIVE DATA SUMMARY:  
Critical Behavior: 
Neurologic State: Confused, Alert Orientation Level: Disoriented to situation, Oriented to person, Oriented to place, Oriented to time Cognition: Follows commands, Memory loss Functional Mobility Training: 
Bed Mobility: 
  
Supine to Sit: Minimum assistance Scooting: Contact guard assistance Transfers: 
Sit to Stand: Minimum assistance;Contact guard assistance Stand to Sit: Contact guard assistance Bed to Chair: Contact guard assistance Balance: 
Sitting: Intact Standing: Impaired Standing - Static: Fair Standing - Dynamic : FairAmbulation/Gait Training: 
Distance (ft): 100 Feet (ft) Assistive Device: Gait belt Ambulation - Level of Assistance: Minimal assistance;Assist x2 Gait Abnormalities: Decreased step clearance; Path deviations; Shuffling gait(unsteady) Base of Support: Widened Speed/Dhara: Pace decreased (<100 feet/min) Step Length: Right shortened;Left shortened Pain: 
Pain Scale 1: Numeric (0 - 10) Pain Intensity 1: 0 Activity Tolerance:  
Pt requiring x1 assistance for safety to mobilize due to balance deficits. After treatment:  
[x]    Patient left in no apparent distress sitting up in chair 
[]    Patient left in no apparent distress in bed 
[x]    Call bell left within reach [x]    Nursing notified 
[x]    Caregiver present [x]    Bed alarm activated COMMUNICATION/COLLABORATION:  
The patients plan of care was discussed with: Occupational Therapist and Registered Nurse Anuja Jones Time Calculation: 32 mins

## 2018-11-14 NOTE — PROGRESS NOTES
Report called to [facility] at [facility].

## 2018-11-14 NOTE — TELEPHONE ENCOUNTER
Viral Clemons at [facility]     Question about antibiotics - hasnt started any     Best number to reach her is [phone]

## 2018-11-15 LAB
ALBUMIN SERPL-MCNC: 3.1 G/DL (ref 3.5–5)
ALBUMIN/GLOB SERPL: 0.9 {RATIO} (ref 1.1–2.2)
ALP SERPL-CCNC: 77 U/L (ref 45–117)
ALT SERPL-CCNC: 24 U/L (ref 12–78)
ANION GAP SERPL CALC-SCNC: 9 MMOL/L (ref 5–15)
APPEARANCE UR: CLEAR
AST SERPL-CCNC: 18 U/L (ref 15–37)
BACTERIA URNS QL MICRO: NEGATIVE /HPF
BASOPHILS # BLD: 0.1 K/UL (ref 0–0.1)
BASOPHILS NFR BLD: 1 % (ref 0–1)
BILIRUB SERPL-MCNC: 0.5 MG/DL (ref 0.2–1)
BILIRUB UR QL: NEGATIVE
BUN SERPL-MCNC: 15 MG/DL (ref 6–20)
BUN/CREAT SERPL: 20 (ref 12–20)
CALCIUM SERPL-MCNC: 8.2 MG/DL (ref 8.5–10.1)
CHLORIDE SERPL-SCNC: 109 MMOL/L (ref 97–108)
CO2 SERPL-SCNC: 24 MMOL/L (ref 21–32)
COLOR UR: NORMAL
CREAT SERPL-MCNC: 0.76 MG/DL (ref 0.7–1.3)
DIFFERENTIAL METHOD BLD: ABNORMAL
EOSINOPHIL # BLD: 0.3 K/UL (ref 0–0.4)
EOSINOPHIL NFR BLD: 5 % (ref 0–7)
EPITH CASTS URNS QL MICRO: NORMAL /LPF
ERYTHROCYTE [DISTWIDTH] IN BLOOD BY AUTOMATED COUNT: 13.5 % (ref 11.5–14.5)
GLOBULIN SER CALC-MCNC: 3.6 G/DL (ref 2–4)
GLUCOSE SERPL-MCNC: 93 MG/DL (ref 65–100)
GLUCOSE UR STRIP.AUTO-MCNC: NEGATIVE MG/DL
HCT VFR BLD AUTO: 31.8 % (ref 36.6–50.3)
HGB BLD-MCNC: 10.6 G/DL (ref 12.1–17)
HGB UR QL STRIP: NEGATIVE
HYALINE CASTS URNS QL MICRO: NORMAL /LPF (ref 0–5)
IMM GRANULOCYTES # BLD: 0.1 K/UL (ref 0–0.04)
IMM GRANULOCYTES NFR BLD AUTO: 1 % (ref 0–0.5)
KETONES UR QL STRIP.AUTO: NEGATIVE MG/DL
LEUKOCYTE ESTERASE UR QL STRIP.AUTO: NEGATIVE
LYMPHOCYTES # BLD: 1.5 K/UL (ref 0.8–3.5)
LYMPHOCYTES NFR BLD: 26 % (ref 12–49)
MCH RBC QN AUTO: 32.5 PG (ref 26–34)
MCHC RBC AUTO-ENTMCNC: 33.3 G/DL (ref 30–36.5)
MCV RBC AUTO: 97.5 FL (ref 80–99)
MONOCYTES # BLD: 0.5 K/UL (ref 0–1)
MONOCYTES NFR BLD: 9 % (ref 5–13)
NEUTS SEG # BLD: 3.5 K/UL (ref 1.8–8)
NEUTS SEG NFR BLD: 59 % (ref 32–75)
NITRITE UR QL STRIP.AUTO: NEGATIVE
NRBC # BLD: 0 K/UL (ref 0–0.01)
NRBC BLD-RTO: 0 PER 100 WBC
PH UR STRIP: 6 [PH] (ref 5–8)
PLATELET # BLD AUTO: 204 K/UL (ref 150–400)
PMV BLD AUTO: 10.3 FL (ref 8.9–12.9)
POTASSIUM SERPL-SCNC: 3.6 MMOL/L (ref 3.5–5.1)
PROT SERPL-MCNC: 6.7 G/DL (ref 6.4–8.2)
PROT UR STRIP-MCNC: NEGATIVE MG/DL
RBC # BLD AUTO: 3.26 M/UL (ref 4.1–5.7)
RBC #/AREA URNS HPF: NORMAL /HPF (ref 0–5)
SODIUM SERPL-SCNC: 142 MMOL/L (ref 136–145)
SP GR UR REFRACTOMETRY: 1.02 (ref 1–1.03)
UROBILINOGEN UR QL STRIP.AUTO: 0.2 EU/DL (ref 0.2–1)
WBC # BLD AUTO: 5.9 K/UL (ref 4.1–11.1)
WBC URNS QL MICRO: NORMAL /HPF (ref 0–4)

## 2018-11-15 PROCEDURE — 85025 COMPLETE CBC W/AUTO DIFF WBC: CPT

## 2018-11-15 PROCEDURE — 87086 URINE CULTURE/COLONY COUNT: CPT

## 2018-11-15 PROCEDURE — 81001 URINALYSIS AUTO W/SCOPE: CPT

## 2018-11-15 PROCEDURE — 36415 COLL VENOUS BLD VENIPUNCTURE: CPT

## 2018-11-15 PROCEDURE — 80053 COMPREHEN METABOLIC PANEL: CPT

## 2018-11-16 ENCOUNTER — TELEPHONE (OUTPATIENT)
Dept: CARDIOLOGY CLINIC | Age: 73
End: 2018-11-16

## 2018-11-16 LAB
BACTERIA SPEC CULT: NORMAL
BACTERIA SPEC CULT: NORMAL
CC UR VC: NORMAL
GM1 AB, IGM: NORMAL TITER
GM1 IGG, GM1GT: NORMAL TITER
SERVICE CMNT-IMP: NORMAL
SERVICE CMNT-IMP: NORMAL

## 2018-11-16 NOTE — TELEPHONE ENCOUNTER
Received call from Eugenia Guerrero RN liaison with [facility], pt has been admitted to their facility post hospital stay. States pt is still in a sling, rehab staff would like to know patients restrictions. Advised will contact         Dr Mona Foreman NP and return her call with more information. States understanding at this time.      Kieran Falcon RN

## 2018-11-20 LAB
ANION GAP SERPL CALC-SCNC: 6 MMOL/L (ref 5–15)
BASOPHILS # BLD: 0.1 K/UL (ref 0–0.1)
BASOPHILS NFR BLD: 1 % (ref 0–1)
BUN SERPL-MCNC: 17 MG/DL (ref 6–20)
BUN/CREAT SERPL: 19 (ref 12–20)
CALCIUM SERPL-MCNC: 8.2 MG/DL (ref 8.5–10.1)
CHLORIDE SERPL-SCNC: 109 MMOL/L (ref 97–108)
CO2 SERPL-SCNC: 27 MMOL/L (ref 21–32)
CREAT SERPL-MCNC: 0.91 MG/DL (ref 0.7–1.3)
DIFFERENTIAL METHOD BLD: ABNORMAL
EOSINOPHIL # BLD: 0.3 K/UL (ref 0–0.4)
EOSINOPHIL NFR BLD: 6 % (ref 0–7)
ERYTHROCYTE [DISTWIDTH] IN BLOOD BY AUTOMATED COUNT: 13.6 % (ref 11.5–14.5)
GLUCOSE SERPL-MCNC: 100 MG/DL (ref 65–100)
HCT VFR BLD AUTO: 35.4 % (ref 36.6–50.3)
HGB BLD-MCNC: 11.5 G/DL (ref 12.1–17)
IMM GRANULOCYTES # BLD: 0.1 K/UL (ref 0–0.04)
IMM GRANULOCYTES NFR BLD AUTO: 2 % (ref 0–0.5)
LYMPHOCYTES # BLD: 1.5 K/UL (ref 0.8–3.5)
LYMPHOCYTES NFR BLD: 28 % (ref 12–49)
MCH RBC QN AUTO: 32.5 PG (ref 26–34)
MCHC RBC AUTO-ENTMCNC: 32.5 G/DL (ref 30–36.5)
MCV RBC AUTO: 100 FL (ref 80–99)
MONOCYTES # BLD: 0.5 K/UL (ref 0–1)
MONOCYTES NFR BLD: 9 % (ref 5–13)
NEUTS SEG # BLD: 3 K/UL (ref 1.8–8)
NEUTS SEG NFR BLD: 55 % (ref 32–75)
NRBC # BLD: 0 K/UL (ref 0–0.01)
NRBC BLD-RTO: 0 PER 100 WBC
PLATELET # BLD AUTO: 248 K/UL (ref 150–400)
PMV BLD AUTO: 10.1 FL (ref 8.9–12.9)
POTASSIUM SERPL-SCNC: 4.1 MMOL/L (ref 3.5–5.1)
RBC # BLD AUTO: 3.54 M/UL (ref 4.1–5.7)
SODIUM SERPL-SCNC: 142 MMOL/L (ref 136–145)
WBC # BLD AUTO: 5.5 K/UL (ref 4.1–11.1)

## 2018-11-20 PROCEDURE — 85025 COMPLETE CBC W/AUTO DIFF WBC: CPT

## 2018-11-20 PROCEDURE — 80048 BASIC METABOLIC PNL TOTAL CA: CPT

## 2018-11-20 PROCEDURE — 36415 COLL VENOUS BLD VENIPUNCTURE: CPT

## 2018-11-21 LAB
25(OH)D2 SERPL-MCNC: <1 NG/ML
25(OH)D3 SERPL-MCNC: 20 NG/ML
25(OH)D3+25(OH)D2 SERPL-MCNC: 20 NG/ML
ACHR AB SER-SCNC: <0.03 NMOL/L (ref 0–0.24)
ACHR BLOCK AB SER-ACNC: 6 % (ref 0–25)
ACHR MOD AB/ACHR TOTAL SFR SER: <12 % (ref 0–20)
ANA SER QL: NEGATIVE
GLIADIN PEPTIDE IGA SER-ACNC: 25 UNITS (ref 0–19)
GLIADIN PEPTIDE IGG SER-ACNC: 2 UNITS (ref 0–19)
IGA SERPL-MCNC: 273 MG/DL (ref 61–437)
IGG SERPL-MCNC: 674 MG/DL (ref 700–1600)
IGM SERPL-MCNC: 59 MG/DL (ref 15–143)
INTERPRETATION, NEU2LT: NORMAL
MAG AB, IGM, ANMGLT: NORMAL TITER
NEURONAL CELL AB, NEU1LT: 11 UNITS (ref 0–54)
PROT PATTERN SERPL IFE-IMP: ABNORMAL
SEE BELOW:, 164879: NORMAL
STRIA MUS AB TITR SER IF: NEGATIVE {TITER}

## 2018-11-22 LAB
ALBUMIN SERPL-MCNC: 3.5 G/DL (ref 3.5–5)
ALBUMIN/GLOB SERPL: 1 {RATIO} (ref 1.1–2.2)
ALP SERPL-CCNC: 83 U/L (ref 45–117)
ALT SERPL-CCNC: 10 U/L (ref 12–78)
ANION GAP SERPL CALC-SCNC: 5 MMOL/L (ref 5–15)
AST SERPL-CCNC: 12 U/L (ref 15–37)
BASOPHILS # BLD: 0.1 K/UL (ref 0–0.1)
BASOPHILS NFR BLD: 1 % (ref 0–1)
BILIRUB SERPL-MCNC: 0.5 MG/DL (ref 0.2–1)
BUN SERPL-MCNC: 18 MG/DL (ref 6–20)
BUN/CREAT SERPL: 19 (ref 12–20)
CALCIUM SERPL-MCNC: 8.5 MG/DL (ref 8.5–10.1)
CHLORIDE SERPL-SCNC: 108 MMOL/L (ref 97–108)
CO2 SERPL-SCNC: 27 MMOL/L (ref 21–32)
CREAT SERPL-MCNC: 0.93 MG/DL (ref 0.7–1.3)
DIFFERENTIAL METHOD BLD: ABNORMAL
EOSINOPHIL # BLD: 0.3 K/UL (ref 0–0.4)
EOSINOPHIL NFR BLD: 5 % (ref 0–7)
ERYTHROCYTE [DISTWIDTH] IN BLOOD BY AUTOMATED COUNT: 13.4 % (ref 11.5–14.5)
GLOBULIN SER CALC-MCNC: 3.4 G/DL (ref 2–4)
GLUCOSE SERPL-MCNC: 95 MG/DL (ref 65–100)
HCT VFR BLD AUTO: 36.4 % (ref 36.6–50.3)
HGB BLD-MCNC: 12 G/DL (ref 12.1–17)
IMM GRANULOCYTES # BLD: 0.1 K/UL (ref 0–0.04)
IMM GRANULOCYTES NFR BLD AUTO: 1 % (ref 0–0.5)
LYMPHOCYTES # BLD: 1.7 K/UL (ref 0.8–3.5)
LYMPHOCYTES NFR BLD: 29 % (ref 12–49)
MCH RBC QN AUTO: 32.6 PG (ref 26–34)
MCHC RBC AUTO-ENTMCNC: 33 G/DL (ref 30–36.5)
MCV RBC AUTO: 98.9 FL (ref 80–99)
MONOCYTES # BLD: 0.4 K/UL (ref 0–1)
MONOCYTES NFR BLD: 8 % (ref 5–13)
NEUTS SEG # BLD: 3.2 K/UL (ref 1.8–8)
NEUTS SEG NFR BLD: 56 % (ref 32–75)
NRBC # BLD: 0 K/UL (ref 0–0.01)
NRBC BLD-RTO: 0 PER 100 WBC
PLATELET # BLD AUTO: 258 K/UL (ref 150–400)
PMV BLD AUTO: 9.9 FL (ref 8.9–12.9)
POTASSIUM SERPL-SCNC: 4.1 MMOL/L (ref 3.5–5.1)
PROT SERPL-MCNC: 6.9 G/DL (ref 6.4–8.2)
RBC # BLD AUTO: 3.68 M/UL (ref 4.1–5.7)
SODIUM SERPL-SCNC: 140 MMOL/L (ref 136–145)
WBC # BLD AUTO: 5.7 K/UL (ref 4.1–11.1)

## 2018-11-22 PROCEDURE — 36415 COLL VENOUS BLD VENIPUNCTURE: CPT

## 2018-11-22 PROCEDURE — 80053 COMPREHEN METABOLIC PANEL: CPT

## 2018-11-22 PROCEDURE — 85025 COMPLETE CBC W/AUTO DIFF WBC: CPT

## 2018-11-23 PROCEDURE — 36415 COLL VENOUS BLD VENIPUNCTURE: CPT

## 2018-11-23 PROCEDURE — 87040 BLOOD CULTURE FOR BACTERIA: CPT

## 2018-11-28 LAB
BACTERIA SPEC CULT: NORMAL
SERVICE CMNT-IMP: NORMAL

## 2018-12-04 ENCOUNTER — TELEPHONE (OUTPATIENT)
Dept: FAMILY MEDICINE CLINIC | Age: 73
End: 2018-12-04

## 2018-12-04 NOTE — TELEPHONE ENCOUNTER
Pt friend, Sruthi Uribe calling     Checking to see if f/u from hosp is needed     Best number to reach her is [phone]

## 2018-12-05 NOTE — TELEPHONE ENCOUNTER
Attempted to contact Adolfo Maldonado. No answer. Left vm to return call.     **Patient does not need follow up with Dr. Nichole Solis.

## 2018-12-20 ENCOUNTER — OFFICE VISIT (OUTPATIENT)
Dept: CARDIOLOGY CLINIC | Age: 73
End: 2018-12-20

## 2018-12-20 VITALS
BODY MASS INDEX: 24.22 KG/M2 | RESPIRATION RATE: 18 BRPM | HEART RATE: 61 BPM | SYSTOLIC BLOOD PRESSURE: 100 MMHG | WEIGHT: 173 LBS | OXYGEN SATURATION: 98 % | DIASTOLIC BLOOD PRESSURE: 62 MMHG | HEIGHT: 71 IN

## 2018-12-20 DIAGNOSIS — R55 SYNCOPE AND COLLAPSE: ICD-10-CM

## 2018-12-20 DIAGNOSIS — I49.5 SSS (SICK SINUS SYNDROME) (HCC): ICD-10-CM

## 2018-12-20 DIAGNOSIS — T82.9XXD PACEMAKER COMPLICATIONS, SUBSEQUENT ENCOUNTER: Primary | ICD-10-CM

## 2018-12-20 NOTE — PROGRESS NOTES
1. Have you been to the ER, urgent care clinic since your last visit? Hospitalized since your last visit? F/u from 11/8/18. 2. Have you seen or consulted any other health care providers outside of the [address] [place] [address] since your last visit? Include any pap smears or colon screening.    No.      Chief Complaint   Patient presents with   [facility] Follow Up     pt denies any signs of infection and denies any cardiac symptoms

## 2018-12-20 NOTE — PROGRESS NOTES
Subjective:      Michelle Burnette is a 68 y.o. male is here for f/u of his infection. The patient denies chest pain/ shortness of breath, orthopnea, PND, LE edema, palpitations, syncope, presyncope or fatigue.        Patient Active Problem List    Diagnosis Date Noted    Fever 11/09/2018    Idiopathic small and large fiber sensory neuropathy 11/09/2018    Ataxia 11/09/2018    Sensory ataxia 11/09/2018    Memory disturbance 11/09/2018    Altered mental status, unspecified 11/09/2018    Degenerative cervical spinal stenosis 11/09/2018    History of lumbar laminectomy 11/09/2018    Degenerative lumbar spinal stenosis 11/09/2018    Pacemaker complications, subsequent encounter 11/08/2018    Stumbling gait 11/08/2018    Pacemaker pocket hematoma, initial encounter 10/24/2018    Pacemaker 10/24/2018    Hx of syncope 10/24/2018    Bradycardia 10/02/2018    Age-related macular degeneration, dry, both eyes 09/14/2018    Syncope 09/11/2018    Neuropathy 09/11/2018    Memory deficit 09/11/2018    BPH with obstruction/lower urinary tract symptoms 04/19/2018    Smoker 01/18/2018    S/P right rotator cuff repair 12/01/2017    S/P hip replacement, bilateral 10/18/2017    Ingrown left big toenail 03/31/2017    PTSD (post-traumatic stress disorder) 12/13/2016    Lumbar spinal stenosis 12/13/2016    Alcoholism in remission ([facility] [address] 12/13/2016    Risk for falls 12/13/2016      Eugenio Molina MD  Past Medical History:   Diagnosis Date    Anxiety     Back pain     Blurred vision     Chest pain     Depression     Dizziness     Fast heart beat     Frequent headaches     Frequent urination     Hip dysplasia, congenital     Joint pain     Joint swelling     Lumbar spinal stenosis     Muscle pain     Neuropathy     Recent change in weight     Sinus problem     Skin disease     SOB (shortness of breath)     Sore throat     SSS (sick sinus syndrome) (HCC)     Stiffness of joints, multiple sites     Stress     Syncope     Tiredness     Trouble in sleeping     Weakness       Past Surgical History:   Procedure Laterality Date    HX HIP REPLACEMENT Bilateral 1993    HX PACEMAKER PLACEMENT  10/16/2018    HX ROTATOR CUFF REPAIR Right 11/16/2017     No Known Allergies   Family History   Problem Relation Age of Onset    Cancer Mother     Cancer Maternal Aunt     negative for cardiac disease  Social History     Socioeconomic History    Marital status: SINGLE     Spouse name: Not on file    Number of children: Not on file    Years of education: Not on file    Highest education level: Not on file   Tobacco Use    Smoking status: Current Every Day Smoker     Packs/day: 0.50     Years: 50.00     Pack years: 25.00     Types: Cigarettes    Smokeless tobacco: Never Used    Tobacco comment: 0.5-1 pack per day   Substance and Sexual Activity    Alcohol use: Yes     Alcohol/week: 16.8 oz     Types: 28 Cans of beer per week     Comment: None now    Drug use: No    Sexual activity: No     Current Outpatient Medications   Medication Sig    clonazePAM (KLONOPIN) 1 mg tablet Take 1 Tab by mouth daily. Max Daily Amount: 1 mg.  donepezil (ARICEPT) 5 mg tablet Take 1 Tab by mouth nightly.  DULoxetine (CYMBALTA) 60 mg capsule TAKE 1 CAPSULE BY MOUTH DAILY    gabapentin (NEURONTIN) 600 mg tablet Take 1 Tab by mouth daily.  ipratropium (ATROVENT) 42 mcg (0.06 %) nasal spray 1 [place] by Both Nostrils route four (4) times daily.  naproxen (NAPROSYN) 500 mg tablet Take 1 Tab by mouth daily.  guaiFENesin ER (MUCINEX) 600 mg ER tablet Take 1 Tab by mouth every twelve (12) hours. (Patient taking differently: Take 600 mg by mouth every twelve (12) hours.)    cyanocobalamin, vitamin B-12, (VITAMIN B-12 PO) Take 1 Tab by mouth daily.  aspirin delayed-release 325 mg tablet Take 325 mg by mouth three (3) days a week. No current facility-administered medications for this visit. Vitals:    12/20/18 1107   BP: 100/62   Pulse: 61   Resp: 18   SpO2: 98%   Weight: 173 lb (78.5 kg)   Height: 5' 11\" (1.803 m)       I have reviewed the nurses notes, vitals, problem list, allergy list, medical history, family, social history and medications. Review of Symptoms:    General: Pt denies excessive weight gain or loss. Pt is able to conduct ADL's  HEENT: Denies blurred vision, headaches, epistaxis and difficulty swallowing. Respiratory: Denies shortness of breath, PATEL, wheezing or stridor. Cardiovascular: Denies precordial pain, palpitations, edema or PND  Gastrointestinal: Denies poor appetite, indigestion, abdominal pain or blood in stool  Urinary: Denies dysuria, pyuria  Musculoskeletal: Denies pain or swelling from muscles or joints  Neurologic: Denies tremor, paresthesias, or sensory motor disturbance  Skin: Denies rash, itching or texture change. Psych: Denies depression      Physical Exam:      General: Well developed, in no acute distress. HEENT: Eyes - PERRL, no jvd  Heart:  Normal S1/S2 negative S3 or S4. Regular, no murmur, gallop or rub.   Respiratory: Clear bilaterally x 4, no wheezing or rales  Abdomen:   Soft, non-tender, bowel sounds are active.   Extremities:  No edema, normal cap refill, no cyanosis. Musculoskeletal: No clubbing  Neuro: A&Ox3, speech clear, gait stable. Skin: Skin color is normal. No rashes or lesions.  Non diaphoretic  Vascular: 2+ pulses symmetric in all extremities    Cardiographics    Ekg: nsr    Results for orders placed or performed during the hospital encounter of 11/08/18   EKG, 12 LEAD, INITIAL   Result Value Ref Range    Ventricular Rate 57 BPM    Atrial Rate 57 BPM    P-R Interval 168 ms    QRS Duration 86 ms    Q-T Interval 438 ms    QTC Calculation (Bezet) 426 ms    Calculated P Axis 41 degrees    Calculated R Axis 48 degrees    Calculated T Axis 43 degrees    Diagnosis       Sinus bradycardia  When compared with ECG of 11-NOV-2018 01:50,  No significant change was found  Confirmed by García Muñoz ([id number]) on 11/14/2018 3:50:05 PM           Lab Results   Component Value Date/Time    WBC 5.7 11/22/2018 07:12 AM    HGB 12.0 (L) 11/22/2018 07:12 AM    HCT 36.4 (L) 11/22/2018 07:12 AM    PLATELET 103 49/90/2284 07:12 AM    MCV 98.9 11/22/2018 07:12 AM      Lab Results   Component Value Date/Time    Sodium 140 11/22/2018 07:12 AM    Potassium 4.1 11/22/2018 07:12 AM    Chloride 108 11/22/2018 07:12 AM    CO2 27 11/22/2018 07:12 AM    Anion gap 5 11/22/2018 07:12 AM    Glucose 95 11/22/2018 07:12 AM    BUN 18 11/22/2018 07:12 AM    Creatinine 0.93 11/22/2018 07:12 AM    BUN/Creatinine ratio 19 11/22/2018 07:12 AM    GFR est AA >60 11/22/2018 07:12 AM    GFR est non-AA >60 11/22/2018 07:12 AM    Calcium 8.5 11/22/2018 07:12 AM    Bilirubin, total 0.5 11/22/2018 07:12 AM    AST (SGOT) 12 (L) 11/22/2018 07:12 AM    Alk. phosphatase 83 11/22/2018 07:12 AM    Protein, total 6.9 11/22/2018 07:12 AM    Albumin 3.5 11/22/2018 07:12 AM    Globulin 3.4 11/22/2018 07:12 AM    A-G Ratio 1.0 (L) 11/22/2018 07:12 AM    ALT (SGPT) 10 (L) 11/22/2018 07:12 AM         Assessment:     Assessment:        ICD-10-CM ICD-9-CM    1. Pacemaker complications, subsequent encounter T82. 9XXD V58.89 AMB POC EKG ROUTINE W/ 12 LEADS, INTER & REP     996.72    2. SSS (sick sinus syndrome) (HCC) I49.5 427.81    3. Syncope and collapse R55 780.2      Orders Placed This Encounter    AMB POC EKG ROUTINE W/ 12 LEADS, INTER & REP     Order Specific Question:   Reason for Exam:     Answer:   ROUTINE        Plan:   Mr Darius Soto is not having any further syncope. He has some dizziness in the am. He has some issues with peripheral vision and urination. I recommended neuro and urology consults. We will refrain from another pacemaker implant for the time being - he has not had any further syncope. He will f/u in 3 months (earlier if he has syncope).     Continue medical management for bph and dementia. Thank you for allowing me to participate in Julia Horvath 's care.     Callum Cueto MD, H[facility]

## 2019-01-03 RX ORDER — GUAIFENESIN 600 MG/1
600 TABLET, EXTENDED RELEASE ORAL EVERY 12 HOURS
Qty: 30 TAB | Refills: 0 | Status: SHIPPED | OUTPATIENT
Start: 2019-01-03

## 2019-01-22 RX ORDER — FINASTERIDE 5 MG/1
TABLET, FILM COATED ORAL
Qty: 90 TAB | Refills: 0 | Status: SHIPPED | OUTPATIENT
Start: 2019-01-22 | End: 2019-01-31 | Stop reason: SDUPTHER

## 2019-02-05 ENCOUNTER — OFFICE VISIT (OUTPATIENT)
Dept: CARDIOLOGY CLINIC | Age: 74
End: 2019-02-05

## 2019-02-05 VITALS
DIASTOLIC BLOOD PRESSURE: 52 MMHG | SYSTOLIC BLOOD PRESSURE: 98 MMHG | HEART RATE: 64 BPM | HEIGHT: 71 IN | RESPIRATION RATE: 16 BRPM | OXYGEN SATURATION: 95 % | WEIGHT: 180.2 LBS | BODY MASS INDEX: 25.23 KG/M2

## 2019-02-05 DIAGNOSIS — R55 SYNCOPE AND COLLAPSE: ICD-10-CM

## 2019-02-05 DIAGNOSIS — F43.10 PTSD (POST-TRAUMATIC STRESS DISORDER): ICD-10-CM

## 2019-02-05 DIAGNOSIS — I95.9 HYPOTENSION, UNSPECIFIED HYPOTENSION TYPE: ICD-10-CM

## 2019-02-05 DIAGNOSIS — I49.9 IRREGULAR HEARTBEAT: Primary | ICD-10-CM

## 2019-02-05 NOTE — PROGRESS NOTES
Chief Complaint   Patient presents with    Irregular Heart Beat     3 month post op follow up. C/O soreness around incision site     1. Have you been to the ER, urgent care clinic since your last visit? Hospitalized since your last visit? No    2. Have you seen or consulted any other health care providers outside of the [address] [place] [address] since your last visit? Include any pap smears or colon screening.  No

## 2019-02-05 NOTE — PROGRESS NOTES
Subjective:      Selvin Moreland is a 68 y.o. male is here for EP consult. The patient denies chest pain/ shortness of breath, orthopnea, PND, LE edema, palpitations, syncope, presyncope or fatigue.        Patient Active Problem List    Diagnosis Date Noted    Fever 11/09/2018    Idiopathic small and large fiber sensory neuropathy 11/09/2018    Ataxia 11/09/2018    Sensory ataxia 11/09/2018    Memory disturbance 11/09/2018    Altered mental status, unspecified 11/09/2018    Degenerative cervical spinal stenosis 11/09/2018    History of lumbar laminectomy 11/09/2018    Degenerative lumbar spinal stenosis 11/09/2018    Pacemaker complications, subsequent encounter 11/08/2018    Stumbling gait 11/08/2018    Pacemaker pocket hematoma, initial encounter 10/24/2018    Pacemaker 10/24/2018    Hx of syncope 10/24/2018    Bradycardia 10/02/2018    Age-related macular degeneration, dry, both eyes 09/14/2018    Syncope 09/11/2018    Neuropathy 09/11/2018    Memory deficit 09/11/2018    BPH with obstruction/lower urinary tract symptoms 04/19/2018    Smoker 01/18/2018    S/P right rotator cuff repair 12/01/2017    S/P hip replacement, bilateral 10/18/2017    Ingrown left big toenail 03/31/2017    PTSD (post-traumatic stress disorder) 12/13/2016    Lumbar spinal stenosis 12/13/2016    Alcoholism in remission ([facility] [address] 12/13/2016    Risk for falls 12/13/2016      Chandler Molina MD  Past Medical History:   Diagnosis Date    Anxiety     Back pain     Blurred vision     Chest pain     Depression     Dizziness     Fast heart beat     Frequent headaches     Frequent urination     Hip dysplasia, congenital     Joint pain     Joint swelling     Lumbar spinal stenosis     Muscle pain     Neuropathy     Recent change in weight     Sinus problem     Skin disease     SOB (shortness of breath)     Sore throat     SSS (sick sinus syndrome) (HCC)     Stiffness of joints, multiple sites  Stress     Syncope     Tiredness     Trouble in sleeping     Weakness       Past Surgical History:   Procedure Laterality Date    HX HIP REPLACEMENT Bilateral 1993    HX PACEMAKER PLACEMENT  10/16/2018    HX ROTATOR CUFF REPAIR Right 11/16/2017     No Known Allergies   Family History   Problem Relation Age of Onset    Cancer Mother     Cancer Maternal Aunt     negative for cardiac disease  Social History     Socioeconomic History    Marital status: SINGLE     Spouse name: Not on file    Number of children: Not on file    Years of education: Not on file    Highest education level: Not on file   Tobacco Use    Smoking status: Current Every Day Smoker     Packs/day: 0.50     Years: 50.00     Pack years: 25.00     Types: Cigarettes    Smokeless tobacco: Never Used    Tobacco comment: 0.5-1 pack per day   Substance and Sexual Activity    Alcohol use: Yes     Alcohol/week: 16.8 oz     Types: 28 Cans of beer per week     Comment: None now    Drug use: No    Sexual activity: No     Current Outpatient Medications   Medication Sig    clonazePAM (KLONOPIN) 1 mg tablet Take 1 Tab by mouth daily. Max Daily Amount: 1 mg.  naproxen (NAPROSYN) 500 mg tablet Take 1 Tab by mouth daily.  ipratropium (ATROVENT) 42 mcg (0.06 %) nasal spray 1 [place] by Both Nostrils route four (4) times daily. (Patient taking differently: 1 Spray by Both Nostrils route four (4) times daily as needed.)    gabapentin (NEURONTIN) 600 mg tablet Take 1 Tab by mouth daily.  DULoxetine (CYMBALTA) 60 mg capsule TAKE 1 CAPSULE BY MOUTH DAILY    donepezil (ARICEPT) 5 mg tablet Take 1 Tab by mouth nightly.  guaiFENesin ER (MUCINEX) 600 mg ER tablet Take 1 Tab by mouth every twelve (12) hours.  cyanocobalamin, vitamin B-12, (VITAMIN B-12 PO) Take 1 Tab by mouth daily.     finasteride (PROSCAR) 5 mg tablet TAKE 1 TABLET BY MOUTH DAILY(PROSTATE)    aspirin delayed-release 325 mg tablet Take 325 mg by mouth three (3) days a week.     No current facility-administered medications for this visit. Vitals:    02/05/19 1314   BP: 98/52   Pulse: 64   Resp: 16   SpO2: 95%   Weight: 180 lb 3.2 oz (81.7 kg)   Height: 5' 11\" (1.803 m)       I have reviewed the nurses notes, vitals, problem list, allergy list, medical history, family, social history and medications. Review of Symptoms:    General: Pt denies excessive weight gain or loss. Pt is able to conduct ADL's  HEENT: Denies blurred vision, headaches, epistaxis and difficulty swallowing. Respiratory: Denies shortness of breath, PATEL, wheezing or stridor. Cardiovascular: Denies precordial pain, palpitations, edema or PND  Gastrointestinal: Denies poor appetite, indigestion, abdominal pain or blood in stool  Urinary: Denies dysuria, pyuria  Musculoskeletal: Denies pain or swelling from muscles or joints  Neurologic: Denies tremor, paresthesias, or sensory motor disturbance  Skin: Denies rash, itching or texture change. Psych: Denies depression      Physical Exam:      General: Well developed, in no acute distress. HEENT: Eyes - PERRL, no jvd  Heart:  Normal S1/S2 negative S3 or S4. Regular, no murmur, gallop or rub.   Respiratory: Clear bilaterally x 4, no wheezing or rales  Abdomen:   Soft, non-tender, bowel sounds are active.   Extremities:  No edema, normal cap refill, no cyanosis. Musculoskeletal: No clubbing  Neuro: A&Ox3, speech clear, gait stable. Skin: Skin color is normal. No rashes or lesions.  Non diaphoretic  Vascular: 2+ pulses symmetric in all extremities    Cardiographics    Ekg: nsr    Results for orders placed or performed during the hospital encounter of 11/08/18   EKG, 12 LEAD, INITIAL   Result Value Ref Range    Ventricular Rate 57 BPM    Atrial Rate 57 BPM    P-R Interval 168 ms    QRS Duration 86 ms    Q-T Interval 438 ms    QTC Calculation (Bezet) 426 ms    Calculated P Axis 41 degrees    Calculated R Axis 48 degrees    Calculated T Axis 43 degrees Diagnosis       Sinus bradycardia  When compared with ECG of 11-NOV-2018 01:50,  No significant change was found  Confirmed by Bouchra Kamara ([id number]) on 11/14/2018 3:50:05 PM           Lab Results   Component Value Date/Time    WBC 5.7 11/22/2018 07:12 AM    HGB 12.0 (L) 11/22/2018 07:12 AM    HCT 36.4 (L) 11/22/2018 07:12 AM    PLATELET 495 62/99/9769 07:12 AM    MCV 98.9 11/22/2018 07:12 AM      Lab Results   Component Value Date/Time    Sodium 140 11/22/2018 07:12 AM    Potassium 4.1 11/22/2018 07:12 AM    Chloride 108 11/22/2018 07:12 AM    CO2 27 11/22/2018 07:12 AM    Anion gap 5 11/22/2018 07:12 AM    Glucose 95 11/22/2018 07:12 AM    BUN 18 11/22/2018 07:12 AM    Creatinine 0.93 11/22/2018 07:12 AM    BUN/Creatinine ratio 19 11/22/2018 07:12 AM    GFR est AA >60 11/22/2018 07:12 AM    GFR est non-AA >60 11/22/2018 07:12 AM    Calcium 8.5 11/22/2018 07:12 AM    Bilirubin, total 0.5 11/22/2018 07:12 AM    AST (SGOT) 12 (L) 11/22/2018 07:12 AM    Alk. phosphatase 83 11/22/2018 07:12 AM    Protein, total 6.9 11/22/2018 07:12 AM    Albumin 3.5 11/22/2018 07:12 AM    Globulin 3.4 11/22/2018 07:12 AM    A-G Ratio 1.0 (L) 11/22/2018 07:12 AM    ALT (SGPT) 10 (L) 11/22/2018 07:12 AM         Assessment:     Assessment:        ICD-10-CM ICD-9-CM    1. Irregular heartbeat I49.9 427.9 AMB POC EKG ROUTINE W/ 12 LEADS, INTER & REP   2. Hypotension, unspecified hypotension type I95.9 458.9    3. Syncope and collapse R55 780.2    4. PTSD (post-traumatic stress disorder) F43.10 309.81      Orders Placed This Encounter    AMB POC EKG ROUTINE W/ 12 LEADS, INTER & REP     Order Specific Question:   Reason for Exam:     Answer:   routine        Plan:   Mr Janet Lamas is doing well. He has not had any further syncope since stopping his prostate med. In sinus today. Can f/u with Dr Cailin King for a monitor. Can f/u with me in one year prn    Continue medical management for ptsd, syncope.     Thank you for allowing me to participate in [facility] care.     Oskar Monique MD, Riccardo Rucker

## 2019-02-08 PROBLEM — T82.837A PACEMAKER POCKET HEMATOMA, INITIAL ENCOUNTER: Status: RESOLVED | Noted: 2018-10-24 | Resolved: 2019-02-08

## 2019-02-08 PROBLEM — Z95.0 PACEMAKER: Status: RESOLVED | Noted: 2018-10-24 | Resolved: 2019-02-08

## 2019-02-08 PROBLEM — R55 SYNCOPE: Status: RESOLVED | Noted: 2018-09-11 | Resolved: 2019-02-08

## 2019-02-08 PROBLEM — T82.9XXD: Status: RESOLVED | Noted: 2018-11-08 | Resolved: 2019-02-08

## 2019-02-12 ENCOUNTER — TELEPHONE (OUTPATIENT)
Dept: CARDIOLOGY CLINIC | Age: 74
End: 2019-02-12

## 2019-02-12 NOTE — TELEPHONE ENCOUNTER
Spoke with Bennie Means who is on HIPAA. Verified patient with two identifiers. According to Dr Marcia Blas office note of 2-5 Mr Kris Horvath is to have a holter monitor. Marina Swift is going to make an appt for Mr Kris Horvath to come to the [place] office instead of the [place] office to have the holter monitor done. She will call next week to set up the appt.

## 2019-02-12 NOTE — TELEPHONE ENCOUNTER
Mr. Yuliana mcknight  Pt wife would please like a return call back about a order for holter monitor please advise thx.

## 2019-02-15 DIAGNOSIS — Z87.898 HX OF SYNCOPE: ICD-10-CM

## 2019-02-15 DIAGNOSIS — R00.1 BRADYCARDIA: Primary | ICD-10-CM

## 2019-06-18 RX ORDER — GUAIFENESIN 600 MG/1
600 TABLET, EXTENDED RELEASE ORAL EVERY 12 HOURS
Qty: 30 TAB | Refills: 0 | OUTPATIENT
Start: 2019-06-18

## 2019-07-24 ENCOUNTER — TELEPHONE (OUTPATIENT)
Dept: CARDIOLOGY CLINIC | Age: 74
End: 2019-07-24

## 2019-07-25 ENCOUNTER — OFFICE VISIT (OUTPATIENT)
Dept: CARDIOLOGY CLINIC | Age: 74
End: 2019-07-25

## 2019-07-25 VITALS
DIASTOLIC BLOOD PRESSURE: 62 MMHG | SYSTOLIC BLOOD PRESSURE: 92 MMHG | HEIGHT: 71 IN | WEIGHT: 176 LBS | OXYGEN SATURATION: 97 % | HEART RATE: 52 BPM | BODY MASS INDEX: 24.64 KG/M2 | RESPIRATION RATE: 18 BRPM

## 2019-07-25 DIAGNOSIS — R55 SYNCOPE, UNSPECIFIED SYNCOPE TYPE: ICD-10-CM

## 2019-07-25 DIAGNOSIS — R56.9 SEIZURE (HCC): ICD-10-CM

## 2019-07-25 DIAGNOSIS — R00.1 BRADYCARDIA: Primary | ICD-10-CM

## 2019-07-25 DIAGNOSIS — I49.5 SINOATRIAL NODE DYSFUNCTION (HCC): ICD-10-CM

## 2019-07-25 DIAGNOSIS — R41.3 MEMORY DEFICIT: ICD-10-CM

## 2019-07-25 DIAGNOSIS — F43.10 PTSD (POST-TRAUMATIC STRESS DISORDER): ICD-10-CM

## 2019-07-25 RX ORDER — IPRATROPIUM BROMIDE AND ALBUTEROL 20; 100 UG/1; UG/1
SPRAY, METERED RESPIRATORY (INHALATION)
COMMUNITY
Start: 2019-06-14 | End: 2019-10-13

## 2019-07-25 NOTE — H&P (VIEW-ONLY)
Subjective:      Adrian Lemus is a 69 y.o. male is here for EP consult. The patient reports dizziness and seizure like activity. He wore event at [facility]. Here to discuss.  Has concerns about reimplantation of PM.      Patient Active Problem List    Diagnosis Date Noted    Fever 11/09/2018    Idiopathic small and large fiber sensory neuropathy 11/09/2018    Ataxia 11/09/2018    Sensory ataxia 11/09/2018    Memory disturbance 11/09/2018    Altered mental status, unspecified 11/09/2018    Degenerative cervical spinal stenosis 11/09/2018    History of lumbar laminectomy 11/09/2018    Degenerative lumbar spinal stenosis 11/09/2018    Stumbling gait 11/08/2018    Hx of syncope 10/24/2018    Bradycardia 10/02/2018    Age-related macular degeneration, dry, both eyes 09/14/2018    Neuropathy 09/11/2018    Memory deficit 09/11/2018    BPH with obstruction/lower urinary tract symptoms 04/19/2018    Smoker 01/18/2018    S/P right rotator cuff repair 12/01/2017    S/P hip replacement, bilateral 10/18/2017    Ingrown left big toenail 03/31/2017    PTSD (post-traumatic stress disorder) 12/13/2016    Lumbar spinal stenosis 12/13/2016    Alcoholism in remission ([facility] [address] 12/13/2016    Risk for falls 12/13/2016      Aneesh Molina MD  Past Medical History:   Diagnosis Date    Anxiety     Back pain     Blurred vision     Chest pain     Depression     Dizziness     Fast heart beat     Frequent headaches     Frequent urination     Hip dysplasia, congenital     Joint pain     Joint swelling     Lumbar spinal stenosis     Muscle pain     Neuropathy     Recent change in weight     Sinus problem     Skin disease     SOB (shortness of breath)     Sore throat     SSS (sick sinus syndrome) (HCC)     Stiffness of joints, multiple sites     Stress     Syncope     Tiredness     Trouble in sleeping     Weakness       Past Surgical History:   Procedure Laterality Date    HX HIP REPLACEMENT Bilateral 1993    HX PACEMAKER PLACEMENT  10/16/2018    HX ROTATOR CUFF REPAIR Right 11/16/2017     No Known Allergies   Family History   Problem Relation Age of Onset    Cancer Mother     Cancer Maternal Aunt     negative for cardiac disease  Social History     Socioeconomic History    Marital status: SINGLE     Spouse name: Not on file    Number of children: Not on file    Years of education: Not on file    Highest education level: Not on file   Tobacco Use    Smoking status: Current Every Day Smoker     Packs/day: 0.25     Years: 50.00     Pack years: 12.50     Types: Cigarettes    Smokeless tobacco: Never Used    Tobacco comment: 0.5-1 pack per day   Substance and Sexual Activity    Alcohol use: Not Currently     Alcohol/week: 28.0 standard drinks     Types: 28 Cans of beer per week     Comment: None now    Drug use: No    Sexual activity: Never     Current Outpatient Medications   Medication Sig    COMBIVENT RESPIMAT  mcg/actuation inhaler     clonazePAM (KLONOPIN) 1 mg tablet Take 0.5 Tabs by mouth two (2) times a day. Max Daily Amount: 1 mg.  DULoxetine (CYMBALTA) 60 mg capsule TAKE 1 CAPSULE BY MOUTH DAILY    tamsulosin (FLOMAX) 0.4 mg capsule Take 1 Cap by mouth daily.  donepezil (ARICEPT) 10 mg tablet Take 1 Tab by mouth nightly.  naproxen (NAPROSYN) 500 mg tablet Take 1 Tab by mouth daily.  ipratropium (ATROVENT) 42 mcg (0.06 %) nasal spray 1 [place] by Both Nostrils route four (4) times daily. (Patient taking differently: 1 Spray by Both Nostrils route four (4) times daily as needed.)    gabapentin (NEURONTIN) 600 mg tablet Take 1 Tab by mouth daily.  finasteride (PROSCAR) 5 mg tablet TAKE 1 TABLET BY MOUTH DAILY(PROSTATE)    cyanocobalamin, vitamin B-12, (VITAMIN B-12 PO) Take 1 Tab by mouth daily.  aspirin delayed-release 325 mg tablet Take 325 mg by mouth three (3) days a week.     guaiFENesin ER (MUCINEX) 600 mg ER tablet Take 1 Tab by mouth every twelve (12) hours. (Patient taking differently: Take 600 mg by mouth every twelve (12) hours. As needed)     No current facility-administered medications for this visit. Vitals:    07/25/19 1034   BP: 92/62   Pulse: (!) 52   Resp: 18   SpO2: 97%   Weight: 176 lb (79.8 kg)   Height: 5' 11\" (1.803 m)       I have reviewed the nurses notes, vitals, problem list, allergy list, medical history, family, social history and medications. Review of Symptoms:    General: Pt denies excessive weight gain or loss. Pt is able to conduct ADL's  HEENT: Denies blurred vision, headaches, epistaxis and difficulty swallowing. Respiratory: Denies shortness of breath, PATEL, wheezing or stridor. Cardiovascular: Denies precordial pain, palpitations, edema or PND  Gastrointestinal: Denies poor appetite, indigestion, abdominal pain or blood in stool  Urinary: Denies dysuria, pyuria  Musculoskeletal: Denies pain or swelling from muscles or joints  Neurologic: Dizziness, weakness, syncope/seizures  Skin: Denies rash, itching or texture change. Psych: Denies depression    Physical Exam:      General: Well developed, in no acute distress. HEENT: Eyes - PERRL, no jvd  Heart:  Normal S1/S2 negative S3 or S4. Regular, no murmur, gallop or rub. Respiratory: Clear bilaterally x 4, no wheezing or rales  Extremities:  No edema, normal cap refill, no cyanosis. Musculoskeletal: No clubbing  Neuro: A&Ox3, speech clear, gait stable. Skin: Skin color is normal. No rashes or lesions. Non diaphoretic  Vascular: 2+ pulses symmetric in all extremities    Cardiographics    Ekg: sinus bradycardia, vent rate 50.      Monitor at [facility] - greater than 5 sec pauses, junctional rhythm    Results for orders placed or performed during the hospital encounter of 07/02/19   EKG, 12 LEAD, INITIAL   Result Value Ref Range    Ventricular Rate 55 BPM    Atrial Rate 55 BPM    P-R Interval 170 ms    QRS Duration 86 ms    Q-T Interval 420 ms    QTC Calculation (Bezet) 401 ms    Calculated P Axis 42 degrees    Calculated R Axis 60 degrees    Calculated T Axis 61 degrees    Diagnosis       Sinus bradycardia  Otherwise normal ECG  When compared with ECG of 14-NOV-2018 03:20,  No significant change was found  Confirmed by Jefry Rincon MD, --- ([id number]) on 7/3/2019 6:39:55 AM           Lab Results   Component Value Date/Time    WBC 6.1 07/02/2019 06:50 PM    HGB 14.9 07/02/2019 06:50 PM    HCT 44.5 07/02/2019 06:50 PM    PLATELET 476 73/33/3680 06:50 PM    MCV 96.5 07/02/2019 06:50 PM      Lab Results   Component Value Date/Time    Sodium 139 07/02/2019 06:50 PM    Potassium 4.1 07/02/2019 06:50 PM    Chloride 101 07/02/2019 06:50 PM    CO2 29 07/02/2019 06:50 PM    Anion gap 9 07/02/2019 06:50 PM    Glucose 101 (H) 07/02/2019 06:50 PM    BUN 18 07/02/2019 06:50 PM    Creatinine 1.12 07/02/2019 06:50 PM    BUN/Creatinine ratio 16 07/02/2019 06:50 PM    GFR est AA >60 07/02/2019 06:50 PM    GFR est non-AA >60 07/02/2019 06:50 PM    Calcium 8.4 (L) 07/02/2019 06:50 PM    Bilirubin, total 0.5 11/22/2018 07:12 AM    AST (SGOT) 12 (L) 11/22/2018 07:12 AM    Alk. phosphatase 83 11/22/2018 07:12 AM    Protein, total 6.9 11/22/2018 07:12 AM    Albumin 3.5 11/22/2018 07:12 AM    Globulin 3.4 11/22/2018 07:12 AM    A-G Ratio 1.0 (L) 11/22/2018 07:12 AM    ALT (SGPT) 10 (L) 11/22/2018 07:12 AM      Lab Results   Component Value Date/Time    TSH 0.50 11/10/2018 04:45 AM        Assessment:             ICD-10-CM ICD-9-CM    1. Bradycardia R00.1 427.89 AMB POC EKG ROUTINE W/ 12 LEADS, INTER & REP      REFERRAL TO NEUROLOGY   2. PTSD (post-traumatic stress disorder) F43.10 309.81 REFERRAL TO NEUROLOGY   3.  Syncope, unspecified syncope type R55 780.2 REFERRAL TO NEUROLOGY   4. Sinoatrial node dysfunction (HCC) I49.5 427.81 REFERRAL TO NEUROLOGY   5. Memory deficit R41.3 780.93 REFERRAL TO NEUROLOGY   6. Seizure ([facility] [address] R56.9 780.39 REFERRAL TO NEUROLOGY     Orders Placed This Encounter    [facility] Neurology ref [facility]     Referral Priority:   Routine     Referral Type:   Consultation     Referral Reason:   Specialty Services Required     Referred to Provider:   Bela Parker MD     Number of Visits Requested:   1    AMB POC EKG ROUTINE W/ 12 LEADS, INTER & REP     Order Specific Question:   Reason for Exam:     Answer:   routine    COMBIVENT RESPIMAT  mcg/actuation inhaler        Plan:     Mr Lydia Santillan presents for follow up with known SSS and recent event monitor for dizziness. Event with HR 33-82 with avg 62, 15 pauses, the longest 5.3 sec. He accepts that he needs a re-implant of PM (right sided). I discussed the risks/benefits/alternatives of the procedure with the patient. Risks include (but are not limited to) bleeding, heart block, infection, cva/mi/tamponade/death. The patient understands and agrees to proceed. Thank you for this interesting consultation. Continue medical management for PTSD, SSS, syncope. Thank you for allowing me to participate in Elifmontez Aguilar 's care. Olivia Caldera NP    Patient seen and examined. All pertinent data reviewed. I have reviewed detailed note as outlined by Olivia Caldera NP. Case discussed with Nursing/medical assistant staff and Olivia Caldera NP. Plans as outlined. pt with sick sinus on monitor. Candidate for pacemaker. We will observe overnight post implant and send him with arm binder. Cont med rx for ptsd and asthma.     Milford Libman, MD, Parag Cristobal

## 2019-07-25 NOTE — PROGRESS NOTES
Verified patient with 2 identifiers   Reviewed record in preparation for visit and obtained necessary documentation. Chief Complaint   Patient presents with    Slow Heart Rate     patient states he keeps passing out due to low heart rate - has happened every week -patients wife states these episodes were diagnosed as seizures per ER    Eye Pain     gets when he looks up - behind both eyes as well as behing the neck -does have an appt with Neurologist next month      1. Have you been to the ER, urgent care clinic since your last visit? Hospitalized since your last visit? Yes [facility]    2. Have you seen or consulted any other health care providers outside of the [address] [place] [address] since your last visit? Include any pap smears or colon screening.   No

## 2019-07-25 NOTE — TELEPHONE ENCOUNTER
Late note for 07/24/19   Called, spoke to pt's wife, two identifiers verified. Asked if we have received the holter monitor results from the [place]. Advised that we have, she states pt keeps passing out, having low heart rates. Scheduled pt to see Dr. Russel Schilling on 7/25/19.     Jailene Bhandari RN

## 2019-07-25 NOTE — PROGRESS NOTES
Subjective:      Reno Hu is a 69 y.o. male is here for EP consult. The patient reports dizziness and seizure like activity. He wore event at [facility]. Here to discuss.  Has concerns about reimplantation of PM.      Patient Active Problem List    Diagnosis Date Noted    Fever 11/09/2018    Idiopathic small and large fiber sensory neuropathy 11/09/2018    Ataxia 11/09/2018    Sensory ataxia 11/09/2018    Memory disturbance 11/09/2018    Altered mental status, unspecified 11/09/2018    Degenerative cervical spinal stenosis 11/09/2018    History of lumbar laminectomy 11/09/2018    Degenerative lumbar spinal stenosis 11/09/2018    Stumbling gait 11/08/2018    Hx of syncope 10/24/2018    Bradycardia 10/02/2018    Age-related macular degeneration, dry, both eyes 09/14/2018    Neuropathy 09/11/2018    Memory deficit 09/11/2018    BPH with obstruction/lower urinary tract symptoms 04/19/2018    Smoker 01/18/2018    S/P right rotator cuff repair 12/01/2017    S/P hip replacement, bilateral 10/18/2017    Ingrown left big toenail 03/31/2017    PTSD (post-traumatic stress disorder) 12/13/2016    Lumbar spinal stenosis 12/13/2016    Alcoholism in remission ([facility] [address] 12/13/2016    Risk for falls 12/13/2016      La Nena Molina MD  Past Medical History:   Diagnosis Date    Anxiety     Back pain     Blurred vision     Chest pain     Depression     Dizziness     Fast heart beat     Frequent headaches     Frequent urination     Hip dysplasia, congenital     Joint pain     Joint swelling     Lumbar spinal stenosis     Muscle pain     Neuropathy     Recent change in weight     Sinus problem     Skin disease     SOB (shortness of breath)     Sore throat     SSS (sick sinus syndrome) (HCC)     Stiffness of joints, multiple sites     Stress     Syncope     Tiredness     Trouble in sleeping     Weakness       Past Surgical History:   Procedure Laterality Date    HX HIP REPLACEMENT Bilateral 1993    HX PACEMAKER PLACEMENT  10/16/2018    HX ROTATOR CUFF REPAIR Right 11/16/2017     No Known Allergies   Family History   Problem Relation Age of Onset    Cancer Mother     Cancer Maternal Aunt     negative for cardiac disease  Social History     Socioeconomic History    Marital status: SINGLE     Spouse name: Not on file    Number of children: Not on file    Years of education: Not on file    Highest education level: Not on file   Tobacco Use    Smoking status: Current Every Day Smoker     Packs/day: 0.25     Years: 50.00     Pack years: 12.50     Types: Cigarettes    Smokeless tobacco: Never Used    Tobacco comment: 0.5-1 pack per day   Substance and Sexual Activity    Alcohol use: Not Currently     Alcohol/week: 28.0 standard drinks     Types: 28 Cans of beer per week     Comment: None now    Drug use: No    Sexual activity: Never     Current Outpatient Medications   Medication Sig    COMBIVENT RESPIMAT  mcg/actuation inhaler     clonazePAM (KLONOPIN) 1 mg tablet Take 0.5 Tabs by mouth two (2) times a day. Max Daily Amount: 1 mg.  DULoxetine (CYMBALTA) 60 mg capsule TAKE 1 CAPSULE BY MOUTH DAILY    tamsulosin (FLOMAX) 0.4 mg capsule Take 1 Cap by mouth daily.  donepezil (ARICEPT) 10 mg tablet Take 1 Tab by mouth nightly.  naproxen (NAPROSYN) 500 mg tablet Take 1 Tab by mouth daily.  ipratropium (ATROVENT) 42 mcg (0.06 %) nasal spray 1 [place] by Both Nostrils route four (4) times daily. (Patient taking differently: 1 Spray by Both Nostrils route four (4) times daily as needed.)    gabapentin (NEURONTIN) 600 mg tablet Take 1 Tab by mouth daily.  finasteride (PROSCAR) 5 mg tablet TAKE 1 TABLET BY MOUTH DAILY(PROSTATE)    cyanocobalamin, vitamin B-12, (VITAMIN B-12 PO) Take 1 Tab by mouth daily.  aspirin delayed-release 325 mg tablet Take 325 mg by mouth three (3) days a week.     guaiFENesin ER (MUCINEX) 600 mg ER tablet Take 1 Tab by mouth every twelve (12) hours. (Patient taking differently: Take 600 mg by mouth every twelve (12) hours. As needed)     No current facility-administered medications for this visit. Vitals:    07/25/19 1034   BP: 92/62   Pulse: (!) 52   Resp: 18   SpO2: 97%   Weight: 176 lb (79.8 kg)   Height: 5' 11\" (1.803 m)       I have reviewed the nurses notes, vitals, problem list, allergy list, medical history, family, social history and medications. Review of Symptoms:    General: Pt denies excessive weight gain or loss. Pt is able to conduct ADL's  HEENT: Denies blurred vision, headaches, epistaxis and difficulty swallowing. Respiratory: Denies shortness of breath, PATEL, wheezing or stridor. Cardiovascular: Denies precordial pain, palpitations, edema or PND  Gastrointestinal: Denies poor appetite, indigestion, abdominal pain or blood in stool  Urinary: Denies dysuria, pyuria  Musculoskeletal: Denies pain or swelling from muscles or joints  Neurologic: Dizziness, weakness, syncope/seizures  Skin: Denies rash, itching or texture change. Psych: Denies depression    Physical Exam:      General: Well developed, in no acute distress. HEENT: Eyes - PERRL, no jvd  Heart:  Normal S1/S2 negative S3 or S4. Regular, no murmur, gallop or rub. Respiratory: Clear bilaterally x 4, no wheezing or rales  Extremities:  No edema, normal cap refill, no cyanosis. Musculoskeletal: No clubbing  Neuro: A&Ox3, speech clear, gait stable. Skin: Skin color is normal. No rashes or lesions. Non diaphoretic  Vascular: 2+ pulses symmetric in all extremities    Cardiographics    Ekg: sinus bradycardia, vent rate 50.      Monitor at [facility] - greater than 5 sec pauses, junctional rhythm    Results for orders placed or performed during the hospital encounter of 07/02/19   EKG, 12 LEAD, INITIAL   Result Value Ref Range    Ventricular Rate 55 BPM    Atrial Rate 55 BPM    P-R Interval 170 ms    QRS Duration 86 ms    Q-T Interval 420 ms    QTC Calculation (Bezet) 401 ms    Calculated P Axis 42 degrees    Calculated R Axis 60 degrees    Calculated T Axis 61 degrees    Diagnosis       Sinus bradycardia  Otherwise normal ECG  When compared with ECG of 14-NOV-2018 03:20,  No significant change was found  Confirmed by Stephany Chavez MD, --- ([id number]) on 7/3/2019 6:39:55 AM           Lab Results   Component Value Date/Time    WBC 6.1 07/02/2019 06:50 PM    HGB 14.9 07/02/2019 06:50 PM    HCT 44.5 07/02/2019 06:50 PM    PLATELET 916 87/02/3147 06:50 PM    MCV 96.5 07/02/2019 06:50 PM      Lab Results   Component Value Date/Time    Sodium 139 07/02/2019 06:50 PM    Potassium 4.1 07/02/2019 06:50 PM    Chloride 101 07/02/2019 06:50 PM    CO2 29 07/02/2019 06:50 PM    Anion gap 9 07/02/2019 06:50 PM    Glucose 101 (H) 07/02/2019 06:50 PM    BUN 18 07/02/2019 06:50 PM    Creatinine 1.12 07/02/2019 06:50 PM    BUN/Creatinine ratio 16 07/02/2019 06:50 PM    GFR est AA >60 07/02/2019 06:50 PM    GFR est non-AA >60 07/02/2019 06:50 PM    Calcium 8.4 (L) 07/02/2019 06:50 PM    Bilirubin, total 0.5 11/22/2018 07:12 AM    AST (SGOT) 12 (L) 11/22/2018 07:12 AM    Alk. phosphatase 83 11/22/2018 07:12 AM    Protein, total 6.9 11/22/2018 07:12 AM    Albumin 3.5 11/22/2018 07:12 AM    Globulin 3.4 11/22/2018 07:12 AM    A-G Ratio 1.0 (L) 11/22/2018 07:12 AM    ALT (SGPT) 10 (L) 11/22/2018 07:12 AM      Lab Results   Component Value Date/Time    TSH 0.50 11/10/2018 04:45 AM        Assessment:             ICD-10-CM ICD-9-CM    1. Bradycardia R00.1 427.89 AMB POC EKG ROUTINE W/ 12 LEADS, INTER & REP      REFERRAL TO NEUROLOGY   2. PTSD (post-traumatic stress disorder) F43.10 309.81 REFERRAL TO NEUROLOGY   3.  Syncope, unspecified syncope type R55 780.2 REFERRAL TO NEUROLOGY   4. Sinoatrial node dysfunction (HCC) I49.5 427.81 REFERRAL TO NEUROLOGY   5. Memory deficit R41.3 780.93 REFERRAL TO NEUROLOGY   6. Seizure ([facility] [address] R56.9 780.39 REFERRAL TO NEUROLOGY     Orders Placed This Encounter    T. Joy Cervantes Neurology ref [facility]     Referral Priority:   Routine     Referral Type:   Consultation     Referral Reason:   Specialty Services Required     Referred to Provider:   Willow Trejo MD     Number of Visits Requested:   1    AMB POC EKG ROUTINE W/ 12 LEADS, INTER & REP     Order Specific Question:   Reason for Exam:     Answer:   routine    COMBIVENT RESPIMAT  mcg/actuation inhaler        Plan:     Mr César Galindo presents for follow up with known SSS and recent event monitor for dizziness. Event with HR 33-82 with avg 62, 15 pauses, the longest 5.3 sec. He accepts that he needs a re-implant of PM (right sided). I discussed the risks/benefits/alternatives of the procedure with the patient. Risks include (but are not limited to) bleeding, heart block, infection, cva/mi/tamponade/death. The patient understands and agrees to proceed. Thank you for this interesting consultation. Continue medical management for PTSD, SSS, syncope. Thank you for allowing me to participate in Usha Singletary 's care. Karly Whalen NP    Patient seen and examined. All pertinent data reviewed. I have reviewed detailed note as outlined by Karly Whalen NP. Case discussed with Nursing/medical assistant staff and Karly Whalen NP. Plans as outlined. pt with sick sinus on monitor. Candidate for pacemaker. We will observe overnight post implant and send him with arm binder. Cont med rx for ptsd and asthma.     Ana Chowdhury MD, [address]

## 2019-07-30 ENCOUNTER — APPOINTMENT (OUTPATIENT)
Dept: GENERAL RADIOLOGY | Age: 74
End: 2019-07-30
Attending: INTERNAL MEDICINE
Payer: COMMERCIAL

## 2019-07-30 ENCOUNTER — HOSPITAL ENCOUNTER (OUTPATIENT)
Age: 74
Setting detail: OBSERVATION
Discharge: HOME | End: 2019-07-31
Attending: INTERNAL MEDICINE | Admitting: INTERNAL MEDICINE
Payer: COMMERCIAL

## 2019-07-30 DIAGNOSIS — R00.1 BRADYCARDIA: ICD-10-CM

## 2019-07-30 PROBLEM — I45.9 HEART BLOCK: Status: ACTIVE | Noted: 2019-07-30

## 2019-07-30 PROCEDURE — 77030018729 HC ELECTRD DEFIB PAD CARD -B: Performed by: INTERNAL MEDICINE

## 2019-07-30 PROCEDURE — 99218 HC RM OBSERVATION: CPT

## 2019-07-30 PROCEDURE — 74011636320 HC RX REV CODE- 636/320: Performed by: INTERNAL MEDICINE

## 2019-07-30 PROCEDURE — C1898 LEAD, PMKR, OTHER THAN TRANS: HCPCS | Performed by: INTERNAL MEDICINE

## 2019-07-30 PROCEDURE — 74011250636 HC RX REV CODE- 250/636: Performed by: INTERNAL MEDICINE

## 2019-07-30 PROCEDURE — 33208 INSRT HEART PM ATRIAL & VENT: CPT | Performed by: INTERNAL MEDICINE

## 2019-07-30 PROCEDURE — 77030002996 HC SUT SLK J&J -A: Performed by: INTERNAL MEDICINE

## 2019-07-30 PROCEDURE — 71045 X-RAY EXAM CHEST 1 VIEW: CPT

## 2019-07-30 PROCEDURE — 99152 MOD SED SAME PHYS/QHP 5/>YRS: CPT | Performed by: INTERNAL MEDICINE

## 2019-07-30 PROCEDURE — C1894 INTRO/SHEATH, NON-LASER: HCPCS | Performed by: INTERNAL MEDICINE

## 2019-07-30 PROCEDURE — C1785 PMKR, DUAL, RATE-RESP: HCPCS | Performed by: INTERNAL MEDICINE

## 2019-07-30 PROCEDURE — 99153 MOD SED SAME PHYS/QHP EA: CPT | Performed by: INTERNAL MEDICINE

## 2019-07-30 PROCEDURE — 77030037713 HC CLOSR DEV INCIS ZIP STRY -B: Performed by: INTERNAL MEDICINE

## 2019-07-30 PROCEDURE — C1893 INTRO/SHEATH, FIXED,NON-PEEL: HCPCS | Performed by: INTERNAL MEDICINE

## 2019-07-30 PROCEDURE — 74011250636 HC RX REV CODE- 250/636

## 2019-07-30 PROCEDURE — C1781 MESH (IMPLANTABLE): HCPCS | Performed by: INTERNAL MEDICINE

## 2019-07-30 PROCEDURE — 77030031139 HC SUT VCRL2 J&J -A: Performed by: INTERNAL MEDICINE

## 2019-07-30 PROCEDURE — 74011250637 HC RX REV CODE- 250/637: Performed by: INTERNAL MEDICINE

## 2019-07-30 PROCEDURE — 74011000250 HC RX REV CODE- 250: Performed by: INTERNAL MEDICINE

## 2019-07-30 PROCEDURE — 77030018673: Performed by: INTERNAL MEDICINE

## 2019-07-30 DEVICE — LEAD PCMKR CAPSUR FIX NOVUS 52 --: Type: IMPLANTABLE DEVICE | Status: FUNCTIONAL

## 2019-07-30 DEVICE — ENVELOPE CMRM6133 ABSORB LRG US
Type: IMPLANTABLE DEVICE | Status: FUNCTIONAL
Brand: TYRX™

## 2019-07-30 DEVICE — LEAD PCMKR CAPSUR FIX NOVUS 58 --: Type: IMPLANTABLE DEVICE | Status: FUNCTIONAL

## 2019-07-30 DEVICE — PCMKR AZURE XT DR MRI --: Type: IMPLANTABLE DEVICE | Status: FUNCTIONAL

## 2019-07-30 RX ORDER — MIDAZOLAM HYDROCHLORIDE 1 MG/ML
INJECTION, SOLUTION INTRAMUSCULAR; INTRAVENOUS AS NEEDED
Status: DISCONTINUED | OUTPATIENT
Start: 2019-07-30 | End: 2019-07-30 | Stop reason: HOSPADM

## 2019-07-30 RX ORDER — NAPROXEN 250 MG/1
500 TABLET ORAL DAILY
Status: DISCONTINUED | OUTPATIENT
Start: 2019-07-31 | End: 2019-07-31 | Stop reason: HOSPADM

## 2019-07-30 RX ORDER — HEPARIN SODIUM 200 [USP'U]/100ML
INJECTION, SOLUTION INTRAVENOUS
Status: COMPLETED | OUTPATIENT
Start: 2019-07-30 | End: 2019-07-30

## 2019-07-30 RX ORDER — SODIUM CHLORIDE 0.9 % (FLUSH) 0.9 %
5-40 SYRINGE (ML) INJECTION EVERY 8 HOURS
Status: DISCONTINUED | OUTPATIENT
Start: 2019-07-30 | End: 2019-07-31 | Stop reason: HOSPADM

## 2019-07-30 RX ORDER — SODIUM CHLORIDE 0.9 % (FLUSH) 0.9 %
5-40 SYRINGE (ML) INJECTION AS NEEDED
Status: DISCONTINUED | OUTPATIENT
Start: 2019-07-30 | End: 2019-07-31 | Stop reason: HOSPADM

## 2019-07-30 RX ORDER — FUROSEMIDE 10 MG/ML
20 INJECTION INTRAMUSCULAR; INTRAVENOUS ONCE
Status: COMPLETED | OUTPATIENT
Start: 2019-07-30 | End: 2019-07-30

## 2019-07-30 RX ORDER — NALOXONE HYDROCHLORIDE 0.4 MG/ML
0.4 INJECTION, SOLUTION INTRAMUSCULAR; INTRAVENOUS; SUBCUTANEOUS AS NEEDED
Status: DISCONTINUED | OUTPATIENT
Start: 2019-07-30 | End: 2019-07-31 | Stop reason: HOSPADM

## 2019-07-30 RX ORDER — GABAPENTIN 300 MG/1
600 CAPSULE ORAL DAILY
Status: DISCONTINUED | OUTPATIENT
Start: 2019-07-30 | End: 2019-07-31 | Stop reason: HOSPADM

## 2019-07-30 RX ORDER — FENTANYL CITRATE 50 UG/ML
INJECTION, SOLUTION INTRAMUSCULAR; INTRAVENOUS AS NEEDED
Status: DISCONTINUED | OUTPATIENT
Start: 2019-07-30 | End: 2019-07-30 | Stop reason: HOSPADM

## 2019-07-30 RX ORDER — IPRATROPIUM BROMIDE 42 UG/1
1 SPRAY, METERED NASAL
Status: DISCONTINUED | OUTPATIENT
Start: 2019-07-30 | End: 2019-07-31 | Stop reason: HOSPADM

## 2019-07-30 RX ORDER — LIDOCAINE HYDROCHLORIDE 10 MG/ML
INJECTION INFILTRATION; PERINEURAL AS NEEDED
Status: DISCONTINUED | OUTPATIENT
Start: 2019-07-30 | End: 2019-07-30 | Stop reason: HOSPADM

## 2019-07-30 RX ORDER — GUAIFENESIN 600 MG/1
600 TABLET, EXTENDED RELEASE ORAL EVERY 12 HOURS
Status: DISCONTINUED | OUTPATIENT
Start: 2019-07-30 | End: 2019-07-31 | Stop reason: HOSPADM

## 2019-07-30 RX ORDER — ACETAMINOPHEN 325 MG/1
650 TABLET ORAL
Status: DISCONTINUED | OUTPATIENT
Start: 2019-07-30 | End: 2019-07-31 | Stop reason: HOSPADM

## 2019-07-30 RX ORDER — CLONAZEPAM 0.5 MG/1
0.5 TABLET ORAL 2 TIMES DAILY
Status: DISCONTINUED | OUTPATIENT
Start: 2019-07-30 | End: 2019-07-31 | Stop reason: HOSPADM

## 2019-07-30 RX ORDER — DONEPEZIL HYDROCHLORIDE 5 MG/1
10 TABLET, FILM COATED ORAL
Status: DISCONTINUED | OUTPATIENT
Start: 2019-07-30 | End: 2019-07-31 | Stop reason: HOSPADM

## 2019-07-30 RX ORDER — TAMSULOSIN HYDROCHLORIDE 0.4 MG/1
0.4 CAPSULE ORAL DAILY
Status: DISCONTINUED | OUTPATIENT
Start: 2019-07-31 | End: 2019-07-31 | Stop reason: HOSPADM

## 2019-07-30 RX ORDER — DULOXETIN HYDROCHLORIDE 30 MG/1
60 CAPSULE, DELAYED RELEASE ORAL DAILY
Status: DISCONTINUED | OUTPATIENT
Start: 2019-07-31 | End: 2019-07-31 | Stop reason: HOSPADM

## 2019-07-30 RX ORDER — CEFAZOLIN SODIUM/WATER 2 G/20 ML
SYRINGE (ML) INTRAVENOUS AS NEEDED
Status: DISCONTINUED | OUTPATIENT
Start: 2019-07-30 | End: 2019-07-30 | Stop reason: HOSPADM

## 2019-07-30 RX ORDER — BACITRACIN 50000 [IU]/1
INJECTION, POWDER, FOR SOLUTION INTRAMUSCULAR AS NEEDED
Status: DISCONTINUED | OUTPATIENT
Start: 2019-07-30 | End: 2019-07-30 | Stop reason: HOSPADM

## 2019-07-30 RX ORDER — IPRATROPIUM BROMIDE AND ALBUTEROL SULFATE 2.5; .5 MG/3ML; MG/3ML
3 SOLUTION RESPIRATORY (INHALATION)
Status: DISCONTINUED | OUTPATIENT
Start: 2019-07-30 | End: 2019-07-31 | Stop reason: HOSPADM

## 2019-07-30 RX ORDER — FINASTERIDE 5 MG/1
5 TABLET, FILM COATED ORAL DAILY
Status: DISCONTINUED | OUTPATIENT
Start: 2019-07-30 | End: 2019-07-31 | Stop reason: HOSPADM

## 2019-07-30 RX ADMIN — GABAPENTIN 600 MG: 300 CAPSULE ORAL at 17:40

## 2019-07-30 RX ADMIN — GUAIFENESIN 600 MG: 600 TABLET, EXTENDED RELEASE ORAL at 23:29

## 2019-07-30 RX ADMIN — DONEPEZIL HYDROCHLORIDE 10 MG: 5 TABLET, FILM COATED ORAL at 23:30

## 2019-07-30 RX ADMIN — ACETAMINOPHEN 650 MG: 325 TABLET ORAL at 17:40

## 2019-07-30 RX ADMIN — FUROSEMIDE 20 MG: 10 INJECTION, SOLUTION INTRAMUSCULAR; INTRAVENOUS at 11:31

## 2019-07-30 RX ADMIN — CLONAZEPAM 0.5 MG: 0.5 TABLET ORAL at 17:40

## 2019-07-30 RX ADMIN — Medication 10 ML: at 23:29

## 2019-07-30 NOTE — PROGRESS NOTES
TRANSFER - OUT REPORT:    Verbal report given to SHANIKA BANDA [facility], RN(name) on Glenn Angel  being transferred to IVCU(unit) for routine progression of care       Report consisted of patients Situation, Background, Assessment and   Recommendations(SBAR). Information from the following report(s) SBAR, Kardex, Procedure Summary, Intake/Output and Cardiac Rhythm paced was reviewed with the receiving nurse. Lines:       Opportunity for questions and clarification was provided.       Patient transported with:   Registered Nurse

## 2019-07-30 NOTE — INTERVAL H&P NOTE
H&P Update:  Martita Gilman was seen and examined. History and physical has been reviewed. The patient has been examined.  There have been no significant clinical changes since the completion of the originally dated History and Physical.

## 2019-07-30 NOTE — PROGRESS NOTES
TRANSFER - IN REPORT:    Verbal report received from [facility] on [address]  being received from EP lab for routine progression of care. Report consisted of patients Situation, Background, Assessment and Recommendations(SBAR). Information from the following report(s) SBAR, Procedure Summary, MAR and Cardiac Rhythm paced was reviewed with the receiving clinician. Opportunity for questions and clarification was provided. Assessment completed upon patients arrival to [address] [place] [address] and care assumed. Cardiac Cath Lab Recovery Arrival Note:    [address] arrived to [facility] recovery area. Patient procedure= PPI . Patient on cardiac monitor, non-invasive blood pressure, SPO2 monitor. On O2 @ 2 lpm via NC. Patient status doing well without problems. Patient is A&O, but drowsy. Patient reports no complaints. PROCEDURE SITE CHECK:    Procedure site:without any bleeding and no hematoma, denies pain/discomfort reported at procedure site. No change in patient status. Continue to monitor patient and status.

## 2019-07-30 NOTE — PROGRESS NOTES
SOPHIE and Marivel Prabhakar provided. Signed. Copy to patient and on chart.     [address], RN CM  Ext [phone]

## 2019-07-30 NOTE — PROGRESS NOTES
Cardiac Cath Lab Recovery Arrival Note:      Glenn Angel arrived to Cardiac Cath Lab, Recovery Area. Staff introduced to patient. Patient identifiers verified with NAME and DATE OF BIRTH. Procedure verified with patient. Consent forms reviewed and signed by patient or authorized representative and verified. Allergies verified. Patient and family oriented to department. Patient and family informed of procedure and plan of care. Questions answered with review. Patient prepped for procedure, per orders from physician, prior to arrival.    Patient on cardiac monitor, non-invasive blood pressure, SPO2 monitor. On room air. Patient is A&Ox 3. Patient reports no c/o. Patient in stretcher, in low position, with side rails up, call bell within reach, patient instructed to call if assistance as needed. Patient prep in: [address], Max [address]. Patient family has pager # none  Family in: [address] [facility] waiting area.    Prep by: Duke Chew RN and Gracia George RN

## 2019-07-30 NOTE — Clinical Note
Left subclavian vein. Accessed successfully. using fluoro guidance. Number of attempts =  1.  Sheath wire advanced

## 2019-07-31 VITALS
SYSTOLIC BLOOD PRESSURE: 113 MMHG | TEMPERATURE: 97.7 F | DIASTOLIC BLOOD PRESSURE: 50 MMHG | OXYGEN SATURATION: 98 % | HEART RATE: 60 BPM | HEIGHT: 71 IN | WEIGHT: 180 LBS | RESPIRATION RATE: 14 BRPM | BODY MASS INDEX: 25.2 KG/M2

## 2019-07-31 LAB
ATRIAL RATE: 60 BPM
CALCULATED R AXIS, ECG10: 74 DEGREES
CALCULATED T AXIS, ECG11: 58 DEGREES
DIAGNOSIS, 93000: NORMAL
P-R INTERVAL, ECG05: 186 MS
Q-T INTERVAL, ECG07: 432 MS
QRS DURATION, ECG06: 80 MS
QTC CALCULATION (BEZET), ECG08: 432 MS
VENTRICULAR RATE, ECG03: 60 BPM

## 2019-07-31 PROCEDURE — 93005 ELECTROCARDIOGRAM TRACING: CPT

## 2019-07-31 PROCEDURE — 74011250637 HC RX REV CODE- 250/637: Performed by: INTERNAL MEDICINE

## 2019-07-31 PROCEDURE — 99218 HC RM OBSERVATION: CPT

## 2019-07-31 RX ADMIN — DULOXETINE HYDROCHLORIDE 60 MG: 30 CAPSULE, DELAYED RELEASE ORAL at 08:31

## 2019-07-31 RX ADMIN — GABAPENTIN 600 MG: 300 CAPSULE ORAL at 08:30

## 2019-07-31 RX ADMIN — Medication 10 ML: at 03:20

## 2019-07-31 RX ADMIN — GUAIFENESIN 600 MG: 600 TABLET, EXTENDED RELEASE ORAL at 08:31

## 2019-07-31 RX ADMIN — FINASTERIDE 5 MG: 5 TABLET, FILM COATED ORAL at 08:31

## 2019-07-31 RX ADMIN — ACETAMINOPHEN 650 MG: 325 TABLET ORAL at 10:00

## 2019-07-31 RX ADMIN — CLONAZEPAM 0.5 MG: 0.5 TABLET ORAL at 08:31

## 2019-07-31 RX ADMIN — TAMSULOSIN HYDROCHLORIDE 0.4 MG: 0.4 CAPSULE ORAL at 08:30

## 2019-07-31 NOTE — PROGRESS NOTES
Cardiac Electrophysiology Progress Note            [address], [place], 200 [facility]  [phone]    7/31/2019 8:11 AM    Admit Date: 7/30/2019    Admit Diagnosis: Bradycardia [R00.1]  Heart block [I45.9]    Subjective:     Noelle Amador   denies chest pain, chest pressure/discomfort, dyspnea, palpitations. He reports tingling in his right hand/fingers which was present prior to device implant - no worse/better. Dicussed follow up with neurology.      Visit Vitals  /72 (BP 1 Location: Left arm, BP Patient Position: At rest)   Pulse 61   Temp 97.9 °F (36.6 °C)   Resp 12   Ht 5' 11\" (1.803 m)   Wt 180 lb (81.6 kg)   SpO2 95%   BMI 25.10 kg/m²     Current Facility-Administered Medications   Medication Dose Route Frequency    sodium chloride (NS) flush 5-40 mL  5-40 mL IntraVENous Q8H    sodium chloride (NS) flush 5-40 mL  5-40 mL IntraVENous PRN    naloxone (NARCAN) injection 0.4 mg  0.4 mg IntraVENous PRN    clonazePAM (KlonoPIN) tablet 0.5 mg  0.5 mg Oral BID    albuterol-ipratropium (DUO-NEB) 2.5 MG-0.5 MG/3 ML  3 mL Nebulization Q4H PRN    donepezil (ARICEPT) tablet 10 mg  10 mg Oral QHS    DULoxetine (CYMBALTA) capsule 60 mg  60 mg Oral DAILY    finasteride (PROSCAR) tablet 5 mg  5 mg Oral DAILY    gabapentin (NEURONTIN) capsule 600 mg  600 mg Oral DAILY    guaiFENesin ER (MUCINEX) tablet 600 mg  600 mg Oral Q12H    ipratropium (ATROVENT) 42 mcg (0.06 %) nasal spray 1 Spray  1 Spray Both Nostrils Q4H PRN    tamsulosin (FLOMAX) capsule 0.4 mg  0.4 mg Oral DAILY    naproxen (NAPROSYN) tablet 500 mg  500 mg Oral DAILY    acetaminophen (TYLENOL) tablet 650 mg  650 mg Oral Q4H PRN         Objective:      Visit Vitals  /72 (BP 1 Location: Left arm, BP Patient Position: At rest)   Pulse 61   Temp 97.9 °F (36.6 °C)   Resp 12   Ht 5' 11\" (1.803 m)   Wt 180 lb (81.6 kg)   SpO2 95%   BMI 25.10 kg/m²       Physical Exam:  Abdomen: soft, non-tender  Extremities: extremities normal  Heart: regular rate and rhythm  Lungs: clear to auscultation bilaterally  Pulses: 2+ and symmetric    Data Review:   Labs:    No results for input(s): WBC, HGB, HCT, PLT, HGBEXT, HCTEXT, PLTEXT in the last 72 hours. No results for input(s): NA, K, CL, CO2, GLU, BUN, CREA, CA, MG, PHOS, ALB, TBIL, TBILI, SGOT, ALT, INR in the last 72 hours. No lab exists for component:  BNP, INREXT    No results for input(s): TROIQ, CPK, CKMB in the last 72 hours. No intake or output data in the 24 hours ending 07/31/19 0811     Telemetry: A paced 61    Assessment:     Active Problems:    Heart block (7/30/2019)        Plan:     Noelle Amador is s/p dual chamber pm 7/30/19. He is A paced at 60 with normal functioning device per interrogation. Will have right subclavian pm dressing changed this morning and he is ok for d/c with follow up in 2- 3 weeks. Encouraged to follow up with Dr Gen Davis of neuro. Jennifer Melendez, ANP  Patient seen and examined by me with nurse practitioner. I personally performed all components of the history, physical, and medical decision making and agree with the assessment and plan with minor modifications as noted. A paced. cxr and interrogation wnl. [id number] Laureen Cisse for [employer]. F/u in 2-3 weeks as outpt.  Arm in binder for at least one week    Damon Mills MD, [facility]        7/31/2019  8:11 AM

## 2019-07-31 NOTE — PROGRESS NOTES
Pt received discharge instructions and prescriptions. Pt states understanding of follow-up care ans side effects of medications. Pt given opportunity for questions and clarifications. IV removed. Pt has all belongings.  Jeffy Henderson RN

## 2019-07-31 NOTE — DISCHARGE INSTRUCTIONS
[address], [place], 200 [facility]  [phone]        NEW PACEMAKER IMPLANT DISCHARGE INSTRUCTIONS    Patient ID:  Pa Mclean  [id number]  62 y.o.  1945    Admit Date: 7/30/2019    Discharge Date: 7/31/2019     Admitting Physician: Zachariah Davis MD     Discharge Physician: Zachariah Davis MD    Admission Diagnoses:   Bradycardia [R00.1]  Heart block [I45.9]    Discharge Diagnoses: Active Problems:    Bradycardia (10/2/2018)      Heart block (7/30/2019)        Discharge Condition: Good    Cardiology Procedures this Admission:  Pacemaker insertion. Disposition: home    Reference discharge instructions provided by nursing for diet and activity. Follow-up with device clinic in three weeks. Call [phone] to make an appointment. Signed:  CINTHIA Ambrosio  7/31/2019  8:37 AM      DISCHARGE INSTRUCTIONS FOR PATIENTS WITH PACEMAKERS    1. Remember to call for an appointment for 3 weeks [phone] to check healing and implant programming. 2. Medic Alert Bracelets are available from your pharmacist to wear at all times if you choose to wear one. 3. Carry your ID card for pacemaker with you at all times. This card will be given to you in the hospital or mailed to you. 4. The pacemaker will bulge slightly under your skin. The bulge will decrease in size over the next few weeks. Please notify the doctor's office if you notice any of the following around your site:   A.  A bruise that does not go away. B.  Soreness or yellow, green, or brown drainage from the site. C. Any swelling from the site. D. If you have a fever of 100 degrees or higher that lasts for a few days. INCISION CARE       1.  Leave the dressing over your site until your follow up visit. 2.  You may not shower until after follow up visit. 3.  For comfort, wear loose fitting clothing. 4.  Ice pack to affected shoulder for first 24 hours, wear your sling for 2 days.   5.  Report any signs of infection, fever, pain, swelling, redness, oozing, or heat at site especially if these symptoms increase after the first 3 to 4 days. 6. LEAVE ARM BINDER ON FOR 2 WEEKS AT ALL TIMES    ACTIVITY PRECAUTIONS     1. Avoid rough contact with the implant site. 2. No driving for 14 days. 3. Avoid lifting your arm over your head, carrying anything on the affected side, or lifting over 10 pounds for 30 days. For the first 2 days only bend your arm at the elbow. 4. Any extreme activity such as golf, weight lifting or exercise biking should be restricted for 60 days. 5. Do not carry objects by holding them against your implant site. 6.  No shooting rifles or any type of gun with the affected shoulder permanently. SPECIAL PRECAUTIONS     1. You should avoid all strong magnetic fields, such as arc welding, large transformers, large motors. 2.  You may or may not (depending on your device) have an MRI which uses a strong magnet to take pictures. 3.  Treatments or surgery that requires diathermy or electrocautery should be discussed with your doctor before scheduled. 4. Avoid radio frequency transmitters, including radar. 5. Advise dentist or other medical personnel you see that you have a pacemaker. 6.  Cell phones and microwave oven use is okay. 7.  If you plan to move or take a trip to a new area, the doctor's office will give you a name of a doctor to contact for any problems. ANTIBIOTIC THERAPY    During the first 8 weeks after your pacemaker insertion, you may need antibiotics before any dental work or certain tests or operations. Let the dentist or doctor who is caring for you know that you have had an implanted device.

## 2019-07-31 NOTE — PROGRESS NOTES
Pt up and ambulated to BR without c/o. Pt then ambulated half length of hallway slightly unsteady, then up to chair.

## 2019-08-05 ENCOUNTER — TELEPHONE (OUTPATIENT)
Dept: CARDIOLOGY CLINIC | Age: 74
End: 2019-08-05

## 2019-08-06 NOTE — TELEPHONE ENCOUNTER
Called, spoke to pt, two identifiers verified. Advised of recommendations from NP. Copied note from CINTHIA Peng into a MyChart note and sent to pt as requested. Pt verbalized understanding of information discussed w/ no further questions at this time.      Jesse Taylor RN

## 2019-08-20 ENCOUNTER — CLINICAL SUPPORT (OUTPATIENT)
Dept: CARDIOLOGY CLINIC | Age: 74
End: 2019-08-20

## 2019-08-20 DIAGNOSIS — I45.9 HEART BLOCK: ICD-10-CM

## 2019-08-20 DIAGNOSIS — I49.5 SINOATRIAL NODE DYSFUNCTION (HCC): Primary | ICD-10-CM

## 2019-08-20 DIAGNOSIS — I49.5 SSS (SICK SINUS SYNDROME) (HCC): ICD-10-CM

## 2019-08-20 DIAGNOSIS — Z95.0 CARDIAC PACEMAKER IN SITU: Primary | ICD-10-CM

## 2019-08-20 DIAGNOSIS — Z95.0 PACEMAKER: ICD-10-CM

## 2019-08-21 NOTE — PROGRESS NOTES
[place]  1945  Aneesh Molina MD          Subjective:    Patient is here for 2 week site check and device interrogation after pacemaker implantation. The patient denies chest pain/shortness of breath or fevers.      Patient Active Problem List    Diagnosis Date Noted    Heart block 07/30/2019    Fever 11/09/2018    Idiopathic small and large fiber sensory neuropathy 11/09/2018    Ataxia 11/09/2018    Sensory ataxia 11/09/2018    Memory disturbance 11/09/2018    Altered mental status, unspecified 11/09/2018    Degenerative cervical spinal stenosis 11/09/2018    History of lumbar laminectomy 11/09/2018    Degenerative lumbar spinal stenosis 11/09/2018    Stumbling gait 11/08/2018    Hx of syncope 10/24/2018    Bradycardia 10/02/2018    Age-related macular degeneration, dry, both eyes 09/14/2018    Neuropathy 09/11/2018    Memory deficit 09/11/2018    BPH with obstruction/lower urinary tract symptoms 04/19/2018    Smoker 01/18/2018    S/P right rotator cuff repair 12/01/2017    S/P hip replacement, bilateral 10/18/2017    Ingrown left big toenail 03/31/2017    PTSD (post-traumatic stress disorder) 12/13/2016    Lumbar spinal stenosis 12/13/2016    Alcoholism in remission ([facility] [address] 12/13/2016    Risk for falls 12/13/2016      Past Medical History:   Diagnosis Date    Anxiety     Back pain     Blurred vision     Chest pain     Depression     Dizziness     Fast heart beat     Frequent headaches     Frequent urination     Hip dysplasia, congenital     Joint pain     Joint swelling     Lumbar spinal stenosis     Muscle pain     Neuropathy     Recent change in weight     Sinus problem     Skin disease     SOB (shortness of breath)     Sore throat     SSS (sick sinus syndrome) (HCC)     Stiffness of joints, multiple sites     Stress     Syncope     Tiredness     Trouble in sleeping     Weakness       Past Surgical History:   Procedure Laterality Date    HX HIP REPLACEMENT Bilateral 1993    HX PACEMAKER PLACEMENT  10/16/2018    HX ROTATOR CUFF REPAIR Right 11/16/2017    OK INS NEW/RPLCMT PRM PM W/TRANSV ELTRD ATRIAL&VENT N/A 7/30/2019    INSERT PPM DUAL performed by Jose Miguel Shafer MD at [facility] CARDIAC CATH LAB     No Known Allergies   Family History   Problem Relation Age of Onset    Cancer Mother     Cancer Maternal Aunt       Current Outpatient Medications   Medication Sig    busPIRone (BUSPAR) 10 mg tablet Take 1 Tab by mouth three (3) times daily. Indications: Repeated Episodes of Anxiety    COMBIVENT RESPIMAT  mcg/actuation inhaler     clonazePAM (KLONOPIN) 1 mg tablet Take 0.5 Tabs by mouth two (2) times a day. Max Daily Amount: 1 mg.  DULoxetine (CYMBALTA) 60 mg capsule TAKE 1 CAPSULE BY MOUTH DAILY    tamsulosin (FLOMAX) 0.4 mg capsule Take 1 Cap by mouth daily.  donepezil (ARICEPT) 10 mg tablet Take 1 Tab by mouth nightly.  naproxen (NAPROSYN) 500 mg tablet Take 1 Tab by mouth daily.  ipratropium (ATROVENT) 42 mcg (0.06 %) nasal spray 1 [place] by Both Nostrils route four (4) times daily. (Patient taking differently: 1 Spray by Both Nostrils route four (4) times daily as needed.)    gabapentin (NEURONTIN) 600 mg tablet Take 1 Tab by mouth daily.  finasteride (PROSCAR) 5 mg tablet TAKE 1 TABLET BY MOUTH DAILY(PROSTATE)    guaiFENesin ER (MUCINEX) 600 mg ER tablet Take 1 Tab by mouth every twelve (12) hours. (Patient taking differently: Take 600 mg by mouth every twelve (12) hours. As needed)    cyanocobalamin, vitamin B-12, (VITAMIN B-12 PO) Take 1 Tab by mouth daily.  aspirin delayed-release 325 mg tablet Take 325 mg by mouth three (3) days a week. No current facility-administered medications for this visit. Review of Systems:    General: Pt denies excessive weight gain or loss. Pt is able to conduct ADL's  Respiratory: Denies shortness of breath, PATEL, wheezing or stridor.   Cardiovascular: Denies precordial pain, palpitations, edema or PND        Physical ExamPhysical Exam:      General: Well developed, in no acute distress. .  Chest: right subclavian pacer site approximated well  Neuro: A&Ox3, speech clear, gait stable. Assessment:        ICD-10-CM ICD-9-CM    1. Sinoatrial node dysfunction (HCC) I49.5 427.81    2. Heart block I45.9 426.9    3. Pacemaker Z95.0 V45.01         Plan:     Patient feels well following pacemaker implantation. Right subclavian pacemaker site approximated well, no discharge. No erythema or heat. Follow up as planned. Sent culture to [facility] for scant discharge at prox incision.      Jennifer Dugan, ANP

## 2019-08-25 LAB — BACTERIA SPEC AEROBE CULT: ABNORMAL

## 2019-08-26 RX ORDER — CLINDAMYCIN HYDROCHLORIDE 300 MG/1
300 CAPSULE ORAL 3 TIMES DAILY
Qty: 30 CAP | Refills: 0 | Status: SHIPPED | OUTPATIENT
Start: 2019-08-26 | End: 2019-09-05

## 2019-08-26 NOTE — PROGRESS NOTES
Called, spoke to pt, two identifiers verified. Advised of need to take antibiotic. Pt verbalized understanding of information discussed w/ no further questions at this time.

## 2019-09-03 ENCOUNTER — TELEPHONE (OUTPATIENT)
Dept: CARDIOLOGY CLINIC | Age: 74
End: 2019-09-03

## 2019-09-03 ENCOUNTER — OFFICE VISIT (OUTPATIENT)
Dept: CARDIOLOGY CLINIC | Age: 74
End: 2019-09-03

## 2019-09-03 VITALS
HEIGHT: 71 IN | DIASTOLIC BLOOD PRESSURE: 72 MMHG | SYSTOLIC BLOOD PRESSURE: 110 MMHG | WEIGHT: 182.3 LBS | OXYGEN SATURATION: 96 % | HEART RATE: 61 BPM | RESPIRATION RATE: 18 BRPM | BODY MASS INDEX: 25.52 KG/M2

## 2019-09-03 DIAGNOSIS — Z95.0 CARDIAC PACEMAKER IN SITU: ICD-10-CM

## 2019-09-03 DIAGNOSIS — I73.9 PAD (PERIPHERAL ARTERY DISEASE) (HCC): Primary | ICD-10-CM

## 2019-09-03 DIAGNOSIS — I73.9 CLAUDICATION (HCC): ICD-10-CM

## 2019-09-03 RX ORDER — GUAIFENESIN 100 MG/5ML
81 LIQUID (ML) ORAL DAILY
Qty: 90 TAB | Refills: 2
Start: 2019-09-03

## 2019-09-03 RX ORDER — PREGABALIN 25 MG/1
25 CAPSULE ORAL
COMMUNITY
Start: 2018-07-31 | End: 2019-09-03

## 2019-09-03 RX ORDER — ATORVASTATIN CALCIUM 20 MG/1
20 TABLET, FILM COATED ORAL DAILY
Qty: 90 TAB | Refills: 1 | Status: SHIPPED | OUTPATIENT
Start: 2019-09-03 | End: 2019-09-03

## 2019-09-03 NOTE — PROGRESS NOTES
[address], 200 [address] [facility]  [phone]     Subjective:      Kavita Hernandez is a 69 y.o. male with pmhx PTSD SSS post PM, PAD COPD previous smoker. Has progressive claudication, PCP ordered LE dopplers which was consistent with PAD. He was a previous smoker 1 PPD x 40 yrs, recently quit. Unknown lipid panel. Per patient, sometimes he is unable to walk at all because of the leg pain. Other times he starts experiencing the pain soon as he stand. Hx of back surgery and bilateral hip surgery    The patient denies chest pain/ shortness of breath, orthopnea, PND, LE edema, palpitations, syncope, or presyncope.        Patient Active Problem List    Diagnosis Date Noted    Heart block 07/30/2019    Fever 11/09/2018    Idiopathic small and large fiber sensory neuropathy 11/09/2018    Ataxia 11/09/2018    Sensory ataxia 11/09/2018    Memory disturbance 11/09/2018    Altered mental status, unspecified 11/09/2018    Degenerative cervical spinal stenosis 11/09/2018    History of lumbar laminectomy 11/09/2018    Degenerative lumbar spinal stenosis 11/09/2018    Stumbling gait 11/08/2018    Hx of syncope 10/24/2018    Bradycardia 10/02/2018    Age-related macular degeneration, dry, both eyes 09/14/2018    Neuropathy 09/11/2018    Memory deficit 09/11/2018    BPH with obstruction/lower urinary tract symptoms 04/19/2018    Smoker 01/18/2018    S/P right rotator cuff repair 12/01/2017    S/P hip replacement, bilateral 10/18/2017    Ingrown left big toenail 03/31/2017    PTSD (post-traumatic stress disorder) 12/13/2016    Lumbar spinal stenosis 12/13/2016    Alcoholism in remission ([facility] [address] 12/13/2016    Risk for falls 12/13/2016      Ashley Molina MD  Past Medical History:   Diagnosis Date    Anxiety     Back pain     Blurred vision     Chest pain     Depression     Dizziness     Fast heart beat     Frequent headaches     Frequent urination     Hip dysplasia, congenital     Joint pain     Joint swelling     Lumbar spinal stenosis     Muscle pain     Neuropathy     Recent change in weight     Sinus problem     Skin disease     SOB (shortness of breath)     Sore throat     SSS (sick sinus syndrome) (HCC)     Stiffness of joints, multiple sites     Stress     Syncope     Tiredness     Trouble in sleeping     Weakness       Past Surgical History:   Procedure Laterality Date    HX HIP REPLACEMENT Bilateral     HX PACEMAKER PLACEMENT  10/16/2018    HX ROTATOR CUFF REPAIR Right 2017    MO INS NEW/RPLCMT PRM PM W/TRANSV ELTRD ATRIAL&VENT N/A 2019    INSERT PPM DUAL performed by Homa Bhatt MD at \[facility]\"" CARDIAC CATH LAB     No Known Allergies   Family History   Problem Relation Age of Onset    Cancer Mother     Cancer Maternal Aunt       Social History     Socioeconomic History    Marital status: SINGLE     Spouse name: Not on file    Number of children: Not on file    Years of education: Not on file    Highest education level: Not on file   Occupational History    Not on file   Social Needs    Financial resource strain: Not on file    Food insecurity:     Worry: Not on file     Inability: Not on file    Transportation needs:     Medical: Not on file     Non-medical: Not on file   Tobacco Use    Smoking status: Former Smoker     Packs/day: 0.25     Years: 50.00     Pack years: 12.50     Types: Cigarettes     Last attempt to quit: 8/3/2019     Years since quittin.0    Smokeless tobacco: Never Used   Substance and Sexual Activity    Alcohol use: Not Currently     Alcohol/week: 28.0 standard drinks     Types: 28 Cans of beer per week     Comment: None now    Drug use: No    Sexual activity: Never   Lifestyle    Physical activity:     Days per week: Not on file     Minutes per session: Not on file    Stress: Not on file   Relationships    Social connections:     Talks on phone: Not on file     Gets together: Not on file     Attends Latter-day service: Not on file     Active member of club or organization: Not on file     Attends meetings of clubs or organizations: Not on file     Relationship status: Not on file    Intimate partner violence:     Fear of current or ex partner: Not on file     Emotionally abused: Not on file     Physically abused: Not on file     Forced sexual activity: Not on file   Other Topics Concern    Not on file   Social History Narrative    Not on file      Current Outpatient Medications   Medication Sig    gabapentin (NEURONTIN) 600 mg tablet Take 1 Tab by mouth daily.  clonazePAM (KLONOPIN) 1 mg tablet Take 0.5 Tabs by mouth two (2) times a day. Max Daily Amount: 1 mg.  clindamycin (CLEOCIN) 300 mg capsule Take 1 Cap by mouth three (3) times daily for 10 days.  busPIRone (BUSPAR) 10 mg tablet Take 1 Tab by mouth three (3) times daily. Indications: Repeated Episodes of Anxiety    COMBIVENT RESPIMAT  mcg/actuation inhaler     DULoxetine (CYMBALTA) 60 mg capsule TAKE 1 CAPSULE BY MOUTH DAILY    tamsulosin (FLOMAX) 0.4 mg capsule Take 1 Cap by mouth daily.  donepezil (ARICEPT) 10 mg tablet Take 1 Tab by mouth nightly.  naproxen (NAPROSYN) 500 mg tablet Take 1 Tab by mouth daily. (Patient taking differently: Take 500 mg by mouth daily as needed.)    ipratropium (ATROVENT) 42 mcg (0.06 %) nasal spray 1 [place] by Both Nostrils route four (4) times daily. (Patient taking differently: 1 Spray by Both Nostrils route four (4) times daily as needed.)    finasteride (PROSCAR) 5 mg tablet TAKE 1 TABLET BY MOUTH DAILY(PROSTATE)    guaiFENesin ER (MUCINEX) 600 mg ER tablet Take 1 Tab by mouth every twelve (12) hours. (Patient taking differently: Take 600 mg by mouth every twelve (12) hours. As needed)    cyanocobalamin, vitamin B-12, (VITAMIN B-12 PO) Take 1 Tab by mouth daily.  aspirin delayed-release 325 mg tablet Take 325 mg by mouth every six (6) hours as needed.     pregabalin (LYRICA) 25 mg capsule Take 25 mg by mouth. No current facility-administered medications for this visit. Review of Symptoms:  11 systems reviewed, negative other than as stated in the HPI    Physical ExamPhysical Exam:    Vitals:    09/03/19 1359   BP: 110/72   Pulse: 61   Resp: 18   SpO2: 96%   Weight: 182 lb 4.8 oz (82.7 kg)   Height: 5' 11\" (1.803 m)     Body mass index is 25.43 kg/m². General PE  Gen:  NAD  Mental Status - Alert. General Appearance - Not in acute distress. HEENT:  PERRL, no carotid bruits or JVD  Chest and Lung Exam   Inspection: Accessory muscles - No use of accessory muscles in breathing. Auscultation:   Breath sounds: - Normal.   Cardiovascular   Inspection: Jugular vein - Bilateral - Inspection Normal.   Palpation/Percussion:   Apical Impulse: - Normal.   Auscultation: Rhythm - Regular. Heart Sounds - S1 WNL and S2 WNL. No S3 or S4. Murmurs & Other Heart Sounds: Auscultation of the heart reveals - No Murmurs. Peripheral Vascular   Upper Extremity: Inspection - Bilateral - No Cyanotic nailbeds or Digital clubbing. Lower Extremity:   Palpation: Edema - Bilateral - No edema. Abdomen:   Soft, non-tender, bowel sounds are active.   Neuro: A&O times 3, CN and motor grossly WNL    Labs:   No results found for: CHOL, CHOLX, CHLST, CHOLV, 311302, HDL, LDL, LDLC, DLDLP, [facility], HD, [facility]  Lab Results   Component Value Date/Time     11/10/2018 04:45 AM     Lab Results   Component Value Date/Time    Sodium 139 07/02/2019 06:50 PM    Potassium 4.1 07/02/2019 06:50 PM    Chloride 101 07/02/2019 06:50 PM    CO2 29 07/02/2019 06:50 PM    Anion gap 9 07/02/2019 06:50 PM    Glucose 101 (H) 07/02/2019 06:50 PM    BUN 18 07/02/2019 06:50 PM    Creatinine 1.12 07/02/2019 06:50 PM    BUN/Creatinine ratio 16 07/02/2019 06:50 PM    GFR est AA >60 07/02/2019 06:50 PM    GFR est non-AA >60 07/02/2019 06:50 PM    Calcium 8.4 (L) 07/02/2019 06:50 PM    Bilirubin, total 0.5 11/22/2018 07:12 AM    AST (SGOT) 12 (L) 11/22/2018 07:12 AM    Alk. phosphatase 83 11/22/2018 07:12 AM    Protein, total 6.9 11/22/2018 07:12 AM    Albumin 3.5 11/22/2018 07:12 AM    Globulin 3.4 11/22/2018 07:12 AM    A-G Ratio 1.0 (L) 11/22/2018 07:12 AM    ALT (SGPT) 10 (L) 11/22/2018 07:12 AM       EKG:       Assessment:     Assessment:      1. PAD (peripheral artery disease) (HCC)    2. Claudication ([address]    3. Cardiac pacemaker in situ        Orders Placed This Encounter    pregabalin (LYRICA) 25 mg capsule     Sig: Take 25 mg by mouth. Plan: This is a 69 y.o. male with pmhx PTSD SSS post PM, PAD COPD previous smoker. Has progressive claudication, PCP ordered LE dopplers which was consistent with PAD. He was a previous smoker 1 PPD x 40 yrs, recently quit. Unknown lipid panel. Per patient, sometimes he is unable to walk at all because of the leg pain. Other times he starts experiencing the pain soon as he stand. Hx of back surgery and bilateral hip surgery    1. PAD: 50-75% stenosis left SFA in 8/19. Symptomatic. Start asa. Will get baseline lipids. 2. BP controlled. Normal EF per [facility] in 11/18  3. SSS post PM 7/19: Followed by EP  4. Will obtain baseline lipids  5. Recently quit smoking, congratulated. On inhalers for COPD    Continue current care and f/u post procedure    Carla Zambrano NP       Patient seen and examined by me with nurse practitioner in vascular clinic. I personally performed all components of the history, physical, and medical decision making and agree with the assessment and plan as noted. Clinically normal distal pulses, normal LE hair growth and skin exam. Symptoms most likely due to previous ortho issues. However needs to rule out significant PAD, due to reported LE arterial doppler findings. Check CTA with LE run off to assess for PAD. D/w pt and family.      Inocencia Rutledge MD

## 2019-09-03 NOTE — TELEPHONE ENCOUNTER
Message   Received: Today   Message Contents   Dieter He., NP  Noy Eric, LPN   Caller: Unspecified (Today,  3:38 PM)             Because I canceled it prior to him leaving the building but I sent the rx        Noted, thanks.

## 2019-09-03 NOTE — TELEPHONE ENCOUNTER
----- Message from Kena Lemos NP sent at 9/3/2019  3:34 PM EDT -----  Pls call pharmacy and cancel atorvastatin. Thanks      I do not see where the atorvastatin was sent. I see ASA was advised daily.

## 2019-09-03 NOTE — PROGRESS NOTES
1. Have you been to the ER, urgent care clinic since your last visit? Hospitalized since your last visit? No.    2. Have you seen or consulted any other health care providers outside of the [address] [place] [address] since your last visit? Include any pap smears or colon screening. No.        Chief Complaint   Patient presents with    Results     here to discuss le arterial doppler results- both legs have pain when walking     Needs written script for medications.

## 2019-09-13 ENCOUNTER — TELEPHONE (OUTPATIENT)
Dept: CARDIOLOGY CLINIC | Age: 74
End: 2019-09-13

## 2019-09-13 NOTE — TELEPHONE ENCOUNTER
Called, spoke to pt's wife, two identifiers verified. States pt is having some unusual symptoms. Slurred speech, headache, can't form thoughts, gait disturbance. Advised pt wife to call 911 for emergency transport to the hospital, pt has several signs of possible stroke. Pt's wife verbalized understanding of information discussed w/ no further questions at this time.        Alex Dyer RN

## 2019-09-29 ENCOUNTER — HOSPITAL ENCOUNTER (INPATIENT)
Age: 74
LOS: 12 days | Discharge: HOME HEALTH | DRG: 057 | End: 2019-10-11
Attending: EMERGENCY MEDICINE | Admitting: GENERAL ACUTE CARE HOSPITAL
Payer: MEDICARE

## 2019-09-29 DIAGNOSIS — I95.1 ORTHOSTATIC HYPOTENSION: Primary | ICD-10-CM

## 2019-09-29 DIAGNOSIS — R42 DIZZINESS: ICD-10-CM

## 2019-09-29 DIAGNOSIS — G23.2 MULTIPLE SYSTEM ATROPHY-P (HCC): ICD-10-CM

## 2019-09-29 PROBLEM — R55 SYNCOPE: Status: ACTIVE | Noted: 2019-09-29

## 2019-09-29 PROCEDURE — 99285 EMERGENCY DEPT VISIT HI MDM: CPT

## 2019-09-29 PROCEDURE — 65660000000 HC RM CCU STEPDOWN

## 2019-09-29 RX ORDER — SODIUM CHLORIDE 0.9 % (FLUSH) 0.9 %
5-40 SYRINGE (ML) INJECTION AS NEEDED
Status: DISCONTINUED | OUTPATIENT
Start: 2019-09-29 | End: 2019-10-11 | Stop reason: HOSPADM

## 2019-09-29 RX ORDER — CLONAZEPAM 0.5 MG/1
0.5 TABLET ORAL 2 TIMES DAILY
Status: DISCONTINUED | OUTPATIENT
Start: 2019-09-30 | End: 2019-10-11 | Stop reason: HOSPADM

## 2019-09-29 RX ORDER — FINASTERIDE 5 MG/1
5 TABLET, FILM COATED ORAL DAILY
Status: DISCONTINUED | OUTPATIENT
Start: 2019-09-30 | End: 2019-10-11

## 2019-09-29 RX ORDER — DONEPEZIL HYDROCHLORIDE 5 MG/1
10 TABLET, FILM COATED ORAL
Status: DISCONTINUED | OUTPATIENT
Start: 2019-09-29 | End: 2019-10-01

## 2019-09-29 RX ORDER — SODIUM CHLORIDE 0.9 % (FLUSH) 0.9 %
5-40 SYRINGE (ML) INJECTION EVERY 8 HOURS
Status: DISCONTINUED | OUTPATIENT
Start: 2019-09-29 | End: 2019-10-11 | Stop reason: HOSPADM

## 2019-09-29 RX ORDER — IPRATROPIUM BROMIDE AND ALBUTEROL SULFATE 2.5; .5 MG/3ML; MG/3ML
3 SOLUTION RESPIRATORY (INHALATION)
Status: DISCONTINUED | OUTPATIENT
Start: 2019-09-30 | End: 2019-10-11 | Stop reason: HOSPADM

## 2019-09-29 RX ORDER — GUAIFENESIN 100 MG/5ML
81 LIQUID (ML) ORAL DAILY
Status: DISCONTINUED | OUTPATIENT
Start: 2019-09-30 | End: 2019-10-11 | Stop reason: HOSPADM

## 2019-09-29 RX ORDER — ACETAMINOPHEN 325 MG/1
650 TABLET ORAL
Status: DISCONTINUED | OUTPATIENT
Start: 2019-09-29 | End: 2019-10-11 | Stop reason: HOSPADM

## 2019-09-29 RX ORDER — DULOXETIN HYDROCHLORIDE 30 MG/1
60 CAPSULE, DELAYED RELEASE ORAL DAILY
Status: DISCONTINUED | OUTPATIENT
Start: 2019-09-30 | End: 2019-10-11 | Stop reason: HOSPADM

## 2019-09-29 RX ORDER — GABAPENTIN 300 MG/1
600 CAPSULE ORAL DAILY
Status: DISCONTINUED | OUTPATIENT
Start: 2019-09-30 | End: 2019-10-11 | Stop reason: HOSPADM

## 2019-09-29 RX ORDER — BUSPIRONE HYDROCHLORIDE 10 MG/1
10 TABLET ORAL 3 TIMES DAILY
Status: DISCONTINUED | OUTPATIENT
Start: 2019-09-29 | End: 2019-10-11 | Stop reason: HOSPADM

## 2019-09-29 RX ORDER — ENOXAPARIN SODIUM 100 MG/ML
40 INJECTION SUBCUTANEOUS DAILY
Status: DISCONTINUED | OUTPATIENT
Start: 2019-09-30 | End: 2019-10-11 | Stop reason: HOSPADM

## 2019-09-29 NOTE — ED TRIAGE NOTES
Pt sent from [address], [address] [facility] ed kfor c/o syncopal episode earlier today. Per ems pt's wife found him in garage earlier today and was having trouble following commands. Per [facility] pt had new pacemaker put in approx 3 months ago. Pt states he has been feeling weak since. Pt also c/o \"burning sensation behind eyes when reading and watching television\". Per [facility] pt was orthostatic at [address], [address] [facility]. Pt arrives to ed alert and oriented x4.

## 2019-09-30 ENCOUNTER — APPOINTMENT (OUTPATIENT)
Dept: VASCULAR SURGERY | Age: 74
DRG: 057 | End: 2019-09-30
Attending: INTERNAL MEDICINE
Payer: MEDICARE

## 2019-09-30 ENCOUNTER — APPOINTMENT (OUTPATIENT)
Dept: NON INVASIVE DIAGNOSTICS | Age: 74
DRG: 057 | End: 2019-09-30
Attending: INTERNAL MEDICINE
Payer: MEDICARE

## 2019-09-30 PROBLEM — I65.23 BILATERAL CAROTID ARTERY STENOSIS: Status: ACTIVE | Noted: 2019-09-30

## 2019-09-30 LAB
ECHO AO ROOT DIAM: 3.44 CM
ECHO AV AREA PEAK VELOCITY: 3 CM2
ECHO AV AREA/BSA PEAK VELOCITY: 1.5 CM2/M2
ECHO AV PEAK GRADIENT: 7.6 MMHG
ECHO AV PEAK VELOCITY: 138.11 CM/S
ECHO EST RA PRESSURE: 10 MMHG
ECHO LA AREA 4C: 18.4 CM2
ECHO LA MAJOR AXIS: 4.48 CM
ECHO LA TO AORTIC ROOT RATIO: 1.3
ECHO LA VOL 4C: 50.85 ML (ref 18–58)
ECHO LA VOLUME INDEX A4C: 25.14 ML/M2 (ref 16–28)
ECHO LV E' LATERAL VELOCITY: 7.37 CM/S
ECHO LV E' SEPTAL VELOCITY: 4.9 CM/S
ECHO LV INTERNAL DIMENSION DIASTOLIC: 4.37 CM (ref 4.2–5.9)
ECHO LV INTERNAL DIMENSION SYSTOLIC: 3.91 CM
ECHO LV IVSD: 1.35 CM (ref 0.6–1)
ECHO LV MASS 2D: 247.3 G (ref 88–224)
ECHO LV MASS INDEX 2D: 122.3 G/M2 (ref 49–115)
ECHO LV POSTERIOR WALL DIASTOLIC: 1.22 CM (ref 0.6–1)
ECHO LV POSTERIOR WALL SYSTOLIC: 1.04 CM
ECHO LVOT DIAM: 2.06 CM
ECHO LVOT PEAK GRADIENT: 6 MMHG
ECHO LVOT PEAK VELOCITY: 122.71 CM/S
ECHO LVOT SV: 101.3 ML
ECHO LVOT VTI: 30.42 CM
ECHO MV A VELOCITY: 117.09 CM/S
ECHO MV AREA VTI: 2.1 CM2
ECHO MV E DECELERATION TIME (DT): 245.4 MS
ECHO MV E VELOCITY: 83.44 CM/S
ECHO MV E/A RATIO: 0.71
ECHO MV E/E' LATERAL: 11.32
ECHO MV E/E' RATIO (AVERAGED): 14.18
ECHO MV E/E' SEPTAL: 17.03
ECHO MV MAX VELOCITY: 148.21 CM/S
ECHO MV MEAN GRADIENT: 2.6 MMHG
ECHO MV PEAK GRADIENT: 8.8 MMHG
ECHO MV REGURGITANT PEAK GRADIENT: 5.8 MMHG
ECHO MV REGURGITANT PEAK VELOCITY: 120.83 CM/S
ECHO MV VTI: 47.33 CM
ECHO PULMONARY ARTERY SYSTOLIC PRESSURE (PASP): 40.3 MMHG
ECHO RA AREA 4C: 12.89 CM2
ECHO RIGHT VENTRICULAR SYSTOLIC PRESSURE (RVSP): 40.3 MMHG
ECHO TV REGURGITANT MAX VELOCITY: 275.37 CM/S
ECHO TV REGURGITANT PEAK GRADIENT: 30.3 MMHG
LEFT CCA DIST DIAS: 16 CM/S
LEFT CCA DIST SYS: 80.6 CM/S
LEFT CCA PROX DIAS: 12.8 CM/S
LEFT CCA PROX SYS: 77.4 CM/S
LEFT ECA DIAS: 5.67 CM/S
LEFT ECA SYS: 58.9 CM/S
LEFT ICA DIST DIAS: 16.7 CM/S
LEFT ICA DIST SYS: 46.5 CM/S
LEFT ICA MID DIAS: 13.3 CM/S
LEFT ICA MID SYS: 44.9 CM/S
LEFT ICA PROX DIAS: 6 CM/S
LEFT ICA PROX SYS: 32.2 CM/S
LEFT ICA/CCA SYS: 0.58
LEFT SUBCLAVIAN DIAS: 0 CM/S
LEFT SUBCLAVIAN SYS: 108.8 CM/S
LEFT VERTEBRAL DIAS: 10.33 CM/S
LEFT VERTEBRAL SYS: 42.4 CM/S
RIGHT CCA DIST DIAS: 12.8 CM/S
RIGHT CCA DIST SYS: 59.4 CM/S
RIGHT CCA PROX DIAS: 12.1 CM/S
RIGHT CCA PROX SYS: 62.7 CM/S
RIGHT ECA DIAS: 4.76 CM/S
RIGHT ECA SYS: 52.4 CM/S
RIGHT ICA DIST DIAS: 15.8 CM/S
RIGHT ICA DIST SYS: 49.9 CM/S
RIGHT ICA MID DIAS: 13.3 CM/S
RIGHT ICA MID SYS: 56.5 CM/S
RIGHT ICA PROX DIAS: 10 CM/S
RIGHT ICA PROX SYS: 49.3 CM/S
RIGHT ICA/CCA SYS: 1
RIGHT SUBCLAVIAN DIAS: 2.67 CM/S
RIGHT SUBCLAVIAN SYS: 89.9 CM/S
RIGHT VERTEBRAL DIAS: 11.58 CM/S
RIGHT VERTEBRAL SYS: 34.1 CM/S

## 2019-09-30 PROCEDURE — 65660000000 HC RM CCU STEPDOWN

## 2019-09-30 PROCEDURE — 93880 EXTRACRANIAL BILAT STUDY: CPT

## 2019-09-30 PROCEDURE — 74011250637 HC RX REV CODE- 250/637: Performed by: INTERNAL MEDICINE

## 2019-09-30 PROCEDURE — 93306 TTE W/DOPPLER COMPLETE: CPT

## 2019-09-30 PROCEDURE — 97165 OT EVAL LOW COMPLEX 30 MIN: CPT

## 2019-09-30 PROCEDURE — 74011250636 HC RX REV CODE- 250/636: Performed by: GENERAL ACUTE CARE HOSPITAL

## 2019-09-30 PROCEDURE — 97116 GAIT TRAINING THERAPY: CPT

## 2019-09-30 PROCEDURE — 97535 SELF CARE MNGMENT TRAINING: CPT

## 2019-09-30 PROCEDURE — 74011250637 HC RX REV CODE- 250/637: Performed by: GENERAL ACUTE CARE HOSPITAL

## 2019-09-30 PROCEDURE — 97161 PT EVAL LOW COMPLEX 20 MIN: CPT

## 2019-09-30 RX ORDER — MIDODRINE HYDROCHLORIDE 5 MG/1
5 TABLET ORAL
Status: DISCONTINUED | OUTPATIENT
Start: 2019-09-30 | End: 2019-10-02

## 2019-09-30 RX ADMIN — Medication 10 ML: at 00:15

## 2019-09-30 RX ADMIN — Medication 10 ML: at 21:36

## 2019-09-30 RX ADMIN — BUSPIRONE HYDROCHLORIDE 10 MG: 10 TABLET ORAL at 16:15

## 2019-09-30 RX ADMIN — BUSPIRONE HYDROCHLORIDE 10 MG: 10 TABLET ORAL at 00:15

## 2019-09-30 RX ADMIN — CLONAZEPAM 0.5 MG: 0.5 TABLET ORAL at 09:03

## 2019-09-30 RX ADMIN — MIDODRINE HYDROCHLORIDE 5 MG: 5 TABLET ORAL at 17:25

## 2019-09-30 RX ADMIN — GABAPENTIN 600 MG: 300 CAPSULE ORAL at 09:04

## 2019-09-30 RX ADMIN — ENOXAPARIN SODIUM 40 MG: 40 INJECTION SUBCUTANEOUS at 09:06

## 2019-09-30 RX ADMIN — Medication 10 ML: at 09:03

## 2019-09-30 RX ADMIN — ASPIRIN 81 MG CHEWABLE TABLET 81 MG: 81 TABLET CHEWABLE at 09:04

## 2019-09-30 RX ADMIN — BUSPIRONE HYDROCHLORIDE 10 MG: 10 TABLET ORAL at 09:04

## 2019-09-30 RX ADMIN — DULOXETINE HYDROCHLORIDE 60 MG: 30 CAPSULE, DELAYED RELEASE ORAL at 09:04

## 2019-09-30 RX ADMIN — CLONAZEPAM 0.5 MG: 0.5 TABLET ORAL at 17:25

## 2019-09-30 RX ADMIN — DONEPEZIL HYDROCHLORIDE 10 MG: 5 TABLET, FILM COATED ORAL at 21:33

## 2019-09-30 RX ADMIN — BUSPIRONE HYDROCHLORIDE 10 MG: 10 TABLET ORAL at 21:34

## 2019-09-30 RX ADMIN — DONEPEZIL HYDROCHLORIDE 10 MG: 5 TABLET, FILM COATED ORAL at 00:15

## 2019-09-30 RX ADMIN — FINASTERIDE 5 MG: 5 TABLET, FILM COATED ORAL at 09:04

## 2019-09-30 RX ADMIN — Medication 10 ML: at 13:52

## 2019-09-30 NOTE — PROGRESS NOTES
[address] INTERDISCIPLINARY ROUNDS    Cardiopulmonary Care Interdisciplinary Rounds were held today to discuss patient's plan of care and outcomes. The following members were present: NP/Physician, Pharmacy, Nursing and Case Management.   Expected Length of Stay:  - - -    PLAN OF CARE:   Continue current treatment plan

## 2019-09-30 NOTE — PROGRESS NOTES
PHYSICAL THERAPY EVALUATION/DISCHARGE  Patient: Wilman Hernandez (61 y.o. male)  Date: 2019  Primary Diagnosis: Syncope [R55]       Precautions:          ASSESSMENT  Based on the objective data described below, the patient presents with orthostatic hypotension however good strength, intact balance, and overall baseline independent functional mobility. Vital signs assessed throughout with pt + for orthostatic hypotension (documented below) however denied complaints of dizziness/lightheadedness. Gait steady and stable overall with no LOB/balance deficits noted. Pt with altered gait pattern secondary to hx of b/l CAITY. Pt reports using rollator walker at night time only to assist with safety while ambulating to bathroom in dark however states rollator \"shoots out too fast\" therefore recommend RW to improve gait stability and overall safety, especially given hx of orthostatic hypotension/snycope. Given + orthostatics this date, recommend pt continue to have supervision of nursing staff during all OOB mobility/ambulation. Supine: 171/80  Sittin/71  Standin/58  Post activity, standin/60    Functional Outcome Measure: The patient scored 75/100 on the Barthel Index outcome measure which is indicative of mild impairments in ADLs and functional mobility. Other factors to consider for discharge: hx of syncope     Further skilled acute physical therapy is not indicated at this time. PLAN :  Recommendation for discharge: (in order for the patient to meet his/her long term goals)  No skilled physical therapy/ follow up rehabilitation needs identified at this time. This discharge recommendation:  Has not yet been discussed the attending provider and/or case management    Equipment recommendations for successful discharge: pt may benefit from rolling walker       SUBJECTIVE:   Patient stated I'm not as active as I used to be.     OBJECTIVE DATA SUMMARY:   HISTORY:    Past Medical History:   Diagnosis Date    Anxiety     Back pain     Blurred vision     Chest pain     Depression     Dizziness     Fast heart beat     Frequent headaches     Frequent urination     Hip dysplasia, congenital     Joint pain     Joint swelling     Lumbar spinal stenosis     Muscle pain     Neuropathy     Pacemaker     new pacemaker July 2019    Recent change in weight     Sinus problem     Skin disease     SOB (shortness of breath)     Sore throat     SSS (sick sinus syndrome) (HCC)     Stiffness of joints, multiple sites     Stress     Syncope     Tiredness     Trouble in sleeping     Weakness      Past Surgical History:   Procedure Laterality Date    CARDIAC SURG PROCEDURE UNLIST      HX HIP REPLACEMENT Bilateral 1993    HX PACEMAKER PLACEMENT  10/16/2018    HX ROTATOR CUFF REPAIR Right 11/16/2017    [place] INS NEW/RPLCMT PRM PM W/TRANSV ELTRD ATRIAL&VENT N/A 7/30/2019    INSERT PPM DUAL performed by Maurice An MD at [facility] CARDIAC CATH LAB       Prior level of function: independent w/ ambulation, use of rollator walker at night time only, hx of multiple falls/syncopal episodes, reporting 6 over previous 6 months. Still driving. Lives w/ wife who assists as needed  Personal factors and/or comorbidities impacting plan of care:     Home Situation  Home Environment: Private residence  # Steps to Enter: 0  Wheelchair Ramp: Yes  One/Two Story Residence: One story  Living Alone: No  Support Systems: Friends \ neighbors  Patient Expects to be Discharged to[de-identified] Private residence  Current DME Used/Available at Home: Pa Shahzad, rollator, Grab bars  Tub or Shower Type: Shower    EXAMINATION/PRESENTATION/DECISION MAKING:   Critical Behavior:  Neurologic State: Alert, Appropriate for age  Orientation Level: Oriented X4  Cognition: Appropriate decision making, Appropriate for age attention/concentration, Appropriate safety awareness, Follows commands, Memory loss     Hearing:   Auditory  Auditory Impairment: None  Skin: intact  Edema: none noted   Range Of Motion:  AROM: Within functional limits                       Strength:    Strength: Within functional limits                    Tone & Sensation:   Tone: Normal              Sensation: Intact               Coordination:  Coordination: Within functional limits  Vision:      Functional Mobility:  Bed Mobility:  Rolling: Independent  Supine to Sit: Independent     Scooting: Independent  Transfers:  Sit to Stand: Independent  Stand to Sit: Independent        Bed to Chair: Supervision              Balance:   Sitting: Intact  Standing: Impaired  Standing - Static: Good  Standing - Dynamic : Good  Ambulation/Gait Training:  Distance (ft): 150 Feet (ft)  Assistive Device: Gait belt  Ambulation - Level of Assistance: Supervision        Gait Abnormalities: Antalgic        Base of Support: Widened                     Functional Measure:  Barthel Index:    Bathin  Bladder: 10  Bowels: 10  Groomin  Dressing: 10  Feeding: 10  Mobility: 10  Stairs: 0  Toilet Use: 10  Transfer (Bed to Chair and Back): 10  Total: 75/100       The Barthel ADL Index: Guidelines  1. The index should be used as a record of what a patient does, not as a record of what a patient could do. 2. The main aim is to establish degree of independence from any help, physical or verbal, however minor and for whatever reason. 3. The need for supervision renders the patient not independent. 4. A patient's performance should be established using the best available evidence. Asking the patient, friends/relatives and nurses are the usual sources, but direct observation and common sense are also important. However direct testing is not needed. 5. Usually the patient's performance over the preceding 24-48 hours is important, but occasionally longer periods will be relevant. 6. Middle categories imply that the patient supplies over 50 per cent of the effort. 7. Use of aids to be independent is allowed.     Spike Marvin., Barthel, D.W. (1965). Functional evaluation: the Barthel Index. [facility] (14)2. MIGUELINA Gaona, Sarthak Francisco.Cherelle., Jeanine, [address] (1999). Measuring the change indisability after inpatient rehabilitation; comparison of the responsiveness of the Barthel Index and Functional [place] Measure. Journal of Neurology, Neurosurgery, and Psychiatry, 66(4), 537-538. CHERYL Rodríguez, LEONORA Gunderson, & Gaby Crandall M.A. (2004.) Assessment of post-stroke quality of life in cost-effectiveness studies: The usefulness of the Barthel Index and the EuroQoL-5D. Quality of Life Research, 15, 233-13          Physical Therapy Evaluation Charge Determination   History Examination Presentation Decision-Making   MEDIUM  Complexity : 1-2 comorbidities / personal factors will impact the outcome/ POC  MEDIUM Complexity : 3 Standardized tests and measures addressing body structure, function, activity limitation and / or participation in recreation  MEDIUM Complexity : Evolving with changing characteristics  MEDIUM Complexity : FOTO score of 26-74      Based on the above components, the patient evaluation is determined to be of the following complexity level: MEDIUM    Pain Rating:  Denied complaints of pain    Activity Tolerance:   signs and symptoms of orthostatic hypotension  Please refer to the flowsheet for vital signs taken during this treatment. After treatment patient left in no apparent distress:   Sitting in chair, Heels elevated for pressure relief, Call bell within reach and Caregiver / family present    COMMUNICATION/EDUCATION:   The patients plan of care was discussed with: Occupational Therapist and Registered Nurse. Fall prevention education was provided and the patient/caregiver indicated understanding., Patient/family have participated as able in goal setting and plan of care. and Patient/family agree to work toward stated goals and plan of care.     Thank you for this referral.  Maris Le, PT   Time Calculation: 24 mins

## 2019-09-30 NOTE — H&P
Hospitalist Admission Note    NAME: Randal Alvarez   :  1945   MRN:  [id number]     Date/Time:  2019 10:17 PM    Patient PCP: Susanne Villegas MD  ______________________________________________________________________  Given the patient's current clinical presentation, I have a high level of concern for decompensation if discharged from the emergency department. Complex decision making was performed, which includes reviewing the patient's available past medical records, laboratory results, and x-ray films. My assessment of this patient's clinical condition and my plan of care is as follows. Assessment / Plan:  Syncope, likely orthostatic  Hx of SSS, s/p PPM  BPH  -Admit to Telemetry  -SBP fell by 100 mmHg as per ER readings  -Cannot have MRI due to PPM  -Head CT: IMPRESSION: No acute intracranial abnormality.  -Neurology Consult  -Cardiology Consult - Dr. Betsy Howell was called by [facility] ER from which pt was initially transferred from due to concerns about his pacemaker - it has since been interrogated and no issues were noted  -Graded stockings  -PT/OT evals  -qAM orthostatic measures  -Will hold Tamsulosin     Anxiety Disorder  Neuropathy  -Continue rest of home meds    Code Status: DDNR  Surrogate Decision Maker: Wife  DVT Prophylaxis: Lovenox  GI Prophylaxis: not indicated  Baseline: Independent IADLs      Subjective:   CHIEF COMPLAINT: passed out    HISTORY OF PRESENT ILLNESS:     Katherine De La Rosa is a 69 y.o.  male who presents with CC listed above. Pt's wife at bedside who helps to provide much of the history. She states that the pt has been \"dizzy\" intermittently for essentially 1 year and has had multiple episodes where he has \"passed out. \" She states that he typically recovers in under a minute and does not recall the event. She denies noticing any jerking or stiff movements of his extremities.  Pt had another episode this morning upon returning from [place] and went to [address] [place] [address] ED. Pt was then transferred to [facility] ED where his pacemaker was interrogated and as per the ED MD, no issues with the pacemaker were found. Currently pt denies any complaints. He does endorse feeling \"weak. .. And dizzy\" when attempts to stand. He denies any CP, fever or chills. We were asked to admit for work up and evaluation of the above problems.      Past Medical History:   Diagnosis Date    Anxiety     Back pain     Blurred vision     Chest pain     Depression     Dizziness     Fast heart beat     Frequent headaches     Frequent urination     Hip dysplasia, congenital     Joint pain     Joint swelling     Lumbar spinal stenosis     Muscle pain     Neuropathy     Pacemaker     new pacemaker 2019    Recent change in weight     Sinus problem     Skin disease     SOB (shortness of breath)     Sore throat     SSS (sick sinus syndrome) (HCC)     Stiffness of joints, multiple sites     Stress     Syncope     Tiredness     Trouble in sleeping     Weakness         Past Surgical History:   Procedure Laterality Date    CARDIAC SURG PROCEDURE UNLIST      HX HIP REPLACEMENT Bilateral     HX PACEMAKER PLACEMENT  10/16/2018    HX ROTATOR CUFF REPAIR Right 2017    [place] INS NEW/RPLCMT PRM PM W/TRANSV ELTRD ATRIAL&VENT N/A 2019    INSERT PPM DUAL performed by Lyssa Graham MD at \[facility]\"" CARDIAC CATH LAB       Social History     Tobacco Use    Smoking status: Former Smoker     Packs/day: 0.25     Years: 50.00     Pack years: 12.50     Types: Cigarettes     Last attempt to quit: 8/3/2019     Years since quittin.1    Smokeless tobacco: Never Used   Substance Use Topics    Alcohol use: Not Currently     Alcohol/week: 28.0 standard drinks     Types: 28 Cans of beer per week     Comment: None now        Family History   Problem Relation Age of Onset    Cancer Mother     Cancer Maternal Aunt      No Known Allergies     Prior to Admission medications Medication Sig Start Date End Date Taking? Authorizing Provider   naproxen (NAPROSYN) 500 mg tablet Take 1 Tab by mouth daily. 9/25/19   Peter Molina MD   busPIRone (BUSPAR) 10 mg tablet Take 1 Tab by mouth three (3) times daily. Indications: Repeated Episodes of Anxiety 9/18/19   Peter Molina MD   gabapentin (NEURONTIN) 600 mg tablet Take 1 Tab by mouth daily. 9/5/19   Peter Molina MD   clonazePAM Senaramonita Long) 1 mg tablet Take 0.5 Tabs by mouth two (2) times a day. Max Daily Amount: 1 mg. 9/5/19   Peter Molina MD   aspirin 81 mg chewable tablet Take 1 Tab by mouth daily. 9/3/19   Maximino Murphy, INDIRA   COMBIVENT RESPIMAT  mcg/actuation inhaler  6/14/19   Provider, Historical   DULoxetine (CYMBALTA) 60 mg capsule TAKE 1 CAPSULE BY MOUTH DAILY 5/22/19   Peter Molina MD   tamsulosin [facility]) 0.4 mg capsule Take 1 Cap by mouth daily. 5/2/19   Peter Molina MD   donepezil (ARICEPT) 10 mg tablet Take 1 Tab by mouth nightly. 2/12/19   Peter Molina MD   ipratropium (ATROVENT) 42 mcg (0.06 %) nasal spray 1 [place] by Both Nostrils route four (4) times daily. Patient taking differently: 1 Spray by Both Nostrils route four (4) times daily as needed. 2/1/19   Peter Molina MD   finasteride (PROSCAR) 5 mg tablet TAKE 1 TABLET BY MOUTH DAILY(PROSTATE) 2/1/19   Peter Molina MD   guaiFENesin ER (MUCINEX) 600 mg ER tablet Take 1 Tab by mouth every twelve (12) hours. Patient taking differently: Take 600 mg by mouth every twelve (12) hours. As needed 1/3/19   Jennifer Doyle, ANP   cyanocobalamin, vitamin B-12, (VITAMIN B-12 PO) Take 1 Tab by mouth daily. Provider, Historical       REVIEW OF SYSTEMS:     I am not able to complete the review of systems because:    The patient is intubated and sedated    The patient has altered mental status due to his acute medical problems    The patient has baseline aphasia from prior stroke(s)    The patient has baseline dementia and is not reliable historian    The patient is in acute medical distress and unable to provide information           Total of 12 systems reviewed as follows:       POSITIVE= underlined text  Negative = text not underlined  General:  fever, chills, sweats, generalized weakness, weight loss/gain,      loss of appetite   Eyes:    blurred vision, eye pain, loss of vision, double vision  ENT:    rhinorrhea, pharyngitis   Respiratory:   cough, sputum production, SOB, PATEL, wheezing, pleuritic pain   Cardiology:   chest pain, palpitations, orthopnea, PND, edema, syncope   Gastrointestinal:  abdominal pain , N/V, diarrhea, dysphagia, constipation, bleeding   Genitourinary:  frequency, urgency, dysuria, hematuria, incontinence   Muskuloskeletal :  arthralgia, myalgia, back pain  Hematology:  easy bruising, nose or gum bleeding, lymphadenopathy   Dermatological: rash, ulceration, pruritis, color change / jaundice  Endocrine:   hot flashes or polydipsia   Neurological:  headache, dizziness, confusion, focal weakness, paresthesia,     Speech difficulties, memory loss, gait difficulty  Psychological: Feelings of anxiety, depression, agitation    Objective:   VITALS:    Visit Vitals  BP 90/60   Pulse 65   Temp 97.5 °F (36.4 °C)   Resp 14   SpO2 98%       PHYSICAL EXAM:    General:    Alert, cooperative, no distress, appears stated age. HEENT: Atraumatic, anicteric sclerae, pink conjunctivae     No oral ulcers, mucosa moist, throat clear, dentition fair  Neck:  Supple, symmetrical,  thyroid: non tender  Lungs:   Clear to auscultation bilaterally. No Wheezing or Rhonchi. No rales. Chest wall:  No tenderness  No Accessory muscle use. PPM R chest  Heart:   Regular  rhythm,  No  murmur   No edema  Abdomen:   Soft, non-tender. Not distended. Bowel sounds normal  Extremities: No cyanosis. No clubbing,      Skin turgor normal, Capillary refill normal, Radial dial pulse 2+  Skin:     Not pale.   Not Jaundiced  No rashes   Psych:  Good insight. Not depressed. Not anxious or agitated. Neurologic: EOMs intact. No facial asymmetry. No aphasia or slurred speech. Symmetrical strength, Sensation grossly intact. Alert and oriented X 4.     _______________________________________________________________________  Care Plan discussed with:    Comments   Patient x    Family  x    RN x    Care Manager                    Consultant:      _______________________________________________________________________  Expected  Disposition:   Home with Family    HH/PT/OT/RN x   SNF/LTC    SUSAN    ________________________________________________________________________  TOTAL TIME:  54 Minutes    Critical Care Provided     Minutes non procedure based      Comments    x Reviewed previous records   >50% of visit spent in counseling and coordination of care  Discussion with patient and/or family and questions answered       ________________________________________________________________________  Signed: Latoya Dos Santos MD    Procedures: see electronic medical records for all procedures/Xrays and details which were not copied into this note but were reviewed prior to creation of Plan.     LAB DATA REVIEWED:    Recent Results (from the past 24 hour(s))   EKG, 12 LEAD, INITIAL    Collection Time: 09/29/19  1:01 PM   Result Value Ref Range    Ventricular Rate 64 BPM    Atrial Rate 64 BPM    P-R Interval 194 ms    QRS Duration 82 ms    Q-T Interval 408 ms    QTC Calculation (Bezet) 420 ms    Calculated P Axis 50 degrees    Calculated R Axis 36 degrees    Calculated T Axis 48 degrees    Diagnosis       Atrial-paced rhythm  Abnormal ECG  When compared with ECG of 13-SEP-2019 13:44,  No significant change was found     CBC WITH AUTOMATED DIFF    Collection Time: 09/29/19  1:18 PM   Result Value Ref Range    WBC 5.8 4.1 - 11.1 K/uL    RBC 3.88 (L) 4.10 - 5.70 M/uL    HGB 12.4 12.1 - 17.0 g/dL    HCT 36.8 36.6 - 50.3 %    MCV 94.8 80.0 - 99.0 FL    MCH 32.0 26.0 - 34.0 PG    MCHC 33.7 30.0 - 36.5 g/dL    RDW 12.7 11.5 - 14.5 %    PLATELET 416 (L) 536 - 400 K/uL    MPV 9.3 8.9 - 12.9 FL    NEUTROPHILS 64 32 - 75 %    LYMPHOCYTES 23 12 - 49 %    MONOCYTES 9 5 - 13 %    EOSINOPHILS 3 0 - 7 %    BASOPHILS 1 0 - 1 %    ABS. NEUTROPHILS 3.7 1.8 - 8.0 K/UL    ABS. LYMPHOCYTES 1.3 0.8 - 3.5 K/UL    ABS. MONOCYTES 0.5 0.0 - 1.0 K/UL    ABS. EOSINOPHILS 0.2 0.0 - 0.4 K/UL    ABS. BASOPHILS 0.1 0.0 - 0.1 K/UL   METABOLIC PANEL, COMPREHENSIVE    Collection Time: 09/29/19  1:18 PM   Result Value Ref Range    Sodium 141 136 - 145 mmol/L    Potassium 4.3 3.5 - 5.1 mmol/L    Chloride 106 97 - 108 mmol/L    CO2 26 21 - 32 mmol/L    Anion gap 9 5 - 15 mmol/L    Glucose 81 65 - 100 mg/dL    BUN 22 (H) 6 - 20 MG/DL    Creatinine 1.15 0.70 - 1.30 MG/DL    BUN/Creatinine ratio 19 12 - 20      GFR est AA >60 >60 ml/min/1.73m2    GFR est non-AA >60 >60 ml/min/1.73m2    Calcium 8.3 (L) 8.5 - 10.1 MG/DL    Bilirubin, total 0.7 0.2 - 1.0 MG/DL    ALT (SGPT) 24 12 - 78 U/L    AST (SGOT) 16 15 - 37 U/L    Alk. phosphatase 80 45 - 117 U/L    Protein, total 6.5 6.4 - 8.2 g/dL    Albumin 3.7 3.5 - 5.0 g/dL    Globulin 2.8 2.0 - 4.0 g/dL    A-G Ratio 1.3 1.1 - 2.2     TROPONIN I    Collection Time: 09/29/19  1:18 PM   Result Value Ref Range    Troponin-I, Qt. <0.05 <0.05 ng/mL   CK W/ CKMB & INDEX    Collection Time: 09/29/19  1:18 PM   Result Value Ref Range    CK 46 39 - 308 U/L    CK - MB <1.0 <3.6 NG/ML    CK-MB Index Cannot be calculated 0.0 - 2.5     SAMPLES BEING HELD    Collection Time: 09/29/19  1:18 PM   Result Value Ref Range    SAMPLES BEING HELD 1SST, 1BLUE     COMMENT        Add-on orders for these samples will be processed based on acceptable specimen integrity and analyte stability, which may vary by analyte.    URINALYSIS W/ REFLEX CULTURE    Collection Time: 09/29/19  2:33 PM   Result Value Ref Range    Color DARK YELLOW      Appearance CLEAR CLEAR      Specific gravity 1.010 1.003 - 1.030      pH (UA) 6.0 5.0 - 8.0      Protein NEGATIVE  NEG mg/dL    Glucose NEGATIVE  NEG mg/dL    Ketone NEGATIVE  NEG mg/dL    Bilirubin NEGATIVE  NEG      Blood NEGATIVE  NEG      Urobilinogen 0.2 0.2 - 1.0 EU/dL    Nitrites NEGATIVE  NEG      Leukocyte Esterase NEGATIVE  NEG      WBC 0-4 0 - 4 /hpf    RBC 0-5 0 - 5 /hpf    Epithelial cells FEW FEW /lpf    Bacteria NEGATIVE  NEG /hpf    UA:UC IF INDICATED CULTURE NOT INDICATED BY UA RESULT CNI

## 2019-09-30 NOTE — PROGRESS NOTES
TRANSFER - IN REPORT:    Verbal report received from Salud(name) on Griselda Moron  being received from ed(unit) for routine progression of care      Report consisted of patients Situation, Background, Assessment and   Recommendations(SBAR). Information from the following report(s) SBAR, Kardex, ED Summary and Cardiac Rhythm nsr was reviewed with the receiving nurse. Opportunity for questions and clarification was provided. Assessment completed upon patients arrival to unit and care assumed.

## 2019-09-30 NOTE — PROGRESS NOTES
Problem: Syncope  Goal: *Absence of injury  Outcome: Progressing Towards Goal  Goal: Decrease or eliminate episodes of syncope  Outcome: Progressing Towards Goal     Problem: Patient Education: Go to Patient Education Activity  Goal: Patient/Family Education  Outcome: Progressing Towards Goal     Problem: Falls - Risk of  Goal: *Absence of Falls  Description  Document Maria Luisa Gerardo Fall Risk and appropriate interventions in the flowsheet.   Outcome: Progressing Towards Goal  Note:   Fall Risk Interventions:  Mobility Interventions: Assess mobility with egress test, Bed/chair exit alarm, Communicate number of staff needed for ambulation/transfer, Mechanical lift, OT consult for ADLs, Patient to call before getting OOB, PT Consult for mobility concerns, PT Consult for assist device competence         Medication Interventions: Assess postural VS orthostatic hypotension, Bed/chair exit alarm, Evaluate medications/consider consulting pharmacy, Patient to call before getting OOB, Teach patient to arise slowly, Utilize gait belt for transfers/ambulation    Elimination Interventions: Bed/chair exit alarm, Call light in reach, Elevated toilet seat, Patient to call for help with toileting needs, Stay With Me (per policy), Toilet paper/wipes in reach, Toileting schedule/hourly rounds, Urinal in reach    History of Falls Interventions: Bed/chair exit alarm, Consult care management for discharge planning, Door open when patient unattended, Evaluate medications/consider consulting pharmacy, Investigate reason for fall, Room close to nurse's station, Utilize gait belt for transfer/ambulation, Assess for delayed presentation/identification of injury for 48 hrs (comment for end date)         Problem: Patient Education: Go to Patient Education Activity  Goal: Patient/Family Education  Outcome: Progressing Towards Goal

## 2019-09-30 NOTE — PROGRESS NOTES
Problem: Self Care Deficits Care Plan (Adult)  Goal: *Acute Goals and Plan of Care (Insert Text)  Description    FUNCTIONAL STATUS PRIOR TO ADMISSION: Patient was modified independent using a Rollator for functional mobility. HOME SUPPORT: The patient lived with family and needed assist from wife for ADLs and ILS. Occupational Therapy Goals  Initiated 9/30/2019  1. Patient will perform grooming with supervision/set-up within 7 day(s). 2.  Patient will perform bathing at the sink with supervision/set-up within 7 day(s). 3.  Patient will perform lower body dressing with minimal assistance/contact guard assist within 7 day(s). 4.  Patient will perform toilet transfers with modified independence within 7 day(s). 5.  Patient will perform all aspects of toileting with independence within 7 day(s). 6.  Patient will participate in upper extremity therapeutic exercise/activities with independence for 5 minutes within 7 day(s). 7.  Patient will utilize energy conservation techniques during functional activities with verbal cues within 7 day(s). Outcome: Progressing Towards Goal   OCCUPATIONAL THERAPY EVALUATION  Patient: Arash Shaw (50 y.o. male)  Date: 9/30/2019  Primary Diagnosis: Syncope [R55]        Precautions:        ASSESSMENT  Based on the objective data described below, the patient presents with generalized weakness, orthostatic, decreased balance, strength and ADLs. Pt was living with family and was needing rollator for walking, and was having falls. Pt was orthostatic, with standing and after walking but his balance was fair to good. Pt has functional range in BUE and strength is good. Pt is able to bathe and dress with setup and needs to stand only for lauren area.      Patient Vitals for the past 8 hrs:   Temp Pulse Resp BP SpO2   09/30/19 1045 97.6 °F (36.4 °C) 63 17 127/55 94 %   09/30/19 1023 -- 78 -- (!) 158/94 --   09/30/19 1021 -- 87 -- (!) 87/60 --   09/30/19 1018 -- -- -- 105/61 --   09/30/19 1015 -- -- -- 91/50 --   09/30/19 1014 -- -- -- 91/58 --   09/30/19 1011 -- 64 -- 126/71 --   09/30/19 1009 -- 60 -- 171/80 --   09/30/19 0737 97.3 °F (36.3 °C) 60 20 (!) 187/98 93 %         Current Level of Function Impacting Discharge (ADLs/self-care): pt has decreased endurance, strength, functional mobility, and orthostatic with standing. Functional Outcome Measure: The patient scored 75/100 on the Barthel outcome measure which is indicative of pt has min deficits at this time . His orthostatics is his limiting factor    Other factors to consider for discharge: pt is falling at home often and noted at eval that his BP continues to drop the longer that the pt is up and standing. Patient will benefit from skilled therapy intervention to address the above noted impairments. PLAN :  Recommendations and Planned Interventions: self care training, functional mobility training, therapeutic exercise, therapeutic activities, endurance activities, patient education, home safety training and family training/education    Frequency/Duration: Patient will be followed by occupational therapy 3 times a week to address goals. Recommendation for discharge: (in order for the patient to meet his/her long term goals)  No skilled occupational therapy/ follow up rehabilitation needs identified at this time. He may benefit from out pt PT after his medical status is resolved. This discharge recommendation:  Has been made in collaboration with the attending provider and/or case management    Equipment recommendations for successful discharge (if) home: none       SUBJECTIVE:   Patient stated I fall a lot at home.     OBJECTIVE DATA SUMMARY:   HISTORY:   Past Medical History:   Diagnosis Date    Anxiety     Back pain     Blurred vision     Chest pain     Depression     Dizziness     Fast heart beat     Frequent headaches     Frequent urination     Hip dysplasia, congenital     Joint pain Joint swelling     Lumbar spinal stenosis     Muscle pain     Neuropathy     Pacemaker     new pacemaker July 2019    Recent change in weight     Sinus problem     Skin disease     SOB (shortness of breath)     Sore throat     SSS (sick sinus syndrome) (HCC)     Stiffness of joints, multiple sites     Stress     Syncope     Tiredness     Trouble in sleeping     Weakness      Past Surgical History:   Procedure Laterality Date    CARDIAC SURG PROCEDURE UNLIST      HX HIP REPLACEMENT Bilateral 1993    HX PACEMAKER PLACEMENT  10/16/2018    HX ROTATOR CUFF REPAIR Right 11/16/2017    MO INS NEW/RPLCMT PRM PM W/TRANSV ELTRD ATRIAL&VENT N/A 7/30/2019    INSERT PPM DUAL performed by Merlin Adler, MD at [place] [address] LAB       Expanded or extensive additional review of patient history:     Home Situation  Home Environment: Private residence  # Steps to Enter: 0  Wheelchair Ramp: Yes  One/Two Story Residence: One story  Living Alone: No  Support Systems: Friends \ neighbors  Patient Expects to be Discharged to[de-identified] Private residence  Current DME Used/Available at Home: Rayetta Crest, rollator, Grab bars  Tub or Shower Type: Shower    Hand dominance: Right    EXAMINATION OF PERFORMANCE DEFICITS:  Cognitive/Behavioral Status:  Neurologic State: Alert  Orientation Level: Oriented X4  Cognition: Appropriate decision making  Perception: Appears intact  Perseveration: No perseveration noted  Safety/Judgement: Awareness of environment    Skin: in good health     Edema: none    Hearing: Auditory  Auditory Impairment: None    Vision/Perceptual:                 intact                    Range of Motion:    AROM: Within functional limits                         Strength:    Strength: Within functional limits                Coordination:  Coordination: Within functional limits  Fine Motor Skills-Upper: Left Intact; Right Intact    Gross Motor Skills-Upper: Left Intact; Right Intact    Tone & Sensation:    Tone: Normal  Sensation: Intact Balance:  Sitting: Intact  Standing: Impaired  Standing - Static: Good  Standing - Dynamic : Good    Functional Mobility and Transfers for ADLs:  Bed Mobility:  Rolling: Independent  Supine to Sit: Independent  Scooting: Independent    Transfers:  Sit to Stand: Independent  Stand to Sit: Independent  Bed to Chair: Supervision  Bathroom Mobility: Contact guard assistance;Stand-by assistance  Toilet Transfer : Contact guard assistance;Stand-by assistance    ADL Assessment:  Feeding: Independent    Oral Facial Hygiene/Grooming: Independent    Bathing: Maximum assistance;Minimum assistance    Upper Body Dressing: Independent    Lower Body Dressing: Moderate assistance;Minimum assistance    Toileting: Contact guard assistance;Stand by assistance                ADL Intervention and task modifications:                                     Cognitive Retraining  Safety/Judgement: Awareness of environment    Therapeutic Exercise:     Functional Measure:  Barthel Index:    Bathin  Bladder: 10  Bowels: 10  Groomin  Dressing: 10  Feeding: 10  Mobility: 10  Stairs: 0  Toilet Use: 10  Transfer (Bed to Chair and Back): 10  Total: 75/100        The Barthel ADL Index: Guidelines  1. The index should be used as a record of what a patient does, not as a record of what a patient could do. 2. The main aim is to establish degree of independence from any help, physical or verbal, however minor and for whatever reason. 3. The need for supervision renders the patient not independent. 4. A patient's performance should be established using the best available evidence. Asking the patient, friends/relatives and nurses are the usual sources, but direct observation and common sense are also important. However direct testing is not needed. 5. Usually the patient's performance over the preceding 24-48 hours is important, but occasionally longer periods will be relevant.   6. Middle categories imply that the patient supplies over 50 per cent of the effort. 7. Use of aids to be independent is allowed. Juwan Christian., Barthel, D.W. (1097). Functional evaluation: the Barthel Index. [address] (14)2. MIGUELINA Chase, Thelma Sparks, Debra Turner., Jeanine, [facility] (1999). Measuring the change indisability after inpatient rehabilitation; comparison of the responsiveness of the Barthel Index and Functional [place] Measure. Journal of Neurology, Neurosurgery, and Psychiatry, 66(4), 592-118. Deja Cochran, NLUIS.A, LEONORA Gunderson, & Milli Owens M.A. (2004.) Assessment of post-stroke quality of life in cost-effectiveness studies: The usefulness of the Barthel Index and the EuroQoL-5D. Quality of Life Research, 15, 653-68         Occupational Therapy Evaluation Charge Determination   History Examination Decision-Making   MEDIUM Complexity : Expanded review of history including physical, cognitive and psychosocial  history  LOW Complexity : 1-3 performance deficits relating to physical, cognitive , or psychosocial skils that result in activity limitations and / or participation restrictions  LOW Complexity : No comorbidities that affect functional and no verbal or physical assistance needed to complete eval tasks       Based on the above components, the patient evaluation is determined to be of the following complexity level: LOW   Pain Rating:      Activity Tolerance:   Fair  Please refer to the flowsheet for vital signs taken during this treatment. After treatment patient left in no apparent distress:    Sitting in chair, Call bell within reach, Bed / chair alarm activated and Caregiver / family present    COMMUNICATION/EDUCATION:   The patients plan of care was discussed with: Physical Therapist, Registered Nurse,  and family . Home safety education was provided and the patient/caregiver indicated understanding.  and Patient/family have participated as able in goal setting and plan of care.    This patients plan of care is appropriate for delegation to [facility].     Thank you for this referral.  Joaquin John OT  Time Calculation: 24 mins

## 2019-09-30 NOTE — PROGRESS NOTES
Problem: Syncope  Goal: *Absence of injury  Outcome: Progressing Towards Goal  Goal: Decrease or eliminate episodes of syncope  Outcome: Progressing Towards Goal     Problem: Patient Education: Go to Patient Education Activity  Goal: Patient/Family Education  Outcome: Progressing Towards Goal     Problem: Falls - Risk of  Goal: *Absence of Falls  Description  Document [place] Fall Risk and appropriate interventions in the flowsheet.   Outcome: Progressing Towards Goal  Note:   Fall Risk Interventions:  Mobility Interventions: Bed/chair exit alarm, Communicate number of staff needed for ambulation/transfer, OT consult for ADLs, Patient to call before getting OOB, PT Consult for mobility concerns, Strengthening exercises (ROM-active/passive), Utilize walker, cane, or other assistive device         Medication Interventions: Bed/chair exit alarm, Patient to call before getting OOB, Teach patient to arise slowly    Elimination Interventions: Bed/chair exit alarm, Call light in reach, Patient to call for help with toileting needs, Toileting schedule/hourly rounds, Urinal in reach    History of Falls Interventions: Bed/chair exit alarm, Consult care management for discharge planning, Door open when patient unattended         Problem: Patient Education: Go to Patient Education Activity  Goal: Patient/Family Education  Outcome: Progressing Towards Goal

## 2019-09-30 NOTE — PROGRESS NOTES

## 2019-09-30 NOTE — PROGRESS NOTES
Bedside shift change report GIVEN TO Marta Wan RN. Report included the following information SBAR, Kardex, MAR and Cardiac Rhythm paced. SIGNIFICANT CHANGES DURING SHIFT:        CONCERNS TO ADDRESS WITH MD:            [facility] NURSING NOTE   Admission Date 9/29/2019   Admission Diagnosis Syncope [R55]   Consults IP CONSULT TO CARDIOLOGY  IP CONSULT TO HOSPITALIST  IP CONSULT TO NEUROLOGY      Cardiac Monitoring [x] Yes [] No      Purposeful Hourly Rounding [x] Yes    Delores Score Total Score: 3   Delores score 3 or > [x] Bed Alarm [] Avasys [] 1:1 sitter [] Patient refused (Signed refusal form in chart)   Kane Score Kane Score: 19   Kane score 14 or < [] PMT consult [] Wound Care consult    []  Specialty bed  [] Nutrition consult      Influenza Vaccine Received Flu Vaccine for Current Season (usually Sept-March): Yes           Oxygen needs? [x] Room air Oxygen @  []1L    []2L    []3L   []4L    []5L   []6L via  NC   Chronic home O2 use? [] Yes [x] No  Perform O2 challenge test and document in progress note using smartphrase (.Homeoxygen)      Last bowel movement Last Bowel Movement Date: 09/29/19      Urinary Catheter             LDAs               Peripheral IV 09/29/19 Left Antecubital (Active)   Site Assessment Clean, dry, & intact 9/29/2019 11:59 PM   Phlebitis Assessment 0 9/29/2019 11:59 PM   Infiltration Assessment 0 9/29/2019 11:59 PM   Dressing Status Clean, dry, & intact 9/29/2019 11:59 PM   Dressing Type Transparent 9/29/2019 11:59 PM   Hub Color/Line Status Pink;Capped;Flushed 9/29/2019 11:59 PM   Alcohol Cap Used No 9/29/2019  2:30 PM                         Readmission Risk Assessment Tool Score Low Risk            12       Total Score        3 Has Seen PCP in Last 6 Months (Yes=3, No=0)    4 IP Visits Last 12 Months (1-3=4, 4=9, >4=11)    5 Pt. Coverage (Medicare=5 , Medicaid, or Self-Pay=4)        Criteria that do not apply:    . Living with Significant Other. Assisted Living. LTAC. SNF. or   Rehab    Patient Length of Stay (>5 days = 3)    Charlson Comorbidity Score (Age + Comorbid Conditions)       Expected Length of Stay - - -   Actual Length of Stay 1

## 2019-09-30 NOTE — ED NOTES
TRANSFER - OUT REPORT:    Verbal report given to Sg Lynch RN(name) on Devante Allen  being transferred to [facility](unit) for routine progression of care       Report consisted of patients Situation, Background, Assessment and   Recommendations(SBAR). Information from the following report(s) SBAR, Kardex and ED Summary was reviewed with the receiving nurse. Lines:       Opportunity for questions and clarification was provided.       Patient transported with:   [employer]

## 2019-09-30 NOTE — PROGRESS NOTES
0700: Bedside shift change report given to Ivonne Jackson (oncoming nurse) by Ping Green (offgoing nurse). Report included the following information SBAR, Kardex, Intake/Output, MAR, Recent Results and Cardiac Rhythm Paced. 0725: Compression stockings applied. 1400: Pt off unit to vascular lab.     1503: Pt returned from vascular lab.    1511: MRI form completed and faxed. 1900:   Bedside shift change report GIVEN TO Alisha/MANUEL Delacruz. Report included the following information SBAR, Kardex, Intake/Output, MAR, Recent Results and Cardiac Rhythm Paced. SIGNIFICANT CHANGES DURING SHIFT:  Uneventful shift. CONCERNS TO ADDRESS WITH MD:  None. [facility] NURSING NOTE   Admission Date 9/29/2019   Admission Diagnosis Syncope [R55]   Consults IP CONSULT TO CARDIOLOGY  IP CONSULT TO HOSPITALIST  IP CONSULT TO NEUROLOGY      Cardiac Monitoring [x] Yes [] No      Purposeful Hourly Rounding [x] Yes    Delores Score Total Score: 3   Delores score 3 or > [x] Bed Alarm [] Avasys [] 1:1 sitter [] Patient refused (Signed refusal form in chart)   Kane Score Kane Score: 19   Kane score 14 or < [] PMT consult [] Wound Care consult    []  Specialty bed  [] Nutrition consult      Influenza Vaccine Received Flu Vaccine for Current Season (usually Sept-March): Yes           Oxygen needs? [x] Room air Oxygen @  []1L    []2L    []3L   []4L    []5L   []6L via  NC   Chronic home O2 use?  [] Yes [] No  Perform O2 challenge test and document in progress note using smartphrase (.Homeoxygen)      Last bowel movement Last Bowel Movement Date: 09/30/19      Urinary Catheter             LDAs               Peripheral IV 09/29/19 Left Antecubital (Active)   Site Assessment Clean, dry, & intact 9/30/2019  3:31 PM   Phlebitis Assessment 0 9/30/2019  3:31 PM   Infiltration Assessment 0 9/30/2019  3:31 PM   Dressing Status Clean, dry, & intact 9/30/2019  3:31 PM   Dressing Type Tape;Transparent 9/30/2019  3:31 PM   Hub Color/Line Status Pink;Flushed 9/30/2019  3:31 PM   Alcohol Cap Used No 9/29/2019  2:30 PM                         Readmission Risk Assessment Tool Score Low Risk            12       Total Score        3 Has Seen PCP in Last 6 Months (Yes=3, No=0)    4 IP Visits Last 12 Months (1-3=4, 4=9, >4=11)    5 Pt. Coverage (Medicare=5 , Medicaid, or Self-Pay=4)        Criteria that do not apply:    . Living with Significant Other. Assisted Living. LTAC. SNF.  or   Rehab    Patient Length of Stay (>5 days = 3)    Charlson Comorbidity Score (Age + Comorbid Conditions)       Expected Length of Stay - - -   Actual Length of Stay 1

## 2019-09-30 NOTE — PROGRESS NOTES
Hospitalist Progress Note    NAME: Clement Ngo   :  1945   MRN:  [id number]       Assessment / Plan:  Syncope, likely orthostatic will check TSH and Cortisol levels and monitor in telemetry, PPM in good status as per Dr. Marylee Lauber, check Carotid duplex, will start low dose midodrine  Hx of SSS, s/p PPM: PPM is compatible with MRI and working fine as per Dr. Marylee Lauber  BPH  Anxiety Disorder  Neuropathy Continue rest of home meds  Confusion and Slurred Speech: will check MRI of brain and get Neurology evaluation. Overweight: BMI 25.27  Surrogate Decision Maker: Wife  Baseline: Independent IADLs  Code status: DNR  Prophylaxis: Lovenox  Recommended Disposition:  PT, OT, RN     Subjective:     Chief Complaint / Reason for Physician Visit  \"I feel dizzy and lightheaded\". Discussed with RN events overnight. Review of Systems:  Symptom Y/N Comments  Symptom Y/N Comments   Fever/Chills    Chest Pain     Poor Appetite    Edema     Cough    Abdominal Pain     Sputum    Joint Pain     SOB/PATEL    Pruritis/Rash     Nausea/vomit    Tolerating PT/OT     Diarrhea    Tolerating Diet y    Constipation    Other       Could NOT obtain due to:      Objective:     VITALS:   Last 24hrs VS reviewed since prior progress note.  Most recent are:  Patient Vitals for the past 24 hrs:   Temp Pulse Resp BP SpO2   19 1045 97.6 °F (36.4 °C) 63 17 127/55 94 %   19 1023 -- 78 -- (!) 158/94 --   19 1021 -- 87 -- (!) 87/60 --   19 1018 -- -- -- 105/61 --   19 1015 -- -- -- 91/50 --   19 1014 -- -- -- 91/58 --   19 1011 -- 64 -- 126/71 --   19 1009 -- 60 -- 171/80 --   19 0737 97.3 °F (36.3 °C) 60 20 (!) 187/98 93 %   19 2327 97.9 °F (36.6 °C) 61 20 157/85 96 %   19 2245 -- 64 15 166/83 --   19 -- 63 17 (!) 155/111 100 %   19 --  14 90/60 98 %   19 -- 65 20 (!) 122/92 96 %   19 -- 63 12 -- 98 %   19 -- 63 20 156/78 97 %   09/29/19 2040 -- 61 16 -- 97 %   09/29/19 2039 -- 60 17 178/81 97 %   09/29/19 2036 -- 61 18 178/81 --   09/29/19 2030 -- 60 12 (!) 169/92 97 %   09/29/19 2015 -- 61 16 183/77 99 %   09/29/19 2000 -- 62 15 168/74 98 %   09/29/19 1948 -- -- -- 174/77 97 %   09/29/19 1945 97.5 °F (36.4 °C) 60 16 174/77 97 %       Intake/Output Summary (Last 24 hours) at 9/30/2019 1400  Last data filed at 9/30/2019 1352  Gross per 24 hour   Intake 720 ml   Output 1400 ml   Net -680 ml        PHYSICAL EXAM:  General: WD, WN. Alert, cooperative, no acute distress    EENT:  EOMI. Anicteric sclerae. MMM  Resp:  Coarse BS  CV:  Regular  rhythm,  No edema  GI:  Soft, Non distended, Non tender.  +Bowel sounds  Neurologic:  Alert and oriented X 3, normal speech,   Psych:   Good insight. Not anxious nor agitated  Skin:  No rashes. No jaundice    Reviewed most current lab test results and cultures  YES  Reviewed most current radiology test results   YES  Review and summation of old records today    NO  Reviewed patient's current orders and MAR    YES  PMH/SH reviewed - no change compared to H&P  ________________________________________________________________________  Care Plan discussed with:    Comments   Patient y    Family  y wife   RN y    Care Manager     Consultant  y Cardiology and Neurology                     Multidiciplinary team rounds were held today with , nursing, pharmacist and clinical coordinator. Patient's plan of care was discussed; medications were reviewed and discharge planning was addressed.      ________________________________________________________________________  Total NON critical care TIME:  35   Minutes    Total CRITICAL CARE TIME Spent:   Minutes non procedure based      Comments   >50% of visit spent in counseling and coordination of care y    ________________________________________________________________________  Markell Khan MD     Procedures: see electronic medical records for all procedures/Xrays and details which were not copied into this note but were reviewed prior to creation of Plan. LABS:  I reviewed today's most current labs and imaging studies.   Pertinent labs include:  Recent Labs     09/29/19  1318   WBC 5.8   HGB 12.4   HCT 36.8   *     Recent Labs     09/29/19  1318      K 4.3      CO2 26   GLU 81   BUN 22*   CREA 1.15   CA 8.3*   ALB 3.7   TBILI 0.7   SGOT 16   ALT 24       Signed: Neo Doty MD

## 2019-09-30 NOTE — CONSULTS
[address] [place] [address], [place], [address] [facility]  [phone]        Date of  Admission: 9/29/2019  7:35 PM     Admission type:Emergency    Consult for: Syncope [R55]  Consult by: Dr Zenaida Radford NP     Subjective:     Griselda Moron is a 69 y.o. male admitted for Syncope [R55]. pt was transferred from [facility] to [facility] for syncope. Pt had syncopal episode yesterday morning upon returning from [place] and went to [address] [place] [address] ED. Pt was then transferred to [facility] ED where his pacemaker was interrogated and as per the ED MD, no issues with the pacemaker were found. Currently pt denies any complaints. He does endorse feeling \"weak. .. And dizzy\" when attempts to stand. He denies any CP, fever or chills.     Patient Active Problem List    Diagnosis Date Noted    Syncope 09/29/2019    Heart block 07/30/2019    Fever 11/09/2018    Idiopathic small and large fiber sensory neuropathy 11/09/2018    Ataxia 11/09/2018    Sensory ataxia 11/09/2018    Memory disturbance 11/09/2018    Altered mental status, unspecified 11/09/2018    Degenerative cervical spinal stenosis 11/09/2018    History of lumbar laminectomy 11/09/2018    Degenerative lumbar spinal stenosis 11/09/2018    Stumbling gait 11/08/2018    Hx of syncope 10/24/2018    Bradycardia 10/02/2018    Age-related macular degeneration, dry, both eyes 09/14/2018    Neuropathy 09/11/2018    Memory deficit 09/11/2018    BPH with obstruction/lower urinary tract symptoms 04/19/2018    Smoker 01/18/2018    S/P right rotator cuff repair 12/01/2017    S/P hip replacement, bilateral 10/18/2017    Ingrown left big toenail 03/31/2017    PTSD (post-traumatic stress disorder) 12/13/2016    Lumbar spinal stenosis 12/13/2016    Alcoholism in remission ([facility] [address] 12/13/2016    Risk for falls 12/13/2016      Vanessa Molina MD  Past Medical History:   Diagnosis Date    Anxiety     Back pain     Blurred vision     Chest pain     Depression  Dizziness     Fast heart beat     Frequent headaches     Frequent urination     Hip dysplasia, congenital     Joint pain     Joint swelling     Lumbar spinal stenosis     Muscle pain     Neuropathy     Pacemaker     new pacemaker 2019    Recent change in weight     Sinus problem     Skin disease     SOB (shortness of breath)     Sore throat     SSS (sick sinus syndrome) (HCC)     Stiffness of joints, multiple sites     Stress     Syncope     Tiredness     Trouble in sleeping     Weakness       Social History     Socioeconomic History    Marital status:      Spouse name: Not on file    Number of children: Not on file    Years of education: Not on file    Highest education level: Not on file   Tobacco Use    Smoking status: Former Smoker     Packs/day: 0.25     Years: 50.00     Pack years: 12.50     Types: Cigarettes     Last attempt to quit: 8/3/2019     Years since quittin.1    Smokeless tobacco: Never Used   Substance and Sexual Activity    Alcohol use: Not Currently     Alcohol/week: 28.0 standard drinks     Types: 28 Cans of beer per week     Comment: None now    Drug use: No    Sexual activity: Never     No Known Allergies   Family History   Problem Relation Age of Onset    Cancer Mother     Cancer Maternal Aunt       Current Facility-Administered Medications   Medication Dose Route Frequency    midodrine (PROAMITINE) tablet 5 mg  5 mg Oral TID WITH MEALS    aspirin chewable tablet 81 mg  81 mg Oral DAILY    busPIRone (BUSPAR) tablet 10 mg  10 mg Oral TID    clonazePAM (KlonoPIN) tablet 0.5 mg  0.5 mg Oral BID    albuterol-ipratropium (DUO-NEB) 2.5 MG-0.5 MG/3 ML  3 mL Nebulization Q6H PRN    donepezil (ARICEPT) tablet 10 mg  10 mg Oral QHS    DULoxetine (CYMBALTA) capsule 60 mg  60 mg Oral DAILY    gabapentin (NEURONTIN) capsule 600 mg  600 mg Oral DAILY    finasteride (PROSCAR) tablet 5 mg  5 mg Oral DAILY    sodium chloride (NS) flush 5-40 mL 5-40 mL IntraVENous Q8H    sodium chloride (NS) flush 5-40 mL  5-40 mL IntraVENous PRN    enoxaparin (LOVENOX) injection 40 mg  40 mg SubCUTAneous DAILY    acetaminophen (TYLENOL) tablet 650 mg  650 mg Oral Q6H PRN         Review of Symptoms:  Constitutional: negative  Eyes: negative  Ears, nose, mouth, throat, and face: negative  Respiratory: negative  Cardiovascular: negative  Gastrointestinal: negative  Genitourinary:negative  Musculoskeletal:negative  Neurological: syncope  Endocrine: negative     Subjective:      Visit Vitals  /55 (BP 1 Location: Left arm, BP Patient Position: Sitting)   Pulse 63   Temp 97.6 °F (36.4 °C)   Resp 17   Wt 82.2 kg (181 lb 3.5 oz)   SpO2 94%   BMI 25.27 kg/m²       Physical:    General: WD, WN. Alert, cooperative, no acute distress  Heart: Regular, S1 WNL and S2 WNL. No S3 or S4, no m/S3/JVD, no carotid bruits   Lungs: clear   Abdomen: Soft, +BS, NTND   Extremities: LE bruna +DP/PT, no edema   Neurologic: Grossly normal    Data Review:   Recent Labs     09/29/19  1318   WBC 5.8   HGB 12.4   HCT 36.8   *     Recent Labs     09/29/19  1318      K 4.3      CO2 26   GLU 81   BUN 22*   CREA 1.15   CA 8.3*   ALB 3.7   TBILI 0.7   SGOT 16   ALT 24       Recent Labs     09/29/19  1318   TROIQ <0.05   CPK 46   CKMB <1.0         Intake/Output Summary (Last 24 hours) at 9/30/2019 1413  Last data filed at 9/30/2019 1352  Gross per 24 hour   Intake 720 ml   Output 1400 ml   Net -680 ml        Cardiographics    Telemetry:   A paced    ECG:   A paced    Echocardiogram: 9/18  Left ventricle: Systolic function was normal. Ejection fraction was  estimated in the range of 60 % to 65 %. There were no regional wall motion  abnormalities. Features were consistent with a pseudonormal left  ventricular filling pattern, with concomitant abnormal relaxation and  increased filling pressure (grade 2 diastolic dysfunction). Left atrium: The atrium was mildly dilated. Assessment:         Active Problems:    Syncope (9/29/2019)         Plan:     Shimon Brooke is a pleasant 69year old male with  pmhx PTSD SSS post PM, PAD COPD previous smoker. he is s/p PPM for SSS. Device interrogation with 91% AP, <0.1% RVP. Appropriately functioning device; no arrhythmias. His syncope is not secondary to arrhythmia. His [employer] [place] [address] MRI is MRI COMPATIBLE. Cardiology form for MRI completed and on chart. We will sign off. Thank you for this consult. Thank you for this interesting consultation. Jennifer Cheng Certain ANP    Patient seen and examined by me with nurse practitioner. I personally performed all components of the history, physical, and medical decision making and agree with the assessment and plan with minor modifications as noted. Pt with syncope. Pacer interrogation wnl. No arrhythmia noted. A paced 91%. MRI compatible. Will sign off.  Please call with ?s    Armen Perez MD, Matthew Ellis

## 2019-10-01 LAB
ALBUMIN SERPL-MCNC: 3.8 G/DL (ref 3.5–5)
ALBUMIN/GLOB SERPL: 1.2 {RATIO} (ref 1.1–2.2)
ALP SERPL-CCNC: 100 U/L (ref 45–117)
ALT SERPL-CCNC: 27 U/L (ref 12–78)
ANION GAP SERPL CALC-SCNC: 6 MMOL/L (ref 5–15)
AST SERPL-CCNC: 15 U/L (ref 15–37)
BASOPHILS # BLD: 0.1 K/UL (ref 0–0.1)
BASOPHILS NFR BLD: 1 % (ref 0–1)
BILIRUB SERPL-MCNC: 0.7 MG/DL (ref 0.2–1)
BUN SERPL-MCNC: 16 MG/DL (ref 6–20)
BUN/CREAT SERPL: 18 (ref 12–20)
CALCIUM SERPL-MCNC: 8.5 MG/DL (ref 8.5–10.1)
CHLORIDE SERPL-SCNC: 111 MMOL/L (ref 97–108)
CHOLEST SERPL-MCNC: 112 MG/DL (ref 100–199)
CO2 SERPL-SCNC: 27 MMOL/L (ref 21–32)
CORTIS AM PEAK SERPL-MCNC: 2.1 UG/DL (ref 4.3–22.45)
CREAT SERPL-MCNC: 0.87 MG/DL (ref 0.7–1.3)
DIFFERENTIAL METHOD BLD: ABNORMAL
EOSINOPHIL # BLD: 0.2 K/UL (ref 0–0.4)
EOSINOPHIL NFR BLD: 3 % (ref 0–7)
ERYTHROCYTE [DISTWIDTH] IN BLOOD BY AUTOMATED COUNT: 12.2 % (ref 11.5–14.5)
EST. AVERAGE GLUCOSE BLD GHB EST-MCNC: 117 MG/DL
GLOBULIN SER CALC-MCNC: 3.3 G/DL (ref 2–4)
GLUCOSE SERPL-MCNC: 89 MG/DL (ref 65–100)
HBA1C MFR BLD: 5.7 % (ref 4.2–6.3)
HCT VFR BLD AUTO: 40.3 % (ref 36.6–50.3)
HDLC SERPL-MCNC: 40 MG/DL
HGB BLD-MCNC: 13.5 G/DL (ref 12.1–17)
IMM GRANULOCYTES # BLD AUTO: 0 K/UL (ref 0–0.04)
IMM GRANULOCYTES NFR BLD AUTO: 1 % (ref 0–0.5)
INTERPRETATION, 910389: NORMAL
LDLC SERPL CALC-MCNC: 53 MG/DL (ref 0–99)
LYMPHOCYTES # BLD: 1.7 K/UL (ref 0.8–3.5)
LYMPHOCYTES NFR BLD: 29 % (ref 12–49)
MAGNESIUM SERPL-MCNC: 2 MG/DL (ref 1.6–2.4)
MCH RBC QN AUTO: 31.9 PG (ref 26–34)
MCHC RBC AUTO-ENTMCNC: 33.5 G/DL (ref 30–36.5)
MCV RBC AUTO: 95.3 FL (ref 80–99)
MONOCYTES # BLD: 0.5 K/UL (ref 0–1)
MONOCYTES NFR BLD: 8 % (ref 5–13)
NEUTS SEG # BLD: 3.4 K/UL (ref 1.8–8)
NEUTS SEG NFR BLD: 58 % (ref 32–75)
NRBC # BLD: 0 K/UL (ref 0–0.01)
NRBC BLD-RTO: 0 PER 100 WBC
PLATELET # BLD AUTO: 150 K/UL (ref 150–400)
PMV BLD AUTO: 10 FL (ref 8.9–12.9)
POTASSIUM SERPL-SCNC: 4 MMOL/L (ref 3.5–5.1)
PROT SERPL-MCNC: 7.1 G/DL (ref 6.4–8.2)
RBC # BLD AUTO: 4.23 M/UL (ref 4.1–5.7)
SODIUM SERPL-SCNC: 144 MMOL/L (ref 136–145)
TRIGL SERPL-MCNC: 96 MG/DL (ref 0–149)
TSH SERPL DL<=0.05 MIU/L-ACNC: 1.61 UIU/ML (ref 0.36–3.74)
VLDLC SERPL CALC-MCNC: 19 MG/DL (ref 5–40)
WBC # BLD AUTO: 5.8 K/UL (ref 4.1–11.1)

## 2019-10-01 PROCEDURE — 83036 HEMOGLOBIN GLYCOSYLATED A1C: CPT

## 2019-10-01 PROCEDURE — 74011250636 HC RX REV CODE- 250/636: Performed by: HOSPITALIST

## 2019-10-01 PROCEDURE — 36415 COLL VENOUS BLD VENIPUNCTURE: CPT

## 2019-10-01 PROCEDURE — 80053 COMPREHEN METABOLIC PANEL: CPT

## 2019-10-01 PROCEDURE — 82533 TOTAL CORTISOL: CPT

## 2019-10-01 PROCEDURE — 65660000000 HC RM CCU STEPDOWN

## 2019-10-01 PROCEDURE — 83735 ASSAY OF MAGNESIUM: CPT

## 2019-10-01 PROCEDURE — 74011250637 HC RX REV CODE- 250/637: Performed by: INTERNAL MEDICINE

## 2019-10-01 PROCEDURE — 85025 COMPLETE CBC W/AUTO DIFF WBC: CPT

## 2019-10-01 PROCEDURE — 87040 BLOOD CULTURE FOR BACTERIA: CPT

## 2019-10-01 PROCEDURE — 74011250637 HC RX REV CODE- 250/637: Performed by: EMERGENCY MEDICINE

## 2019-10-01 PROCEDURE — 74011000250 HC RX REV CODE- 250: Performed by: HOSPITALIST

## 2019-10-01 PROCEDURE — 74011250637 HC RX REV CODE- 250/637: Performed by: GENERAL ACUTE CARE HOSPITAL

## 2019-10-01 PROCEDURE — 74011250636 HC RX REV CODE- 250/636: Performed by: GENERAL ACUTE CARE HOSPITAL

## 2019-10-01 PROCEDURE — 84443 ASSAY THYROID STIM HORMONE: CPT

## 2019-10-01 RX ADMIN — COSYNTROPIN 0.25 MG: 0.25 INJECTION, POWDER, LYOPHILIZED, FOR SOLUTION INTRAMUSCULAR; INTRAVENOUS at 19:00

## 2019-10-01 RX ADMIN — FINASTERIDE 5 MG: 5 TABLET, FILM COATED ORAL at 09:32

## 2019-10-01 RX ADMIN — MIDODRINE HYDROCHLORIDE 5 MG: 5 TABLET ORAL at 18:02

## 2019-10-01 RX ADMIN — BUSPIRONE HYDROCHLORIDE 10 MG: 10 TABLET ORAL at 09:32

## 2019-10-01 RX ADMIN — Medication 10 ML: at 21:58

## 2019-10-01 RX ADMIN — BUSPIRONE HYDROCHLORIDE 10 MG: 10 TABLET ORAL at 18:02

## 2019-10-01 RX ADMIN — DULOXETINE HYDROCHLORIDE 60 MG: 30 CAPSULE, DELAYED RELEASE ORAL at 09:32

## 2019-10-01 RX ADMIN — MIDODRINE HYDROCHLORIDE 5 MG: 5 TABLET ORAL at 09:32

## 2019-10-01 RX ADMIN — MIDODRINE HYDROCHLORIDE 5 MG: 5 TABLET ORAL at 11:16

## 2019-10-01 RX ADMIN — ACETAMINOPHEN 650 MG: 325 TABLET ORAL at 11:16

## 2019-10-01 RX ADMIN — ENOXAPARIN SODIUM 40 MG: 40 INJECTION SUBCUTANEOUS at 09:32

## 2019-10-01 RX ADMIN — BUSPIRONE HYDROCHLORIDE 10 MG: 10 TABLET ORAL at 21:56

## 2019-10-01 RX ADMIN — Medication 10 ML: at 09:32

## 2019-10-01 RX ADMIN — Medication 10 ML: at 18:05

## 2019-10-01 RX ADMIN — ASPIRIN 81 MG CHEWABLE TABLET 81 MG: 81 TABLET CHEWABLE at 09:32

## 2019-10-01 RX ADMIN — CLONAZEPAM 0.5 MG: 0.5 TABLET ORAL at 18:02

## 2019-10-01 RX ADMIN — Medication 10 ML: at 06:00

## 2019-10-01 RX ADMIN — GABAPENTIN 600 MG: 300 CAPSULE ORAL at 09:32

## 2019-10-01 RX ADMIN — CLONAZEPAM 0.5 MG: 0.5 TABLET ORAL at 09:32

## 2019-10-01 NOTE — CDMP QUERY
Patient admitted with lightheaded. noted to have Syncope. If possible, please document in progress notes and discharge summary if you are evaluating and/or treating any of the following: ? Syncope due to orthostatic hypotension secondary to Parkinson's ? Syncope due to SSS (arrhythmia) ? Syncope due to other hypotension ? Carotid sinus syncope ? Syncope due to other, please specify ? Clinically unable to determine The medical record reflects the following: 
  Risk Factors: Hx SSS S/p PPM;Syncope; Neuropathy; Blurred vision Clinical Indicators: CT head: No acute intracranial abnormality. ... Lenin Hay Lenin Hay Carotid Duplex B/L: · There is mild stenosis in the right ICA (<50%). Oxford Hay Oxford Hay Oxford Hay The right vertebral is antegrade. Lenin Hay Lenin Hay Oxford Hay There is mild stenosis in the left ICA (<50%). Lenin Hay Oxford Hay Lenin Hay The left vertebral is antegrade. ... noted per Cards: Appropriately functioning device; no arrhythmias. His syncope is not secondary to arrhythmia. .. Lenin Hay Oxford Hay Lenin Hay noted per Neuro: On exam he does have parkinsonian features. 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

## 2019-10-01 NOTE — CONSULTS
NEUROLOGY NOTE     Chief Complaint   Patient presents with    Transfer Of Care       Reason for Consult  I have been asked by Chito Fontanez MD to see the patient in neurological consultation to render advice and opinion regarding hypotension. HPI  Eren Tomlinson is a 69 y.o. male who presents to the hospital because of hypotension. Pt does have dizziness for a while and came to the hospital because on Sunday he was at [employer] and he had to sit and stand and he did not fell well with that. He went home and stood up out of the car and passed out. No bladder or bowel incontinence or any tongue bite. No postictal state. + tremors and bradykinesia.      ROS  A ten system review of constitutional, cardiovascular, respiratory, musculoskeletal, endocrine, skin, SHEENT, genitourinary, psychiatric and neurologic systems was obtained and is unremarkable except as stated in HPI     PMH  Past Medical History:   Diagnosis Date    Anxiety     Back pain     Blurred vision     Chest pain     Depression     Dizziness     Fast heart beat     Frequent headaches     Frequent urination     Hip dysplasia, congenital     Joint pain     Joint swelling     Lumbar spinal stenosis     Muscle pain     Neuropathy     Pacemaker     new pacemaker July 2019    Recent change in weight     Sinus problem     Skin disease     SOB (shortness of breath)     Sore throat     SSS (sick sinus syndrome) (HCC)     Stiffness of joints, multiple sites     Stress     Syncope     Tiredness     Trouble in sleeping     Weakness        FH  Family History   Problem Relation Age of Onset    Cancer Mother     Cancer Maternal Aunt        SH  Social History     Socioeconomic History    Marital status:      Spouse name: Not on file    Number of children: Not on file    Years of education: Not on file    Highest education level: Not on file   Tobacco Use    Smoking status: Former Smoker     Packs/day: 0.25     Years: 50.00 Pack years: 12.50     Types: Cigarettes     Last attempt to quit: 8/3/2019     Years since quittin.1    Smokeless tobacco: Never Used   Substance and Sexual Activity    Alcohol use: Not Currently     Alcohol/week: 28.0 standard drinks     Types: 28 Cans of beer per week     Comment: None now    Drug use: No    Sexual activity: Never       ALLERGIES  No Known Allergies    PHYSICAL EXAMINATION:   Patient Vitals for the past 24 hrs:   Temp Pulse Resp BP SpO2   10/01/19 1038 98.1 °F (36.7 °C) 60 16 163/74 95 %   10/01/19 0932 -- 62 -- 120/68 --   10/01/19 0809 -- 65 -- 118/79 --   10/01/19 0806 -- 60 -- (!) 75/50 --   10/01/19 0803 -- 65 -- 123/72 --   10/01/19 0800 98.1 °F (36.7 °C) 65 20 166/77 95 %   10/01/19 0305 97.6 °F (36.4 °C) 63 16 165/77 94 %   19 2342 97.6 °F (36.4 °C) 82 18 174/89 94 %   19 2100 97.4 °F (36.3 °C) (!) 59 18 174/84 99 %   19 1510 97.3 °F (36.3 °C) 62 18 169/80 98 %        General:   General appearance: Pt is in no acute distress   Distal pulses are preserved  Fundoscopic exam: attempted    Neurological Examination:   Mental Status:  AAO x3. Speech is fluent. Follows commands, has normal fund of knowledge, attention, short term recall, comprehension and insight. Cranial Nerves: Visual fields are full. PERRL, Extraocular movements are full. Facial sensation intact. Facial movement intact. Hearing intact to conversation. Palate elevates symmetrically. Shoulder shrug symmetric. Tongue midline. Motor: Strength is 5/5 in all 4 ext. Cog wheel rigidity - tone. No atrophy. Sensation: Light touch - Normal    Reflexes: DTRs 2+ throughout. Plantar responses downgoing. Coordination/Cerebellar: Intact to finger-nose-finger     Gait: deferred    Skin: No significant bruising or lacerations. Bradykinesia on fast finger tapping.     LAB DATA REVIEWED:    Recent Results (from the past 24 hour(s))   DUPLEX CAROTID BILATERAL    Collection Time: 19  2:18 PM Result Value Ref Range    Right subclavian sys 89.9 cm/s    RIGHT SUBCLAVIAN ARTERY D 2.67 cm/s    Right cca dist sys 59.4 cm/s    Right CCA dist luong 12.8 cm/s    Right CCA prox sys 62.7 cm/s    Right CCA prox luong 12.1 cm/s    Right ICA dist sys 49.9 cm/s    Right ICA dist luong 15.8 cm/s    Right ICA mid sys 56.5 cm/s    Right ICA mid luong 13.3 cm/s    Right ICA prox sys 49.3 cm/s    Right ICA prox luong 10.0 cm/s    Right eca sys 52.4 cm/s    RIGHT EXTERNAL CAROTID ARTERY D 4.76 cm/s    Right vertebral sys 34.1 cm/s    RIGHT VERTEBRAL ARTERY D 11.58 cm/s    Right ICA/CCA sys 1.0     Left subclavian sys 108.8 cm/s    LEFT SUBCLAVIAN ARTERY D 0.00 cm/s    Left CCA dist sys 80.6 cm/s    Left CCA dist luong 16.0 cm/s    Left CCA prox sys 77.4 cm/s    Left CCA prox luong 12.8 cm/s    Left ICA dist sys 46.5 cm/s    Left ICA dist luong 16.7 cm/s    Left ICA mid sys 44.9 cm/s    Left ICA mid luong 13.3 cm/s    Left ICA prox sys 32.2 cm/s    Left ICA prox luong 6.0 cm/s    Left ECA sys 58.9 cm/s    LEFT EXTERNAL CAROTID ARTERY D 5.67 cm/s    Left vertebral sys 42.4 cm/s    LEFT VERTEBRAL ARTERY D 10.33 cm/s    Left ICA/CCA sys 0.58    ECHO ADULT COMPLETE    Collection Time: 09/30/19  4:36 PM   Result Value Ref Range    Right Atrial Area 4C 12.89 cm2    LV E' Lateral Velocity 7.37 cm/s    LV E' Septal Velocity 4.90 cm/s    Ao Root D 3.44 cm    Aortic Valve Systolic Peak Velocity 513.29 cm/s    Aortic Valve Area by Continuity of Peak Velocity 3.0 cm2    AoV PG 7.6 mmHg    LVIDd 4.37 4.2 - 5.9 cm    LVPWd 1.22 (A) 0.6 - 1.0 cm    LVIDs 3.91 cm    IVSd 1.35 (A) 0.6 - 1.0 cm    LVOT d 2.06 cm    LVOT Peak Velocity 122.71 cm/s    LVOT Peak Gradient 6.0 mmHg    LVOT VTI 30.42 cm    MV A Gary 117.09 cm/s    MV E Gary 83.44 cm/s    MV E/A 0.71     MV Mean Gradient 2.6 mmHg    Mitral Valve Annulus Velocity Time Integral 47.33 cm    Left Atrium to Aortic Root Ratio 1.30     LA Vol 4C 50.85 18 - 58 mL    LA Area 4C 18.4 cm2    LV Mass AL 247.3 (A) 88 - 224 g    LV Mass AL Index 122.3 49 - 115 g/m2    LVPWs 1.04 cm    E/E' lateral 11.32     E/E' septal 17.03     RVSP 40.3 mmHg    MV Peak Gradient 8.8 mmHg    E/E' ratio (averaged) 14.18     Est. RA Pressure 10.0 mmHg    Mitral Regurgitant Peak Velocity 120.83 cm/s    Mitral Valve E Wave Deceleration Time 245.4 ms    Left Atrium Major Axis 4.48 cm    Triscuspid Valve Regurgitation Peak Gradient 30.3 mmHg    Mitral Valve Max Velocity 148. 21 cm/s    MVA VTI 2.1 cm2    LVOT .3 ml    TR Max Velocity 275.37 cm/s    PASP 40.3 mmHg    LA Vol Index 25.14 16 - 28 ml/m2    MR Peak Gradient 5.8 mmHg    CARON/BSA Pk Gary 1.5 cm2/m2   CORTISOL, AM    Collection Time: 10/01/19 12:51 AM   Result Value Ref Range    Cortisol, a.m. 2.1 (L) 4.30 - 22.45 ug/dL   TSH 3RD GENERATION    Collection Time: 10/01/19 12:51 AM   Result Value Ref Range    TSH 1.61 0.36 - 3.74 uIU/mL   CBC WITH AUTOMATED DIFF    Collection Time: 10/01/19 12:51 AM   Result Value Ref Range    WBC 5.8 4.1 - 11.1 K/uL    RBC 4.23 4. 10 - 5.70 M/uL    HGB 13.5 12.1 - 17.0 g/dL    HCT 40.3 36.6 - 50.3 %    MCV 95.3 80.0 - 99.0 FL    MCH 31.9 26.0 - 34.0 PG    MCHC 33.5 30.0 - 36.5 g/dL    RDW 12.2 11.5 - 14.5 %    PLATELET 490 308 - 008 K/uL    MPV 10.0 8.9 - 12.9 FL    NRBC 0.0 0  WBC    ABSOLUTE NRBC 0.00 0.00 - 0.01 K/uL    NEUTROPHILS 58 32 - 75 %    LYMPHOCYTES 29 12 - 49 %    MONOCYTES 8 5 - 13 %    EOSINOPHILS 3 0 - 7 %    BASOPHILS 1 0 - 1 %    IMMATURE GRANULOCYTES 1 (H) 0.0 - 0.5 %    ABS. NEUTROPHILS 3.4 1.8 - 8.0 K/UL    ABS. LYMPHOCYTES 1.7 0.8 - 3.5 K/UL    ABS. MONOCYTES 0.5 0.0 - 1.0 K/UL    ABS. EOSINOPHILS 0.2 0.0 - 0.4 K/UL    ABS. BASOPHILS 0.1 0.0 - 0.1 K/UL    ABS. IMM.  GRANS. 0.0 0.00 - 0.04 K/UL    DF AUTOMATED     HEMOGLOBIN A1C WITH EAG    Collection Time: 10/01/19 12:51 AM   Result Value Ref Range    Hemoglobin A1c 5.7 4.2 - 6.3 %    Est. average glucose 117 mg/dL   MAGNESIUM    Collection Time: 10/01/19 12:51 AM Result Value Ref Range    Magnesium 2.0 1.6 - 2.4 mg/dL   METABOLIC PANEL, COMPREHENSIVE    Collection Time: 10/01/19 12:51 AM   Result Value Ref Range    Sodium 144 136 - 145 mmol/L    Potassium 4.0 3.5 - 5.1 mmol/L    Chloride 111 (H) 97 - 108 mmol/L    CO2 27 21 - 32 mmol/L    Anion gap 6 5 - 15 mmol/L    Glucose 89 65 - 100 mg/dL    BUN 16 6 - 20 MG/DL    Creatinine 0.87 0.70 - 1.30 MG/DL    BUN/Creatinine ratio 18 12 - 20      GFR est AA >60 >60 ml/min/1.73m2    GFR est non-AA >60 >60 ml/min/1.73m2    Calcium 8.5 8.5 - 10.1 MG/DL    Bilirubin, total 0.7 0.2 - 1.0 MG/DL    ALT (SGPT) 27 12 - 78 U/L    AST (SGOT) 15 15 - 37 U/L    Alk. phosphatase 100 45 - 117 U/L    Protein, total 7.1 6.4 - 8.2 g/dL    Albumin 3.8 3.5 - 5.0 g/dL    Globulin 3.3 2.0 - 4.0 g/dL    A-G Ratio 1.2 1.1 - 2.2          Imaging review:  CT Head  No acute intracranial abnormality. Carotid US  There is mild stenosis in the right ICA (<50%). The right vertebral is antegrade. There is mild stenosis in the left ICA (<50%). The left vertebral is antegrade. HOME MEDS  Prior to Admission Medications   Prescriptions Last Dose Informant Patient Reported? Taking? COMBIVENT RESPIMAT  mcg/actuation inhaler 9/28/2019 at Unknown time  Yes Yes   DULoxetine (CYMBALTA) 60 mg capsule 9/29/2019 at Unknown time  No Yes   Sig: TAKE 1 CAPSULE BY MOUTH DAILY   aspirin 81 mg chewable tablet 9/22/2019 at Unknown time  No Yes   Sig: Take 1 Tab by mouth daily. busPIRone (BUSPAR) 10 mg tablet 9/29/2019 at Unknown time  No Yes   Sig: Take 1 Tab by mouth three (3) times daily. Indications: Repeated Episodes of Anxiety   clonazePAM (KLONOPIN) 1 mg tablet 9/29/2019 at Unknown time  No Yes   Sig: Take 0.5 Tabs by mouth two (2) times a day. Max Daily Amount: 1 mg.   cyanocobalamin, vitamin B-12, (VITAMIN B-12 PO) 9/29/2019 at Unknown time Self Yes Yes   Sig: Take 1 Tab by mouth daily.    donepezil (ARICEPT) 10 mg tablet 9/28/2019 at Unknown time  No Yes   Sig: Take 1 Tab by mouth nightly. finasteride (PROSCAR) 5 mg tablet 2019 at Unknown time  No Yes   Sig: TAKE 1 TABLET BY MOUTH DAILY(PROSTATE)   gabapentin (NEURONTIN) 600 mg tablet 2019 at Unknown time  No Yes   Sig: Take 1 Tab by mouth daily. guaiFENesin ER (MUCINEX) 600 mg ER tablet 2019 at Unknown time  No Yes   Sig: Take 1 Tab by mouth every twelve (12) hours. Patient taking differently: Take 600 mg by mouth every twelve (12) hours. As needed   ipratropium (ATROVENT) 42 mcg (0.06 %) nasal spray   No No   Si Carpenter by Both Nostrils route four (4) times daily. Patient taking differently: 1 Spray by Both Nostrils route four (4) times daily as needed. naproxen (NAPROSYN) 500 mg tablet Not Taking at Unknown time  No No   Sig: Take 1 Tab by mouth daily. tamsulosin (FLOMAX) 0.4 mg capsule 2019 at Unknown time  No Yes   Sig: Take 1 Cap by mouth daily. Facility-Administered Medications: None       CURRENT MEDS  Current Facility-Administered Medications   Medication Dose Route Frequency    midodrine (PROAMITINE) tablet 5 mg  5 mg Oral TID WITH MEALS    aspirin chewable tablet 81 mg  81 mg Oral DAILY    busPIRone (BUSPAR) tablet 10 mg  10 mg Oral TID    clonazePAM (KlonoPIN) tablet 0.5 mg  0.5 mg Oral BID    donepezil (ARICEPT) tablet 10 mg  10 mg Oral QHS    DULoxetine (CYMBALTA) capsule 60 mg  60 mg Oral DAILY    gabapentin (NEURONTIN) capsule 600 mg  600 mg Oral DAILY    finasteride (PROSCAR) tablet 5 mg  5 mg Oral DAILY    sodium chloride (NS) flush 5-40 mL  5-40 mL IntraVENous Q8H    enoxaparin (LOVENOX) injection 40 mg  40 mg SubCUTAneous DAILY       IMPRESSION:  Griselda Moron is a 69 y.o. male who presents with orthostatic hypotension. On exam he does have parkinsonian features. 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 Will not start sinemet at that will increase the orthostatic hypotension.      RECOMMENDATIONS:  - Waist high compression stockings  - Continue po hydration  - Cont midodrine 5 mg po tid      Blaine Perdomo MD  Neurologist

## 2019-10-01 NOTE — PROGRESS NOTES
Bedside shift change report GIVEN TO William Silvestre RN. Report included the following information SBAR, Kardex and MAR. SIGNIFICANT CHANGES DURING SHIFT:  Wife, Shazia Negro would like a phone call from neurologist, phone number on board. CONCERNS TO ADDRESS WITH MD:            [address] NURSING NOTE   Admission Date 9/29/2019   Admission Diagnosis Syncope [R55]   Consults IP CONSULT TO CARDIOLOGY  IP CONSULT TO HOSPITALIST  IP CONSULT TO NEUROLOGY      Cardiac Monitoring [x] Yes [] No      Purposeful Hourly Rounding [x] Yes    Delores Score Total Score: 3   Delores score 3 or > [x] Bed Alarm [] Avasys [] 1:1 sitter [] Patient refused (Signed refusal form in chart)   Kane Score Kane Score: 19   Kane score 14 or < [] PMT consult [] Wound Care consult    []  Specialty bed  [] Nutrition consult      Influenza Vaccine Received Flu Vaccine for Current Season (usually Sept-March): Yes           Oxygen needs? [x] Room air Oxygen @  []1L    []2L    []3L   []4L    []5L   []6L via  NC   Chronic home O2 use? [] Yes [] No  Perform O2 challenge test and document in progress note using smartphrase (.Homeoxygen)      Last bowel movement Last Bowel Movement Date: 09/30/19      Urinary Catheter             LDAs               Peripheral IV 09/29/19 Left Antecubital (Active)   Site Assessment Clean, dry, & intact 10/1/2019  4:17 AM   Phlebitis Assessment 0 10/1/2019  4:17 AM   Infiltration Assessment 0 10/1/2019  4:17 AM   Dressing Status Clean, dry, & intact 10/1/2019  4:17 AM   Dressing Type Transparent 10/1/2019  4:17 AM   Hub Color/Line Status Pink 10/1/2019  4:17 AM   Alcohol Cap Used No 9/29/2019  2:30 PM                         Readmission Risk Assessment Tool Score Low Risk            12       Total Score        3 Has Seen PCP in Last 6 Months (Yes=3, No=0)    4 IP Visits Last 12 Months (1-3=4, 4=9, >4=11)    5 Pt. Coverage (Medicare=5 , Medicaid, or Self-Pay=4)        Criteria that do not apply:    .  Living with Significant Other. Assisted Living. LTAC. SNF.  or   Rehab    Patient Length of Stay (>5 days = 3)    Charlson Comorbidity Score (Age + Comorbid Conditions)       Expected Length of Stay - - -   Actual Length of Stay 2

## 2019-10-01 NOTE — PROGRESS NOTES
Hospitalist Progress Note    NAME: Ese Hernandez   :  1945   MRN:  [id number]       Assessment / Plan:  Syncope due to orthostatic hypotension   -remain orthostatic: BP down from 120 to 70s standing   TSH normal   -will stop Aricept ( started recently per pt, can cause dizziness/orthostatics)   -cont midodrine  -follow MRI    -neurology help appreciated: On exam he does have parkinsonian features. 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 Will not start sinemet at that will increase the orthostatic hypotension. Waist high compression stockings  Continue po hydration  Cont midodrine 5 mg po tid    Possible adrenal insufficiency   -cortisol 2.1  Check cosyntropin test   -will ask endocrinology to help with Dx/management     Hx SSS, s/p PPM   Possible MV vegetation   -echo: ef 56%, Possible vegetation present on the anterior leaflet of the mitral valve. 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 Appropriately functioning device; no arrhythmias. His syncope is not secondary to arrhythmia. His [address] MRI is MRI COMPATIBLE. Cardiology form for MRI completed and on chart. BPH, con Proscar   Anxiety Disorder, cont scheduled klonopin, Buspar, Cymbalta   Neuropathy, cont Neurontin     10/01: wife was updated over the phone on all of the above; wife shared that pt dtr from the first marriage passed today.  Pt is not aware yet         Code status: DNR   NOK: wife   Prophylaxis: Lovenox - holding for MINH     Baseline: lives with wife; ambulates with rolator   Recommended Disposition: Home w/Family     Subjective:     Chief Complaint / Reason for Physician Visit: following orthostatic hypotension   Denies feeling dizzy   Ambulating with assistance     Discussed with RN events overnight. Review of Systems:  Symptom Y/N Comments  Symptom Y/N Comments   Fever/Chills n   Chest Pain n    Poor Appetite    Edema     Cough    Abdominal Pain n    Sputum    Joint Pain     SOB/PATEL n   Pruritis/Rash     Nausea/vomit    Tolerating PT/OT     Diarrhea    Tolerating Diet     Constipation    Other       Could NOT obtain due to:      Objective:     VITALS:   Last 24hrs VS reviewed since prior progress note. Most recent are:  Patient Vitals for the past 24 hrs:   Temp Pulse Resp BP SpO2   10/01/19 1441 97.2 °F (36.2 °C) 61 16 99/57 98 %   10/01/19 1038 98.1 °F (36.7 °C) 60 16 163/74 95 %   10/01/19 0932 -- 62 -- 120/68 --   10/01/19 0809 -- 65 -- 118/79 --   10/01/19 0806 -- 60 -- (!) 75/50 --   10/01/19 0803 -- 65 -- 123/72 --   10/01/19 0800 98.1 °F (36.7 °C) 65 20 166/77 95 %   10/01/19 0305 97.6 °F (36.4 °C) 63 16 165/77 94 %   09/30/19 2342 97.6 °F (36.4 °C) 82 18 174/89 94 %   09/30/19 2100 97.4 °F (36.3 °C) (!) 59 18 174/84 99 %       Intake/Output Summary (Last 24 hours) at 10/1/2019 1614  Last data filed at 10/1/2019 1311  Gross per 24 hour   Intake 840 ml   Output 1700 ml   Net -860 ml        PHYSICAL EXAM:  General: WD, WN. Alert, cooperative, no acute distress    EENT:  EOMI. Anicteric sclerae. MMM  Resp:  CTA bilaterally, no wheezing or rales. No accessory muscle use  CV:  Regular  rhythm,  No edema  GI:  Soft, Non distended, Non tender.  +Bowel sounds  Neurologic:  Alert and oriented X 3, normal speech,   Psych:   Fair insight. Not anxious nor agitated  Skin:  No rashes.   No jaundice    Reviewed most current lab test results and cultures  YES  Reviewed most current radiology test results   YES  Review and summation of old records today    NO  Reviewed patient's current orders and MAR    YES  PMH/SH reviewed - no change compared to H&P  ________________________________________________________________________  Care Plan discussed with:    Comments   Patient y    Family  y Wife over the phone    RN y    Care Manager     Consultant  y Cardiology                      Multidiciplinary team rounds were held today with , nursing, pharmacist and clinical coordinator. Patient's plan of care was discussed; medications were reviewed and discharge planning was addressed. ________________________________________________________________________  Total NON critical care TIME:  35   Minutes    Total CRITICAL CARE TIME Spent:   Minutes non procedure based      Comments   >50% of visit spent in counseling and coordination of care     ________________________________________________________________________  David Dick MD     Procedures: see electronic medical records for all procedures/Xrays and details which were not copied into this note but were reviewed prior to creation of Plan. LABS:  I reviewed today's most current labs and imaging studies.   Pertinent labs include:  Recent Labs     10/01/19  0051 09/29/19  1318   WBC 5.8 5.8   HGB 13.5 12.4   HCT 40.3 36.8    145*     Recent Labs     10/01/19  0051 09/29/19  1318    141   K 4.0 4.3   * 106   CO2 27 26   GLU 89 81   BUN 16 22*   CREA 0.87 1.15   CA 8.5 8.3*   MG 2.0  --    ALB 3.8 3.7   TBILI 0.7 0.7   SGOT 15 16   ALT 27 24       Signed: David Dick MD

## 2019-10-01 NOTE — PROGRESS NOTES
Spiritual Care Partner Volunteer visited patient in [facility] on 10/1/19.   Documented by:  JOE Chawla, [facility], jesse PABON [facility] Paging Service  [phone]

## 2019-10-01 NOTE — PROGRESS NOTES
JOSE:   1. Home with wife at d/c  2. OP f/u appts with PCP, Cardiology, Neurology, and [facility]      Reason for Admission: Syncope, orthostatic HTN                     RRAT Score: 12                    Plan for utilizing home health: No indicated at this time. Current Advanced Directive/Advance Care Plan: AMD on file - Frankie Clark (wife) listed as mPOA 1 and Emily Elizabeth (brother) listed as mPOA 2. DDNR on file. Transition of Care Plan:  Home with f/u appts    CM met with patient at the bedside to introduce role, verify demographics, and discuss discharge planning. Patient was awake and alert/oriented x4. Pt is a 68 yo  male admitted to [facility] on 9/29/19 for syncope and orthostatic HTN. Cardiology and Neurology following - pt currently awaiting MRI. Per Neurology note, pt demonstrates signs of Parkinson's. Pt lives with his wife in a single story home with ramp entry. Pt's wife provides care for with ADL/IADLs when needed. Pt stated he sometimes becomes dizzy, however it goes away in a matter of seconds if he stops and sits down. Pt uses a rollator at nighttime and reported it to be sufficient - declined a traditional RW when offered. Pt reported no hx of HH and was at [facility] in November 2018. Pt declined concern with ambulation/mobility at this time and does not feel he needs additional assistance at home. Per pt, wife is very supportive and involved in his medical care. Pt is insured through [employer] and [place] as well as benefits through the [place]. CM will continue to follow and remain available for d/c planning as needed.      PCP: Dr. Monique Up / PCP at [facility]  Cardiologist: Dr. Ivanna Sears / Dr. Donavon Villegas  Neurologist: Dr. Chadwick Found: Patricia Son in [place] and Mail order through [address] [place] [address] Management Interventions  PCP Verified by CM: Yes(Dr. Monique Up and MD at [facility])  Last Visit to PCP: 08/02/19  Mode of Transport at Discharge:  Other (see comment)(wife)  Transition of Care Consult (CM Consult): Discharge Planning(initial eval - anticipate home with f/u appts)  MyChart Signup: No  Discharge Durable Medical Equipment: No(has rollator)  Physical Therapy Consult: No  Occupational Therapy Consult: No  Speech Therapy Consult: No  Current Support Network: Lives with Spouse, Own Home, Family Lives Nearby(pt and wife live together in single story home with ramp entry)  Confirm Follow Up Transport: Family  Plan discussed with Pt/Family/Caregiver: Yes(discussed with pt at the bedside)  Discharge Location  Discharge Placement: Home with family assistance    PAVEL Mccracken  Care Manager  [phone]

## 2019-10-02 ENCOUNTER — TELEPHONE (OUTPATIENT)
Dept: CARDIOLOGY CLINIC | Age: 74
End: 2019-10-02

## 2019-10-02 ENCOUNTER — APPOINTMENT (OUTPATIENT)
Dept: MRI IMAGING | Age: 74
DRG: 057 | End: 2019-10-02
Attending: INTERNAL MEDICINE
Payer: MEDICARE

## 2019-10-02 LAB
CORTIS 1H P CHAL SERPL-MCNC: 35.1 UG/DL
CORTIS 30M P CHAL SERPL-MCNC: 27.6 UG/DL
CORTIS BS SERPL-MCNC: 8.2 UG/DL

## 2019-10-02 PROCEDURE — 70551 MRI BRAIN STEM W/O DYE: CPT

## 2019-10-02 PROCEDURE — 51798 US URINE CAPACITY MEASURE: CPT

## 2019-10-02 PROCEDURE — 74011250637 HC RX REV CODE- 250/637: Performed by: INTERNAL MEDICINE

## 2019-10-02 PROCEDURE — 74011250637 HC RX REV CODE- 250/637: Performed by: GENERAL ACUTE CARE HOSPITAL

## 2019-10-02 PROCEDURE — 74011250637 HC RX REV CODE- 250/637: Performed by: HOSPITALIST

## 2019-10-02 PROCEDURE — 74011250636 HC RX REV CODE- 250/636: Performed by: HOSPITALIST

## 2019-10-02 PROCEDURE — 65660000000 HC RM CCU STEPDOWN

## 2019-10-02 RX ORDER — MIDODRINE HYDROCHLORIDE 5 MG/1
10 TABLET ORAL
Status: DISCONTINUED | OUTPATIENT
Start: 2019-10-02 | End: 2019-10-10

## 2019-10-02 RX ORDER — DEXAMETHASONE SODIUM PHOSPHATE 4 MG/ML
4 INJECTION, SOLUTION INTRA-ARTICULAR; INTRALESIONAL; INTRAMUSCULAR; INTRAVENOUS; SOFT TISSUE
Status: COMPLETED | OUTPATIENT
Start: 2019-10-02 | End: 2019-10-02

## 2019-10-02 RX ORDER — TAMSULOSIN HYDROCHLORIDE 0.4 MG/1
0.4 CAPSULE ORAL DAILY
Status: DISCONTINUED | OUTPATIENT
Start: 2019-10-02 | End: 2019-10-08

## 2019-10-02 RX ADMIN — MIDODRINE HYDROCHLORIDE 10 MG: 5 TABLET ORAL at 17:47

## 2019-10-02 RX ADMIN — TAMSULOSIN HYDROCHLORIDE 0.4 MG: 0.4 CAPSULE ORAL at 21:12

## 2019-10-02 RX ADMIN — FINASTERIDE 5 MG: 5 TABLET, FILM COATED ORAL at 10:55

## 2019-10-02 RX ADMIN — ASPIRIN 81 MG CHEWABLE TABLET 81 MG: 81 TABLET CHEWABLE at 10:56

## 2019-10-02 RX ADMIN — DEXAMETHASONE SODIUM PHOSPHATE 4 MG: 4 INJECTION, SOLUTION INTRAMUSCULAR; INTRAVENOUS at 10:56

## 2019-10-02 RX ADMIN — Medication 10 ML: at 06:00

## 2019-10-02 RX ADMIN — BUSPIRONE HYDROCHLORIDE 10 MG: 10 TABLET ORAL at 21:13

## 2019-10-02 RX ADMIN — BUSPIRONE HYDROCHLORIDE 10 MG: 10 TABLET ORAL at 17:47

## 2019-10-02 RX ADMIN — CLONAZEPAM 0.5 MG: 0.5 TABLET ORAL at 10:55

## 2019-10-02 RX ADMIN — Medication 10 ML: at 22:00

## 2019-10-02 RX ADMIN — MIDODRINE HYDROCHLORIDE 10 MG: 5 TABLET ORAL at 13:06

## 2019-10-02 RX ADMIN — MIDODRINE HYDROCHLORIDE 5 MG: 5 TABLET ORAL at 10:56

## 2019-10-02 RX ADMIN — BUSPIRONE HYDROCHLORIDE 10 MG: 10 TABLET ORAL at 10:56

## 2019-10-02 RX ADMIN — GABAPENTIN 600 MG: 300 CAPSULE ORAL at 10:56

## 2019-10-02 RX ADMIN — DULOXETINE HYDROCHLORIDE 60 MG: 30 CAPSULE, DELAYED RELEASE ORAL at 10:55

## 2019-10-02 RX ADMIN — CLONAZEPAM 0.5 MG: 0.5 TABLET ORAL at 17:47

## 2019-10-02 NOTE — PROGRESS NOTES
Problem: Syncope  Goal: *Absence of injury  Outcome: Progressing Towards Goal     Problem: Falls - Risk of  Goal: *Absence of Falls  Description  Document Serge Ward Fall Risk and appropriate interventions in the flowsheet.   Outcome: Progressing Towards Goal  Note:   Fall Risk Interventions:  Mobility Interventions: Bed/chair exit alarm         Medication Interventions: Assess postural VS orthostatic hypotension, Bed/chair exit alarm    Elimination Interventions: Bed/chair exit alarm    History of Falls Interventions: Bed/chair exit alarm

## 2019-10-02 NOTE — PROGRESS NOTES
Bedside shift change report GIVEN TO MANUEL Sullivan. Report included the following information SBAR, Kardex and MAR. SIGNIFICANT CHANGES DURING SHIFT:        CONCERNS TO ADDRESS WITH MD:            [facility] NURSING NOTE   Admission Date 9/29/2019   Admission Diagnosis Syncope [R55]   Consults IP CONSULT TO CARDIOLOGY  IP CONSULT TO CARDIOLOGY  IP CONSULT TO HOSPITALIST  IP CONSULT TO NEUROLOGY  IP CONSULT TO ENDOCRINOLOGY      Cardiac Monitoring [x] Yes [] No      Purposeful Hourly Rounding [x] Yes    Delores Score Total Score: 3   Delores score 3 or > [x] Bed Alarm [] Avasys [] 1:1 sitter [] Patient refused (Signed refusal form in chart)   Kane Score Kane Score: 19   Kane score 14 or < [] PMT consult [] Wound Care consult    []  Specialty bed  [] Nutrition consult      Influenza Vaccine Received Flu Vaccine for Current Season (usually Sept-March): Yes           Oxygen needs? [x] Room air Oxygen @  []1L    []2L    []3L   []4L    []5L   []6L via  NC   Chronic home O2 use? [] Yes [] No  Perform O2 challenge test and document in progress note using smartphrase (.Homeoxygen)      Last bowel movement Last Bowel Movement Date: 09/30/19      Urinary Catheter             LDAs               Peripheral IV 09/29/19 Left Antecubital (Active)   Site Assessment Clean, dry, & intact 10/1/2019  7:42 PM   Phlebitis Assessment 0 10/1/2019  7:42 PM   Infiltration Assessment 0 10/1/2019  7:42 PM   Dressing Status Clean, dry, & intact 10/1/2019  7:42 PM   Dressing Type Transparent 10/1/2019  7:42 PM   Hub Color/Line Status Pink 10/1/2019  7:42 PM   Alcohol Cap Used No 9/29/2019  2:30 PM                         Readmission Risk Assessment Tool Score Low Risk            12       Total Score        3 Has Seen PCP in Last 6 Months (Yes=3, No=0)    4 IP Visits Last 12 Months (1-3=4, 4=9, >4=11)    5 Pt. Coverage (Medicare=5 , Medicaid, or Self-Pay=4)        Criteria that do not apply:    . Living with Significant Other.  Assisted Living. LTAC. SNF.  or   Rehab    Patient Length of Stay (>5 days = 3)    Charlson Comorbidity Score (Age + Comorbid Conditions)       Expected Length of Stay 2d 7h   Actual Length of Stay 3

## 2019-10-02 NOTE — TELEPHONE ENCOUNTER
----- Message from Keyur Angeles NP sent at 10/1/2019  8:56 AM EDT -----  Lipids at goal.  LDL which is bad cholesterol is at 53, goal 70. Called pt and left message to call me back. Pt's wife returned call,rosa Pinto on [facility], advised that Lipids are at goal, LDL 53 and goal 70. She advised pt is in hospital now for BP issues. Wished her and pt well. Wife verbalized understanding.

## 2019-10-02 NOTE — PROGRESS NOTES
Bedside shift change report GIVEN TO Dayna Box RN. Report included the following information SBAR. SIGNIFICANT CHANGES DURING SHIFT:  none      CONCERNS TO ADDRESS WITH MD:  none          [address] [facility] NURSING NOTE   Admission Date 9/29/2019   Admission Diagnosis Syncope [R55]   Consults IP CONSULT TO CARDIOLOGY  IP CONSULT TO CARDIOLOGY  IP CONSULT TO HOSPITALIST  IP CONSULT TO NEUROLOGY  IP CONSULT TO ENDOCRINOLOGY      Cardiac Monitoring [x] Yes [] No      Purposeful Hourly Rounding [x] Yes    Delores Score Total Score: 3   Delores score 3 or > [x] Bed Alarm [] Avasys [] 1:1 sitter [] Patient refused (Signed refusal form in chart)   Kane Score Kane Score: 19   Kane score 14 or < [] PMT consult [] Wound Care consult    []  Specialty bed  [] Nutrition consult      Influenza Vaccine Received Flu Vaccine for Current Season (usually Sept-March): Yes           Oxygen needs? [x] Room air Oxygen @  []1L    []2L    []3L   []4L    []5L   []6L via  NC   Chronic home O2 use? [] Yes [] No  Perform O2 challenge test and document in progress note using smartphrase (.Homeoxygen)      Last bowel movement Last Bowel Movement Date: 09/30/19      Urinary Catheter             LDAs               Peripheral IV 09/29/19 Left Antecubital (Active)   Site Assessment Clean, dry, & intact 10/2/2019  1:06 PM   Phlebitis Assessment 0 10/2/2019  1:06 PM   Infiltration Assessment 0 10/2/2019  1:06 PM   Dressing Status Clean, dry, & intact 10/2/2019  1:06 PM   Dressing Type Transparent 10/2/2019  1:06 PM   Hub Color/Line Status Pink 10/2/2019  1:06 PM   Alcohol Cap Used No 9/29/2019  2:30 PM                         Readmission Risk Assessment Tool Score Low Risk            12       Total Score        3 Has Seen PCP in Last 6 Months (Yes=3, No=0)    4 IP Visits Last 12 Months (1-3=4, 4=9, >4=11)    5 Pt. Coverage (Medicare=5 , Medicaid, or Self-Pay=4)        Criteria that do not apply:    . Living with Significant Other. Assisted Living. LTAC. SNF.  or   Rehab    Patient Length of Stay (>5 days = 3)    Charlson Comorbidity Score (Age + Comorbid Conditions)       Expected Length of Stay 2d 7h   Actual Length of Stay 3

## 2019-10-02 NOTE — PROGRESS NOTES
[facility] INTERDISCIPLINARY ROUNDS    Cardiopulmonary Care Interdisciplinary Rounds were held today to discuss patient's plan of care and outcomes. The following members were present: NP/Physician, Pharmacy, Nursing and Case Management.   Expected Length of Stay:  2d 7h    PLAN OF CARE:   Continue current treatment plan

## 2019-10-02 NOTE — PROGRESS NOTES
Endocrinology Progress Note    Received a call from [address] at 5:30pm [facility] for a consult on this patient to rule out adrenal insufficiency given low overnight cortisol of 2.1 draw at 1am as part of the evaluation for syncope and orthostatic hypotension. I asked if an ACTH stimulation test had been ordered and she said it had and would be completed this evening. I told her my partner, Dr. Bartolo Smith, can review the results when available and if abnormal, he can try to see him tomorrow as he is available for consults on Wednesdays when I am in the [address] at [facility]. If the 60 min cortisol value is > 20, then this rules out adrenal insufficiency. Please don't hesitate to call [phone] with further questions.     Carlota Whyte MD

## 2019-10-02 NOTE — PROGRESS NOTES
NEUROLOGY NOTE     Chief Complaint   Patient presents with    Transfer Of Care       SUBJECTIVE:  Pt is symptomatically doing better although still orthostatic. HPI  Hui Cervantes is a 69 y.o. male who presents to the hospital because of hypotension. Pt does have dizziness for a while and came to the hospital because on Sunday he was at [employer] and he had to sit and stand and he did not fell well with that. He went home and stood up out of the car and passed out. No bladder or bowel incontinence or any tongue bite. No postictal state. + tremors and bradykinesia.      ROS  A ten system review of constitutional, cardiovascular, respiratory, musculoskeletal, endocrine, skin, SHEENT, genitourinary, psychiatric and neurologic systems was obtained and is unremarkable except as stated in HPI     PMH  Past Medical History:   Diagnosis Date    Anxiety     Back pain     Blurred vision     Chest pain     Depression     Dizziness     Fast heart beat     Frequent headaches     Frequent urination     Hip dysplasia, congenital     Joint pain     Joint swelling     Lumbar spinal stenosis     Muscle pain     Neuropathy     Pacemaker     new pacemaker July 2019    Recent change in weight     Sinus problem     Skin disease     SOB (shortness of breath)     Sore throat     SSS (sick sinus syndrome) (HCC)     Stiffness of joints, multiple sites     Stress     Syncope     Tiredness     Trouble in sleeping     Weakness        FH  Family History   Problem Relation Age of Onset    Cancer Mother     Cancer Maternal Aunt        SH  Social History     Socioeconomic History    Marital status:      Spouse name: Not on file    Number of children: Not on file    Years of education: Not on file    Highest education level: Not on file   Tobacco Use    Smoking status: Former Smoker     Packs/day: 0.25     Years: 50.00     Pack years: 12.50     Types: Cigarettes     Last attempt to quit: 8/3/2019     Years since quittin.1    Smokeless tobacco: Never Used   Substance and Sexual Activity    Alcohol use: Not Currently     Alcohol/week: 28.0 standard drinks     Types: 28 Cans of beer per week     Comment: None now    Drug use: No    Sexual activity: Never       ALLERGIES  No Known Allergies    PHYSICAL EXAMINATION:   Patient Vitals for the past 24 hrs:   Temp Pulse Resp BP SpO2   10/02/19 1126 97.4 °F (36.3 °C) 61 18 150/85 91 %   10/02/19 0745 97.9 °F (36.6 °C) 60 18 145/75 91 %   10/02/19 0409 -- -- -- (!) 69/46 --   10/02/19 0407 -- -- -- (!) 80/52 --   10/02/19 0406 97.7 °F (36.5 °C) 66 18 136/69 91 %   10/01/19 2235 97.4 °F (36.3 °C) (!) 59 18 181/90 91 %   10/01/19 2011 98 °F (36.7 °C) 61 18 161/85 95 %   10/01/19 1802 -- 62 -- 145/90 --   10/01/19 1441 97.2 °F (36.2 °C) 61 16 99/57 98 %        General:   General appearance: Pt is in no acute distress   Distal pulses are preserved    Neurological Examination:   Mental Status:  AAO x3. Speech is fluent. Follows commands, has normal fund of knowledge, attention, short term recall, comprehension and insight. Cranial Nerves: Visual fields are full. PERRL, Extraocular movements are full. Facial sensation intact. Facial movement intact. Hearing intact to conversation. Palate elevates symmetrically. Shoulder shrug symmetric. Tongue midline. Motor: Strength is 5/5 in all 4 ext. Cog wheel rigidity - tone. No atrophy. Sensation: Light touch - Normal    Reflexes: DTRs 2+ throughout. Plantar responses downgoing. Coordination/Cerebellar: Intact to finger-nose-finger     Skin: No significant bruising or lacerations. Bradykinesia on fast finger tapping.     LAB DATA REVIEWED:    Recent Results (from the past 24 hour(s))   CULTURE, BLOOD, PAIRED    Collection Time: 10/01/19  6:53 PM   Result Value Ref Range    Special Requests: NO SPECIAL REQUESTS      Culture result: NO GROWTH AFTER 13 HOURS     COSYNTROPIN ACTH STIMULATION TEST    Collection Time: 10/01/19  8:04 PM   Result Value Ref Range    Cortisol, baseline 8.2 ug/dL    Cortisol, 30 min. 27.6 ug/dL    Cortisol, 60 min. 35.1 ug/dL        Imaging review:  CT Head  No acute intracranial abnormality. Carotid US  There is mild stenosis in the right ICA (<50%). The right vertebral is antegrade. There is mild stenosis in the left ICA (<50%). The left vertebral is antegrade. HOME MEDS  Prior to Admission Medications   Prescriptions Last Dose Informant Patient Reported? Taking? COMBIVENT RESPIMAT  mcg/actuation inhaler 2019 at Unknown time  Yes Yes   DULoxetine (CYMBALTA) 60 mg capsule 2019 at Unknown time  No Yes   Sig: TAKE 1 CAPSULE BY MOUTH DAILY   aspirin 81 mg chewable tablet 2019 at Unknown time  No Yes   Sig: Take 1 Tab by mouth daily. busPIRone (BUSPAR) 10 mg tablet 2019 at Unknown time  No Yes   Sig: Take 1 Tab by mouth three (3) times daily. Indications: Repeated Episodes of Anxiety   clonazePAM (KLONOPIN) 1 mg tablet 2019 at Unknown time  No Yes   Sig: Take 0.5 Tabs by mouth two (2) times a day. Max Daily Amount: 1 mg.   cyanocobalamin, vitamin B-12, (VITAMIN B-12 PO) 2019 at Unknown time Self Yes Yes   Sig: Take 1 Tab by mouth daily. donepezil (ARICEPT) 10 mg tablet 2019 at Unknown time  No Yes   Sig: Take 1 Tab by mouth nightly. finasteride (PROSCAR) 5 mg tablet 2019 at Unknown time  No Yes   Sig: TAKE 1 TABLET BY MOUTH DAILY(PROSTATE)   gabapentin (NEURONTIN) 600 mg tablet 2019 at Unknown time  No Yes   Sig: Take 1 Tab by mouth daily. guaiFENesin ER (MUCINEX) 600 mg ER tablet 2019 at Unknown time  No Yes   Sig: Take 1 Tab by mouth every twelve (12) hours. Patient taking differently: Take 600 mg by mouth every twelve (12) hours. As needed   ipratropium (ATROVENT) 42 mcg (0.06 %) nasal spray   No No   [place] by Both Nostrils route four (4) times daily.    Patient taking differently: 1 Spray by Both Nostrils route four (4) times daily as needed. naproxen (NAPROSYN) 500 mg tablet Not Taking at Unknown time  No No   Sig: Take 1 Tab by mouth daily. tamsulosin (FLOMAX) 0.4 mg capsule 9/29/2019 at Unknown time  No Yes   Sig: Take 1 Cap by mouth daily. Facility-Administered Medications: None       CURRENT MEDS  Current Facility-Administered Medications   Medication Dose Route Frequency    midodrine (PROAMITINE) tablet 10 mg  10 mg Oral TID WITH MEALS    aspirin chewable tablet 81 mg  81 mg Oral DAILY    busPIRone (BUSPAR) tablet 10 mg  10 mg Oral TID    clonazePAM (KlonoPIN) tablet 0.5 mg  0.5 mg Oral BID    DULoxetine (CYMBALTA) capsule 60 mg  60 mg Oral DAILY    gabapentin (NEURONTIN) capsule 600 mg  600 mg Oral DAILY    finasteride (PROSCAR) tablet 5 mg  5 mg Oral DAILY    sodium chloride (NS) flush 5-40 mL  5-40 mL IntraVENous Q8H    [Held by provider] enoxaparin (LOVENOX) injection 40 mg  40 mg SubCUTAneous DAILY       IMPRESSION:  Mehreen Lundy is a 69 y.o. male who presents with orthostatic hypotension. On exam he does have parkinsonian features. 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 Will not start sinemet at that will increase the orthostatic hypotension.      RECOMMENDATIONS:  - Waist high compression stockings  - Continue po hydration  - Agree with increased dosage of midodrine 10 mg po tid  - MRI brain is pending      Alcira Henry MD  Neurologist

## 2019-10-02 NOTE — PROGRESS NOTES
Hospitalist Progress Note    NAME: Adam Brown   :  1945   MRN:  [id number]       Assessment / Plan:  Syncope due to orthostatic hypotension   -remain orthostatic on midodrine 5 mg TID    TSH normal   Cortisol was low in am, cosyntropin test normal. Endocrinology input appreciated - no adrenal insufficiency   -stopped  Aricept ( started recently per pt, can cause dizziness/orthostatics)   -inc midodrine  -follow MRI    -neurology help appreciated: On exam he does have parkinsonian features. 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 Will not start sinemet at that will increase the orthostatic hypotension. Waist high compression stockings  Continue po hydration  Cont midodrine 5 mg po tid    Hx SSS, s/p PPM   Possible MV vegetation   -echo: ef 56%, Possible vegetation present on the anterior leaflet of the mitral valve. 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 Appropriately functioning device; no arrhythmias. His syncope is not secondary to arrhythmia. His [employer] [facility] RUTH MRI is MRI COMPATIBLE. Cardiology form for MRI completed and on chart. BPH, con Proscar   Anxiety Disorder, cont scheduled klonopin, Buspar, Cymbalta   Neuropathy, cont Neurontin     10/01: wife was updated over the phone on all of the above; wife shared that pt dtr from the first marriage passed today. Pt is not aware yet         Code status: DNR   NOK: wife   Prophylaxis: Lovenox - holding for MINH     Baseline: lives with wife; ambulates with rolator   Recommended Disposition: Home w/Family once orthostatics resolve      Subjective:     Chief Complaint / Reason for Physician Visit: following orthostatic hypotension   Dizzy when standing up this am      Discussed with RN events overnight.      Review of Systems:  Symptom Y/N Comments  Symptom Y/N Comments   Fever/Chills n   Chest Pain n    Poor Appetite    Edema     Cough    Abdominal Pain n    Sputum    Joint Pain     SOB/PATEL n   Pruritis/Rash     Nausea/vomit    Tolerating PT/OT     Diarrhea    Tolerating Diet     Constipation    Other       Could NOT obtain due to:      Objective:     VITALS:   Last 24hrs VS reviewed since prior progress note. Most recent are:  Patient Vitals for the past 24 hrs:   Temp Pulse Resp BP SpO2   10/02/19 1126 97.4 °F (36.3 °C) 61 18 150/85 91 %   10/02/19 0745 97.9 °F (36.6 °C) 60 18 145/75 91 %   10/02/19 0409 -- -- -- (!) 69/46 --   10/02/19 0407 -- -- -- (!) 80/52 --   10/02/19 0406 97.7 °F (36.5 °C) 66 18 136/69 91 %   10/01/19 2235 97.4 °F (36.3 °C) (!) 59 18 181/90 91 %   10/01/19 2011 98 °F (36.7 °C) 61 18 161/85 95 %   10/01/19 1802 -- 62 -- 145/90 --       Intake/Output Summary (Last 24 hours) at 10/2/2019 1529  Last data filed at 10/2/2019 1306  Gross per 24 hour   Intake 360 ml   Output 950 ml   Net -590 ml        PHYSICAL EXAM:  General: WD, WN. Alert, cooperative, no acute distress    EENT:  EOMI. Anicteric sclerae. MMM  Resp:  CTA bilaterally, no wheezing or rales. No accessory muscle use  CV:  Regular  rhythm,  No edema  GI:  Soft, Non distended, Non tender.  +Bowel sounds  Neurologic:  Alert and oriented X 3, normal speech,   Psych:   Fair insight. Not anxious nor agitated  Skin:  No rashes.   No jaundice    Reviewed most current lab test results and cultures  YES  Reviewed most current radiology test results   YES  Review and summation of old records today    NO  Reviewed patient's current orders and MAR    YES  PMH/SH reviewed - no change compared to H&P  ________________________________________________________________________  Care Plan discussed with:    Comments   Patient y    Family      RN y    Care Manager y    Consultant  y Cardiology                      Multidiciplinary team rounds were held today with case manager, nursing, pharmacist and clinical coordinator. Patient's plan of care was discussed; medications were reviewed and discharge planning was addressed. ________________________________________________________________________  Total NON critical care TIME:  35   Minutes    Total CRITICAL CARE TIME Spent:   Minutes non procedure based      Comments   >50% of visit spent in counseling and coordination of care     ________________________________________________________________________  Dona Al MD     Procedures: see electronic medical records for all procedures/Xrays and details which were not copied into this note but were reviewed prior to creation of Plan. LABS:  I reviewed today's most current labs and imaging studies.   Pertinent labs include:  Recent Labs     10/01/19  0051   WBC 5.8   HGB 13.5   HCT 40.3        Recent Labs     10/01/19  0051      K 4.0   *   CO2 27   GLU 89   BUN 16   CREA 0.87   CA 8.5   MG 2.0   ALB 3.8   TBILI 0.7   SGOT 15   ALT 27       Signed: Dona Al MD

## 2019-10-02 NOTE — PROGRESS NOTES
Problem: Syncope  Goal: *Absence of injury  Outcome: Progressing Towards Goal     Problem: Falls - Risk of  Goal: *Absence of Falls  Description  Document Kimberly Solis Fall Risk and appropriate interventions in the flowsheet.   Outcome: Progressing Towards Goal  Note:   Fall Risk Interventions:  Mobility Interventions: Bed/chair exit alarm, Patient to call before getting OOB         Medication Interventions: Assess postural VS orthostatic hypotension, Bed/chair exit alarm    Elimination Interventions: Bed/chair exit alarm, Call light in reach, Patient to call for help with toileting needs    History of Falls Interventions: Bed/chair exit alarm, Door open when patient unattended         Problem: Patient Education: Go to Patient Education Activity  Goal: Patient/Family Education  Outcome: Progressing Towards Goal

## 2019-10-02 NOTE — CONSULTS
Endocrinology Consult    Asked by Dr. Jackie Delacruz to see if this patient could have adrenal insufficiency. Reviewed ACTH stimulation test results:    Component      Latest Ref Rng & Units 10/1/2019           8:04 PM   Cortisol, baseline      ug/dL 8.2   Cortisol, 30 min.      ug/dL 27.6   Cortisol, 60 min.      ug/dL 35.1     60 min cortisol is well above 20 so this rules out adrenal insufficiency and a formal inperson consult is not needed. Recommend continued evaluation and treatment for syncope and orthostasis per primary team.    Thanks for the consult. Please don't hesitate to call [phone] with further questions.     Farheen Garcia MD

## 2019-10-02 NOTE — PROGRESS NOTES
1025:  Called [employer] at [phone] RE:pacemaker check for MRI testing. Per Jude Connors paged will be placed to [address] (representative), primary care RN aware. 1110: Called MRI department, per Kaylah Jade patient is scheduled for MRI at 1400 today, just waiting on [employer] representative to arrive. Primary care RN notified.

## 2019-10-02 NOTE — PROGRESS NOTES
Cardiology Progress Note            [address] [place] [address], [address] [facility]  [phone]    10/2/2019 12:52 PM    Admit Date: 9/29/2019    Admit Diagnosis: Syncope [R55]    Subjective:     Nabeel Blum   denies chest pain.     Visit Vitals  /85 (BP 1 Location: Left arm, BP Patient Position: At rest)   Pulse 61   Temp 97.4 °F (36.3 °C)   Resp 18   Wt 181 lb 3.5 oz (82.2 kg)   SpO2 91%   BMI 25.27 kg/m²     Current Facility-Administered Medications   Medication Dose Route Frequency    midodrine (PROAMITINE) tablet 10 mg  10 mg Oral TID WITH MEALS    aspirin chewable tablet 81 mg  81 mg Oral DAILY    busPIRone (BUSPAR) tablet 10 mg  10 mg Oral TID    clonazePAM (KlonoPIN) tablet 0.5 mg  0.5 mg Oral BID    albuterol-ipratropium (DUO-NEB) 2.5 MG-0.5 MG/3 ML  3 mL Nebulization Q6H PRN    DULoxetine (CYMBALTA) capsule 60 mg  60 mg Oral DAILY    gabapentin (NEURONTIN) capsule 600 mg  600 mg Oral DAILY    finasteride (PROSCAR) tablet 5 mg  5 mg Oral DAILY    sodium chloride (NS) flush 5-40 mL  5-40 mL IntraVENous Q8H    sodium chloride (NS) flush 5-40 mL  5-40 mL IntraVENous PRN    [Held by provider] enoxaparin (LOVENOX) injection 40 mg  40 mg SubCUTAneous DAILY    acetaminophen (TYLENOL) tablet 650 mg  650 mg Oral Q6H PRN         Objective:      Visit Vitals  /85 (BP 1 Location: Left arm, BP Patient Position: At rest)   Pulse 61   Temp 97.4 °F (36.3 °C)   Resp 18   Wt 181 lb 3.5 oz (82.2 kg)   SpO2 91%   BMI 25.27 kg/m²       Physical Exam:  Abdomen: soft, non-tender  Extremities: extremities normal  Heart: regular rate and rhythm  Lungs: clear to auscultation bilaterally  Pulses: 2+ and symmetric    Data Review:   Labs:    Recent Labs     10/01/19  0051 09/29/19  1318   WBC 5.8 5.8   HGB 13.5 12.4   HCT 40.3 36.8    145*     Recent Labs     10/01/19  0051 09/29/19  1318    141   K 4.0 4.3   * 106   CO2 27 26   GLU 89 81   BUN 16 22*   CREA 0.87 1.15   CA 8.5 8.3*   MG 2.0 --    ALB 3.8 3.7   TBILI 0.7 0.7   SGOT 15 16   ALT 27 24       Recent Labs     09/29/19  1318   TROIQ <0.05   CPK 46   CKMB <1.0         Intake/Output Summary (Last 24 hours) at 10/2/2019 1252  Last data filed at 10/2/2019 0840  Gross per 24 hour   Intake 120 ml   Output 1350 ml   Net -1230 ml        Telemetry: a paced    Assessment:     Active Problems:    Syncope (9/29/2019)        Plan:     Juan Antonio Walker is A paced. Having issues with orthostatics - likely secondary to parkinsons. Echo mentioned could not r/o MV vegetation. No fevers or wbc. If bld cx are negative, no need for further w/u with [facility].  Will f/u tmrw after bld cx results are back    Justin Tiwari MD, [facility]    10/2/2019

## 2019-10-03 PROCEDURE — 97164 PT RE-EVAL EST PLAN CARE: CPT

## 2019-10-03 PROCEDURE — 97116 GAIT TRAINING THERAPY: CPT

## 2019-10-03 PROCEDURE — 97530 THERAPEUTIC ACTIVITIES: CPT

## 2019-10-03 PROCEDURE — 97535 SELF CARE MNGMENT TRAINING: CPT | Performed by: OCCUPATIONAL THERAPIST

## 2019-10-03 PROCEDURE — 74011250636 HC RX REV CODE- 250/636: Performed by: GENERAL ACUTE CARE HOSPITAL

## 2019-10-03 PROCEDURE — 74011250637 HC RX REV CODE- 250/637: Performed by: GENERAL ACUTE CARE HOSPITAL

## 2019-10-03 PROCEDURE — 65660000000 HC RM CCU STEPDOWN

## 2019-10-03 PROCEDURE — 74011250637 HC RX REV CODE- 250/637: Performed by: HOSPITALIST

## 2019-10-03 PROCEDURE — 74011250637 HC RX REV CODE- 250/637: Performed by: PSYCHIATRY & NEUROLOGY

## 2019-10-03 RX ORDER — FLUDROCORTISONE ACETATE 0.1 MG/1
0.1 TABLET ORAL DAILY
Status: DISCONTINUED | OUTPATIENT
Start: 2019-10-03 | End: 2019-10-04

## 2019-10-03 RX ADMIN — Medication 10 ML: at 14:05

## 2019-10-03 RX ADMIN — BUSPIRONE HYDROCHLORIDE 10 MG: 10 TABLET ORAL at 15:35

## 2019-10-03 RX ADMIN — ENOXAPARIN SODIUM 40 MG: 40 INJECTION SUBCUTANEOUS at 10:03

## 2019-10-03 RX ADMIN — ASPIRIN 81 MG CHEWABLE TABLET 81 MG: 81 TABLET CHEWABLE at 10:03

## 2019-10-03 RX ADMIN — GABAPENTIN 600 MG: 300 CAPSULE ORAL at 10:03

## 2019-10-03 RX ADMIN — FLUDROCORTISONE ACETATE 0.1 MG: 0.1 TABLET ORAL at 14:05

## 2019-10-03 RX ADMIN — CLONAZEPAM 0.5 MG: 0.5 TABLET ORAL at 10:03

## 2019-10-03 RX ADMIN — MIDODRINE HYDROCHLORIDE 10 MG: 5 TABLET ORAL at 12:43

## 2019-10-03 RX ADMIN — BUSPIRONE HYDROCHLORIDE 10 MG: 10 TABLET ORAL at 10:03

## 2019-10-03 RX ADMIN — MIDODRINE HYDROCHLORIDE 10 MG: 5 TABLET ORAL at 10:03

## 2019-10-03 RX ADMIN — CLONAZEPAM 0.5 MG: 0.5 TABLET ORAL at 17:36

## 2019-10-03 RX ADMIN — Medication 10 ML: at 22:40

## 2019-10-03 RX ADMIN — BUSPIRONE HYDROCHLORIDE 10 MG: 10 TABLET ORAL at 22:39

## 2019-10-03 RX ADMIN — FINASTERIDE 5 MG: 5 TABLET, FILM COATED ORAL at 10:17

## 2019-10-03 RX ADMIN — DULOXETINE HYDROCHLORIDE 60 MG: 30 CAPSULE, DELAYED RELEASE ORAL at 10:03

## 2019-10-03 RX ADMIN — Medication 10 ML: at 06:00

## 2019-10-03 RX ADMIN — MIDODRINE HYDROCHLORIDE 10 MG: 5 TABLET ORAL at 17:36

## 2019-10-03 NOTE — PROGRESS NOTES
Problem: Syncope  Goal: *Absence of injury  Outcome: Progressing Towards Goal  Goal: Decrease or eliminate episodes of syncope  Outcome: Progressing Towards Goal     Problem: Patient Education: Go to Patient Education Activity  Goal: Patient/Family Education  Outcome: Progressing Towards Goal     Problem: Falls - Risk of  Goal: *Absence of Falls  Description  Document Amanda Arroyo Fall Risk and appropriate interventions in the flowsheet.   Outcome: Progressing Towards Goal  Note:   Fall Risk Interventions:  Mobility Interventions: Bed/chair exit alarm, OT consult for ADLs, Patient to call before getting OOB, PT Consult for mobility concerns, PT Consult for assist device competence, Strengthening exercises (ROM-active/passive), Utilize walker, cane, or other assistive device         Medication Interventions: Bed/chair exit alarm, Patient to call before getting OOB, Teach patient to arise slowly    Elimination Interventions: Call light in reach, Bed/chair exit alarm, Patient to call for help with toileting needs, Toilet paper/wipes in reach, Toileting schedule/hourly rounds, Urinal in reach    History of Falls Interventions: Bed/chair exit alarm, Door open when patient unattended, Room close to nurse's station, Vital signs minimum Q4HRs X 24 hrs (comment for end date)         Problem: Patient Education: Go to Patient Education Activity  Goal: Patient/Family Education  Outcome: Progressing Towards Goal

## 2019-10-03 NOTE — PROGRESS NOTES
NEUROLOGY NOTE     Chief Complaint   Patient presents with    Transfer Of Care       SUBJECTIVE:  Continues to be orthostatic    HPI  Kalen Urias is a 69 y.o. male who presents to the hospital because of hypotension. Pt does have dizziness for a while and came to the hospital because on  he was at [employer] and he had to sit and stand and he did not fell well with that. He went home and stood up out of the car and passed out. No bladder or bowel incontinence or any tongue bite. No postictal state. + tremors and bradykinesia.      ROS  A ten system review of constitutional, cardiovascular, respiratory, musculoskeletal, endocrine, skin, SHEENT, genitourinary, psychiatric and neurologic systems was obtained and is unremarkable except as stated in HPI     PMH  Past Medical History:   Diagnosis Date    Anxiety     Back pain     Blurred vision     Chest pain     Depression     Dizziness     Fast heart beat     Frequent headaches     Frequent urination     Hip dysplasia, congenital     Joint pain     Joint swelling     Lumbar spinal stenosis     Muscle pain     Neuropathy     Pacemaker     new pacemaker 2019    Recent change in weight     Sinus problem     Skin disease     SOB (shortness of breath)     Sore throat     SSS (sick sinus syndrome) (HCC)     Stiffness of joints, multiple sites     Stress     Syncope     Tiredness     Trouble in sleeping     Weakness        FH  Family History   Problem Relation Age of Onset    Cancer Mother     Cancer Maternal Aunt        SH  Social History     Socioeconomic History    Marital status:      Spouse name: Not on file    Number of children: Not on file    Years of education: Not on file    Highest education level: Not on file   Tobacco Use    Smoking status: Former Smoker     Packs/day: 0.25     Years: 50.00     Pack years: 12.50     Types: Cigarettes     Last attempt to quit: 8/3/2019     Years since quittin.1    Smokeless tobacco: Never Used   Substance and Sexual Activity    Alcohol use: Not Currently     Alcohol/week: 28.0 standard drinks     Types: 28 Cans of beer per week     Comment: None now    Drug use: No    Sexual activity: Never       ALLERGIES  No Known Allergies    PHYSICAL EXAMINATION:   Patient Vitals for the past 24 hrs:   Temp Pulse Resp BP SpO2   10/03/19 1111 98.6 °F (37 °C) 60 12 109/68 100 %   10/03/19 0829 98.5 °F (36.9 °C) 88 14 123/78 96 %   10/03/19 0320 97.8 °F (36.6 °C) 63 18 142/76 93 %   10/02/19 2309 97.4 °F (36.3 °C) 62 18 156/80 92 %   10/02/19 1911 97.9 °F (36.6 °C) 66 18 146/67 94 %   10/02/19 1520 97.6 °F (36.4 °C) 62 18 141/81 92 %        General:   General appearance: Pt is in no acute distress   Distal pulses are preserved    Neurological Examination:   Mental Status:  AAO x3. Speech is fluent. Follows commands, has normal fund of knowledge, attention, short term recall, comprehension and insight. Cranial Nerves: Visual fields are full. PERRL, Extraocular movements are full. Facial sensation intact. Facial movement intact. Hearing intact to conversation. Palate elevates symmetrically. Shoulder shrug symmetric. Tongue midline. Motor: Strength is 5/5 in all 4 ext. Cog wheel rigidity - tone. No atrophy. Sensation: Light touch - Normal    Reflexes: DTRs 2+ throughout. Plantar responses downgoing. Coordination/Cerebellar: Intact to finger-nose-finger     Skin: No significant bruising or lacerations. Bradykinesia on fast finger tapping. LAB DATA REVIEWED:    No results found for this or any previous visit (from the past 24 hour(s)). Imaging review:  MRI brain  Normal    CT Head  No acute intracranial abnormality. Carotid US  There is mild stenosis in the right ICA (<50%). The right vertebral is antegrade. There is mild stenosis in the left ICA (<50%). The left vertebral is antegrade.       HOME MEDS  Prior to Admission Medications   Prescriptions Last Dose Informant Patient Reported? Taking? COMBIVENT RESPIMAT  mcg/actuation inhaler 2019 at Unknown time  Yes Yes   DULoxetine (CYMBALTA) 60 mg capsule 2019 at Unknown time  No Yes   Sig: TAKE 1 CAPSULE BY MOUTH DAILY   aspirin 81 mg chewable tablet 2019 at Unknown time  No Yes   Sig: Take 1 Tab by mouth daily. busPIRone (BUSPAR) 10 mg tablet 2019 at Unknown time  No Yes   Sig: Take 1 Tab by mouth three (3) times daily. Indications: Repeated Episodes of Anxiety   clonazePAM (KLONOPIN) 1 mg tablet 2019 at Unknown time  No Yes   Sig: Take 0.5 Tabs by mouth two (2) times a day. Max Daily Amount: 1 mg.   cyanocobalamin, vitamin B-12, (VITAMIN B-12 PO) 2019 at Unknown time Self Yes Yes   Sig: Take 1 Tab by mouth daily. donepezil (ARICEPT) 10 mg tablet 2019 at Unknown time  No Yes   Sig: Take 1 Tab by mouth nightly. finasteride (PROSCAR) 5 mg tablet 2019 at Unknown time  No Yes   Sig: TAKE 1 TABLET BY MOUTH DAILY(PROSTATE)   gabapentin (NEURONTIN) 600 mg tablet 2019 at Unknown time  No Yes   Sig: Take 1 Tab by mouth daily. guaiFENesin ER (MUCINEX) 600 mg ER tablet 2019 at Unknown time  No Yes   Sig: Take 1 Tab by mouth every twelve (12) hours. Patient taking differently: Take 600 mg by mouth every twelve (12) hours. As needed   ipratropium (ATROVENT) 42 mcg (0.06 %) nasal spray   No No   [place] by Both Nostrils route four (4) times daily. Patient taking differently: 1 Spray by Both Nostrils route four (4) times daily as needed. naproxen (NAPROSYN) 500 mg tablet Not Taking at Unknown time  No No   Sig: Take 1 Tab by mouth daily. tamsulosin (FLOMAX) 0.4 mg capsule 2019 at Unknown time  No Yes   Sig: Take 1 Cap by mouth daily.       Facility-Administered Medications: None       CURRENT MEDS  Current Facility-Administered Medications   Medication Dose Route Frequency    fludrocortisone (FLORINEF) tablet 0.1 mg  0.1 mg Oral DAILY    midodrine (PROAMITINE) tablet 10 mg  10 mg Oral TID WITH MEALS    tamsulosin (FLOMAX) capsule 0.4 mg  0.4 mg Oral DAILY    aspirin chewable tablet 81 mg  81 mg Oral DAILY    busPIRone (BUSPAR) tablet 10 mg  10 mg Oral TID    clonazePAM (KlonoPIN) tablet 0.5 mg  0.5 mg Oral BID    DULoxetine (CYMBALTA) capsule 60 mg  60 mg Oral DAILY    gabapentin (NEURONTIN) capsule 600 mg  600 mg Oral DAILY    finasteride (PROSCAR) tablet 5 mg  5 mg Oral DAILY    sodium chloride (NS) flush 5-40 mL  5-40 mL IntraVENous Q8H    enoxaparin (LOVENOX) injection 40 mg  40 mg SubCUTAneous DAILY       IMPRESSION:  Wilman Hernandez is a 69 y.o. male who presents with orthostatic hypotension. On exam he does have parkinsonian features. 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 Will not start sinemet at that will increase the orthostatic hypotension.      RECOMMENDATIONS:  - Waist high compression stockings  - Continue po hydration  - Continue midodrine 10 mg po tid  - Start fludrocortisone 0.1 mg daily  - MRI C spine/MRA head and neck      Bhavana Varela MD  Neurologist

## 2019-10-03 NOTE — PROGRESS NOTES
Hospitalist Progress Note    NAME: Karishma Gutiérrez   :  1945   MRN:  [id number]       Assessment / Plan:  Syncope due to orthostatic hypotension due to multiple system atrophy - parkinson's type  -remain orthostatic on midodrine 10 mg TID    TSH normal   Cortisol was low in am, cosyntropin test normal. Endocrinology input appreciated - no adrenal insufficiency   MRI: chronic microvascular changes, no acute pathology    -stopped  Aricept ( started recently per pt, can cause dizziness/orthostatics)   -adding Florinef   -neurology help appreciated: On exam he does have parkinsonian features. 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 No sinemet at that will increase the orthostatic hypotension. Waist high compression stockings  MRI C spine/MRA head and neck    Hx SSS, s/p PPM   Possible MV vegetation    -echo: ef 56%, Possible vegetation present on the anterior leaflet of the mitral valve. 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 Appropriately functioning device; no arrhythmias. His syncope is not secondary to arrhythmia. His [employer] Yadira RUTH MRI is MRI COMPATIBLE. Cardiology form for MRI completed and on chart. BPH, con Proscar   Anxiety Disorder, cont scheduled klonopin, Buspar, Cymbalta   Neuropathy, cont Neurontin     10/01: wife was updated over the phone on all of the above; wife shared that pt dtr from the first marriage passed today.  Pt is not aware yet   10/02 and 10/03: wife was updated at bedside         Code status: DNR   NOK: wife   Prophylaxis: Lovenox - holding for [facility]     Baseline: lives with wife; ambulates with rolator   Recommended Disposition: Home w/Family once orthostatics resolve      Subjective:     Chief Complaint / Reason for Physician Visit: following orthostatic hypotension   Dizzy when standing up this am    BP down to 70s with standing up   Didn';t work with PT yet     Discussed with RN events overnight. Review of Systems:  Symptom Y/N Comments  Symptom Y/N Comments   Fever/Chills n   Chest Pain n    Poor Appetite    Edema     Cough    Abdominal Pain n    Sputum    Joint Pain     SOB/PATEL n   Pruritis/Rash     Nausea/vomit    Tolerating PT/OT     Diarrhea    Tolerating Diet     Constipation    Other       Could NOT obtain due to:      Objective:     VITALS:   Last 24hrs VS reviewed since prior progress note. Most recent are:  Patient Vitals for the past 24 hrs:   Temp Pulse Resp BP SpO2   10/03/19 1111 98.6 °F (37 °C) 60 12 109/68 100 %   10/03/19 0829 98.5 °F (36.9 °C) 88 14 123/78 96 %   10/03/19 0320 97.8 °F (36.6 °C) 63 18 142/76 93 %   10/02/19 2309 97.4 °F (36.3 °C) 62 18 156/80 92 %   10/02/19 1911 97.9 °F (36.6 °C) 66 18 146/67 94 %   10/02/19 1520 97.6 °F (36.4 °C) 62 18 141/81 92 %       Intake/Output Summary (Last 24 hours) at 10/3/2019 1325  Last data filed at 10/2/2019 2309  Gross per 24 hour   Intake 360 ml   Output 400 ml   Net -40 ml        PHYSICAL EXAM:  General: WD, WN. Alert, cooperative, no acute distress    EENT:  EOMI. Anicteric sclerae. MMM  Resp:  CTA bilaterally, no wheezing or rales. No accessory muscle use  CV:  Regular  rhythm,  No edema  GI:  Soft, Non distended, Non tender.  +Bowel sounds  Neurologic:  Alert and oriented X 3, normal speech,   Psych:   Fair insight. Not anxious nor agitated  Skin:  No rashes.   No jaundice    Reviewed most current lab test results and cultures  YES  Reviewed most current radiology test results   YES  Review and summation of old records today    NO  Reviewed patient's current orders and MAR    YES  PMH/SH reviewed - no change compared to H&P  ________________________________________________________________________  Care Plan discussed with:    Comments   Patient y    Family  y Wife bedside    RN y    Care Manager y    Consultant  y Neurology                      Multidiciplinary team rounds were held today with , nursing, pharmacist and clinical coordinator. Patient's plan of care was discussed; medications were reviewed and discharge planning was addressed. ________________________________________________________________________  Total NON critical care TIME:  35   Minutes    Total CRITICAL CARE TIME Spent:   Minutes non procedure based      Comments   >50% of visit spent in counseling and coordination of care     ________________________________________________________________________  David Dick MD     Procedures: see electronic medical records for all procedures/Xrays and details which were not copied into this note but were reviewed prior to creation of Plan. LABS:  I reviewed today's most current labs and imaging studies.   Pertinent labs include:  Recent Labs     10/01/19  0051   WBC 5.8   HGB 13.5   HCT 40.3        Recent Labs     10/01/19  0051      K 4.0   *   CO2 27   GLU 89   BUN 16   CREA 0.87   CA 8.5   MG 2.0   ALB 3.8   TBILI 0.7   SGOT 15   ALT 27       Signed: David Dick MD

## 2019-10-03 NOTE — PROGRESS NOTES
Vitals:    10/03/19 1408 10/03/19 1410 10/03/19 1414 10/03/19 1416   BP: 155/93 117/77 82/58 83/61   BP 1 Location: Right arm Right arm Right arm Right arm   BP Patient Position: Supine; HOB Elevated 50 degrees; Pre activity Sitting;Pre activity Standing; Pre activity Standing; Pre activity   Pulse: 62 65 62    Resp:       Temp:       SpO2: 96 96     Weight:           Vitals:    10/03/19 1421 10/03/19 1423 10/03/19 1425 10/03/19 1431   BP: (!) 71/53 (!) 69/51 95/68 110/72   BP 1 Location: Right arm Right arm Right arm Right arm   BP Patient Position: Standing;Post activity Standing;Post activity Sitting;Post activity Sitting; At rest   Pulse: 67 68 62    Resp:       Temp:       SpO2:       Weight:

## 2019-10-03 NOTE — PROGRESS NOTES
Bedside shift change report GIVEN TO MANUEL Sullivan. Report included the following information SBAR, Kardex and MAR. SIGNIFICANT CHANGES DURING SHIFT:        CONCERNS TO ADDRESS WITH MD:            [facility] NURSING NOTE   Admission Date 9/29/2019   Admission Diagnosis Syncope [R55]   Consults IP CONSULT TO CARDIOLOGY  IP CONSULT TO CARDIOLOGY  IP CONSULT TO HOSPITALIST  IP CONSULT TO NEUROLOGY  IP CONSULT TO ENDOCRINOLOGY      Cardiac Monitoring [x] Yes [] No      Purposeful Hourly Rounding [x] Yes    Delores Score Total Score: 3   Delores score 3 or > [x] Bed Alarm [] Avasys [] 1:1 sitter [] Patient refused (Signed refusal form in chart)   Kane Score Kane Score: 19   Kane score 14 or < [] PMT consult [] Wound Care consult    []  Specialty bed  [] Nutrition consult      Influenza Vaccine Received Flu Vaccine for Current Season (usually Sept-March): Yes           Oxygen needs? [x] Room air Oxygen @  []1L    []2L    []3L   []4L    []5L   []6L via  NC   Chronic home O2 use? [] Yes [] No  Perform O2 challenge test and document in progress note using smartphrase (.Homeoxygen)      Last bowel movement Last Bowel Movement Date: 09/30/19      Urinary Catheter             LDAs               Peripheral IV 09/29/19 Left Antecubital (Active)   Site Assessment Clean, dry, & intact 10/3/2019  4:36 AM   Phlebitis Assessment 0 10/3/2019  4:36 AM   Infiltration Assessment 0 10/3/2019  4:36 AM   Dressing Status Clean, dry, & intact 10/3/2019  4:36 AM   Dressing Type Transparent 10/3/2019  4:36 AM   Hub Color/Line Status Pink 10/3/2019  4:36 AM   Alcohol Cap Used No 9/29/2019  2:30 PM                         Readmission Risk Assessment Tool Score Low Risk            12       Total Score        3 Has Seen PCP in Last 6 Months (Yes=3, No=0)    4 IP Visits Last 12 Months (1-3=4, 4=9, >4=11)    5 Pt. Coverage (Medicare=5 , Medicaid, or Self-Pay=4)        Criteria that do not apply:    . Living with Significant Other.  Assisted Living. LTAC. SNF.  or   Rehab    Patient Length of Stay (>5 days = 3)    Charlson Comorbidity Score (Age + Comorbid Conditions)       Expected Length of Stay 2d 7h   Actual Length of Stay 4

## 2019-10-03 NOTE — PROGRESS NOTES
Problem: Syncope  Goal: *Absence of injury  Outcome: Progressing Towards Goal     Problem: Falls - Risk of  Goal: *Absence of Falls  Description  Document Lenaeugenia Solerfadi Fall Risk and appropriate interventions in the flowsheet.   Outcome: Progressing Towards Goal  Note:   Fall Risk Interventions:  Mobility Interventions: Bed/chair exit alarm         Medication Interventions: Assess postural VS orthostatic hypotension, Bed/chair exit alarm, Patient to call before getting OOB    Elimination Interventions: Call light in reach    History of Falls Interventions: Bed/chair exit alarm         Problem: Patient Education: Go to Patient Education Activity  Goal: Patient/Family Education  Outcome: Progressing Towards Goal

## 2019-10-03 NOTE — PROGRESS NOTES
0700: MANUEL Edwards (oncoming nurse) received bedside report from MANUEL Delacruz (offgoing nurse). 0900: Dr. Luma Flynn would like to give Midodrine and have orthos rechecked. 1115: Pt still orthostatic with midodrine. 1300: Called MRI regarding have pacemaker rep. MRI will call back with answer from rep and if they can schedule for today. 1900:    Bedside shift change report GIVEN TO Isela Bhatt RN. Report included the following information SBAR, Kardex, Intake/Output, MAR, Recent Results and Cardiac Rhythm Paced. SIGNIFICANT CHANGES DURING SHIFT:  Cardio signed off, neuro on board, MRI of spine/brain/neck tomorrow, started florinef, orthostatic with midodrine      CONCERNS TO ADDRESS WITH MD:  n/a          [facility] NURSING NOTE   Admission Date 9/29/2019   Admission Diagnosis Syncope [R55]   Consults IP CONSULT TO CARDIOLOGY  IP CONSULT TO CARDIOLOGY  IP CONSULT TO HOSPITALIST  IP CONSULT TO NEUROLOGY  IP CONSULT TO ENDOCRINOLOGY      Cardiac Monitoring [x] Yes [] No      Purposeful Hourly Rounding [x] Yes    Delores Score Total Score: 3   Delores score 3 or > [x] Bed Alarm [] Avasys [] 1:1 sitter [] Patient refused (Signed refusal form in chart)   Kane Score Kane Score: 19   Knae score 14 or < [] PMT consult [] Wound Care consult    []  Specialty bed  [] Nutrition consult      Influenza Vaccine Received Flu Vaccine for Current Season (usually Sept-March): Yes           Oxygen needs? [x] Room air Oxygen @  []1L    []2L    []3L   []4L    []5L   []6L via  NC   Chronic home O2 use?  [] Yes [] No  Perform O2 challenge test and document in progress note using smartphrase (.Homeoxygen)      Last bowel movement Last Bowel Movement Date: 10/02/19      Urinary Catheter             LDAs               Peripheral IV 09/29/19 Left Antecubital (Active)   Site Assessment Clean, dry, & intact 10/3/2019  8:29 AM   Phlebitis Assessment 0 10/3/2019  8:29 AM   Infiltration Assessment 0 10/3/2019  8:29 AM   Dressing Status Clean, dry, & intact 10/3/2019  8:29 AM   Dressing Type Tape;Transparent 10/3/2019  8:29 AM   Hub Color/Line Status Pink;Flushed 10/3/2019  8:29 AM   Alcohol Cap Used No 9/29/2019  2:30 PM                         Readmission Risk Assessment Tool Score Medium Risk            14       Total Score        3 Has Seen PCP in Last 6 Months (Yes=3, No=0)    2 . Living with Significant Other. Assisted Living. LTAC. SNF. or   Rehab    4 IP Visits Last 12 Months (1-3=4, 4=9, >4=11)    5 Pt.  Coverage (Medicare=5 , Medicaid, or Self-Pay=4)        Criteria that do not apply:    Patient Length of Stay (>5 days = 3)    Charlson Comorbidity Score (Age + Comorbid Conditions)       Expected Length of Stay 2d 7h   Actual Length of Stay 4

## 2019-10-03 NOTE — PROGRESS NOTES
Chart reviewed. Spoke with RN this morning. He is still having orthostatic hypotension. She recommends coming back in the afternoon instead when BP may be more stable.     Heber Herbert, PT

## 2019-10-03 NOTE — PROGRESS NOTES
Cardiac Electrophysiology Progress Note            [address], [address] [facility]  [phone]    10/3/2019 9:41 AM    Admit Date: 9/29/2019    Admit Diagnosis: Syncope [R55]    Subjective:     Chrisloapolinar Space   denies chest pain, chest pressure/discomfort, dyspnea, palpitations.     Visit Vitals  /78 (BP 1 Location: Right arm, BP Patient Position: At rest)   Pulse 88   Temp 98.5 °F (36.9 °C)   Resp 14   Wt 82.2 kg (181 lb 3.5 oz)   SpO2 96%   BMI 25.27 kg/m²     Current Facility-Administered Medications   Medication Dose Route Frequency    midodrine (PROAMITINE) tablet 10 mg  10 mg Oral TID WITH MEALS    tamsulosin (FLOMAX) capsule 0.4 mg  0.4 mg Oral DAILY    aspirin chewable tablet 81 mg  81 mg Oral DAILY    busPIRone (BUSPAR) tablet 10 mg  10 mg Oral TID    clonazePAM (KlonoPIN) tablet 0.5 mg  0.5 mg Oral BID    albuterol-ipratropium (DUO-NEB) 2.5 MG-0.5 MG/3 ML  3 mL Nebulization Q6H PRN    DULoxetine (CYMBALTA) capsule 60 mg  60 mg Oral DAILY    gabapentin (NEURONTIN) capsule 600 mg  600 mg Oral DAILY    finasteride (PROSCAR) tablet 5 mg  5 mg Oral DAILY    sodium chloride (NS) flush 5-40 mL  5-40 mL IntraVENous Q8H    sodium chloride (NS) flush 5-40 mL  5-40 mL IntraVENous PRN    enoxaparin (LOVENOX) injection 40 mg  40 mg SubCUTAneous DAILY    acetaminophen (TYLENOL) tablet 650 mg  650 mg Oral Q6H PRN         Objective:      Visit Vitals  /78 (BP 1 Location: Right arm, BP Patient Position: At rest)   Pulse 88   Temp 98.5 °F (36.9 °C)   Resp 14   Wt 82.2 kg (181 lb 3.5 oz)   SpO2 96%   BMI 25.27 kg/m²       Physical Exam:    General: elderly  male in NAD  Abdomen: soft, non-tender  Extremities: extremities normal  Heart: regular rate and rhythm  Lungs: clear to auscultation bilaterally  Pulses: 2+ and symmetric    Data Review:   Labs:    Recent Labs     10/01/19  0051   WBC 5.8   HGB 13.5   HCT 40.3        Recent Labs     10/01/19  0051      K 4.0 *   CO2 27   GLU 89   BUN 16   CREA 0.87   CA 8.5   MG 2.0   ALB 3.8   TBILI 0.7   SGOT 15   ALT 27       No results for input(s): TROIQ, CPK, CKMB in the last 72 hours. Intake/Output Summary (Last 24 hours) at 10/3/2019 0941  Last data filed at 10/2/2019 2309  Gross per 24 hour   Intake 600 ml   Output 600 ml   Net 0 ml        Telemetry: V paced    Assessment:     Active Problems:    Syncope (9/29/2019)        Plan:     Carlos Escamilla is  A paced. Remains orthostatic - likely secondary to parkinsons. Echo mentioned could not r/o MV vegetation. No fevers or wbc. After 2 days, blood cx are negative, no need for further w/u with [facility]. Cardiology will sign off. Thank you for this interesting consult. Katharina Catselan, ANP  Radha Cochran MD, [facility]  10/3/2019  9:41 AM      Patient seen and examined by me with nurse practitioner. I personally performed all components of the history, physical, and medical decision making and agree with the assessment and plan with minor modifications as noted. bld cx negative, nl wbc and afebrile. No need for [facility]. Will sign off.     Radha Cochran MD, [facility]

## 2019-10-03 NOTE — PROGRESS NOTES
Problem: Self Care Deficits Care Plan (Adult)  Goal: *Acute Goals and Plan of Care (Insert Text)  Description    FUNCTIONAL STATUS PRIOR TO ADMISSION: Patient was modified independent using a Rollator for functional mobility. HOME SUPPORT: The patient lived with family and needed assist from wife for ADLs and ILS. Occupational Therapy Goals  Initiated 9/30/2019  1. Patient will perform grooming with supervision/set-up within 7 day(s). 2.  Patient will perform bathing at the sink with supervision/set-up within 7 day(s). 3.  Patient will perform lower body dressing with minimal assistance/contact guard assist within 7 day(s). 4.  Patient will perform toilet transfers with modified independence within 7 day(s). 5.  Patient will perform all aspects of toileting with independence within 7 day(s). 6.  Patient will participate in upper extremity therapeutic exercise/activities with independence for 5 minutes within 7 day(s). 7.  Patient will utilize energy conservation techniques during functional activities with verbal cues within 7 day(s). Outcome: Progressing Towards Goal   OCCUPATIONAL THERAPY TREATMENT  Patient: Cindy Chou (10 y.o. male)  Date: 10/3/2019  Diagnosis: Syncope [R55] <principal problem not specified>       Precautions: Fall, DNR(orthostatic hypotension)  Chart, occupational therapy assessment, plan of care, and goals were reviewed. ASSESSMENT  Patient continues with skilled OT services and is progressing towards goals. Pt is suffering from asymptomatic OH. Pt's BP drops while sitting and severely drops with standing immediately and more so after a prolonged time. See vitals below. Pt is able to recover well with a brief rest. Therapists are advising pt to plan route and frequent rest breaks in order to minimize the amount of time standing.  Pt shows no evidence of learning, although his wife verbalized understanding and was attempting to assist therapists in explaining points to the pt. Vitals:     10/03/19 1408 10/03/19 1410 10/03/19 1414 10/03/19 1416   BP: 155/93 117/77 82/58 83/61   BP 1 Location: Right arm Right arm Right arm Right arm   BP Patient Position: Supine; HOB Elevated 50 degrees; Pre activity Sitting;Pre activity Standing; Pre activity Standing; Pre activity   Pulse: 62 65 62     Resp:           Temp:           SpO2: 96 96       Weight:                        Vitals:     10/03/19 1421 10/03/19 1423 10/03/19 1425 10/03/19 1431   BP: (!) 71/53 (!) 69/51 95/68 110/72   BP 1 Location: Right arm Right arm Right arm Right arm   BP Patient Position: Standing;Post activity Standing;Post activity Sitting;Post activity Sitting; At rest   Pulse: 67 68 62     Resp:           Temp:           SpO2:           Weight:                    Current Level of Function Impacting Discharge (ADLs): Pt was CGA for transfers and mobilization to bathroom with RW and verbal prompts for safe hand placement. Other factors to consider for discharge: OH         PLAN :  Patient continues to benefit from skilled intervention to address the above impairments. Continue treatment per established plan of care. to address goals.     Recommend with staff: closely monitor orthostatics, all meals in recliner, encourage leg exercises from sitting    Recommend next OT session: education of energy conservation techniques for transference of skills for adapted task management in order to minimize amount of prolonged standing, LB dressing    Recommendation for discharge: (in order for the patient to meet his/her long term goals)  Occupational therapy at least 2 days/week in the home AND ensure assist and/or supervision for safety with ADLs and functional transfers     This discharge recommendation:  Has been made in collaboration with the attending provider and/or case management    Equipment recommendations for successful discharge (if) home: shower chair       SUBJECTIVE:   Patient stated \"I stand frequently at home.  as therapists are explaining that prolonged standing is not safe until BP is stabilized. To which his wife replied, \"Yeah and that's where you passed out. \"    OBJECTIVE DATA SUMMARY:   Cognitive/Behavioral Status:  Neurologic State: Alert  Orientation Level: Appropriate for age;Oriented X4  Cognition: Appropriate decision making; Appropriate for age attention/concentration; Follows commands  Perception: Appears intact  Perseveration: No perseveration noted  Safety/Judgement: Awareness of environment    Functional Mobility and Transfers for ADLs:  Bed Mobility:  Rolling: (sitting on edge of bed when PT arrived)  Supine to Sit: Independent  Scooting: Independent    Transfers:  Sit to Stand: Modified independent(RW)  Functional Transfers  Bathroom Mobility: Contact guard assistance  Toilet Transfer : Contact guard assistance(VC for safe hand placement)  Bed to Chair: Contact guard assistance    Balance:  Sitting: Intact  Standing: Impaired  Standing - Static: Good;Occasional  Standing - Dynamic : Fair;Constant support    ADL Intervention:       Cognitive Retraining  Safety/Judgement: Awareness of environment      Pain:  0/10    Activity Tolerance:   Fair and asymptomatic OH   Please refer to the flowsheet for vital signs taken during this treatment. After treatment patient left in no apparent distress:   Sitting in chair, Call bell within reach and Caregiver / family present    COMMUNICATION/COLLABORATION:   The patients plan of care was discussed with: Physical Therapist    Karly Sierra  Time Calculation: 24 mins     Regarding student involvement in patient care:  A student participated in this treatment session. Per CMS Medicare statements and AOTA guidelines I certify that the following was true:  1. I was present and directly observed the entire session. 2. I made all skilled judgments and clinical decisions regarding care.   3. I am the practitioner responsible for assessment, treatment, and documentation.

## 2019-10-03 NOTE — PROGRESS NOTES
Problem: Mobility Impaired (Adult and Pediatric)  Goal: *Acute Goals and Plan of Care (Insert Text)  Description  FUNCTIONAL STATUS PRIOR TO ADMISSION: Patient was modified independent using a Rollator for functional mobility, but has had multiple episodes for syncope with falls in the last 6 months. Most episodes of syncope occur with prolonged standing and walking. HOME SUPPORT PRIOR TO ADMISSION: The patient lived with supportive spouse in [de-identified] free 1 story home. Physical Therapy Goals  Initiated 10/3/2019  1. Patient will move from supine to sit and sit to supine , scoot up and down and roll side to side in bed with independence within 7 day(s). 2.  Patient will transfer from bed to chair and chair to bed with supervision/set-up using the least restrictive device within 7 day(s). 3.  Patient will perform sit to stand with modified independence within 7 day(s). 4.  Patient will ambulate with supervision/set-up for 400 feet with the least restrictive device within 7 day(s). Outcome: Progressing Towards Goal  PHYSICAL THERAPY EVALUATION  Patient: Mehreen Lundy (75 y.o. male)  Date: 10/3/2019  Primary Diagnosis: Syncope [R55]        Precautions: Fall, DNR(orthostatic hypotension)      ASSESSMENT  Based on the objective data described below, the patient presents with continued orthostatic hypotension, bilateral LE weakness, decreased standing balance reactions with decreased mobility skills. See vitals below for details. The longer patient remains standing the lower BP became. Sitting improved as noted below. He may benefit from using a shower chair at home to limit standing if he does not own one. Instructed patient to consider sitting at table rather than standing in kitchen while drinking coffee in the morning and to avoid standing and walking for prolonged periods of time - his wife agrees, but patient likes to be up and moving.  He was able to tolerate walking outside his room about 225 feet with RW and cg assistance - BP was 122/60 once back to room and sitting. Biggest risk for falls are orthostatic hypotension, decreased balance reactions and LE weakness. Vitals:    10/03/19 1421 10/03/19 1423 10/03/19 1425 10/03/19 1431   BP: (!) 71/53 (!) 69/51 95/68 110/72   BP 1 Location: Right arm Right arm Right arm Right arm   BP Patient Position: Standing;Post activity Standing;Post activity Sitting;Post activity Sitting; At rest   Pulse: 67 68 62    Resp:       Temp:       SpO2:       Weight:            Current Level of Function Impacting Discharge (mobility/balance): modified independent sit to stand; cg assist for bed to chair transfers with RW. Functional Outcome Measure: The patient scored 65/100 on the Barthel outcome measure which is indicative of moderate functional impairment. Other factors to consider for discharge: spouse wants to be able to take him home and go to outpatient PT     Patient will benefit from skilled therapy intervention to address the above noted impairments. PLAN :  Recommendations and Planned Interventions: bed mobility training, transfer training, gait training, therapeutic exercises, neuromuscular re-education, patient and family training/education and therapeutic activities      Frequency/Duration: Patient will be followed by physical therapy:  3 times a week to address goals. Recommendation for discharge: (in order for the patient to meet his/her long term goals)  Physical therapy at least 2 days/week in the home AND ensure assist and/or supervision for safety with ADLs and mobility skills. This discharge recommendation:  Has not yet been discussed the attending provider and/or case management    Equipment recommendations for successful discharge (if) home: bedside commode and shower chair if patient does not have these           SUBJECTIVE:   Patient stated I can't tell if I'm about to pass out.     OBJECTIVE DATA SUMMARY:   HISTORY:    Past Medical History:   Diagnosis Date    Anxiety     Back pain     Blurred vision     Chest pain     Depression     Dizziness     Fast heart beat     Frequent headaches     Frequent urination     Hip dysplasia, congenital     Joint pain     Joint swelling     Lumbar spinal stenosis     Muscle pain     Neuropathy     Pacemaker     new pacemaker July 2019    Recent change in weight     Sinus problem     Skin disease     SOB (shortness of breath)     Sore throat     SSS (sick sinus syndrome) ([facility])     Stiffness of joints, multiple sites     Stress     Syncope     Tiredness     Trouble in sleeping     Weakness      Past Surgical History:   Procedure Laterality Date    CARDIAC SURG PROCEDURE UNLIST      HX HIP REPLACEMENT Bilateral 1993    HX PACEMAKER PLACEMENT  10/16/2018    HX ROTATOR CUFF REPAIR Right 11/16/2017    KS INS NEW/RPLCMT PRM PM W/TRANSV ELTRD ATRIAL&VENT N/A 7/30/2019    INSERT PPM DUAL performed by Mely Uribe MD at [facility] CARDIAC CATH LAB       Personal factors and/or comorbidities impacting plan of care: neuropathy, orthostatic hypotension    Home Situation  Home Environment: Private residence(barrier free home)  # Steps to Enter: 0  Wheelchair Ramp: Yes  One/Two Story Residence: One story  Living Alone: No  Support Systems: Spouse/Significant Other/Partner  Patient Expects to be Discharged to[de-identified] Private residence  Current DME Used/Available at Home: Walker, rollator  Tub or Shower Type: Shower    EXAMINATION/PRESENTATION/DECISION MAKING:   Critical Behavior:  Neurologic State: Alert  Orientation Level: Appropriate for age, Oriented X4  Cognition: Appropriate decision making, Appropriate for age attention/concentration, Follows commands  Safety/Judgement: Awareness of environment  Hearing:   Auditory  Auditory Impairment: None  Skin:    Edema:   Range Of Motion:  AROM: Within functional limits           PROM: Within functional limits           Strength:    Strength: Generally decreased, functional(grossly 4-/5 bilateral LEs)                    Tone & Sensation:   Tone: Normal              Sensation: (decreased in bilateral feet)               Coordination:  Coordination: Generally decreased, functional(decreased in LEs due to neuropathy and weakness)  Vision:   Acuity: Within Defined Limits  Corrective Lenses: Glasses  Functional Mobility:  Bed Mobility:  Rolling: (sitting on edge of bed when PT arrived)  Supine to Sit: Independent     Scooting: Independent  Transfers:  Sit to Stand: Modified independent(RW)  Stand to Sit: Stand-by assistance        Bed to Chair: Contact guard assistance              Balance:   Sitting: Intact  Standing: Impaired  Standing - Static: Good;Occasional  Standing - Dynamic : Fair;Constant support  Ambulation/Gait Training:  Distance (ft): 225 Feet (ft)  Assistive Device: Gait belt;Walker, rolling  Ambulation - Level of Assistance: Contact guard assistance;Stand-by assistance;Assist x1        Gait Abnormalities: Path deviations(R path deviations; R foot rotates outward in stance )        Base of Support: Widened     Speed/Dhara: Pace decreased (<100 feet/min)                    Functional Measure:  Barthel Index:    Bathin  Bladder: 10  Bowels: 10  Groomin  Dressin  Feeding: 10  Mobility: 10  Stairs: 0(deferred as no stairs at home)  Toilet Use: 5  Transfer (Bed to Chair and Back): 10  Total: 65/100       The Barthel ADL Index: Guidelines  1. The index should be used as a record of what a patient does, not as a record of what a patient could do. 2. The main aim is to establish degree of independence from any help, physical or verbal, however minor and for whatever reason. 3. The need for supervision renders the patient not independent. 4. A patient's performance should be established using the best available evidence. Asking the patient, friends/relatives and nurses are the usual sources, but direct observation and common sense are also important. However direct testing is not needed. 5. Usually the patient's performance over the preceding 24-48 hours is important, but occasionally longer periods will be relevant. 6. Middle categories imply that the patient supplies over 50 per cent of the effort. 7. Use of aids to be independent is allowed. Classie Serum., Barthel, D.W. (7769). Functional evaluation: the Barthel Index. [facility] (14)2. MIGUELINA Torres, Josh Yen., Armando Sloan., Jeanine, [facility] [facility] (1999). Measuring the change indisability after inpatient rehabilitation; comparison of the responsiveness of the Barthel Index and Functional Scott Measure. Journal of Neurology, Neurosurgery, and Psychiatry, 66(4), 510-857. Carter Sandhoff, NDerekJ.WANDA, LEONORA Gunderson, & Duran Chong M.A. (2004.) Assessment of post-stroke quality of life in cost-effectiveness studies: The usefulness of the Barthel Index and the EuroQoL-5D. Quality of Life Research, 15, 040-63            Physical Therapy Evaluation Charge Determination   History Examination Presentation Decision-Making   HIGH Complexity :3+ comorbidities / personal factors will impact the outcome/ POC  HIGH Complexity : 4+ Standardized tests and measures addressing body structure, function, activity limitation and / or participation in recreation  HIGH Complexity : Unstable and unpredictable characteristics  Other outcome measures Barthel  MEDIUM      Based on the above components, the patient evaluation is determined to be of the following complexity level: MEDIUM    Pain Rating:  Bilateral hip pain with walking reported at 4/10. 0/10 in sitting. Activity Tolerance:   Fair, SpO2 stable on RA, requires rest breaks and signs and symptoms of orthostatic hypotension  Please refer to the flowsheet for vital signs taken during this treatment.     After treatment patient left in no apparent distress:   Sitting in chair, Call bell within reach, Bed / chair alarm activated and Caregiver / family present    COMMUNICATION/EDUCATION:   The patients plan of care was discussed with: Occupational Therapist and Registered Nurse. Fall prevention education was provided and the patient/caregiver indicated understanding., Patient/family have participated as able in goal setting and plan of care. and Patient/family agree to work toward stated goals and plan of care.     Thank you for this referral.  Angella Meeks, PT   Time Calculation: 31 mins

## 2019-10-03 NOTE — PROGRESS NOTES
Orthostatics were just taken by nursing and pt remains very orthostatic with standing and change of position. They are increasing his medication to combat the orthostasis. Will follow up this afternoon for treatment per discussion with nurse.

## 2019-10-03 NOTE — PROGRESS NOTES
[address] INTERDISCIPLINARY ROUNDS    Cardiopulmonary Care Interdisciplinary Rounds were held today to discuss patient's plan of care and outcomes. The following members were present: NP/Physician, Pharmacy, Nursing and Case Management.   Expected Length of Stay:  2d 7h    PLAN OF CARE:   Continue current treatment plan

## 2019-10-04 ENCOUNTER — APPOINTMENT (OUTPATIENT)
Dept: MRI IMAGING | Age: 74
DRG: 057 | End: 2019-10-04
Attending: PSYCHIATRY & NEUROLOGY
Payer: MEDICARE

## 2019-10-04 PROCEDURE — 70544 MR ANGIOGRAPHY HEAD W/O DYE: CPT

## 2019-10-04 PROCEDURE — 70547 MR ANGIOGRAPHY NECK W/O DYE: CPT

## 2019-10-04 PROCEDURE — 74011250637 HC RX REV CODE- 250/637: Performed by: HOSPITALIST

## 2019-10-04 PROCEDURE — 74011250637 HC RX REV CODE- 250/637: Performed by: GENERAL ACUTE CARE HOSPITAL

## 2019-10-04 PROCEDURE — 97110 THERAPEUTIC EXERCISES: CPT

## 2019-10-04 PROCEDURE — 65660000000 HC RM CCU STEPDOWN

## 2019-10-04 PROCEDURE — 97116 GAIT TRAINING THERAPY: CPT

## 2019-10-04 PROCEDURE — 74011250636 HC RX REV CODE- 250/636: Performed by: GENERAL ACUTE CARE HOSPITAL

## 2019-10-04 PROCEDURE — 72141 MRI NECK SPINE W/O DYE: CPT

## 2019-10-04 PROCEDURE — 74011250637 HC RX REV CODE- 250/637: Performed by: PSYCHIATRY & NEUROLOGY

## 2019-10-04 RX ORDER — FLUDROCORTISONE ACETATE 0.1 MG/1
0.2 TABLET ORAL DAILY
Status: DISCONTINUED | OUTPATIENT
Start: 2019-10-05 | End: 2019-10-11 | Stop reason: HOSPADM

## 2019-10-04 RX ORDER — FLUDROCORTISONE ACETATE 0.1 MG/1
0.1 TABLET ORAL
Status: COMPLETED | OUTPATIENT
Start: 2019-10-04 | End: 2019-10-04

## 2019-10-04 RX ADMIN — MIDODRINE HYDROCHLORIDE 10 MG: 5 TABLET ORAL at 08:36

## 2019-10-04 RX ADMIN — ENOXAPARIN SODIUM 40 MG: 40 INJECTION SUBCUTANEOUS at 08:36

## 2019-10-04 RX ADMIN — CLONAZEPAM 0.5 MG: 0.5 TABLET ORAL at 08:36

## 2019-10-04 RX ADMIN — GABAPENTIN 600 MG: 300 CAPSULE ORAL at 08:36

## 2019-10-04 RX ADMIN — ASPIRIN 81 MG CHEWABLE TABLET 81 MG: 81 TABLET CHEWABLE at 08:35

## 2019-10-04 RX ADMIN — Medication 10 ML: at 05:58

## 2019-10-04 RX ADMIN — BUSPIRONE HYDROCHLORIDE 10 MG: 10 TABLET ORAL at 17:44

## 2019-10-04 RX ADMIN — FINASTERIDE 5 MG: 5 TABLET, FILM COATED ORAL at 09:00

## 2019-10-04 RX ADMIN — BUSPIRONE HYDROCHLORIDE 10 MG: 10 TABLET ORAL at 08:36

## 2019-10-04 RX ADMIN — CLONAZEPAM 0.5 MG: 0.5 TABLET ORAL at 17:44

## 2019-10-04 RX ADMIN — MIDODRINE HYDROCHLORIDE 10 MG: 5 TABLET ORAL at 13:21

## 2019-10-04 RX ADMIN — BUSPIRONE HYDROCHLORIDE 10 MG: 10 TABLET ORAL at 21:00

## 2019-10-04 RX ADMIN — MIDODRINE HYDROCHLORIDE 10 MG: 5 TABLET ORAL at 17:44

## 2019-10-04 RX ADMIN — FLUDROCORTISONE ACETATE 0.1 MG: 0.1 TABLET ORAL at 13:21

## 2019-10-04 RX ADMIN — Medication 10 ML: at 21:00

## 2019-10-04 RX ADMIN — FLUDROCORTISONE ACETATE 0.1 MG: 0.1 TABLET ORAL at 08:36

## 2019-10-04 RX ADMIN — Medication 10 ML: at 13:21

## 2019-10-04 RX ADMIN — DULOXETINE HYDROCHLORIDE 60 MG: 30 CAPSULE, DELAYED RELEASE ORAL at 08:36

## 2019-10-04 RX ADMIN — TAMSULOSIN HYDROCHLORIDE 0.4 MG: 0.4 CAPSULE ORAL at 08:36

## 2019-10-04 NOTE — PROGRESS NOTES
Problem: Syncope  Goal: *Absence of injury  Outcome: Progressing Towards Goal  Goal: Decrease or eliminate episodes of syncope  Outcome: Progressing Towards Goal     Problem: Patient Education: Go to Patient Education Activity  Goal: Patient/Family Education  Outcome: Progressing Towards Goal     Problem: Falls - Risk of  Goal: *Absence of Falls  Description  Document Priscilla Patches Fall Risk and appropriate interventions in the flowsheet.   Outcome: Progressing Towards Goal  Note:   Fall Risk Interventions:  Mobility Interventions: Bed/chair exit alarm         Medication Interventions: Bed/chair exit alarm    Elimination Interventions: Call light in reach, Bed/chair exit alarm    History of Falls Interventions: Bed/chair exit alarm         Problem: Patient Education: Go to Patient Education Activity  Goal: Patient/Family Education  Outcome: Progressing Towards Goal     Problem: Patient Education: Go to Patient Education Activity  Goal: Patient/Family Education  Outcome: Progressing Towards Goal     Problem: Patient Education: Go to Patient Education Activity  Goal: Patient/Family Education  Outcome: Progressing Towards Goal

## 2019-10-04 NOTE — PROGRESS NOTES
0700: Bedside shift change report given to DANIELA Kern RN (oncoming nurse) by Armando Rios RN (offgoing nurse). Report included the following information SBAR and Kardex. 1300: PT ALLIE for MRI. 1430: Pt back in room. 1900:   Bedside shift change report GIVEN TO Desiree Reyes RN. Report included the following information SBAR and Kardex. SIGNIFICANT CHANGES DURING SHIFT:  Pt had MRI today, pt voided a couple of times, pt vitals still orthstatic, pt will be here for a few more days. CONCERNS TO ADDRESS WITH MD:  D/c planning? [address] NURSING NOTE   Admission Date 9/29/2019   Admission Diagnosis Syncope [R55]   Consults IP CONSULT TO CARDIOLOGY  IP CONSULT TO CARDIOLOGY  IP CONSULT TO HOSPITALIST  IP CONSULT TO NEUROLOGY  IP CONSULT TO ENDOCRINOLOGY      Cardiac Monitoring [x] Yes [] No      Purposeful Hourly Rounding [x] Yes    Delores Score Total Score: 3   Delores score 3 or > [x] Bed Alarm [] Avasys [] 1:1 sitter [] Patient refused (Signed refusal form in chart)   Kane Score Kane Score: 19   Kane score 14 or < [] PMT consult [] Wound Care consult    []  Specialty bed  [] Nutrition consult      Influenza Vaccine Received Flu Vaccine for Current Season (usually Sept-March): Yes           Oxygen needs? [x] Room air Oxygen @  []1L    []2L    []3L   []4L    []5L   []6L via  NC   Chronic home O2 use?  [] Yes [] No  Perform O2 challenge test and document in progress note using smartphrase (.Homeoxygen)      Last bowel movement Last Bowel Movement Date: 10/04/19      Urinary Catheter             LDAs               Peripheral IV 09/29/19 Left Antecubital (Active)   Site Assessment Clean, dry, & intact 10/4/2019  3:26 PM   Phlebitis Assessment 0 10/4/2019  3:26 PM   Infiltration Assessment 0 10/4/2019  3:26 PM   Dressing Status Clean, dry, & intact 10/4/2019  3:26 PM   Dressing Type Transparent;Tape 10/4/2019  3:26 PM   Hub Color/Line Status Pink 10/4/2019  3:26 PM   Alcohol Cap Used No 9/29/2019  2:30 PM Readmission Risk Assessment Tool Score Medium Risk            14       Total Score        3 Has Seen PCP in Last 6 Months (Yes=3, No=0)    2 . Living with Significant Other. Assisted Living. LTAC. SNF. or   Rehab    4 IP Visits Last 12 Months (1-3=4, 4=9, >4=11)    5 Pt.  Coverage (Medicare=5 , Medicaid, or Self-Pay=4)        Criteria that do not apply:    Patient Length of Stay (>5 days = 3)    Charlson Comorbidity Score (Age + Comorbid Conditions)       Expected Length of Stay 3d 21h   Actual Length of Stay 5

## 2019-10-04 NOTE — PROGRESS NOTES
Problem: Syncope  Goal: *Absence of injury  Outcome: Progressing Towards Goal  Goal: Decrease or eliminate episodes of syncope  Outcome: Progressing Towards Goal     Problem: Patient Education: Go to Patient Education Activity  Goal: Patient/Family Education  Outcome: Progressing Towards Goal     Problem: Falls - Risk of  Goal: *Absence of Falls  Description  Document Linda Fajardo Fall Risk and appropriate interventions in the flowsheet.   Outcome: Progressing Towards Goal  Note:   Fall Risk Interventions:  Mobility Interventions: Bed/chair exit alarm, Communicate number of staff needed for ambulation/transfer, Mechanical lift, OT consult for ADLs, Patient to call before getting OOB, PT Consult for mobility concerns         Medication Interventions: Bed/chair exit alarm, Evaluate medications/consider consulting pharmacy, Patient to call before getting OOB, Teach patient to arise slowly    Elimination Interventions: Bed/chair exit alarm, Call light in reach, Elevated toilet seat, Patient to call for help with toileting needs, Stay With Me (per policy), Toilet paper/wipes in reach, Toileting schedule/hourly rounds, Urinal in reach    History of Falls Interventions: Bed/chair exit alarm, Consult care management for discharge planning, Door open when patient unattended, Evaluate medications/consider consulting pharmacy, Investigate reason for fall, Room close to nurse's station, Utilize gait belt for transfer/ambulation, Assess for delayed presentation/identification of injury for 48 hrs (comment for end date)         Problem: Patient Education: Go to Patient Education Activity  Goal: Patient/Family Education  Outcome: Progressing Towards Goal     Problem: Patient Education: Go to Patient Education Activity  Goal: Patient/Family Education  Outcome: Progressing Towards Goal     Problem: Patient Education: Go to Patient Education Activity  Goal: Patient/Family Education  Outcome: Progressing Towards Goal

## 2019-10-04 NOTE — PROGRESS NOTES
Initial Nutrition Assessment:    INTERVENTIONS/RECOMMENDATIONS:   · Continue cardiac diet     ASSESSMENT:   Chart reviewed, medically noted for Syncope due to orthostatic hypotension due to multiple system atrophy - parkinson's type and PMH shown below. Assessing pt due to LOS. Pt reports good appetite and eating well. This is confirmed by flowsheet documentation. Weight history shows no significant weight loss PTA.      Past Medical History:   Diagnosis Date    Anxiety     Back pain     Blurred vision     Chest pain     Depression     Dizziness     Fast heart beat     Frequent headaches     Frequent urination     Hip dysplasia, congenital     Joint pain     Joint swelling     Lumbar spinal stenosis     Muscle pain     Neuropathy     Pacemaker     new pacemaker July 2019    Recent change in weight     Sinus problem     Skin disease     SOB (shortness of breath)     Sore throat     SSS (sick sinus syndrome) (H[facility])     Stiffness of joints, multiple sites     Stress     Syncope     Tiredness     Trouble in sleeping     Weakness        Diet Order: Cardiac  % Eaten:    Patient Vitals for the past 72 hrs:   % Diet Eaten   10/03/19 1401 75 %   10/03/19 0959 75 %   10/02/19 1833 100 %   10/02/19 1306 100 %   10/02/19 0830 0 %     Pertinent Medications: [x]Reviewed:   Pertinent Labs: [x]Reviewed:   Food Allergies: [x]NKFA  []Other   Last BM: 10/2  Edema:   n/a     []RUE   []LUE   []RLE   []LLE      Pressure Injury:  n/a    [] Stage I   [] Stage II   [] Stage III   [] Stage IV      Wt Readings from Last 30 Encounters:   09/29/19 82.2 kg (181 lb 3.5 oz)   09/30/19 82.2 kg (181 lb 3.5 oz)   09/29/19 81.6 kg (180 lb)   09/13/19 83.3 kg (183 lb 10.3 oz)   09/03/19 82.7 kg (182 lb 4.8 oz)   08/02/19 81.6 kg (180 lb)   07/30/19 81.6 kg (180 lb)   07/25/19 79.8 kg (176 lb)   07/03/19 80.7 kg (178 lb)   07/02/19 80.6 kg (177 lb 11.1 oz)   06/27/19 81.9 kg (180 lb 8 oz)   05/02/19 85 kg (187 lb 6.4 oz) 02/08/19 82.6 kg (182 lb)   02/05/19 81.7 kg (180 lb 3.2 oz)   12/20/18 78.5 kg (173 lb)   12/12/18 79.8 kg (176 lb)   11/28/18 75.6 kg (166 lb 9.6 oz)   11/10/18 72.6 kg (160 lb)   11/12/18 72.6 kg (160 lb)   11/08/18 72.6 kg (160 lb)   11/01/18 78.2 kg (172 lb 6.4 oz)   10/24/18 76.8 kg (169 lb 5 oz)   10/19/18 74.4 kg (164 lb)   10/16/18 72.6 kg (160 lb)   10/02/18 75.1 kg (165 lb 9.6 oz)   09/20/18 75.3 kg (166 lb)   09/14/18 74.9 kg (165 lb 3.2 oz)   09/11/18 73.5 kg (162 lb)   09/10/18 72.6 kg (160 lb)   07/19/18 75.9 kg (167 lb 6 oz)       Anthropometrics:   Height:   Weight: 82.2 kg (181 lb 3.5 oz)   IBW (%IBW):   ( ) UBW (%UBW):   (  %)   Last Weight Metrics:  Weight Loss Metrics 9/29/2019 9/29/2019 9/13/2019 9/3/2019 8/2/2019 7/30/2019 7/25/2019   Today's Wt 181 lb 3.5 oz 180 lb 183 lb 10.3 oz 182 lb 4.8 oz 180 lb 180 lb 176 lb   BMI 25.27 kg/m2 25.1 kg/m2 25.61 kg/m2 25.43 kg/m2 25.1 kg/m2 25.1 kg/m2 24.55 kg/m2       BMI: Body mass index is 25.27 kg/m². This BMI is indicative of:   []Underweight    []Normal    [x]Overweight    [] Obesity   [] Extreme Obesity (BMI>40)     Estimated Nutrition Needs (Based on):   2050 Kcals/day(BMR: 1575 x 1.3) , 80 g(1 g/kg) Protein  Carbohydrate:  At Least 130 g/day  Fluids: 2050 mL/day (1ml/kcal) or per primary team    NUTRITION DIAGNOSES:   Problem:  No nutritional diagnosis at this time      Etiology: related to       Signs/Symptoms: as evidenced by        NUTRITION INTERVENTIONS:  Meals/Snacks: General/healthful diet                  GOAL:   consume >50% of meals in 5-7 days    LEARNING NEEDS (Diet, Food/Nutrient-Drug Interaction):    [x] None Identified   [] Identified and Education Provided/Documented   [] Identified and Pt declined/was not appropriate     Cultureal, Hindu, OR Ethnic Dietary Needs:    [x] None Identified   [] Identified and Addressed     [x] Interdisciplinary Care Plan Reviewed/Documented    [x] Discharge Planning: general healthy diet MONITORING /EVALUATION:      Food/Nutrient Intake Outcomes:  Total energy intake  Physical Signs/Symptoms Outcomes: Weight/weight change, Electrolyte and renal profile, GI    NUTRITION RISK:    [] High              [] Moderate           []  Low  [x]  Minimal/Uncompromised    PT SEEN FOR:    []  MD Consult: []Calorie Count      []Diabetic Diet Education        []Diet Education     []Electrolyte Management     []General Nutrition Management and Supplements     []Management of Tube Feeding     []TPN Recommendations    []  RN Referral:  []MST score >=2     []Enteral/Parenteral Nutrition PTA     []Pregnant: Gestational DM or Multigestation     []Pressure Ulcer/Wound Care needs        []  Low BMI  [x]  LOS Referral       Oni Bishop RDN  Pager [phone]  Weekend Pager [phone]

## 2019-10-04 NOTE — PROGRESS NOTES
Bedside shift change report GIVEN TO Marjorie Anna RN. Report included the following information SBAR, Kardex, Intake/Output, MAR and Recent Results. SIGNIFICANT CHANGES DURING SHIFT:        CONCERNS TO ADDRESS WITH MD:  [facility] NURSING NOTE   Admission Date 9/29/2019   Admission Diagnosis Syncope [R55]   Consults IP CONSULT TO CARDIOLOGY  IP CONSULT TO CARDIOLOGY  IP CONSULT TO HOSPITALIST  IP CONSULT TO NEUROLOGY  IP CONSULT TO ENDOCRINOLOGY      Cardiac Monitoring [x] Yes [] No      Purposeful Hourly Rounding [x] Yes    Delores Score Total Score: 3   Delores score 3 or > [x] Bed Alarm [] Avasys [] 1:1 sitter [] Patient refused (Signed refusal form in chart)   Kane Score Kane Score: 19   Kane score 14 or < [] PMT consult [] Wound Care consult    []  Specialty bed  [] Nutrition consult      Influenza Vaccine Received Flu Vaccine for Current Season (usually Sept-March): Yes           Oxygen needs? [x] Room air Oxygen @  []1L    []2L    []3L   []4L    []5L   []6L via  NC   Chronic home O2 use? [] Yes [x] No  Perform O2 challenge test and document in progress note using smartphrase (.Homeoxygen)      Last bowel movement Last Bowel Movement Date: 10/04/19      Urinary Catheter             LDAs               Peripheral IV 09/29/19 Left Antecubital (Active)   Site Assessment Clean, dry, & intact 10/4/2019  7:35 PM   Phlebitis Assessment 0 10/4/2019  7:35 PM   Infiltration Assessment 0 10/4/2019  7:35 PM   Dressing Status Clean, dry, & intact 10/4/2019  7:35 PM   Dressing Type Transparent 10/4/2019  7:35 PM   Hub Color/Line Status Pink;Flushed 10/4/2019  7:35 PM   Alcohol Cap Used No 9/29/2019  2:30 PM                         Readmission Risk Assessment Tool Score Medium Risk            14       Total Score        3 Has Seen PCP in Last 6 Months (Yes=3, No=0)    2 . Living with Significant Other. Assisted Living. LTAC. SNF. or   Rehab    4 IP Visits Last 12 Months (1-3=4, 4=9, >4=11)    5 Pt.  Coverage (Medicare=5 , Medicaid, or Self-Pay=4)        Criteria that do not apply:    Patient Length of Stay (>5 days = 3)    Charlson Comorbidity Score (Age + Comorbid Conditions)       Expected Length of Stay 3d 21h   Actual Length of Stay 5

## 2019-10-04 NOTE — PROGRESS NOTES
Bedside shift change report given to Tena Gr (oncoming nurse) by Baldo(offgoing nurse). Report included the following information SBAR and Kardex.

## 2019-10-04 NOTE — PROGRESS NOTES
Problem: Mobility Impaired (Adult and Pediatric)  Goal: *Acute Goals and Plan of Care (Insert Text)  Description  FUNCTIONAL STATUS PRIOR TO ADMISSION: Patient was modified independent using a Rollator for functional mobility, but has had multiple episodes for syncope with falls in the last 6 months. Most episodes of syncope occur with prolonged standing and walking. HOME SUPPORT PRIOR TO ADMISSION: The patient lived with supportive spouse in [de-identified] free 1 story home. Physical Therapy Goals  Initiated 10/3/2019  1. Patient will move from supine to sit and sit to supine , scoot up and down and roll side to side in bed with independence within 7 day(s). 2.  Patient will transfer from bed to chair and chair to bed with supervision/set-up using the least restrictive device within 7 day(s). 3.  Patient will perform sit to stand with modified independence within 7 day(s). 4.  Patient will ambulate with supervision/set-up for 400 feet with the least restrictive device within 7 day(s). Note:   PHYSICAL THERAPY TREATMENT  Patient: Wilman Hernandez (75 y.o. male)  Date: 10/4/2019  Diagnosis: Syncope [R55]        Precautions: Fall, DNR(orthostatic hypotension)  Chart, physical therapy assessment, plan of care and goals were reviewed. ASSESSMENT  Patient continues with skilled PT services and is progressing slow towards goals, pt's biggest setback for mobility at this time is his BP, very orthostatic once on his feet, he does well with bed mob and transfers, good motivation, did well with ther-ex, vc's for safety and proper RW use. Current Level of Function Impacting Discharge (mobility/balance): CGA x1       PLAN :  Patient continues to benefit from skilled intervention to address the above impairments. Continue treatment per established plan of care. to address goals.     Recommendation for discharge: (in order for the patient to meet his/her long term goals)  Physical therapy at least 2 days/week in the home AND ensure assist and/or supervision for safety with mobility and ADL's     This discharge recommendation:  Has not yet been discussed the attending provider and/or case management    Equipment recommendations for successful discharge (if) home: rolling walker       OBJECTIVE DATA SUMMARY:     Critical Behavior:  Neurologic State: Alert  Orientation Level: Oriented X4  Cognition: Appropriate decision making, Appropriate for age attention/concentration, Appropriate safety awareness, Follows commands  Safety/Judgement: Awareness of environment    Functional Mobility Training:  Bed Mobility:  Rolling: Independent  Supine to Sit: Independent  Scooting: Independent  Level of Assistance: Independent  Interventions: Verbal cues    Transfers:  Sit to Stand: Modified independent  Stand to Sit: Modified independent  Bed to Chair: Contact guard assistance  Interventions: Tactile cues; Verbal cues  Level of Assistance: Contact guard assistance    Balance:  Sitting: Intact; Without support  Standing: Intact; With support  Standing - Static: Good;Constant support;Occasional  Standing - Dynamic : Good;Constant support    Ambulation/Gait Training:  Distance (ft): 12 Feet (ft)  Assistive Device: Walker, rolling;Gait belt  Ambulation - Level of Assistance: Contact guard assistance;Assist x1  Gait Abnormalities: Decreased step clearance  Right Side Weight Bearing: Full  Left Side Weight Bearing: Full  Base of Support: Widened  Stance: (equal)  Speed/Dhara: Pace decreased (<100 feet/min)  Step Length: Left shortened;Right shortened    Therapeutic Exercises:   sitting  EXERCISE   Sets   Reps   Active Active Assist   Passive   Comments   Ankle pumps 1 10 ? ? ? bilat   Heel raises 1 10 ? ? ? \"   Toe tap 1 10 ? ? ? \"   Knee ext 1 10 ? ? ? \"   Hip flex 1 10 ? ? ? \"   Abd & Add 1 10 ? ? ? \"     Pain Ratin/10    Activity Tolerance: Poor and signs and symptoms of orthostatic hypotension    Please refer to the flowsheet for vital signs taken during this treatment.     After treatment patient left in no apparent distress: Sitting in chair, Call bell within reach and Caregiver / family present    COMMUNICATION/COLLABORATION:   The patients plan of care was discussed with: Registered Nurse    Castro Sanders PTA   Time Calculation: 25 mins

## 2019-10-04 NOTE — PROGRESS NOTES
Problem: Falls - Risk of  Goal: *Absence of Falls  Description  Document Lan Pike Fall Risk and appropriate interventions in the flowsheet.   Outcome: Progressing Towards Goal  Note:   Fall Risk Interventions:  Mobility Interventions: Bed/chair exit alarm, Communicate number of staff needed for ambulation/transfer, OT consult for ADLs, Patient to call before getting OOB, PT Consult for mobility concerns, PT Consult for assist device competence, Strengthening exercises (ROM-active/passive)         Medication Interventions: Assess postural VS orthostatic hypotension, Bed/chair exit alarm, Evaluate medications/consider consulting pharmacy, Patient to call before getting OOB, Teach patient to arise slowly    Elimination Interventions: Bed/chair exit alarm, Call light in reach, Patient to call for help with toileting needs, Stay With Me (per policy), Urinal in reach    History of Falls Interventions: Bed/chair exit alarm, Consult care management for discharge planning, Door open when patient unattended, Evaluate medications/consider consulting pharmacy, Investigate reason for fall, Room close to nurse's station, Assess for delayed presentation/identification of injury for 48 hrs (comment for end date), Vital signs minimum Q4HRs X 24 hrs (comment for end date)

## 2019-10-04 NOTE — PROGRESS NOTES
NEUROLOGY NOTE     Chief Complaint   Patient presents with    Transfer Of Care       SUBJECTIVE:  Continues to be orthostatic    HPI  Mehreen Case is a 69 y.o. male who presents to the hospital because of hypotension. Pt does have dizziness for a while and came to the hospital because on  he was at [employer] and he had to sit and stand and he did not fell well with that. He went home and stood up out of the car and passed out. No bladder or bowel incontinence or any tongue bite. No postictal state. + tremors and bradykinesia.      ROS  A ten system review of constitutional, cardiovascular, respiratory, musculoskeletal, endocrine, skin, SHEENT, genitourinary, psychiatric and neurologic systems was obtained and is unremarkable except as stated in HPI     PMH  Past Medical History:   Diagnosis Date    Anxiety     Back pain     Blurred vision     Chest pain     Depression     Dizziness     Fast heart beat     Frequent headaches     Frequent urination     Hip dysplasia, congenital     Joint pain     Joint swelling     Lumbar spinal stenosis     Muscle pain     Neuropathy     Pacemaker     new pacemaker 2019    Recent change in weight     Sinus problem     Skin disease     SOB (shortness of breath)     Sore throat     SSS (sick sinus syndrome) (HCC)     Stiffness of joints, multiple sites     Stress     Syncope     Tiredness     Trouble in sleeping     Weakness        FH  Family History   Problem Relation Age of Onset    Cancer Mother     Cancer Maternal Aunt        SH  Social History     Socioeconomic History    Marital status:      Spouse name: Not on file    Number of children: Not on file    Years of education: Not on file    Highest education level: Not on file   Tobacco Use    Smoking status: Former Smoker     Packs/day: 0.25     Years: 50.00     Pack years: 12.50     Types: Cigarettes     Last attempt to quit: 8/3/2019     Years since quittin.1    Smokeless tobacco: Never Used   Substance and Sexual Activity    Alcohol use: Not Currently     Alcohol/week: 28.0 standard drinks     Types: 28 Cans of beer per week     Comment: None now    Drug use: No    Sexual activity: Never       ALLERGIES  No Known Allergies    PHYSICAL EXAMINATION:   Patient Vitals for the past 24 hrs:   Temp Pulse Resp BP SpO2   10/04/19 1135 -- -- -- 107/52 --   10/04/19 1130 -- -- -- (!) 83/46 --   10/04/19 1128 -- -- -- (!) 65/43 --   10/04/19 1059 97.4 °F (36.3 °C) 61 18 121/70 94 %   10/04/19 0947 -- 66 -- (!) 70/48 --   10/04/19 0945 -- 74 -- 97/60 --   10/04/19 0943 -- 60 -- 147/72 --   10/04/19 0804 97.6 °F (36.4 °C) 61 18 145/78 99 %   10/04/19 0226 97.6 °F (36.4 °C) 62 18 (!) 157/91 95 %   10/03/19 2312 97.5 °F (36.4 °C) (!) 59 18 139/78 96 %   10/03/19 2019 97.6 °F (36.4 °C) 60 16 139/71 93 %   10/03/19 1515 97.4 °F (36.3 °C) 60 13 170/84 92 %   10/03/19 1431 -- -- -- 110/72 --   10/03/19 1425 -- 62 -- 95/68 --   10/03/19 1423 -- 68 -- (!) 69/51 --   10/03/19 1421 -- 67 -- (!) 71/53 --   10/03/19 1416 -- -- -- (!) 83/61 --   10/03/19 1414 -- 62 -- (!) 82/58 --   10/03/19 1410 -- 65 -- 117/77 96 %   10/03/19 1408 -- 62 -- (!) 155/93 96 %        General:   General appearance: Pt is in no acute distress   Distal pulses are preserved    Neurological Examination:   Mental Status:  AAO x3. Speech is fluent. Follows commands, has normal fund of knowledge, attention, short term recall, comprehension and insight. Cranial Nerves: Visual fields are full. PERRL, Extraocular movements are full. Facial sensation intact. Facial movement intact. Hearing intact to conversation. Palate elevates symmetrically. Shoulder shrug symmetric. Tongue midline. Motor: Strength is 5/5 in all 4 ext. Cog wheel rigidity - tone. No atrophy. Sensation: Light touch - Normal    Reflexes: DTRs 2+ throughout. Plantar responses downgoing.      Coordination/Cerebellar: Intact to finger-nose-finger     Skin: No significant bruising or lacerations. Bradykinesia on fast finger tapping. LAB DATA REVIEWED:    No results found for this or any previous visit (from the past 24 hour(s)). Imaging review:  MRI brain  Normal    CT Head  No acute intracranial abnormality. Carotid US  There is mild stenosis in the right ICA (<50%). The right vertebral is antegrade. There is mild stenosis in the left ICA (<50%). The left vertebral is antegrade. HOME MEDS  Prior to Admission Medications   Prescriptions Last Dose Informant Patient Reported? Taking? COMBIVENT RESPIMAT  mcg/actuation inhaler 2019 at Unknown time  Yes Yes   DULoxetine (CYMBALTA) 60 mg capsule 2019 at Unknown time  No Yes   Sig: TAKE 1 CAPSULE BY MOUTH DAILY   aspirin 81 mg chewable tablet 2019 at Unknown time  No Yes   Sig: Take 1 Tab by mouth daily. busPIRone (BUSPAR) 10 mg tablet 2019 at Unknown time  No Yes   Sig: Take 1 Tab by mouth three (3) times daily. Indications: Repeated Episodes of Anxiety   clonazePAM (KLONOPIN) 1 mg tablet 2019 at Unknown time  No Yes   Sig: Take 0.5 Tabs by mouth two (2) times a day. Max Daily Amount: 1 mg.   cyanocobalamin, vitamin B-12, (VITAMIN B-12 PO) 2019 at Unknown time Self Yes Yes   Sig: Take 1 Tab by mouth daily. donepezil (ARICEPT) 10 mg tablet 2019 at Unknown time  No Yes   Sig: Take 1 Tab by mouth nightly. finasteride (PROSCAR) 5 mg tablet 2019 at Unknown time  No Yes   Sig: TAKE 1 TABLET BY MOUTH DAILY(PROSTATE)   gabapentin (NEURONTIN) 600 mg tablet 2019 at Unknown time  No Yes   Sig: Take 1 Tab by mouth daily. guaiFENesin ER (MUCINEX) 600 mg ER tablet 2019 at Unknown time  No Yes   Sig: Take 1 Tab by mouth every twelve (12) hours. Patient taking differently: Take 600 mg by mouth every twelve (12) hours. As needed   ipratropium (ATROVENT) 42 mcg (0.06 %) nasal spray   No No   Si [place] by Both Nostrils route four (4) times daily. Patient taking differently: 1 Spray by Both Nostrils route four (4) times daily as needed. naproxen (NAPROSYN) 500 mg tablet Not Taking at Unknown time  No No   Sig: Take 1 Tab by mouth daily. tamsulosin (FLOMAX) 0.4 mg capsule 9/29/2019 at Unknown time  No Yes   Sig: Take 1 Cap by mouth daily. Facility-Administered Medications: None       CURRENT MEDS  Current Facility-Administered Medications   Medication Dose Route Frequency    [START ON 10/5/2019] fludrocortisone (FLORINEF) tablet 0.2 mg  0.2 mg Oral DAILY    fludrocortisone (FLORINEF) tablet 0.1 mg  0.1 mg Oral NOW    midodrine (PROAMITINE) tablet 10 mg  10 mg Oral TID WITH MEALS    tamsulosin (FLOMAX) capsule 0.4 mg  0.4 mg Oral DAILY    aspirin chewable tablet 81 mg  81 mg Oral DAILY    busPIRone (BUSPAR) tablet 10 mg  10 mg Oral TID    clonazePAM (KlonoPIN) tablet 0.5 mg  0.5 mg Oral BID    DULoxetine (CYMBALTA) capsule 60 mg  60 mg Oral DAILY    gabapentin (NEURONTIN) capsule 600 mg  600 mg Oral DAILY    finasteride (PROSCAR) tablet 5 mg  5 mg Oral DAILY    sodium chloride (NS) flush 5-40 mL  5-40 mL IntraVENous Q8H    enoxaparin (LOVENOX) injection 40 mg  40 mg SubCUTAneous DAILY       IMPRESSION:  Randal Alvarez is a 69 y.o. male who presents with orthostatic hypotension. On exam he does have parkinsonian features. 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 Will not start sinemet at that will increase the orthostatic hypotension. Pt continues to be orthostatic on florinef 0.1 mg and midodrine 10 mg po tid. We will not be able to fix his orthostatic hypotension. He continues to be dizzy on standing as per the nurse. Agree with increasing the dosage of florinef to 0.2 mg daily. Pt will be seen as outpatient with Dr. Cecilia Dobbins and can try to start her on droxidopa. Will also have Dr. Checo Jj see the pt tomorrow for his input.      RECOMMENDATIONS:  - Waist high compression stockings  - Continue po hydration  - Continue midodrine 10 mg po tid  - Increase fludrocortisone 0.2 mg daily  - MRI C spine/MRA head and neck - pending. Ordered as per family request. Cervical etiology will not be causing his to have hypotension. Dr. Sena Mckinney will take over the service tomorrow and will ask him to see Mr. Vero Fraga.       Damian Shanks MD  Neurologist

## 2019-10-04 NOTE — PROGRESS NOTES
Hospitalist Progress Note    NAME: Clement Ngo   :  1945   MRN:  [id number]       Assessment / Plan:  Syncope due to orthostatic hypotension due to multiple system atrophy - parkinson's type  -remain orthostatic on midodrine 10 mg TID + florinef 0.1   TSH normal   Cortisol was low in am, cosyntropin test normal. Endocrinology input appreciated - no adrenal insufficiency   MRI: chronic microvascular changes, no acute pathology    -Goal of treatment: minimize Sx as much as possible, may not be able to get 100% fixed   -stopped  Aricept ( started recently per pt, can cause dizziness/orthostatics)   -inc Florinef to 0.2  Little benefit is obtained by dose increase beyond 0.2 mg per day, however, while side effects increase significantly. Watch for worsening supine HTN   -neurology help appreciated: On exam he does have parkinsonian features. 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 No sinemet at that will increase the orthostatic hypotension. 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 Appropriately functioning device; no arrhythmias. His syncope is not secondary to arrhythmia. His [employer]onic Yadira RUTH MRI is MRI COMPATIBLE. Cardiology form for MRI completed and on chart. BPH, con Proscar   Anxiety Disorder, cont scheduled klonopin, Buspar, Cymbalta   Neuropathy, cont Neurontin     10/01: wife was updated over the phone on all of the above; wife shared that pt dtr from the first marriage passed today.  Pt is not aware yet   10/02 and 10/03: wife was updated at bedside         Code status: DNR   NOK: wife   Prophylaxis: Lovenox - holding for MINH     Baseline: lives with wife; ambulates with rolator   Recommended Disposition: Home w/Family once orthostatics resolve      Subjective:     Chief Complaint / Reason for Physician Visit: following orthostatic hypotension   Dizzy when standing up   + orthostatics     Discussed with RN events overnight. Review of Systems:  Symptom Y/N Comments  Symptom Y/N Comments   Fever/Chills n   Chest Pain n    Poor Appetite    Edema     Cough    Abdominal Pain n    Sputum    Joint Pain     SOB/PATEL n   Pruritis/Rash     Nausea/vomit    Tolerating PT/OT     Diarrhea    Tolerating Diet     Constipation    Other       Could NOT obtain due to:      Objective:     VITALS:   Last 24hrs VS reviewed since prior progress note. Most recent are:  Patient Vitals for the past 24 hrs:   Temp Pulse Resp BP SpO2   10/04/19 1135 -- -- -- 107/52 --   10/04/19 1130 -- -- -- (!) 83/46 --   10/04/19 1128 -- -- -- (!) 65/43 --   10/04/19 1059 97.4 °F (36.3 °C) 61 18 121/70 94 %   10/04/19 0947 -- 66 -- (!) 70/48 --   10/04/19 0945 -- 74 -- 97/60 --   10/04/19 0943 -- 60 -- 147/72 --   10/04/19 0804 97.6 °F (36.4 °C) 61 18 145/78 99 %   10/04/19 0226 97.6 °F (36.4 °C) 62 18 (!) 157/91 95 %   10/03/19 2312 97.5 °F (36.4 °C) (!) 59 18 139/78 96 %   10/03/19 2019 97.6 °F (36.4 °C) 60 16 139/71 93 %   10/03/19 1515 97.4 °F (36.3 °C) 60 13 170/84 92 %   10/03/19 1431 -- -- -- 110/72 --   10/03/19 1425 -- 62 -- 95/68 --   10/03/19 1423 -- 68 -- (!) 69/51 --   10/03/19 1421 -- 67 -- (!) 71/53 --   10/03/19 1416 -- -- -- (!) 83/61 --   10/03/19 1414 -- 62 -- (!) 82/58 --   10/03/19 1410 -- 65 -- 117/77 96 %   10/03/19 1408 -- 62 -- (!) 155/93 96 %       Intake/Output Summary (Last 24 hours) at 10/4/2019 1222  Last data filed at 10/4/2019 2898  Gross per 24 hour   Intake 480 ml   Output 2125 ml   Net -1645 ml        PHYSICAL EXAM:  General: WD, WN.  Alert, cooperative, no acute distress    EENT:  EOMI. Anicteric sclerae. MMM  Resp:  CTA bilaterally, no wheezing or rales. No accessory muscle use  CV:  Regular  rhythm,  No edema  GI:  Soft, Non distended, Non tender.  +Bowel sounds  Neurologic:  Alert and oriented X 3, normal speech,   Psych:   Fair insight. Not anxious nor agitated  Skin:  No rashes. No jaundice    Reviewed most current lab test results and cultures  YES  Reviewed most current radiology test results   YES  Review and summation of old records today    NO  Reviewed patient's current orders and MAR    YES  PMH/SH reviewed - no change compared to H&P  ________________________________________________________________________  Care Plan discussed with:    Comments   Patient y    Family  y Wife bedside    RN y    Care Manager     Consultant                         Multidiciplinary team rounds were held today with , nursing, pharmacist and clinical coordinator. Patient's plan of care was discussed; medications were reviewed and discharge planning was addressed. ________________________________________________________________________  Total NON critical care TIME:  35   Minutes    Total CRITICAL CARE TIME Spent:   Minutes non procedure based      Comments   >50% of visit spent in counseling and coordination of care     ________________________________________________________________________  Olaf Garcia MD     Procedures: see electronic medical records for all procedures/Xrays and details which were not copied into this note but were reviewed prior to creation of Plan. LABS:  I reviewed today's most current labs and imaging studies. Pertinent labs include:  No results for input(s): WBC, HGB, HCT, PLT, HGBEXT, HCTEXT, PLTEXT, HGBEXT, HCTEXT, PLTEXT in the last 72 hours. No results for input(s): NA, K, CL, CO2, GLU, BUN, CREA, CA, MG, PHOS, ALB, TBIL, TBILI, SGOT, ALT, INR, INREXT, INREXT in the last 72 hours.     Signed: Olaf Garcia MD

## 2019-10-04 NOTE — ED PROVIDER NOTES
EMERGENCY DEPARTMENT HISTORY AND PHYSICAL EXAM      Date: 9/29/2019  Patient Name: Dustin Marie    History of Presenting Illness     Chief Complaint   Patient presents with    Transfer Of Care       History Provided By: Patient    HPI: Dustin Marie, 69 y.o. male presents to the ED with cc of syncope. Pt with recent medical hx surrounding cardiac device placement and post op infection. Pt was transferred from [facility] to [facility] for [facility] interrogation. Pt had gone to [employer] this morning and after he returned home, he was feeling poorly, lethargic, lightheaded and very weak. He was having difficulty getting out of his vehicle and called to his wife for assistance. Based on pt appearance she drove him to [facility]. Pt had work-up without any sig issues to suggest causative effect. Dr. Mikey Norris had discussed with Ren Galvin who suggested transfer to [facility] so they could at least interrogate pacemaker to assess for arrhythmia. Upon arrival to ED, pt states general feeling of fatigue. His episodes have seemed to be associated with positional changes. He denies any H/A, CP or SOA. There are no other complaints, changes, or physical findings at this time. PCP: Travis Sheehan MD    No current facility-administered medications on file prior to encounter. Current Outpatient Medications on File Prior to Encounter   Medication Sig Dispense Refill    busPIRone (BUSPAR) 10 mg tablet Take 1 Tab by mouth three (3) times daily. Indications: Repeated Episodes of Anxiety 270 Tab 0    gabapentin (NEURONTIN) 600 mg tablet Take 1 Tab by mouth daily. 10 Tab 0    clonazePAM (KLONOPIN) 1 mg tablet Take 0.5 Tabs by mouth two (2) times a day. Max Daily Amount: 1 mg. 10 Tab 0    aspirin 81 mg chewable tablet Take 1 Tab by mouth daily.  90 Tab 2    COMBIVENT RESPIMAT  mcg/actuation inhaler       DULoxetine (CYMBALTA) 60 mg capsule TAKE 1 CAPSULE BY MOUTH DAILY 90 Cap 1    tamsulosin (FLOMAX) 0.4 mg capsule Take 1 Cap by mouth daily. 90 Cap 1    donepezil (ARICEPT) 10 mg tablet Take 1 Tab by mouth nightly. 90 Tab 1    finasteride (PROSCAR) 5 mg tablet TAKE 1 TABLET BY MOUTH DAILY(PROSTATE) 90 Tab 0    guaiFENesin ER (MUCINEX) 600 mg ER tablet Take 1 Tab by mouth every twelve (12) hours. (Patient taking differently: Take 600 mg by mouth every twelve (12) hours. As needed) 30 Tab 0    cyanocobalamin, vitamin B-12, (VITAMIN B-12 PO) Take 1 Tab by mouth daily.  naproxen (NAPROSYN) 500 mg tablet Take 1 Tab by mouth daily. 90 Tab 1    ipratropium (ATROVENT) 42 mcg (0.06 %) nasal spray 1 [place] by Both Nostrils route four (4) times daily.  (Patient taking differently: 1 Spray by Both Nostrils route four (4) times daily as needed.) 45 mL 1       Past History     Past Medical History:  Past Medical History:   Diagnosis Date    Anxiety     Back pain     Blurred vision     Chest pain     Depression     Dizziness     Fast heart beat     Frequent headaches     Frequent urination     Hip dysplasia, congenital     Joint pain     Joint swelling     Lumbar spinal stenosis     Muscle pain     Neuropathy     Pacemaker     new pacemaker July 2019    Recent change in weight     Sinus problem     Skin disease     SOB (shortness of breath)     Sore throat     SSS (sick sinus syndrome) (HCC)     Stiffness of joints, multiple sites     Stress     Syncope     Tiredness     Trouble in sleeping     Weakness        Past Surgical History:  Past Surgical History:   Procedure Laterality Date    CARDIAC SURG PROCEDURE UNLIST      HX HIP REPLACEMENT Bilateral 1993    HX PACEMAKER PLACEMENT  10/16/2018    HX ROTATOR CUFF REPAIR Right 11/16/2017    [place] INS NEW/RPLCMT PRM PM W/TRANSV ELTRD ATRIAL&VENT N/A 7/30/2019    INSERT PPM DUAL performed by Mely Uribe MD at [facility] CARDIAC CATH LAB       Family History:  Family History   Problem Relation Age of Onset    Cancer Mother     Cancer Maternal Aunt        Social History:  Social History     Tobacco Use    Smoking status: Former Smoker     Packs/day: 0.25     Years: 50.00     Pack years: 12.50     Types: Cigarettes     Last attempt to quit: 8/3/2019     Years since quittin.1    Smokeless tobacco: Never Used   Substance Use Topics    Alcohol use: Not Currently     Alcohol/week: 28.0 standard drinks     Types: 28 Cans of beer per week     Comment: None now    Drug use: No       Allergies:  No Known Allergies      Review of Systems   Review of Systems   Constitutional: Negative. Negative for appetite change, chills, fatigue and fever. HENT: Negative. Negative for congestion, rhinorrhea, sinus pressure and sore throat. Eyes: Negative. Respiratory: Negative. Negative for cough, choking, chest tightness, shortness of breath and wheezing. Cardiovascular: Negative. Negative for chest pain, palpitations and leg swelling. Gastrointestinal: Negative for abdominal pain, constipation, diarrhea, nausea and vomiting. Endocrine: Negative. Genitourinary: Negative. Negative for difficulty urinating, dysuria, flank pain and urgency. Musculoskeletal: Negative. Skin: Negative. Neurological: Negative. Negative for dizziness, speech difficulty, weakness, light-headedness, numbness and headaches. Psychiatric/Behavioral: Negative. All other systems reviewed and are negative. Physical Exam   Physical Exam   Constitutional: He is oriented to person, place, and time. He appears well-developed and well-nourished. No distress. HENT:   Head: Normocephalic and atraumatic. Mouth/Throat: Oropharynx is clear and moist. No oropharyngeal exudate. Eyes: Pupils are equal, round, and reactive to light. Conjunctivae and EOM are normal.   Neck: Normal range of motion. Neck supple. No JVD present. No tracheal deviation present. Cardiovascular: Normal rate, regular rhythm, normal heart sounds and intact distal pulses. No murmur heard.   Pulmonary/Chest: Effort normal and breath sounds normal. No stridor. No respiratory distress. He has no wheezes. He has no rales. Cardiac device   Abdominal: Soft. He exhibits no distension. There is no tenderness. There is no rebound and no guarding. Musculoskeletal: Normal range of motion. He exhibits no edema or tenderness. Neurological: He is alert and oriented to person, place, and time. No cranial nerve deficit. No gross motor or sensory deficits    Skin: Skin is warm and dry. He is not diaphoretic. Psychiatric: He has a normal mood and affect. His behavior is normal.   Nursing note and vitals reviewed. Diagnostic Study Results     Labs -  None- Reviewed from [address]    Radiologic Studies -   MRI BRAIN WO CONT   Final Result   IMPRESSION:   Mild chronic white matter disease with no acute infarction. MRI CERV SPINE WO CONT    (Results Pending)   MRA BRAIN WO CONT    (Results Pending)   MRA NECK WO CONT    (Results Pending)     CT Results  (Last 48 hours)    None        CXR Results  (Last 48 hours)    None          Medical Decision Making   I am the first provider for this patient. I reviewed the vital signs, available nursing notes, past medical history, past surgical history, family history and social history. Vital Signs-Reviewed the patient's vital signs.   Patient Vitals for the past 12 hrs:   Temp Pulse Resp BP SpO2   10/04/19 1135 -- -- -- 107/52 --   10/04/19 1130 -- -- -- (!) 83/46 --   10/04/19 1128 -- -- -- (!) 65/43 --   10/04/19 1059 97.4 °F (36.3 °C) 61 18 121/70 94 %   10/04/19 0947 -- 66 -- (!) 70/48 --   10/04/19 0945 -- 74 -- 97/60 --   10/04/19 0943 -- 60 -- 147/72 --   10/04/19 0804 97.6 °F (36.4 °C) 61 18 145/78 99 %   10/04/19 0226 97.6 °F (36.4 °C) 62 18 (!) 157/91 95 %         Records Reviewed: Nursing Notes, Old Medical Records, Previous electrocardiograms, Ambulance Run Sheet, Previous Radiology Studies and Previous Laboratory Studies    Provider Notes (Medical Decision Making):   DDx- arrhythmia, dehydration, electrolyte abnormality, ACS    ED Course:   Initial assessment performed. The patients presenting problems have been discussed, and they are in agreement with the care plan formulated and outlined with them. I have encouraged them to ask questions as they arise throughout their visit. Pt with significant orthostasis, pacemaker interrogation- no acute abnormality, will admit for further assessment. Pt also found to be very orthostatic. Consult Note:   Case discussed with Dr. Camilo Hightower, he will follow pt in hospital.     Consult Note: Case discussed with hospitalist, they will see and evaluate pt for admx. Critical Care Time:   None    Disposition:  Admit    PLAN:  1. Admit    Diagnosis     Clinical Impression:   1. Orthostatic hypotension    2. Dizziness    3. Multiple system atrophy-P [facility])        Attestations:    Jose Manuel Reyes, DO    Please note that this dictation was completed with [email or web address], the computer voice recognition software. Quite often unanticipated grammatical, syntax, homophones, and other interpretive errors are inadvertently transcribed by the computer software. Please disregard these errors. Please excuse any errors that have escaped final proofreading. Thank you.

## 2019-10-05 LAB
ANION GAP SERPL CALC-SCNC: 4 MMOL/L (ref 5–15)
BUN SERPL-MCNC: 21 MG/DL (ref 6–20)
BUN/CREAT SERPL: 22 (ref 12–20)
CALCIUM SERPL-MCNC: 8.2 MG/DL (ref 8.5–10.1)
CHLORIDE SERPL-SCNC: 110 MMOL/L (ref 97–108)
CO2 SERPL-SCNC: 29 MMOL/L (ref 21–32)
CREAT SERPL-MCNC: 0.95 MG/DL (ref 0.7–1.3)
ERYTHROCYTE [DISTWIDTH] IN BLOOD BY AUTOMATED COUNT: 12.8 % (ref 11.5–14.5)
GLUCOSE SERPL-MCNC: 103 MG/DL (ref 65–100)
HCT VFR BLD AUTO: 37.7 % (ref 36.6–50.3)
HGB BLD-MCNC: 12.7 G/DL (ref 12.1–17)
MAGNESIUM SERPL-MCNC: 2.1 MG/DL (ref 1.6–2.4)
MCH RBC QN AUTO: 32.6 PG (ref 26–34)
MCHC RBC AUTO-ENTMCNC: 33.7 G/DL (ref 30–36.5)
MCV RBC AUTO: 96.7 FL (ref 80–99)
NRBC # BLD: 0 K/UL (ref 0–0.01)
NRBC BLD-RTO: 0 PER 100 WBC
PHOSPHATE SERPL-MCNC: 4.5 MG/DL (ref 2.6–4.7)
PLATELET # BLD AUTO: 158 K/UL (ref 150–400)
PMV BLD AUTO: 10.4 FL (ref 8.9–12.9)
POTASSIUM SERPL-SCNC: 3.7 MMOL/L (ref 3.5–5.1)
RBC # BLD AUTO: 3.9 M/UL (ref 4.1–5.7)
SODIUM SERPL-SCNC: 143 MMOL/L (ref 136–145)
WBC # BLD AUTO: 6.4 K/UL (ref 4.1–11.1)

## 2019-10-05 PROCEDURE — 80048 BASIC METABOLIC PNL TOTAL CA: CPT

## 2019-10-05 PROCEDURE — 74011250637 HC RX REV CODE- 250/637: Performed by: GENERAL ACUTE CARE HOSPITAL

## 2019-10-05 PROCEDURE — 74011250636 HC RX REV CODE- 250/636: Performed by: GENERAL ACUTE CARE HOSPITAL

## 2019-10-05 PROCEDURE — 36415 COLL VENOUS BLD VENIPUNCTURE: CPT

## 2019-10-05 PROCEDURE — 65660000000 HC RM CCU STEPDOWN

## 2019-10-05 PROCEDURE — 83735 ASSAY OF MAGNESIUM: CPT

## 2019-10-05 PROCEDURE — 84100 ASSAY OF PHOSPHORUS: CPT

## 2019-10-05 PROCEDURE — 74011250637 HC RX REV CODE- 250/637: Performed by: HOSPITALIST

## 2019-10-05 PROCEDURE — 85027 COMPLETE CBC AUTOMATED: CPT

## 2019-10-05 RX ADMIN — DULOXETINE HYDROCHLORIDE 60 MG: 30 CAPSULE, DELAYED RELEASE ORAL at 10:21

## 2019-10-05 RX ADMIN — FINASTERIDE 5 MG: 5 TABLET, FILM COATED ORAL at 10:09

## 2019-10-05 RX ADMIN — BUSPIRONE HYDROCHLORIDE 10 MG: 10 TABLET ORAL at 10:09

## 2019-10-05 RX ADMIN — Medication 10 ML: at 02:26

## 2019-10-05 RX ADMIN — BUSPIRONE HYDROCHLORIDE 10 MG: 10 TABLET ORAL at 21:07

## 2019-10-05 RX ADMIN — BUSPIRONE HYDROCHLORIDE 10 MG: 10 TABLET ORAL at 15:44

## 2019-10-05 RX ADMIN — CLONAZEPAM 0.5 MG: 0.5 TABLET ORAL at 17:44

## 2019-10-05 RX ADMIN — ASPIRIN 81 MG CHEWABLE TABLET 81 MG: 81 TABLET CHEWABLE at 10:09

## 2019-10-05 RX ADMIN — ENOXAPARIN SODIUM 40 MG: 40 INJECTION SUBCUTANEOUS at 10:10

## 2019-10-05 RX ADMIN — TAMSULOSIN HYDROCHLORIDE 0.4 MG: 0.4 CAPSULE ORAL at 10:09

## 2019-10-05 RX ADMIN — MIDODRINE HYDROCHLORIDE 10 MG: 5 TABLET ORAL at 13:05

## 2019-10-05 RX ADMIN — FLUDROCORTISONE ACETATE 0.2 MG: 0.1 TABLET ORAL at 10:09

## 2019-10-05 RX ADMIN — GABAPENTIN 600 MG: 300 CAPSULE ORAL at 10:09

## 2019-10-05 RX ADMIN — CLONAZEPAM 0.5 MG: 0.5 TABLET ORAL at 10:09

## 2019-10-05 RX ADMIN — Medication 10 ML: at 21:07

## 2019-10-05 RX ADMIN — MIDODRINE HYDROCHLORIDE 10 MG: 5 TABLET ORAL at 17:43

## 2019-10-05 RX ADMIN — Medication 10 ML: at 15:46

## 2019-10-05 RX ADMIN — MIDODRINE HYDROCHLORIDE 10 MG: 5 TABLET ORAL at 10:09

## 2019-10-05 NOTE — PROGRESS NOTES
Problem: Syncope  Goal: *Absence of injury  Outcome: Progressing Towards Goal  Goal: Decrease or eliminate episodes of syncope  Outcome: Progressing Towards Goal     Problem: Patient Education: Go to Patient Education Activity  Goal: Patient/Family Education  Outcome: Progressing Towards Goal     Problem: Falls - Risk of  Goal: *Absence of Falls  Description  Document Chuyita Becketts Fall Risk and appropriate interventions in the flowsheet.   Outcome: Progressing Towards Goal  Note:   Fall Risk Interventions:  Mobility Interventions: Bed/chair exit alarm, Communicate number of staff needed for ambulation/transfer, Mechanical lift, OT consult for ADLs, Patient to call before getting OOB, PT Consult for mobility concerns         Medication Interventions: Assess postural VS orthostatic hypotension, Bed/chair exit alarm, Evaluate medications/consider consulting pharmacy, Patient to call before getting OOB, Teach patient to arise slowly    Elimination Interventions: Bed/chair exit alarm, Call light in reach, Elevated toilet seat, Patient to call for help with toileting needs, Stay With Me (per policy), Toilet paper/wipes in reach, Toileting schedule/hourly rounds, Urinal in reach    History of Falls Interventions: Bed/chair exit alarm, Consult care management for discharge planning, Door open when patient unattended, Evaluate medications/consider consulting pharmacy, Investigate reason for fall, Room close to nurse's station, Utilize gait belt for transfer/ambulation, Assess for delayed presentation/identification of injury for 48 hrs (comment for end date)         Problem: Patient Education: Go to Patient Education Activity  Goal: Patient/Family Education  Outcome: Progressing Towards Goal     Problem: Patient Education: Go to Patient Education Activity  Goal: Patient/Family Education  Outcome: Progressing Towards Goal     Problem: Patient Education: Go to Patient Education Activity  Goal: Patient/Family Education  Outcome: Progressing Towards Goal

## 2019-10-05 NOTE — PROGRESS NOTES
Problem: Falls - Risk of  Goal: *Absence of Falls  Description  Document Go Host Fall Risk and appropriate interventions in the flowsheet.   Outcome: Progressing Towards Goal  Note:   Fall Risk Interventions:  Mobility Interventions: Bed/chair exit alarm         Medication Interventions: Bed/chair exit alarm    Elimination Interventions: Bed/chair exit alarm    History of Falls Interventions: Bed/chair exit alarm

## 2019-10-05 NOTE — PROGRESS NOTES
Bedside shift change report GIVEN TO Gretel Nascimento RN. Report included the following information SBAR and Kardex. SIGNIFICANT CHANGES DURING SHIFT:  Orthostatics orders three times a day      CONCERNS TO ADDRESS WITH MD:            [facility] NURSING NOTE   Admission Date 9/29/2019   Admission Diagnosis Syncope [R55]   Consults IP CONSULT TO CARDIOLOGY  IP CONSULT TO CARDIOLOGY  IP CONSULT TO HOSPITALIST  IP CONSULT TO NEUROLOGY  IP CONSULT TO ENDOCRINOLOGY      Cardiac Monitoring [x] Yes [] No      Purposeful Hourly Rounding [x] Yes    Delores Score Total Score: 4   Delores score 3 or > [x] Bed Alarm [] Avasys [] 1:1 sitter [] Patient refused (Signed refusal form in chart)   Kane Score Kane Score: 19   Kane score 14 or < [] PMT consult [] Wound Care consult    []  Specialty bed  [] Nutrition consult      Influenza Vaccine Received Flu Vaccine for Current Season (usually Sept-March): Yes           Oxygen needs? [x] Room air Oxygen @  []1L    []2L    []3L   []4L    []5L   []6L via  NC   Chronic home O2 use? [] Yes [x] No  Perform O2 challenge test and document in progress note using smartphrase (.Homeoxygen)      Last bowel movement Last Bowel Movement Date: 10/04/19      Urinary Catheter             LDAs               Peripheral IV 09/29/19 Left Antecubital (Active)   Site Assessment Clean, dry, & intact 10/5/2019  4:00 PM   Phlebitis Assessment 0 10/5/2019  4:00 PM   Infiltration Assessment 0 10/5/2019  4:00 PM   Dressing Status Clean, dry, & intact 10/5/2019  4:00 PM   Dressing Type Transparent;Tape 10/5/2019  4:00 PM   Hub Color/Line Status Pink 10/5/2019  4:00 PM   Alcohol Cap Used No 9/29/2019  2:30 PM                         Readmission Risk Assessment Tool Score Medium Risk            17       Total Score        3 Has Seen PCP in Last 6 Months (Yes=3, No=0)    2 . Living with Significant Other. Assisted Living. LTAC. SNF.  or   Rehab    3 Patient Length of Stay (>5 days = 3)    4 IP Visits Last 12 Months (1-3=4, 4=9, >4=11)    5 Pt.  Coverage (Medicare=5 , Medicaid, or Self-Pay=4)        Criteria that do not apply:    Charlson Comorbidity Score (Age + Comorbid Conditions)       Expected Length of Stay 3d 21h   Actual Length of Stay 6

## 2019-10-05 NOTE — PROGRESS NOTES
Hospitalist Progress Note    NAME: Riana Christie   :  1945   MRN:  [id number]       Assessment / Plan:  Syncope due to orthostatic hypotension due to multiple system atrophy - parkinson's type  -remain orthostatic on midodrine 10 mg TID + florinef 0.2, Sx with dizziness   TSH normal   Cortisol was low in am, cosyntropin test normal. Endocrinology input appreciated - no adrenal insufficiency   MRI brain: chronic microvascular changes, no acute pathology   MRI C spine:No significant change in moderate to severe cervical spondylosis most pronounced at C3-4 and C4-5. MRA: no flow-limiting stenosis or occlusion.  -Goal of treatment: minimize Sx as much as possible, may not be able to get 100% fixed   -stopped  Aricept ( started recently per pt, can cause dizziness/orthostatics)   -on Florinef to 0.2   Little benefit is obtained by dose increase beyond 0.2 mg per day, however, while side effects increase significantly. Watch for worsening supine HTN   -will await on further neurology recommendations   -neurology help appreciated: On exam he does have parkinsonian features. 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 No sinemet at that will increase the orthostatic hypotension. 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 Appropriately functioning device; no arrhythmias. His syncope is not secondary to arrhythmia. His [employer]onic Yadira RUTH MRI is MRI COMPATIBLE. Cardiology form for MRI completed and on chart.       BPH, con Proscar   Anxiety Disorder, cont scheduled klonopin, Buspar, Cymbalta   Neuropathy, cont Neurontin     10/01: wife was updated over the phone on all of the above; wife shared that pt dtr from the first marriage passed today. Pt is not aware yet   10/02 and 10/03: wife was updated at bedside         Code status: DNR   NOK: wife   Prophylaxis: Lovenox - holding for MINH     Baseline: lives with wife; ambulates with rolator   Recommended Disposition: Home w/Family once orthostatics resolve      Subjective:     Chief Complaint / Reason for Physician Visit: following orthostatic hypotension   Dizzy when standing up   + orthostatics 854 --> 72 systolic BP     Discussed with RN events overnight. Review of Systems:  Symptom Y/N Comments  Symptom Y/N Comments   Fever/Chills n   Chest Pain n    Poor Appetite    Edema     Cough    Abdominal Pain n    Sputum    Joint Pain     SOB/PATEL n   Pruritis/Rash     Nausea/vomit    Tolerating PT/OT     Diarrhea    Tolerating Diet     Constipation    Other       Could NOT obtain due to:      Objective:     VITALS:   Last 24hrs VS reviewed since prior progress note. Most recent are:  Patient Vitals for the past 24 hrs:   Temp Pulse Resp BP SpO2   10/05/19 1429 97.5 °F (36.4 °C) 61 18 119/62 95 %   10/05/19 1041 97.8 °F (36.6 °C) 60 18 136/68 96 %   10/05/19 0802 -- -- -- 141/73 --   10/05/19 0713 -- 63 -- (!) 72/48 --   10/05/19 0710 -- 64 -- (!) 83/51 --   10/05/19 0708 97.9 °F (36.6 °C) 60 18 139/72 95 %   10/05/19 0337 97.8 °F (36.6 °C) 95 18 152/68 96 %   10/04/19 2252 97.9 °F (36.6 °C) 63 18 154/79 94 %   10/04/19 1912 98.1 °F (36.7 °C) 61 18 110/71 95 %   10/04/19 1530 97.2 °F (36.2 °C) 62 18 142/87 94 %       Intake/Output Summary (Last 24 hours) at 10/5/2019 1445  Last data filed at 10/5/2019 1435  Gross per 24 hour   Intake 340 ml   Output 1125 ml   Net -785 ml        PHYSICAL EXAM:  General: WD, WN. Alert, cooperative, no acute distress    EENT:  EOMI. Anicteric sclerae. MMM  Resp:  CTA bilaterally, no wheezing or rales.   No accessory muscle use  CV:  Regular  rhythm,  No edema  GI:  Soft, Non distended, Non tender.  +Bowel sounds  Neurologic:  Alert and oriented X 3, normal speech,   Psych:   Fair insight. Not anxious nor agitated  Skin:  No rashes. No jaundice    Reviewed most current lab test results and cultures  YES  Reviewed most current radiology test results   YES  Review and summation of old records today    NO  Reviewed patient's current orders and MAR    YES  PMH/SH reviewed - no change compared to H&P  ________________________________________________________________________  Care Plan discussed with:    Comments   Patient y    Family      RN y    Care Manager     Consultant                         Multidiciplinary team rounds were held today with , nursing, pharmacist and clinical coordinator. Patient's plan of care was discussed; medications were reviewed and discharge planning was addressed. ________________________________________________________________________  Total NON critical care TIME:  25   Minutes    Total CRITICAL CARE TIME Spent:   Minutes non procedure based      Comments   >50% of visit spent in counseling and coordination of care     ________________________________________________________________________  Bethanie Hall MD     Procedures: see electronic medical records for all procedures/Xrays and details which were not copied into this note but were reviewed prior to creation of Plan. LABS:  I reviewed today's most current labs and imaging studies.   Pertinent labs include:  Recent Labs     10/05/19  0227   WBC 6.4   HGB 12.7   HCT 37.7        Recent Labs     10/05/19  0227      K 3.7   *   CO2 29   *   BUN 21*   CREA 0.95   CA 8.2*   MG 2.1   PHOS 4.5       Signed: Bethanie Hall MD

## 2019-10-05 NOTE — PROGRESS NOTES
Bedside shift change report GIVEN TO MANUEL Yousif. Report included the following information SBAR, Kardex, Intake/Output, MAR and Recent Results. SIGNIFICANT CHANGES DURING SHIFT:        CONCERNS TO ADDRESS WITH MD:  [facility] NURSING NOTE   Admission Date 9/29/2019   Admission Diagnosis Syncope [R55]   Consults IP CONSULT TO CARDIOLOGY  IP CONSULT TO CARDIOLOGY  IP CONSULT TO HOSPITALIST  IP CONSULT TO NEUROLOGY  IP CONSULT TO ENDOCRINOLOGY      Cardiac Monitoring [x] Yes [] No      Purposeful Hourly Rounding [x] Yes    Delores Score Total Score: 4   Delores score 3 or > [x] Bed Alarm [] Avasys [] 1:1 sitter [] Patient refused (Signed refusal form in chart)   Kane Score Kane Score: 19   Kane score 14 or < [] PMT consult [] Wound Care consult    []  Specialty bed  [] Nutrition consult      Influenza Vaccine Received Flu Vaccine for Current Season (usually Sept-March): Yes           Oxygen needs? [x] Room air Oxygen @  []1L    []2L    []3L   []4L    []5L   []6L via  NC   Chronic home O2 use? [] Yes [x] No  Perform O2 challenge test and document in progress note using smartphrase (.Homeoxygen)      Last bowel movement Last Bowel Movement Date: 10/04/19      Urinary Catheter             LDAs               Peripheral IV 09/29/19 Left Antecubital (Active)   Site Assessment Clean, dry, & intact 10/5/2019  7:16 PM   Phlebitis Assessment 0 10/5/2019  7:16 PM   Infiltration Assessment 0 10/5/2019  7:16 PM   Dressing Status Clean, dry, & intact 10/5/2019  7:16 PM   Dressing Type Transparent 10/5/2019  7:16 PM   Hub Color/Line Status Pink;Capped 10/5/2019  7:16 PM   Alcohol Cap Used No 9/29/2019  2:30 PM                         Readmission Risk Assessment Tool Score Medium Risk            17       Total Score        3 Has Seen PCP in Last 6 Months (Yes=3, No=0)    2 . Living with Significant Other. Assisted Living. LTAC. SNF.  or   Rehab    3 Patient Length of Stay (>5 days = 3)    4 IP Visits Last 12 Months (1-3=4, 4=9, >4=11)    5 Pt.  Coverage (Medicare=5 , Medicaid, or Self-Pay=4)        Criteria that do not apply:    Charlson Comorbidity Score (Age + Comorbid Conditions)       Expected Length of Stay 3d 21h   Actual Length of Stay 6

## 2019-10-06 LAB
BACTERIA SPEC CULT: NORMAL
SERVICE CMNT-IMP: NORMAL

## 2019-10-06 PROCEDURE — 65660000000 HC RM CCU STEPDOWN

## 2019-10-06 PROCEDURE — 74011250636 HC RX REV CODE- 250/636: Performed by: GENERAL ACUTE CARE HOSPITAL

## 2019-10-06 PROCEDURE — 74011250637 HC RX REV CODE- 250/637: Performed by: HOSPITALIST

## 2019-10-06 PROCEDURE — 74011250637 HC RX REV CODE- 250/637: Performed by: GENERAL ACUTE CARE HOSPITAL

## 2019-10-06 PROCEDURE — 77030027138 HC INCENT SPIROMETER -A

## 2019-10-06 RX ADMIN — BUSPIRONE HYDROCHLORIDE 10 MG: 10 TABLET ORAL at 17:33

## 2019-10-06 RX ADMIN — Medication 10 ML: at 17:35

## 2019-10-06 RX ADMIN — FLUDROCORTISONE ACETATE 0.2 MG: 0.1 TABLET ORAL at 09:44

## 2019-10-06 RX ADMIN — BUSPIRONE HYDROCHLORIDE 10 MG: 10 TABLET ORAL at 09:40

## 2019-10-06 RX ADMIN — BUSPIRONE HYDROCHLORIDE 10 MG: 10 TABLET ORAL at 21:00

## 2019-10-06 RX ADMIN — CLONAZEPAM 0.5 MG: 0.5 TABLET ORAL at 17:34

## 2019-10-06 RX ADMIN — FINASTERIDE 5 MG: 5 TABLET, FILM COATED ORAL at 09:44

## 2019-10-06 RX ADMIN — ENOXAPARIN SODIUM 40 MG: 40 INJECTION SUBCUTANEOUS at 09:43

## 2019-10-06 RX ADMIN — MIDODRINE HYDROCHLORIDE 10 MG: 5 TABLET ORAL at 17:34

## 2019-10-06 RX ADMIN — ASPIRIN 81 MG CHEWABLE TABLET 81 MG: 81 TABLET CHEWABLE at 09:40

## 2019-10-06 RX ADMIN — Medication 10 ML: at 21:00

## 2019-10-06 RX ADMIN — DULOXETINE HYDROCHLORIDE 60 MG: 30 CAPSULE, DELAYED RELEASE ORAL at 09:43

## 2019-10-06 RX ADMIN — Medication 10 ML: at 02:53

## 2019-10-06 RX ADMIN — CLONAZEPAM 0.5 MG: 0.5 TABLET ORAL at 09:42

## 2019-10-06 RX ADMIN — MIDODRINE HYDROCHLORIDE 10 MG: 5 TABLET ORAL at 12:38

## 2019-10-06 RX ADMIN — MIDODRINE HYDROCHLORIDE 10 MG: 5 TABLET ORAL at 08:49

## 2019-10-06 RX ADMIN — GABAPENTIN 600 MG: 300 CAPSULE ORAL at 09:44

## 2019-10-06 RX ADMIN — TAMSULOSIN HYDROCHLORIDE 0.4 MG: 0.4 CAPSULE ORAL at 09:45

## 2019-10-06 NOTE — PROGRESS NOTES
Patient checked for orthostatic blood pressure and was positive for with a drop in blood pressure but no real change in pulse. Tierra James He is asymptomatic. Assisted to chair.  Blood pressure retaken when in chair and it was 118/75 with HR of 62

## 2019-10-06 NOTE — PROGRESS NOTES
Problem: Syncope  Goal: *Absence of injury  Outcome: Progressing Towards Goal  Goal: Decrease or eliminate episodes of syncope  Outcome: Progressing Towards Goal     Problem: Patient Education: Go to Patient Education Activity  Goal: Patient/Family Education  Outcome: Progressing Towards Goal     Problem: Falls - Risk of  Goal: *Absence of Falls  Description  Document Anna Peralta Fall Risk and appropriate interventions in the flowsheet.   Outcome: Progressing Towards Goal  Note:   Fall Risk Interventions:  Mobility Interventions: Assess mobility with egress test, Bed/chair exit alarm, Communicate number of staff needed for ambulation/transfer, Mechanical lift, OT consult for ADLs, Patient to call before getting OOB, PT Consult for mobility concerns, PT Consult for assist device competence         Medication Interventions: Assess postural VS orthostatic hypotension, Bed/chair exit alarm, Evaluate medications/consider consulting pharmacy, Patient to call before getting OOB, Teach patient to arise slowly    Elimination Interventions: Bed/chair exit alarm, Call light in reach, Elevated toilet seat, Patient to call for help with toileting needs, Stay With Me (per policy), Toilet paper/wipes in reach, Toileting schedule/hourly rounds, Urinal in reach    History of Falls Interventions: Bed/chair exit alarm, Consult care management for discharge planning, Door open when patient unattended, Evaluate medications/consider consulting pharmacy, Investigate reason for fall, Room close to nurse's station, Utilize gait belt for transfer/ambulation, Assess for delayed presentation/identification of injury for 48 hrs (comment for end date)         Problem: Patient Education: Go to Patient Education Activity  Goal: Patient/Family Education  Outcome: Progressing Towards Goal     Problem: Patient Education: Go to Patient Education Activity  Goal: Patient/Family Education  Outcome: Progressing Towards Goal     Problem: Patient Education: [employer] to Patient Education Activity  Goal: Patient/Family Education  Outcome: Progressing Towards Goal

## 2019-10-06 NOTE — PROGRESS NOTES
Report received from Shayan Felipe , [facility]. SBAR were discussed. Edward Musa RN     Patient up to the bathroom this am. He did not complain of dizziness. Will assess orthostatics    5130 orthostatics were done and patient very orthostatic but was asymptomatic.  Not diaphoretic or dizzy

## 2019-10-06 NOTE — PROGRESS NOTES
Bedside shift change report GIVEN TO Matthias Hernandez RN. Report included the following information SBAR. SIGNIFICANT CHANGES DURING SHIFT:  Patient continues to be orthostatic. The afternoon numbers were best because he is drinking more fluid. He had no dizziness or sx. With any blood pressure checks today. Up in chair for quite a while. Using scd on legs in bed      CONCERNS TO ADDRESS WITH MD:  Continue to monitor for problems with low blood pressure. Bed alarm on. Patient calls when he needs to get up          [facility][1]   Admission Date 9/29/2019   Admission Diagnosis Syncope [R55]   Consults IP CONSULT TO CARDIOLOGY  IP CONSULT TO CARDIOLOGY  IP CONSULT TO HOSPITALIST  IP CONSULT TO NEUROLOGY  IP CONSULT TO ENDOCRINOLOGY      Cardiac Monitoring [] Yes [] No      Purposeful Hourly Rounding [] Yes    Delores Score Total Score: 4   Delores score 3 or > [] Bed Alarm [] Avasys [] 1:1 sitter [] Patient refused (Signed refusal form in chart)   Kane Score Kane Score: 19   Kane score 14 or < [] PMT consult [] Wound Care consult    []  Specialty bed  [] Nutrition consult      Influenza Vaccine Received Flu Vaccine for Current Season (usually Sept-March): Yes           Oxygen needs? [] Room air Oxygen @  []1L    []2L    []3L   []4L    []5L   []6L via  NC   Chronic home O2 use?  [] Yes [] No  Perform O2 challenge test and document in progress note using smartphrase (.Homeoxygen)      Last bowel movement Last Bowel Movement Date: 10/06/19      Urinary Catheter             LDAs               Peripheral IV 09/29/19 Left Antecubital (Active)   Site Assessment Clean, dry, & intact 10/6/2019  7:39 PM   Phlebitis Assessment 0 10/6/2019  7:39 PM   Infiltration Assessment 0 10/6/2019  7:39 PM   Dressing Status Clean, dry, & intact 10/6/2019  7:39 PM   Dressing Type Transparent 10/6/2019  7:39 PM   Hub Color/Line Status Pink;Flushed 10/6/2019  7:39 PM   Alcohol Cap Used No 9/29/2019  2:30 PM Readmission Risk Assessment Tool Score Medium Risk            17       Total Score        3 Has Seen PCP in Last 6 Months (Yes=3, No=0)    2 . Living with Significant Other. Assisted Living. LTAC. SNF. or   Rehab    3 Patient Length of Stay (>5 days = 3)    4 IP Visits Last 12 Months (1-3=4, 4=9, >4=11)    5 Pt.  Coverage (Medicare=5 , Medicaid, or Self-Pay=4)        Criteria that do not apply:    Charlson Comorbidity Score (Age + Comorbid Conditions)       Expected Length of Stay 3d 21h   Actual Length of Stay 7

## 2019-10-06 NOTE — PROGRESS NOTES
Bedside shift change report GIVEN TO Deandre Domingo RN. Report included the following information SBAR, Kardex, Intake/Output, MAR and Recent Results. SIGNIFICANT CHANGES DURING SHIFT:        CONCERNS TO ADDRESS WITH MD:  [facility] NURSING NOTE   Admission Date 9/29/2019   Admission Diagnosis Syncope [R55]   Consults IP CONSULT TO CARDIOLOGY  IP CONSULT TO CARDIOLOGY  IP CONSULT TO HOSPITALIST  IP CONSULT TO NEUROLOGY  IP CONSULT TO ENDOCRINOLOGY      Cardiac Monitoring [x] Yes [] No      Purposeful Hourly Rounding [x] Yes    Delores Score Total Score: 4   Delores score 3 or > [x] Bed Alarm [] Avasys [] 1:1 sitter [] Patient refused (Signed refusal form in chart)   Kane Score Kane Score: 19   Kane score 14 or < [] PMT consult [] Wound Care consult    []  Specialty bed  [] Nutrition consult      Influenza Vaccine Received Flu Vaccine for Current Season (usually Sept-March): Yes           Oxygen needs? [x] Room air Oxygen @  []1L    []2L    []3L   []4L    []5L   []6L via  NC   Chronic home O2 use? [] Yes [x] No  Perform O2 challenge test and document in progress note using smartphrase (.Homeoxygen)      Last bowel movement Last Bowel Movement Date: 10/06/19      Urinary Catheter             LDAs               Peripheral IV 09/29/19 Left Antecubital (Active)   Site Assessment Clean, dry, & intact 10/6/2019  7:39 PM   Phlebitis Assessment 0 10/6/2019  7:39 PM   Infiltration Assessment 0 10/6/2019  7:39 PM   Dressing Status Clean, dry, & intact 10/6/2019  7:39 PM   Dressing Type Transparent 10/6/2019  7:39 PM   Hub Color/Line Status Pink;Flushed 10/6/2019  7:39 PM   Alcohol Cap Used No 9/29/2019  2:30 PM                         Readmission Risk Assessment Tool Score Medium Risk            17       Total Score        3 Has Seen PCP in Last 6 Months (Yes=3, No=0)    2 . Living with Significant Other. Assisted Living. LTAC. SNF.  or   Rehab    3 Patient Length of Stay (>5 days = 3)    4 IP Visits Last 12 Months (1-3=4, 4=9, >4=11)    5 Pt.  Coverage (Medicare=5 , Medicaid, or Self-Pay=4)        Criteria that do not apply:    Charlson Comorbidity Score (Age + Comorbid Conditions)       Expected Length of Stay 3d 21h   Actual Length of Stay 7

## 2019-10-06 NOTE — PROGRESS NOTES
Hospitalist Progress Note    NAME: Ese Hernandez   :  1945   MRN:  [id number]       Assessment / Plan:  Syncope due to orthostatic hypotension due to multiple system atrophy - parkinson's type  -remain orthostatic on midodrine 10 mg TID + florinef 0.2, less Sx thou   TSH normal   Cortisol was low in am, cosyntropin test normal. Endocrinology input appreciated - no adrenal insufficiency   MRI brain: chronic microvascular changes, no acute pathology   MRI C spine:No significant change in moderate to severe cervical spondylosis most pronounced at C3-4 and C4-5. MRA: no flow-limiting stenosis or occlusion.  -Goal of treatment: minimize Sx as much as possible, may not be able to get 100% fixed ( wife aware)   -stopped  Aricept ( started recently per pt, can cause dizziness/orthostatics)   -on Florinef  0.2   Little benefit is obtained by dose increase beyond 0.2 mg per day, however, while side effects increase significantly. Watch for worsening supine HTN   -will await on further neurology recommendations   -neurology help appreciated: On exam he does have parkinsonian features. 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 No sinemet at that will increase the orthostatic hypotension. 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 10/01 NTD   -Dr Ti Childers input appreciated: negative BC, no need for MINH    Device interrogation with 91% AP, <0.1% RVP. Appropriately functioning device; no arrhythmias. His syncope is not secondary to arrhythmia. His [employer] Yadira RUTH MRI is MRI COMPATIBLE. Cardiology form for MRI completed and on chart.       BPH, con Proscar   Anxiety Disorder, cont scheduled klonopin, Buspar, Cymbalta   Neuropathy, cont Neurontin     10/01: wife was updated over the phone on all of the above; wife shared that pt dtr from the first marriage passed today. Pt is not aware yet   10/02 and 10/03: wife was updated at bedside         Code status: DNR   NOK: wife   Prophylaxis: Lovenox - holding for MINH     Baseline: lives with wife; ambulates with rolator   Recommended Disposition: Home w/Family once not Sx with dropping his BP; need to mobilize more      Subjective:     Chief Complaint / Reason for Physician Visit: following orthostatic hypotension   Less Sx when standing but BP still dropping down to 70/80s when standing     Discussed with RN events overnight. Review of Systems:  Symptom Y/N Comments  Symptom Y/N Comments   Fever/Chills n   Chest Pain n    Poor Appetite    Edema     Cough    Abdominal Pain n    Sputum    Joint Pain     SOB/PATEL n   Pruritis/Rash     Nausea/vomit    Tolerating PT/OT     Diarrhea    Tolerating Diet     Constipation    Other       Could NOT obtain due to:      Objective:     VITALS:   Last 24hrs VS reviewed since prior progress note. Most recent are:  Patient Vitals for the past 24 hrs:   Temp Pulse Resp BP SpO2   10/06/19 1236 -- 62 -- 118/75 --   10/06/19 1229 -- 60 -- (!) 103/95 --   10/06/19 1120 97.5 °F (36.4 °C) 60 18 144/82 93 %   10/06/19 0826 97.9 °F (36.6 °C) 60 18 132/69 95 %   10/06/19 0256 97.4 °F (36.3 °C) 65 18 144/77 94 %   10/05/19 2237 97.9 °F (36.6 °C) 60 18 165/76 96 %   10/05/19 1911 97.5 °F (36.4 °C) 60 18 120/77 97 %   10/05/19 1736 -- -- -- 137/72 --   10/05/19 1429 97.5 °F (36.4 °C) 61 18 119/62 95 %       Intake/Output Summary (Last 24 hours) at 10/6/2019 1240  Last data filed at 10/6/2019 1227  Gross per 24 hour   Intake 580 ml   Output 2475 ml   Net -1895 ml        PHYSICAL EXAM:  General: WD, WN. Alert, cooperative, no acute distress    EENT:  EOMI. Anicteric sclerae. MMM  Resp:  CTA bilaterally, no wheezing or rales.   No accessory muscle use  CV:  Regular  rhythm,  No edema  GI:  Soft, Non distended, Non tender.  +Bowel sounds  Neurologic:  Alert and oriented X 3, normal speech,   Psych:   Fair insight. Not anxious nor agitated  Skin:  No rashes. No jaundice    Reviewed most current lab test results and cultures  YES  Reviewed most current radiology test results   YES  Review and summation of old records today    NO  Reviewed patient's current orders and MAR    YES  PMH/SH reviewed - no change compared to H&P  ________________________________________________________________________  Care Plan discussed with:    Comments   Patient y    Family      RN y    Care Manager     Consultant  y Neurology                       Multidiciplinary team rounds were held today with , nursing, pharmacist and clinical coordinator. Patient's plan of care was discussed; medications were reviewed and discharge planning was addressed. ________________________________________________________________________  Total NON critical care TIME:  25   Minutes    Total CRITICAL CARE TIME Spent:   Minutes non procedure based      Comments   >50% of visit spent in counseling and coordination of care     ________________________________________________________________________  Brad Tobar MD     Procedures: see electronic medical records for all procedures/Xrays and details which were not copied into this note but were reviewed prior to creation of Plan. LABS:  I reviewed today's most current labs and imaging studies.   Pertinent labs include:  Recent Labs     10/05/19  0227   WBC 6.4   HGB 12.7   HCT 37.7        Recent Labs     10/05/19  0227      K 3.7   *   CO2 29   *   BUN 21*   CREA 0.95   CA 8.2*   MG 2.1   PHOS 4.5       Signed: Brad Tobar MD

## 2019-10-07 PROCEDURE — 74011250637 HC RX REV CODE- 250/637: Performed by: HOSPITALIST

## 2019-10-07 PROCEDURE — 97110 THERAPEUTIC EXERCISES: CPT

## 2019-10-07 PROCEDURE — 94760 N-INVAS EAR/PLS OXIMETRY 1: CPT

## 2019-10-07 PROCEDURE — 97535 SELF CARE MNGMENT TRAINING: CPT | Performed by: OCCUPATIONAL THERAPIST

## 2019-10-07 PROCEDURE — 74011250636 HC RX REV CODE- 250/636: Performed by: GENERAL ACUTE CARE HOSPITAL

## 2019-10-07 PROCEDURE — 97116 GAIT TRAINING THERAPY: CPT

## 2019-10-07 PROCEDURE — 65660000000 HC RM CCU STEPDOWN

## 2019-10-07 PROCEDURE — 77030036554

## 2019-10-07 PROCEDURE — 74011250637 HC RX REV CODE- 250/637: Performed by: GENERAL ACUTE CARE HOSPITAL

## 2019-10-07 RX ADMIN — BUSPIRONE HYDROCHLORIDE 10 MG: 10 TABLET ORAL at 21:08

## 2019-10-07 RX ADMIN — BUSPIRONE HYDROCHLORIDE 10 MG: 10 TABLET ORAL at 08:51

## 2019-10-07 RX ADMIN — MIDODRINE HYDROCHLORIDE 10 MG: 5 TABLET ORAL at 08:51

## 2019-10-07 RX ADMIN — Medication 10 ML: at 21:08

## 2019-10-07 RX ADMIN — BUSPIRONE HYDROCHLORIDE 10 MG: 10 TABLET ORAL at 18:18

## 2019-10-07 RX ADMIN — Medication 10 ML: at 14:00

## 2019-10-07 RX ADMIN — MIDODRINE HYDROCHLORIDE 10 MG: 5 TABLET ORAL at 11:10

## 2019-10-07 RX ADMIN — MIDODRINE HYDROCHLORIDE 10 MG: 5 TABLET ORAL at 18:18

## 2019-10-07 RX ADMIN — DULOXETINE HYDROCHLORIDE 60 MG: 30 CAPSULE, DELAYED RELEASE ORAL at 11:10

## 2019-10-07 RX ADMIN — Medication 10 ML: at 01:52

## 2019-10-07 RX ADMIN — FLUDROCORTISONE ACETATE 0.2 MG: 0.1 TABLET ORAL at 08:51

## 2019-10-07 RX ADMIN — CLONAZEPAM 0.5 MG: 0.5 TABLET ORAL at 08:51

## 2019-10-07 RX ADMIN — ENOXAPARIN SODIUM 40 MG: 40 INJECTION SUBCUTANEOUS at 08:51

## 2019-10-07 RX ADMIN — CLONAZEPAM 0.5 MG: 0.5 TABLET ORAL at 18:18

## 2019-10-07 RX ADMIN — GABAPENTIN 600 MG: 300 CAPSULE ORAL at 08:51

## 2019-10-07 RX ADMIN — ASPIRIN 81 MG CHEWABLE TABLET 81 MG: 81 TABLET CHEWABLE at 08:51

## 2019-10-07 RX ADMIN — TAMSULOSIN HYDROCHLORIDE 0.4 MG: 0.4 CAPSULE ORAL at 08:51

## 2019-10-07 NOTE — PROGRESS NOTES
0730: Received report from Mecca Foster, [facility]. Report included SBAR, Kardex, MAR, Recent results, and I/O.    1055: Patient has MEWS of 3 due to blood pressure 74/44. Patient sitting in chair with legs in dependant position. Patient asymptomatic. Requested patient get back in bed. Patient asked to remain in chair and asked if I would put his foot rest up and check his blood pressure in 20 minutes.  Complied with patient request.    8125: BP 93/55 sitting in chair with feet elevated in foot rest

## 2019-10-07 NOTE — PROGRESS NOTES
Sittin/60 HR 60    Vitals:    10/07/19 1357 10/07/19 1402 10/07/19 1426 10/07/19 1429   BP: (!) 68/47 116/70 96/41 (!) 71/47   BP 1 Location: Right arm Right arm Right arm Right arm   BP Patient Position: Standing Sitting Sitting  Comment: 2 pairs of renee hose, abdominal binder Standing   Pulse: 62 62 61    Resp:       Temp:       SpO2:       Weight:         120/79 standing marching 103    130/91 Standing after mobilizing 90 feet with two pair of renee hose and abdominal binder

## 2019-10-07 NOTE — PROGRESS NOTES
Problem: Mobility Impaired (Adult and Pediatric)  Goal: *Acute Goals and Plan of Care (Insert Text)  Description  FUNCTIONAL STATUS PRIOR TO ADMISSION: Patient was modified independent using a Rollator for functional mobility, but has had multiple episodes for syncope with falls in the last 6 months. Most episodes of syncope occur with prolonged standing and walking. HOME SUPPORT PRIOR TO ADMISSION: The patient lived with supportive spouse in [de-identified] free 1 story home. Physical Therapy Goals  Initiated 10/3/2019  1. Patient will move from supine to sit and sit to supine , scoot up and down and roll side to side in bed with independence within 7 day(s). 2.  Patient will transfer from bed to chair and chair to bed with supervision/set-up using the least restrictive device within 7 day(s). 3.  Patient will perform sit to stand with modified independence within 7 day(s). 4.  Patient will ambulate with supervision/set-up for 400 feet with the least restrictive device within 7 day(s). Note:   PHYSICAL THERAPY TREATMENT  Patient: Johanna Pino (89 y.o. male)  Date: 10/7/2019  Diagnosis: Syncope [R55]        Precautions: Fall, DNR(orthostatic hypotension)  Chart, physical therapy assessment, plan of care and goals were reviewed. ASSESSMENT  Patient continues with skilled PT services and is progressing towards goals, pt tolerated tx well today, no LOB or dizziness, no SOB, does well with bed mob and transfers, very motivated, did well with ther-ex, vc's for safety and proper RW use. Current Level of Function Impacting Discharge (mobility/balance): CGA x1         PLAN :  Patient continues to benefit from skilled intervention to address the above impairments. Continue treatment per established plan of care. to address goals.     Recommendation for discharge: (in order for the patient to meet his/her long term goals)  Physical therapy at least 2 days/week in the home AND ensure assist and/or supervision for safety with mobility and ADL's     This discharge recommendation:  Has not yet been discussed the attending provider and/or case management    Equipment recommendations for successful discharge (if) home: rolling walker       OBJECTIVE DATA SUMMARY:     Critical Behavior:  Neurologic State: Alert, Appropriate for age  Orientation Level: Appropriate for age  Cognition: Appropriate decision making  Safety/Judgement: Awareness of environment    Functional Mobility Training:  Bed Mobility:  Rolling: Independent  Supine to Sit: Independent  Scooting: Independent  Level of Assistance: Independent  Interventions: Verbal cues    Transfers:  Sit to Stand: Modified independent  Stand to Sit: Modified independent  Bed to Chair: Contact guard assistance  Interventions: Tactile cues; Verbal cues  Level of Assistance: Contact guard assistance    Balance:  Sitting: Intact; Without support  Standing: Intact; With support  Standing - Static: Good;Constant support  Standing - Dynamic : Good;Constant support    Ambulation/Gait Training:  Distance (ft): 125 Feet (ft)  Assistive Device: Walker, rolling;Gait belt  Ambulation - Level of Assistance: Contact guard assistance  Gait Abnormalities: Decreased step clearance  Right Side Weight Bearing: Full  Left Side Weight Bearing: Full  Base of Support: Widened  Stance: (equal)  Speed/Dhara: Pace decreased (<100 feet/min)  Step Length: Left shortened;Right shortened    Therapeutic Exercises:   sitting  EXERCISE   Sets   Reps   Active Active Assist   Passive   Comments   Ankle pumps 1 10 ? ? ? bilat   Heel raises 1 10 ? ? ? \"   Toe tap 1 10 ? ? ? \"   Knee ext 1 10 ? ? ? \"   Hip flex 1 10 ? ? ? \"   Abd & Add 1 10 ? ? ? \"     Pain Ratin/10    Activity Tolerance: Good    After treatment patient left in no apparent distress: Sitting in chair and Call bell within reach    COMMUNICATION/COLLABORATION:   The patients plan of care was discussed with: Registered Nurse    Jean-Claude Hernandez, PTA   Time Calculation: 25 mins

## 2019-10-07 NOTE — PROGRESS NOTES
[facility] INTERDISCIPLINARY ROUNDS    Cardiopulmonary Care Interdisciplinary Rounds were held today to discuss patient's plan of care and outcomes. The following members were present: NP/Physician, Pharmacy, Nursing and Case Management.   Expected Length of Stay:  3d 21h    PLAN OF CARE:   Continue current treatment plan

## 2019-10-07 NOTE — PROGRESS NOTES
Problem: Syncope  Goal: *Absence of injury  Outcome: Progressing Towards Goal     Problem: Patient Education: Go to Patient Education Activity  Goal: Patient/Family Education  Outcome: Progressing Towards Goal     Problem: Falls - Risk of  Goal: *Absence of Falls  Description  Document Jagdish Cole Fall Risk and appropriate interventions in the flowsheet.   Outcome: Progressing Towards Goal  Note:   Fall Risk Interventions:  Mobility Interventions: Bed/chair exit alarm         Medication Interventions: Bed/chair exit alarm    Elimination Interventions: Bed/chair exit alarm    History of Falls Interventions: Bed/chair exit alarm

## 2019-10-07 NOTE — PROGRESS NOTES
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 He is still on Flomax (Known to cause Orthostatic hypotension) and not on Proscar (Not known for orthostatic as has no Alpha effects). Consider stopping Flomax and restarting Proscar if [facility] with Neurology. Clonazepam is another drug he is on related to falls . He is still orthostatic . Syncope due to orthostatic hypotension due to multiple system atrophy - parkinson's type  -remain orthostatic on midodrine 10 mg TID + florinef 0.2, less Sx thou   TSH normal   Cortisol was low in am, cosyntropin test normal. Endocrinology input appreciated - no adrenal insufficiency   MRI brain: chronic microvascular changes, no acute pathology   MRI C spine:No significant change in moderate to severe cervical spondylosis most pronounced at C3-4 and C4-5. MRA: no flow-limiting stenosis or occlusion.  -Goal of treatment: minimize Sx as much as possible, may not be able to get 100% fixed ( wife aware)   -stopped  Aricept ( started recently per pt, can cause dizziness/orthostatics)   -on Florinef  0.2   Little benefit is obtained by dose increase beyond 0.2 mg per day, however, while side effects increase significantly. Watch for worsening supine HTN   -will await on further neurology recommendations   -neurology help appreciated: On exam he does have parkinsonian features. 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 No sinemet at that will increase the orthostatic hypotension. 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 Appropriately functioning device; no arrhythmias. His syncope is not secondary to arrhythmia. His Medtronic Yadira RUTH MRI is MRI COMPATIBLE. Cardiology form for MRI completed and on chart. BPH, con Proscar   Anxiety Disorder, cont scheduled klonopin, Buspar, Cymbalta   Neuropathy, cont Neurontin     10/01: wife was updated over the phone on all of the above; wife shared that pt dtr from the first marriage passed today. Pt is not aware yet   10/02 and 10/03: wife was updated at bedside         Code status: DNR   NOK: wife   Prophylaxis: Lovenox - holding for MINH     Baseline: lives with wife; ambulates with rolator   Recommended Disposition: Home w/Family once not Sx with dropping his BP; need to mobilize more      Subjective:     Chief Complaint / Reason for Physician Visit: following orthostatic hypotension   Less Sx when standing but BP still dropping down to 70/80s when standing     Discussed with RN events overnight. Review of Systems:  Symptom Y/N Comments  Symptom Y/N Comments   Fever/Chills n   Chest Pain n    Poor Appetite    Edema     Cough    Abdominal Pain n    Sputum    Joint Pain     SOB/PATEL n   Pruritis/Rash     Nausea/vomit    Tolerating PT/OT     Diarrhea    Tolerating Diet     Constipation    Other       Could NOT obtain due to:      Objective:     VITALS:   Last 24hrs VS reviewed since prior progress note.  Most recent are:  Patient Vitals for the past 24 hrs:   Temp Pulse Resp BP SpO2   10/07/19 0741 98.2 °F (36.8 °C) 68 18 130/82 94 %   10/07/19 0246 97.3 °F (36.3 °C) 61 20 166/75 95 %   10/06/19 2235 97.5 °F (36.4 °C) 73 20 154/79 98 %   10/06/19 1919 98.2 °F (36.8 °C) 60 20 147/62 98 %   10/06/19 1722 -- 64 -- (!) 161/95 --   10/06/19 1521 97.8 °F (36.6 °C) 60 16 149/78 97 %   10/06/19 1236 -- 62 -- 118/75 --   10/06/19 1229 -- 60 -- (!) 103/95 --   10/06/19 1120 97.5 °F (36.4 °C) 60 18 144/82 93 %       Intake/Output Summary (Last 24 hours) at 10/7/2019 1038  Last data filed at 10/7/2019 0255  Gross per 24 hour   Intake 2360 ml   Output 2475 ml   Net -115 ml        PHYSICAL EXAM:  General: WD, WN. Alert, cooperative, no acute distress    EENT:  EOMI. Anicteric sclerae. MMM  Resp:  CTA bilaterally, no wheezing or rales. No accessory muscle use  CV:  Regular  rhythm,  No edema  GI:  Soft, Non distended, Non tender.  +Bowel sounds  Neurologic:  Alert and oriented X 3, normal speech,   Psych:   Fair insight. Not anxious nor agitated  Skin:  No rashes. No jaundice    Reviewed most current lab test results and cultures  YES  Reviewed most current radiology test results   YES  Review and summation of old records today    NO  Reviewed patient's current orders and MAR    YES  PMH/SH reviewed - no change compared to H&P  ________________________________________________________________________  Care Plan discussed with:    Comments   Patient y    Family      RN y    Care Manager     Consultant  y Neurology                       Multidiciplinary team rounds were held today with , nursing, pharmacist and clinical coordinator. Patient's plan of care was discussed; medications were reviewed and discharge planning was addressed. ________________________________________________________________________  Total NON critical care TIME:  25   Minutes    Total CRITICAL CARE TIME Spent:   Minutes non procedure based      Comments   >50% of visit spent in counseling and coordination of care     ________________________________________________________________________  Emile Ace MD     Procedures: see electronic medical records for all procedures/Xrays and details which were not copied into this note but were reviewed prior to creation of Plan. LABS:  I reviewed today's most current labs and imaging studies.   Pertinent labs include:  Recent Labs     10/05/19  0227   WBC 6.4   HGB 12.7   HCT 37.7        Recent Labs 10/05/19  0227      K 3.7   *   CO2 29   *   BUN 21*   CREA 0.95   CA 8.2*   MG 2.1   PHOS 4.5       Signed: Savi Mcgee MD

## 2019-10-07 NOTE — PROGRESS NOTES
Problem: Self Care Deficits Care Plan (Adult)  Goal: *Acute Goals and Plan of Care (Insert Text)  Description    FUNCTIONAL STATUS PRIOR TO ADMISSION: Patient was modified independent using a Rollator for functional mobility. HOME SUPPORT: The patient lived with family and needed assist from wife for ADLs and ILS. Occupational Therapy Goals  Initiated 9/30/2019  1. Patient will perform grooming with supervision/set-up within 7 day(s). - Goal met  2. Patient will perform bathing at the sink with supervision/set-up within 7 day(s). - Goal not met. Pt unable to perform standing at sink d/t OH, he can perform the task from sitting. Recommend shower chair. 3.  Patient will perform lower body dressing with minimal assistance/contact guard assist within 7 day(s). - Goal met. 4.  Patient will perform toilet transfers with modified independence within 7 day(s). - Goal met. 5.  Patient will perform all aspects of toileting with independence within 7 day(s). - Goal met. 6.  Patient will participate in upper extremity therapeutic exercise/activities with independence for 5 minutes within 7 day(s). - Goal met. 7.  Patient will utilize energy conservation techniques during functional activities with verbal cues within 7 day(s). Goal met. Outcome: Resolved/Met   OCCUPATIONAL THERAPY RE-EVALUATION/DISCHARGE  Patient: Karly Tobar (25 y.o. male)  Date: 10/7/2019  Diagnosis: Syncope [R55] <principal problem not specified>      Precautions: Fall, DNR(orthostatic hypotension)  Chart, occupational therapy assessment, plan of care, and goals were reviewed. ASSESSMENT  Based on the objective data described below, pt is still suffering from [facility], but is functional in all ADLs and functional mobility. Therapist provided pt with addition knee high Rob hose and abdominal wrap in order to offset OH BP drop during standing.  Pt responded well to treatment and was able to tolerate LE and abdominal compression garments in addition to 80' off ambulation resulting with a standing BP of 130/91. Pt has completed all achievable goals, and has been educated on compensatory techniques to utilize in his home environment. Sittin/60 HR 60            Vitals:     10/07/19 1357 10/07/19 1402 10/07/19 1426 10/07/19 1429   BP: (!) 68/47 116/70 96/41 (!) 71/47   BP 1 Location: Right arm Right arm Right arm Right arm   BP Patient Position: Standing Sitting Sitting  Comment: 2 pairs of renee hose, abdominal binder Standing   Pulse: 62 62 61     Resp:           Temp:           SpO2:           Weight:              120/79 standing marching 103     130/91 Standing after mobilizing 90 feet with two pair of renee hose and abdominal binder        Current Level of Function Impacting Discharge (ADLs): Pt is independent or Mod I with all functional transfers, feeding, grooming, and UB dressing. He needs SBA for toileting, and Min A with LB dressing and bathing (using shower chair). Other factors to consider for discharge: orthostatic hypotension         PLAN :  Recommend with staff: use compression stockings, abdominal compression wrap, encourage     Recommendation for discharge: (in order for the patient to meet his/her long term goals)  Occupational therapy at least 2 days/week in the home AND ensure assist and/or supervision for safety with activity analysis, ADLs, and route planning. This discharge recommendation:  Has been made in collaboration with the attending provider and/or case management    Equipment recommendations for successful discharge (if) home: shower chair       SUBJECTIVE:   Patient stated \"I feel a little dizzy.  in response to therapist asking how he felt with 120/79 BP. OBJECTIVE DATA SUMMARY:   Hospital course since last seen and reason for reevaluation: Pt continues to suffer from asymptomatic OH, but is still receptive to therapy and has made good progress.     Cognitive/Behavioral Status:  Neurologic State: Alert; Appropriate for age  Orientation Level: Appropriate for age;Oriented X4  Cognition: Appropriate decision making; Appropriate for age attention/concentration; Appropriate safety awareness;Recognition of people/places; Follows commands  Perception: Appears intact  Perseveration: No perseveration noted  Safety/Judgement: Awareness of environment; Insight into deficits; Fall prevention    Balance:  Sitting: Intact  Standing: Intact; Without support  Standing - Static: Good; Unsupported  Standing - Dynamic : Good; Unsupported    Functional Mobility and Transfers for ADLs:  Bed Mobility:  Rolling: Independent  Supine to Sit: Independent  Scooting: Independent    Transfers:  Sit to Stand: Modified independent  Stand to Sit: Modified independent  Bed to Chair: Contact guard assistance    ADL Assessment:  Feeding: Independent    Oral Facial Hygiene/Grooming: Independent    Bathing: Minimum assistance; Adaptive equipment(Shower chair)    Upper Body Dressing: Independent    Lower Body Dressing: Minimum assistance    Toileting: Stand by assistance                ADL Intervention and task modifications:     Pt was provided with knee high Rob hose and abdominal compression wrap in efforts to increase standing BP. Pt was educated on task management, route planning, and strategic placement of seating throughout his home for frequent rest breaks. Cognitive Retraining  Safety/Judgement: Awareness of environment; Insight into deficits; Fall prevention        Functional Measure:  Barthel Index:    Bathin  Bladder: 10  Bowels: 10  Groomin  Dressing: 10  Feeding: 10  Mobility: 10  Stairs: 5  Toilet Use: 10  Transfer (Bed to Chair and Back): 15  Total: 85/100        The Barthel ADL Index: Guidelines  1. The index should be used as a record of what a patient does, not as a record of what a patient could do.   2. The main aim is to establish degree of independence from any help, physical or verbal, however minor and for whatever reason. 3. The need for supervision renders the patient not independent. 4. A patient's performance should be established using the best available evidence. Asking the patient, friends/relatives and nurses are the usual sources, but direct observation and common sense are also important. However direct testing is not needed. 5. Usually the patient's performance over the preceding 24-48 hours is important, but occasionally longer periods will be relevant. 6. Middle categories imply that the patient supplies over 50 per cent of the effort. 7. Use of aids to be independent is allowed. Sophia Castañeda., Barthel, DDerekW. ([id number]). Functional evaluation: the Barthel Index. [address] [facility] (14)2. MIGUELINA Awad, Suzanna Ugalde, Dalton Gallegos, Jeanine, [address] (1999). Measuring the change indisability after inpatient rehabilitation; comparison of the responsiveness of the Barthel Index and Functional Howard Measure. Journal of Neurology, Neurosurgery, and Psychiatry, 66(4), 782-787. Daquan Casey, [place].WANDA, LEONORA Gunderson, & Ambrose Self MYU. (2004.) Assessment of post-stroke quality of life in cost-effectiveness studies: The usefulness of the Barthel Index and the EuroQoL-5D. Quality of Life Research, 13, 427-43         Pain:  0/10    Activity Tolerance:   Good, but still suffers from asymptomatic orthostatic hypotension  Please refer to the flowsheet for vital signs taken during this treatment. After treatment patient left in no apparent distress:   Sitting in chair, Call bell within reach and Caregiver / family present    COMMUNICATION/COLLABORATION:   The patients plan of care was discussed with: Registered Nurse    Eun Isaac  Time Calculation: 46 mins     Regarding student involvement in patient care:  A student participated in this treatment session. Per CMS Medicare statements and AOTA guidelines I certify that the following was true:  1.  I was present and directly observed the entire session. 2. I made all skilled judgments and clinical decisions regarding care. 3. I am the practitioner responsible for assessment, treatment, and documentation.

## 2019-10-08 LAB
ANION GAP SERPL CALC-SCNC: 5 MMOL/L (ref 5–15)
BUN SERPL-MCNC: 18 MG/DL (ref 6–20)
BUN/CREAT SERPL: 18 (ref 12–20)
CALCIUM SERPL-MCNC: 8.4 MG/DL (ref 8.5–10.1)
CHLORIDE SERPL-SCNC: 106 MMOL/L (ref 97–108)
CO2 SERPL-SCNC: 29 MMOL/L (ref 21–32)
CREAT SERPL-MCNC: 1 MG/DL (ref 0.7–1.3)
ERYTHROCYTE [DISTWIDTH] IN BLOOD BY AUTOMATED COUNT: 12.6 % (ref 11.5–14.5)
GLUCOSE SERPL-MCNC: 120 MG/DL (ref 65–100)
HCT VFR BLD AUTO: 38.7 % (ref 36.6–50.3)
HGB BLD-MCNC: 12.6 G/DL (ref 12.1–17)
MCH RBC QN AUTO: 31.7 PG (ref 26–34)
MCHC RBC AUTO-ENTMCNC: 32.6 G/DL (ref 30–36.5)
MCV RBC AUTO: 97.5 FL (ref 80–99)
NRBC # BLD: 0 K/UL (ref 0–0.01)
NRBC BLD-RTO: 0 PER 100 WBC
PLATELET # BLD AUTO: 160 K/UL (ref 150–400)
PMV BLD AUTO: 10.1 FL (ref 8.9–12.9)
POTASSIUM SERPL-SCNC: 3.7 MMOL/L (ref 3.5–5.1)
RBC # BLD AUTO: 3.97 M/UL (ref 4.1–5.7)
SODIUM SERPL-SCNC: 140 MMOL/L (ref 136–145)
WBC # BLD AUTO: 6.6 K/UL (ref 4.1–11.1)

## 2019-10-08 PROCEDURE — 74011250637 HC RX REV CODE- 250/637: Performed by: HOSPITALIST

## 2019-10-08 PROCEDURE — 85027 COMPLETE CBC AUTOMATED: CPT

## 2019-10-08 PROCEDURE — 80048 BASIC METABOLIC PNL TOTAL CA: CPT

## 2019-10-08 PROCEDURE — 36415 COLL VENOUS BLD VENIPUNCTURE: CPT

## 2019-10-08 PROCEDURE — 97110 THERAPEUTIC EXERCISES: CPT

## 2019-10-08 PROCEDURE — 97116 GAIT TRAINING THERAPY: CPT

## 2019-10-08 PROCEDURE — 65660000000 HC RM CCU STEPDOWN

## 2019-10-08 PROCEDURE — 74011250637 HC RX REV CODE- 250/637: Performed by: GENERAL ACUTE CARE HOSPITAL

## 2019-10-08 PROCEDURE — 74011250636 HC RX REV CODE- 250/636: Performed by: GENERAL ACUTE CARE HOSPITAL

## 2019-10-08 RX ADMIN — BUSPIRONE HYDROCHLORIDE 10 MG: 10 TABLET ORAL at 10:32

## 2019-10-08 RX ADMIN — GABAPENTIN 600 MG: 300 CAPSULE ORAL at 10:32

## 2019-10-08 RX ADMIN — BUSPIRONE HYDROCHLORIDE 10 MG: 10 TABLET ORAL at 18:32

## 2019-10-08 RX ADMIN — CLONAZEPAM 0.5 MG: 0.5 TABLET ORAL at 10:32

## 2019-10-08 RX ADMIN — FLUDROCORTISONE ACETATE 0.2 MG: 0.1 TABLET ORAL at 10:32

## 2019-10-08 RX ADMIN — CLONAZEPAM 0.5 MG: 0.5 TABLET ORAL at 18:32

## 2019-10-08 RX ADMIN — ENOXAPARIN SODIUM 40 MG: 40 INJECTION SUBCUTANEOUS at 10:33

## 2019-10-08 RX ADMIN — MIDODRINE HYDROCHLORIDE 10 MG: 5 TABLET ORAL at 13:00

## 2019-10-08 RX ADMIN — BUSPIRONE HYDROCHLORIDE 10 MG: 10 TABLET ORAL at 21:05

## 2019-10-08 RX ADMIN — ASPIRIN 81 MG CHEWABLE TABLET 81 MG: 81 TABLET CHEWABLE at 10:32

## 2019-10-08 RX ADMIN — Medication 10 ML: at 18:33

## 2019-10-08 RX ADMIN — FINASTERIDE 5 MG: 5 TABLET, FILM COATED ORAL at 10:33

## 2019-10-08 RX ADMIN — MIDODRINE HYDROCHLORIDE 10 MG: 5 TABLET ORAL at 18:32

## 2019-10-08 RX ADMIN — Medication 10 ML: at 21:06

## 2019-10-08 RX ADMIN — MIDODRINE HYDROCHLORIDE 10 MG: 5 TABLET ORAL at 09:25

## 2019-10-08 RX ADMIN — DULOXETINE HYDROCHLORIDE 60 MG: 30 CAPSULE, DELAYED RELEASE ORAL at 10:34

## 2019-10-08 RX ADMIN — Medication 10 ML: at 06:37

## 2019-10-08 NOTE — PROGRESS NOTES
Hospitalist Progress Note    NAME: Dustin Marie   :  1945   MRN:  [id number]       Assessment / Plan:  Syncope due to orthostatic hypotension due to multiple system atrophy - parkinson's type  -remain orthostatic on midodrine 10 mg TID + florinef 0.2, less Sx thou   TSH normal   Cortisol was low in am, cosyntropin test normal. Endocrinology input appreciated - no adrenal insufficiency   MRI brain: chronic microvascular changes, no acute pathology   MRI C spine:No significant change in moderate to severe cervical spondylosis most pronounced at C3-4 and C4-5. MRA: no flow-limiting stenosis or occlusion.  -Goal of treatment: minimize Sx as much as possible, may not be able to get 100% fixed ( wife aware)   -stopped  Aricept ( started recently per pt, can cause dizziness/orthostatics)   -on Florinef  0.2   Little benefit is obtained by dose increase beyond 0.2 mg per day, however, while side effects increase significantly. Watch for worsening supine HTN   -will await on further neurology recommendations   -neurology help appreciated: On exam he does have parkinsonian features. 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 No sinemet at that will increase the orthostatic hypotension. Waist high compression stockings    10/8:  Pt still orthostatic this morning - reviewed the numbers with pt's wife at bedside today. Will continue holding Flomax today. Spoke with Dr. Prudence Gorman of Neurology and he will follow up today - appreciate his help. Hx SSS, s/p PPM   Possible MV vegetation    -echo: ef 56%, Possible vegetation present on the anterior leaflet of the mitral valve. 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 Appropriately functioning device; no arrhythmias.    His syncope is not secondary to arrhythmia. His Medtronic Yadira  MRI is MRI COMPATIBLE. Cardiology form for MRI completed and on chart. BPH, con Proscar   Anxiety Disorder, cont scheduled klonopin, Buspar, Cymbalta   Neuropathy, cont Neurontin     Code status: DNR   NOK: wife   Prophylaxis: Lovenox - holding for MINH     Baseline: lives with wife; ambulates with rolator   Recommended Disposition: Home w/Family once not Sx with dropping his BP; need to mobilize more      Subjective:     Chief Complaint / Reason for Physician Visit:  Still with significant drop in BP - symptoms improving slowly. Discussed with RN events overnight. Review of Systems:  Symptom Y/N Comments  Symptom Y/N Comments   Fever/Chills n   Chest Pain n    Poor Appetite    Edema     Cough    Abdominal Pain n    Sputum    Joint Pain     SOB/PATEL n   Pruritis/Rash     Nausea/vomit    Tolerating PT/OT     Diarrhea    Tolerating Diet     Constipation    Other       Could NOT obtain due to:      Objective:     VITALS:   Last 24hrs VS reviewed since prior progress note.  Most recent are:  Patient Vitals for the past 24 hrs:   Temp Pulse Resp BP SpO2   10/08/19 1121 -- 62 -- 90/57 96 %   10/08/19 1118 -- 61 18 114/72 97 %   10/08/19 1114 97.3 °F (36.3 °C) 62 18 144/79 96 %   10/08/19 0832 -- -- -- 108/58 --   10/08/19 0809 -- 68 18 (!) 77/50 94 %   10/08/19 0805 -- 68 18 100/56 96 %   10/08/19 0758 97.6 °F (36.4 °C) 60 18 139/68 95 %   10/08/19 0225 97.6 °F (36.4 °C) 64 18 139/85 92 %   10/07/19 2352 97.6 °F (36.4 °C) (!) 59 18 134/66 92 %   10/07/19 2105 -- 66 -- (!) 75/53 --   10/07/19 2104 -- 77 -- (!) 75/56 --   10/07/19 2102 -- 62 -- 108/67 --   10/07/19 2011 -- 68 -- (!) 76/52 --   10/07/19 2009 -- 80 -- (!) 86/60 96 %   10/07/19 2007 97.5 °F (36.4 °C) 62 18 142/79 93 %   10/07/19 1454 97.4 °F (36.3 °C) 63 17 106/76 97 %   10/07/19 1433 -- 87 -- (!) 130/91 --   10/07/19 1429 -- -- -- (!) 71/47 --   10/07/19 1426 -- 61 -- 96/41 --   10/07/19 1402 -- 62 -- 116/70 -- 10/07/19 1357 -- 62 -- (!) 68/47 --   10/07/19 1353 -- 60 -- 98/60 98 %       Intake/Output Summary (Last 24 hours) at 10/8/2019 1128  Last data filed at 10/8/2019 0225  Gross per 24 hour   Intake 500 ml   Output 1550 ml   Net -1050 ml        PHYSICAL EXAM:  General: WD, WN. Alert, cooperative, no acute distress    EENT:  EOMI. Anicteric sclerae. MMM  Resp:  CTA bilaterally, no wheezing or rales. No accessory muscle use  CV:  Regular  rhythm,  No edema  GI:  Soft, Non distended, Non tender.  +Bowel sounds  Neurologic:  Alert and oriented X 3, normal speech,   Psych:   Fair insight. Not anxious nor agitated  Skin:  No rashes. No jaundice    Reviewed most current lab test results and cultures  YES  Reviewed most current radiology test results   YES  Review and summation of old records today    NO  Reviewed patient's current orders and MAR    YES  PMH/SH reviewed - no change compared to H&P  ________________________________________________________________________  Care Plan discussed with:    Comments   Patient y    Family      RN y    Care Manager     Consultant  y Neurology                       Multidiciplinary team rounds were held today with , nursing, pharmacist and clinical coordinator. Patient's plan of care was discussed; medications were reviewed and discharge planning was addressed. ________________________________________________________________________  Total NON critical care TIME:  25   Minutes    Total CRITICAL CARE TIME Spent:   Minutes non procedure based      Comments   >50% of visit spent in counseling and coordination of care     ________________________________________________________________________  Tato Fair MD     Procedures: see electronic medical records for all procedures/Xrays and details which were not copied into this note but were reviewed prior to creation of Plan. LABS:  I reviewed today's most current labs and imaging studies.   Pertinent labs include:  Recent Labs     10/08/19  0037   WBC 6.6   HGB 12.6   HCT 38.7        Recent Labs     10/08/19  0037      K 3.7      CO2 29   *   BUN 18   CREA 1.00   CA 8.4*       Signed: Irais Mark MD

## 2019-10-08 NOTE — PROGRESS NOTES
Chief Complaint: Dizziness    Symptoms have significantly improved though he remains orthostatic. No falls. Discussed the plan regards to future medication changes if needed targeting proscar next. Discussed not holding the salt. He has an appointment with Dr. Jaleesa Diaz in the near future. Medication changes:  Florinef 0.2 mg  Midodrine 10 mg 3 times daily  Flomax was discontinued   Donepezil was discontinued        Assesment and Plan  1. Orthostatic hypotension  Florinef may take weeks before any benefit is realized. Will prudent to lower dose of 1 of the alpha-blocker as he is on if tolerated for quicker results. Continue compression stockings and hydration  - Continue midodrine  - Continue florinef  - Discontinue Flomax. - Don't hold dietary salt. - Outpatient considerations include Northera (not available inpatient)  May add Mestinon if removing Proscar is not tolerated or not effective.  -continue fall precautions. May walk with PT with assistance. - follow up with neurology after discharge. 2. Dizziness  resolved    3. Multiple system atrophy-P ([address]  suspected    4. Gait disturbance   Needs assistance. Allergies  Patient has no known allergies.      Medications  Current Facility-Administered Medications   Medication Dose Route Frequency    fludrocortisone (FLORINEF) tablet 0.2 mg  0.2 mg Oral DAILY    midodrine (PROAMITINE) tablet 10 mg  10 mg Oral TID WITH MEALS    [Held by provider] tamsulosin (FLOMAX) capsule 0.4 mg  0.4 mg Oral DAILY    aspirin chewable tablet 81 mg  81 mg Oral DAILY    busPIRone (BUSPAR) tablet 10 mg  10 mg Oral TID    clonazePAM (KlonoPIN) tablet 0.5 mg  0.5 mg Oral BID    albuterol-ipratropium (DUO-NEB) 2.5 MG-0.5 MG/3 ML  3 mL Nebulization Q6H PRN    DULoxetine (CYMBALTA) capsule 60 mg  60 mg Oral DAILY    gabapentin (NEURONTIN) capsule 600 mg  600 mg Oral DAILY    finasteride (PROSCAR) tablet 5 mg  5 mg Oral DAILY    sodium chloride (NS) flush 5-40 mL  5-40 mL IntraVENous Q8H    sodium chloride (NS) flush 5-40 mL  5-40 mL IntraVENous PRN    enoxaparin (LOVENOX) injection 40 mg  40 mg SubCUTAneous DAILY    acetaminophen (TYLENOL) tablet 650 mg  650 mg Oral Q6H PRN        Medical History  Past Medical History:   Diagnosis Date    Anxiety     Back pain     Blurred vision     Chest pain     Depression     Dizziness     Fast heart beat     Frequent headaches     Frequent urination     Hip dysplasia, congenital     Joint pain     Joint swelling     Lumbar spinal stenosis     Muscle pain     Neuropathy     Pacemaker     new pacemaker July 2019    Recent change in weight     Sinus problem     Skin disease     SOB (shortness of breath)     Sore throat     SSS (sick sinus syndrome) (HCC)     Stiffness of joints, multiple sites     Stress     Syncope     Tiredness     Trouble in sleeping     Weakness      Review of Systems   Constitutional: Negative for chills and fever. HENT: Negative for ear pain. Eyes: Negative for pain and discharge. Respiratory: Negative for cough and hemoptysis. Cardiovascular: Negative for chest pain and claudication. Gastrointestinal: Negative for constipation and diarrhea. Genitourinary: Negative for flank pain and hematuria. Musculoskeletal: Positive for myalgias. Negative for back pain. Skin: Negative for itching and rash. Neurological: Positive for dizziness. Negative for headaches. Endo/Heme/Allergies: Negative for environmental allergies. Does not bruise/bleed easily. Psychiatric/Behavioral: Negative for depression and hallucinations. The patient is nervous/anxious. Exam:    Visit Vitals  BP 90/57 (BP 1 Location: Left arm, BP Patient Position: Standing)   Pulse 62   Temp 97.3 °F (36.3 °C)   Resp 18   Wt 181 lb 3.5 oz (82.2 kg)   SpO2 96%   BMI 25.27 kg/m²      General: Well developed, well nourished.  Patient in no apparent distress   Head: Normocephalic, atraumatic, anicteric sclera   Neck Normal ROM, No thyromegally   Lungs:  Clear to auscultation bilaterally, No wheezes or rubs   Cardiac: Regular rate and rhythm with no murmurs. Abd: Bowel sounds were audible. No tenderness on palpation   Ext: No pedal edema   Skin: Supple no rash     NeurologicExam:  Mental Status: Alert and oriented to person place and time   Speech: Fluent no aphasia or dysarthria. Cranial Nerves:  II - XII Intact   Motor:  Full and symmetric strength of upper and lower proximal and distal muscles. Bradykinetic. + cogwheeling. Reflexes:   Deep tendon reflexes 2+/4 and symmetric. Sensory:   Symmetric and intact with no perceived deficits modalities involving small or large fibers. Gait:  Gait limping (secondary to congenital hip condition) wide based not steady   Tremor:   No tremor noted. Cerebellar:  Coordination intact. Neurovascular: No carotid bruits. No JVD     Imaging  CT Results (most recent):  Results from Hospital Encounter encounter on 09/29/19   CT HEAD WO CONT    Narrative EXAM:  CT HEAD WO CONT    INDICATION: Dizziness. COMPARISON: CT 9/13/2019    TECHNIQUE: Axial noncontrast head CT from foramen magnum to vertex. Coronal and  sagittal reformatted images were obtained. CT dose reduction was achieved  through use of a standardized protocol tailored for this examination and  automatic exposure control for dose modulation. FINDINGS:  There is diffuse age-related parenchymal volume loss. The ventricles  and sulci are age-appropriate without hydrocephalus. There is no mass effect or  midline shift. There is no intracranial hemorrhage or extra-axial fluid  collection. Scattered foci of low attenuation in the periventricular white  matter represent stable chronic microvascular ischemic changes. The gray-white  matter differentiation is maintained. The basal cisterns are patent. The osseous structures are intact. There is mild mucosal thickening in the  posterior left ethmoid air cells.  The remaining paranasal sinuses and mastoid  air cells are clear. Impression IMPRESSION:   No acute intracranial abnormality. MRI Results (most recent):  Results from [place] encounter on 09/29/19   MRA NECK WO CONT    Narrative INDICATION: Headaches    COMPARISON:  MR brain October 2, 2019    TECHNIQUE:  3-D time-of-flight MRA of the brain was performed. 2-D  time-of-flight MRA of the neck was performed. Multiplanar reconstructions were  obtained. FINDINGS:    MRA NECK    Common Carotids/Internal Carotids (visualized segments):  No flow limiting  stenosis by NASCET criteria. Vertebral Arteries (visualized segments):  No flow limiting stenosis. MRA HEAD    Posterior Circulation:  Patent. Mild irregularity throughout the bilateral  posterior cerebral arteries. Anterior Circulation:  Mild to moderate irregularity bilateral cavernous and  supraclinoid internal carotid artery segments without flow limitation. Mild  irregularity throughout the bilateral middle and anterior cerebral arteries may  be accentuated by artifact. No flow-limiting stenosis or occlusion. Other:  No aneurysm or vascular malformation. Impression IMPRESSION:  Mild to moderate intracranial atherosclerosis as above with no flow-limiting  stenosis or occlusion.             Lab Review  Lab Results   Component Value Date/Time    WBC 6.6 10/08/2019 12:37 AM    HCT 38.7 10/08/2019 12:37 AM    HGB 12.6 10/08/2019 12:37 AM    PLATELET 923 86/32/9854 12:37 AM       Lab Results   Component Value Date/Time    Sodium 140 10/08/2019 12:37 AM    Potassium 3.7 10/08/2019 12:37 AM    Chloride 106 10/08/2019 12:37 AM    CO2 29 10/08/2019 12:37 AM    Glucose 120 (H) 10/08/2019 12:37 AM    BUN 18 10/08/2019 12:37 AM    Creatinine 1.00 10/08/2019 12:37 AM    Calcium 8.4 (L) 10/08/2019 12:37 AM       Lab Results   Component Value Date/Time    Hemoglobin A1c 5.7 10/01/2019 12:51 AM        Lab Results   Component Value Date/Time    Vitamin B12 636 11/10/2018 04:45 AM    Folate 15.6 11/10/2018 04:45 AM       Lab Results   Component Value Date/Time    Cholesterol, total 112 09/27/2019 02:37 PM    HDL Cholesterol 40 09/27/2019 02:37 PM    LDL, calculated 53 09/27/2019 02:37 PM    VLDL, calculated 19 09/27/2019 02:37 PM    Triglyceride 96 09/27/2019 02:37 PM

## 2019-10-08 NOTE — PROGRESS NOTES
Problem: Falls - Risk of  Goal: *Absence of Falls  Description  Document Marcio Walters Fall Risk and appropriate interventions in the flowsheet.   Outcome: Progressing Towards Goal  Note:   Fall Risk Interventions:  Mobility Interventions: Assess mobility with egress test, Bed/chair exit alarm, Patient to call before getting OOB         Medication Interventions: Assess postural VS orthostatic hypotension, Bed/chair exit alarm, Patient to call before getting OOB, Teach patient to arise slowly    Elimination Interventions: Bed/chair exit alarm, Call light in reach, Patient to call for help with toileting needs, Urinal in reach    History of Falls Interventions: Bed/chair exit alarm, Consult care management for discharge planning, Door open when patient unattended

## 2019-10-08 NOTE — PROGRESS NOTES
Problem: Mobility Impaired (Adult and Pediatric)  Goal: *Acute Goals and Plan of Care (Insert Text)  Description  FUNCTIONAL STATUS PRIOR TO ADMISSION: Patient was modified independent using a Rollator for functional mobility, but has had multiple episodes for syncope with falls in the last 6 months. Most episodes of syncope occur with prolonged standing and walking. HOME SUPPORT PRIOR TO ADMISSION: The patient lived with supportive spouse in [de-identified] free 1 story home. Physical Therapy Goals  Initiated 10/3/2019  1. Patient will move from supine to sit and sit to supine , scoot up and down and roll side to side in bed with independence within 7 day(s). 2.  Patient will transfer from bed to chair and chair to bed with supervision/set-up using the least restrictive device within 7 day(s). 3.  Patient will perform sit to stand with modified independence within 7 day(s). 4.  Patient will ambulate with supervision/set-up for 400 feet with the least restrictive device within 7 day(s). Outcome: Progressing Towards Goal  Note:   PHYSICAL THERAPY TREATMENT  Patient: Alexandrea Lipoma (56 y.o. male)  Date: 10/8/2019  Diagnosis: Syncope [R55]        Precautions: Fall, DNR(orthostatic hypotension)  Chart, physical therapy assessment, plan of care and goals were reviewed. ASSESSMENT  Patient continues with skilled PT services and is progressing towards goals, pt continues to do well, no LOB or SOB, does well with bed mob and transfers, good motivation, did well with ther-ex, vc's for safety and proper RW use. Current Level of Function Impacting Discharge (mobility/balance): CGA x1         PLAN :  Patient continues to benefit from skilled intervention to address the above impairments. Continue treatment per established plan of care. to address goals.     Recommendation for discharge: (in order for the patient to meet his/her long term goals)  Physical therapy at least 2 days/week in the home AND ensure assist and/or supervision for safety with gait and ADL's     This discharge recommendation:  Has not yet been discussed the attending provider and/or case management    Equipment recommendations for successful discharge (if) home: patient owns DME required for discharge     OBJECTIVE DATA SUMMARY:     Critical Behavior:  Neurologic State: Alert  Orientation Level: Appropriate for age  Cognition: Appropriate decision making, Follows commands  Safety/Judgement: Awareness of environment, Insight into deficits, Fall prevention    Functional Mobility Training:  Bed Mobility:  Rolling: Independent  Supine to Sit: Independent  Scooting: Independent  Level of Assistance: Independent  Interventions: Verbal cues    Transfers:  Sit to Stand: Modified independent  Stand to Sit: Modified independent  Bed to Chair: Contact guard assistance  Interventions: Tactile cues; Verbal cues  Level of Assistance: Contact guard assistance    Balance:  Sitting: Intact; Without support  Standing: Intact; With support  Standing - Static: Good;Constant support  Standing - Dynamic : Good;Constant support    Ambulation/Gait Training:  Distance (ft): 125 Feet (ft)  Assistive Device: Walker, rolling;Gait belt  Ambulation - Level of Assistance: Contact guard assistance  Gait Abnormalities: Decreased step clearance  Right Side Weight Bearing: Full  Left Side Weight Bearing: Full  Base of Support: Widened  Stance: (equal)  Speed/Dhara: Pace decreased (<100 feet/min)  Step Length: Left shortened;Right shortened    Therapeutic Exercises:   standing  EXERCISE   Sets   Reps   Active Active Assist   Passive   Comments   Heel raises 1 10 ?                        ?                        ?                        bilat   Leg curls 1 10 ?                        ?                        ?                        \"   Hip flex 1 10 ?                        ?                        ?                        \"   Ankle pump 1 10 ?                        ? ?                        \"   Abd & Add 1 10 ?                        ?                        ?                        \"     Pain Ratin/10    Activity Tolerance: Good    After treatment patient left in no apparent distress: Sitting in chair, Call bell within reach and Caregiver / family present    COMMUNICATION/COLLABORATION:   The patients plan of care was discussed with: Registered Nurse    Jackie Flowers PTA   Time Calculation: 25 mins

## 2019-10-08 NOTE — PROGRESS NOTES
Bedside shift change report given to  (oncoming nurse) by Melquiades Montalvo (offgoing nurse). Report included the following information SBAR and Kardex.

## 2019-10-09 PROCEDURE — 97110 THERAPEUTIC EXERCISES: CPT

## 2019-10-09 PROCEDURE — 97116 GAIT TRAINING THERAPY: CPT

## 2019-10-09 PROCEDURE — 51798 US URINE CAPACITY MEASURE: CPT

## 2019-10-09 PROCEDURE — 74011250637 HC RX REV CODE- 250/637: Performed by: HOSPITALIST

## 2019-10-09 PROCEDURE — 74011250637 HC RX REV CODE- 250/637: Performed by: GENERAL ACUTE CARE HOSPITAL

## 2019-10-09 PROCEDURE — 65660000000 HC RM CCU STEPDOWN

## 2019-10-09 PROCEDURE — 74011250636 HC RX REV CODE- 250/636: Performed by: GENERAL ACUTE CARE HOSPITAL

## 2019-10-09 RX ADMIN — FLUDROCORTISONE ACETATE 0.2 MG: 0.1 TABLET ORAL at 08:57

## 2019-10-09 RX ADMIN — BUSPIRONE HYDROCHLORIDE 10 MG: 10 TABLET ORAL at 21:09

## 2019-10-09 RX ADMIN — Medication 10 ML: at 03:46

## 2019-10-09 RX ADMIN — Medication 10 ML: at 14:40

## 2019-10-09 RX ADMIN — CLONAZEPAM 0.5 MG: 0.5 TABLET ORAL at 08:57

## 2019-10-09 RX ADMIN — MIDODRINE HYDROCHLORIDE 10 MG: 5 TABLET ORAL at 08:57

## 2019-10-09 RX ADMIN — MIDODRINE HYDROCHLORIDE 10 MG: 5 TABLET ORAL at 13:03

## 2019-10-09 RX ADMIN — GABAPENTIN 600 MG: 300 CAPSULE ORAL at 08:57

## 2019-10-09 RX ADMIN — BUSPIRONE HYDROCHLORIDE 10 MG: 10 TABLET ORAL at 18:24

## 2019-10-09 RX ADMIN — BUSPIRONE HYDROCHLORIDE 10 MG: 10 TABLET ORAL at 08:57

## 2019-10-09 RX ADMIN — CLONAZEPAM 0.5 MG: 0.5 TABLET ORAL at 18:24

## 2019-10-09 RX ADMIN — FINASTERIDE 5 MG: 5 TABLET, FILM COATED ORAL at 08:57

## 2019-10-09 RX ADMIN — ENOXAPARIN SODIUM 40 MG: 40 INJECTION SUBCUTANEOUS at 08:58

## 2019-10-09 RX ADMIN — ASPIRIN 81 MG CHEWABLE TABLET 81 MG: 81 TABLET CHEWABLE at 08:57

## 2019-10-09 RX ADMIN — Medication 10 ML: at 21:09

## 2019-10-09 RX ADMIN — DULOXETINE HYDROCHLORIDE 60 MG: 30 CAPSULE, DELAYED RELEASE ORAL at 08:58

## 2019-10-09 NOTE — PROGRESS NOTES
Bedside shift change report GIVEN TO Epi Jesus RN. Report included the following information SBAR and Kardex. SIGNIFICANT CHANGES DURING SHIFT:        CONCERNS TO ADDRESS WITH MD:            [facility] NURSING NOTE   Admission Date 9/29/2019   Admission Diagnosis Syncope [R55]   Consults IP CONSULT TO CARDIOLOGY  IP CONSULT TO CARDIOLOGY  IP CONSULT TO HOSPITALIST  IP CONSULT TO NEUROLOGY  IP CONSULT TO ENDOCRINOLOGY      Cardiac Monitoring [x] Yes [] No      Purposeful Hourly Rounding [x] Yes    Delores Score Total Score: 3   Delores score 3 or > [x] Bed Alarm [] Avasys [] 1:1 sitter [] Patient refused (Signed refusal form in chart)   Kane Score Kane Score: 20   Kane score 14 or < [] PMT consult [] Wound Care consult    []  Specialty bed  [] Nutrition consult      Influenza Vaccine Received Flu Vaccine for Current Season (usually Sept-March): Yes           Oxygen needs? [x] Room air Oxygen @  []1L    []2L    []3L   []4L    []5L   []6L via  NC   Chronic home O2 use? [] Yes [x] No  Perform O2 challenge test and document in progress note using smartphrase (.Homeoxygen)      Last bowel movement Last Bowel Movement Date: 10/06/19      Urinary Catheter             LDAs               Peripheral IV 09/29/19 Left Antecubital (Active)   Site Assessment Clean, dry, & intact 10/9/2019  3:49 AM   Phlebitis Assessment 0 10/9/2019  3:49 AM   Infiltration Assessment 0 10/9/2019  3:49 AM   Dressing Status Clean, dry, & intact 10/9/2019  3:49 AM   Dressing Type Transparent;Tape 10/9/2019  3:49 AM   Hub Color/Line Status Pink;Flushed 10/9/2019  3:49 AM   Alcohol Cap Used No 9/29/2019  2:30 PM                         Readmission Risk Assessment Tool Score Medium Risk            17       Total Score        3 Has Seen PCP in Last 6 Months (Yes=3, No=0)    2 . Living with Significant Other. Assisted Living. LTAC. SNF.  or   Rehab    3 Patient Length of Stay (>5 days = 3)    4 IP Visits Last 12 Months (1-3=4, 4=9, >4=11)    5 Pt. Coverage (Medicare=5 , Medicaid, or Self-Pay=4)        Criteria that do not apply:    Charlson Comorbidity Score (Age + Comorbid Conditions)       Expected Length of Stay 2d 21h   Actual Length of Stay 10

## 2019-10-09 NOTE — PROGRESS NOTES
JOSE: Home Health    CM spoke with pt at the bedside regarding d/c planning. Pt stated he wants to return home and is agreeable to home health care, although he requested CM to call his wife to talk about it further. CM spoke with pt's wife, Vivien Witt, over the phone about home health and she is also agreeable. Vivien Witt expressed her concern regarding pt being by himself in the case he has another \"episode\". Vivien Witt stated she plans to take off of work for 3-4 days to be with pt after d/c. [facility] selected - referral sent via [facility]. CM will follow-up in the morning.      Nidhi Molina, MSW  Care Manager  [phone]

## 2019-10-09 NOTE — ROUTINE PROCESS
The following appointments have been successfully scheduled:    Date/time Friday, October 25, 2019 02:20 PM  Patient  Jimmyreld Leader 1945 ([id number] M) #[id number] N#[id number]  Department [facility] OFFICE  Appointment type Established Patient  Provider Ruby Adler

## 2019-10-09 NOTE — PROGRESS NOTES
Problem: Falls - Risk of  Goal: *Absence of Falls  Description  Document Tiffanie Orozco Fall Risk and appropriate interventions in the flowsheet.   Outcome: Progressing Towards Goal  Note:   Fall Risk Interventions:  Mobility Interventions: Bed/chair exit alarm         Medication Interventions: Patient to call before getting OOB    Elimination Interventions: Bed/chair exit alarm    History of Falls Interventions: Bed/chair exit alarm

## 2019-10-09 NOTE — PROGRESS NOTES
Bedside shift change report GIVEN TO Shawnee Nieves RN. Report included the following information SBAR. SIGNIFICANT CHANGES DURING SHIFT:  Patient continues to be asymptomatic with the orthostatic hypotension. Up in chair today. Wife in to visit. CONCERNS TO ADDRESS WITH MD:  Increase mobility. Patient wearing thigh high teds and knee hi teds          [address] NURSING NOTE   Admission Date 9/29/2019   Admission Diagnosis Syncope [R55]   Consults IP CONSULT TO CARDIOLOGY  IP CONSULT TO CARDIOLOGY  IP CONSULT TO HOSPITALIST  IP CONSULT TO NEUROLOGY  IP CONSULT TO ENDOCRINOLOGY      Cardiac Monitoring [] Yes [] No      Purposeful Hourly Rounding [] Yes    Delores Score Total Score: 3   Delores score 3 or > [] Bed Alarm [] Avasys [] 1:1 sitter [] Patient refused (Signed refusal form in chart)   Kane Score Kane Score: 19   Kane score 14 or < [] PMT consult [] Wound Care consult    []  Specialty bed  [] Nutrition consult      Influenza Vaccine Received Flu Vaccine for Current Season (usually Sept-March): Yes           Oxygen needs? [] Room air Oxygen @  []1L    []2L    []3L   []4L    []5L   []6L via  NC   Chronic home O2 use? [] Yes [] No  Perform O2 challenge test and document in progress note using smartphXenoOnee (.Homeoxygen)      Last bowel movement Last Bowel Movement Date: 10/06/19      Urinary Catheter             LDAs               Peripheral IV 09/29/19 Left Antecubital (Active)   Site Assessment Clean, dry, & intact 10/8/2019  8:22 AM   Phlebitis Assessment 0 10/8/2019  8:22 AM   Infiltration Assessment 0 10/8/2019  8:22 AM   Dressing Status Clean, dry, & intact 10/8/2019  8:22 AM   Dressing Type Transparent;Tape 10/8/2019  2:25 AM   Hub Color/Line Status Pink;Flushed 10/8/2019  2:25 AM   Alcohol Cap Used No 9/29/2019  2:30 PM                         Readmission Risk Assessment Tool Score Medium Risk            17       Total Score        3 Has Seen PCP in Last 6 Months (Yes=3, No=0)    2 .  Living with Significant Other. Assisted Living. LTAC. SNF. or   Rehab    3 Patient Length of Stay (>5 days = 3)    4 IP Visits Last 12 Months (1-3=4, 4=9, >4=11)    5 Pt.  Coverage (Medicare=5 , Medicaid, or Self-Pay=4)        Criteria that do not apply:    Charlson Comorbidity Score (Age + Comorbid Conditions)       Expected Length of Stay 2d 21h   Actual Length of Stay 9

## 2019-10-09 NOTE — PROGRESS NOTES
Problem: Syncope  Goal: *Absence of injury  Outcome: Progressing Towards Goal  Goal: Decrease or eliminate episodes of syncope  Outcome: Progressing Towards Goal     Problem: Patient Education: Go to Patient Education Activity  Goal: Patient/Family Education  Outcome: Progressing Towards Goal     Problem: Falls - Risk of  Goal: *Absence of Falls  Description  Document Yohana Foote Fall Risk and appropriate interventions in the flowsheet.   Outcome: Progressing Towards Goal  Note:   Fall Risk Interventions:  Mobility Interventions: Bed/chair exit alarm, Patient to call before getting OOB         Medication Interventions: Patient to call before getting OOB    Elimination Interventions: Call light in reach    History of Falls Interventions: Bed/chair exit alarm, Consult care management for discharge planning, Door open when patient unattended         Problem: Patient Education: Go to Patient Education Activity  Goal: Patient/Family Education  Outcome: Progressing Towards Goal     Problem: Patient Education: Go to Patient Education Activity  Goal: Patient/Family Education  Outcome: Progressing Towards Goal     Problem: Breathing Pattern - Ineffective  Goal: *Absence of hypoxia  Outcome: Progressing Towards Goal

## 2019-10-09 NOTE — PROGRESS NOTES
Bedside shift change report GIVEN TO Yisel Rodriguez RN. Report included the following information SBAR and Kardex. SIGNIFICANT CHANGES DURING SHIFT:    2403: Blood pressure 181/77. Will recheck  1801: Blood pressure 176/74. Spoke with Dr. Anai Adkins, received verbal order to hold PM dose of midodrine      CONCERNS TO ADDRESS WITH MD:            [facility] NURSING NOTE   Admission Date 9/29/2019   Admission Diagnosis Syncope [R55]   Consults IP CONSULT TO CARDIOLOGY  IP CONSULT TO CARDIOLOGY  IP CONSULT TO HOSPITALIST  IP CONSULT TO NEUROLOGY  IP CONSULT TO ENDOCRINOLOGY      Cardiac Monitoring [x] Yes [] No      Purposeful Hourly Rounding [x] Yes    Delores Score Total Score: 3   Delores score 3 or > [x] Bed Alarm [] Avasys [] 1:1 sitter [] Patient refused (Signed refusal form in chart)   Kane Score Kane Score: 20   Kane score 14 or < [] PMT consult [] Wound Care consult    []  Specialty bed  [] Nutrition consult      Influenza Vaccine Received Flu Vaccine for Current Season (usually Sept-March): Yes           Oxygen needs? [x] Room air Oxygen @  []1L    []2L    []3L   []4L    []5L   []6L via  NC   Chronic home O2 use? [] Yes [x] No  Perform O2 challenge test and document in progress note using smartphrase (.Homeoxygen)      Last bowel movement Last Bowel Movement Date: 10/06/19      Urinary Catheter             LDAs               Peripheral IV 09/29/19 Left Antecubital (Active)   Site Assessment Clean, dry, & intact 10/9/2019  3:16 PM   Phlebitis Assessment 0 10/9/2019  3:16 PM   Infiltration Assessment 0 10/9/2019  3:16 PM   Dressing Status Clean, dry, & intact 10/9/2019  3:16 PM   Dressing Type Transparent;Tape 10/9/2019  3:16 PM   Hub Color/Line Status Pink;Flushed 10/9/2019  3:16 PM   Alcohol Cap Used No 9/29/2019  2:30 PM                         Readmission Risk Assessment Tool Score Medium Risk            17       Total Score        3 Has Seen PCP in Last 6 Months (Yes=3, No=0)    2 .  Living with Significant Other. Assisted Living. LTAC. SNF. or   Rehab    3 Patient Length of Stay (>5 days = 3)    4 IP Visits Last 12 Months (1-3=4, 4=9, >4=11)    5 Pt.  Coverage (Medicare=5 , Medicaid, or Self-Pay=4)        Criteria that do not apply:    Charlson Comorbidity Score (Age + Comorbid Conditions)       Expected Length of Stay 2d 21h   Actual Length of Stay 10

## 2019-10-09 NOTE — PROGRESS NOTES
Hospitalist Progress Note    NAME: Dustin Marie   :  1945   MRN:  [id number]       Assessment / Plan:  Syncope due to orthostatic hypotension due to multiple system atrophy - parkinson's type  -remain orthostatic on midodrine 10 mg TID + florinef 0.2, less Sx thou   TSH normal   Cortisol was low in am, cosyntropin test normal. Endocrinology input appreciated - no adrenal insufficiency   MRI brain: chronic microvascular changes, no acute pathology   MRI C spine:No significant change in moderate to severe cervical spondylosis most pronounced at C3-4 and C4-5. MRA: no flow-limiting stenosis or occlusion.  -Goal of treatment: minimize Sx as much as possible, may not be able to get 100% fixed ( wife aware)   -stopped  Aricept ( started recently per pt, can cause dizziness/orthostatics)   -on Florinef  0.2   Little benefit is obtained by dose increase beyond 0.2 mg per day, however, while side effects increase significantly. Watch for worsening supine HTN   -will await on further neurology recommendations   -neurology help appreciated: On exam he does have parkinsonian features. 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 No sinemet at that will increase the orthostatic hypotension. Waist high compression stockings    10/8:  Pt still orthostatic this morning - reviewed the numbers with pt's wife at bedside today. Will continue holding Flomax today. Spoke with Dr. Prudence Gorman of Neurology and he will follow up today - appreciate his help. 10/9:  [address] [facility] follow up. Pt again orthostatic this morning, with approximately 70mmHg drop in SBP - and complaining of dizziness. Pt not wearing stockings - advised to wear. Updated pt's wife on extensive workup undertaken - and in particular reviewing imaging [facility] Head/Neck, MRI Brain, CT Cervical Spine] in an effort to alleviate her concerns.   Discussed disposition with pt's wife and likely discharge home tomorrow in the AM.     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 Appropriately functioning device; no arrhythmias. His syncope is not secondary to arrhythmia. His [employer] Yadira RUTH MRI is MRI COMPATIBLE. Cardiology form for MRI completed and on chart. BPH, con Proscar   Anxiety Disorder, cont scheduled klonopin, Buspar, Cymbalta   Neuropathy, cont Neurontin     Code status: DNR   NOK: wife   Prophylaxis: Lovenox - holding for MINH     Baseline: lives with wife; ambulates with rolator   Recommended Disposition: Home w/Family once not Sx with dropping his BP; need to mobilize more      Subjective:     Chief Complaint / Reason for Physician Visit:  Complaining of some dizziness today - had large drop in SBP this morning. Discussed with RN events overnight. Review of Systems:  Symptom Y/N Comments  Symptom Y/N Comments   Fever/Chills n   Chest Pain n    Poor Appetite    Edema     Cough    Abdominal Pain n    Sputum    Joint Pain     SOB/PATEL n   Pruritis/Rash     Nausea/vomit    Tolerating PT/OT     Diarrhea    Tolerating Diet     Constipation    Other y dizziness     Could NOT obtain due to:      Objective:     VITALS:   Last 24hrs VS reviewed since prior progress note.  Most recent are:  Patient Vitals for the past 24 hrs:   Temp Pulse Resp BP SpO2   10/09/19 0747 97.8 °F (36.6 °C) 63 18 137/70 91 %   10/09/19 0254 -- -- -- 158/81 --   10/09/19 0252 -- -- -- (!) 145/100 --   10/09/19 0251 -- -- -- 95/49 --   10/09/19 0248 97.8 °F (36.6 °C) 70 18 120/69 96 %   10/08/19 2319 -- -- -- 114/62 --   10/08/19 2317 -- -- -- 127/69 --   10/08/19 2315 97.5 °F (36.4 °C) 60 18 168/73 94 %   10/08/19 1925 98.1 °F (36.7 °C) 60 18 147/71 93 %   10/08/19 1617 -- 68 18 (!) 67/47 96 %   10/08/19 1616 -- 64 18 111/59 95 %   10/08/19 1612 97.7 °F (36.5 °C) 60 18 159/77 93 %   10/08/19 1121 -- 62 -- 90/57 96 %   10/08/19 1118 -- 61 18 114/72 97 %   10/08/19 1114 97.3 °F (36.3 °C) 62 18 144/79 96 %       Intake/Output Summary (Last 24 hours) at 10/9/2019 1025  Last data filed at 10/9/2019 0754  Gross per 24 hour   Intake 1330 ml   Output 2625 ml   Net -1295 ml        PHYSICAL EXAM:  General: Alert, cooperative, no acute distress    EENT:  EOMI. Anicteric sclerae. MMM  Resp:  CTA bilaterally, no wheezing or rales. No accessory muscle use  CV:  Regular  rhythm,  No edema  GI:  Soft, Non distended, Non tender.  +Bowel sounds  Neurologic:  Alert and oriented X 3, normal speech,   Psych:   Fair insight. Not anxious nor agitated  Skin:  No rashes. No jaundice    Reviewed most current lab test results and cultures  YES  Reviewed most current radiology test results   YES  Review and summation of old records today    NO  Reviewed patient's current orders and MAR    YES  PMH/ reviewed - no change compared to H&P  ________________________________________________________________________  Care Plan discussed with:    Comments   Patient x    Family      RN x    Care Manager     Consultant  x                      Multidiciplinary team rounds were held today with , nursing, pharmacist and clinical coordinator. Patient's plan of care was discussed; medications were reviewed and discharge planning was addressed.      ________________________________________________________________________  Total NON critical care TIME:  25   Minutes    Total CRITICAL CARE TIME Spent:   Minutes non procedure based      Comments   >50% of visit spent in counseling and coordination of care     ________________________________________________________________________  Bridget Price MD     Procedures: see electronic medical records for all procedures/Xrays and details which were not copied into this note but were reviewed prior to creation of Plan. LABS:  I reviewed today's most current labs and imaging studies.   Pertinent labs include:  Recent Labs     10/08/19  0037   WBC 6.6   HGB 12.6   HCT 38.7        Recent Labs     10/08/19  0037      K 3.7      CO2 29   *   BUN 18   CREA 1.00   CA 8.4*       Signed: Jared Ayala MD

## 2019-10-09 NOTE — PROGRESS NOTES
[address] INTERDISCIPLINARY ROUNDS    Cardiopulmonary Care Interdisciplinary Rounds were held today to discuss patient's plan of care and outcomes. The following members were present: NP/Physician, Pharmacy, Nursing and Case Management.   Expected Length of Stay:  2d 21h    PLAN OF CARE:   Continue current treatment plan

## 2019-10-09 NOTE — PROGRESS NOTES
Problem: Mobility Impaired (Adult and Pediatric)  Goal: *Acute Goals and Plan of Care (Insert Text)  Description  FUNCTIONAL STATUS PRIOR TO ADMISSION: Patient was modified independent using a Rollator for functional mobility, but has had multiple episodes for syncope with falls in the last 6 months. Most episodes of syncope occur with prolonged standing and walking. HOME SUPPORT PRIOR TO ADMISSION: The patient lived with supportive spouse in [de-identified] free 1 story home. Physical Therapy Goals  Revised 10/9/2019  1. Patient will ambulate with modified independence for 300 feet with the least restrictive device within 7 day(s). 2. Independent with home exercise program to increase core strength within 7 days. Physical Therapy Goals  Initiated 10/3/2019  1. Patient will move from supine to sit and sit to supine , scoot up and down and roll side to side in bed with independence within 7 day(s). Met 10/9/19.  2.  Patient will transfer from bed to chair and chair to bed with supervision/set-up using the least restrictive device within 7 day(s). Met 10/9/19.  3.  Patient will perform sit to stand with modified independence within 7 day(s). Met 10/9/19.  4.  Patient will ambulate with supervision/set-up for 400 feet with the least restrictive device within 7 day(s). Outcome: Progressing Towards Goal   PHYSICAL THERAPY TREATMENT: WEEKLY REASSESSMENT  Patient: Marina Go (69 y.o. male)  Date: 10/9/2019  Primary Diagnosis: Syncope [R55]        Precautions:   Fall, DNR(orthostatic hypotension)      ASSESSMENT  Patient continues with skilled PT services and is progressing towards goals. Patient has met all current PT goals except gait goal. He tolerated standing exercises and gait training without oncet of lightheadedness and good color in face. Demonstrated independent skill with bed mobility, sit to stand and bed to chair transfers. Gait was cg assistance to sba without a gait device for about 120 feet. Needs to improve core strengthening which may assist with BP control, establish home strengthening program and mobilize in palacios with nursing staff in addition to PT. Patient's progression toward goals since last assessment: excellent. All met except gait goal which has improved and was upgraded for the next 7 days. Current Level of Function Impacting Discharge (mobility/balance): cg to sba ambulation for 120 feet with gait belt. Functional Outcome Measure: The patient scored 85/100 on the Barthel outcome measure which is indicative of low functional impairment. Other factors to consider for discharge: spouse works during the day         PLAN :  Goals have been updated based on progression since last assessment. Patient continues to benefit from skilled intervention to address the above impairments. Recommendations and Planned Interventions: gait training, therapeutic exercises, neuromuscular re-education, patient and family training/education and therapeutic activities      Frequency/Duration: Patient will be followed by physical therapy:  4 times a week to address goals. Recommendation for discharge: (in order for the patient to meet his/her long term goals)  Physical therapy at least 2 days/week in the home     This discharge recommendation:  Has been made in collaboration with the attending provider and/or case management    Equipment recommendations for successful discharge (if) home: patient owns DME required for discharge         SUBJECTIVE:   Patient stated Eudelia Bullocks won't let me do anything.     OBJECTIVE DATA SUMMARY:   HISTORY:    Past Medical History:   Diagnosis Date    Anxiety     Back pain     Blurred vision     Chest pain     Depression     Dizziness     Fast heart beat     Frequent headaches     Frequent urination     Hip dysplasia, congenital     Joint pain     Joint swelling     Lumbar spinal stenosis     Muscle pain     Neuropathy     Pacemaker     new pacemaker July 2019 Recent change in weight     Sinus problem     Skin disease     SOB (shortness of breath)     Sore throat     SSS (sick sinus syndrome) (HCC)     Stiffness of joints, multiple sites     Stress     Syncope     Tiredness     Trouble in sleeping     Weakness      Past Surgical History:   Procedure Laterality Date    CARDIAC SURG PROCEDURE UNLIST      HX HIP REPLACEMENT Bilateral 1993    HX PACEMAKER PLACEMENT  10/16/2018    HX ROTATOR CUFF REPAIR Right 11/16/2017    RI INS NEW/RPLCMT PRM PM W/TRANSV ELTRD ATRIAL&VENT N/A 7/30/2019    INSERT PPM DUAL performed by Shay West MD at [facility] CARDIAC CATH LAB       Personal factors and/or comorbidities impacting plan of care: orthostatic hypotension with recurrent syncopal episodes    Home Situation  Home Environment: Private residence(barrier free home)  # Steps to Enter: 0  Wheelchair Ramp: Yes  One/Two Story Residence: One story  Living Alone: No  Support Systems: Spouse/Significant Other/Partner  Patient Expects to be Discharged to[de-identified] Private residence  Current DME Used/Available at Home: [address], rollator  Tub or Shower Type: Shower    EXAMINATION/PRESENTATION/DECISION MAKING:   Critical Behavior:  Neurologic State: Alert  Orientation Level: Oriented X4  Cognition: Appropriate decision making, Appropriate for age attention/concentration, Appropriate safety awareness, Follows commands  Safety/Judgement: Fall prevention, Awareness of environment, Good awareness of safety precautions(h/o syncope)  Hearing:   Auditory  Auditory Impairment: None  Skin:    Edema:   Range Of Motion:  AROM: Within functional limits           PROM: Within functional limits           Strength:    Strength: Generally decreased, functional(weak core; 4-/5 hips)                    Tone & Sensation:   Tone: Normal              Sensation: Impaired(decreased sensation feet)               Coordination:  Coordination: Generally decreased, functional(LE neuropathy)  Vision:      Functional Mobility:  Bed Mobility:  Rolling: Independent  Supine to Sit: Independent  Sit to Supine: Independent  Scooting: Independent  Transfers:  Sit to Stand: Independent  Stand to Sit: Independent        Bed to Chair: Independent              Balance:   Sitting: Intact; Without support  Standing: Impaired  Standing - Static: Good; Unsupported  Standing - Dynamic : Fair;Occasional  Ambulation/Gait Training:  Distance (ft): 125 Feet (ft)  Assistive Device: Gait belt  Ambulation - Level of Assistance: Contact guard assistance;Stand-by assistance;Assist x1        Gait Abnormalities: Path deviations;Trunk sway increased  Right Side Weight Bearing: Full  Left Side Weight Bearing: Full  Base of Support: Widened     Speed/Dhara: Pace decreased (<100 feet/min)  Step Length: Right shortened;Left shortened        Interventions: Safety awareness training           Therapeutic Exercises:   Standing - heel raises, bird dog  Supine  - glut sets and quad set  All x 10 reps each side. Needed cg for [facility] dog exercise for safety. Functional Measure:  Barthel Index:    Bathin  Bladder: 10  Bowels: 10  Groomin  Dressing: 10  Feeding: 10  Mobility: 10  Stairs: 5  Toilet Use: 10  Transfer (Bed to Chair and Back): 15  Total: 85/100       The Barthel ADL Index: Guidelines  1. The index should be used as a record of what a patient does, not as a record of what a patient could do. 2. The main aim is to establish degree of independence from any help, physical or verbal, however minor and for whatever reason. 3. The need for supervision renders the patient not independent. 4. A patient's performance should be established using the best available evidence. Asking the patient, friends/relatives and nurses are the usual sources, but direct observation and common sense are also important. However direct testing is not needed.   5. Usually the patient's performance over the preceding 24-48 hours is important, but occasionally longer periods will be relevant. 6. Middle categories imply that the patient supplies over 50 per cent of the effort. 7. Use of aids to be independent is allowed. Nury Carr., Barthel, JI (1231). Functional evaluation: the Barthel Index. [address] [facility] (14)2. MIGUELINA Levine, Milagros Talbot, Lashonda Olivera., Jeanine, [address] (1999). Measuring the change indisability after inpatient rehabilitation; comparison of the responsiveness of the Barthel Index and Functional Converse Measure. Journal of Neurology, Neurosurgery, and Psychiatry, 66(4), 194-553. Polo Kaplan, CHERYL, LEONORA Gunderson, & Pam Caldera MDerekA. (2004.) Assessment of post-stroke quality of life in cost-effectiveness studies: The usefulness of the Barthel Index and the EuroQoL-5D. Quality of Life Research, 13, 427-43       Pain Rating:  3/10 chronic low back pain. Activity Tolerance:   Good and SpO2 stable on RA  Please refer to the flowsheet for vital signs taken during this treatment. After treatment patient left in no apparent distress:   Sitting in chair and Call bell within reach    COMMUNICATION/EDUCATION:   The patients plan of care was discussed with: Registered Nurse and . Fall prevention education was provided and the patient/caregiver indicated understanding., Patient/family have participated as able in goal setting and plan of care. and Patient/family agree to work toward stated goals and plan of care.     Thank you for this referral.  Jorge Tolliver, PT   Time Calculation: 24 mins

## 2019-10-10 PROCEDURE — 65660000000 HC RM CCU STEPDOWN

## 2019-10-10 PROCEDURE — 74011250636 HC RX REV CODE- 250/636: Performed by: GENERAL ACUTE CARE HOSPITAL

## 2019-10-10 PROCEDURE — 74011250637 HC RX REV CODE- 250/637: Performed by: HOSPITALIST

## 2019-10-10 PROCEDURE — 74011250637 HC RX REV CODE- 250/637: Performed by: GENERAL ACUTE CARE HOSPITAL

## 2019-10-10 RX ORDER — FLUDROCORTISONE ACETATE 0.1 MG/1
0.2 TABLET ORAL DAILY
Qty: 60 TAB | Refills: 1 | Status: SHIPPED | OUTPATIENT
Start: 2019-10-11 | End: 2019-10-11

## 2019-10-10 RX ORDER — MIDODRINE HYDROCHLORIDE 10 MG/1
10 TABLET ORAL
Qty: 90 TAB | Refills: 0 | Status: SHIPPED | OUTPATIENT
Start: 2019-10-10 | End: 2019-10-11

## 2019-10-10 RX ORDER — MIDODRINE HYDROCHLORIDE 5 MG/1
10 TABLET ORAL 2 TIMES DAILY WITH MEALS
Status: DISCONTINUED | OUTPATIENT
Start: 2019-10-11 | End: 2019-10-11 | Stop reason: HOSPADM

## 2019-10-10 RX ORDER — MIDODRINE HYDROCHLORIDE 5 MG/1
5 TABLET ORAL
Status: DISCONTINUED | OUTPATIENT
Start: 2019-10-11 | End: 2019-10-11 | Stop reason: HOSPADM

## 2019-10-10 RX ADMIN — BUSPIRONE HYDROCHLORIDE 10 MG: 10 TABLET ORAL at 18:04

## 2019-10-10 RX ADMIN — ASPIRIN 81 MG CHEWABLE TABLET 81 MG: 81 TABLET CHEWABLE at 09:46

## 2019-10-10 RX ADMIN — Medication 10 ML: at 06:36

## 2019-10-10 RX ADMIN — ENOXAPARIN SODIUM 40 MG: 40 INJECTION SUBCUTANEOUS at 09:46

## 2019-10-10 RX ADMIN — FINASTERIDE 5 MG: 5 TABLET, FILM COATED ORAL at 09:46

## 2019-10-10 RX ADMIN — CLONAZEPAM 0.5 MG: 0.5 TABLET ORAL at 09:46

## 2019-10-10 RX ADMIN — MIDODRINE HYDROCHLORIDE 10 MG: 5 TABLET ORAL at 09:46

## 2019-10-10 RX ADMIN — Medication 10 ML: at 15:13

## 2019-10-10 RX ADMIN — MIDODRINE HYDROCHLORIDE 10 MG: 5 TABLET ORAL at 13:30

## 2019-10-10 RX ADMIN — MIDODRINE HYDROCHLORIDE 10 MG: 5 TABLET ORAL at 18:04

## 2019-10-10 RX ADMIN — BUSPIRONE HYDROCHLORIDE 10 MG: 10 TABLET ORAL at 21:52

## 2019-10-10 RX ADMIN — DULOXETINE HYDROCHLORIDE 60 MG: 30 CAPSULE, DELAYED RELEASE ORAL at 09:46

## 2019-10-10 RX ADMIN — GABAPENTIN 600 MG: 300 CAPSULE ORAL at 09:46

## 2019-10-10 RX ADMIN — Medication 10 ML: at 21:52

## 2019-10-10 RX ADMIN — FLUDROCORTISONE ACETATE 0.2 MG: 0.1 TABLET ORAL at 09:45

## 2019-10-10 RX ADMIN — BUSPIRONE HYDROCHLORIDE 10 MG: 10 TABLET ORAL at 09:46

## 2019-10-10 RX ADMIN — CLONAZEPAM 0.5 MG: 0.5 TABLET ORAL at 18:04

## 2019-10-10 NOTE — PROGRESS NOTES
Bedside shift change report GIVEN TO Monica Meeks RN. Report included the following information SBAR, Kardex, Intake/Output, MAR and Recent Results. SIGNIFICANT CHANGES DURING SHIFT:        CONCERNS TO ADDRESS WITH MD:  [facility] NURSING NOTE   Admission Date 9/29/2019   Admission Diagnosis Syncope [R55]   Consults IP CONSULT TO CARDIOLOGY  IP CONSULT TO CARDIOLOGY  IP CONSULT TO HOSPITALIST  IP CONSULT TO NEUROLOGY  IP CONSULT TO ENDOCRINOLOGY      Cardiac Monitoring [x] Yes [] No      Purposeful Hourly Rounding [x] Yes    Delores Score Total Score: 3   Delores score 3 or > [x] Bed Alarm [] Avasys [] 1:1 sitter [] Patient refused (Signed refusal form in chart)   Kane Score Kane Score: 19   Kane score 14 or < [] PMT consult [] Wound Care consult    []  Specialty bed  [] Nutrition consult      Influenza Vaccine Received Flu Vaccine for Current Season (usually Sept-March): Yes           Oxygen needs? [x] Room air Oxygen @  []1L    []2L    []3L   []4L    []5L   []6L via  NC   Chronic home O2 use? [] Yes [x] No  Perform O2 challenge test and document in progress note using smartphrase (.Homeoxygen)      Last bowel movement Last Bowel Movement Date: 10/06/19      Urinary Catheter             LDAs               Peripheral IV 09/29/19 Left Antecubital (Active)   Site Assessment Clean, dry, & intact 10/9/2019  8:00 PM   Phlebitis Assessment 0 10/9/2019  8:00 PM   Infiltration Assessment 0 10/9/2019  8:00 PM   Dressing Status Clean, dry, & intact 10/9/2019  8:00 PM   Dressing Type Transparent 10/9/2019  8:00 PM   Hub Color/Line Status Pink;Flushed 10/9/2019  8:00 PM   Alcohol Cap Used No 9/29/2019  2:30 PM                         Readmission Risk Assessment Tool Score Medium Risk            17       Total Score        3 Has Seen PCP in Last 6 Months (Yes=3, No=0)    2 . Living with Significant Other. Assisted Living. LTAC. SNF.  or   Rehab    3 Patient Length of Stay (>5 days = 3)    4 IP Visits Last 12 Months (1-3=4, 4=9, >4=11)    5 Pt.  Coverage (Medicare=5 , Medicaid, or Self-Pay=4)        Criteria that do not apply:    Charlson Comorbidity Score (Age + Comorbid Conditions)       Expected Length of Stay 2d 21h   Actual Length of Stay 10

## 2019-10-10 NOTE — PROGRESS NOTES
Bedside shift change report GIVEN TO  Chaim Headley RN and Alva Carrillo RN. Report included the following information SBAR. SIGNIFICANT CHANGES DURING SHIFT:  Ensure patient is wearing thigh high compression stockings for orthostatic blood pressures. CONCERNS TO ADDRESS WITH MD:            [facility] NURSING NOTE   Admission Date 9/29/2019   Admission Diagnosis Syncope [R55]   Consults IP CONSULT TO CARDIOLOGY  IP CONSULT TO CARDIOLOGY  IP CONSULT TO CARDIOLOGY  IP CONSULT TO HOSPITALIST  IP CONSULT TO NEUROLOGY  IP CONSULT TO ENDOCRINOLOGY      Cardiac Monitoring [x] Yes [] No      Purposeful Hourly Rounding [x] Yes    Delores Score Total Score: 4   Delores score 3 or > [x] Bed Alarm [] Avasys [] 1:1 sitter [] Patient refused (Signed refusal form in chart)   Kane Score Kane Score: 19   Kaen score 14 or < [] PMT consult [] Wound Care consult    []  Specialty bed  [] Nutrition consult      Influenza Vaccine Received Flu Vaccine for Current Season (usually Sept-March): Yes           Oxygen needs? [x] Room air Oxygen @  []1L    []2L    []3L   []4L    []5L   []6L via  NC   Chronic home O2 use? [] Yes [x] No  Perform O2 challenge test and document in progress note using smartphrase (.Homeoxygen)      Last bowel movement Last Bowel Movement Date: 10/06/19      Urinary Catheter             LDAs               Peripheral IV 09/29/19 Left Antecubital (Active)   Site Assessment Clean, dry, & intact 10/10/2019  3:11 PM   Phlebitis Assessment 0 10/10/2019  3:11 PM   Infiltration Assessment 0 10/10/2019  3:11 PM   Dressing Status Clean, dry, & intact 10/10/2019  3:11 PM   Dressing Type Transparent;Tape 10/10/2019  3:11 PM   Hub Color/Line Status Pink;Flushed 10/10/2019  3:11 PM   Alcohol Cap Used No 9/29/2019  2:30 PM                         Readmission Risk Assessment Tool Score Medium Risk            17       Total Score        3 Has Seen PCP in Last 6 Months (Yes=3, No=0)    2 . Living with Significant Other.  Assisted Living. LTAC. SNF. or   Rehab    3 Patient Length of Stay (>5 days = 3)    4 IP Visits Last 12 Months (1-3=4, 4=9, >4=11)    5 Pt.  Coverage (Medicare=5 , Medicaid, or Self-Pay=4)        Criteria that do not apply:    Charlson Comorbidity Score (Age + Comorbid Conditions)       Expected Length of Stay 2d 21h   Actual Length of Stay 11

## 2019-10-10 NOTE — PROGRESS NOTES
Pt's wife now wishes for her [place] second opinion. \" She states that she is happy with the due diligence undertaken by the hospital staff but wants to make sure that she \"did everything possible to get him better. \"   Pt specifically requesting Cardiology evaluation from [facility]. She is worried about pt's on going orthostatic hypotension.

## 2019-10-10 NOTE — PROGRESS NOTES
Problem: Syncope  Goal: *Absence of injury  Outcome: Progressing Towards Goal     Problem: Syncope  Goal: *Absence of injury  Outcome: Progressing Towards Goal     Problem: Falls - Risk of  Goal: *Absence of Falls  Description  Document Farrukh Timothy Fall Risk and appropriate interventions in the flowsheet.   Outcome: Progressing Towards Goal  Note:   Fall Risk Interventions:  Mobility Interventions: Bed/chair exit alarm, Patient to call before getting OOB, PT Consult for mobility concerns, PT Consult for assist device competence, Strengthening exercises (ROM-active/passive), Utilize walker, cane, or other assistive device         Medication Interventions: Bed/chair exit alarm, Evaluate medications/consider consulting pharmacy, Teach patient to arise slowly, Utilize gait belt for transfers/ambulation    Elimination Interventions: Bed/chair exit alarm, Call light in reach, Elevated toilet seat, Patient to call for help with toileting needs    History of Falls Interventions: Bed/chair exit alarm, Consult care management for discharge planning, Door open when patient unattended

## 2019-10-10 NOTE — PROGRESS NOTES
JOSE: Home Health    CM spoke with Ellard Kawasaki, [address] liaison, who stated she has to speak with pt's PCP regarding the [facility] referral. Ellard Kawasaki will call CM back shortly with an answer. Binta Levy MSW  Care Manager  [phone] 2:48 PM  [facility] accepted pt for PT/OT/RN. Added to AVS. Pt's wife has requested 2nd opinion of pt's condition from [facility]. Dr. Deanna Harper agreeable and consult placed. Likely will not d/c today.

## 2019-10-10 NOTE — PROGRESS NOTES
Problem: Syncope  Goal: *Absence of injury  Outcome: Progressing Towards Goal  Goal: Decrease or eliminate episodes of syncope  Outcome: Progressing Towards Goal     Problem: Patient Education: Go to Patient Education Activity  Goal: Patient/Family Education  Outcome: Progressing Towards Goal     Problem: Falls - Risk of  Goal: *Absence of Falls  Description  Document Mely Rojas Fall Risk and appropriate interventions in the flowsheet.   Outcome: Progressing Towards Goal  Note:   Fall Risk Interventions:  Mobility Interventions: Bed/chair exit alarm, Communicate number of staff needed for ambulation/transfer, Mechanical lift, OT consult for ADLs, Patient to call before getting OOB, PT Consult for mobility concerns         Medication Interventions: Bed/chair exit alarm, Evaluate medications/consider consulting pharmacy, Patient to call before getting OOB, Teach patient to arise slowly    Elimination Interventions: Bed/chair exit alarm, Call light in reach, Elevated toilet seat, Patient to call for help with toileting needs, Stay With Me (per policy), Toilet paper/wipes in reach, Toileting schedule/hourly rounds, Urinal in reach    History of Falls Interventions: Bed/chair exit alarm, Consult care management for discharge planning, Door open when patient unattended, Evaluate medications/consider consulting pharmacy, Investigate reason for fall, Room close to nurse's station, Utilize gait belt for transfer/ambulation, Assess for delayed presentation/identification of injury for 48 hrs (comment for end date)         Problem: Patient Education: Go to Patient Education Activity  Goal: Patient/Family Education  Outcome: Progressing Towards Goal     Problem: Patient Education: Go to Patient Education Activity  Goal: Patient/Family Education  Outcome: Progressing Towards Goal     Problem: Patient Education: Go to Patient Education Activity  Goal: Patient/Family Education  Outcome: Progressing Towards Goal     Problem: Breathing Pattern - Ineffective  Goal: *Absence of hypoxia  Outcome: Progressing Towards Goal

## 2019-10-10 NOTE — DISCHARGE SUMMARY
Hospitalist Discharge Summary     Patient ID:  Kalen Urias  [id number]  87 y.o.  1945 9/29/2019    PCP on record: Karen Taveras MD    Admit date: 9/29/2019  Discharge date and time: 10/11/2019    DISCHARGE DIAGNOSIS:  See below    CONSULTATIONS:  IP CONSULT TO CARDIOLOGY  IP CONSULT TO CARDIOLOGY  IP CONSULT TO CARDIOLOGY  IP CONSULT TO HOSPITALIST  IP CONSULT TO NEUROLOGY  IP CONSULT TO ENDOCRINOLOGY    Excerpted HPI from H&P of Jose Manuel Wheatley MD:  Christiano Oates is a 69 y.o.  male who presents with CC listed above. Pt's wife at bedside who helps to provide much of the history. She states that the pt has been \"dizzy\" intermittently for essentially 1 year and has had multiple episodes where he has \"passed out. \" She states that he typically recovers in under a minute and does not recall the event. She denies noticing any jerking or stiff movements of his extremities. Pt had another episode this morning upon returning from [place] and went to [address] [place] [address] ED. Pt was then transferred to [facility] ED where his pacemaker was interrogated and as per the ED MD, no issues with the pacemaker were found. ______________________________________________________________________  DISCHARGE SUMMARY/HOSPITAL COURSE:  for full details see H&P, daily progress notes, labs, consult notes. Syncope due to orthostatic hypotension due to multiple system atrophy - parkinson's type  -remain orthostatic on midodrine 10 mg TID + florinef 0.2, less Sx thou   TSH normal   Cortisol was low in am, cosyntropin test normal. Endocrinology input appreciated - no adrenal insufficiency   MRI brain: chronic microvascular changes, no acute pathology   MRI C spine:No significant change in moderate to severe cervical spondylosis most pronounced at C3-4 and C4-5.   MRA: no flow-limiting stenosis or occlusion.  -Goal of treatment: minimize Sx as much as possible, may not be able to get 100% fixed ( wife aware)   -stopped Aricept ( started recently per pt, can cause dizziness/orthostatics)   -on Florinef  0.2   Little benefit is obtained by dose increase beyond 0.2 mg per day, however, while side effects increase significantly. Watch for worsening supine HTN   -will await on further neurology recommendations   -neurology help appreciated: On exam he does have parkinsonian features. 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 No sinemet at that will increase the orthostatic hypotension.   Waist high compression stockings     10/8:  Pt still orthostatic this morning - reviewed the numbers with pt's wife at bedside today. Will continue holding Flomax today. Spoke with Dr. Russ Pedroza of Neurology and he will follow up today - appreciate his help. 10/9:  [address] Neurology follow up. Pt again orthostatic this morning, with approximately 70mmHg drop in SBP - and complaining of dizziness. Pt not wearing stockings - advised to wear. Updated pt's wife on extensive workup undertaken - and in particular reviewing imaging [facility] Head/Neck, MRI Brain, CT Cervical Spine] in an effort to alleviate her concerns. Discussed disposition with pt's wife and likely discharge home tomorrow in the AM.     10/11:  Pt still orthostatic this morning. Have had tamiko discussions with pt's wife in regards to this and she is comfortable with the pt being orthostatic because we have discussed various strategies in order to best avoid any negative outcomes/falls/repeat syncopal episodes. In particular, we have discussed the importance of shifting his positions, from lying, to sitting, to standing very slowly - and also to allow for a few minutes to pass by before moving. Pt has been doing well PT and their recommendation with for [place] w/ PT. Both the pt's wife and I are hopeful that his orthostasis will improve in the coming weeks after having his Flomax held and being started on waist high stockings.   Pt has a follow up appointment with Dr. Alicia Ghosh of Neurology next week. Pt to follow up with PCP. Appreciate Dr. Chiki Wong and Dr. Padma Fraga help.     DC on Florinef 0.2mg, Midodrine 10mg with breakfast and lunch, and then 5mg with dinner, Mestinon 30mg TID. *Relevant changes made to med rec include holding both Flomax and Aricept due to concerns for dizziness and orthostatic hypotension. Hx SSS, s/p PPM   MV vegetation ruled out  -echo: ef 56%, Possible vegetation present on the anterior leaflet of the mitral valve. 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 Appropriately functioning device; no arrhythmias.   His syncope is not secondary to arrhythmia.  His Medtronic Yadira RUTH MRI is MRI COMPATIBLE. Cardiology form for MRI completed and on chart.       BPH, discontinue Flomax, continue Proscar  Anxiety Disorder, cont scheduled klonopin, Buspar, Cymbalta   Neuropathy, cont Neurontin   _______________________________________________________________________  Patient seen and examined by me on discharge day. Pertinent Findings:  Gen:    Not in distress  Chest: Clear lungs  CVS:   Regular rhythm. No edema  Abd:  Soft, not distended, not tender  Neuro:  Alert, oriented x3  _______________________________________________________________________  DISCHARGE MEDICATIONS:   Current Discharge Medication List      START taking these medications    Details   docusate sodium (COLACE) 100 mg capsule Take 1 Cap by mouth two (2) times a day for 90 days. Qty: 60 Cap, Refills: 2      !! midodrine (PROAMITINE) 10 mg tablet Take 1 Tab by mouth two (2) times daily (with meals) for 30 days. Qty: 60 Tab, Refills: 0      !! midodrine (PROAMITINE) 5 mg tablet Take 1 Tab by mouth daily (with dinner) for 30 days.   Qty: 30 Tab, Refills: 0      pyridostigmine (MESTINON) 60 mg tablet Take 0.5 Tabs by mouth three (3) times daily. Qty: 90 Tab, Refills: 0      fludrocortisone (FLORINEF) 0.1 mg tablet Take 2 Tabs by mouth daily. Qty: 60 Tab, Refills: 1       !! - Potential duplicate medications found. Please discuss with provider. CONTINUE these medications which have NOT CHANGED    Details   busPIRone (BUSPAR) 10 mg tablet Take 1 Tab by mouth three (3) times daily. Indications: Repeated Episodes of Anxiety  Qty: 270 Tab, Refills: 0      gabapentin (NEURONTIN) 600 mg tablet Take 1 Tab by mouth daily. Qty: 10 Tab, Refills: 0    Associated Diagnoses: Neuropathy      clonazePAM (KLONOPIN) 1 mg tablet Take 0.5 Tabs by mouth two (2) times a day. Max Daily Amount: 1 mg. Qty: 10 Tab, Refills: 0    Associated Diagnoses: PTSD (post-traumatic stress disorder)      aspirin 81 mg chewable tablet Take 1 Tab by mouth daily. Qty: 90 Tab, Refills: 2      COMBIVENT RESPIMAT  mcg/actuation inhaler       DULoxetine (CYMBALTA) 60 mg capsule TAKE 1 CAPSULE BY MOUTH DAILY  Qty: 90 Cap, Refills: 1    Associated Diagnoses: PTSD (post-traumatic stress disorder)      finasteride (PROSCAR) 5 mg tablet TAKE 1 TABLET BY MOUTH DAILY(PROSTATE)  Qty: 90 Tab, Refills: 0      guaiFENesin ER (MUCINEX) 600 mg ER tablet Take 1 Tab by mouth every twelve (12) hours. Qty: 30 Tab, Refills: 0      cyanocobalamin, vitamin B-12, (VITAMIN B-12 PO) Take 1 Tab by mouth daily. ipratropium (ATROVENT) 42 mcg (0.06 %) nasal spray 1 [place] by Both Nostrils route four (4) times daily. Qty: 45 mL, Refills: 1    Associated Diagnoses: Acute nasopharyngitis         STOP taking these medications       tamsulosin (FLOMAX) 0.4 mg capsule Comments:   Reason for Stopping:         donepezil (ARICEPT) 10 mg tablet Comments:   Reason for Stopping:         naproxen (NAPROSYN) 500 mg tablet Comments:   Reason for Stopping:             Patient Follow Up Instructions:    Activity: Activity as tolerated  Diet: Resume previous diet  Wound Care: None needed    Follow-up Information     Follow up With Specialties Details Why Contact Jaquelin Arzate MD Family Practice Go on 10/25/2019 For hospital follow up appointment at 2:20PM  [place] [facility].      Troy Albarado MD Cardiology, [address] [facility] Go on 11/19/2019 For scheduled appointment at 11:00AM at [facility] office [address]  [address] Rayo Guerra MD Neurology Go on 10/17/2019 Previously scheduled Neurology appointment. [address]  [address] [facility]  [id number] [place] [address],[address]  A nurse will call you to schedule an initial home visit.   [address]  [address] [place] [address]  [phone]        ________________________________________________________________    Risk of deterioration: Low    Condition at Discharge:  Stable  __________________________________________________________________    Disposition  Home with family and home health services    ____________________________________________________________________    Code Status: DNR/DNI  ___________________________________________________________________      Total time in minutes spent coordinating this discharge (includes going over instructions, follow-up, prescriptions, and preparing report for sign off to her PCP) :  35 minutes    Signed:  Charlie Martinez MD

## 2019-10-10 NOTE — PROGRESS NOTES
Hospitalist Progress Note    NAME: Elaine Fofana   :  1945   MRN:  [id number]       Assessment / Plan:  Syncope due to orthostatic hypotension due to multiple system atrophy - parkinson's type  -remain orthostatic on midodrine 10 mg TID + florinef 0.2, less Sx thou   TSH normal   Cortisol was low in am, cosyntropin test normal. Endocrinology input appreciated - no adrenal insufficiency   MRI brain: chronic microvascular changes, no acute pathology   MRI C spine:No significant change in moderate to severe cervical spondylosis most pronounced at C3-4 and C4-5. MRA: no flow-limiting stenosis or occlusion.  -Goal of treatment: minimize Sx as much as possible, may not be able to get 100% fixed ( wife aware)   -stopped  Aricept ( started recently per pt, can cause dizziness/orthostatics)   -on Florinef  0.2   Little benefit is obtained by dose increase beyond 0.2 mg per day, however, while side effects increase significantly. Watch for worsening supine HTN   -will await on further neurology recommendations   -neurology help appreciated: On exam he does have parkinsonian features. 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 No sinemet at that will increase the orthostatic hypotension. Waist high compression stockings    10/8:  Pt still orthostatic this morning - reviewed the numbers with pt's wife at bedside today. Will continue holding Flomax today. Spoke with Dr. Pamella Dandy of Neurology and he will follow up today - appreciate his help. 10/9:  [address] Neurology follow up. Pt again orthostatic this morning, with approximately 70mmHg drop in SBP - and complaining of dizziness. Pt not wearing stockings - advised to wear. Updated pt's wife on extensive workup undertaken - and in particular reviewing imaging [facility] Head/Neck, MRI Brain, CT Cervical Spine] in an effort to alleviate her concerns.   Discussed disposition with pt's wife and likely discharge home tomorrow in the AM.     10/10:  Pt's meds readjusted by Neurology due to continued profound hypotension. Also pending secondary Cardiology opinion. Hx SSS, s/p PPM   Possible MV vegetation    -echo: ef 56%, Possible vegetation present on the anterior leaflet of the mitral valve. 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 Appropriately functioning device; no arrhythmias. His syncope is not secondary to arrhythmia. His Medtronic Yadira RUTH MRI is MRI COMPATIBLE. Cardiology form for MRI completed and on chart. BPH, con Proscar   Anxiety Disorder, cont scheduled klonopin, Buspar, Cymbalta   Neuropathy, cont Neurontin     Code status: DNR   NOK: wife   Prophylaxis: Lovenox - holding for MINH     Baseline: lives with wife; ambulates with rolator   Recommended Disposition: Home w/Family once not Sx with dropping his BP; need to mobilize more      Subjective:     Chief Complaint / Reason for Physician Visit:  Complaining of some dizziness today - had large drop in SBP this morning and this afternoon. Discussed with RN events overnight. Review of Systems:  Symptom Y/N Comments  Symptom Y/N Comments   Fever/Chills n   Chest Pain n    Poor Appetite    Edema     Cough    Abdominal Pain n    Sputum    Joint Pain     SOB/PATEL n   Pruritis/Rash     Nausea/vomit    Tolerating PT/OT     Diarrhea    Tolerating Diet     Constipation    Other y dizziness     Could NOT obtain due to:      Objective:     VITALS:   Last 24hrs VS reviewed since prior progress note.  Most recent are:  Patient Vitals for the past 24 hrs:   Temp Pulse Resp BP SpO2   10/10/19 1506 97.4 °F (36.3 °C) 60 18 157/70 96 %   10/10/19 1120 97.4 °F (36.3 °C) 71 20 174/90 97 %   10/10/19 0753 98 °F (36.7 °C) 63 18 133/72 95 %   10/10/19 0344 97.3 °F (36.3 °C) -- 18 114/75 96 %   10/09/19 2321 97.6 °F (36.4 °C) 60 18 174/84 95 %   10/09/19 2010 98 °F (36.7 °C) 62 18 166/86 96 %   10/09/19 1801 -- 75 18 176/74 --   10/09/19 1733 -- -- -- 181/77 --       Intake/Output Summary (Last 24 hours) at 10/10/2019 1626  Last data filed at 10/10/2019 1431  Gross per 24 hour   Intake 1120 ml   Output 2475 ml   Net -1355 ml        PHYSICAL EXAM:  General: Alert, cooperative, no acute distress    EENT:  EOMI. Anicteric sclerae. MMM  Resp:  CTA bilaterally, no wheezing or rales. No accessory muscle use  CV:  Regular  rhythm,  No edema  GI:  Soft, Non distended, Non tender.  +Bowel sounds  Neurologic:  Alert and oriented X 3, normal speech,   Psych:   Fair insight. Not anxious nor agitated  Skin:  No rashes. No jaundice    Reviewed most current lab test results and cultures  YES  Reviewed most current radiology test results   YES  Review and summation of old records today    NO  Reviewed patient's current orders and MAR    YES  PMH/ reviewed - no change compared to H&P  ________________________________________________________________________  Care Plan discussed with:    Comments   Patient x    Family      RN x    Care Manager     Consultant  x                      Multidiciplinary team rounds were held today with , nursing, pharmacist and clinical coordinator. Patient's plan of care was discussed; medications were reviewed and discharge planning was addressed. ________________________________________________________________________  Total NON critical care TIME:  25   Minutes    Total CRITICAL CARE TIME Spent:   Minutes non procedure based      Comments   >50% of visit spent in counseling and coordination of care     ________________________________________________________________________  Aislinn bAdalla MD     Procedures: see electronic medical records for all procedures/Xrays and details which were not copied into this note but were reviewed prior to creation of Plan.       LABS:  I reviewed today's most current labs and imaging studies.   Pertinent labs include:  Recent Labs     10/08/19  0037   WBC 6.6   HGB 12.6   HCT 38.7        Recent Labs     10/08/19  0037      K 3.7      CO2 29   *   BUN 18   CREA 1.00   CA 8.4*       Signed: Kevin Barker MD

## 2019-10-11 ENCOUNTER — HOME HEALTH ADMISSION (OUTPATIENT)
Dept: HOME HEALTH SERVICES | Facility: HOME HEALTH | Age: 74
End: 2019-10-11
Payer: MEDICARE

## 2019-10-11 VITALS
SYSTOLIC BLOOD PRESSURE: 97 MMHG | DIASTOLIC BLOOD PRESSURE: 57 MMHG | RESPIRATION RATE: 18 BRPM | OXYGEN SATURATION: 94 % | BODY MASS INDEX: 25 KG/M2 | HEART RATE: 89 BPM | TEMPERATURE: 97.5 F | WEIGHT: 179.23 LBS

## 2019-10-11 PROCEDURE — 74011250637 HC RX REV CODE- 250/637: Performed by: PSYCHIATRY & NEUROLOGY

## 2019-10-11 PROCEDURE — 74011250637 HC RX REV CODE- 250/637: Performed by: HOSPITALIST

## 2019-10-11 PROCEDURE — 74011250637 HC RX REV CODE- 250/637: Performed by: INTERNAL MEDICINE

## 2019-10-11 PROCEDURE — 74011250637 HC RX REV CODE- 250/637: Performed by: GENERAL ACUTE CARE HOSPITAL

## 2019-10-11 PROCEDURE — 94760 N-INVAS EAR/PLS OXIMETRY 1: CPT

## 2019-10-11 PROCEDURE — 74011250636 HC RX REV CODE- 250/636: Performed by: GENERAL ACUTE CARE HOSPITAL

## 2019-10-11 RX ORDER — PYRIDOSTIGMINE BROMIDE 60 MG/1
30 TABLET ORAL 3 TIMES DAILY
Qty: 90 TAB | Refills: 0 | Status: SHIPPED | OUTPATIENT
Start: 2019-10-11 | End: 2019-11-12 | Stop reason: SDUPTHER

## 2019-10-11 RX ORDER — FINASTERIDE 5 MG/1
5 TABLET, FILM COATED ORAL
Status: DISCONTINUED | OUTPATIENT
Start: 2019-10-12 | End: 2019-10-11 | Stop reason: HOSPADM

## 2019-10-11 RX ORDER — DOCUSATE SODIUM 100 MG/1
100 CAPSULE, LIQUID FILLED ORAL 2 TIMES DAILY
Status: DISCONTINUED | OUTPATIENT
Start: 2019-10-11 | End: 2019-10-11 | Stop reason: HOSPADM

## 2019-10-11 RX ORDER — MIDODRINE HYDROCHLORIDE 5 MG/1
5 TABLET ORAL
Qty: 30 TAB | Refills: 0 | Status: SHIPPED | OUTPATIENT
Start: 2019-10-11 | End: 2019-10-30 | Stop reason: SDUPTHER

## 2019-10-11 RX ORDER — PYRIDOSTIGMINE BROMIDE 60 MG/1
30 TABLET ORAL 3 TIMES DAILY
Status: DISCONTINUED | OUTPATIENT
Start: 2019-10-11 | End: 2019-10-11 | Stop reason: HOSPADM

## 2019-10-11 RX ORDER — MIDODRINE HYDROCHLORIDE 10 MG/1
10 TABLET ORAL 2 TIMES DAILY WITH MEALS
Qty: 60 TAB | Refills: 0 | Status: SHIPPED | OUTPATIENT
Start: 2019-10-12 | End: 2019-10-30 | Stop reason: SDUPTHER

## 2019-10-11 RX ORDER — FLUDROCORTISONE ACETATE 0.1 MG/1
0.2 TABLET ORAL DAILY
Qty: 60 TAB | Refills: 1 | Status: SHIPPED | OUTPATIENT
Start: 2019-10-11 | End: 2019-11-12 | Stop reason: SDUPTHER

## 2019-10-11 RX ORDER — DOCUSATE SODIUM 100 MG/1
100 CAPSULE, LIQUID FILLED ORAL 2 TIMES DAILY
Qty: 60 CAP | Refills: 2 | Status: SHIPPED | OUTPATIENT
Start: 2019-10-11 | End: 2019-10-30 | Stop reason: SDUPTHER

## 2019-10-11 RX ADMIN — BUSPIRONE HYDROCHLORIDE 10 MG: 10 TABLET ORAL at 08:59

## 2019-10-11 RX ADMIN — ASPIRIN 81 MG CHEWABLE TABLET 81 MG: 81 TABLET CHEWABLE at 08:59

## 2019-10-11 RX ADMIN — MIDODRINE HYDROCHLORIDE 10 MG: 5 TABLET ORAL at 08:58

## 2019-10-11 RX ADMIN — DOCUSATE SODIUM 100 MG: 100 CAPSULE, LIQUID FILLED ORAL at 12:07

## 2019-10-11 RX ADMIN — FLUDROCORTISONE ACETATE 0.2 MG: 0.1 TABLET ORAL at 08:56

## 2019-10-11 RX ADMIN — DULOXETINE HYDROCHLORIDE 60 MG: 30 CAPSULE, DELAYED RELEASE ORAL at 08:56

## 2019-10-11 RX ADMIN — Medication 10 ML: at 05:44

## 2019-10-11 RX ADMIN — Medication 10 ML: at 13:58

## 2019-10-11 RX ADMIN — MIDODRINE HYDROCHLORIDE 10 MG: 5 TABLET ORAL at 12:07

## 2019-10-11 RX ADMIN — FINASTERIDE 5 MG: 5 TABLET, FILM COATED ORAL at 08:59

## 2019-10-11 RX ADMIN — BUSPIRONE HYDROCHLORIDE 10 MG: 10 TABLET ORAL at 16:26

## 2019-10-11 RX ADMIN — CLONAZEPAM 0.5 MG: 0.5 TABLET ORAL at 08:59

## 2019-10-11 RX ADMIN — PYRIDOSTIGMINE BROMIDE 30 MG: 60 TABLET ORAL at 08:59

## 2019-10-11 RX ADMIN — MIDODRINE HYDROCHLORIDE 5 MG: 5 TABLET ORAL at 16:26

## 2019-10-11 RX ADMIN — GABAPENTIN 600 MG: 300 CAPSULE ORAL at 08:56

## 2019-10-11 RX ADMIN — PYRIDOSTIGMINE BROMIDE 30 MG: 60 TABLET ORAL at 16:26

## 2019-10-11 RX ADMIN — ENOXAPARIN SODIUM 40 MG: 40 INJECTION SUBCUTANEOUS at 09:01

## 2019-10-11 NOTE — PROGRESS NOTES
Chief Complaint: Dizziness    Symptoms have improved though he remains orthostatic. No falls. Wife is apprehensive about taking him home. He is having difficulty urinating off the flomax leaving no room to stop the proscar. Medication changes:  Florinef 0.2 mg  Midodrine 10 mg 3 times daily  Flomax was discontinued   Donepezil was discontinued        Assesment and Plan  1. Orthostatic hypotension  Florinef may take weeks before any benefit is realized. Will prudent to lower dose of 1 of the alpha-blocker as he is on if tolerated for quicker results. Continue compression stockings and hydration  - Continue midodrine  - Continue florinef  - Discontinue Flomax. - Don't hold dietary salt. - Outpatient considerations include Northera (not available inpatient)  raul add mesitinon. May need to restart flomax  -continue fall precautions. May walk with PT with assistance. - follow up with neurology after discharge. 2. Dizziness  resolved    3. Multiple system atrophy-P ([address]  suspected    4. Gait disturbance   Needs assistance. D/w patient and family at length including driving restrictions, using abd binder when going out and using walker and cane when possible. Allergies    Patient has no known allergies.      Medications  Current Facility-Administered Medications   Medication Dose Route Frequency    midodrine (PROAMITINE) tablet 5 mg  5 mg Oral DAILY WITH DINNER    midodrine (PROAMITINE) tablet 10 mg  10 mg Oral BID WITH MEALS    fludrocortisone (FLORINEF) tablet 0.2 mg  0.2 mg Oral DAILY    aspirin chewable tablet 81 mg  81 mg Oral DAILY    busPIRone (BUSPAR) tablet 10 mg  10 mg Oral TID    clonazePAM (KlonoPIN) tablet 0.5 mg  0.5 mg Oral BID    albuterol-ipratropium (DUO-NEB) 2.5 MG-0.5 MG/3 ML  3 mL Nebulization Q6H PRN    DULoxetine (CYMBALTA) capsule 60 mg  60 mg Oral DAILY    gabapentin (NEURONTIN) capsule 600 mg  600 mg Oral DAILY    finasteride (PROSCAR) tablet 5 mg  5 mg Oral DAILY  sodium chloride (NS) flush 5-40 mL  5-40 mL IntraVENous Q8H    sodium chloride (NS) flush 5-40 mL  5-40 mL IntraVENous PRN    enoxaparin (LOVENOX) injection 40 mg  40 mg SubCUTAneous DAILY    acetaminophen (TYLENOL) tablet 650 mg  650 mg Oral Q6H PRN        Medical History  Past Medical History:   Diagnosis Date    Anxiety     Back pain     Blurred vision     Chest pain     Depression     Dizziness     Fast heart beat     Frequent headaches     Frequent urination     Hip dysplasia, congenital     Joint pain     Joint swelling     Lumbar spinal stenosis     Muscle pain     Neuropathy     Pacemaker     new pacemaker 2019    Recent change in weight     Sinus problem     Skin disease     SOB (shortness of breath)     Sore throat     SSS (sick sinus syndrome) (HCC)     Stiffness of joints, multiple sites     Stress     Syncope     Tiredness     Trouble in sleeping     Weakness      Review of Systems   Constitutional: Negative for chills and fever. HENT: Negative for ear pain. Eyes: Negative for pain and discharge. Respiratory: Negative for cough and hemoptysis. Cardiovascular: Negative for chest pain and claudication. Gastrointestinal: Negative for constipation and diarrhea. Genitourinary: Negative for flank pain and hematuria. Musculoskeletal: Positive for myalgias. Negative for back pain. Skin: Negative for itching and rash. Neurological: Positive for dizziness. Negative for headaches. Endo/Heme/Allergies: Negative for environmental allergies. Does not bruise/bleed easily. Psychiatric/Behavioral: Negative for depression and hallucinations. The patient is nervous/anxious.       Exam:  orthostatic pressures at the bed side with manual cuff supine 140/70, sitting 110/70, standin/50  Visit Vitals  BP (!) 194/97   Pulse 94   Temp 97.4 °F (36.3 °C)   Resp 18   Wt 179 lb 3.7 oz (81.3 kg)   SpO2 96%   BMI 25.00 kg/m²      General: Well developed, well nourished. Patient in no apparent distress   Head: Normocephalic, atraumatic, anicteric sclera   Neck Normal ROM, No thyromegally   Lungs:  Clear to auscultation bilaterally, No wheezes or rubs   Cardiac: Regular rate and rhythm with no murmurs. Abd: Bowel sounds were audible. No tenderness on palpation   Ext: No pedal edema   Skin: Supple no rash     NeurologicExam:  Mental Status: Alert and oriented to person place and time   Speech: Fluent no aphasia or dysarthria. Cranial Nerves:  II - XII Intact   Motor:  Full and symmetric strength of upper and lower proximal and distal muscles. Bradykinetic. + cogwheeling. Reflexes:   Deep tendon reflexes 2+/4 and symmetric. Sensory:   Symmetric and intact with no perceived deficits modalities involving small or large fibers. Gait:  Gait limping (secondary to congenital hip condition) wide based not steady   Tremor:   No tremor noted. Cerebellar:  Coordination intact. Neurovascular: No carotid bruits. No JVD     Imaging  CT Results (most recent):  Results from Hospital Encounter encounter on 09/29/19   CT HEAD WO CONT    Narrative EXAM:  CT HEAD WO CONT    INDICATION: Dizziness. COMPARISON: CT 9/13/2019    TECHNIQUE: Axial noncontrast head CT from foramen magnum to vertex. Coronal and  sagittal reformatted images were obtained. CT dose reduction was achieved  through use of a standardized protocol tailored for this examination and  automatic exposure control for dose modulation. FINDINGS:  There is diffuse age-related parenchymal volume loss. The ventricles  and sulci are age-appropriate without hydrocephalus. There is no mass effect or  midline shift. There is no intracranial hemorrhage or extra-axial fluid  collection. Scattered foci of low attenuation in the periventricular white  matter represent stable chronic microvascular ischemic changes. The gray-white  matter differentiation is maintained. The basal cisterns are patent.     The osseous structures are intact. There is mild mucosal thickening in the  posterior left ethmoid air cells. The remaining paranasal sinuses and mastoid  air cells are clear. Impression IMPRESSION:   No acute intracranial abnormality. MRI Results (most recent):  Results from [place] encounter on 09/29/19   MRA NECK WO CONT    Narrative INDICATION: Headaches    COMPARISON:  MR brain October 2, 2019    TECHNIQUE:  3-D time-of-flight MRA of the brain was performed. 2-D  time-of-flight MRA of the neck was performed. Multiplanar reconstructions were  obtained. FINDINGS:    MRA NECK    Common Carotids/Internal Carotids (visualized segments):  No flow limiting  stenosis by NASCET criteria. Vertebral Arteries (visualized segments):  No flow limiting stenosis. MRA HEAD    Posterior Circulation:  Patent. Mild irregularity throughout the bilateral  posterior cerebral arteries. Anterior Circulation:  Mild to moderate irregularity bilateral cavernous and  supraclinoid internal carotid artery segments without flow limitation. Mild  irregularity throughout the bilateral middle and anterior cerebral arteries may  be accentuated by artifact. No flow-limiting stenosis or occlusion. Other:  No aneurysm or vascular malformation. Impression IMPRESSION:  Mild to moderate intracranial atherosclerosis as above with no flow-limiting  stenosis or occlusion.             Lab Review  Lab Results   Component Value Date/Time    WBC 6.6 10/08/2019 12:37 AM    HCT 38.7 10/08/2019 12:37 AM    HGB 12.6 10/08/2019 12:37 AM    PLATELET 193 52/54/6072 12:37 AM       Lab Results   Component Value Date/Time    Sodium 140 10/08/2019 12:37 AM    Potassium 3.7 10/08/2019 12:37 AM    Chloride 106 10/08/2019 12:37 AM    CO2 29 10/08/2019 12:37 AM    Glucose 120 (H) 10/08/2019 12:37 AM    BUN 18 10/08/2019 12:37 AM    Creatinine 1.00 10/08/2019 12:37 AM    Calcium 8.4 (L) 10/08/2019 12:37 AM       Lab Results   Component Value Date/Time    Hemoglobin A1c 5.7 10/01/2019 12:51 AM        Lab Results   Component Value Date/Time    Vitamin B12 636 11/10/2018 04:45 AM    Folate 15.6 11/10/2018 04:45 AM       Lab Results   Component Value Date/Time    Cholesterol, total 112 09/27/2019 02:37 PM    HDL Cholesterol 40 09/27/2019 02:37 PM    LDL, calculated 53 09/27/2019 02:37 PM    VLDL, calculated 19 09/27/2019 02:37 PM    Triglyceride 96 09/27/2019 02:37 PM     60 minutes spent more than 50% spent in chart review and face to face  examination, discussion of treatment options and approach

## 2019-10-11 NOTE — PROGRESS NOTES
Problem: Syncope  Goal: *Absence of injury  Outcome: Progressing Towards Goal  Goal: Decrease or eliminate episodes of syncope  Outcome: Progressing Towards Goal     Problem: Patient Education: Go to Patient Education Activity  Goal: Patient/Family Education  Outcome: Progressing Towards Goal     Problem: Falls - Risk of  Goal: *Absence of Falls  Description  Document Uzma Luxora Fall Risk and appropriate interventions in the flowsheet.   Outcome: Progressing Towards Goal  Note:   Fall Risk Interventions:  Mobility Interventions: Bed/chair exit alarm, Communicate number of staff needed for ambulation/transfer, Patient to call before getting OOB, Strengthening exercises (ROM-active/passive), Utilize walker, cane, or other assistive device         Medication Interventions: Assess postural VS orthostatic hypotension, Bed/chair exit alarm, Patient to call before getting OOB, Teach patient to arise slowly    Elimination Interventions: Bed/chair exit alarm, Call light in reach, Patient to call for help with toileting needs, Urinal in reach    History of Falls Interventions: Bed/chair exit alarm, Consult care management for discharge planning, Door open when patient unattended         Problem: Patient Education: Go to Patient Education Activity  Goal: Patient/Family Education  Outcome: Progressing Towards Goal plan of care explained plan of care explained

## 2019-10-11 NOTE — PROGRESS NOTES
Bedside shift change report given to Moraima Zamora (oncoming nurse) by José Miguel Ashraf and Ellen Riggins (offgoing nurse). Report included the following information SBAR and Kardex.

## 2019-10-11 NOTE — PROGRESS NOTES
Pt /97, paged on call for cardiology, spoke with Dr. Bess Beck. Received verbal orders to continue monitoring vitals, no further interventions at this time.

## 2019-10-11 NOTE — PROGRESS NOTES
Problem: Falls - Risk of  Goal: *Absence of Falls  Description  Document Linda Fajardo Fall Risk and appropriate interventions in the flowsheet.   Outcome: Progressing Towards Goal  Note:   Fall Risk Interventions:  Mobility Interventions: Assess mobility with egress test, Bed/chair exit alarm         Medication Interventions: Assess postural VS orthostatic hypotension, Bed/chair exit alarm, Patient to call before getting OOB, Teach patient to arise slowly    Elimination Interventions: Bed/chair exit alarm, Call light in reach, Patient to call for help with toileting needs, Toileting schedule/hourly rounds    History of Falls Interventions: Bed/chair exit alarm, Consult care management for discharge planning, Door open when patient unattended, Evaluate medications/consider consulting pharmacy, Investigate reason for fall, Room close to nurse's station, Utilize gait belt for transfer/ambulation

## 2019-10-11 NOTE — PROGRESS NOTES
Nutrition Assessment:    INTERVENTIONS/RECOMMENDATIONS:   Continue current diet      ASSESSMENT:   Chart reviewed. Pt continues with a good appetite and consistently consuming % of meals. Hopefull to go home soon. Diet Order: Cardiac  % Eaten:    Patient Vitals for the past 72 hrs:   % Diet Eaten   10/11/19 0926 100 %   10/09/19 1702 90 %   10/08/19 1835 70 %   10/08/19 1118 100 %     Pertinent Medications: [x]Reviewed:   Pertinent Labs: [x]Reviewed:   Food Allergies: [x]NKFA  []Other   Last BM:  10/10  Edema:  n/a    []RUE   []LUE   []RLE   []LLE      Pressure Ulcer:   n/a   [] Stage I   [] Stage II   [] Stage III   [] Stage IV      Anthropometrics: Height:   Weight: 81.3 kg (179 lb 3.7 oz)    IBW (%IBW):   ( ) UBW (%UBW):   (  %)    BMI: Body mass index is 25 kg/m². This BMI is indicative of:  []Underweight   []Normal   [x]Overweight   [] Obesity   [] Extreme Obesity (BMI>40)  Last Weight Metrics:  Weight Loss Metrics 10/9/2019 9/29/2019 9/29/2019 9/29/2019 9/13/2019 9/3/2019 8/2/2019   Today's Wt 179 lb 3.7 oz - 180 lb - 183 lb 10.3 oz 182 lb 4.8 oz 180 lb   BMI - 25 kg/m2 - 25.1 kg/m2 25.61 kg/m2 25.43 kg/m2 25.1 kg/m2       Estimated Nutrition Needs (Based on): 2050 Kcals/day(BMR: 1575 x 1.3) , 80 g(1 g/kg) Protein  Carbohydrate: At Least 130 g/day  Fluids: 2050 mL/day or per primary team    NUTRITION DIAGNOSES:   Problem:  No nutritional diagnosis at this time      Etiology: related to       Signs/Symptoms: as evidenced by      Previous Nutrition Dx:  [x] N/a  [] Unresolved           [] Progressing    NUTRITION INTERVENTIONS:  Meals/Snacks: General/healthful diet                  GOAL:   consume >50% of meals in 5-7 days    NUTRITION MONITORING AND EVALUATION      Food/Nutrient Intake Outcomes:  Total energy intake  Physical Signs/Symptoms Outcomes: Weight/weight change, Electrolyte and renal profile, GI    Previous Goal Met:   [x] Met              [] Progressing Towards Goal              [] Not Progressing Towards Goal   Previous Recommendations:   [] Implemented          [] Not Implemented          [x] Not Applicable    LEARNING NEEDS (Diet, Food/Nutrient-Drug Interaction):    [x] None Identified   [] Identified and Education Provided/Documented   [] Identified and Pt declined/was not appropriate     Cultural, Latter day, OR Ethnic Dietary Needs:    [x] None Identified   [] Identified and Addressed     [x] Interdisciplinary Care Plan Reviewed/Documented    [x] Discharge Planning: general healthy diet    [] Participated in Interdisciplinary Rounds    NUTRITION RISK:    [] High              [] Moderate           []  Low  [x]  Minimal/Uncompromised      Aleah Oro RDN  Pager [phone]  Weekend Pager [phone]

## 2019-10-11 NOTE — PROGRESS NOTES
0700: Bedside shift change report given to Power Barahona (oncoming nurse) by Luz Pringle (offgoing nurse). Report included the following information SBAR, Kardex, Intake/Output, MAR, Recent Results and Cardiac Rhythm Paced. 1019: Pt with c/o constipation. Last documented BM on 10/6/2019. Dr. Saulo Ramos paged. [id number]: Pt's wife with questions regarding discharge. Pt's wife would like for Dr. Delvis Wan to see patient again before discharge. Will page Dr. Saulo Ramos.    56: [id number]    The patient is stable for discharge. I have reviewed the discharge instructions with the patient and spouse. The patient and spouse verbalized understanding. All questions were fully answered. The  patient and spouseand denies any complaints. There were no personal belongings, valuables or home medications left at patients bedside,  or safe. Hard scripts and medication handouts were given and reviewed with the patient and spouse. Appropriate educational materials and medication side effects teaching were provided. Cardiac monitor and IV line(s) were removed.

## 2019-10-11 NOTE — PROGRESS NOTES
3:37 PM - Unclear if pt cleared for d/c today, per conversation with RN. Awaiting Cardiology recommendation/clearance to d/c.  4:16 PM - Pt may d/c home this evening. Dr. Mily Amezquita has seen pt/wife and discussed plan of care. Dr. Juliana Rothman to see later. JOSE plan: Pt will discharge home, transported by his wife in a personal vehicle. Pt will receive [place] PT/OT/SN through [facility] beginning 10/13/19. Pt is scheduled to follow-up with his Neurologist on 10/17/19, PCP on 10/25/19, and Cardiologist on 11/19/19. Pt/wife considering switching Cardiology practices - CM included Dr. Star Cornelius office information on the AVS as well as Dr. Lianet Rosas information at [facility] per Dr. Star Cornelius note (to address dysautonomia). No further concerns indicated at this time. AVS updated. Patient is ready for discharge from Care Managment standpoint. Medicare pt has received, reviewed, and signed 2nd IM letter informing them of their right to appeal the discharge. Signed copy has been placed on pt bedside chart. Care Management Interventions  PCP Verified by CM: Yes  Last Visit to PCP: 08/02/19  Mode of Transport at Discharge:  Other (see comment)(wife)  Transition of Care Consult (CM Consult): Home Health(PT/OT/RN through [address] [place] [address])  [address] [place] [address] Road: Yes  MyChart Signup: No  Discharge Durable Medical Equipment: No  Physical Therapy Consult: Yes  Occupational Therapy Consult: Yes  Speech Therapy Consult: No  Current Support Network: Lives with Spouse, Own Home, Family Lives Nearby(pt and wife live together in single story home with ramp entry)  Confirm Follow Up Transport: Family  Plan discussed with Pt/Family/Caregiver: Yes  Freedom of Choice Offered: Yes  Discharge Location  Discharge Placement: Home with home health    PAVEL Kemp  Care Manager  [phone]

## 2019-10-11 NOTE — PROGRESS NOTES
EP/ ARRHYTHMIA/ CARDIOLOGY Progress Note    Patient ID:  Patient: Olaf Everett  MRN: [id number]  Age: 69 y.o.  : 1945    Date of  Admission: 2019  7:35 PM   PCP:  Inocencia Martinez MD    Assessment: 1. Early orthostatic hypotension with BP drop due to dysautonomia, to a far lesser extent due to medication. No documented accompanying tachycardia during hypotension suggesting sinus node dysfunction. Recurrent syncope due to this problem. 2. Sinus node dysfunction, dependent on the pacemaker to accelerate the HR (compensate during hypotension). 3. Pacemaker implant in the summer of , right chest, prior left chest pacemaker removed due to infection. 4. Supine hypertension. Asymptomatic. 5. Prostatic enlargement with symptoms. Plan:     1. Continue midodrine 10 mg at BF and lunch, but rec dinner dose of 5 mg to avoid very severe supine hypertension. 2. Increased the base rate of the pacemaker to 80 from 60 and increase the ADL rate from 95 to 110 (which increases the overall rate of acceleration during activity). The interrogation report was left on the chart to be scanned. 3. No salt restriction. 4. Agree with compression stockings, ideally waist high. If not, then use abdominal binder. 5. Continue fludrocortisone, Mestinon. 6. Encouraged hydration. 7. Elevate the head of the bed. 8. OK for a couple cups of coffee in the AM.  9. Multiple small (lower carb) meals if possible. 10. Avoid vasodilating substances (like alcohol) or scenarios (heat). 11. Agree with PT/OT. Let's see what these changes do. If midodrine does not work, I'd strongly consider a neurology-guided attempt to use Northera. Recommended being evaluated by Dr. Terese Adrian ([facility]) or Dr. Shakira Costa for the dysautonomia. Can F/U with my NP, Mecca Sloan, in the office in 2 weeks IF needed. Otherwise, F/U with Dr. Morenita Valdivia routinely.       [x]       High complexity decision making was performed in this patient. All questions answered for the patient and wife. Riana Christie is a 69 y.o. male with a history of recurrent syncope now found to be due to orthostatic hypotension. He has a history of pacemaker placement at the left chest, but due to infection had an implant on the right chest.  Still has had recurrent syncope, and documented orthostatic hypotension as noted. He's had his midodrine increased, Florinef increased, Mestinon added, taking in fluid he says. Due to the complexity of his management (given the dysautonomia), I was asked to be a second opinion and independently evaluate and treat. His pacemaker function was checked earlier and found to be OK. He's had an echo showing good LV systolic function and no significant preload dependent cardiac lesions. TODAY:  No dizziness currently. No orthopnea, PND, or edema. No chest pain.       Past Medical History:   Diagnosis Date    Anxiety     Back pain     Blurred vision     Chest pain     Depression     Dizziness     Fast heart beat     Frequent headaches     Frequent urination     Hip dysplasia, congenital     Joint pain     Joint swelling     Lumbar spinal stenosis     Muscle pain     Neuropathy     Pacemaker     new pacemaker July 2019    Recent change in weight     Sinus problem     Skin disease     SOB (shortness of breath)     Sore throat     SSS (sick sinus syndrome) (HCC)     Stiffness of joints, multiple sites     Stress     Syncope     Tiredness     Trouble in sleeping     Weakness         Past Surgical History:   Procedure Laterality Date    CARDIAC SURG PROCEDURE UNLIST      HX HIP REPLACEMENT Bilateral 1993    HX PACEMAKER PLACEMENT  10/16/2018    HX ROTATOR CUFF REPAIR Right 11/16/2017    TN INS NEW/RPLCMT PRM PM W/TRANSV ELTRD ATRIAL&VENT N/A 7/30/2019    INSERT PPM DUAL performed by Johnathan Nathan MD at [facility] CARDIAC CATH LAB       Social History     Tobacco Use    Smoking status: Former Smoker     Packs/day: 0.25     Years: 50.00     Pack years: 12.50     Types: Cigarettes     Last attempt to quit: 8/3/2019     Years since quittin.1    Smokeless tobacco: Never Used   Substance Use Topics    Alcohol use: Not Currently     Alcohol/week: 28.0 standard drinks     Types: 28 Cans of beer per week     Comment: None now        Family History   Problem Relation Age of Onset    Cancer Mother     Cancer Maternal Aunt         No Known Allergies       Current Facility-Administered Medications   Medication Dose Route Frequency    pyridostigmine (MESTINON) tablet 30 mg  30 mg Oral TID    docusate sodium (COLACE) capsule 100 mg  100 mg Oral BID    [START ON 10/12/2019] finasteride (PROSCAR) tablet 5 mg  5 mg Oral QHS    midodrine (PROAMITINE) tablet 5 mg  5 mg Oral DAILY WITH DINNER    midodrine (PROAMITINE) tablet 10 mg  10 mg Oral BID WITH MEALS    fludrocortisone (FLORINEF) tablet 0.2 mg  0.2 mg Oral DAILY    aspirin chewable tablet 81 mg  81 mg Oral DAILY    busPIRone (BUSPAR) tablet 10 mg  10 mg Oral TID    clonazePAM (KlonoPIN) tablet 0.5 mg  0.5 mg Oral BID    albuterol-ipratropium (DUO-NEB) 2.5 MG-0.5 MG/3 ML  3 mL Nebulization Q6H PRN    DULoxetine (CYMBALTA) capsule 60 mg  60 mg Oral DAILY    gabapentin (NEURONTIN) capsule 600 mg  600 mg Oral DAILY    sodium chloride (NS) flush 5-40 mL  5-40 mL IntraVENous Q8H    sodium chloride (NS) flush 5-40 mL  5-40 mL IntraVENous PRN    enoxaparin (LOVENOX) injection 40 mg  40 mg SubCUTAneous DAILY    acetaminophen (TYLENOL) tablet 650 mg  650 mg Oral Q6H PRN       Review of Symptoms:  See HPI as well.   General: negative for fever, chills, sweats, weakness, weight loss   Eyes: negative for blurred vision, eye pain, loss of vision, diplopia   Ear Nose and Throat: negative for rhinorrhea, pharyngitis, otalgia, tinnitus, speech or swallowing difficulties   Respiratory: negative for SOB, cough, sputum production, wheezing, PATEL, pleuritic pain Cardiology: +syncope, negative for chest pain, palpitations, orthopnea, PND, edema  Gastrointestinal: negative for abdominal pain, N/V, dysphagia, change in bowel habits, bleeding   Genitourinary: +frequency, urgency, negative for dysuria, hematuria  Muskuloskeletal : negative for arthralgia, myalgia   Hematology: negative for easy bruising, bleeding, lymphadenopathy   Dermatological: negative for rash, ulceration, mole change, new lesion   Endocrine: negative for hot flashes or polydipsia   Neurological: negative for headache, confusion, focal weakness, paresthesia, memory loss, gait disturbance   Psychological: negative for anxiety, depression, agitation       Objective:      Physical Exam:  Temp (24hrs), Av.8 °F (36.6 °C), Min:97.4 °F (36.3 °C), Max:98.1 °F (36.7 °C)    Patient Vitals for the past 8 hrs:   Pulse   10/11/19 1202 90   10/11/19 1158 84   10/11/19 1153 82   10/11/19 0827 82   10/11/19 0822 79    Patient Vitals for the past 8 hrs:   Resp   10/11/19 1158 18   10/11/19 1153 18   10/11/19 0827 18   10/11/19 0822 18    Patient Vitals for the past 8 hrs:   BP   10/11/19 1202 (!) 78/53   10/11/19 1158 108/77   10/11/19 1153 (!) 175/92   10/11/19 0829 (!) 76/54   10/11/19 0827 111/70   10/11/19 0822 139/84          Intake/Output Summary (Last 24 hours) at 10/11/2019 1553  Last data filed at 10/11/2019 1333  Gross per 24 hour   Intake 1880 ml   Output 2675 ml   Net -795 ml       Nondiaphoretic, not in acute distress. No scleral icterus, mucous membranes moist, conjuctivae pink, no xanthelasma. Unlabored, clear to auscultation bilaterally, symmetric air movement. R chest pacemaker site OK. Regular rate and rhythm, no murmur, pericardial rub, knock, or gallop. No JVD or peripheral edema. Palpable radial pulses bilaterally. Abdomen, soft, nontender, nondistended. Extremities without cyanosis or clubbing. Muscle tone and bulk normal.  Skin warm and dry. No rashes or ulcers.   Neuro grossly nonfocal.  No resting tremor. Awake and appropriate. CARDIOGRAPHICS and STUDIES, I reviewed:    Telemetry:  A pacing. ECG:  Reviewed. Labs:  No results for input(s): CPK, CKMB, CKNDX, TROIQ in the last 72 hours. No lab exists for component: CPKMB  Lab Results   Component Value Date/Time    Cholesterol, total 112 09/27/2019 02:37 PM    HDL Cholesterol 40 09/27/2019 02:37 PM    LDL, calculated 53 09/27/2019 02:37 PM    Triglyceride 96 09/27/2019 02:37 PM     No results for input(s): INR, PTP, APTT, INREXT, INREXT in the last 72 hours. No results for input(s): NA, K, CL, CO2, BUN, CREA, GLU, PHOS, CA, ALB, WBC, HGB, HCT, PLT, HGBEXT, HCTEXT, PLTEXT, HGBEXT, HCTEXT, PLTEXT in the last 72 hours. No results for input(s): SGOT, GPT, AP, TBIL, TP, ALB, GLOB, GGT, AML, LPSE in the last 72 hours. No lab exists for component: AMYP, HLPSE  No components found for: GLPOC  No results for input(s): PH, PCO2, PO2 in the last 72 hours.         Kyrie Pinto MD  10/11/2019

## 2019-10-13 ENCOUNTER — HOME CARE VISIT (OUTPATIENT)
Dept: SCHEDULING | Facility: HOME HEALTH | Age: 74
End: 2019-10-13
Payer: MEDICARE

## 2019-10-13 PROCEDURE — 400013 HH SOC

## 2019-10-13 PROCEDURE — G0299 HHS/HOSPICE OF RN EA 15 MIN: HCPCS

## 2019-10-13 PROCEDURE — 3331090002 HH PPS REVENUE DEBIT

## 2019-10-13 PROCEDURE — 3331090001 HH PPS REVENUE CREDIT

## 2019-10-14 ENCOUNTER — PATIENT OUTREACH (OUTPATIENT)
Dept: INTERNAL MEDICINE CLINIC | Age: 74
End: 2019-10-14

## 2019-10-14 VITALS
TEMPERATURE: 99.3 F | SYSTOLIC BLOOD PRESSURE: 114 MMHG | DIASTOLIC BLOOD PRESSURE: 81 MMHG | RESPIRATION RATE: 18 BRPM | OXYGEN SATURATION: 95 % | HEART RATE: 84 BPM

## 2019-10-14 PROCEDURE — 3331090001 HH PPS REVENUE CREDIT

## 2019-10-14 PROCEDURE — 3331090002 HH PPS REVENUE DEBIT

## 2019-10-14 RX ORDER — ATORVASTATIN CALCIUM 20 MG/1
TABLET, FILM COATED ORAL DAILY
COMMUNITY
End: 2020-08-12 | Stop reason: ALTCHOICE

## 2019-10-14 NOTE — PROGRESS NOTES
Hospital Discharge Follow-Up      Date/Time:  10/14/2019 10:47 AM    Patient was admitted to [facility] on  and discharged on 10/11 for DX. SYNCOPE. The physician discharge summary was available at the time of outreach. Patient was contacted within 2 business days of discharge. Top Challenges reviewed with the provider     Advance Care Planning:   Does patient have an Advance Directive:  reviewed and current     DC on Florinef 0.2mg, Midodrine 10mg with breakfast and lunch, and then 5mg with dinner, Mestinon 30mg TID. Relevant changes made to med rec include holding both Flomax and Aricept due to concerns for dizziness and orthostatic hypotension.          Method of communication with provider :chart routing    Inpatient RRAT score: 16  Was this a readmission? no     Care Transition Nurse (CTN) contacted the patient by telephone to perform post hospital discharge assessment. Verified name and  with patient as identifiers. Provided introduction to self, and explanation of the CTN role. Patient received hospital discharge instructions. CTN reviewed discharge instructions and red flags with patient who verbalized understanding. Patient given an opportunity to ask questions and does not have any further questions or concerns at this time. The patient agrees to contact the PCP office for questions related to their healthcare. CTN provided contact information for future reference. Disease Specific:   N/A     Patients top risk factors for readmission:  none    Home Health orders at discharge: PT, OT, [address] [facility]: [facility]  Date of initial visit: 10/13    Medication(s):   New Medications at Discharge:   docusate sodium (COLACE) 100 mg capsule Take 1 Cap by mouth two (2) times a day for 90 days. Qty: 60 Cap, Refills: 2       !! midodrine (PROAMITINE) 10 mg tablet Take 1 Tab by mouth two (2) times daily (with meals) for 30 days.   Qty: 60 Tab, Refills: 0       !! midodrine (PROAMITINE) 5 mg tablet Take 1 Tab by mouth daily (with dinner) for 30 days. Qty: 30 Tab, Refills: 0       pyridostigmine (MESTINON) 60 mg tablet Take 0.5 Tabs by mouth three (3) times daily. Qty: 90 Tab, Refills: 0       fludrocortisone (FLORINEF) 0.1 mg tablet Take 2 Tabs by mouth daily. Qty: 60 Tab, Refills: 1     Not- Changed Medications at Discharge:   busPIRone (BUSPAR) 10 mg tablet Take 1 Tab by mouth three (3) times daily. Indications: Repeated Episodes of Anxiety  Qty: 270 Tab, Refills: 0       gabapentin (NEURONTIN) 600 mg tablet Take 1 Tab by mouth daily. Qty: 10 Tab, Refills: 0     Associated Diagnoses: Neuropathy       clonazePAM (KLONOPIN) 1 mg tablet Take 0.5 Tabs by mouth two (2) times a day. Max Daily Amount: 1 mg. Qty: 10 Tab, Refills: 0     Associated Diagnoses: PTSD (post-traumatic stress disorder)       aspirin 81 mg chewable tablet Take 1 Tab by mouth daily. Qty: 90 Tab, Refills: 2       COMBIVENT RESPIMAT  mcg/actuation inhaler         DULoxetine (CYMBALTA) 60 mg capsule TAKE 1 CAPSULE BY MOUTH DAILY  Qty: 90 Cap, Refills: 1     Associated Diagnoses: PTSD (post-traumatic stress disorder)       finasteride (PROSCAR) 5 mg tablet TAKE 1 TABLET BY MOUTH DAILY(PROSTATE)  Qty: 90 Tab, Refills: 0       guaiFENesin ER (MUCINEX) 600 mg ER tablet Take 1 Tab by mouth every twelve (12) hours. Qty: 30 Tab, Refills: 0       cyanocobalamin, vitamin B-12, (VITAMIN B-12 PO) Take 1 Tab by mouth daily.       ipratropium (ATROVENT) 42 mcg (0.06 %) nasal spray 1 [place] by Both Nostrils route four (4) times daily. Qty: 45 mL, Refills: 1     Associated Diagnoses: Acute nasopharyngitis     Discontinued Medications at Discharge:   tamsulosin (FLOMAX) 0.4 mg capsule             donepezil (ARICEPT) 10 mg tablet            naproxen (NAPROSYN) 500 mg tablet      Medication reconciliation was performed with patient, who verbalizes understanding of administration of home medications.   There were no barriers to obtaining medications identified at this time. Referral to Pharm D needed: no     Current Outpatient Medications   Medication Sig    docusate sodium (COLACE) 100 mg capsule Take 1 Cap by mouth two (2) times a day for 90 days.  midodrine (PROAMITINE) 10 mg tablet Take 1 Tab by mouth two (2) times daily (with meals) for 30 days.  midodrine (PROAMITINE) 5 mg tablet Take 1 Tab by mouth daily (with dinner) for 30 days.  pyridostigmine (MESTINON) 60 mg tablet Take 0.5 Tabs by mouth three (3) times daily.  fludrocortisone (FLORINEF) 0.1 mg tablet Take 2 Tabs by mouth daily.  busPIRone (BUSPAR) 10 mg tablet Take 1 Tab by mouth three (3) times daily. Indications: Repeated Episodes of Anxiety    gabapentin (NEURONTIN) 600 mg tablet Take 1 Tab by mouth daily.  clonazePAM (KLONOPIN) 1 mg tablet Take 0.5 Tabs by mouth two (2) times a day. Max Daily Amount: 1 mg.  aspirin 81 mg chewable tablet Take 1 Tab by mouth daily.  COMBIVENT RESPIMAT  mcg/actuation inhaler     DULoxetine (CYMBALTA) 60 mg capsule TAKE 1 CAPSULE BY MOUTH DAILY    ipratropium (ATROVENT) 42 mcg (0.06 %) nasal spray 1 [place] by Both Nostrils route four (4) times daily. (Patient taking differently: 1 Spray by Both Nostrils route four (4) times daily as needed.)    finasteride (PROSCAR) 5 mg tablet TAKE 1 TABLET BY MOUTH DAILY(PROSTATE)    guaiFENesin ER (MUCINEX) 600 mg ER tablet Take 1 Tab by mouth every twelve (12) hours. (Patient taking differently: Take 600 mg by mouth every twelve (12) hours. As needed)    cyanocobalamin, vitamin B-12, (VITAMIN B-12 PO) Take 1 Tab by mouth daily. No current facility-administered medications for this visit.       [facility] follow up appointment(s):  Richard Cid NP 10/17 @ 12:00     Future Appointments   Date Time Provider Maddie Hidalgo   10/15/2019 To Be Determined Usha Merritt OT [address] [place]   10/16/2019 To Be Determined Elvis Carmen PT [address] [place] 10/17/2019  9:40 AM Gerhard Daley  [address]   10/25/2019  2:20 PM Kalyan Molina MD [facility] MAIN VU SCHED   11/19/2019 11:00 AM PACEMAKER, [facility] VU SCHED   11/19/2019 11:15 AM Gavin Mcdermott  [address]:  out of service area   Goals      Develop action plan for Self-Management Chronic Disease. 10/14 PHI encouraged to have pt.monitor B/P daily, monitor s/s orthostatic such dizziness, lightheadedness, fainting, unsteadiness, blurred vision, confusion, increased heart rate. CTN encourage to notify physician of sx and B/P  Sys <100., to have pt.lay flat with lower extremities elevated. PHI reports understanding. PHI reports pt.b/p today lowest reading 97/54 pt.without any s/s listed above, reports pt.wearing compression stocking as directed. CTN will f/u with pt.in 1-2 weeks. -Torsten Bhatt PCP relationships and regularly scheduled appointments. 10/14  Patient (PHI)  encouraged to attend  follow-up with PCP and specialist as directed, keep a list of pt. medications he take to bring to f/u appointments. CTN reminded pt.of upcoming appts: PHI reports has appt. at  (PCP office) with Thai Lofton NP on 10/23, will f/u with Harmony Mejia (Neurologist) 10/17 @ 9:40 am, Cardio Vascular Surg. 11/19 @ 11 am at [facility] office with Dr. Brandi Cade will f/u with pt.in 1-2 weeks. -       Supportive resources in place to maintain patient in the community (ie., home health, equipment, DME, refer to, etc.)      10/14 Pt. lives at home with wife, whom very active and supportive of his care. PHI reports she schedule appts, and provide transportation. PHI reports [facility] eval. 10/13, pt.to receive PT/OT/SN. PHI concern pt.at home alone when she return to work. CTN encouraged PHI to discuss with physician her concerns pt.being at home alone. CTN provided PHI with information to [employer] [phone] ([employer] ext. [phone]) and Med.  Alert [phone]. CTN contact information provided to call with any questions or concerns. CTN  will f/u with pt.in 1-2 weeks. -[address]

## 2019-10-15 ENCOUNTER — HOME CARE VISIT (OUTPATIENT)
Dept: SCHEDULING | Facility: HOME HEALTH | Age: 74
End: 2019-10-15
Payer: MEDICARE

## 2019-10-15 VITALS
DIASTOLIC BLOOD PRESSURE: 62 MMHG | TEMPERATURE: 98 F | OXYGEN SATURATION: 98 % | HEART RATE: 99 BPM | SYSTOLIC BLOOD PRESSURE: 95 MMHG

## 2019-10-15 PROCEDURE — G0151 HHCP-SERV OF PT,EA 15 MIN: HCPCS

## 2019-10-15 PROCEDURE — 3331090002 HH PPS REVENUE DEBIT

## 2019-10-15 PROCEDURE — 3331090001 HH PPS REVENUE CREDIT

## 2019-10-15 PROCEDURE — G0300 HHS/HOSPICE OF LPN EA 15 MIN: HCPCS

## 2019-10-15 PROCEDURE — G0152 HHCP-SERV OF OT,EA 15 MIN: HCPCS

## 2019-10-16 ENCOUNTER — TELEPHONE (OUTPATIENT)
Dept: CARDIOLOGY CLINIC | Age: 74
End: 2019-10-16

## 2019-10-16 VITALS
TEMPERATURE: 98 F | DIASTOLIC BLOOD PRESSURE: 62 MMHG | HEART RATE: 99 BPM | SYSTOLIC BLOOD PRESSURE: 95 MMHG | OXYGEN SATURATION: 98 %

## 2019-10-16 PROCEDURE — 3331090001 HH PPS REVENUE CREDIT

## 2019-10-16 PROCEDURE — 3331090002 HH PPS REVENUE DEBIT

## 2019-10-16 NOTE — TELEPHONE ENCOUNTER
Left a message to return a call to the office. Need to know if pt is going to f/up with Dr Chris Rush or has switched to Dr Fawad Boland for EP. CINTHIA Verma is waiting for this information.      Kyrie Jay RN

## 2019-10-17 ENCOUNTER — OFFICE VISIT (OUTPATIENT)
Dept: NEUROLOGY | Age: 74
End: 2019-10-17

## 2019-10-17 ENCOUNTER — HOME CARE VISIT (OUTPATIENT)
Dept: SCHEDULING | Facility: HOME HEALTH | Age: 74
End: 2019-10-17
Payer: MEDICARE

## 2019-10-17 VITALS
OXYGEN SATURATION: 97 % | WEIGHT: 183 LBS | SYSTOLIC BLOOD PRESSURE: 152 MMHG | HEART RATE: 103 BPM | DIASTOLIC BLOOD PRESSURE: 90 MMHG | HEIGHT: 71 IN | RESPIRATION RATE: 16 BRPM | BODY MASS INDEX: 25.62 KG/M2

## 2019-10-17 VITALS
OXYGEN SATURATION: 96 % | TEMPERATURE: 99 F | HEART RATE: 81 BPM | SYSTOLIC BLOOD PRESSURE: 142 MMHG | DIASTOLIC BLOOD PRESSURE: 80 MMHG

## 2019-10-17 DIAGNOSIS — R27.8 SENSORY ATAXIA: ICD-10-CM

## 2019-10-17 DIAGNOSIS — I65.23 BILATERAL CAROTID ARTERY STENOSIS: Primary | ICD-10-CM

## 2019-10-17 DIAGNOSIS — G60.8 IDIOPATHIC SMALL AND LARGE FIBER SENSORY NEUROPATHY: ICD-10-CM

## 2019-10-17 DIAGNOSIS — R41.3 MEMORY DEFICIT: ICD-10-CM

## 2019-10-17 DIAGNOSIS — R42 DIZZINESS AND GIDDINESS: ICD-10-CM

## 2019-10-17 DIAGNOSIS — R27.0 ATAXIA: ICD-10-CM

## 2019-10-17 DIAGNOSIS — R41.3 MEMORY DISTURBANCE: ICD-10-CM

## 2019-10-17 DIAGNOSIS — I95.1 AUTONOMIC ORTHOSTATIC HYPOTENSION: ICD-10-CM

## 2019-10-17 PROCEDURE — 3331090002 HH PPS REVENUE DEBIT

## 2019-10-17 PROCEDURE — 3331090001 HH PPS REVENUE CREDIT

## 2019-10-17 NOTE — PATIENT INSTRUCTIONS
A Healthy Lifestyle: Care Instructions  Your Care Instructions    A healthy lifestyle can help you feel good, stay at a healthy weight, and have plenty of energy for both work and play. A healthy lifestyle is something you can share with your whole family. A healthy lifestyle also can lower your risk for serious health problems, such as high blood pressure, heart disease, and diabetes. You can follow a few steps listed below to improve your health and the health of your family. Follow-up care is a key part of your treatment and safety. Be sure to make and go to all appointments, and call your doctor if you are having problems. It's also a good idea to know your test results and keep a list of the medicines you take. How can you care for yourself at home? · Do not eat too much sugar, fat, or fast foods. You can still have dessert and treats now and then. The goal is moderation. · Start small to improve your eating habits. Pay attention to portion sizes, drink less juice and soda pop, and eat more fruits and vegetables. ? Eat a healthy amount of food. A 3-ounce serving of meat, for example, is about the size of a deck of cards. Fill the rest of your plate with vegetables and whole grains. ? Limit the amount of soda and sports drinks you have every day. Drink more water when you are thirsty. ? Eat at least 5 servings of fruits and vegetables every day. It may seem like a lot, but it is not hard to reach this goal. A serving or helping is 1 piece of fruit, 1 cup of vegetables, or 2 cups of leafy, raw vegetables. Have an apple or some carrot sticks as an afternoon snack instead of a candy bar. Try to have fruits and/or vegetables at every meal.  · Make exercise part of your daily routine. You may want to start with simple activities, such as walking, bicycling, or slow swimming. Try to be active 30 to 60 minutes every day. You do not need to do all 30 to 60 minutes all at once.  For example, you can exercise 3 times a day for 10 or 20 minutes. Moderate exercise is safe for most people, but it is always a good idea to talk to your doctor before starting an exercise program.  · Keep moving. Jaspal Vasquezu the lawn, work in the garden, or [place]. Take the stairs instead of the elevator at work. · If you smoke, quit. People who smoke have an increased risk for heart attack, stroke, cancer, and other lung illnesses. Quitting is hard, but there are ways to boost your chance of quitting tobacco for good. ? Use nicotine gum, patches, or lozenges. ? Ask your doctor about stop-smoking programs and medicines. ? Keep trying. In addition to reducing your risk of diseases in the future, you will notice some benefits soon after you stop using tobacco. If you have shortness of breath or asthma symptoms, they will likely get better within a few weeks after you quit. · Limit how much alcohol you drink. Moderate amounts of alcohol (up to 2 drinks a day for men, 1 drink a day for women) are okay. But drinking too much can lead to liver problems, high blood pressure, and other health problems. Family health  If you have a family, there are many things you can do together to improve your health. · Eat meals together as a family as often as possible. · Eat healthy foods. This includes fruits, vegetables, lean meats and dairy, and whole grains. · Include your family in your fitness plan. Most people think of activities such as jogging or tennis as the way to fitness, but there are many ways you and your family can be more active. Anything that makes you breathe hard and gets your heart pumping is exercise. Here are some tips:  ? Walk to do errands or to take your child to school or the bus.  ? Go for a family bike ride after dinner instead of watching TV. Where can you learn more? Go to http://gris-fariha.info/. Enter V458 in the search box to learn more about \"A Healthy Lifestyle: Care Instructions. \"  Current as of: May 28, 2019  Content Version: 12.2  © 5354-4347 [employer]. Care instructions adapted under license by [employer] (which disclaims liability or warranty for this information). If you have questions about a medical condition or this instruction, always ask your healthcare professional. [address] any warranty or liability for your use of this information. Office Policies    o Phone calls/patient messages:  Please allow up to 24 hours for someone in the office to contact you about your call or message. Be mindful your provider may be out of the office or your message may require further review. We encourage you to use [employer] for your messages as this is a faster, more efficient way to communicate with our office    o Medication Refills:  Prescription medications require up to 48 business hours to process. We encourage you to use [employer] for your refills. For controlled medications: Please allow up to 72 business hours to process. Certain medications may require you to  a written prescription at our office. NO narcotic/controlled medications will be prescribed after 4pm Monday through Friday or on weekends    o Form/Paperwork Completion:  We ask that you allow 7-14 business days. You may also download your forms to [employer] to have your doctor print off.

## 2019-10-17 NOTE — PROGRESS NOTES
Consult  REFERRED BY:  Riya Molina MD    CHIEF COMPLAINT: Unsteady gait and frequent falls      Subjective:     Carlos Escamilla is a 69 y.o. right-handed  male seen on urgent work in basis at the request of Dr. Yasmin Dong for evaluation of new problem of recent hospitalization last week for dizziness when he was found to be orthostatic, and when his medications were adjusted and his fluid balance increased he improved, but his Aricept was discontinued at that time because of concern of hypotension from that. Patient was seen in the emergency room 2 days ago for recurrent orthostatic hypotension, and was found to be mildly dehydrated and encouraged to drink more fluids. He never had any true syncope over the last week. He had an MRA of head and neck October 2019 just showed mild intracranial disease, but no major stenosis or occlusion, October 2019 MRI of the brain just showed old microvascular disease but no acute vascular lesions, and the patient had MRI of the cervical spine does show the severe stenosis at C3-4 and C4-5 levels with cord compression. Patient does not appear to have any clear myelopathy from this, we will address this later, he does have mild neck pain and a level of 3/10, and does not think that is significant and does not want surgery. Patient has had a pacemaker done in summer 2019. Patient today was not significantly orthostatic, with a blood pressure that went from 140/90 down to 120/70 in his health there after a minute being up. Patient already on ProAmatine 10 mg in breakfast and lunch and 5 mg at supper and 0.2 mg Florinef every morning. He was just started on Mestinon 30 mg 3 times a day and is tolerating that well without GI side effects. We encouraged the patient he needs to start wearing support hose to help his orthostasis.   He has never had any new focal weakness or sensory loss or cranial nerve problem or neurologic problem, mainly just the orthostatic hypotension. He denies any major headache, fever, meningismus, but does have a mild memory problem consistent with early Alzheimer's we will hold on medications for that, but down the way if he blood pressure remains stable, he may be a candidate for Namenda. Patient has a known history of unsteady gait for 3 or 4 years at least, for which he had a lumbar laminectomy done 2 years ago, without improvement in his gait. He is been to physical therapy, and tried on a cane but he cannot coordinate a cane due to his mental impairment. He has a history of dementia and is currently being evaluated by a Richard Dennis 1946 neurologist for Alzheimer's disease and is on Aricept 5 mg a day for the last year. His PCPs note 2 years ago showed that he was unsteady then, needing a walker to ambulate but not using it and falling frequently. He also complains of neck pain, and some headache, and had a normal CT of the head except for mild atrophic changes on admission yesterday. He does not give a history of clear strokes in the past.  He has risk factors for vascular disease including smoking, hypertension, cholesterol, but denies diabetes though his blood sugars are mildly elevated. He has no family history of similar problems, but does have a brother who has Alzheimer's disease. Patient has a history of heavy drinking for years in the past, but has been off the alcohol for 3 years. 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 His exam shows a mild neuropathy with more involvement of his position sensation so we will check his C22 levels and folic acid levels and anti-neuronal antibody titers.   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 Discussed this case with the patient and his wife in detail, and most of the information came from the wife since the patient has a memory problems as above. Past Medical History:   Diagnosis Date    Anxiety     Back pain     Blurred vision     Chest pain     Depression     Dizziness     Dysautonomia (HCC)     Fast heart beat     Frequent headaches     Frequent urination     Hip dysplasia, congenital     Joint pain     Joint swelling     Lumbar spinal stenosis     Muscle pain     Neuropathy     Orthostatic hypotension     asymptomatic    Pacemaker     new pacemaker 2019    Recent change in weight     Sinus problem     Skin disease     SOB (shortness of breath)     Sore throat     SSS (sick sinus syndrome) (HCC)     Stiffness of joints, multiple sites     Stress     Syncope     Tiredness     Trouble in sleeping     Weakness       Past Surgical History:   Procedure Laterality Date    CARDIAC SURG PROCEDURE UNLIST      HX HIP REPLACEMENT Bilateral     HX PACEMAKER PLACEMENT  10/16/2018    HX ROTATOR CUFF REPAIR Right 2017    [facility] INS NEW/RPLCMT PRM PM W/TRANSV ELTRD ATRIAL&VENT N/A 2019    INSERT PPM DUAL performed by Troy Albarado MD at [facility] CARDIAC CATH LAB     Family History   Problem Relation Age of Onset    Cancer Mother     Cancer Maternal Aunt       Social History     Tobacco Use    Smoking status: Former Smoker     Packs/day: 0.25     Years: 50.00     Pack years: 12.50     Types: Cigarettes     Last attempt to quit: 8/3/2019     Years since quittin.2    Smokeless tobacco: Never Used   Substance Use Topics    Alcohol use: Not Currently     Alcohol/week: 28.0 standard drinks     Types: 28 Cans of beer per week     Comment: None now         Current Outpatient Medications:     atorvastatin (LIPITOR) 20 mg tablet, Take  by mouth daily. , Disp: , Rfl:     ipratropium-albuterol (COMBIVENT RESPIMAT)  mcg/actuation inhaler, Take 1 Puff by inhalation two (2) times daily as needed for Shortness of Breath or Wheezing., Disp: , Rfl:     acetaminophen 500 mg tab 500 mg, diphenhydrAMINE 25 mg cap 25 mg, Take 1 Dose by mouth nightly as needed for Other (sleep/pain relief). , Disp: , Rfl:     acetaminophen (TYLENOL 8 HOUR) 650 mg TbER, Take 650 mg by mouth every eight (8) hours as needed for Pain., Disp: , Rfl:     docusate sodium (COLACE) 100 mg capsule, Take 1 Cap by mouth two (2) times a day for 90 days. , Disp: 60 Cap, Rfl: 2    midodrine (PROAMITINE) 10 mg tablet, Take 1 Tab by mouth two (2) times daily (with meals) for 30 days. (Patient taking differently: Take 10 mg by mouth two (2) times daily (with meals). With brkft and lunch), Disp: 60 Tab, Rfl: 0    midodrine (PROAMITINE) 5 mg tablet, Take 1 Tab by mouth daily (with dinner) for 30 days. , Disp: 30 Tab, Rfl: 0    pyridostigmine (MESTINON) 60 mg tablet, Take 0.5 Tabs by mouth three (3) times daily. , Disp: 90 Tab, Rfl: 0    fludrocortisone (FLORINEF) 0.1 mg tablet, Take 2 Tabs by mouth daily. , Disp: 60 Tab, Rfl: 1    busPIRone (BUSPAR) 10 mg tablet, Take 1 Tab by mouth three (3) times daily. Indications: Repeated Episodes of Anxiety, Disp: 270 Tab, Rfl: 0    gabapentin (NEURONTIN) 600 mg tablet, Take 1 Tab by mouth daily. , Disp: 10 Tab, Rfl: 0    clonazePAM (KLONOPIN) 1 mg tablet, Take 0.5 Tabs by mouth two (2) times a day. Max Daily Amount: 1 mg., Disp: 10 Tab, Rfl: 0    aspirin 81 mg chewable tablet, Take 1 Tab by mouth daily. , Disp: 90 Tab, Rfl: 2    DULoxetine (CYMBALTA) 60 mg capsule, TAKE 1 CAPSULE BY MOUTH DAILY, Disp: 90 Cap, Rfl: 1    ipratropium (ATROVENT) 42 mcg (0.06 %) nasal spray, 1 Benedict by Both Nostrils route four (4) times daily.  (Patient taking differently: 1 Spray by Both Nostrils route four (4) times daily as needed.), Disp: 45 mL, Rfl: 1    finasteride (PROSCAR) 5 mg tablet, TAKE 1 TABLET BY MOUTH DAILY(PROSTATE), Disp: 90 Tab, Rfl: 0    guaiFENesin ER (MUCINEX) 600 mg ER tablet, Take 1 Tab by mouth every twelve (12) hours. (Patient taking differently: Take 600 mg by mouth every twelve (12) hours. As needed), Disp: 30 Tab, Rfl: 0        No Known Allergies   MRI Results (most recent):  Results from Hospital Encounter encounter on 09/29/19   MRA NECK WO CONT    Narrative INDICATION: Headaches    COMPARISON:  MR brain October 2, 2019    TECHNIQUE:  3-D time-of-flight MRA of the brain was performed. 2-D  time-of-flight MRA of the neck was performed. Multiplanar reconstructions were  obtained. FINDINGS:    MRA NECK    Common Carotids/Internal Carotids (visualized segments):  No flow limiting  stenosis by NASCET criteria. Vertebral Arteries (visualized segments):  No flow limiting stenosis. MRA HEAD    Posterior Circulation:  Patent. Mild irregularity throughout the bilateral  posterior cerebral arteries. Anterior Circulation:  Mild to moderate irregularity bilateral cavernous and  supraclinoid internal carotid artery segments without flow limitation. Mild  irregularity throughout the bilateral middle and anterior cerebral arteries may  be accentuated by artifact. No flow-limiting stenosis or occlusion. Other:  No aneurysm or vascular malformation. Impression IMPRESSION:  Mild to moderate intracranial atherosclerosis as above with no flow-limiting  stenosis or occlusion. Results from [place] encounter on 09/29/19   MRA NECK WO CONT    Narrative INDICATION: Headaches    COMPARISON:  MR brain October 2, 2019    TECHNIQUE:  3-D time-of-flight MRA of the brain was performed. 2-D  time-of-flight MRA of the neck was performed. Multiplanar reconstructions were  obtained. FINDINGS:    MRA NECK    Common Carotids/Internal Carotids (visualized segments):  No flow limiting  stenosis by NASCET criteria. Vertebral Arteries (visualized segments):  No flow limiting stenosis. MRA HEAD    Posterior Circulation:  Patent.  Mild irregularity throughout the bilateral  posterior cerebral arteries. Anterior Circulation:  Mild to moderate irregularity bilateral cavernous and  supraclinoid internal carotid artery segments without flow limitation. Mild  irregularity throughout the bilateral middle and anterior cerebral arteries may  be accentuated by artifact. No flow-limiting stenosis or occlusion. Other:  No aneurysm or vascular malformation. Impression IMPRESSION:  Mild to moderate intracranial atherosclerosis as above with no flow-limiting  stenosis or occlusion. Review of Systems:  Review of systems not obtained due to patient factors. Vitals:    10/17/19 0959   BP: 152/90   Pulse: (!) 103   Resp: 16   SpO2: 97%   Weight: 183 lb (83 kg)   Height: 5' 11\" (1.803 m)     Objective:     I      NEUROLOGICAL EXAM:    Appearance: The patient is well developed, well nourished, provides a poor history but neat in appearance and cooperative. Mental Status: Oriented to place and person, and the president, but not the date, cognitive function is slow and mildly abnormal and speech is fluent and no aphasia or dysarthria. Mood and affect appropriate but appears a little depressed and slow. Cranial Nerves:   Intact visual fields. Fundi are benign, discs are flat no lesions seen on funduscopy. KARINA, EOM's full, no nystagmus, no ptosis. Facial sensation is normal. Corneal reflexes are not tested. Facial movement is symmetric. Hearing is abnormal bilaterally. Palate is midline with normal sternocleidomastoid and trapezius muscles are normal. Tongue is midline. Neck without meningismus or bruits  Temporal arteries are not tender or enlarged  TMJ areas are not tender on palpation   Motor:  4/5 strength in upper and lower proximal and distal muscles. Normal bulk and tone. No fasciculations.   Rapid altering movement is slow but symmetric   Reflexes:   Deep tendon reflexes 1+/4 and symmetrical.  No babinski or clonus present   Sensory:   Abnormal to touch, pinprick and vibration and temperature in both feet to midcalf level. DSS is intact   Gait:   Patient with slightly slow gait, but ambulates without assistive devices. Tremor:   No tremor noted. Cerebellar:  No cerebellar signs present on finger-nose-finger exam.  Mildly abnormal Romberg and tandem   Neurovascular:  Normal heart sounds and regular rhythm, peripheral pulses decreased, and no carotid bruits. Assessment:       ICD-10-CM ICD-9-CM    1. Bilateral carotid artery stenosis I65.23 433.10      433.30    2. Memory disturbance R41.3 780.93    3. Idiopathic small and large fiber sensory neuropathy G60.8 356.4    4. Memory deficit R41.3 780.93    5. Ataxia R27.0 781.3    6. 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 Dizziness and giddiness R42 780.4      Active Problems:    * No active hospital problems. *      Plan:     Patient with recurring syncope, most likely secondary to orthostatic hypotension, but not too symptomatic today but still mildly orthostatic blood pressures going from 140/90 sitting to 120/70 standing at about a minute to a minute and a half of standing. Patient asymptomatic then also. Patient encouraged that he needs to wear support hose to help his gait and continue his current medication which seem to be helping his gait. We encouraged him to remain hydrated and drink water to Gatorade every day in addition. The [facility] feels his medication so no refills given to patient. We advised the wife that he can certainly go up on his Mestinon to 1 whole tablet 3 times a day if needed. We will not give him anything for his memory yet, because of all of his problem with the orthostasis. However in the future he may be a good candidate for Namenda  Discussed with the patient and his wife in detail, they agree with plans and therapy.   40 minutes spent with them, going over his recent admissions to the hospital all his testing done, reviewing his MRI scans personally on the PACS system, reviewing his Dopplers, reviewing the neurology and cardiology and medicine notes, and discussing his diagnosis prognosis treatment options patient and the wife in detail. Advised him that neurologically he is stable, and that his dizziness and orthostasis is not a neurologic problem but more a cardiovascular problem. .  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 We will follow closely with you, and continue active medical care as you are. Follow-up in 3 to 6 months time or earlier as need be. Signed By: Jacque Mcknight MD     October 17, 2019       CC: Bernardino Garcia MD  FAX: [phone]     This note will not be viewable in [address]

## 2019-10-18 ENCOUNTER — HOME CARE VISIT (OUTPATIENT)
Dept: SCHEDULING | Facility: HOME HEALTH | Age: 74
End: 2019-10-18
Payer: MEDICARE

## 2019-10-18 PROCEDURE — G0151 HHCP-SERV OF PT,EA 15 MIN: HCPCS

## 2019-10-18 PROCEDURE — 3331090002 HH PPS REVENUE DEBIT

## 2019-10-18 PROCEDURE — 3331090001 HH PPS REVENUE CREDIT

## 2019-10-19 PROCEDURE — 3331090001 HH PPS REVENUE CREDIT

## 2019-10-19 PROCEDURE — 3331090002 HH PPS REVENUE DEBIT

## 2019-10-20 PROCEDURE — 3331090002 HH PPS REVENUE DEBIT

## 2019-10-20 PROCEDURE — 3331090001 HH PPS REVENUE CREDIT

## 2019-10-21 VITALS
HEART RATE: 89 BPM | OXYGEN SATURATION: 96 % | TEMPERATURE: 98.4 F | DIASTOLIC BLOOD PRESSURE: 73 MMHG | SYSTOLIC BLOOD PRESSURE: 105 MMHG

## 2019-10-21 PROCEDURE — 3331090001 HH PPS REVENUE CREDIT

## 2019-10-21 PROCEDURE — 3331090002 HH PPS REVENUE DEBIT

## 2019-10-22 ENCOUNTER — HOME CARE VISIT (OUTPATIENT)
Dept: SCHEDULING | Facility: HOME HEALTH | Age: 74
End: 2019-10-22
Payer: MEDICARE

## 2019-10-22 VITALS
HEART RATE: 95 BPM | OXYGEN SATURATION: 97 % | DIASTOLIC BLOOD PRESSURE: 90 MMHG | RESPIRATION RATE: 18 BRPM | SYSTOLIC BLOOD PRESSURE: 150 MMHG | TEMPERATURE: 98.5 F

## 2019-10-22 VITALS
OXYGEN SATURATION: 97 % | RESPIRATION RATE: 20 BRPM | HEART RATE: 96 BPM | DIASTOLIC BLOOD PRESSURE: 80 MMHG | SYSTOLIC BLOOD PRESSURE: 130 MMHG | TEMPERATURE: 98 F

## 2019-10-22 PROCEDURE — 3331090002 HH PPS REVENUE DEBIT

## 2019-10-22 PROCEDURE — G0156 HHCP-SVS OF AIDE,EA 15 MIN: HCPCS

## 2019-10-22 PROCEDURE — G0299 HHS/HOSPICE OF RN EA 15 MIN: HCPCS

## 2019-10-22 PROCEDURE — 3331090001 HH PPS REVENUE CREDIT

## 2019-10-23 ENCOUNTER — HOME CARE VISIT (OUTPATIENT)
Dept: SCHEDULING | Facility: HOME HEALTH | Age: 74
End: 2019-10-23
Payer: MEDICARE

## 2019-10-23 PROCEDURE — 3331090001 HH PPS REVENUE CREDIT

## 2019-10-23 PROCEDURE — 3331090002 HH PPS REVENUE DEBIT

## 2019-10-24 ENCOUNTER — HOME CARE VISIT (OUTPATIENT)
Dept: SCHEDULING | Facility: HOME HEALTH | Age: 74
End: 2019-10-24
Payer: MEDICARE

## 2019-10-24 VITALS
RESPIRATION RATE: 18 BRPM | OXYGEN SATURATION: 97 % | DIASTOLIC BLOOD PRESSURE: 100 MMHG | HEART RATE: 90 BPM | SYSTOLIC BLOOD PRESSURE: 142 MMHG | TEMPERATURE: 98.3 F

## 2019-10-24 PROCEDURE — G0156 HHCP-SVS OF AIDE,EA 15 MIN: HCPCS

## 2019-10-24 PROCEDURE — 3331090002 HH PPS REVENUE DEBIT

## 2019-10-24 PROCEDURE — G0299 HHS/HOSPICE OF RN EA 15 MIN: HCPCS

## 2019-10-24 PROCEDURE — 3331090001 HH PPS REVENUE CREDIT

## 2019-10-24 PROCEDURE — G0152 HHCP-SERV OF OT,EA 15 MIN: HCPCS

## 2019-10-25 ENCOUNTER — HOME CARE VISIT (OUTPATIENT)
Dept: SCHEDULING | Facility: HOME HEALTH | Age: 74
End: 2019-10-25
Payer: MEDICARE

## 2019-10-25 PROCEDURE — G0157 HHC PT ASSISTANT EA 15: HCPCS

## 2019-10-25 PROCEDURE — 3331090001 HH PPS REVENUE CREDIT

## 2019-10-25 PROCEDURE — 3331090002 HH PPS REVENUE DEBIT

## 2019-10-25 PROCEDURE — G0152 HHCP-SERV OF OT,EA 15 MIN: HCPCS

## 2019-10-26 PROCEDURE — 3331090001 HH PPS REVENUE CREDIT

## 2019-10-26 PROCEDURE — 3331090002 HH PPS REVENUE DEBIT

## 2019-10-27 VITALS
HEART RATE: 87 BPM | DIASTOLIC BLOOD PRESSURE: 64 MMHG | TEMPERATURE: 97.7 F | OXYGEN SATURATION: 97 % | SYSTOLIC BLOOD PRESSURE: 101 MMHG

## 2019-10-27 PROCEDURE — 3331090001 HH PPS REVENUE CREDIT

## 2019-10-27 PROCEDURE — 3331090002 HH PPS REVENUE DEBIT

## 2019-10-28 ENCOUNTER — HOME CARE VISIT (OUTPATIENT)
Dept: SCHEDULING | Facility: HOME HEALTH | Age: 74
End: 2019-10-28
Payer: MEDICARE

## 2019-10-28 VITALS
OXYGEN SATURATION: 92 % | DIASTOLIC BLOOD PRESSURE: 90 MMHG | RESPIRATION RATE: 18 BRPM | TEMPERATURE: 98.2 F | HEART RATE: 62 BPM | SYSTOLIC BLOOD PRESSURE: 152 MMHG

## 2019-10-28 VITALS
OXYGEN SATURATION: 95 % | TEMPERATURE: 98.9 F | HEART RATE: 87 BPM | TEMPERATURE: 97.7 F | OXYGEN SATURATION: 97 % | SYSTOLIC BLOOD PRESSURE: 135 MMHG | DIASTOLIC BLOOD PRESSURE: 75 MMHG | HEART RATE: 83 BPM | SYSTOLIC BLOOD PRESSURE: 101 MMHG | DIASTOLIC BLOOD PRESSURE: 64 MMHG

## 2019-10-28 PROCEDURE — 3331090001 HH PPS REVENUE CREDIT

## 2019-10-28 PROCEDURE — G0299 HHS/HOSPICE OF RN EA 15 MIN: HCPCS

## 2019-10-28 PROCEDURE — 3331090002 HH PPS REVENUE DEBIT

## 2019-10-29 ENCOUNTER — HOME CARE VISIT (OUTPATIENT)
Dept: SCHEDULING | Facility: HOME HEALTH | Age: 74
End: 2019-10-29
Payer: MEDICARE

## 2019-10-29 VITALS
SYSTOLIC BLOOD PRESSURE: 168 MMHG | OXYGEN SATURATION: 98 % | HEART RATE: 71 BPM | DIASTOLIC BLOOD PRESSURE: 98 MMHG | TEMPERATURE: 97.8 F

## 2019-10-29 PROCEDURE — 3331090002 HH PPS REVENUE DEBIT

## 2019-10-29 PROCEDURE — G0157 HHC PT ASSISTANT EA 15: HCPCS

## 2019-10-29 PROCEDURE — G0152 HHCP-SERV OF OT,EA 15 MIN: HCPCS

## 2019-10-29 PROCEDURE — 3331090001 HH PPS REVENUE CREDIT

## 2019-10-30 ENCOUNTER — HOME CARE VISIT (OUTPATIENT)
Dept: SCHEDULING | Facility: HOME HEALTH | Age: 74
End: 2019-10-30
Payer: MEDICARE

## 2019-10-30 VITALS
RESPIRATION RATE: 18 BRPM | SYSTOLIC BLOOD PRESSURE: 162 MMHG | TEMPERATURE: 97.9 F | HEART RATE: 80 BPM | DIASTOLIC BLOOD PRESSURE: 90 MMHG | OXYGEN SATURATION: 97 %

## 2019-10-30 VITALS
TEMPERATURE: 98.6 F | HEART RATE: 96 BPM | SYSTOLIC BLOOD PRESSURE: 122 MMHG | RESPIRATION RATE: 19 BRPM | OXYGEN SATURATION: 99 % | DIASTOLIC BLOOD PRESSURE: 80 MMHG

## 2019-10-30 VITALS
SYSTOLIC BLOOD PRESSURE: 150 MMHG | TEMPERATURE: 98.8 F | HEART RATE: 93 BPM | OXYGEN SATURATION: 97 % | DIASTOLIC BLOOD PRESSURE: 75 MMHG

## 2019-10-30 PROCEDURE — G0299 HHS/HOSPICE OF RN EA 15 MIN: HCPCS

## 2019-10-30 PROCEDURE — 3331090002 HH PPS REVENUE DEBIT

## 2019-10-30 PROCEDURE — 3331090001 HH PPS REVENUE CREDIT

## 2019-10-31 ENCOUNTER — HOME CARE VISIT (OUTPATIENT)
Dept: SCHEDULING | Facility: HOME HEALTH | Age: 74
End: 2019-10-31
Payer: MEDICARE

## 2019-10-31 VITALS
DIASTOLIC BLOOD PRESSURE: 70 MMHG | RESPIRATION RATE: 18 BRPM | OXYGEN SATURATION: 96 % | TEMPERATURE: 98.3 F | HEART RATE: 84 BPM | SYSTOLIC BLOOD PRESSURE: 110 MMHG

## 2019-10-31 VITALS
HEART RATE: 79 BPM | OXYGEN SATURATION: 95 % | DIASTOLIC BLOOD PRESSURE: 75 MMHG | SYSTOLIC BLOOD PRESSURE: 125 MMHG | TEMPERATURE: 98.8 F

## 2019-10-31 PROCEDURE — 3331090002 HH PPS REVENUE DEBIT

## 2019-10-31 PROCEDURE — G0156 HHCP-SVS OF AIDE,EA 15 MIN: HCPCS

## 2019-10-31 PROCEDURE — 3331090001 HH PPS REVENUE CREDIT

## 2019-10-31 PROCEDURE — G0152 HHCP-SERV OF OT,EA 15 MIN: HCPCS

## 2019-10-31 PROCEDURE — G0157 HHC PT ASSISTANT EA 15: HCPCS

## 2019-11-01 ENCOUNTER — PATIENT OUTREACH (OUTPATIENT)
Dept: INTERNAL MEDICINE CLINIC | Age: 74
End: 2019-11-01

## 2019-11-01 VITALS
DIASTOLIC BLOOD PRESSURE: 72 MMHG | HEART RATE: 95 BPM | OXYGEN SATURATION: 97 % | TEMPERATURE: 98.5 F | SYSTOLIC BLOOD PRESSURE: 116 MMHG

## 2019-11-01 PROCEDURE — 3331090002 HH PPS REVENUE DEBIT

## 2019-11-01 PROCEDURE — 3331090001 HH PPS REVENUE CREDIT

## 2019-11-01 NOTE — PROGRESS NOTES
Goals      Develop action plan for Self-Management Chronic Disease. 10/14 PHI encouraged to have pt.monitor B/P daily, monitor s/s orthostatic such dizziness, lightheadedness, fainting, unsteadiness, blurred vision, confusion, increased heart rate. CTN encourage to notify physician of sx and B/P  Sys <100., to have pt.lay flat with lower extremities elevated. PHI reports understanding. PHI reports pt.b/p today lowest reading 97/54 pt.without any s/s listed above, reports pt.wearing compression stocking as directed. CTN will f/u with pt.in 1-2 weeks. -[address]     11/1 Unable to contact [facility]. -[address] [place] [address] PCP relationships and regularly scheduled appointments. 10/14  Patient (PHI)  encouraged to attend  follow-up with PCP and specialist as directed, keep a list of pt. medications he take to bring to f/u appointments. CTN reminded pt.of upcoming appts: PHI reports has appt. at  (PCP office) with Claudio Costello NP on 10/23, will f/u with Edward Saldivar (Neurologist) 10/17 @ 9:40 am, Cardio Vascular Surg. 11/19 @ 11 am at [facility] office with Dr. Tres Crowley will f/u with pt.in 1-2 weeks. -[address]    11/1 Pt.has attend f/u with PCP and neurology, from chart pt.monitoring B/Ps, pt.last office visit with PCP on 10/21, CTN attempt to contact pt.,unable to reach, [facility]. -       Supportive resources in place to maintain patient in the community (ie., home health, equipment, DME, refer to, etc.)      10/14 Pt. lives at home with wife, whom very active and supportive of his care. PHI reports she schedule appts, and provide transportation. PHI reports [facility] eval. 10/13, pt.to receive PT/OT/SN. PHI concern pt.at home alone when she return to work. CTN encouraged PHI to discuss with physician her concerns pt.being at home alone. CTN provided PHI with information to [employer] [phone] ([employer] ext. [phone]) and [employer] [phone]. CTN contact information provided to call with any questions or concerns.  CTN will f/u with pt.in 1-2 weeks. -[address]    11/1 Pt.continues to receive [facility] services, PT/OT/SN/PCA, CTN attempt to contact, unable to reach. LVM. CTN will f/u with pt in 1 week. -[address]

## 2019-11-02 PROCEDURE — 3331090001 HH PPS REVENUE CREDIT

## 2019-11-02 PROCEDURE — 3331090002 HH PPS REVENUE DEBIT

## 2019-11-03 PROCEDURE — 3331090002 HH PPS REVENUE DEBIT

## 2019-11-03 PROCEDURE — 3331090001 HH PPS REVENUE CREDIT

## 2019-11-04 ENCOUNTER — HOME CARE VISIT (OUTPATIENT)
Dept: SCHEDULING | Facility: HOME HEALTH | Age: 74
End: 2019-11-04
Payer: MEDICARE

## 2019-11-04 VITALS
DIASTOLIC BLOOD PRESSURE: 70 MMHG | TEMPERATURE: 98.8 F | OXYGEN SATURATION: 96 % | HEART RATE: 92 BPM | RESPIRATION RATE: 16 BRPM | SYSTOLIC BLOOD PRESSURE: 102 MMHG

## 2019-11-04 PROCEDURE — 3331090002 HH PPS REVENUE DEBIT

## 2019-11-04 PROCEDURE — G0299 HHS/HOSPICE OF RN EA 15 MIN: HCPCS

## 2019-11-04 PROCEDURE — 3331090001 HH PPS REVENUE CREDIT

## 2019-11-05 ENCOUNTER — HOME CARE VISIT (OUTPATIENT)
Dept: SCHEDULING | Facility: HOME HEALTH | Age: 74
End: 2019-11-05
Payer: MEDICARE

## 2019-11-05 VITALS
DIASTOLIC BLOOD PRESSURE: 78 MMHG | HEART RATE: 80 BPM | SYSTOLIC BLOOD PRESSURE: 130 MMHG | TEMPERATURE: 98.6 F | OXYGEN SATURATION: 98 %

## 2019-11-05 VITALS
RESPIRATION RATE: 19 BRPM | SYSTOLIC BLOOD PRESSURE: 110 MMHG | HEART RATE: 88 BPM | OXYGEN SATURATION: 96 % | DIASTOLIC BLOOD PRESSURE: 80 MMHG | TEMPERATURE: 98.4 F

## 2019-11-05 PROCEDURE — 3331090001 HH PPS REVENUE CREDIT

## 2019-11-05 PROCEDURE — 3331090002 HH PPS REVENUE DEBIT

## 2019-11-05 PROCEDURE — G0156 HHCP-SVS OF AIDE,EA 15 MIN: HCPCS

## 2019-11-05 PROCEDURE — G0157 HHC PT ASSISTANT EA 15: HCPCS

## 2019-11-06 PROCEDURE — 3331090002 HH PPS REVENUE DEBIT

## 2019-11-06 PROCEDURE — 3331090001 HH PPS REVENUE CREDIT

## 2019-11-07 ENCOUNTER — HOME CARE VISIT (OUTPATIENT)
Dept: SCHEDULING | Facility: HOME HEALTH | Age: 74
End: 2019-11-07
Payer: MEDICARE

## 2019-11-07 VITALS
TEMPERATURE: 97.8 F | DIASTOLIC BLOOD PRESSURE: 70 MMHG | SYSTOLIC BLOOD PRESSURE: 116 MMHG | HEART RATE: 94 BPM | OXYGEN SATURATION: 95 %

## 2019-11-07 PROCEDURE — G0299 HHS/HOSPICE OF RN EA 15 MIN: HCPCS

## 2019-11-07 PROCEDURE — G0152 HHCP-SERV OF OT,EA 15 MIN: HCPCS

## 2019-11-07 PROCEDURE — 3331090001 HH PPS REVENUE CREDIT

## 2019-11-07 PROCEDURE — 3331090002 HH PPS REVENUE DEBIT

## 2019-11-07 PROCEDURE — G0156 HHCP-SVS OF AIDE,EA 15 MIN: HCPCS

## 2019-11-08 ENCOUNTER — HOME CARE VISIT (OUTPATIENT)
Dept: SCHEDULING | Facility: HOME HEALTH | Age: 74
End: 2019-11-08
Payer: MEDICARE

## 2019-11-08 VITALS
DIASTOLIC BLOOD PRESSURE: 72 MMHG | SYSTOLIC BLOOD PRESSURE: 103 MMHG | OXYGEN SATURATION: 99 % | HEART RATE: 84 BPM | TEMPERATURE: 98.9 F

## 2019-11-08 VITALS
SYSTOLIC BLOOD PRESSURE: 155 MMHG | RESPIRATION RATE: 18 BRPM | TEMPERATURE: 99.3 F | HEART RATE: 69 BPM | OXYGEN SATURATION: 97 % | DIASTOLIC BLOOD PRESSURE: 85 MMHG

## 2019-11-08 VITALS
TEMPERATURE: 97.8 F | SYSTOLIC BLOOD PRESSURE: 116 MMHG | DIASTOLIC BLOOD PRESSURE: 70 MMHG | OXYGEN SATURATION: 95 % | HEART RATE: 94 BPM

## 2019-11-08 PROCEDURE — 3331090002 HH PPS REVENUE DEBIT

## 2019-11-08 PROCEDURE — 3331090001 HH PPS REVENUE CREDIT

## 2019-11-08 PROCEDURE — G0151 HHCP-SERV OF PT,EA 15 MIN: HCPCS

## 2019-11-09 PROCEDURE — 3331090001 HH PPS REVENUE CREDIT

## 2019-11-09 PROCEDURE — 3331090002 HH PPS REVENUE DEBIT

## 2019-11-10 PROCEDURE — 3331090002 HH PPS REVENUE DEBIT

## 2019-11-10 PROCEDURE — 3331090001 HH PPS REVENUE CREDIT

## 2019-11-11 ENCOUNTER — HOME CARE VISIT (OUTPATIENT)
Dept: SCHEDULING | Facility: HOME HEALTH | Age: 74
End: 2019-11-11
Payer: MEDICARE

## 2019-11-11 PROCEDURE — G0299 HHS/HOSPICE OF RN EA 15 MIN: HCPCS

## 2019-11-11 PROCEDURE — 3331090002 HH PPS REVENUE DEBIT

## 2019-11-11 PROCEDURE — 3331090001 HH PPS REVENUE CREDIT

## 2019-11-12 VITALS
DIASTOLIC BLOOD PRESSURE: 65 MMHG | OXYGEN SATURATION: 94 % | HEART RATE: 84 BPM | RESPIRATION RATE: 20 BRPM | TEMPERATURE: 98.2 F | SYSTOLIC BLOOD PRESSURE: 110 MMHG

## 2019-11-12 PROCEDURE — 3331090001 HH PPS REVENUE CREDIT

## 2019-11-12 PROCEDURE — 3331090002 HH PPS REVENUE DEBIT

## 2019-11-13 ENCOUNTER — HOME CARE VISIT (OUTPATIENT)
Dept: SCHEDULING | Facility: HOME HEALTH | Age: 74
End: 2019-11-13
Payer: MEDICARE

## 2019-11-13 VITALS
SYSTOLIC BLOOD PRESSURE: 141 MMHG | DIASTOLIC BLOOD PRESSURE: 74 MMHG | TEMPERATURE: 98.7 F | HEART RATE: 79 BPM | OXYGEN SATURATION: 96 %

## 2019-11-13 PROCEDURE — 3331090002 HH PPS REVENUE DEBIT

## 2019-11-13 PROCEDURE — G0157 HHC PT ASSISTANT EA 15: HCPCS

## 2019-11-13 PROCEDURE — 3331090001 HH PPS REVENUE CREDIT

## 2019-11-14 ENCOUNTER — HOME CARE VISIT (OUTPATIENT)
Dept: SCHEDULING | Facility: HOME HEALTH | Age: 74
End: 2019-11-14
Payer: MEDICARE

## 2019-11-14 PROCEDURE — G0299 HHS/HOSPICE OF RN EA 15 MIN: HCPCS

## 2019-11-14 PROCEDURE — 3331090002 HH PPS REVENUE DEBIT

## 2019-11-14 PROCEDURE — 3331090001 HH PPS REVENUE CREDIT

## 2019-11-14 NOTE — Clinical Note
Dr. Garrison Lopez, 
I am concerned about Mr. Anastasiya Del Cid bp readings. May we have some parameters for acceptable orthostatic bp readings? Today (11/14/19) Mr. Anastasiya Del Cid supine bp was the highest it's ever been at 207/114, P-85/ sitting 148/78 P-78/ and standing 128/85 P-82. He was asymptomatic for headache, etc. You can view the other orthostatic bps on the flowsheet. His midodrine dose is 10mg with breakfast and lunch, 5mg with dinner. Also of note, he tells me he has been confused since discharge from the hospital.  He says it happens a lot during the day. Having trouble with time, situation, remembering instructions. He cannot independently manage his medications any more. I have asked his wife to get an appointment with the cardiologist since all this started after the addition of new cardiac meds. He also tells me he is depressed, lonely and feels hopeless. No suicidal/homicidal ideations. I have encouraged him to start his hobby of yardwork again, as long as he has no dizziness, shortness of breath, chest pain, etc.  I am hoping we can finish up with home health soon so he can go back out into the community to pursue interests. Thanks.

## 2019-11-15 ENCOUNTER — HOME CARE VISIT (OUTPATIENT)
Dept: SCHEDULING | Facility: HOME HEALTH | Age: 74
End: 2019-11-15
Payer: MEDICARE

## 2019-11-15 VITALS
OXYGEN SATURATION: 98 % | SYSTOLIC BLOOD PRESSURE: 116 MMHG | HEART RATE: 99 BPM | TEMPERATURE: 99.1 F | DIASTOLIC BLOOD PRESSURE: 67 MMHG

## 2019-11-15 VITALS
RESPIRATION RATE: 16 BRPM | OXYGEN SATURATION: 97 % | DIASTOLIC BLOOD PRESSURE: 85 MMHG | SYSTOLIC BLOOD PRESSURE: 128 MMHG | HEART RATE: 82 BPM | TEMPERATURE: 98.6 F

## 2019-11-15 PROCEDURE — 3331090002 HH PPS REVENUE DEBIT

## 2019-11-15 PROCEDURE — 3331090001 HH PPS REVENUE CREDIT

## 2019-11-15 PROCEDURE — G0157 HHC PT ASSISTANT EA 15: HCPCS

## 2019-11-15 NOTE — PROGRESS NOTES
Spoke with Beto Grossman at [facility] and she is going to make the [facility] nurse aware of Dr. Kendra finch

## 2019-11-15 NOTE — PROGRESS NOTES
As long as his systolic blood pressure is above 100 though should not be a problem.   As far as his elevated pressures when he is laying down that should be referred to the cardiologist.

## 2019-11-16 PROCEDURE — 3331090001 HH PPS REVENUE CREDIT

## 2019-11-16 PROCEDURE — 3331090002 HH PPS REVENUE DEBIT

## 2019-11-17 PROCEDURE — 3331090002 HH PPS REVENUE DEBIT

## 2019-11-17 PROCEDURE — 3331090001 HH PPS REVENUE CREDIT

## 2019-11-18 ENCOUNTER — HOME CARE VISIT (OUTPATIENT)
Dept: SCHEDULING | Facility: HOME HEALTH | Age: 74
End: 2019-11-18
Payer: MEDICARE

## 2019-11-18 VITALS
RESPIRATION RATE: 20 BRPM | TEMPERATURE: 99 F | SYSTOLIC BLOOD PRESSURE: 152 MMHG | DIASTOLIC BLOOD PRESSURE: 100 MMHG | HEART RATE: 75 BPM | OXYGEN SATURATION: 97 %

## 2019-11-18 PROCEDURE — 3331090001 HH PPS REVENUE CREDIT

## 2019-11-18 PROCEDURE — 3331090002 HH PPS REVENUE DEBIT

## 2019-11-18 PROCEDURE — G0299 HHS/HOSPICE OF RN EA 15 MIN: HCPCS

## 2019-11-19 ENCOUNTER — HOME CARE VISIT (OUTPATIENT)
Dept: SCHEDULING | Facility: HOME HEALTH | Age: 74
End: 2019-11-19
Payer: MEDICARE

## 2019-11-19 VITALS
DIASTOLIC BLOOD PRESSURE: 93 MMHG | TEMPERATURE: 98.1 F | OXYGEN SATURATION: 96 % | SYSTOLIC BLOOD PRESSURE: 142 MMHG | HEART RATE: 82 BPM

## 2019-11-19 PROCEDURE — 3331090001 HH PPS REVENUE CREDIT

## 2019-11-19 PROCEDURE — G0157 HHC PT ASSISTANT EA 15: HCPCS

## 2019-11-19 PROCEDURE — 3331090002 HH PPS REVENUE DEBIT

## 2019-11-20 PROCEDURE — 3331090001 HH PPS REVENUE CREDIT

## 2019-11-20 PROCEDURE — 3331090002 HH PPS REVENUE DEBIT

## 2019-11-21 ENCOUNTER — HOME CARE VISIT (OUTPATIENT)
Dept: SCHEDULING | Facility: HOME HEALTH | Age: 74
End: 2019-11-21
Payer: MEDICARE

## 2019-11-21 VITALS
DIASTOLIC BLOOD PRESSURE: 100 MMHG | TEMPERATURE: 97.7 F | SYSTOLIC BLOOD PRESSURE: 158 MMHG | OXYGEN SATURATION: 98 % | HEART RATE: 84 BPM | RESPIRATION RATE: 18 BRPM

## 2019-11-21 PROCEDURE — G0299 HHS/HOSPICE OF RN EA 15 MIN: HCPCS

## 2019-11-21 PROCEDURE — 3331090002 HH PPS REVENUE DEBIT

## 2019-11-21 PROCEDURE — 3331090001 HH PPS REVENUE CREDIT

## 2019-11-22 ENCOUNTER — HOME CARE VISIT (OUTPATIENT)
Dept: SCHEDULING | Facility: HOME HEALTH | Age: 74
End: 2019-11-22
Payer: MEDICARE

## 2019-11-22 VITALS
SYSTOLIC BLOOD PRESSURE: 158 MMHG | TEMPERATURE: 97.7 F | DIASTOLIC BLOOD PRESSURE: 80 MMHG | OXYGEN SATURATION: 95 % | HEART RATE: 113 BPM

## 2019-11-22 PROCEDURE — 3331090001 HH PPS REVENUE CREDIT

## 2019-11-22 PROCEDURE — 3331090002 HH PPS REVENUE DEBIT

## 2019-11-22 PROCEDURE — G0151 HHCP-SERV OF PT,EA 15 MIN: HCPCS

## 2019-11-22 NOTE — Clinical Note
Pt discharged from PT 11/22/19. Pt feels like he is ready to start outpatient therapy whenever he is discharged from nursing. He reports he has an appointment with Anni the first week of December to [facility] to them about therapy there. \"

## 2019-11-22 NOTE — Clinical Note
Dr Khari Santana,  Pt discharged from home health PT services 11/22/19. Pt will benefit from outpatient PT services for continued strength, balance, and gait training once discharged from home health nursing.

## 2019-11-23 PROCEDURE — 3331090001 HH PPS REVENUE CREDIT

## 2019-11-23 PROCEDURE — 3331090002 HH PPS REVENUE DEBIT

## 2019-11-24 PROCEDURE — 3331090001 HH PPS REVENUE CREDIT

## 2019-11-24 PROCEDURE — 3331090002 HH PPS REVENUE DEBIT

## 2019-11-25 PROCEDURE — 3331090001 HH PPS REVENUE CREDIT

## 2019-11-25 PROCEDURE — 3331090002 HH PPS REVENUE DEBIT

## 2019-11-26 ENCOUNTER — HOME CARE VISIT (OUTPATIENT)
Dept: SCHEDULING | Facility: HOME HEALTH | Age: 74
End: 2019-11-26
Payer: MEDICARE

## 2019-11-26 PROCEDURE — G0299 HHS/HOSPICE OF RN EA 15 MIN: HCPCS

## 2019-11-26 PROCEDURE — 3331090002 HH PPS REVENUE DEBIT

## 2019-11-26 PROCEDURE — 3331090001 HH PPS REVENUE CREDIT

## 2019-11-27 VITALS
RESPIRATION RATE: 18 BRPM | DIASTOLIC BLOOD PRESSURE: 102 MMHG | HEART RATE: 91 BPM | OXYGEN SATURATION: 97 % | TEMPERATURE: 97.9 F | SYSTOLIC BLOOD PRESSURE: 180 MMHG

## 2019-11-27 PROCEDURE — 3331090002 HH PPS REVENUE DEBIT

## 2019-11-27 PROCEDURE — 3331090001 HH PPS REVENUE CREDIT

## 2019-11-28 PROCEDURE — 3331090001 HH PPS REVENUE CREDIT

## 2019-11-28 PROCEDURE — 3331090002 HH PPS REVENUE DEBIT

## 2019-11-29 ENCOUNTER — HOME CARE VISIT (OUTPATIENT)
Dept: SCHEDULING | Facility: HOME HEALTH | Age: 74
End: 2019-11-29
Payer: MEDICARE

## 2019-11-29 VITALS
OXYGEN SATURATION: 95 % | HEART RATE: 84 BPM | SYSTOLIC BLOOD PRESSURE: 90 MMHG | TEMPERATURE: 97.3 F | RESPIRATION RATE: 16 BRPM | DIASTOLIC BLOOD PRESSURE: 60 MMHG

## 2019-11-29 PROCEDURE — 3331090001 HH PPS REVENUE CREDIT

## 2019-11-29 PROCEDURE — 3331090002 HH PPS REVENUE DEBIT

## 2019-11-29 PROCEDURE — G0299 HHS/HOSPICE OF RN EA 15 MIN: HCPCS

## 2019-11-30 PROCEDURE — 3331090001 HH PPS REVENUE CREDIT

## 2019-11-30 PROCEDURE — 3331090002 HH PPS REVENUE DEBIT

## 2019-12-01 PROCEDURE — 3331090002 HH PPS REVENUE DEBIT

## 2019-12-01 PROCEDURE — 3331090001 HH PPS REVENUE CREDIT

## 2019-12-02 ENCOUNTER — HOME CARE VISIT (OUTPATIENT)
Dept: SCHEDULING | Facility: HOME HEALTH | Age: 74
End: 2019-12-02
Payer: MEDICARE

## 2019-12-02 VITALS
RESPIRATION RATE: 18 BRPM | SYSTOLIC BLOOD PRESSURE: 180 MMHG | TEMPERATURE: 98.7 F | DIASTOLIC BLOOD PRESSURE: 100 MMHG | HEART RATE: 88 BPM | OXYGEN SATURATION: 98 %

## 2019-12-02 PROCEDURE — 3331090001 HH PPS REVENUE CREDIT

## 2019-12-02 PROCEDURE — G0299 HHS/HOSPICE OF RN EA 15 MIN: HCPCS

## 2019-12-02 PROCEDURE — 3331090002 HH PPS REVENUE DEBIT

## 2019-12-03 PROCEDURE — 3331090001 HH PPS REVENUE CREDIT

## 2019-12-03 PROCEDURE — 3331090002 HH PPS REVENUE DEBIT

## 2019-12-04 ENCOUNTER — HOME CARE VISIT (OUTPATIENT)
Dept: SCHEDULING | Facility: HOME HEALTH | Age: 74
End: 2019-12-04
Payer: MEDICARE

## 2019-12-04 VITALS
OXYGEN SATURATION: 96 % | SYSTOLIC BLOOD PRESSURE: 152 MMHG | RESPIRATION RATE: 18 BRPM | HEART RATE: 63 BPM | TEMPERATURE: 97.3 F | DIASTOLIC BLOOD PRESSURE: 94 MMHG

## 2019-12-04 PROCEDURE — 3331090001 HH PPS REVENUE CREDIT

## 2019-12-04 PROCEDURE — 3331090002 HH PPS REVENUE DEBIT

## 2019-12-04 PROCEDURE — 3331090003 HH PPS REVENUE ADJ

## 2019-12-04 PROCEDURE — G0299 HHS/HOSPICE OF RN EA 15 MIN: HCPCS

## 2020-03-05 ENCOUNTER — OFFICE VISIT (OUTPATIENT)
Dept: SLEEP MEDICINE | Age: 75
End: 2020-03-05

## 2020-03-05 VITALS
OXYGEN SATURATION: 92 % | DIASTOLIC BLOOD PRESSURE: 58 MMHG | HEART RATE: 70 BPM | SYSTOLIC BLOOD PRESSURE: 110 MMHG | WEIGHT: 182 LBS | BODY MASS INDEX: 25.48 KG/M2 | HEIGHT: 71 IN | TEMPERATURE: 96.9 F

## 2020-03-05 DIAGNOSIS — I49.9 CARDIAC ARRHYTHMIA, UNSPECIFIED CARDIAC ARRHYTHMIA TYPE: ICD-10-CM

## 2020-03-05 DIAGNOSIS — R41.3 MEMORY DEFICIT: ICD-10-CM

## 2020-03-05 DIAGNOSIS — G47.33 OSA (OBSTRUCTIVE SLEEP APNEA): Primary | ICD-10-CM

## 2020-03-05 NOTE — PATIENT INSTRUCTIONS
[address] [place]. [place], [address]  Tel.  [phone]  Fax. [address] [facility] [address]  [place], [address] [facility]  Tel.  [phone]  Fax. [phone] [address]  Tel.  [phone]  Fax. [phone]     Sleep Apnea: After Your Visit  Your Care Instructions  Sleep apnea occurs when you frequently stop breathing for 10 seconds or longer during sleep. It can be mild to severe, based on the number of times per hour that you stop breathing or have slowed breathing. Blocked or narrowed airways in your nose, mouth, or throat can cause sleep apnea. Your airway can become blocked when your throat muscles and tongue relax during sleep. Sleep apnea is common, occurring in 1 out of 20 individuals. Individuals having any of the following characteristics should be evaluated and treated right away due to high risk and detrimental consequences from untreated sleep apnea:  1. Obesity  2. Congestive Heart failure  3. Atrial Fibrillation  4. Uncontrolled Hypertension  5. Type II Diabetes  6. Night-time Arrhythmias  7. Stroke  8. Pulmonary Hypertension  9. High-risk Driving Populations (pilots, truck drivers, etc.)  10. Patients Considering Weight-loss Surgery    How do you know you have sleep apnea? You probably have sleep apnea if you answer 'yes' to 3 or more of the following questions:  S - Have you been told that you Snore? T - Are you often Tired during the day? O - Has anyone Observed you stop breathing while sleeping? P- Do you have (or are being treated for) high blood Pressure? B - Are you obese (Body Mass Index > 35)? A - Is your Age 48years old or older? N - Is your Neck size greater than 16 inches? G - Are you male Gender? A sleep physician can prescribe a breathing device that prevents tissues in the throat from blocking your airway.  Or your doctor may recommend using a dental device (oral breathing device) to help keep your airway open. In some cases, surgery may be needed to remove enlarged tissues in the throat. Follow-up care is a key part of your treatment and safety. Be sure to make and go to all appointments, and call your doctor if you are having problems. It's also a good idea to know your test results and keep a list of the medicines you take. How can you care for yourself at home? · Lose weight, if needed. It may reduce the number of times you stop breathing or have slowed breathing. · Go to bed at the same time every night. · Sleep on your side. It may stop mild apnea. If you tend to roll onto your back, sew a pocket in the back of your pajama top. Put a tennis ball into the pocket, and stitch the pocket shut. This will help keep you from sleeping on your back. · Avoid alcohol and medicines such as sleeping pills and sedatives before bed. · Do not smoke. Smoking can make sleep apnea worse. If you need help quitting, talk to your doctor about stop-smoking programs and medicines. These can increase your chances of quitting for good. · Prop up the head of your bed 4 to 6 inches by putting bricks under the legs of the bed. · Treat breathing problems, such as a stuffy nose, caused by a cold or allergies. · Use a continuous positive airway pressure (CPAP) breathing machine if lifestyle changes do not help your apnea and your doctor recommends it. The machine keeps your airway from closing when you sleep. · If CPAP does not help you, ask your doctor whether you should try other breathing machines. A bilevel positive airway pressure machine has two types of air pressureâone for breathing in and one for breathing out. Another device raises or lowers air pressure as needed while you breathe. · If your nose feels dry or bleeds when using one of these machines, talk with your doctor about increasing moisture in the air. A humidifier may help.   · If your nose is runny or stuffy from using a breathing machine, talk with your doctor about using decongestants or a corticosteroid nasal spray. When should you call for help? Watch closely for changes in your health, and be sure to contact your doctor if:  · You still have sleep apnea even though you have made lifestyle changes. · You are thinking of trying a device such as CPAP. · You are having problems using a CPAP or similar machine. Where can you learn more? Go to [employer]. Enter W978 in the search box to learn more about \"Sleep Apnea: After Your Visit. \"   © 3981-8278 Healthwise, Incorporated. Care instructions adapted under license by [facility] [employer] (which disclaims liability or warranty for this information). This care instruction is for use with your licensed healthcare professional. If you have questions about a medical condition or this instruction, always ask your healthcare professional. Christy Ohm any warranty or liability for your use of this information. PROPER SLEEP HYGIENE    What to avoid  · Do not have drinks with caffeine, such as coffee or black tea, for 8 hours before bed. · Do not smoke or use other types of tobacco near bedtime. Nicotine is a stimulant and can keep you awake. · Avoid drinking alcohol late in the evening, because it can cause you to wake in the middle of the night. · Do not eat a big meal close to bedtime. If you are hungry, eat a light snack. · Do not drink a lot of water close to bedtime, because the need to urinate may wake you up during the night. · Do not read or watch TV in bed. Use the bed only for sleeping and sexual activity. What to try  · Go to bed at the same time every night, and wake up at the same time every morning. Do not take naps during the day. · Keep your bedroom quiet, dark, and cool. · Get regular exercise, but not within 3 to 4 hours of your bedtime. .  · Sleep on a comfortable pillow and mattress.   · If watching the clock makes you anxious, turn it facing away from you so you cannot see the time. · If you worry when you lie down, start a worry book. Well before bedtime, write down your worries, and then set the book and your concerns aside. · Try meditation or other relaxation techniques before you go to bed. · If you cannot fall asleep, get up and go to another room until you feel sleepy. Do something relaxing. Repeat your bedtime routine before you go to bed again. · Make your house quiet and calm about an hour before bedtime. Turn down the lights, turn off the TV, log off the computer, and turn down the volume on music. This can help you relax after a busy day. Drowsy Driving  The [address] [place] [address] Road Traffic Safety Administration cites drowsiness as a causing factor in more than 295,737 police reported crashes annually, resulting in 76,000 injuries and 1,500 deaths. Other surveys suggest 55% of people polled have driven while drowsy in the past year, 23% had fallen asleep but not crashed, 3% crashed, and 2% had and accident due to drowsy driving. Who is at risk? Young Drivers: One study of drowsy driving accidents states that 55% of the drivers were under 25 years. Of those, 75% were male. Shift Workers and Travelers: People who work overnight or travel across time zones frequently are at higher risk of experiencing Circadian Rhythm Disorders. They are trying to work and function when their body is programed to sleep. Sleep Deprived: Lack of sleep has a serious impact on your ability to pay attention or focus on a task. Consistently getting less than the average of 8 hours your body needs creates partial or cumulative sleep deprivation. Untreated Sleep Disorders: Sleep Apnea, Narcolepsy, R.L.S., and other sleep disorders (untreated) prevent a person from getting enough restful sleep. This leads to excessive daytime sleepiness and increases the risk for drowsy driving accidents by up to 7 times.   Medications / Alcohol: Even over the counter medications can cause drowsiness. Medications that impair a drivers attention should have a warning label. Alcohol naturally makes you sleepy and on its own can cause accidents. Combined with excessive drowsiness its effects are amplified. Signs of Drowsy Driving:   * You don't remember driving the last few miles   * You may drift out of your ryder   * You are unable to focus and your thoughts wander   * You may yawn more often than normal   * You have difficulty keeping your eyes open / nodding off   * Missing traffic signs, speeding, or tailgating  Prevention-   Good sleep hygiene, lifestyle and behavioral choices have the most impact on drowsy driving. There is no substitute for sleep and the average person requires 8 hours nightly. If you find yourself driving drowsy, stop and sleep. Consider the sleep hygiene tips provided during your visit as well. Medication Refill Policy: Refills for all medications require 1 week advance notice. Please have your pharmacy fax a refill request. We are unable to fax, or call in \"controled substance\" medications and you will need to pick these prescriptions up from our office. Savision Activation    Thank you for requesting access to Savision. Please follow the instructions below to securely access and download your online medical record. Savision allows you to send messages to your doctor, view your test results, renew your prescriptions, schedule appointments, and more. How Do I Sign Up? 1. In your internet browser, go to [email or web address]. 2. Click on the First Time User? Click Here link in the Sign In box. You will see the New Member Sign Up page. 3. Enter your Savision Access Code exactly as it appears below. You will not need to use this code after youve completed the sign-up process. If you do not sign up before the expiration date, you must request a new code. Savision Access Code:  Activation code not generated  Current Savision Status: Active (This is the date your Memetales access code will )    4. Enter the last four digits of your Social Security Number (xxxx) and Date of Birth (mm/dd/yyyy) as indicated and click Submit. You will be taken to the next sign-up page. 5. Create a Audematt ID. This will be your Memetales login ID and cannot be changed, so think of one that is secure and easy to remember. 6. Create a Memetales password. You can change your password at any time. 7. Enter your Password Reset Question and Answer. This can be used at a later time if you forget your password. 8. Enter your e-mail address. You will receive e-mail notification when new information is available in [address] 9. Click Sign Up. You can now view and download portions of your medical record. 10. Click the Download Summary menu link to download a portable copy of your medical information. Additional Information    If you have questions, please call [phone]. Remember, Memetales is NOT to be used for urgent needs. For medical emergencies, dial 911.

## 2020-03-05 NOTE — PROGRESS NOTES
[address] [facility]. [place], [address]   Tel.  [phone]   Fax. 5[address] [facility]   [place], [address] [facility]   Tel.  [phone]   Fax. [phone] [address]   Tel.  [phone]   Fax. [phone]       Chief Complaint:      Chief Complaint   Patient presents with    Witnessed Apnea       Subjective:     Faustino Salinas is a 69y.o. year old male referred by Karie Sifuentes for evaluation of a sleep disorder. He is a retired . He is joined today by his wife. he reports he has experienced excessive daytime sleepiness for a number of year (s) and it has worsened. The sleepiness is described as being severe, he has been taking naps during the day. He notes that he experiences fatigue when driving, riding as a passenger, seated and inactive, reading, in conversation, at work, at [employer]. The severity of the day time fatigue has impacted the ability to function normally during the day. He reports he has been snoring for a number of years and his snoring has stayed the same. The snoring is exacerbated by sleeping supine. Faustino Salinas has noted problems with concentration and memory, he reports he has experienced non-restorative sleep, early morning headaches, sleep paralysis, witnessed apnea, waking with gasping/choking. His bed partner notes that he will act out dreams, kicking legs, and talking in sleep. The patient notes he retires at 10 pm and awakens at 8-10 am and that he will experience frequent awakening from sleep. In general he is able to return to sleep after awakening, he tends to awaken with an alarm. The patient has not undergone diagnostic testing for the current problems. Review of Systems:  Constitutional:  No significant weight loss or weight gain. Eyes:  No blurred vision.   CVS:  No significant chest pain  Pulm:  No significant shortness of breath  GI:  No significant nausea or vomiting  :  No significant nocturia  Musculoskeletal:  No significant joint pain at night  Skin:  No significant rashes  Neuro:  No significant dizziness   Psych:  No active mood issues    Sleep Review of Systems: notable for Negative difficulty falling asleep; Positive perceived regular dreaming; Positive nightmares; negative heartburn; Positive caffeine;  Negative alcohol; Negative history of any automobile or occupational accidents due to daytime drowsiness. No Known Allergies    He has a current medication list which includes the following prescription(s): clonazepam, pyridostigmine bromide, gabapentin, fludrocortisone, pyridostigmine, docusate sodium, buspirone, duloxetine, finasteride, midodrine, atorvastatin, ipratropium-albuterol, acetaminophen 500 mg tab 500 mg, diphenhydrAMINE 25 mg cap 25 mg, acetaminophen, aspirin, ipratropium, and guaifenesin er. .      He  has a past medical history of Anxiety, Back pain, Blurred vision, Chest pain, Depression, Dizziness, Dysautonomia (HCC), Fast heart beat, Frequent headaches, Frequent urination, Hip dysplasia, congenital, Hypotension, Joint pain, Joint swelling, Lumbar spinal stenosis, Muscle pain, Neuropathy, Orthostatic hypotension, Pacemaker, Recent change in weight, Sinus problem, Skin disease, SOB (shortness of breath), Sore throat, SSS (sick sinus syndrome) (HCC), Stiffness of joints, multiple sites, Stress, Syncope, Tiredness, Trouble in sleeping, and Weakness. Parks Sleepiness Score: 21 and Modified F.O.S.Q. Score Total / 2: 11.5   which reflect severe daytime sleepiness. Objective:     Visit Vitals  /58   Pulse 70   Temp 96.9 °F (36.1 °C)   Ht 5' 11\" (1.803 m)   Wt 182 lb (82.6 kg)   SpO2 92%   BMI 25.38 kg/m²    Neck circ.  in \"inches\": 17    General:   Alert, oriented, not in acute distress   Eyes:  Anicteric Sclerae; intact EOM's   Nose:  No obvious nasal septum deviation    Oropharynx:   Mallampati score 4, thick tongue base, uvula not seen due to low-lying soft palate, narrow tonsilo-pharyngeal pilars, tongue scalloped   Neck:   midline trachea,  no JVD   Chest/Lungs:  symmetrical lung expansion ,clear lung fields on auscultation    CVS:  Irregular rate, rhythm    Extremities:  No obvious rashes, absent edema    Neuro:  No focal deficits; No obvious tremor    Psych:  Normal eye contact; normal  affect, normal countenance        Assessment:       ICD-10-CM ICD-9-CM    1. MACIE (obstructive sleep apnea) G47.33 327.23 POLYSOMNOGRAPHY 1 NIGHT   2. Cardiac arrhythmia, unspecified cardiac arrhythmia type I49.9 427.9    3. Memory deficit R41.3 780.93    4. Adult BMI 25.0-25.9 kg/sq m Z68.25 V85.21        Patient has a history and examination consistent with the diagnosis of sleep apnea. Plan:     Follow-up and Dispositions    · Return if symptoms worsen or fail to improve. * The patient currently has a High Risk for having sleep apnea. STOP-BANG score 6.    * Sleep testing was ordered for initial evaluation. Orders Placed This Encounter    POLYSOMNOGRAPHY 1 NIGHT     Patient has history of hypotension that is known and managed. He has some gait and balance limitations but is able to transfer and should not need to be a 1:1     Standing Status:   Future     Standing Expiration Date:   9/5/2020     Scheduling Instructions:      Please route to Dr. Alexandria Caldera for interp. Order Specific Question:   Reason for Exam     Answer:   MACIE, witnessed apnea       * He was provided information on sleep apnea including corresponding risk factors and the importance of proper treatment. * Treatment options were reviewed in detail. he would like to proceed with PAP therapy. Patient will be seen in follow-up in 6-8 weeks after PAP setup to gauge treatment response and adherence to therapy.      * The patient was counseled regarding proper sleep hygiene, with emphasis on ensuring sufficient total sleep time; safe driving and the benefits of exercise and weight loss. * All of his questions were addressed. 2.  Arrhythmia -   continue on his current regimen. I have reviewed the relationship between heart arrhythmias and sleep disordered breathing. 3. Memory Deficit -  continue on his current regimen. I have reviewed the relationship between nuerological health and sleep disordered breathing. 4. Encouraged continued weight management program through appropriate diet and exercise regimen as significant weight reduction has been shown to reduce severity of obstructive sleep apnea. He goes to PT for gait and     Patient's phone number [phone] and  [phone]  was reviewed and confirmed for accuracy. He gives permission for messages regarding results and appointments to be left at that number. ANEL Jarvis-BC, [address] [place] [address]  Electronically signed.  03/05/20

## 2020-03-25 ENCOUNTER — TELEPHONE (OUTPATIENT)
Dept: SLEEP MEDICINE | Age: 75
End: 2020-03-25

## 2020-03-25 DIAGNOSIS — G47.33 OSA (OBSTRUCTIVE SLEEP APNEA): Primary | ICD-10-CM

## 2020-03-25 NOTE — TELEPHONE ENCOUNTER
Cheryl Up is to be contacted by lead sleep technologist regarding results of Sleep Testing which was indicative of an average AHI of 33.5 per hour with an SpO2 william of 79% and SpO2 of < 88% being 104 minutes. An APAP prescription has been written and patient will be contacted by office staff regarding follow-up  in 2-3 months after initiation of therapy. Encounter Diagnosis   Name Primary?  MACIE (obstructive sleep apnea) Yes       Orders Placed This Encounter    AMB SUPPLY ORDER     Diagnosis: (G47.39) MSA (mixed sleep apnea)  (primary encounter diagnosis)     Respironics DreamStation ( Unit - Auto Mode) / Heated Humidifier :    Positive Airway Pressure Therapy: Duration of need: 99 months. Auto - PAP: 4 - 20 cmH2O; Optistart enabled. Ramp Time: 30 Minutes; Flex: 2. Remote monitoring enrollment.  Nasal Cushion (Replace) 2 per month.  Nasal Interface Mask 1 every 3 months.  Headgear 1 every 6 months.  Filter(s) Disposable 2 per month.  Filter(s) Non-Disposable 1 every 6 months. [address] for [employer] (Replace) 1 every 6 months.  Tubing with heating element 1 every 3 months. Perform Mask Fitting per patient preference and comfort - replace as above. * PAP card download in 1 week after set-up send download to the Ms. Hui's attention. Mercedes Quick MD, [facility]; NPI: [id number]  Electronically signed. 03/25/20

## 2020-03-27 ENCOUNTER — DOCUMENTATION ONLY (OUTPATIENT)
Dept: SLEEP MEDICINE | Age: 75
End: 2020-03-27

## 2020-03-27 ENCOUNTER — TELEPHONE (OUTPATIENT)
Dept: SLEEP MEDICINE | Age: 75
End: 2020-03-27

## 2020-03-27 NOTE — PROGRESS NOTES
Spoke with Laurie who states they give out their own PAPs at [address] [place] [address] and to fax over everything to them. Faxed PAP order to medical equipment company.  ([phone])

## 2020-03-27 NOTE — TELEPHONE ENCOUNTER
Reviewed sleep study results with  Mejia post PHI review. She  expressed understanding and Mr. Eda House is willing to proceed with a trial of APAP.

## 2020-04-13 ENCOUNTER — TELEPHONE (OUTPATIENT)
Dept: SLEEP MEDICINE | Age: 75
End: 2020-04-13

## 2020-04-13 NOTE — TELEPHONE ENCOUNTER
Abhi Mead called from the [facility], [address] [facility], stated she didn't see the sleep study results done on 3/17/2020,  Directed her to the Media and found sleep study Results

## 2020-06-29 ENCOUNTER — DOCUMENTATION ONLY (OUTPATIENT)
Dept: SLEEP MEDICINE | Age: 75
End: 2020-06-29

## 2020-08-12 PROBLEM — R55 SYNCOPE: Status: RESOLVED | Noted: 2019-09-29 | Resolved: 2020-08-12

## 2020-08-12 PROBLEM — R41.3 MEMORY DEFICIT: Status: RESOLVED | Noted: 2018-09-11 | Resolved: 2020-08-12

## 2020-08-12 PROBLEM — R41.82 ALTERED MENTAL STATUS, UNSPECIFIED: Status: RESOLVED | Noted: 2018-11-09 | Resolved: 2020-08-12

## 2020-08-12 PROBLEM — R42 DIZZINESS AND GIDDINESS: Status: RESOLVED | Noted: 2019-10-17 | Resolved: 2020-08-12

## 2020-08-12 PROBLEM — R50.9 FEVER: Status: RESOLVED | Noted: 2018-11-09 | Resolved: 2020-08-12

## 2020-08-12 PROBLEM — L60.0 INGROWN LEFT BIG TOENAIL: Status: RESOLVED | Noted: 2017-03-31 | Resolved: 2020-08-12

## 2020-08-12 PROBLEM — Z87.898 HX OF SYNCOPE: Status: RESOLVED | Noted: 2018-10-24 | Resolved: 2020-08-12

## 2020-08-12 PROBLEM — R27.8 SENSORY ATAXIA: Status: RESOLVED | Noted: 2018-11-09 | Resolved: 2020-08-12

## 2020-08-12 PROBLEM — R27.0 ATAXIA: Status: RESOLVED | Noted: 2018-11-09 | Resolved: 2020-08-12

## 2020-08-12 PROBLEM — R00.1 BRADYCARDIA: Status: RESOLVED | Noted: 2018-10-02 | Resolved: 2020-08-12

## 2020-08-12 PROBLEM — R26.89 STUMBLING GAIT: Status: RESOLVED | Noted: 2018-11-08 | Resolved: 2020-08-12

## 2020-08-12 PROBLEM — F17.200 SMOKER: Status: RESOLVED | Noted: 2018-01-18 | Resolved: 2020-08-12

## 2020-11-16 ENCOUNTER — TELEPHONE (OUTPATIENT)
Dept: SLEEP MEDICINE | Age: 75
End: 2020-11-16

## 2020-11-16 NOTE — TELEPHONE ENCOUNTER
Patient's spouse Greg Clemons (ok per PHI) LVM in regards to [facility], return call spoke with spouse who states letter that Dr. Darryl Guadarrama needs to be revised. She sent a copy of a [employer] Sample Letter through [employer] and states that the letter has to be revised as such, stating that the diagnosis of sleep apnea is connected to patient PTSD from his [facility] in [facility] (must include [facility]). Sample of letter scanned in media and routed to Dr. Darryl Guadarrama. Patient will need letter by Thursday 11/19/2020 for [employer].

## 2020-11-23 ENCOUNTER — DOCUMENTATION ONLY (OUTPATIENT)
Dept: SLEEP MEDICINE | Age: 75
End: 2020-11-23

## 2020-11-24 NOTE — PROGRESS NOTES
Called patient and spoke with spouse [facility] (ok per [address]) informed letter has been rewritten by Dr. Darryl Guadarrama and has been ready since 11/19/2020 and a message was left. Spouse states has been busy with jerome in her area and hasn't checked messages.  Per verbal consent and paper consent form emailed copy of revised letter to patient

## 2020-12-01 PROBLEM — G62.9 NEUROPATHY: Status: RESOLVED | Noted: 2018-09-11 | Resolved: 2020-12-01

## 2020-12-22 ENCOUNTER — TELEPHONE (OUTPATIENT)
Dept: SLEEP MEDICINE | Age: 75
End: 2020-12-22

## 2020-12-22 NOTE — TELEPHONE ENCOUNTER
Called patient's wife back regarding letter request.  Dr. Davide Bowser has provided a letter documenting patient's severe obstructive sleep apnea and likelihood that the PTSD connected with his [employer] service has increased the prevalence of his obstructive sleep apnea. The [employer] letter that is being requested per the sample provided by the patient requests assessment that the cause of sleep apnea is specific [employer] service. We are unable to provide additional evidence that the patient sleep apnea was caused by service beyond his associated PTSD diagnosis. Left voicemail.     Mazin Montgomery NP, [facility]  12/22/20

## 2021-01-14 ENCOUNTER — OFFICE VISIT (OUTPATIENT)
Dept: PRIMARY CARE CLINIC | Age: 76
End: 2021-01-14

## 2021-01-14 DIAGNOSIS — Z20.822 EXPOSURE TO COVID-19 VIRUS: Primary | ICD-10-CM

## 2021-01-14 NOTE — PROGRESS NOTES
Pt presents to the flu clinic today requesting a covid test. Pt denies symptoms but has had a possible exposure. Pt gave verbal consent for testing.

## 2021-01-16 LAB — SARS-COV-2, NAA: NOT DETECTED

## 2021-03-31 ENCOUNTER — OFFICE VISIT (OUTPATIENT)
Dept: FAMILY MEDICINE CLINIC | Age: 76
End: 2021-03-31
Payer: COMMERCIAL

## 2021-03-31 VITALS
DIASTOLIC BLOOD PRESSURE: 75 MMHG | HEIGHT: 71 IN | TEMPERATURE: 96.7 F | OXYGEN SATURATION: 100 % | BODY MASS INDEX: 24.08 KG/M2 | RESPIRATION RATE: 18 BRPM | WEIGHT: 172 LBS | HEART RATE: 84 BPM | SYSTOLIC BLOOD PRESSURE: 139 MMHG

## 2021-03-31 DIAGNOSIS — F41.1 ANXIETY AS ACUTE REACTION TO EXCEPTIONAL STRESS: ICD-10-CM

## 2021-03-31 DIAGNOSIS — F43.0 ANXIETY AS ACUTE REACTION TO EXCEPTIONAL STRESS: ICD-10-CM

## 2021-03-31 DIAGNOSIS — G62.9 NEUROPATHY: ICD-10-CM

## 2021-03-31 DIAGNOSIS — F43.10 PTSD (POST-TRAUMATIC STRESS DISORDER): ICD-10-CM

## 2021-03-31 DIAGNOSIS — R41.3 MEMORY DISTURBANCE: ICD-10-CM

## 2021-03-31 DIAGNOSIS — F10.21 ALCOHOLISM IN REMISSION (HCC): Primary | ICD-10-CM

## 2021-03-31 PROCEDURE — 99214 OFFICE O/P EST MOD 30 MIN: CPT | Performed by: FAMILY MEDICINE

## 2021-03-31 RX ORDER — CLONAZEPAM 0.5 MG/1
0.5 TABLET ORAL
Qty: 90 TAB | Refills: 0 | Status: SHIPPED | OUTPATIENT
Start: 2021-03-31 | End: 2021-09-16 | Stop reason: SDUPTHER

## 2021-03-31 RX ORDER — GABAPENTIN 600 MG/1
300 TABLET ORAL 3 TIMES DAILY
Qty: 135 TAB | Refills: 0 | Status: SHIPPED | OUTPATIENT
Start: 2021-03-31

## 2021-03-31 NOTE — PROGRESS NOTES
Brenda Neumann is a 77 y.o. male who presents with the following:  Chief Complaint   Patient presents with    Depression    Anxiety       The patient has a great deal of anxiety as his wife has developed a second primary breast cancer and cannot have radiation because of her previous exposures for treatment of the first lesion and the plan is for her to undergo bilateral mastectomies and bilateral oophorectomies to treat this. The patient's alcoholism is in remission but he does have memory disturbance and he is concerned about trying to care for his wife after she comes home from the procedures. Patient also has idiopathic small and large fiber sensory neuropathy. Has autonomic orthostatic hypotension seems to be doing well on his medication at this point in time. Patient also suffers from PTSD but is not having any nightmares at this time. Patient needs a refill of his clonazepam of which she is out because he could not make it back in within the 3-month time space prescribed by the [facility]. The patient does have his duloxetine from the [place]. The patient would also like his gabapentin refilled which he takes for his neuropathy but he would like this refilled at the [facility] in [place]. No Known Allergies    Current Outpatient Medications   Medication Sig    clonazePAM (KlonoPIN) 0.5 mg tablet Take 1 Tab by mouth nightly. Max Daily Amount: 0.5 mg.    gabapentin (NEURONTIN) 600 mg tablet Take 0.5 Tabs by mouth three (3) times daily. Max Daily Amount: 900 mg.  pyridostigmine bromide 30 mg tab Take 30 mg by mouth three (3) times daily.  fludrocortisone (FLORINEF) 0.1 mg tablet TAKE 1 TABLET BY MOUTH TWICE DAILY    midodrine (PROAMITINE) 5 mg tablet Take 1 Tab by mouth daily (with dinner).     DULoxetine (CYMBALTA) 60 mg capsule TAKE 1 CAPSULE BY MOUTH DAILY    acetaminophen 500 mg tab 500 mg, diphenhydrAMINE 25 mg cap 25 mg Take 1 Dose by mouth nightly as needed for Other (sleep/pain relief).  acetaminophen (TYLENOL 8 HOUR) 650 mg TbER Take 650 mg by mouth every eight (8) hours as needed for Pain.  aspirin 81 mg chewable tablet Take 1 Tab by mouth daily.  finasteride (PROSCAR) 5 mg tablet TAKE 1 TABLET BY MOUTH DAILY(PROSTATE)    guaiFENesin ER (MUCINEX) 600 mg ER tablet Take 1 Tab by mouth every twelve (12) hours. (Patient taking differently: Take 600 mg by mouth every twelve (12) hours. As needed)     No current facility-administered medications for this visit.         Past Medical History:   Diagnosis Date    Altered mental status, unspecified 11/9/2018    Anxiety     Ataxia 11/9/2018    Back pain     Blurred vision     Bradycardia 10/2/2018    Chest pain     Depression     Dizziness     Dizziness and giddiness 10/17/2019    Dysautonomia ([address]     Fast heart beat     Fever 11/9/2018    Frequent headaches     Frequent urination     Hip dysplasia, congenital     Hx of syncope 10/24/2018    Hypotension     Ingrown left big toenail 3/31/2017    Joint pain     Joint swelling     Lumbar spinal stenosis     Memory deficit 9/11/2018    Muscle pain     Neuropathy     Orthostatic hypotension     asymptomatic    Pacemaker     new pacemaker July 2019    Recent change in weight     Risk for falls 12/13/2016    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    Sinus problem     Skin disease     Smoker 1/18/2018    SOB (shortness of breath)     Sore throat     SSS (sick sinus syndrome) (HCC)     Stiffness of joints, multiple sites     Stress     Stumbling gait 11/8/2018    Syncope     Tiredness     Trouble in sleeping     Weakness        Past Surgical History:   Procedure Laterality Date    HX HIP REPLACEMENT Bilateral 1993    HX PACEMAKER PLACEMENT  10/16/2018    HX ROTATOR CUFF REPAIR Right 11/16/2017    PA CARDIAC SURG PROCEDURE UNLIST      PA INS NEW/RPLCMT PRM PM W/TRANSV ELTRD ATRIAL&VENT N/A 7/30/2019    INSERT PPM DUAL performed by Hudsonnava Marsh, MD at [facility] CARDIAC CATH LAB       Family History   Problem Relation Age of Onset    Cancer Mother     Cancer Maternal Aunt        Social History     Socioeconomic History    Marital status:      Spouse name: Not on file    Number of children: Not on file    Years of education: Not on file    Highest education level: Not on file   Tobacco Use    Smoking status: Former Smoker     Packs/day: 0.25     Years: 50.00     Pack years: 12.50     Types: Cigarettes     Quit date: 8/3/2019     Years since quittin.6    Smokeless tobacco: Never Used   Substance and Sexual Activity    Alcohol use: Not Currently     Alcohol/week: 28.0 standard drinks     Types: 28 Cans of beer per week     Comment: None now    Drug use: No    Sexual activity: Never       Review of Systems   Constitutional: Negative for chills, fever, malaise/fatigue and weight loss. HENT: Negative for congestion, hearing loss, sore throat and tinnitus. Eyes: Negative for blurred vision, pain and discharge. Respiratory: Negative for cough, shortness of breath and wheezing. Cardiovascular: Negative for chest pain, palpitations, orthopnea, claudication and leg swelling. Gastrointestinal: Negative for abdominal pain, constipation and heartburn. Genitourinary: Negative for dysuria, frequency and urgency. Musculoskeletal: Negative for falls, joint pain and myalgias. Skin: Negative for itching and rash. Neurological: Positive for sensory change. Negative for dizziness, tingling, tremors and headaches. Endo/Heme/Allergies: Negative for environmental allergies and polydipsia. Psychiatric/Behavioral: Positive for depression and memory loss. Negative for hallucinations, substance abuse and suicidal ideas. The patient is nervous/anxious. The patient does not have insomnia.         Visit Vitals  /75 (BP 1 Location: Left arm, BP Patient Position: Sitting)   Pulse 84   Temp (!) 96.7 °F (35.9 °C) (Temporal)   Resp 18   Ht 5' 11\" (1.803 m)   Wt 172 lb (78 kg)   SpO2 100%   BMI 23.99 kg/m²     Physical Exam  Vitals signs reviewed. Constitutional:       General: He is not in acute distress. Appearance: Normal appearance. He is not ill-appearing. HENT:      Head: Normocephalic and atraumatic. Right Ear: Tympanic membrane, ear canal and external ear normal.      Left Ear: Tympanic membrane, ear canal and external ear normal.      Nose: Nose normal. No congestion or rhinorrhea. Mouth/Throat:      Mouth: Mucous membranes are moist.      Pharynx: No oropharyngeal exudate or posterior oropharyngeal erythema. Eyes:      Extraocular Movements: Extraocular movements intact. Conjunctiva/sclera: Conjunctivae normal.      Pupils: Pupils are equal, round, and reactive to light. Comments: Pupil iris and anterior chamber normal.   Neck:      Musculoskeletal: Normal range of motion and neck supple. Trachea: No tracheal deviation. Cardiovascular:      Rate and Rhythm: Normal rate and regular rhythm. Pulses: Normal pulses. Heart sounds: Normal heart sounds. No murmur. No friction rub. No gallop. Pulmonary:      Effort: Pulmonary effort is normal. No respiratory distress. Breath sounds: Normal breath sounds. No wheezing, rhonchi or rales. Chest:      Chest wall: No tenderness. Abdominal:      General: Bowel sounds are normal. There is no distension. Palpations: Abdomen is soft. There is no mass. Tenderness: There is no abdominal tenderness. There is no guarding or rebound. Hernia: No hernia is present. Musculoskeletal: Normal range of motion. General: No tenderness. Right lower leg: No edema. Left lower leg: No edema. Lymphadenopathy:      Cervical: No cervical adenopathy. Skin:     General: Skin is warm and dry. Findings: No erythema or rash. Neurological:      General: No focal deficit present. Mental Status: He is alert. Cranial Nerves:  No cranial nerve deficit. Motor: No abnormal muscle tone. Deep Tendon Reflexes: Reflexes are normal and symmetric. Reflexes normal.      Comments: Cranial nerves II through XII intact sensory and motor. Deep tendon reflexes in the biceps triceps knee and ankle are normal and bilaterally symmetrical.   Psychiatric:         Mood and Affect: Mood normal.         Behavior: Behavior normal.           ICD-10-CM ICD-9-CM    1. Alcoholism in remission ([facility] [address]  F10.21 303.93    2. Memory disturbance  R41.3 780.93    3. PTSD (post-traumatic stress disorder)  F43.10 309.81 clonazePAM (KlonoPIN) 0.5 mg tablet   4. Neuropathy  G62.9 355.9 gabapentin (NEURONTIN) 600 mg tablet   5. Anxiety as acute reaction to exceptional stress  F41.1 308.0     F43.0         Orders Placed This Encounter    clonazePAM (KlonoPIN) 0.5 mg tablet     Sig: Take 1 Tab by mouth nightly. Max Daily Amount: 0.5 mg.     Dispense:  90 Tab     Refill:  0    gabapentin (NEURONTIN) 600 mg tablet     Sig: Take 0.5 Tabs by mouth three (3) times daily. Max Daily Amount: 900 mg. Dispense:  135 Tab     Refill:  0   Patient was asked to make an appointment on his way out so he would not get locked out as he did this time by all the slots being taken. Follow-up and Dispositions    · Return in about 3 months (around 6/30/2021).          Parish White MD

## 2021-04-02 ENCOUNTER — DOCUMENTATION ONLY (OUTPATIENT)
Dept: FAMILY MEDICINE CLINIC | Age: 76
End: 2021-04-02

## 2021-07-02 ENCOUNTER — OFFICE VISIT (OUTPATIENT)
Dept: FAMILY MEDICINE CLINIC | Age: 76
End: 2021-07-02
Payer: COMMERCIAL

## 2021-07-02 VITALS
BODY MASS INDEX: 23.94 KG/M2 | RESPIRATION RATE: 20 BRPM | HEART RATE: 87 BPM | DIASTOLIC BLOOD PRESSURE: 80 MMHG | WEIGHT: 171 LBS | OXYGEN SATURATION: 98 % | TEMPERATURE: 98.6 F | SYSTOLIC BLOOD PRESSURE: 136 MMHG | HEIGHT: 71 IN

## 2021-07-02 DIAGNOSIS — N40.1 BPH WITH OBSTRUCTION/LOWER URINARY TRACT SYMPTOMS: ICD-10-CM

## 2021-07-02 DIAGNOSIS — I95.1 AUTONOMIC ORTHOSTATIC HYPOTENSION: Primary | ICD-10-CM

## 2021-07-02 DIAGNOSIS — G60.8 IDIOPATHIC SMALL AND LARGE FIBER SENSORY NEUROPATHY: ICD-10-CM

## 2021-07-02 DIAGNOSIS — F10.21 ALCOHOLISM IN REMISSION (HCC): ICD-10-CM

## 2021-07-02 DIAGNOSIS — N13.8 BPH WITH OBSTRUCTION/LOWER URINARY TRACT SYMPTOMS: ICD-10-CM

## 2021-07-02 DIAGNOSIS — M48.061 SPINAL STENOSIS OF LUMBAR REGION, UNSPECIFIED WHETHER NEUROGENIC CLAUDICATION PRESENT: ICD-10-CM

## 2021-07-02 PROCEDURE — 99214 OFFICE O/P EST MOD 30 MIN: CPT | Performed by: FAMILY MEDICINE

## 2021-07-02 NOTE — PROGRESS NOTES
Lisandro Urias is a 77 y.o. male who presents with the following:  Chief Complaint   Patient presents with    Follow-up     Anxiety and depression       Patient is doing well with his autonomic orthostatic hypotension on his current medications and has not had any episodes of passing out. Patient does have idiopathic small and large fiber sensory neuropathy which seems to be help with his gabapentin which she is taking 300 mg 3 times a day. Patient is having quite a bit of anxiety over his wife's illness and somewhat at the [place] has reduced his clonazepam down to 0.25 mg nightly. The patient is not having any falls or anything that would indicate that he is being overdosed. The patient does have BPH with obstructive lower urinary tract symptoms but currently he seems to be doing well on his current medications patient does have lumbar spinal stenosis and recently has had a series of injections epidurals that have not really helped any. The patient's alcoholism remains in remission at this time and he is doing well without drinking. No Known Allergies    Current Outpatient Medications   Medication Sig    clonazePAM (KlonoPIN) 0.5 mg tablet Take 1 Tab by mouth nightly. Max Daily Amount: 0.5 mg.    gabapentin (NEURONTIN) 600 mg tablet Take 0.5 Tabs by mouth three (3) times daily. Max Daily Amount: 900 mg.  pyridostigmine bromide 30 mg tab Take 30 mg by mouth three (3) times daily.  fludrocortisone (FLORINEF) 0.1 mg tablet TAKE 1 TABLET BY MOUTH TWICE DAILY    midodrine (PROAMITINE) 5 mg tablet Take 1 Tab by mouth daily (with dinner).  DULoxetine (CYMBALTA) 60 mg capsule TAKE 1 CAPSULE BY MOUTH DAILY    acetaminophen 500 mg tab 500 mg, diphenhydrAMINE 25 mg cap 25 mg Take 1 Dose by mouth nightly as needed for Other (sleep/pain relief).  acetaminophen (TYLENOL 8 HOUR) 650 mg TbER Take 650 mg by mouth every eight (8) hours as needed for Pain.     aspirin 81 mg chewable tablet Take 1 Tab by mouth daily.    finasteride (PROSCAR) 5 mg tablet TAKE 1 TABLET BY MOUTH DAILY(PROSTATE)    guaiFENesin ER (MUCINEX) 600 mg ER tablet Take 1 Tab by mouth every twelve (12) hours. (Patient taking differently: Take 600 mg by mouth every twelve (12) hours. As needed)     No current facility-administered medications for this visit.        Past Medical History:   Diagnosis Date    Altered mental status, unspecified 11/9/2018    Anxiety     Ataxia 11/9/2018    Back pain     Blurred vision     Bradycardia 10/2/2018    Chest pain     Depression     Dizziness     Dizziness and giddiness 10/17/2019    Dysautonomia ([address]     Fast heart beat     Fever 11/9/2018    Frequent headaches     Frequent urination     Hip dysplasia, congenital     Hx of syncope 10/24/2018    Hypotension     Ingrown left big toenail 3/31/2017    Joint pain     Joint swelling     Lumbar spinal stenosis     Memory deficit 9/11/2018    Muscle pain     Neuropathy     Orthostatic hypotension     asymptomatic    Pacemaker     new pacemaker July 2019    Recent change in weight     Risk for falls 12/13/2016    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    Sinus problem     Skin disease     Smoker 1/18/2018    SOB (shortness of breath)     Sore throat     SSS (sick sinus syndrome) (HCC)     Stiffness of joints, multiple sites     Stress     Stumbling gait 11/8/2018    Syncope     Tiredness     Trouble in sleeping     Weakness        Past Surgical History:   Procedure Laterality Date    HX HIP REPLACEMENT Bilateral 1993    HX PACEMAKER PLACEMENT  10/16/2018    HX ROTATOR CUFF REPAIR Right 11/16/2017    MT CARDIAC SURG PROCEDURE UNLIST      MT INS NEW/RPLCMT PRM PM W/TRANSV ELTRD ATRIAL&VENT N/A 7/30/2019    INSERT PPM DUAL performed by Teena Rojas MD at \[facility]"" CARDIAC CATH LAB       Family History   Problem Relation Age of Onset    Cancer Mother     Cancer Maternal Aunt        Social History     Socioeconomic History    Marital status:      Spouse name: Not on file    Number of children: Not on file    Years of education: Not on file    Highest education level: Not on file   Tobacco Use    Smoking status: Former Smoker     Packs/day: 0.25     Years: 50.00     Pack years: 12.50     Types: Cigarettes     Quit date: 8/3/2019     Years since quittin.9    Smokeless tobacco: Never Used   Substance and Sexual Activity    Alcohol use: Not Currently     Alcohol/week: 28.0 standard drinks     Types: 28 Cans of beer per week     Comment: None now    Drug use: No    Sexual activity: Never     Social Determinants of Health     Financial Resource Strain:     Difficulty of Paying Living Expenses:    Food Insecurity:     Worried About Running Out of Food in the Last Year:     [address] in the Last Year:    Transportation Needs:     Lack of Transportation (Medical):  Lack of Transportation (Non-Medical):    Physical Activity:     Days of Exercise per Week:     Minutes of Exercise per Session:    Stress:     Feeling of Stress :    Social Connections:     Frequency of Communication with Friends and Family:     Frequency of Social Gatherings with Friends and Family:     Attends Rastafari Services:     Active Member of Clubs or Organizations:     Attends Club or Organization Meetings:     Marital Status:        Review of Systems   Constitutional: Negative for chills, fever, malaise/fatigue and weight loss. HENT: Negative for congestion, hearing loss, sore throat and tinnitus. Eyes: Negative for blurred vision, pain and discharge. Respiratory: Negative for cough, shortness of breath and wheezing. Cardiovascular: Negative for chest pain, palpitations, orthopnea, claudication and leg swelling. Gastrointestinal: Negative for abdominal pain, constipation and heartburn. Genitourinary: Negative for dysuria, frequency and urgency. Musculoskeletal: Positive for back pain.  Negative for falls, joint pain and myalgias. Skin: Negative for itching and rash. Neurological: Negative for dizziness, tingling, tremors and headaches. Endo/Heme/Allergies: Negative for environmental allergies and polydipsia. Psychiatric/Behavioral: Negative for depression and substance abuse. The patient is nervous/anxious. Visit Vitals  /80   Pulse 87   Temp 98.6 °F (37 °C)   Resp 20   Ht 5' 11\" (1.803 m)   Wt 171 lb (77.6 kg)   SpO2 98%   BMI 23.85 kg/m²     Physical Exam  Vitals reviewed. Constitutional:       General: He is not in acute distress. Appearance: Normal appearance. He is normal weight. He is not ill-appearing. HENT:      Head: Normocephalic and atraumatic. Right Ear: Tympanic membrane, ear canal and external ear normal.      Left Ear: Tympanic membrane, ear canal and external ear normal.      Nose: Nose normal. No congestion or rhinorrhea. Mouth/Throat:      Mouth: Mucous membranes are moist.      Pharynx: No oropharyngeal exudate or posterior oropharyngeal erythema. Eyes:      Extraocular Movements: Extraocular movements intact. Conjunctiva/sclera: Conjunctivae normal.      Pupils: Pupils are equal, round, and reactive to light. Comments: Pupil iris and anterior chamber normal.   Neck:      Trachea: No tracheal deviation. Cardiovascular:      Rate and Rhythm: Normal rate and regular rhythm. Pulses: Normal pulses. Heart sounds: Normal heart sounds. No murmur heard. No friction rub. No gallop. Pulmonary:      Effort: Pulmonary effort is normal. No respiratory distress. Breath sounds: Normal breath sounds. No wheezing, rhonchi or rales. Chest:      Chest wall: No tenderness. Abdominal:      General: Bowel sounds are normal. There is no distension. Palpations: Abdomen is soft. There is no mass. Tenderness: There is no abdominal tenderness. There is no guarding or rebound. Hernia: No hernia is present.    Musculoskeletal:         General: No tenderness. Normal range of motion. Cervical back: Normal range of motion and neck supple. Right lower leg: No edema. Left lower leg: No edema. Lymphadenopathy:      Cervical: No cervical adenopathy. Skin:     General: Skin is warm and dry. Findings: No erythema or rash. Neurological:      General: No focal deficit present. Mental Status: He is alert and oriented to person, place, and time. Cranial Nerves: No cranial nerve deficit. Motor: No abnormal muscle tone. Deep Tendon Reflexes: Reflexes are normal and symmetric. Reflexes normal.      Comments: Cranial nerves II through XII intact sensory and motor. Deep tendon reflexes in the biceps triceps knee and ankle are normal and bilaterally symmetrical.   Psychiatric:         Mood and Affect: Mood normal.         Behavior: Behavior normal.           ICD-10-CM ICD-9-CM    1. Autonomic orthostatic hypotension  I95.1 458.0    2. Idiopathic small and large fiber sensory neuropathy  G60.8 356.4    3. BPH with obstruction/lower urinary tract symptoms  N40.1 600.01     N13.8 599.69    4. [facility] in remission ([facility]  F10.21 303.93    5. Spinal stenosis of lumbar region, unspecified whether neurogenic claudication present  M48.061 724.02        No orders of the defined types were placed in this encounter. Patient is to continue his usual medications as is and recheck in 3 months. Follow-up and Dispositions    · Return in about 3 months (around 10/2/2021), or if symptoms worsen or fail to improve.          Ashely Moreno MD

## 2021-07-02 NOTE — PROGRESS NOTES
1. Have you been to the ER, urgent care clinic since your last visit? Hospitalized since your last visit?no    2. Have you seen or consulted any other health care providers outside of the [address] [place] [address] since your last visit? Include any pap smears or colon screening.  yes

## 2021-08-03 ENCOUNTER — TELEPHONE (OUTPATIENT)
Dept: FAMILY MEDICINE CLINIC | Age: 76
End: 2021-08-03

## 2021-08-03 NOTE — TELEPHONE ENCOUNTER
----- Message from Marisol Patricia sent at 8/3/2021  7:23 AM EDT -----  Regarding: /Telephone  Patient return call    Caller's first and last name and relationship (if not the patient):      Best contact number(s [phone]      Whose call is being returned: Elizabeth Anshu      Details to clarify the request: Pt wife returning a call in regards to scheduling an appt for today .       Marisol Patricia

## 2021-09-16 ENCOUNTER — OFFICE VISIT (OUTPATIENT)
Dept: FAMILY MEDICINE CLINIC | Age: 76
End: 2021-09-16
Payer: COMMERCIAL

## 2021-09-16 VITALS
SYSTOLIC BLOOD PRESSURE: 120 MMHG | HEIGHT: 71 IN | BODY MASS INDEX: 24.47 KG/M2 | DIASTOLIC BLOOD PRESSURE: 68 MMHG | WEIGHT: 174.8 LBS | TEMPERATURE: 97.8 F | HEART RATE: 52 BPM | OXYGEN SATURATION: 96 % | RESPIRATION RATE: 16 BRPM

## 2021-09-16 DIAGNOSIS — F43.10 PTSD (POST-TRAUMATIC STRESS DISORDER): Primary | ICD-10-CM

## 2021-09-16 PROCEDURE — 99213 OFFICE O/P EST LOW 20 MIN: CPT | Performed by: FAMILY MEDICINE

## 2021-09-16 RX ORDER — CLONAZEPAM 0.5 MG/1
0.25 TABLET ORAL
Qty: 45 TABLET | Refills: 0 | Status: SHIPPED | OUTPATIENT
Start: 2021-09-16 | End: 2021-11-17 | Stop reason: SDUPTHER

## 2021-09-16 NOTE — PROGRESS NOTES
Salazar Trotter is a 77 y.o. male who presents with the following:  Chief Complaint   Patient presents with    Anxiety       Patient with a history of PTSD service-connected comes in today for refill of his clonazepam 0.5 mg tablets which he has been breaking in half and doing quite well. The patient is showing no signs of aberrant behavior and no signs of giving the drugs to diversion. No Known Allergies    Current Outpatient Medications   Medication Sig    clonazePAM (KlonoPIN) 0.5 mg tablet Take 0.5 Tablets by mouth nightly. Max Daily Amount: 0.25 mg.    gabapentin (NEURONTIN) 600 mg tablet Take 0.5 Tabs by mouth three (3) times daily. Max Daily Amount: 900 mg.  pyridostigmine bromide 30 mg tab Take 30 mg by mouth three (3) times daily.  fludrocortisone (FLORINEF) 0.1 mg tablet TAKE 1 TABLET BY MOUTH TWICE DAILY    midodrine (PROAMITINE) 5 mg tablet Take 1 Tab by mouth daily (with dinner).  DULoxetine (CYMBALTA) 60 mg capsule TAKE 1 CAPSULE BY MOUTH DAILY    acetaminophen 500 mg tab 500 mg, diphenhydrAMINE 25 mg cap 25 mg Take 1 Dose by mouth nightly as needed for Other (sleep/pain relief).  acetaminophen (TYLENOL 8 HOUR) 650 mg TbER Take 650 mg by mouth every eight (8) hours as needed for Pain.  aspirin 81 mg chewable tablet Take 1 Tab by mouth daily.  finasteride (PROSCAR) 5 mg tablet TAKE 1 TABLET BY MOUTH DAILY(PROSTATE)    guaiFENesin ER (MUCINEX) 600 mg ER tablet Take 1 Tab by mouth every twelve (12) hours. (Patient taking differently: Take 600 mg by mouth every twelve (12) hours. As needed)     No current facility-administered medications for this visit.        Past Medical History:   Diagnosis Date    Altered mental status, unspecified 11/9/2018    Anxiety     Ataxia 11/9/2018    Back pain     Blurred vision     Bradycardia 10/2/2018    Chest pain     Depression     Dizziness     Dizziness and giddiness 10/17/2019    Dysautonomia (HCC)     Fast heart beat     Fever 2018    Frequent headaches     Frequent urination     Hip dysplasia, congenital     Hx of syncope 10/24/2018    Hypotension     Ingrown left big toenail 3/31/2017    Joint pain     Joint swelling     Lumbar spinal stenosis     Memory deficit 2018    Muscle pain     Neuropathy     Orthostatic hypotension     asymptomatic    Pacemaker     new pacemaker 2019    Recent change in weight     Risk for falls 2016    Sensory ataxia 2018    Sinus problem     Skin disease     Smoker 2018    SOB (shortness of breath)     Sore throat     SSS (sick sinus syndrome) (HCC)     Stiffness of joints, multiple sites     Stress     Stumbling gait 2018    Syncope     Tiredness     Trouble in sleeping     Weakness        Past Surgical History:   Procedure Laterality Date    HX HIP REPLACEMENT Bilateral     HX PACEMAKER PLACEMENT  10/16/2018    HX ROTATOR CUFF REPAIR Right 2017    [facility] CARDIAC SURG PROCEDURE UNLIST      VA INS NEW/RPLCMT PRM PM W/TRANSV ELTRD ATRIAL&VENT N/A 2019    INSERT PPM DUAL performed by Kai Vela MD at [facility] CARDIAC CATH LAB       Family History   Problem Relation Age of Onset    Cancer Mother     Cancer Maternal Aunt        Social History     Socioeconomic History    Marital status:      Spouse name: Not on file    Number of children: Not on file    Years of education: Not on file    Highest education level: Not on file   Tobacco Use    Smoking status: Former Smoker     Packs/day: 0.25     Years: 50.00     Pack years: 12.50     Types: Cigarettes     Quit date: 8/3/2019     Years since quittin.1    Smokeless tobacco: Never Used   Substance and Sexual Activity    Alcohol use: Not Currently     Alcohol/week: 28.0 standard drinks     Types: 28 Cans of beer per week     Comment: None now    Drug use: No    Sexual activity: Never     Social Determinants of Health     Financial Resource Strain:     Difficulty of Paying Living Expenses:    Food Insecurity:     Worried About [address] in the Last Year:     [address] in the Last Year:    Transportation Needs:     Lack of Transportation (Medical):  Lack of Transportation (Non-Medical):    Physical Activity:     Days of Exercise per Week:     Minutes of Exercise per Session:    Stress:     Feeling of Stress :    Social Connections:     Frequency of Communication with Friends and Family:     Frequency of Social Gatherings with Friends and Family:     Attends [employer] Services:     Active Member of Clubs or Organizations:     Attends Club or Organization Meetings:     Marital Status:        Review of Systems   Constitutional: Negative for chills, fever, malaise/fatigue and weight loss. HENT: Negative for congestion, hearing loss, sore throat and tinnitus. Eyes: Negative for blurred vision, pain and discharge. Respiratory: Negative for cough, shortness of breath and wheezing. Cardiovascular: Negative for chest pain, palpitations, orthopnea, claudication and leg swelling. Gastrointestinal: Negative for abdominal pain, constipation and heartburn. Genitourinary: Negative for dysuria, frequency and urgency. Musculoskeletal: Negative for falls, joint pain and myalgias. Skin: Negative for itching and rash. Neurological: Negative for dizziness, tingling, tremors and headaches. Endo/Heme/Allergies: Negative for environmental allergies and polydipsia. Psychiatric/Behavioral: Negative for depression and substance abuse. The patient is nervous/anxious. Visit Vitals  /68 (BP 1 Location: Right arm, BP Patient Position: Sitting, BP Cuff Size: Adult)   Pulse (!) 52   Temp 97.8 °F (36.6 °C) (Temporal)   Resp 16   Ht 5' 11\" (1.803 m)   Wt 174 lb 12.8 oz (79.3 kg)   SpO2 96%   BMI 24.38 kg/m²     Physical Exam  Vitals reviewed. Constitutional:       General: He is not in acute distress. Appearance: Normal appearance.  He is not ill-appearing. HENT:      Head: Normocephalic and atraumatic. Right Ear: Tympanic membrane, ear canal and external ear normal.      Left Ear: Tympanic membrane, ear canal and external ear normal.      Nose: Nose normal. No congestion or rhinorrhea. Mouth/Throat:      Mouth: Mucous membranes are moist.      Pharynx: No oropharyngeal exudate or posterior oropharyngeal erythema. Eyes:      Extraocular Movements: Extraocular movements intact. Conjunctiva/sclera: Conjunctivae normal.      Pupils: Pupils are equal, round, and reactive to light. Comments: Pupil iris and anterior chamber normal.   Neck:      Trachea: No tracheal deviation. Cardiovascular:      Rate and Rhythm: Normal rate and regular rhythm. Pulses: Normal pulses. Heart sounds: Normal heart sounds. No murmur heard. No friction rub. No gallop. Pulmonary:      Effort: Pulmonary effort is normal. No respiratory distress. Breath sounds: Normal breath sounds. No wheezing, rhonchi or rales. Chest:      Chest wall: No tenderness. Abdominal:      General: Bowel sounds are normal. There is no distension. Palpations: Abdomen is soft. There is no mass. Tenderness: There is no abdominal tenderness. There is no guarding or rebound. Hernia: No hernia is present. Musculoskeletal:         General: No tenderness. Normal range of motion. Cervical back: Normal range of motion and neck supple. Right lower leg: No edema. Left lower leg: No edema. Lymphadenopathy:      Cervical: No cervical adenopathy. Skin:     General: Skin is warm and dry. Findings: No erythema or rash. Neurological:      General: No focal deficit present. Mental Status: He is alert and oriented to person, place, and time. Cranial Nerves: No cranial nerve deficit. Motor: No abnormal muscle tone. Deep Tendon Reflexes: Reflexes are normal and symmetric.  Reflexes normal.      Comments: Cranial nerves II through XII intact sensory and motor. Deep tendon reflexes in the biceps triceps knee and ankle are normal and bilaterally symmetrical.   Psychiatric:         Mood and Affect: Mood normal.         Behavior: Behavior normal.           ICD-10-CM ICD-9-CM    1. PTSD (post-traumatic stress disorder)  F43.10 309.81 clonazePAM (KlonoPIN) 0.5 mg tablet       Orders Placed This Encounter    clonazePAM (KlonoPIN) 0.5 mg tablet     Sig: Take 0.5 Tablets by mouth nightly. Max Daily Amount: 0.25 mg. Dispense:  45 Tablet     Refill:  0       Follow-up and Dispositions    · Return in about 3 months (around 12/16/2021).          Armando Pacheco MD

## 2021-11-17 ENCOUNTER — OFFICE VISIT (OUTPATIENT)
Dept: FAMILY MEDICINE CLINIC | Age: 76
End: 2021-11-17
Payer: COMMERCIAL

## 2021-11-17 VITALS
TEMPERATURE: 97.5 F | BODY MASS INDEX: 23.85 KG/M2 | HEART RATE: 87 BPM | RESPIRATION RATE: 18 BRPM | WEIGHT: 170.38 LBS | OXYGEN SATURATION: 98 % | HEIGHT: 71 IN | DIASTOLIC BLOOD PRESSURE: 68 MMHG | SYSTOLIC BLOOD PRESSURE: 131 MMHG

## 2021-11-17 DIAGNOSIS — F43.10 PTSD (POST-TRAUMATIC STRESS DISORDER): ICD-10-CM

## 2021-11-17 DIAGNOSIS — F10.21 ALCOHOLISM IN REMISSION (HCC): Primary | ICD-10-CM

## 2021-11-17 PROCEDURE — 99213 OFFICE O/P EST LOW 20 MIN: CPT | Performed by: FAMILY MEDICINE

## 2021-11-17 RX ORDER — CLONAZEPAM 0.5 MG/1
0.5 TABLET ORAL
Qty: 90 TABLET | Refills: 0 | Status: SHIPPED | OUTPATIENT
Start: 2021-11-17 | End: 2022-02-23 | Stop reason: SDUPTHER

## 2021-11-17 NOTE — PROGRESS NOTES
Kim Suero is a 77 y.o. male who presents with the following:  Chief Complaint   Patient presents with    Anxiety       Patient who is a  with PTSD and has a recovering alcoholic who is not drinking has been having increasing anxiety about the holidays and since being cut back to 0.25 mg of clonazepam he has been having increased anxiety and tremor. The patient increased his medicines back up to 0.5 mg of clonazepam and on this he is doing better but he was counseled that he was not to do that unless I instructed him to do that and that was a serious violation of his controlled substances agreement. I told the patient the main reason I cut him back to 0.25 mg was his gait was becoming unsteady and older people tend to accumulate this drug in their system and then it causes them to fall and he is already had 2 hip replacements and he does not want to go there. Patient assured me that he would not do this again and that he would contact me if he felt there was any need for any dosage adjustment. No Known Allergies    Current Outpatient Medications   Medication Sig    clonazePAM (KlonoPIN) 0.5 mg tablet Take 1 Tablet by mouth nightly. Max Daily Amount: 0.5 mg.    gabapentin (NEURONTIN) 600 mg tablet Take 0.5 Tabs by mouth three (3) times daily. Max Daily Amount: 900 mg.  pyridostigmine bromide 30 mg tab Take 30 mg by mouth three (3) times daily.  fludrocortisone (FLORINEF) 0.1 mg tablet TAKE 1 TABLET BY MOUTH TWICE DAILY    midodrine (PROAMITINE) 5 mg tablet Take 1 Tab by mouth daily (with dinner).  DULoxetine (CYMBALTA) 60 mg capsule TAKE 1 CAPSULE BY MOUTH DAILY    acetaminophen 500 mg tab 500 mg, diphenhydrAMINE 25 mg cap 25 mg Take 1 Dose by mouth nightly as needed for Other (sleep/pain relief).  acetaminophen (TYLENOL 8 HOUR) 650 mg TbER Take 650 mg by mouth every eight (8) hours as needed for Pain.  aspirin 81 mg chewable tablet Take 1 Tab by mouth daily.     finasteride (PROSCAR) 5 mg tablet TAKE 1 TABLET BY MOUTH DAILY(PROSTATE)    guaiFENesin ER (MUCINEX) 600 mg ER tablet Take 1 Tab by mouth every twelve (12) hours. (Patient taking differently: Take 600 mg by mouth every twelve (12) hours. As needed)     No current facility-administered medications for this visit.        Past Medical History:   Diagnosis Date    Altered mental status, unspecified 11/9/2018    Anxiety     Ataxia 11/9/2018    Back pain     Blurred vision     Bradycardia 10/2/2018    Chest pain     Depression     Dizziness     Dizziness and giddiness 10/17/2019    Dysautonomia ([address]     Fast heart beat     Fever 11/9/2018    Frequent headaches     Frequent urination     Hip dysplasia, congenital     Hx of syncope 10/24/2018    Hypotension     Ingrown left big toenail 3/31/2017    Joint pain     Joint swelling     Lumbar spinal stenosis     Memory deficit 9/11/2018    Muscle pain     Neuropathy     Orthostatic hypotension     asymptomatic    Pacemaker     new pacemaker July 2019    Recent change in weight     Risk for falls 12/13/2016    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    Sinus problem     Skin disease     Smoker 1/18/2018    SOB (shortness of breath)     Sore throat     SSS (sick sinus syndrome) (HCC)     Stiffness of joints, multiple sites     Stress     Stumbling gait 11/8/2018    Syncope     Tiredness     Trouble in sleeping     Weakness        Past Surgical History:   Procedure Laterality Date    HX HIP REPLACEMENT Bilateral 1993    HX PACEMAKER PLACEMENT  10/16/2018    HX ROTATOR CUFF REPAIR Right 11/16/2017    OR CARDIAC SURG PROCEDURE UNLIST      OR INS NEW/RPLCMT PRM PM W/TRANSV ELTRD ATRIAL&VENT N/A 7/30/2019    INSERT PPM DUAL performed by Enriqueta De La Torre [facility] at [facility] CARDIAC CATH LAB       Family History   Problem Relation Age of Onset    Cancer Mother     Cancer Maternal Aunt        Social History     Socioeconomic History    Marital status:    Tobacco Use    Smoking status: Former Smoker     Packs/day: 0.25     Years: 50.00     Pack years: 12.50     Types: Cigarettes     Quit date: 8/3/2019     Years since quittin.2    Smokeless tobacco: Never Used   Substance and Sexual Activity    Alcohol use: Not Currently     Alcohol/week: 28.0 standard drinks     Types: 28 Cans of beer per week     Comment: None now    Drug use: No    Sexual activity: Never       Review of Systems   Constitutional: Negative for chills, fever, malaise/fatigue and weight loss. HENT: Negative for congestion, hearing loss, sore throat and tinnitus. Eyes: Negative for blurred vision, pain and discharge. Respiratory: Negative for cough, shortness of breath and wheezing. Cardiovascular: Negative for chest pain, palpitations, orthopnea, claudication and leg swelling. Gastrointestinal: Negative for abdominal pain, constipation and heartburn. Genitourinary: Negative for dysuria, frequency and urgency. Musculoskeletal: Negative for falls, joint pain and myalgias. Skin: Negative for itching and rash. Neurological: Positive for tremors. Negative for dizziness, tingling and headaches. Endo/Heme/Allergies: Negative for environmental allergies and polydipsia. Psychiatric/Behavioral: Negative for depression and substance abuse. The patient is nervous/anxious and has insomnia. Visit Vitals  /68 (BP 1 Location: Left arm, BP Patient Position: Sitting)   Pulse 87   Temp 97.5 °F (36.4 °C) (Temporal)   Resp 18   Ht 5' 11\" (1.803 m)   Wt 170 lb 6 oz (77.3 kg)   SpO2 98%   BMI 23.76 kg/m²     Physical Exam  Vitals reviewed. Constitutional:       Appearance: Normal appearance. HENT:      Head: Normocephalic and atraumatic. Right Ear: Tympanic membrane, ear canal and external ear normal.      Left Ear: Tympanic membrane, ear canal and external ear normal.      Nose: Nose normal. No congestion or rhinorrhea.       Mouth/Throat:      Mouth: Mucous membranes are moist.      Pharynx: No oropharyngeal exudate or posterior oropharyngeal erythema. Eyes:      Extraocular Movements: Extraocular movements intact. Conjunctiva/sclera: Conjunctivae normal.      Pupils: Pupils are equal, round, and reactive to light. Comments: Pupil iris and anterior chamber normal.   Neck:      Trachea: No tracheal deviation. Cardiovascular:      Rate and Rhythm: Normal rate and regular rhythm. Pulses: Normal pulses. Heart sounds: Normal heart sounds. No murmur heard. No friction rub. No gallop. Pulmonary:      Effort: Pulmonary effort is normal. No respiratory distress. Breath sounds: Normal breath sounds. No wheezing, rhonchi or rales. Chest:      Chest wall: No tenderness. Abdominal:      General: Bowel sounds are normal. There is no distension. Palpations: Abdomen is soft. There is no mass. Tenderness: There is no abdominal tenderness. There is no guarding or rebound. Hernia: No hernia is present. Musculoskeletal:         General: No tenderness. Normal range of motion. Cervical back: Normal range of motion and neck supple. Comments: Patient's carpal tunnel surgery is going well and he can feel his fingers are moving normally. His fifth digit on that hand has had so many injuries from his days playing hockey that one tendon is broken loose from its pulley and is off to the side and retracting somewhat. Lymphadenopathy:      Cervical: No cervical adenopathy. Skin:     General: Skin is warm and dry. Findings: No erythema or rash. Neurological:      General: No focal deficit present. Mental Status: He is alert and oriented to person, place, and time. Cranial Nerves: No cranial nerve deficit. Motor: No abnormal muscle tone. Deep Tendon Reflexes: Reflexes are normal and symmetric. Reflexes normal.      Comments: Cranial nerves II through XII intact sensory and motor.   Deep tendon reflexes in the biceps triceps knee and ankle are normal and bilaterally symmetrical.   Psychiatric:         Mood and Affect: Mood normal.         Behavior: Behavior normal.           ICD-10-CM ICD-9-CM    1. [facility] in [facility] ([facility]  F10.21 303.93    2. PTSD (post-traumatic stress disorder)  F43.10 309.81 clonazePAM (KlonoPIN) 0.5 mg tablet       Orders Placed This Encounter    clonazePAM (KlonoPIN) 0.5 mg tablet     Sig: Take 1 Tablet by mouth nightly. Max Daily Amount: 0.5 mg.     Dispense:  90 Tablet     Refill:  0       Follow-up and Dispositions    · Return in about 3 months (around 2/17/2022), or if symptoms worsen or fail to improve.          Parish White MD

## 2021-11-17 NOTE — PROGRESS NOTES
1. Have you been to the ER, urgent care clinic since your last visit? Hospitalized since your last visit? No    2. Have you seen or consulted any other health care providers outside of the [address] [place] [address] since your last visit? Include any pap smears or colon screening. Ortho for Carpel Tunnel Release.      Chief Complaint   Patient presents with    Anxiety     Visit Vitals  /68 (BP 1 Location: Left arm, BP Patient Position: Sitting)   Pulse 87   Temp 97.5 °F (36.4 °C) (Temporal)   Resp 18   Ht 5' 11\" (1.803 m)   Wt 170 lb 6 oz (77.3 kg)   SpO2 98%   BMI 23.76 kg/m²

## 2021-12-28 ENCOUNTER — TELEPHONE (OUTPATIENT)
Dept: FAMILY MEDICINE CLINIC | Age: 76
End: 2021-12-28

## 2021-12-28 NOTE — TELEPHONE ENCOUNTER
----- Message from Oliver Brambila sent at 12/28/2021 10:01 AM EST -----  Subject: Message to Provider    QUESTIONS  Information for Provider? Patient called in to schedule a flu shot please   call patient  ---------------------------------------------------------------------------  --------------  CALL BACK INFO  What is the best way for the office to contact you? OK to leave message on   voicemail  Preferred Call Back Phone Number? [phone]  ---------------------------------------------------------------------------  --------------  SCRIPT ANSWERS  Relationship to Patient? Third Party  Representative Name?  wife

## 2021-12-29 ENCOUNTER — CLINICAL SUPPORT (OUTPATIENT)
Dept: FAMILY MEDICINE CLINIC | Age: 76
End: 2021-12-29
Payer: COMMERCIAL

## 2021-12-29 DIAGNOSIS — Z23 NEEDS FLU SHOT: Primary | ICD-10-CM

## 2021-12-29 PROCEDURE — 90694 VACC AIIV4 NO PRSRV 0.5ML IM: CPT | Performed by: FAMILY MEDICINE

## 2021-12-29 PROCEDURE — 90471 IMMUNIZATION ADMIN: CPT | Performed by: FAMILY MEDICINE

## 2021-12-29 NOTE — PATIENT INSTRUCTIONS
Vaccine Information Statement    Influenza (Flu) Vaccine (Inactivated or Recombinant): What You Need to Know    Many vaccine information statements are available in Greek and other languages. See www.immunize.org/vis. Hojas de información sobre vacunas están disponibles en español y en muchos otros idiomas. Visite www.immunize.org/vis. 1. Why get vaccinated? Influenza vaccine can prevent influenza (flu). Flu is a contagious disease that spreads around the [facility] every year, usually between October and May. Anyone can get the flu, but it is more dangerous for some people. Infants and young children, people 72 years and older, pregnant people, and people with certain health conditions or a weakened immune system are at greatest risk of flu complications. Pneumonia, bronchitis, sinus infections, and ear infections are examples of flu-related complications. If you have a medical condition, such as heart disease, cancer, or diabetes, flu can make it worse. Flu can cause fever and chills, sore throat, muscle aches, fatigue, cough, headache, and runny or stuffy nose. Some people may have vomiting and diarrhea, though this is more common in children than adults. In an average year, thousands of people in the [facility] die from flu, and many more are hospitalized. Flu vaccine prevents millions of illnesses and flu-related visits to the doctor each year. 2. Influenza vaccines     CDC recommends everyone 6 months and older get vaccinated every flu season. Children 6 months through 6years of age may need 2 doses during a single flu season. Everyone else needs only 1 dose each flu season. It takes about 2 weeks for protection to develop after vaccination. There are many flu viruses, and they are always changing. Each year a new flu vaccine is made to protect against the influenza viruses believed to be likely to cause disease in the upcoming flu season.  Even when the vaccine doesnt exactly match these viruses, it may still provide some protection. Influenza vaccine does not cause flu. Influenza vaccine may be given at the same time as other vaccines. 3. Talk with your health care provider    Tell your vaccination provider if the person getting the vaccine:   Has had an allergic reaction after a previous dose of influenza vaccine, or has any severe, life-threatening allergies    Has ever had Guillain-Barré Syndrome (also called GBS)    In some cases, your health care provider may decide to postpone influenza vaccination until a future visit. Influenza vaccine can be administered at any time during pregnancy. People who are or will be pregnant during influenza season should receive inactivated influenza vaccine. People with minor illnesses, such as a cold, may be vaccinated. People who are moderately or severely ill should usually wait until they recover before getting influenza vaccine. Your health care provider can give you more information. 4. Risks of a vaccine reaction     Soreness, redness, and swelling where the shot is given, fever, muscle aches, and headache can happen after influenza vaccination.  There may be a very small increased risk of Guillain-Barré Syndrome (GBS) after inactivated influenza vaccine (the flu shot). Marli Delgado children who get the flu shot along with pneumococcal vaccine (PCV13) and/or DTaP vaccine at the same time might be slightly more likely to have a seizure caused by fever. Tell your health care provider if a child who is getting flu vaccine has ever had a seizure. People sometimes faint after medical procedures, including vaccination. Tell your provider if you feel dizzy or have vision changes or ringing in the ears. As with any medicine, there is a very remote chance of a vaccine causing a severe allergic reaction, other serious injury, or death. 5. What if there is a serious problem?     An allergic reaction could occur after the vaccinated person leaves the clinic. If you see signs of a severe allergic reaction (hives, swelling of the face and throat, difficulty breathing, a fast heartbeat, dizziness, or weakness), call 9-1-1 and get the person to the nearest hospital.    For other signs that concern you, call your health care provider. Adverse reactions should be reported to the Vaccine Adverse Event Reporting System (VAERS). Your health care provider will usually file this report, or you can do it yourself. Visit the VAERS website at www.vaers. [facility].gov or call 3-366.515.4524. VAERS is only for reporting reactions, and VAERS staff members do not give medical advice. 6. The National Vaccine Injury Compensation Program    The [facility] Vaccine Injury Compensation Program (VICP) is a federal program that was created to compensate people who may have been injured by certain vaccines. Claims regarding alleged injury or death due to vaccination have a time limit for filing, which may be as short as two years. Visit the VICP website at www.[facility].gov/vaccinecompensation or call 7-398.487.4980 to learn about the program and about filing a claim. 7. How can I learn more?  Ask your health care provider.  Call your local or state health department.  Visit the website of the Food and Drug Administration (FDA) for vaccine package inserts and additional information at www.fda.gov/vaccines-blood-biologics/vaccines.  Contact the Centers for Disease Control and Prevention (CDC):  - Call 0-334.570.9210 (1-800-CDC-INFO) or  - Visit CDCs influenza website at www.cdc.gov/flu. Vaccine Information Statement   Inactivated Influenza Vaccine   8/6/2021  42 LISA Ruizmonte[address] [id number]   Department of Health and Human Services  Centers for Disease Control and Prevention    Office Use Only

## 2022-02-17 ENCOUNTER — TELEPHONE (OUTPATIENT)
Dept: FAMILY MEDICINE CLINIC | Age: 77
End: 2022-02-17

## 2022-02-17 ENCOUNTER — DOCUMENTATION ONLY (OUTPATIENT)
Dept: FAMILY MEDICINE CLINIC | Age: 77
End: 2022-02-17

## 2022-02-17 NOTE — TELEPHONE ENCOUNTER
----- Message from Sherrie Gaveivonne sent at 2/17/2022  7:35 AM EST -----  Subject: Message to Provider    QUESTIONS  Information for Provider? Patient's wife is calling to see if the   paperwork that they needed was faxed to the [place]. She would like a call   back. ---------------------------------------------------------------------------  --------------  Warden Petty GREY  What is the best way for the office to contact you? OK to leave message on   voicemail  Preferred Call Back Phone Number? [phone]  ---------------------------------------------------------------------------  --------------  SCRIPT ANSWERS  Relationship to Patient? Other  Representative Name? Meera Barrett  Is the Representative on the appropriate HIPAA document in Epic?  Yes

## 2022-02-17 NOTE — PROGRESS NOTES
Spoke to Ms. Joy and faxed a continuation of care form to [facility].     [phone]: [phone]

## 2022-02-23 ENCOUNTER — OFFICE VISIT (OUTPATIENT)
Dept: FAMILY MEDICINE CLINIC | Age: 77
End: 2022-02-23
Payer: OTHER GOVERNMENT

## 2022-02-23 VITALS
RESPIRATION RATE: 20 BRPM | OXYGEN SATURATION: 97 % | HEART RATE: 90 BPM | TEMPERATURE: 98.7 F | HEIGHT: 71 IN | SYSTOLIC BLOOD PRESSURE: 139 MMHG | DIASTOLIC BLOOD PRESSURE: 81 MMHG | BODY MASS INDEX: 24.5 KG/M2 | WEIGHT: 175 LBS

## 2022-02-23 DIAGNOSIS — F43.10 PTSD (POST-TRAUMATIC STRESS DISORDER): Primary | ICD-10-CM

## 2022-02-23 DIAGNOSIS — F10.21 ALCOHOLISM IN REMISSION (HCC): ICD-10-CM

## 2022-02-23 PROCEDURE — 99213 OFFICE O/P EST LOW 20 MIN: CPT | Performed by: FAMILY MEDICINE

## 2022-02-23 RX ORDER — CLONAZEPAM 0.5 MG/1
0.5 TABLET ORAL
Qty: 90 TABLET | Refills: 0 | Status: SHIPPED | OUTPATIENT
Start: 2022-02-23 | End: 2022-05-26 | Stop reason: SDUPTHER

## 2022-02-23 NOTE — PROGRESS NOTES
Aaron Duran is a 77 y.o. male who presents with the following:  Chief Complaint   Patient presents with    Follow-up    Anxiety    Depression       States he is doing quite well on his clonazepam and he states he did fall once because he tends to shuffle his feet because of his neuropathy. The patient is showing no aberrant behavior nor is he showing any signs of diversion. No Known Allergies    Current Outpatient Medications   Medication Sig    clonazePAM (KlonoPIN) 0.5 mg tablet Take 1 Tablet by mouth nightly. Max Daily Amount: 0.5 mg.    gabapentin (NEURONTIN) 600 mg tablet Take 0.5 Tabs by mouth three (3) times daily. Max Daily Amount: 900 mg.  pyridostigmine bromide 30 mg tab Take 30 mg by mouth three (3) times daily.  fludrocortisone (FLORINEF) 0.1 mg tablet TAKE 1 TABLET BY MOUTH TWICE DAILY    midodrine (PROAMITINE) 5 mg tablet Take 1 Tab by mouth daily (with dinner).  DULoxetine (CYMBALTA) 60 mg capsule TAKE 1 CAPSULE BY MOUTH DAILY    acetaminophen 500 mg tab 500 mg, diphenhydrAMINE 25 mg cap 25 mg Take 1 Dose by mouth nightly as needed for Other (sleep/pain relief).  acetaminophen (TYLENOL 8 HOUR) 650 mg TbER Take 650 mg by mouth every eight (8) hours as needed for Pain.  aspirin 81 mg chewable tablet Take 1 Tab by mouth daily.  finasteride (PROSCAR) 5 mg tablet TAKE 1 TABLET BY MOUTH DAILY(PROSTATE)    guaiFENesin ER (MUCINEX) 600 mg ER tablet Take 1 Tab by mouth every twelve (12) hours. (Patient taking differently: Take 600 mg by mouth every twelve (12) hours. As needed)     No current facility-administered medications for this visit.        Past Medical History:   Diagnosis Date    Altered mental status, unspecified 11/9/2018    Anxiety     Ataxia 11/9/2018    Back pain     Blurred vision     Bradycardia 10/2/2018    Chest pain     Depression     Dizziness     Dizziness and giddiness 10/17/2019    Dysautonomia (HCC)     Fast heart beat     Fever 11/9/2018    Frequent headaches     Frequent urination     Hip dysplasia, congenital     Hx of syncope 10/24/2018    Hypotension     Ingrown left big toenail 3/31/2017    Joint pain     Joint swelling     Lumbar spinal stenosis     Memory deficit 2018    Muscle pain     Neuropathy     Orthostatic hypotension     asymptomatic    Pacemaker     new pacemaker 2019    Recent change in weight     Risk for falls 2016    Sensory ataxia 2018    Sinus problem     Skin disease     Smoker 2018    SOB (shortness of breath)     Sore throat     SSS (sick sinus syndrome) (HCC)     Stiffness of joints, multiple sites     Stress     Stumbling gait 2018    Syncope     Tiredness     Trouble in sleeping     Weakness        Past Surgical History:   Procedure Laterality Date    HX HIP REPLACEMENT Bilateral     HX PACEMAKER PLACEMENT  10/16/2018    HX ROTATOR CUFF REPAIR Right 2017    [place] CARDIAC SURG PROCEDURE UNLIST      [place] INS NEW/RPLCMT PRM PM W/TRANSV ELTRD ATRIAL&VENT N/A 2019    INSERT PPM DUAL performed by Fidelia Garcia MD at hospitals CARDIAC CATH LAB       Family History   Problem Relation Age of Onset    Cancer Mother     Cancer Maternal Aunt        Social History     Socioeconomic History    Marital status:    Tobacco Use    Smoking status: Former Smoker     Packs/day: 0.25     Years: 50.00     Pack years: 12.50     Types: Cigarettes     Quit date: 8/3/2019     Years since quittin.5    Smokeless tobacco: Never Used   Substance and Sexual Activity    Alcohol use: Not Currently     Alcohol/week: 28.0 standard drinks     Types: 28 Cans of beer per week     Comment: None now    Drug use: No    Sexual activity: Never       Review of Systems   Constitutional: Negative for chills, fever, malaise/fatigue and weight loss. HENT: Negative for congestion, hearing loss, sore throat and tinnitus. Eyes: Negative for blurred vision, pain and discharge. Respiratory: Negative for cough, shortness of breath and wheezing. Cardiovascular: Negative for chest pain, palpitations, orthopnea, claudication and leg swelling. Gastrointestinal: Negative for abdominal pain, constipation and heartburn. Genitourinary: Negative for dysuria, frequency and urgency. Musculoskeletal: Negative for falls, joint pain and myalgias. Skin: Negative for itching and rash. Neurological: Negative for dizziness, tingling, tremors and headaches. Endo/Heme/Allergies: Negative for environmental allergies and polydipsia. Psychiatric/Behavioral: Negative for depression and substance abuse. The patient is not nervous/anxious. Visit Vitals  /81 (BP 1 Location: Left arm)   Pulse 90   Temp 98.7 °F (37.1 °C)   Resp 20   Ht 5' 11\" (1.803 m)   Wt 175 lb (79.4 kg)   SpO2 97%   BMI 24.41 kg/m²     Physical Exam  Vitals reviewed. Constitutional:       Appearance: Normal appearance. He is normal weight. He is not ill-appearing. HENT:      Head: Normocephalic and atraumatic. Right Ear: Tympanic membrane, ear canal and external ear normal.      Left Ear: Tympanic membrane, ear canal and external ear normal.      Nose: Nose normal. No congestion or rhinorrhea. Mouth/Throat:      Mouth: Mucous membranes are moist.      Pharynx: No oropharyngeal exudate or posterior oropharyngeal erythema. Eyes:      Extraocular Movements: Extraocular movements intact. Conjunctiva/sclera: Conjunctivae normal.      Pupils: Pupils are equal, round, and reactive to light. Comments: Pupil iris and anterior chamber normal.   Neck:      Trachea: No tracheal deviation. Cardiovascular:      Rate and Rhythm: Normal rate and regular rhythm. Pulses: Normal pulses. Heart sounds: Normal heart sounds. No murmur heard. No friction rub. No gallop. Pulmonary:      Effort: Pulmonary effort is normal. No respiratory distress. Breath sounds: Normal breath sounds.  No wheezing, rhonchi or rales. Chest:      Chest wall: No tenderness. Abdominal:      General: Bowel sounds are normal. There is no distension. Palpations: Abdomen is soft. There is no mass. Tenderness: There is no abdominal tenderness. There is no guarding or rebound. Hernia: No hernia is present. Musculoskeletal:         General: No tenderness. Normal range of motion. Cervical back: Normal range of motion and neck supple. Right lower leg: No edema. Left lower leg: No edema. Lymphadenopathy:      Cervical: No cervical adenopathy. Skin:     General: Skin is warm and dry. Findings: No erythema or rash. Neurological:      General: No focal deficit present. Mental Status: He is alert and oriented to person, place, and time. Cranial Nerves: No cranial nerve deficit. Motor: No abnormal muscle tone. Deep Tendon Reflexes: Reflexes are normal and symmetric. Reflexes normal.      Comments: Cranial nerves II through XII intact sensory and motor. Deep tendon reflexes in the biceps triceps knee and ankle are normal and bilaterally symmetrical.   Psychiatric:         Mood and Affect: Mood normal.         Behavior: Behavior normal.         Thought Content: Thought content normal.         Judgment: Judgment normal.           ICD-10-CM ICD-9-CM    1. PTSD (post-traumatic stress disorder)  F43.10 309.81 clonazePAM (KlonoPIN) 0.5 mg tablet   2. Alcoholism in remission ([facility]  F10.21 303.93        Orders Placed This Encounter    clonazePAM (KlonoPIN) 0.5 mg tablet     Sig: Take 1 Tablet by mouth nightly. Max Daily Amount: 0.5 mg.     Dispense:  90 Tablet     Refill:  0       Follow-up and Dispositions    · Return in about 3 months (around 5/23/2022).          Mindy Rubalcava MD

## 2022-02-23 NOTE — PROGRESS NOTES
1. Have you been to the ER, urgent care clinic since your last visit? Hospitalized since your last visit?no    2. Have you seen or consulted any other health care providers outside of the [address] [place] [address] since your last visit? Include any pap smears or colon screening.  yes

## 2022-03-19 PROBLEM — M48.02 DEGENERATIVE CERVICAL SPINAL STENOSIS: Status: ACTIVE | Noted: 2018-11-09

## 2022-03-19 PROBLEM — M48.061 DEGENERATIVE LUMBAR SPINAL STENOSIS: Status: ACTIVE | Noted: 2018-11-09

## 2022-03-19 PROBLEM — H35.3130 AGE-RELATED MACULAR DEGENERATION, DRY, BOTH EYES: Status: ACTIVE | Noted: 2018-09-14

## 2022-03-19 PROBLEM — Z98.890 S/P RIGHT ROTATOR CUFF REPAIR: Status: ACTIVE | Noted: 2017-12-01

## 2022-03-19 PROBLEM — I95.1 AUTONOMIC ORTHOSTATIC HYPOTENSION: Status: ACTIVE | Noted: 2019-10-17

## 2022-03-19 PROBLEM — G60.8 IDIOPATHIC SMALL AND LARGE FIBER SENSORY NEUROPATHY: Status: ACTIVE | Noted: 2018-11-09

## 2022-03-19 PROBLEM — Z98.890 HISTORY OF LUMBAR LAMINECTOMY: Status: ACTIVE | Noted: 2018-11-09

## 2022-03-19 PROBLEM — I65.23 BILATERAL CAROTID ARTERY STENOSIS: Status: ACTIVE | Noted: 2019-09-30

## 2022-03-19 PROBLEM — I45.9 HEART BLOCK: Status: ACTIVE | Noted: 2019-07-30

## 2022-03-20 PROBLEM — R41.3 MEMORY DISTURBANCE: Status: ACTIVE | Noted: 2018-11-09

## 2022-03-20 PROBLEM — Z96.643 S/P HIP REPLACEMENT, BILATERAL: Status: ACTIVE | Noted: 2017-10-18

## 2022-03-20 PROBLEM — N13.8 BPH WITH OBSTRUCTION/LOWER URINARY TRACT SYMPTOMS: Status: ACTIVE | Noted: 2018-04-19

## 2022-03-20 PROBLEM — N40.1 BPH WITH OBSTRUCTION/LOWER URINARY TRACT SYMPTOMS: Status: ACTIVE | Noted: 2018-04-19

## 2022-05-24 ENCOUNTER — TELEPHONE (OUTPATIENT)
Dept: FAMILY MEDICINE CLINIC | Age: 77
End: 2022-05-24

## 2022-05-24 NOTE — TELEPHONE ENCOUNTER
Per Vitaly Jimenez, she sent [employer] [email or web address])t under her name saying Mr. Lee needs appt.  Please call him to schedule follow up

## 2022-05-26 ENCOUNTER — OFFICE VISIT (OUTPATIENT)
Dept: FAMILY MEDICINE CLINIC | Age: 77
End: 2022-05-26
Payer: OTHER GOVERNMENT

## 2022-05-26 VITALS
DIASTOLIC BLOOD PRESSURE: 85 MMHG | WEIGHT: 172 LBS | SYSTOLIC BLOOD PRESSURE: 136 MMHG | RESPIRATION RATE: 20 BRPM | HEART RATE: 87 BPM | BODY MASS INDEX: 24.08 KG/M2 | TEMPERATURE: 97.8 F | HEIGHT: 71 IN

## 2022-05-26 DIAGNOSIS — F43.10 PTSD (POST-TRAUMATIC STRESS DISORDER): ICD-10-CM

## 2022-05-26 DIAGNOSIS — F10.21 ALCOHOLISM IN REMISSION (HCC): Primary | ICD-10-CM

## 2022-05-26 PROCEDURE — 1123F ACP DISCUSS/DSCN MKR DOCD: CPT | Performed by: FAMILY MEDICINE

## 2022-05-26 PROCEDURE — 99213 OFFICE O/P EST LOW 20 MIN: CPT | Performed by: FAMILY MEDICINE

## 2022-05-26 RX ORDER — CLONAZEPAM 0.5 MG/1
0.5 TABLET ORAL
Qty: 90 TABLET | Refills: 0 | Status: SHIPPED | OUTPATIENT
Start: 2022-05-26 | End: 2022-08-22 | Stop reason: SDUPTHER

## 2022-05-26 NOTE — PROGRESS NOTES
1. Have you been to the ER, urgent care clinic since your last visit? Hospitalized since your last visit?no    2. Have you seen or consulted any other health care providers outside of the [address] [place] [address] since your last visit? Include any pap smears or colon screening.  Yes

## 2022-05-26 NOTE — PROGRESS NOTES
Manolo Brown is a 77 y.o. male who presents with the following:  Chief Complaint   Patient presents with    Anxiety       Patient with anxiety related to his PTSD from his [facility] service and who has alcoholism in remission comes in today for refill of his clonazepam which he is down to half a milligram tablet taken at bedtime and that seems to hold him throughout the day along with his Cymbalta 60 mg daily. Patient still uses an occasional gabapentin up to 300 mg 3 times a day for his arthritis and back pain. The patient is showing no signs of diversion and is showing no aberrant behavior. No Known Allergies    Current Outpatient Medications   Medication Sig    clonazePAM (KlonoPIN) 0.5 mg tablet Take 1 Tablet by mouth nightly. Max Daily Amount: 0.5 mg.    gabapentin (NEURONTIN) 600 mg tablet Take 0.5 Tabs by mouth three (3) times daily. Max Daily Amount: 900 mg.  pyridostigmine bromide 30 mg tab Take 30 mg by mouth three (3) times daily.  fludrocortisone (FLORINEF) 0.1 mg tablet TAKE 1 TABLET BY MOUTH TWICE DAILY    midodrine (PROAMITINE) 5 mg tablet Take 1 Tab by mouth daily (with dinner).  DULoxetine (CYMBALTA) 60 mg capsule TAKE 1 CAPSULE BY MOUTH DAILY    acetaminophen 500 mg tab 500 mg, diphenhydrAMINE 25 mg cap 25 mg Take 1 Dose by mouth nightly as needed for Other (sleep/pain relief).  acetaminophen (TYLENOL 8 HOUR) 650 mg TbER Take 650 mg by mouth every eight (8) hours as needed for Pain.  aspirin 81 mg chewable tablet Take 1 Tab by mouth daily.  finasteride (PROSCAR) 5 mg tablet TAKE 1 TABLET BY MOUTH DAILY(PROSTATE)    guaiFENesin ER (MUCINEX) 600 mg ER tablet Take 1 Tab by mouth every twelve (12) hours. (Patient taking differently: Take 600 mg by mouth every twelve (12) hours. As needed)     No current facility-administered medications for this visit.        Past Medical History:   Diagnosis Date    Altered mental status, unspecified 11/9/2018    Anxiety     Ataxia 2018    Back pain     Blurred vision     Bradycardia 10/2/2018    Chest pain     Depression     Dizziness     Dizziness and giddiness 10/17/2019    Dysautonomia ([address]     Fast heart beat     Fever 2018    Frequent headaches     Frequent urination     Hip dysplasia, congenital     Hx of syncope 10/24/2018    Hypotension     Ingrown left big toenail 3/31/2017    Joint pain     Joint swelling     Lumbar spinal stenosis     Memory deficit 2018    Muscle pain     Neuropathy     Orthostatic hypotension     asymptomatic    Pacemaker     new pacemaker 2019    Recent change in weight     Risk for falls 2016    Sensory ataxia 2018    Sinus problem     Skin disease     Smoker 2018    SOB (shortness of breath)     Sore throat     SSS (sick sinus syndrome) (HCC)     Stiffness of joints, multiple sites     Stress     Stumbling gait 2018    Syncope     Tiredness     Trouble in sleeping     Weakness        Past Surgical History:   Procedure Laterality Date    HX HIP REPLACEMENT Bilateral     HX PACEMAKER PLACEMENT  10/16/2018    HX ROTATOR CUFF REPAIR Right 2017    [place] CARDIAC SURG PROCEDURE UNLIST      [place] INS NEW/RPLCMT PRM PM W/TRANSV ELTRD ATRIAL&VENT N/A 2019    INSERT PPM DUAL performed by Evelio Trujillo MD at [facility] CARDIAC CATH LAB       Family History   Problem Relation Age of Onset    Cancer Mother     Cancer Maternal Aunt        Social History     Socioeconomic History    Marital status:    Tobacco Use    Smoking status: Former Smoker     Packs/day: 0.25     Years: 50.00     Pack years: 12.50     Types: Cigarettes     Quit date: 8/3/2019     Years since quittin.8    Smokeless tobacco: Never Used   Substance and Sexual Activity    Alcohol use: Not Currently     Alcohol/week: 28.0 standard drinks     Types: 28 Cans of beer per week     Comment: None now    Drug use: No    Sexual activity: Never Review of Systems   Constitutional: Negative for chills, fever, malaise/fatigue and weight loss. HENT: Negative for congestion, hearing loss, sore throat and tinnitus. Eyes: Negative for blurred vision, pain and discharge. Respiratory: Negative for cough, shortness of breath and wheezing. Cardiovascular: Negative for chest pain, palpitations, orthopnea, claudication and leg swelling. Gastrointestinal: Negative for abdominal pain, constipation and heartburn. Genitourinary: Negative for dysuria, frequency and urgency. Musculoskeletal: Positive for back pain and joint pain. Negative for falls and myalgias. Skin: Negative for itching and rash. Neurological: Negative for dizziness, tingling, tremors and headaches. Endo/Heme/Allergies: Negative for environmental allergies and polydipsia. Psychiatric/Behavioral: Negative for depression and substance abuse. The patient is nervous/anxious. Visit Vitals  /85 (BP 1 Location: Left arm)   Pulse 87   Temp 97.8 °F (36.6 °C)   Resp 20   Ht 5' 11\" (1.803 m)   Wt 172 lb (78 kg)   BMI 23.99 kg/m²     Physical Exam  Vitals reviewed. Constitutional:       General: He is not in acute distress. Appearance: Normal appearance. He is normal weight. He is not ill-appearing. HENT:      Head: Normocephalic and atraumatic. Right Ear: Tympanic membrane, ear canal and external ear normal.      Left Ear: Tympanic membrane, ear canal and external ear normal.      Nose: Nose normal. No congestion or rhinorrhea. Mouth/Throat:      Mouth: Mucous membranes are moist.      Pharynx: No oropharyngeal exudate or posterior oropharyngeal erythema. Eyes:      Extraocular Movements: Extraocular movements intact. Conjunctiva/sclera: Conjunctivae normal.      Pupils: Pupils are equal, round, and reactive to light. Comments: Pupil iris and anterior chamber normal.   Neck:      Trachea: No tracheal deviation.    Cardiovascular:      Rate and Rhythm: Normal rate and regular rhythm. Pulses: Normal pulses. Heart sounds: Normal heart sounds. No murmur heard. No friction rub. No gallop. Pulmonary:      Effort: Pulmonary effort is normal. No respiratory distress. Breath sounds: Normal breath sounds. No wheezing, rhonchi or rales. Chest:      Chest wall: No tenderness. Abdominal:      General: Bowel sounds are normal. There is no distension. Palpations: Abdomen is soft. There is no mass. Tenderness: There is no abdominal tenderness. There is no guarding or rebound. Hernia: No hernia is present. Musculoskeletal:         General: No tenderness. Normal range of motion. Cervical back: Normal range of motion and neck supple. Right lower leg: No edema. Left lower leg: No edema. Lymphadenopathy:      Cervical: No cervical adenopathy. Skin:     General: Skin is warm and dry. Findings: No erythema or rash. Neurological:      General: No focal deficit present. Mental Status: He is alert and oriented to person, place, and time. Cranial Nerves: No cranial nerve deficit. Motor: No abnormal muscle tone. Deep Tendon Reflexes: Reflexes are normal and symmetric. Reflexes normal.      Comments: Cranial nerves II through XII intact sensory and motor. Deep tendon reflexes in the biceps triceps knee and ankle are normal and bilaterally symmetrical.   Psychiatric:         Mood and Affect: Mood normal.         Behavior: Behavior normal.         Thought Content: Thought content normal.         Judgment: Judgment normal.           ICD-10-CM ICD-9-CM    1. Alcoholism in remission ([facility]  F10.21 303.93    2. PTSD (post-traumatic stress disorder)  F43.10 309.81 clonazePAM (KlonoPIN) 0.5 mg tablet       Orders Placed This Encounter    clonazePAM (KlonoPIN) 0.5 mg tablet     Sig: Take 1 Tablet by mouth nightly.  Max Daily Amount: 0.5 mg.     Dispense:  90 Tablet     Refill:  0       Follow-up and Dispositions    · Return in about 3 months (around 8/26/2022), or if symptoms worsen or fail to improve.          Maris Herrera MD

## 2022-05-30 ENCOUNTER — TELEPHONE (OUTPATIENT)
Dept: FAMILY MEDICINE CLINIC | Age: 77
End: 2022-05-30

## 2022-05-30 NOTE — TELEPHONE ENCOUNTER
Contacted on call. Pt had normal breakfast today with cup of coffee. He was out to pickup some meds today, and on way back into the house today felt dizzy, around 12:30p. Couldn't walk by himself. Spouse helped him in, got him sat down, got him a sip of water. BP was low on 12:50pm check (sitting) today at 67/45. p85. Then took midodrine 10mg at 1:15p today. After that, BP's wouldn't register. PT instructed to lie down with his feet elevated. BP rechecked, showed 120/76, p76. Pt felt a lot better. Rec to drink a quart of fluids over the next 1.5hrs, and have a light snack, and get another quart of water down before dinner. Rec to call his cardio soon, may be able to interrogate his pacer for a rhythm check too.

## 2022-06-13 ENCOUNTER — TELEPHONE (OUTPATIENT)
Dept: FAMILY MEDICINE CLINIC | Age: 77
End: 2022-06-13

## 2022-06-13 NOTE — TELEPHONE ENCOUNTER
Pt called back, not sure if he is going to do the procedure if he decides to he will call us back to schedule

## 2022-06-13 NOTE — TELEPHONE ENCOUNTER
lvm for pt to call back to set up an appt with Dr. Chuy Machado to get medical clearance for a tooth extraction. Cecil also states their office does not have a pre op form or a medical clearance form of their own. If Dr. Chuy Machado does approve of the extraction he will have to put that in the pt's OV notes.

## 2022-06-27 ENCOUNTER — HOSPITAL ENCOUNTER (EMERGENCY)
Age: 77
Discharge: HOME | End: 2022-06-27
Attending: EMERGENCY MEDICINE
Payer: COMMERCIAL

## 2022-06-27 VITALS
RESPIRATION RATE: 13 BRPM | DIASTOLIC BLOOD PRESSURE: 93 MMHG | WEIGHT: 170 LBS | BODY MASS INDEX: 23.8 KG/M2 | HEIGHT: 71 IN | HEART RATE: 80 BPM | OXYGEN SATURATION: 98 % | TEMPERATURE: 98 F | SYSTOLIC BLOOD PRESSURE: 170 MMHG

## 2022-06-27 DIAGNOSIS — R03.0 ELEVATED BLOOD PRESSURE READING: ICD-10-CM

## 2022-06-27 DIAGNOSIS — E86.0 DEHYDRATION: ICD-10-CM

## 2022-06-27 DIAGNOSIS — T67.5XXA HEAT EXHAUSTION, INITIAL ENCOUNTER: Primary | ICD-10-CM

## 2022-06-27 LAB
ALBUMIN SERPL-MCNC: 3.5 G/DL (ref 3.5–5)
ALBUMIN/GLOB SERPL: 1.2 {RATIO} (ref 1.1–2.2)
ALP SERPL-CCNC: 90 U/L (ref 45–117)
ALT SERPL-CCNC: 21 U/L (ref 12–78)
ANION GAP SERPL CALC-SCNC: 10 MMOL/L (ref 5–15)
AST SERPL-CCNC: 13 U/L (ref 15–37)
BASOPHILS # BLD: 0 K/UL (ref 0–0.1)
BASOPHILS NFR BLD: 1 % (ref 0–1)
BILIRUB SERPL-MCNC: 0.7 MG/DL (ref 0.2–1)
BUN SERPL-MCNC: 23 MG/DL (ref 6–20)
BUN/CREAT SERPL: 19 (ref 12–20)
CALCIUM SERPL-MCNC: 8.5 MG/DL (ref 8.5–10.1)
CHLORIDE SERPL-SCNC: 106 MMOL/L (ref 97–108)
CO2 SERPL-SCNC: 27 MMOL/L (ref 21–32)
CREAT SERPL-MCNC: 1.24 MG/DL (ref 0.7–1.3)
DIFFERENTIAL METHOD BLD: ABNORMAL
EOSINOPHIL # BLD: 0.1 K/UL (ref 0–0.4)
EOSINOPHIL NFR BLD: 2 % (ref 0–7)
ERYTHROCYTE [DISTWIDTH] IN BLOOD BY AUTOMATED COUNT: 13.2 % (ref 11.5–14.5)
ETHANOL SERPL-MCNC: <10 MG/DL
GLOBULIN SER CALC-MCNC: 3 G/DL (ref 2–4)
GLUCOSE SERPL-MCNC: 85 MG/DL (ref 65–100)
HCT VFR BLD AUTO: 37.5 % (ref 36.6–50.3)
HGB BLD-MCNC: 12.3 G/DL (ref 12.1–17)
IMM GRANULOCYTES # BLD AUTO: 0.1 K/UL (ref 0–0.04)
IMM GRANULOCYTES NFR BLD AUTO: 1 % (ref 0–0.5)
LYMPHOCYTES # BLD: 1.3 K/UL (ref 0.8–3.5)
LYMPHOCYTES NFR BLD: 19 % (ref 12–49)
MAGNESIUM SERPL-MCNC: 2.1 MG/DL (ref 1.6–2.4)
MCH RBC QN AUTO: 31.5 PG (ref 26–34)
MCHC RBC AUTO-ENTMCNC: 32.8 G/DL (ref 30–36.5)
MCV RBC AUTO: 95.9 FL (ref 80–99)
MONOCYTES # BLD: 0.5 K/UL (ref 0–1)
MONOCYTES NFR BLD: 8 % (ref 5–13)
NEUTS SEG # BLD: 4.8 K/UL (ref 1.8–8)
NEUTS SEG NFR BLD: 69 % (ref 32–75)
NRBC # BLD: 0 K/UL (ref 0–0.01)
NRBC BLD-RTO: 0 PER 100 WBC
PLATELET # BLD AUTO: 164 K/UL (ref 150–400)
PMV BLD AUTO: 9.1 FL (ref 8.9–12.9)
POTASSIUM SERPL-SCNC: 4 MMOL/L (ref 3.5–5.1)
PROT SERPL-MCNC: 6.5 G/DL (ref 6.4–8.2)
RBC # BLD AUTO: 3.91 M/UL (ref 4.1–5.7)
SODIUM SERPL-SCNC: 143 MMOL/L (ref 136–145)
WBC # BLD AUTO: 6.8 K/UL (ref 4.1–11.1)

## 2022-06-27 PROCEDURE — 80053 COMPREHEN METABOLIC PANEL: CPT

## 2022-06-27 PROCEDURE — 82077 ASSAY SPEC XCP UR&BREATH IA: CPT

## 2022-06-27 PROCEDURE — 96361 HYDRATE IV INFUSION ADD-ON: CPT

## 2022-06-27 PROCEDURE — 99284 EMERGENCY DEPT VISIT MOD MDM: CPT

## 2022-06-27 PROCEDURE — 93005 ELECTROCARDIOGRAM TRACING: CPT

## 2022-06-27 PROCEDURE — 83735 ASSAY OF MAGNESIUM: CPT

## 2022-06-27 PROCEDURE — 96360 HYDRATION IV INFUSION INIT: CPT

## 2022-06-27 PROCEDURE — 74011250636 HC RX REV CODE- 250/636: Performed by: EMERGENCY MEDICINE

## 2022-06-27 PROCEDURE — 85025 COMPLETE CBC W/AUTO DIFF WBC: CPT

## 2022-06-27 PROCEDURE — 36415 COLL VENOUS BLD VENIPUNCTURE: CPT

## 2022-06-27 RX ORDER — SODIUM CHLORIDE 0.9 % (FLUSH) 0.9 %
5-10 SYRINGE (ML) INJECTION ONCE
Status: DISCONTINUED | OUTPATIENT
Start: 2022-06-27 | End: 2022-06-27 | Stop reason: HOSPADM

## 2022-06-27 RX ADMIN — SODIUM CHLORIDE 1000 ML: 9 INJECTION, SOLUTION INTRAVENOUS at 15:46

## 2022-06-27 RX ADMIN — SODIUM CHLORIDE 1000 ML: 9 INJECTION, SOLUTION INTRAVENOUS at 14:47

## 2022-06-27 NOTE — ED PROVIDER NOTES
EMERGENCY DEPARTMENT HISTORY AND PHYSICAL EXAM          Date: 6/27/2022  Patient Name: Priti Davis    History of Presenting Illness     Chief Complaint   Patient presents with    Heat Exposure       History Provided By: Patient and EMS    HPI: Priti Davis is a 69 y.o. male, pmhx sick sinus syndrome with a pacemaker, dysautonomia, memory deficits, who presents the EMS to the ED c/o heat exposure    She explains he was outside mowing his lawn when he went off the mower to walk into the house and get something to drink he remembers walking around the corner running into his wife. His wife told him he did not look well and she made him sit down. Patient felt hot and so he went to the house to get something to drink. He states he did not pass out but based on how it looked his wife called ambulance. Medics note that he appeared awake and alert and acting appropriately on their arrival.  His vital signs were normal and they transported him without event. Patient does not remember how long he was outside. He denies any fevers, chills, chest pain, shortness of breath, abdominal pain, cramping, nausea, vomiting and recent diarrhea. PCP: Tyler Molina MD    Allergies: None known  Social Hx: -tobacco, -vaping, +EtOH, -Illicit Drugs; There are no other complaints, changes, or physical findings at this time. Current Facility-Administered Medications   Medication Dose Route Frequency Provider Last Rate Last Admin    sodium chloride (NS) flush 5-10 mL  5-10 mL IntraVENous ONCE TermeerNewton MD         Current Outpatient Medications   Medication Sig Dispense Refill    gabapentin (NEURONTIN) 600 mg tablet Take 0.5 Tabs by mouth three (3) times daily. Max Daily Amount: 900 mg. (Patient taking differently: Take 300 mg by mouth two (2) times a day.) 135 Tab 0    pyridostigmine bromide 30 mg tab Take 30 mg by mouth three (3) times daily.  270 Tab 1    fludrocortisone (FLORINEF) 0.1 mg tablet TAKE 1 TABLET BY MOUTH TWICE DAILY 28 Tab 3    DULoxetine (CYMBALTA) 60 mg capsule TAKE 1 CAPSULE BY MOUTH DAILY 90 Cap 1    finasteride (PROSCAR) 5 mg tablet TAKE 1 TABLET BY MOUTH DAILY(PROSTATE) 90 Tab 0    clonazePAM (KlonoPIN) 0.5 mg tablet Take 1 Tablet by mouth nightly. Max Daily Amount: 0.5 mg. 90 Tablet 0    midodrine (PROAMITINE) 5 mg tablet Take 1 Tab by mouth daily (with dinner). 90 Tab 0    acetaminophen 500 mg tab 500 mg, diphenhydrAMINE 25 mg cap 25 mg Take 1 Dose by mouth nightly as needed for Other (sleep/pain relief).  acetaminophen (TYLENOL 8 HOUR) 650 mg TbER Take 650 mg by mouth every eight (8) hours as needed for Pain.  aspirin 81 mg chewable tablet Take 1 Tab by mouth daily. 90 Tab 2    guaiFENesin ER (MUCINEX) 600 mg ER tablet Take 1 Tab by mouth every twelve (12) hours. (Patient taking differently: Take 600 mg by mouth every twelve (12) hours.  As needed) 30 Tab 0       Past History     Past Medical History:  Past Medical History:   Diagnosis Date    Altered mental status, unspecified 11/9/2018    Anxiety     Ataxia 11/9/2018    Back pain     Blurred vision     Bradycardia 10/2/2018    Chest pain     Depression     Dizziness     Dizziness and giddiness 10/17/2019    Dysautonomia ([address]     Fast heart beat     Fever 11/9/2018    Frequent headaches     Frequent urination     Hip dysplasia, congenital     Hx of syncope 10/24/2018    Hypotension     Ingrown left big toenail 3/31/2017    Joint pain     Joint swelling     Lumbar spinal stenosis     Memory deficit 9/11/2018    Muscle pain     Neuropathy     Orthostatic hypotension     asymptomatic    Pacemaker     new pacemaker July 2019    Recent change in weight     Risk for falls 12/13/2016    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    Sinus problem     Skin disease     Smoker 1/18/2018    SOB (shortness of breath)     Sore throat     SSS (sick sinus syndrome) (HCC)     Stiffness of joints, multiple sites     Stress  Stumbling gait 2018    Syncope     Tiredness     Trouble in sleeping     Weakness        Past Surgical History:  Past Surgical History:   Procedure Laterality Date    HX HIP REPLACEMENT Bilateral     HX PACEMAKER PLACEMENT  10/16/2018    HX ROTATOR CUFF REPAIR Right 2017    TX CARDIAC SURG PROCEDURE UNLIST      TX INS NEW/RPLCMT PRM PM W/TRANSV ELTRD ATRIAL&VENT N/A 2019    INSERT PPM DUAL performed by Sharri Carranza MD at [facility] CARDIAC CATH LAB       Family History:  Family History   Problem Relation Age of Onset    Cancer Mother     Cancer Maternal Aunt        Social History:  Social History     Tobacco Use    Smoking status: Former Smoker     Packs/day: 0.25     Years: 50.00     Pack years: 12.50     Types: Cigarettes     Quit date: 8/3/2019     Years since quittin.9    Smokeless tobacco: Never Used   Substance Use Topics    Alcohol use: Not Currently     Alcohol/week: 28.0 standard drinks     Types: 28 Cans of beer per week     Comment: None now    Drug use: No       Allergies:  No Known Allergies      Review of Systems   Review of Systems   Constitutional: Negative for activity change, appetite change, chills, fever and unexpected weight change. HENT: Negative for congestion. Eyes: Negative for pain and visual disturbance. Respiratory: Negative for cough and shortness of breath. Cardiovascular: Negative for chest pain. Gastrointestinal: Negative for abdominal pain, diarrhea, nausea and vomiting. Genitourinary: Negative for dysuria. Musculoskeletal: Negative for back pain. Skin: Negative for rash. Neurological: Positive for light-headedness (He admits to feeling lightheaded when he got off the mower). Negative for headaches. Physical Exam   Physical Exam  Vitals and nursing note reviewed. Constitutional:       Appearance: He is well-developed. He is not diaphoretic.       Comments: Healthy-appearing elderly male, covered in sweat, with normal vital signs, in mild acute distress   HENT:      Head: Normocephalic and atraumatic. Eyes:      General:         Right eye: No discharge. Left eye: No discharge. Conjunctiva/sclera: Conjunctivae normal.      Pupils: Pupils are equal, round, and reactive to light. Cardiovascular:      Rate and Rhythm: Normal rate and regular rhythm. Heart sounds: Normal heart sounds. No murmur heard. Pulmonary:      Effort: Pulmonary effort is normal. No respiratory distress. Breath sounds: Normal breath sounds. No wheezing or rales. Abdominal:      General: Bowel sounds are normal. There is no distension. Palpations: Abdomen is soft. There is no mass. Tenderness: There is no abdominal tenderness. There is no guarding or rebound. Hernia: No hernia is present. Musculoskeletal:         General: Normal range of motion. Cervical back: Normal range of motion and neck supple. Skin:     General: Skin is warm. Coloration: Skin is not pale. Findings: No erythema or rash. Neurological:      Mental Status: He is alert and oriented to person, place, and time. Cranial Nerves: No cranial nerve deficit. Motor: No abnormal muscle tone. Diagnostic Study Results     Labs -     Recent Results (from the past 12 hour(s))   CBC WITH AUTOMATED DIFF    Collection Time: 06/27/22  2:49 PM   Result Value Ref Range    WBC 6.8 4.1 - 11.1 K/uL    RBC 3.91 (L) 4.10 - 5.70 M/uL    HGB 12.3 12.1 - 17.0 g/dL    HCT 37.5 36.6 - 50.3 %    MCV 95.9 80.0 - 99.0 FL    MCH 31.5 26.0 - 34.0 PG    MCHC 32.8 30.0 - 36.5 g/dL    RDW 13.2 11.5 - 14.5 %    PLATELET 788 512 - 125 K/uL    MPV 9.1 8.9 - 12.9 FL    NRBC 0.0 0  WBC    ABSOLUTE NRBC 0.00 0.00 - 0.01 K/uL    NEUTROPHILS 69 32 - 75 %    LYMPHOCYTES 19 12 - 49 %    MONOCYTES 8 5 - 13 %    EOSINOPHILS 2 0 - 7 %    BASOPHILS 1 0 - 1 %    IMMATURE GRANULOCYTES 1 (H) 0.0 - 0.5 %    ABS. NEUTROPHILS 4.8 1.8 - 8.0 K/UL    ABS. LYMPHOCYTES 1.3 0.8 - 3.5 K/UL    ABS. MONOCYTES 0.5 0.0 - 1.0 K/UL    ABS. EOSINOPHILS 0.1 0.0 - 0.4 K/UL    ABS. BASOPHILS 0.0 0.0 - 0.1 K/UL    ABS. IMM. GRANS. 0.1 (H) 0.00 - 0.04 K/UL    DF AUTOMATED     METABOLIC PANEL, COMPREHENSIVE    Collection Time: 06/27/22  2:49 PM   Result Value Ref Range    Sodium 143 136 - 145 mmol/L    Potassium 4.0 3.5 - 5.1 mmol/L    Chloride 106 97 - 108 mmol/L    CO2 27 21 - 32 mmol/L    Anion gap 10 5 - 15 mmol/L    Glucose 85 65 - 100 mg/dL    BUN 23 (H) 6 - 20 MG/DL    Creatinine 1.24 0.70 - 1.30 MG/DL    BUN/Creatinine ratio 19 12 - 20      GFR est AA >60 >60 ml/min/1.73m2    GFR est non-AA 57 (L) >60 ml/min/1.73m2    Calcium 8.5 8.5 - 10.1 MG/DL    Bilirubin, total 0.7 0.2 - 1.0 MG/DL    ALT (SGPT) 21 12 - 78 U/L    AST (SGOT) 13 (L) 15 - 37 U/L    Alk. phosphatase 90 45 - 117 U/L    Protein, total 6.5 6.4 - 8.2 g/dL    Albumin 3.5 3.5 - 5.0 g/dL    Globulin 3.0 2.0 - 4.0 g/dL    A-G Ratio 1.2 1.1 - 2.2     MAGNESIUM    Collection Time: 06/27/22  2:49 PM   Result Value Ref Range    Magnesium 2.1 1.6 - 2.4 mg/dL   ETHYL ALCOHOL    Collection Time: 06/27/22  2:49 PM   Result Value Ref Range    ALCOHOL(ETHYL),SERUM <10 <10 MG/DL       Radiologic Studies -   No orders to display     CT Results  (Last 48 hours)    None        CXR Results  (Last 48 hours)    None            Medical Decision Making   I am the first provider for this patient. I reviewed the vital signs, available nursing notes, past medical history, past surgical history, family history and social history. Vital Signs-Reviewed the patient's vital signs.   Patient Vitals for the past 12 hrs:   Temp Pulse Resp BP SpO2   06/27/22 1541 -- 80 10 -- 97 %   06/27/22 1531 -- 79 14 (!) 162/92 97 %   06/27/22 1516 -- 80 14 (!) 162/89 97 %   06/27/22 1501 -- 80 10 (!) 153/86 98 %   06/27/22 1444 -- -- -- -- 98 %   06/27/22 1436 98 °F (36.7 °C) 86 18 135/82 98 %       Pulse Oximetry Analysis - 97% on RA    Cardiac Monitor: Rate: 80bpm  Rhythm: Normal Sinus Rhythm      Records Reviewed: Nursing Notes, Old Medical Records, Previous electrocardiograms, Ambulance Run Sheet, Previous Radiology Studies and Previous Laboratory Studies    Provider Notes (Medical Decision Making):   MDM: Elderly male presents via EMS for possible heat exhaustion. It does not sound as though he passed out although waiting for spouse to verify history. Does seem that he was lightheaded when he got off the mower but was able to ambulate normally and has not had any symptoms of cramping, nausea or vomiting. He is awake and alert with low suspicion for heatstroke. EKG without concerning findings. ED Course:   Initial assessment performed. The patients presenting problems have been discussed, and they are in agreement with the care plan formulated and outlined with them. I have encouraged them to ask questions as they arise throughout their visit. EKG interpretation: (Preliminary)  Rhythm: This rhythm at a rate of 84 bpm; normal NH; normal QRS; normal QTC. There is no evidence of acute ST-T abnormalities. This EKG was interpreted by ED Provider Britt Tarango MD    ED Course as of 06/27/22 1610   Mon Jun 27, 2022   1542 Labs reviewed and although only minimally elevated, his BUN and creatinine are increased from his baseline. Repeat fluid bolus to be provided while continuing on cardiac monitoring. Patient has not had any evidence of arrhythmia since arrival. [JT]   1609 Patient is feeling comfortable and blood pressure stabilized in the 160s. He has been up walking around the ER without any evidence of dizziness or ataxia. Will complete IV fluids as previously discussed and discharged home with outpatient follow-up when fluid infusion has completed [JT]      ED Course User Index  [JT] Kristina Newton MD        Discharge note:  Pt re-evaluated and noted to be feeling better, ready for discharge. Updated pt on all final lab findings. Will follow up as instructed. All questions have been answered, pt voiced understanding and agreement with plan. Specific return precautions provided as well as instructions to return to the ED should sx worsen at any time. Vital signs stable for discharge. Critical Care Time:   0      Diagnosis     Clinical Impression:   1. Heat exhaustion, initial encounter    2. Dehydration    3. Elevated blood pressure reading        PLAN:  1. Current Discharge Medication List        2. Follow-up Information     Follow up With Specialties Details Why Contact Info    Amanda Molina., MD Family Medicine Schedule an appointment as soon as possible for a visit   [address] [facility].      [address] [facility] [address] [facility] Emergency Medicine  If symptoms worsen [address] [phone]        Return to ED if worse     Disposition:  Home       Please note, this dictation was completed with [employer] the [employer] voice recognition software. Quite often unanticipated grammatical, syntax, homophones, and other interpretive errors are inadvertently transcribed by the computer software. Please disregard these errors. Please excuse any errors that have escaped final proof reading.

## 2022-06-27 NOTE — ED TRIAGE NOTES
Pt arrived by EMS after a near syncopal episode after heat exposure. Per EMS pts wife had reported pt was in the garage after cutting the grass and she stated pt was have seizure like activity. Pt denies having a seizure, pt denies LOC and remembers all events, pt report he has a history of low blood pressure and passing out which these symptoms increase when he is out in the heat. Pt arrived awake alert and oriented x4, IV in place with NS infusing. Pt denies pain at this time.

## 2022-06-27 NOTE — ED NOTES
Pt wife has returned with dry clothing for pt. Pt reports feeling better, is using urinal at bedside at this time.

## 2022-06-27 NOTE — ED NOTES
Pt has used urinal at bedside with this RN outside curtain, reports feeling fine, no dizziness, no difficulty. Pt also reports he does not remember drinking any water today.

## 2022-06-28 LAB
ATRIAL RATE: 84 BPM
CALCULATED P AXIS, ECG09: 58 DEGREES
CALCULATED R AXIS, ECG10: 63 DEGREES
CALCULATED T AXIS, ECG11: 58 DEGREES
DIAGNOSIS, 93000: NORMAL
P-R INTERVAL, ECG05: 186 MS
Q-T INTERVAL, ECG07: 360 MS
QRS DURATION, ECG06: 86 MS
QTC CALCULATION (BEZET), ECG08: 425 MS
VENTRICULAR RATE, ECG03: 84 BPM

## 2022-06-30 ENCOUNTER — TELEPHONE (OUTPATIENT)
Dept: FAMILY MEDICINE CLINIC | Age: 77
End: 2022-06-30

## 2022-06-30 NOTE — TELEPHONE ENCOUNTER
Patients wife states patient had a dental appointment today then they went out to lunch where he had an alcoholic drink and later when they got home his blood pressure dropped and he passed out. Patient refused to go to ER. Appointment set for him to see PCP.

## 2022-07-01 ENCOUNTER — OFFICE VISIT (OUTPATIENT)
Dept: FAMILY MEDICINE CLINIC | Age: 77
End: 2022-07-01
Payer: OTHER GOVERNMENT

## 2022-07-01 VITALS
DIASTOLIC BLOOD PRESSURE: 79 MMHG | SYSTOLIC BLOOD PRESSURE: 129 MMHG | OXYGEN SATURATION: 97 % | RESPIRATION RATE: 18 BRPM | WEIGHT: 172.8 LBS | HEART RATE: 99 BPM | TEMPERATURE: 98.2 F | HEIGHT: 71 IN | BODY MASS INDEX: 24.19 KG/M2

## 2022-07-01 DIAGNOSIS — R41.3 MEMORY DISTURBANCE: ICD-10-CM

## 2022-07-01 DIAGNOSIS — F10.21 ALCOHOLISM IN REMISSION (HCC): ICD-10-CM

## 2022-07-01 DIAGNOSIS — G60.8 IDIOPATHIC SMALL AND LARGE FIBER SENSORY NEUROPATHY: ICD-10-CM

## 2022-07-01 DIAGNOSIS — I95.1 AUTONOMIC ORTHOSTATIC HYPOTENSION: Primary | ICD-10-CM

## 2022-07-01 DIAGNOSIS — F43.10 PTSD (POST-TRAUMATIC STRESS DISORDER): ICD-10-CM

## 2022-07-01 PROCEDURE — 1123F ACP DISCUSS/DSCN MKR DOCD: CPT | Performed by: FAMILY MEDICINE

## 2022-07-01 PROCEDURE — 99213 OFFICE O/P EST LOW 20 MIN: CPT | Performed by: FAMILY MEDICINE

## 2022-07-01 NOTE — PROGRESS NOTES
Archana Su is a 69 y.o. male who presents with the following:  Chief Complaint   Patient presents with    Hypotension       Patient has been having difficulties becoming hypotensive because he was dehydrated and had not been listening to his wife tell him over and over again that he needed to drink more water. The patient finally collapsed on top of his wife and fortunately did not cause any great harm to either one of them. The patient has been doing well with his midodrine as far as keeping his blood pressure up but his wife was somewhat concerned that he may be taking too much of his medication however I reassured her that he was within dates on all of his medications and although I am a medicine box set up for each day is not a bad idea he seems to be doing well at this point. Patient's wife felt like they needed counseling regarding geriatrics and having him be a little more receptive to her when she was concerned about him. I spoke to the patient about this and to his wife and stated that I had no idea of any licensed clinical  in the area but as she had a friend in [facility] who is a licensed clinical  she may have connections and be able to guide them to someone that can help with her counseling needs. No Known Allergies    Current Outpatient Medications   Medication Sig    clonazePAM (KlonoPIN) 0.5 mg tablet Take 1 Tablet by mouth nightly. Max Daily Amount: 0.5 mg.    gabapentin (NEURONTIN) 600 mg tablet Take 0.5 Tabs by mouth three (3) times daily. Max Daily Amount: 900 mg. (Patient taking differently: Take 300 mg by mouth two (2) times a day.)    pyridostigmine bromide 30 mg tab Take 30 mg by mouth three (3) times daily.  fludrocortisone (FLORINEF) 0.1 mg tablet TAKE 1 TABLET BY MOUTH TWICE DAILY    midodrine (PROAMITINE) 5 mg tablet Take 1 Tab by mouth daily (with dinner).     DULoxetine (CYMBALTA) 60 mg capsule TAKE 1 CAPSULE BY MOUTH DAILY    acetaminophen 500 mg tab 500 mg, diphenhydrAMINE 25 mg cap 25 mg Take 1 Dose by mouth nightly as needed for Other (sleep/pain relief).  acetaminophen (TYLENOL 8 HOUR) 650 mg TbER Take 650 mg by mouth every eight (8) hours as needed for Pain.  aspirin 81 mg chewable tablet Take 1 Tab by mouth daily.  finasteride (PROSCAR) 5 mg tablet TAKE 1 TABLET BY MOUTH DAILY(PROSTATE)    guaiFENesin ER (MUCINEX) 600 mg ER tablet Take 1 Tab by mouth every twelve (12) hours. (Patient taking differently: Take 600 mg by mouth every twelve (12) hours. As needed)     No current facility-administered medications for this visit.        Past Medical History:   Diagnosis Date    Altered mental status, unspecified 11/9/2018    Anxiety     Ataxia 11/9/2018    Back pain     Blurred vision     Bradycardia 10/2/2018    Chest pain     Depression     Dizziness     Dizziness and giddiness 10/17/2019    Dysautonomia ([address]     Fast heart beat     Fever 11/9/2018    Frequent headaches     Frequent urination     Hip dysplasia, congenital     Hx of syncope 10/24/2018    Hypotension     Ingrown left big toenail 3/31/2017    Joint pain     Joint swelling     Lumbar spinal stenosis     Memory deficit 9/11/2018    Muscle pain     Neuropathy     Orthostatic hypotension     asymptomatic    Pacemaker     new pacemaker July 2019    Recent change in weight     Risk for falls 12/13/2016    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    Sinus problem     Skin disease     Smoker 1/18/2018    SOB (shortness of breath)     Sore throat     SSS (sick sinus syndrome) (HCC)     Stiffness of joints, multiple sites     Stress     Stumbling gait 11/8/2018    Syncope     Tiredness     Trouble in sleeping     Weakness        Past Surgical History:   Procedure Laterality Date    HX HIP REPLACEMENT Bilateral 1993    HX PACEMAKER PLACEMENT  10/16/2018    HX ROTATOR CUFF REPAIR Right 11/16/2017    PA CARDIAC SURG PROCEDURE UNLIST      PA INS NEW/RPLCMT PRM PM W/TRANSV ELTRD ATRIAL&VENT N/A 2019    INSERT PPM DUAL performed by Marcus Alaniz MD at [facility] CARDIAC CATH LAB       Family History   Problem Relation Age of Onset    Cancer Mother     Cancer Maternal Aunt        Social History     Socioeconomic History    Marital status:    Tobacco Use    Smoking status: Former Smoker     Packs/day: 0.25     Years: 50.00     Pack years: 12.50     Types: Cigarettes     Quit date: 8/3/2019     Years since quittin.9    Smokeless tobacco: Never Used   Substance and Sexual Activity    Alcohol use: Not Currently     Alcohol/week: 28.0 standard drinks     Types: 28 Cans of beer per week     Comment: None now    Drug use: No    Sexual activity: Never       Review of Systems   Constitutional: Negative for chills, fever, malaise/fatigue and weight loss. The patient is also started drinking alcohol again and I told him that this was absolutely a bad idea that he had been in remission for a while ago while and alcohol tends to make dehydration worse which would affect his blood pressure and his mentation. HENT: Negative for congestion, hearing loss, sore throat and tinnitus. Eyes: Negative for blurred vision, pain and discharge. Respiratory: Negative for cough, shortness of breath and wheezing. Cardiovascular: Negative for chest pain, palpitations, orthopnea, claudication and leg swelling. Gastrointestinal: Negative for abdominal pain, constipation and heartburn. Genitourinary: Negative for dysuria, frequency and urgency. Musculoskeletal: Negative for falls, joint pain and myalgias. Skin: Negative for itching and rash. Neurological: Negative for dizziness, tingling, tremors and headaches. Endo/Heme/Allergies: Negative for environmental allergies and polydipsia. Psychiatric/Behavioral: Negative for depression and substance abuse. The patient is not nervous/anxious.         Visit Vitals  /79 (BP 1 Location: Left arm)   Pulse 99   Temp 98.2 °F (36.8 °C)   Resp 18   Ht 5' 11\" (1.803 m)   Wt 172 lb 12.8 oz (78.4 kg)   SpO2 97%   BMI 24.10 kg/m²     Physical Exam  Vitals reviewed. Constitutional:       General: He is not in acute distress. Appearance: Normal appearance. He is not ill-appearing. HENT:      Head: Normocephalic and atraumatic. Right Ear: Tympanic membrane, ear canal and external ear normal.      Left Ear: Tympanic membrane, ear canal and external ear normal.      Nose: Nose normal. No congestion or rhinorrhea. Mouth/Throat:      Mouth: Mucous membranes are moist.      Pharynx: No oropharyngeal exudate or posterior oropharyngeal erythema. Eyes:      Extraocular Movements: Extraocular movements intact. Conjunctiva/sclera: Conjunctivae normal.      Pupils: Pupils are equal, round, and reactive to light. Comments: Pupil iris and anterior chamber normal.   Neck:      Trachea: No tracheal deviation. Cardiovascular:      Rate and Rhythm: Normal rate and regular rhythm. Pulses: Normal pulses. Heart sounds: Normal heart sounds. No murmur heard. No friction rub. No gallop. Pulmonary:      Effort: Pulmonary effort is normal. No respiratory distress. Breath sounds: Normal breath sounds. No wheezing, rhonchi or rales. Chest:      Chest wall: No tenderness. Abdominal:      General: Bowel sounds are normal. There is no distension. Palpations: Abdomen is soft. There is no mass. Tenderness: There is no abdominal tenderness. There is no guarding or rebound. Hernia: No hernia is present. Musculoskeletal:         General: No tenderness. Normal range of motion. Cervical back: Normal range of motion and neck supple. Lymphadenopathy:      Cervical: No cervical adenopathy. Skin:     General: Skin is warm and dry. Findings: No erythema or rash. Neurological:      General: No focal deficit present. Mental Status: He is alert.       Cranial Nerves: No cranial nerve deficit. Motor: No abnormal muscle tone. Coordination: Coordination abnormal.      Deep Tendon Reflexes: Reflexes are normal and symmetric. Reflexes normal.      Comments: Cranial nerves II through XII intact sensory and motor. Deep tendon reflexes in the biceps triceps knee and ankle are normal and bilaterally symmetrical.   Psychiatric:         Mood and Affect: Mood normal.         Behavior: Behavior normal.         Thought Content: Thought content normal.           ICD-10-CM ICD-9-CM    1. Autonomic orthostatic hypotension  I95.1 458.0    2. Idiopathic small and large fiber sensory neuropathy  G60.8 356.4    3. Alcoholism in remission ([facility] [address]  F10.21 303.93    4. Memory disturbance  R41.3 780.93    5. PTSD (post-traumatic stress disorder)  F43.10 309.81        No orders of the defined types were placed in this encounter. Follow-up and Dispositions    · Return in about 3 months (around 10/1/2022), or if symptoms worsen or fail to improve.          Best Dominique MD

## 2022-07-01 NOTE — PROGRESS NOTES
1. Have you been to the ER, urgent care clinic since your last visit? Hospitalized since your last visit? no    2. Have you seen or consulted any other health care providers outside of the [address] [place] [address] since your last visit? Include any pap smears or colon screening.  Yes dentist

## 2022-07-08 ENCOUNTER — HOSPITAL ENCOUNTER (EMERGENCY)
Age: 77
Discharge: HOME | End: 2022-07-08
Attending: EMERGENCY MEDICINE | Admitting: EMERGENCY MEDICINE
Payer: OTHER GOVERNMENT

## 2022-07-08 ENCOUNTER — APPOINTMENT (OUTPATIENT)
Dept: GENERAL RADIOLOGY | Age: 77
End: 2022-07-08
Attending: EMERGENCY MEDICINE
Payer: OTHER GOVERNMENT

## 2022-07-08 VITALS
DIASTOLIC BLOOD PRESSURE: 84 MMHG | TEMPERATURE: 97.7 F | HEART RATE: 80 BPM | BODY MASS INDEX: 23.99 KG/M2 | WEIGHT: 172 LBS | OXYGEN SATURATION: 96 % | RESPIRATION RATE: 16 BRPM | SYSTOLIC BLOOD PRESSURE: 146 MMHG

## 2022-07-08 DIAGNOSIS — E86.0 DEHYDRATION: ICD-10-CM

## 2022-07-08 DIAGNOSIS — I95.9 HYPOTENSION, UNSPECIFIED HYPOTENSION TYPE: Primary | ICD-10-CM

## 2022-07-08 LAB
ALBUMIN SERPL-MCNC: 3.6 G/DL (ref 3.5–5)
ALBUMIN/GLOB SERPL: 1.1 {RATIO} (ref 1.1–2.2)
ALP SERPL-CCNC: 98 U/L (ref 45–117)
ALT SERPL-CCNC: 19 U/L (ref 12–78)
ANION GAP SERPL CALC-SCNC: 5 MMOL/L (ref 5–15)
APTT PPP: 24.8 SEC (ref 22.1–31)
AST SERPL-CCNC: 13 U/L (ref 15–37)
BASOPHILS # BLD: 0.1 K/UL (ref 0–0.1)
BASOPHILS NFR BLD: 1 % (ref 0–1)
BILIRUB SERPL-MCNC: 0.6 MG/DL (ref 0.2–1)
BUN SERPL-MCNC: 19 MG/DL (ref 6–20)
BUN/CREAT SERPL: 17 (ref 12–20)
CALCIUM SERPL-MCNC: 8.8 MG/DL (ref 8.5–10.1)
CHLORIDE SERPL-SCNC: 105 MMOL/L (ref 97–108)
CO2 SERPL-SCNC: 30 MMOL/L (ref 21–32)
CREAT SERPL-MCNC: 1.15 MG/DL (ref 0.7–1.3)
DIFFERENTIAL METHOD BLD: ABNORMAL
EOSINOPHIL # BLD: 0.1 K/UL (ref 0–0.4)
EOSINOPHIL NFR BLD: 2 % (ref 0–7)
ERYTHROCYTE [DISTWIDTH] IN BLOOD BY AUTOMATED COUNT: 12.9 % (ref 11.5–14.5)
GLOBULIN SER CALC-MCNC: 3.2 G/DL (ref 2–4)
GLUCOSE SERPL-MCNC: 125 MG/DL (ref 65–100)
HCT VFR BLD AUTO: 39 % (ref 36.6–50.3)
HGB BLD-MCNC: 13 G/DL (ref 12.1–17)
IMM GRANULOCYTES # BLD AUTO: 0 K/UL (ref 0–0.04)
IMM GRANULOCYTES NFR BLD AUTO: 1 % (ref 0–0.5)
INR PPP: 1 (ref 0.9–1.1)
LYMPHOCYTES # BLD: 1.2 K/UL (ref 0.8–3.5)
LYMPHOCYTES NFR BLD: 21 % (ref 12–49)
MCH RBC QN AUTO: 31.6 PG (ref 26–34)
MCHC RBC AUTO-ENTMCNC: 33.3 G/DL (ref 30–36.5)
MCV RBC AUTO: 94.9 FL (ref 80–99)
MONOCYTES # BLD: 0.5 K/UL (ref 0–1)
MONOCYTES NFR BLD: 8 % (ref 5–13)
NEUTS SEG # BLD: 4.1 K/UL (ref 1.8–8)
NEUTS SEG NFR BLD: 67 % (ref 32–75)
NRBC # BLD: 0 K/UL (ref 0–0.01)
NRBC BLD-RTO: 0 PER 100 WBC
PLATELET # BLD AUTO: 185 K/UL (ref 150–400)
PMV BLD AUTO: 9.4 FL (ref 8.9–12.9)
POTASSIUM SERPL-SCNC: 3.9 MMOL/L (ref 3.5–5.1)
PROT SERPL-MCNC: 6.8 G/DL (ref 6.4–8.2)
PROTHROMBIN TIME: 10.4 SEC (ref 9–11.1)
RBC # BLD AUTO: 4.11 M/UL (ref 4.1–5.7)
SODIUM SERPL-SCNC: 140 MMOL/L (ref 136–145)
THERAPEUTIC RANGE,PTTT: NORMAL SECS (ref 58–77)
TROPONIN-HIGH SENSITIVITY: 7 NG/L (ref 0–76)
WBC # BLD AUTO: 6 K/UL (ref 4.1–11.1)

## 2022-07-08 PROCEDURE — 85610 PROTHROMBIN TIME: CPT

## 2022-07-08 PROCEDURE — 74011250636 HC RX REV CODE- 250/636: Performed by: EMERGENCY MEDICINE

## 2022-07-08 PROCEDURE — 99285 EMERGENCY DEPT VISIT HI MDM: CPT

## 2022-07-08 PROCEDURE — 80053 COMPREHEN METABOLIC PANEL: CPT

## 2022-07-08 PROCEDURE — 93005 ELECTROCARDIOGRAM TRACING: CPT

## 2022-07-08 PROCEDURE — 85025 COMPLETE CBC W/AUTO DIFF WBC: CPT

## 2022-07-08 PROCEDURE — 71045 X-RAY EXAM CHEST 1 VIEW: CPT

## 2022-07-08 PROCEDURE — 84484 ASSAY OF TROPONIN QUANT: CPT

## 2022-07-08 PROCEDURE — 36415 COLL VENOUS BLD VENIPUNCTURE: CPT

## 2022-07-08 PROCEDURE — 85730 THROMBOPLASTIN TIME PARTIAL: CPT

## 2022-07-08 RX ADMIN — SODIUM CHLORIDE 500 ML: 9 INJECTION, SOLUTION INTRAVENOUS at 14:54

## 2022-07-08 NOTE — ED TRIAGE NOTES
Pt reports hx of hypotension with episode of light headedness, he took his midodrine and came to ER POV.  Awake , alert an pain free on arrival

## 2022-07-08 NOTE — ED PROVIDER NOTES
EMERGENCY DEPARTMENT HISTORY AND PHYSICAL EXAM      Date: 7/8/2022  Patient Name: Lyn Oconnell    History of Presenting Illness     Chief Complaint   Patient presents with    Hypotension       History Provided By: Patient    HPI: Lyn Oconnell, 69 y.o. male with PMHx significant for orthostatic hypotension,, presents via private vehicle to the ED with cc of low blood pressure readings, confusion. He has a history of orthostatic hypotension and is on midodrine for this. He denies any chest pain or shortness of breath. He is alert and oriented on arrival.  Episode of lightheadedness today. Denies any abdominal pain. He denies any nausea vomiting or diarrhea. Symptoms similar to prior. Blood pressure normal on arrival as well. PMHx: Significant for orthostatic hypotension  PSHx: Significant for bilateral hip replacement. Right rotator cuff repair. Social Hx: Former smoker. Quit in 2019. There are no other complaints, changes, or physical findings at this time. PCP: Teodoro Giang., MD    No current facility-administered medications on file prior to encounter. Current Outpatient Medications on File Prior to Encounter   Medication Sig Dispense Refill    clonazePAM (KlonoPIN) 0.5 mg tablet Take 1 Tablet by mouth nightly. Max Daily Amount: 0.5 mg. 90 Tablet 0    gabapentin (NEURONTIN) 600 mg tablet Take 0.5 Tabs by mouth three (3) times daily. Max Daily Amount: 900 mg. (Patient taking differently: Take 300 mg by mouth two (2) times a day.) 135 Tab 0    pyridostigmine bromide 30 mg tab Take 30 mg by mouth three (3) times daily. 270 Tab 1    fludrocortisone (FLORINEF) 0.1 mg tablet TAKE 1 TABLET BY MOUTH TWICE DAILY 28 Tab 3    midodrine (PROAMITINE) 5 mg tablet Take 1 Tab by mouth daily (with dinner).  90 Tab 0    DULoxetine (CYMBALTA) 60 mg capsule TAKE 1 CAPSULE BY MOUTH DAILY 90 Cap 1    acetaminophen 500 mg tab 500 mg, diphenhydrAMINE 25 mg cap 25 mg Take 1 Dose by mouth nightly as needed for Other (sleep/pain relief).  acetaminophen (TYLENOL 8 HOUR) 650 mg TbER Take 650 mg by mouth every eight (8) hours as needed for Pain.  aspirin 81 mg chewable tablet Take 1 Tab by mouth daily. 90 Tab 2    finasteride (PROSCAR) 5 mg tablet TAKE 1 TABLET BY MOUTH DAILY(PROSTATE) 90 Tab 0    guaiFENesin ER (MUCINEX) 600 mg ER tablet Take 1 Tab by mouth every twelve (12) hours. (Patient taking differently: Take 600 mg by mouth every twelve (12) hours.  As needed) 30 Tab 0       Past History     Past Medical History:  Past Medical History:   Diagnosis Date    Altered mental status, unspecified 11/9/2018    Anxiety     Ataxia 11/9/2018    Back pain     Blurred vision     Bradycardia 10/2/2018    Chest pain     Depression     Dizziness     Dizziness and giddiness 10/17/2019    Dysautonomia ([address]     Fast heart beat     Fever 11/9/2018    Frequent headaches     Frequent urination     Hip dysplasia, congenital     Hx of syncope 10/24/2018    Hypotension     Ingrown left big toenail 3/31/2017    Joint pain     Joint swelling     Lumbar spinal stenosis     Memory deficit 9/11/2018    Muscle pain     Neuropathy     Orthostatic hypotension     asymptomatic    Pacemaker     new pacemaker July 2019    Recent change in weight     Risk for falls 12/13/2016    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    Sinus problem     Skin disease     Smoker 1/18/2018    SOB (shortness of breath)     Sore throat     SSS (sick sinus syndrome) (HCC)     Stiffness of joints, multiple sites     Stress     Stumbling gait 11/8/2018    Syncope     Tiredness     Trouble in sleeping     Weakness        Past Surgical History:  Past Surgical History:   Procedure Laterality Date    HX HIP REPLACEMENT Bilateral 1993    HX PACEMAKER PLACEMENT  10/16/2018    HX ROTATOR CUFF REPAIR Right 11/16/2017    OH CARDIAC SURG PROCEDURE UNLIST      OH INS NEW/RPLCMT PRM PM W/TRANSV ELTRD ATRIAL&VENT N/A 7/30/2019 INSERT PPM DUAL performed by Angie Lewis MD at [facility]       Family History:  Family History   Problem Relation Age of Onset    Cancer Mother     Cancer Maternal Aunt        Social History:  Social History     Tobacco Use    Smoking status: Former Smoker     Packs/day: 0.25     Years: 50.00     Pack years: 12.50     Types: Cigarettes     Quit date: 8/3/2019     Years since quittin.9    Smokeless tobacco: Never Used   Substance Use Topics    Alcohol use: Not Currently     Alcohol/week: 28.0 standard drinks     Types: 28 Cans of beer per week     Comment: None now    Drug use: No       Allergies:  No Known Allergies      Review of Systems   Review of Systems   Constitutional: Positive for fatigue. Negative for activity change, chills and fever. HENT: Negative for congestion and sore throat. Eyes: Negative for pain and redness. Respiratory: Negative for cough, chest tightness and shortness of breath. Cardiovascular: Negative for chest pain and palpitations. Gastrointestinal: Negative for abdominal pain, diarrhea, nausea and vomiting. Genitourinary: Negative for dysuria, frequency and urgency. Musculoskeletal: Negative for back pain and neck pain. Skin: Negative for rash. Neurological: Positive for light-headedness. Negative for syncope and headaches. Psychiatric/Behavioral: Positive for confusion. All other systems reviewed and are negative. Physical Exam   Physical Exam  Vitals and nursing note reviewed. Constitutional:       General: He is not in acute distress. Appearance: He is well-developed. He is not diaphoretic. HENT:      Head: Normocephalic. Nose: Nose normal.      Mouth/Throat:      Pharynx: No oropharyngeal exudate. Eyes:      General: No scleral icterus. Conjunctiva/sclera: Conjunctivae normal.      Pupils: Pupils are equal, round, and reactive to light. Neck:      Thyroid: No thyromegaly. Vascular: No JVD.       Trachea: No tracheal deviation. Cardiovascular:      Rate and Rhythm: Normal rate and regular rhythm. Heart sounds: No murmur heard. No friction rub. No gallop. Pulmonary:      Effort: Pulmonary effort is normal. No respiratory distress. Breath sounds: Normal breath sounds. No stridor. No wheezing or rales. Abdominal:      General: Bowel sounds are normal. There is no distension. Palpations: Abdomen is soft. Tenderness: There is no abdominal tenderness. There is no guarding or rebound. Musculoskeletal:         General: Normal range of motion. Cervical back: Normal range of motion and neck supple. Lymphadenopathy:      Cervical: No cervical adenopathy. Skin:     General: Skin is warm and dry. Findings: No erythema or rash. Neurological:      Mental Status: He is alert and oriented to person, place, and time. Cranial Nerves: No cranial nerve deficit. Motor: No abnormal muscle tone. Coordination: Coordination normal.   Psychiatric:         Behavior: Behavior normal.             Diagnostic Study Results     Labs -     Recent Results (from the past 12 hour(s))   CBC WITH AUTOMATED DIFF    Collection Time: 07/08/22  1:55 PM   Result Value Ref Range    WBC 6.0 4.1 - 11.1 K/uL    RBC 4.11 4.10 - 5.70 M/uL    HGB 13.0 12.1 - 17.0 g/dL    HCT 39.0 36.6 - 50.3 %    MCV 94.9 80.0 - 99.0 FL    MCH 31.6 26.0 - 34.0 PG    MCHC 33.3 30.0 - 36.5 g/dL    RDW 12.9 11.5 - 14.5 %    PLATELET 441 734 - 609 K/uL    MPV 9.4 8.9 - 12.9 FL    NRBC 0.0 0  WBC    ABSOLUTE NRBC 0.00 0.00 - 0.01 K/uL    NEUTROPHILS 67 32 - 75 %    LYMPHOCYTES 21 12 - 49 %    MONOCYTES 8 5 - 13 %    EOSINOPHILS 2 0 - 7 %    BASOPHILS 1 0 - 1 %    IMMATURE GRANULOCYTES 1 (H) 0.0 - 0.5 %    ABS. NEUTROPHILS 4.1 1.8 - 8.0 K/UL    ABS. LYMPHOCYTES 1.2 0.8 - 3.5 K/UL    ABS. MONOCYTES 0.5 0.0 - 1.0 K/UL    ABS. EOSINOPHILS 0.1 0.0 - 0.4 K/UL    ABS. BASOPHILS 0.1 0.0 - 0.1 K/UL    ABS. IMM.  GRANS. 0.0 0.00 - 0.04 K/UL    DF AUTOMATED     METABOLIC PANEL, COMPREHENSIVE    Collection Time: 07/08/22  1:55 PM   Result Value Ref Range    Sodium 140 136 - 145 mmol/L    Potassium 3.9 3.5 - 5.1 mmol/L    Chloride 105 97 - 108 mmol/L    CO2 30 21 - 32 mmol/L    Anion gap 5 5 - 15 mmol/L    Glucose 125 (H) 65 - 100 mg/dL    BUN 19 6 - 20 MG/DL    Creatinine 1.15 0.70 - 1.30 MG/DL    BUN/Creatinine ratio 17 12 - 20      GFR est AA >60 >60 ml/min/1.73m2    GFR est non-AA >60 >60 ml/min/1.73m2    Calcium 8.8 8.5 - 10.1 MG/DL    Bilirubin, total 0.6 0.2 - 1.0 MG/DL    ALT (SGPT) 19 12 - 78 U/L    AST (SGOT) 13 (L) 15 - 37 U/L    Alk. phosphatase 98 45 - 117 U/L    Protein, total 6.8 6.4 - 8.2 g/dL    Albumin 3.6 3.5 - 5.0 g/dL    Globulin 3.2 2.0 - 4.0 g/dL    A-G Ratio 1.1 1.1 - 2.2     TROPONIN-HIGH SENSITIVITY    Collection Time: 07/08/22  1:55 PM   Result Value Ref Range    Troponin-High Sensitivity 7 0 - 76 ng/L   PROTHROMBIN TIME + INR    Collection Time: 07/08/22  1:55 PM   Result Value Ref Range    INR 1.0 0.9 - 1.1      Prothrombin time 10.4 9.0 - 11.1 sec   PTT    Collection Time: 07/08/22  1:55 PM   Result Value Ref Range    aPTT 24.8 22.1 - 31.0 sec    aPTT, therapeutic range     58.0 - 77.0 SECS   EKG, 12 LEAD, INITIAL    Collection Time: 07/08/22  1:55 PM   Result Value Ref Range    Ventricular Rate 84 BPM    Atrial Rate 84 BPM    P-R Interval 206 ms    QRS Duration 88 ms    Q-T Interval 364 ms    QTC Calculation (Bezet) 430 ms    Calculated P Axis 59 degrees    Calculated R Axis 62 degrees    Calculated T Axis 51 degrees    Diagnosis        Suspect unspecified pacemaker failure  Normal sinus rhythm  Normal ECG  When compared with ECG of 27-JUN-2022 14:41,  Sinus rhythm has replaced Electronic atrial pacemaker         Radiologic Studies -   XR CHEST PORT   Final Result   No evidence of acute cardiopulmonary process.            CT Results  (Last 48 hours)    None        CXR Results  (Last 48 hours)               07/08/22 1401  XR CHEST PORT Final result    Impression:  No evidence of acute cardiopulmonary process. Narrative:  INDICATION:  chest pain        COMPARISON: February 2020       FINDINGS: Single AP portable view of the chest obtained at 1352 demonstrates a   stable cardiomediastinal silhouette. The lungs are clear bilaterally. Right-sided pacer device is noted. No osseous abnormalities are seen. Medical Decision Making   I am the first provider for this patient. I reviewed the vital signs, available nursing notes, past medical history, past surgical history, family history and social history. Vital Signs-Reviewed the patient's vital signs. Patient Vitals for the past 12 hrs:   Temp Pulse Resp BP SpO2   07/08/22 1431 -- 80 -- (!) 146/84 96 %   07/08/22 1420 -- 81 -- (!) 141/86 95 %   07/08/22 1405 -- 82 -- (!) 147/82 97 %   07/08/22 1404 -- -- -- -- 97 %   07/08/22 1400 97.7 °F (36.5 °C) 83 16 (!) 159/89 --   07/08/22 1355 -- -- -- 120/71 --   07/08/22 1350 -- -- -- 116/76 --       Pulse Oximetry Analysis - 96% on RA    Cardiac Monitor:   Rate: 80 bpm  Rhythm: Paced        ED EKG interpretation: 1:55 PM  Rhythm: Normal sinus rhythm and regular . Rate (approx.): 84; Axis: normal; KS Interval: normal; QRS interval: normal ; ST/T wave: Nonspecific changes; This EKG was interpreted by Prakash Arevalo MD,ED Provider. Records Reviewed: Nursing Notes and Old Medical Records    Provider Notes (Medical Decision Making):   DDx: Pacemaker malfunction, dehydration, metabolic abnormality, orthostatic hypotension    ED Course:   Initial assessment performed. The patients presenting problems have been discussed, and they are in agreement with the care plan formulated and outlined with them. I have encouraged them to ask questions as they arise throughout their visit. PROGRESS NOTE    Pt reevaluated. Patient well-appearing. Alert and oriented x4. No neurodeficits.   CBC and CMP unremarkable. Clear chest x-ray. EKG paced. Patient feels better after IV fluids. Suspect orthostatic hypotension. Negative troponin. Reassured patient,  And family. Discharge home. Written by Jania Mai MD     Progress note:    Pt noted to be feeling better , ready for discharge. Updated pt and/or family on all final lab and imaging findings. Will follow up as instructed. All questions have been answered, pt voiced understanding and agreement with plan. Specific return precautions provided as well as instructions to return to the ED should sx worsen at any time. Vital signs stable for discharge. I have also put together some discharge instructions for them that include: 1) educational information regarding their diagnosis, 2) how to care for their diagnosis at home, as well a 3) list of reasons why they would want to return to the ED prior to their follow-up appointment, should their condition change. Written by Jania Mai MD        Critical Care Time:   0    Disposition:  Discharge    PLAN:  1. Current Discharge Medication List        2. Follow-up Information    None       Return to ED if worse     Diagnosis     Clinical Impression:   1. Hypotension, unspecified hypotension type    2. Dehydration              Please note that this dictation was completed with Mant[employer], the [employer] voice recognition software. Quite often unanticipated grammatical, syntax, homophones, and other interpretive errors are inadvertently transcribed by the computer software. Please disregard these errors. Please excuse any errors that have escaped final proofreading.

## 2022-07-10 LAB
ATRIAL RATE: 84 BPM
CALCULATED P AXIS, ECG09: 59 DEGREES
CALCULATED R AXIS, ECG10: 62 DEGREES
CALCULATED T AXIS, ECG11: 51 DEGREES
DIAGNOSIS, 93000: NORMAL
P-R INTERVAL, ECG05: 206 MS
Q-T INTERVAL, ECG07: 364 MS
QRS DURATION, ECG06: 88 MS
QTC CALCULATION (BEZET), ECG08: 430 MS
VENTRICULAR RATE, ECG03: 84 BPM

## 2022-07-29 ENCOUNTER — TELEPHONE (OUTPATIENT)
Dept: FAMILY MEDICINE CLINIC | Age: 77
End: 2022-07-29

## 2022-07-29 ENCOUNTER — DOCUMENTATION ONLY (OUTPATIENT)
Dept: FAMILY MEDICINE CLINIC | Age: 77
End: 2022-07-29

## 2022-07-29 DIAGNOSIS — Z86.73 HISTORY OF CVA (CEREBROVASCULAR ACCIDENT): Primary | ICD-10-CM

## 2022-07-29 DIAGNOSIS — R91.1 INCIDENTAL LUNG NODULE, GREATER THAN OR EQUAL TO 8MM: ICD-10-CM

## 2022-07-29 RX ORDER — ATORVASTATIN CALCIUM 40 MG/1
40 TABLET, FILM COATED ORAL DAILY
Qty: 90 TABLET | Refills: 0 | Status: SHIPPED | OUTPATIENT
Start: 2022-07-29

## 2022-07-29 NOTE — TELEPHONE ENCOUNTER
I called pt's neurologist Dr Polina Avilez to inform him of the MRI results. Spoke to his nurse who states he is out of the office today but she will send the message and he will get it Monday. I told her I would have patient call and schedule an appt ASAP.

## 2022-07-29 NOTE — TELEPHONE ENCOUNTER
Spoke to Dr Beth Felton earlier this morning. She is a spine specialist with [facility]. States patient's MRI of the brain shows a new lacunar stroke. His cervical CT scan showed severe spinal stenosis. She states she will address his spinal stenosis but she wanted his PCP to address the new stroke. I informed her Dr Ying Zavala is out of the office for 3 months but the patient does see a neurologist so I will notify him and the patient. She verbalized understanding.

## 2022-07-29 NOTE — TELEPHONE ENCOUNTER
Dr Lorrie Robertson called back to report pt's CT of the cervical spine also showed a 1.1cm lung nodule that the radiologist referred to as a possible \"neoplasm\" so she is going to refer pt to [facility] for further eval.

## 2022-07-29 NOTE — TELEPHONE ENCOUNTER
I then called patient and informed him of the MRI results. States Dr Eduardo Diop called him last night and told him. I suggested he schedule an appt with his neurologist ASAP. He said he would. I also spoke to his wife per his request and informed her. She asked if there is anything they can do in the meantime for this stroke and I told her I will go ahead and start him on a statin to lower his risk of another stroke. [facility] per wife's request for Lipitor 40mg daily. S/e were reviewed. He has not been taking his baby asa 81mg daily so I advised her to have him do so. She also wanted me to inform his PCP at the [facility], Dr Tito Reynolds (?) She said she would send me her info via [employer] so I can get in touch with her.

## 2022-07-29 NOTE — TELEPHONE ENCOUNTER
Called neuro's office back and told them I am starting pt on lipitor 40mg daily. They will update his med list. Pt has appt for October but they have put him on a cancelation list to be seen sooner.

## 2022-08-22 DIAGNOSIS — F43.10 PTSD (POST-TRAUMATIC STRESS DISORDER): ICD-10-CM

## 2022-08-22 RX ORDER — CLONAZEPAM 0.5 MG/1
0.5 TABLET ORAL
Qty: 90 TABLET | Refills: 0 | Status: SHIPPED | OUTPATIENT
Start: 2022-08-22

## 2022-08-26 ENCOUNTER — HOSPITAL ENCOUNTER (EMERGENCY)
Age: 77
Discharge: HOME | End: 2022-08-26
Attending: EMERGENCY MEDICINE
Payer: OTHER GOVERNMENT

## 2022-08-26 VITALS
WEIGHT: 172 LBS | HEART RATE: 82 BPM | HEIGHT: 71 IN | RESPIRATION RATE: 20 BRPM | BODY MASS INDEX: 24.08 KG/M2 | SYSTOLIC BLOOD PRESSURE: 187 MMHG | DIASTOLIC BLOOD PRESSURE: 92 MMHG | TEMPERATURE: 98.2 F | OXYGEN SATURATION: 96 %

## 2022-08-26 DIAGNOSIS — E86.0 DEHYDRATION: Primary | ICD-10-CM

## 2022-08-26 DIAGNOSIS — Z86.79 HISTORY OF HYPOTENSION: ICD-10-CM

## 2022-08-26 LAB
ALBUMIN SERPL-MCNC: 3.6 G/DL (ref 3.5–5)
ALBUMIN/GLOB SERPL: 1.2 {RATIO} (ref 1.1–2.2)
ALP SERPL-CCNC: 105 U/L (ref 45–117)
ALT SERPL-CCNC: 16 U/L (ref 12–78)
ANION GAP SERPL CALC-SCNC: 5 MMOL/L (ref 5–15)
APPEARANCE UR: CLEAR
AST SERPL-CCNC: 11 U/L (ref 15–37)
ATRIAL RATE: 80 BPM
BACTERIA URNS QL MICRO: NEGATIVE /HPF
BASOPHILS # BLD: 0.1 K/UL (ref 0–0.1)
BASOPHILS NFR BLD: 1 % (ref 0–1)
BILIRUB SERPL-MCNC: 0.4 MG/DL (ref 0.2–1)
BILIRUB UR QL: NEGATIVE
BUN SERPL-MCNC: 31 MG/DL (ref 6–20)
BUN/CREAT SERPL: 26 (ref 12–20)
CALCIUM SERPL-MCNC: 8.6 MG/DL (ref 8.5–10.1)
CALCULATED P AXIS, ECG09: 52 DEGREES
CALCULATED R AXIS, ECG10: 65 DEGREES
CALCULATED T AXIS, ECG11: 45 DEGREES
CHLORIDE SERPL-SCNC: 108 MMOL/L (ref 97–108)
CO2 SERPL-SCNC: 29 MMOL/L (ref 21–32)
COLOR UR: ABNORMAL
CREAT SERPL-MCNC: 1.19 MG/DL (ref 0.7–1.3)
DIAGNOSIS, 93000: NORMAL
DIFFERENTIAL METHOD BLD: ABNORMAL
EOSINOPHIL # BLD: 0.2 K/UL (ref 0–0.4)
EOSINOPHIL NFR BLD: 3 % (ref 0–7)
EPITH CASTS URNS QL MICRO: ABNORMAL /LPF
ERYTHROCYTE [DISTWIDTH] IN BLOOD BY AUTOMATED COUNT: 12.7 % (ref 11.5–14.5)
GLOBULIN SER CALC-MCNC: 3.1 G/DL (ref 2–4)
GLUCOSE SERPL-MCNC: 139 MG/DL (ref 65–100)
GLUCOSE UR STRIP.AUTO-MCNC: NEGATIVE MG/DL
HCT VFR BLD AUTO: 38.7 % (ref 36.6–50.3)
HGB BLD-MCNC: 12.6 G/DL (ref 12.1–17)
HGB UR QL STRIP: ABNORMAL
IMM GRANULOCYTES # BLD AUTO: 0.1 K/UL (ref 0–0.04)
IMM GRANULOCYTES NFR BLD AUTO: 1 % (ref 0–0.5)
KETONES UR QL STRIP.AUTO: NEGATIVE MG/DL
LEUKOCYTE ESTERASE UR QL STRIP.AUTO: NEGATIVE
LYMPHOCYTES # BLD: 1.3 K/UL (ref 0.8–3.5)
LYMPHOCYTES NFR BLD: 21 % (ref 12–49)
MAGNESIUM SERPL-MCNC: 2.2 MG/DL (ref 1.6–2.4)
MCH RBC QN AUTO: 31.3 PG (ref 26–34)
MCHC RBC AUTO-ENTMCNC: 32.6 G/DL (ref 30–36.5)
MCV RBC AUTO: 96.3 FL (ref 80–99)
MONOCYTES # BLD: 0.4 K/UL (ref 0–1)
MONOCYTES NFR BLD: 6 % (ref 5–13)
NEUTS SEG # BLD: 4.2 K/UL (ref 1.8–8)
NEUTS SEG NFR BLD: 68 % (ref 32–75)
NITRITE UR QL STRIP.AUTO: NEGATIVE
NRBC # BLD: 0 K/UL (ref 0–0.01)
NRBC BLD-RTO: 0 PER 100 WBC
P-R INTERVAL, ECG05: 234 MS
PH UR STRIP: 6 [PH] (ref 5–8)
PLATELET # BLD AUTO: 168 K/UL (ref 150–400)
PMV BLD AUTO: 9.2 FL (ref 8.9–12.9)
POTASSIUM SERPL-SCNC: 4.3 MMOL/L (ref 3.5–5.1)
PROT SERPL-MCNC: 6.7 G/DL (ref 6.4–8.2)
PROT UR STRIP-MCNC: NEGATIVE MG/DL
Q-T INTERVAL, ECG07: 376 MS
QRS DURATION, ECG06: 92 MS
QTC CALCULATION (BEZET), ECG08: 433 MS
RBC # BLD AUTO: 4.02 M/UL (ref 4.1–5.7)
RBC #/AREA URNS HPF: ABNORMAL /HPF (ref 0–5)
SODIUM SERPL-SCNC: 142 MMOL/L (ref 136–145)
SP GR UR REFRACTOMETRY: 1.01 (ref 1–1.03)
UROBILINOGEN UR QL STRIP.AUTO: 0.2 EU/DL (ref 0.2–1)
VENTRICULAR RATE, ECG03: 80 BPM
WBC # BLD AUTO: 6.1 K/UL (ref 4.1–11.1)
WBC URNS QL MICRO: ABNORMAL /HPF (ref 0–4)

## 2022-08-26 PROCEDURE — 93005 ELECTROCARDIOGRAM TRACING: CPT

## 2022-08-26 PROCEDURE — 74011250636 HC RX REV CODE- 250/636: Performed by: EMERGENCY MEDICINE

## 2022-08-26 PROCEDURE — 81001 URINALYSIS AUTO W/SCOPE: CPT

## 2022-08-26 PROCEDURE — 36415 COLL VENOUS BLD VENIPUNCTURE: CPT

## 2022-08-26 PROCEDURE — 83735 ASSAY OF MAGNESIUM: CPT

## 2022-08-26 PROCEDURE — 99284 EMERGENCY DEPT VISIT MOD MDM: CPT

## 2022-08-26 PROCEDURE — 80053 COMPREHEN METABOLIC PANEL: CPT

## 2022-08-26 PROCEDURE — 96360 HYDRATION IV INFUSION INIT: CPT

## 2022-08-26 PROCEDURE — 96361 HYDRATE IV INFUSION ADD-ON: CPT

## 2022-08-26 PROCEDURE — 85025 COMPLETE CBC W/AUTO DIFF WBC: CPT

## 2022-08-26 RX ORDER — SODIUM CHLORIDE 0.9 % (FLUSH) 0.9 %
5-10 SYRINGE (ML) INJECTION ONCE
Status: DISCONTINUED | OUTPATIENT
Start: 2022-08-26 | End: 2022-08-26 | Stop reason: HOSPADM

## 2022-08-26 RX ADMIN — SODIUM CHLORIDE 500 ML: 9 INJECTION, SOLUTION INTRAVENOUS at 12:19

## 2022-08-26 NOTE — ED PROVIDER NOTES
EMERGENCY DEPARTMENT HISTORY AND PHYSICAL EXAM          Date: 8/26/2022  Patient Name: Kim Suero    History of Presenting Illness     Chief Complaint   Patient presents with    Hypotension       History Provided By: Patient    HPI: Kim Suero is a 69 y.o. male, pmhx depression, anxiety, with a static hypotension, sick sinus status post pacemaker placement who presents via EMS to the ED c/o hypotension    Today's history obtained by patient to is not the best historian. He explains he was feeling lethargic this morning his wife checked his blood pressure and it was low but he does not know the numbers. She called EMS and gave him a dose of his midodrine. When medics got there his blood pressure was 120 palp. They placed an IV started fluids as blood pressure came up to 886 systolic. Patient denies any headache, blurry vision, dizziness, chest pain, abdominal pain, nausea, vomiting and diarrhea. PCP: Ina Mcdonald MD    Allergies: None known    There are no other complaints, changes, or physical findings at this time. Current Outpatient Medications   Medication Sig Dispense Refill    clonazePAM (KlonoPIN) 0.5 mg tablet Take 1 Tablet by mouth nightly. Max Daily Amount: 0.5 mg. 90 Tablet 0    atorvastatin (LIPITOR) 40 mg tablet Take 1 Tablet by mouth in the morning. 90 Tablet 0    gabapentin (NEURONTIN) 600 mg tablet Take 0.5 Tabs by mouth three (3) times daily. Max Daily Amount: 900 mg. (Patient taking differently: Take 300 mg by mouth two (2) times a day.) 135 Tab 0    pyridostigmine bromide 30 mg tab Take 30 mg by mouth three (3) times daily. 270 Tab 1    fludrocortisone (FLORINEF) 0.1 mg tablet TAKE 1 TABLET BY MOUTH TWICE DAILY 28 Tab 3    midodrine (PROAMITINE) 5 mg tablet Take 1 Tab by mouth daily (with dinner).  90 Tab 0    DULoxetine (CYMBALTA) 60 mg capsule TAKE 1 CAPSULE BY MOUTH DAILY 90 Cap 1    acetaminophen 500 mg tab 500 mg, diphenhydrAMINE 25 mg cap 25 mg Take 1 Dose by mouth nightly as needed for Other (sleep/pain relief). acetaminophen (TYLENOL 8 HOUR) 650 mg TbER Take 650 mg by mouth every eight (8) hours as needed for Pain. aspirin 81 mg chewable tablet Take 1 Tab by mouth daily. 90 Tab 2    finasteride (PROSCAR) 5 mg tablet TAKE 1 TABLET BY MOUTH DAILY(PROSTATE) 90 Tab 0    guaiFENesin ER (MUCINEX) 600 mg ER tablet Take 1 Tab by mouth every twelve (12) hours. (Patient taking differently: Take 600 mg by mouth every twelve (12) hours.  As needed) 30 Tab 0       Past History     Past Medical History:  Past Medical History:   Diagnosis Date    Altered mental status, unspecified 11/9/2018    Anxiety     Ataxia 11/9/2018    Back pain     Blurred vision     Bradycardia 10/2/2018    Chest pain     Depression     Dizziness     Dizziness and giddiness 10/17/2019    Dysautonomia ([address]     Fast heart beat     Fever 11/9/2018    Frequent headaches     Frequent urination     Hip dysplasia, congenital     Hx of syncope 10/24/2018    Hypotension     Ingrown left big toenail 3/31/2017    Joint pain     Joint swelling     Lumbar spinal stenosis     Memory deficit 9/11/2018    Muscle pain     Neuropathy     Orthostatic hypotension     asymptomatic    Pacemaker     new pacemaker July 2019    Recent change in weight     Risk for falls 12/13/2016    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 11/8/2018    Syncope     Tiredness     Trouble in sleeping     Weakness        Past Surgical History:  Past Surgical History:   Procedure Laterality Date    HX HIP REPLACEMENT Bilateral 1993    HX PACEMAKER PLACEMENT  10/16/2018    HX ROTATOR CUFF REPAIR Right 11/16/2017    [place] CARDIAC SURG PROCEDURE [place]LIST      [place] NEW/RPLCMT PRM PM W/TRANSV ELTRD ATRIAL&VENT N/A 7/30/2019    INSERT PPM DUAL performed by Wandy Ann MD RIYA at [facility] CARDIAC CATH LAB       Family History:  Family History   Problem Relation Age of Onset    Cancer Mother     Cancer Maternal Aunt        Social History:  Social History     Tobacco Use    Smoking status: Former     Packs/day: 0.25     Years: 50.00     Pack years: 12.50     Types: Cigarettes     Quit date: 8/3/2019     Years since quitting: 3.0    Smokeless tobacco: Never   Substance Use Topics    Alcohol use: Not Currently     Alcohol/week: 28.0 standard drinks     Types: 28 Cans of beer per week     Comment: None now    Drug use: No       Allergies:  No Known Allergies      Review of Systems   Review of Systems   Constitutional:  Positive for fatigue. Negative for activity change, appetite change, chills, fever and unexpected weight change. HENT:  Negative for congestion. Eyes:  Negative for pain and visual disturbance. Respiratory:  Negative for cough and shortness of breath. Cardiovascular:  Negative for chest pain. Gastrointestinal:  Negative for abdominal pain, diarrhea, nausea and vomiting. Genitourinary:  Negative for dysuria. Musculoskeletal:  Negative for back pain. Skin:  Negative for rash. Neurological:  Positive for weakness and light-headedness. Negative for headaches. Physical Exam   Physical Exam  Vitals and nursing note reviewed. Constitutional:       Appearance: He is well-developed. He is not diaphoretic. Comments: Elderly male, with normal vital signs appearing comfortable, in minimal acute distress   HENT:      Head: Normocephalic and atraumatic. Eyes:      General:         Right eye: No discharge. Left eye: No discharge. Conjunctiva/sclera: Conjunctivae normal.      Pupils: Pupils are equal, round, and reactive to light. Cardiovascular:      Rate and Rhythm: Normal rate and regular rhythm. Heart sounds: Normal heart sounds. No murmur heard. Pulmonary:      Effort: Pulmonary effort is normal. No respiratory distress.       Breath sounds: Normal breath sounds. No wheezing, rhonchi or rales. Abdominal:      General: Bowel sounds are normal. There is no distension. Palpations: Abdomen is soft. Tenderness: There is no abdominal tenderness. There is no guarding or rebound. Hernia: No hernia is present. Musculoskeletal:         General: Normal range of motion. Cervical back: Normal range of motion and neck supple. Right lower leg: No edema. Left lower leg: No edema. Skin:     General: Skin is warm and dry. Coloration: Skin is pale. Findings: No erythema or rash. Neurological:      Mental Status: He is alert and oriented to person, place, and time. Cranial Nerves: No cranial nerve deficit. Motor: No abnormal muscle tone. Diagnostic Study Results     Labs -   No results found for this or any previous visit (from the past 12 hour(s)). Radiologic Studies -   No orders to display     CT Results  (Last 48 hours)      None          CXR Results  (Last 48 hours)      None              Medical Decision Making   I am the first provider for this patient. I reviewed the vital signs, available nursing notes, past medical history, past surgical history, family history and social history. Vital Signs-Reviewed the patient's vital signs. Patient Vitals for the past 24 hrs:   Pulse BP   08/26/22 1548 82 (!) 187/92      Pulse Oximetry Analysis - 96% on RA    Records Reviewed: Nursing Notes    Provider Notes (Medical Decision Making):   MDM: Elderly male sent for lethargy and hypotension at home which was solved by midodrine that he took prior to EMS arrival.  Currently hemodynamically stable with no complaints. Exam benign without concern for intrathoracic infection/catastrophe, intra-abdominal infection or vascular pathology. ED Course:   Initial assessment performed.  The patients presenting problems have been discussed, and they are in agreement with the care plan formulated and outlined with them. I have encouraged them to ask questions as they arise throughout their visit. EKG interpretation: (Preliminary)  Rhythm: Paced rhythm at 80 bpm.  This EKG was interpreted by ED Provider Dee Mariano MD    ED Course as of 08/27/22 1339   Fri Aug 26, 2022   1321 Patient reevaluated and still doing well. She remained stable. Await urinalysis. [JT]      ED Course User Index  [JT] Yessica Gallagher MD      Discharge note:  3:45  Pt re-evaluated and noted to be feeling better, ready for discharge. Updated pt and his wife on all final lab findings. Will follow up as instructed. All questions have been answered, pt voiced understanding and agreement with plan. Specific return precautions provided as well as instructions to return to the ED should sx worsen at any time. Vital signs stable for discharge. Critical Care Time:   0      Diagnosis     Clinical Impression:   1. Dehydration    2. History of hypotension        PLAN:  1. Discharge Medication List as of 8/26/2022  1:13 PM        2. Follow-up Information       Follow up With Specialties Details Why Contact Info    Ashworth, Lajuan Favre., MD Family Medicine Schedule an appointment as soon as possible for a visit   [address] [place] [address] [address] [facility].      [address] [facility] [address] [facility] Emergency Medicine  If symptoms worsen [address] [id number]          Return to ED if worse     Disposition:  Home       Please note, this dictation was completed with [employer], the [employer] voice recognition software. Quite often unanticipated grammatical, syntax, homophones, and other interpretive errors are inadvertently transcribed by the computer software. Please disregard these errors. Please excuse any errors that have escaped final proof reading.

## 2022-08-26 NOTE — ED NOTES
D/C instructions provided and reviewed with patient. Opportunity provided for discussion. All questions answered nothing further at this time. Unknown if ever smoked

## 2022-08-26 NOTE — ED TRIAGE NOTES
Pt arrived by EMS with c/o a hypotensive episode this morning when he woke up. Pt has an extensive cardiac history, also has problems with hypotension and takes midodrine prn for this problem. Pt was not syncopal and has no complaints at this time.  He states his wife gave him his prn midodrine when he had the episode and per EMS the recheck was 158/95

## 2022-08-27 ENCOUNTER — HOSPITAL ENCOUNTER (EMERGENCY)
Age: 77
Discharge: HOME | End: 2022-08-28
Attending: FAMILY MEDICINE
Payer: OTHER GOVERNMENT

## 2022-08-27 DIAGNOSIS — I95.1 ORTHOSTATIC HYPOTENSION: Primary | ICD-10-CM

## 2022-08-27 LAB
ALBUMIN SERPL-MCNC: 3.7 G/DL (ref 3.5–5)
ALBUMIN/GLOB SERPL: 1.4 {RATIO} (ref 1.1–2.2)
ALP SERPL-CCNC: 100 U/L (ref 45–117)
ALT SERPL-CCNC: 20 U/L (ref 12–78)
ANION GAP SERPL CALC-SCNC: 10 MMOL/L (ref 5–15)
AST SERPL-CCNC: 15 U/L (ref 15–37)
BASOPHILS # BLD: 0.1 K/UL (ref 0–0.1)
BASOPHILS NFR BLD: 1 % (ref 0–1)
BILIRUB SERPL-MCNC: 0.4 MG/DL (ref 0.2–1)
BUN SERPL-MCNC: 20 MG/DL (ref 6–20)
BUN/CREAT SERPL: 14 (ref 12–20)
CALCIUM SERPL-MCNC: 8.3 MG/DL (ref 8.5–10.1)
CHLORIDE SERPL-SCNC: 103 MMOL/L (ref 97–108)
CO2 SERPL-SCNC: 28 MMOL/L (ref 21–32)
CREAT SERPL-MCNC: 1.38 MG/DL (ref 0.7–1.3)
DIFFERENTIAL METHOD BLD: ABNORMAL
EOSINOPHIL # BLD: 0.2 K/UL (ref 0–0.4)
EOSINOPHIL NFR BLD: 3 % (ref 0–7)
ERYTHROCYTE [DISTWIDTH] IN BLOOD BY AUTOMATED COUNT: 12.9 % (ref 11.5–14.5)
GLOBULIN SER CALC-MCNC: 2.6 G/DL (ref 2–4)
GLUCOSE SERPL-MCNC: 107 MG/DL (ref 65–100)
HCT VFR BLD AUTO: 37.4 % (ref 36.6–50.3)
HGB BLD-MCNC: 12.6 G/DL (ref 12.1–17)
IMM GRANULOCYTES # BLD AUTO: 0 K/UL (ref 0–0.04)
IMM GRANULOCYTES NFR BLD AUTO: 1 % (ref 0–0.5)
LYMPHOCYTES # BLD: 1.8 K/UL (ref 0.8–3.5)
LYMPHOCYTES NFR BLD: 25 % (ref 12–49)
MCH RBC QN AUTO: 32.2 PG (ref 26–34)
MCHC RBC AUTO-ENTMCNC: 33.7 G/DL (ref 30–36.5)
MCV RBC AUTO: 95.7 FL (ref 80–99)
MONOCYTES # BLD: 0.5 K/UL (ref 0–1)
MONOCYTES NFR BLD: 7 % (ref 5–13)
NEUTS SEG # BLD: 4.5 K/UL (ref 1.8–8)
NEUTS SEG NFR BLD: 63 % (ref 32–75)
NRBC # BLD: 0 K/UL (ref 0–0.01)
NRBC BLD-RTO: 0 PER 100 WBC
PLATELET # BLD AUTO: 190 K/UL (ref 150–400)
PMV BLD AUTO: 10 FL (ref 8.9–12.9)
POTASSIUM SERPL-SCNC: 3.7 MMOL/L (ref 3.5–5.1)
PROT SERPL-MCNC: 6.3 G/DL (ref 6.4–8.2)
RBC # BLD AUTO: 3.91 M/UL (ref 4.1–5.7)
SODIUM SERPL-SCNC: 141 MMOL/L (ref 136–145)
TROPONIN-HIGH SENSITIVITY: 7 NG/L (ref 0–76)
WBC # BLD AUTO: 7.1 K/UL (ref 4.1–11.1)

## 2022-08-27 PROCEDURE — 80053 COMPREHEN METABOLIC PANEL: CPT

## 2022-08-27 PROCEDURE — 74011250636 HC RX REV CODE- 250/636: Performed by: FAMILY MEDICINE

## 2022-08-27 PROCEDURE — 84484 ASSAY OF TROPONIN QUANT: CPT

## 2022-08-27 PROCEDURE — 85025 COMPLETE CBC W/AUTO DIFF WBC: CPT

## 2022-08-27 PROCEDURE — 96374 THER/PROPH/DIAG INJ IV PUSH: CPT

## 2022-08-27 PROCEDURE — 36415 COLL VENOUS BLD VENIPUNCTURE: CPT

## 2022-08-27 PROCEDURE — 99284 EMERGENCY DEPT VISIT MOD MDM: CPT

## 2022-08-27 PROCEDURE — 93005 ELECTROCARDIOGRAM TRACING: CPT

## 2022-08-27 RX ORDER — ONDANSETRON 2 MG/ML
4 INJECTION INTRAMUSCULAR; INTRAVENOUS
Status: COMPLETED | OUTPATIENT
Start: 2022-08-27 | End: 2022-08-27

## 2022-08-27 RX ADMIN — ONDANSETRON 4 MG: 2 INJECTION INTRAMUSCULAR; INTRAVENOUS at 23:46

## 2022-08-27 RX ADMIN — SODIUM CHLORIDE 1000 ML: 9 INJECTION, SOLUTION INTRAVENOUS at 23:47

## 2022-08-28 VITALS
SYSTOLIC BLOOD PRESSURE: 164 MMHG | DIASTOLIC BLOOD PRESSURE: 84 MMHG | RESPIRATION RATE: 16 BRPM | BODY MASS INDEX: 23.8 KG/M2 | TEMPERATURE: 97.7 F | OXYGEN SATURATION: 99 % | HEIGHT: 71 IN | WEIGHT: 170 LBS | HEART RATE: 78 BPM

## 2022-08-28 LAB
ATRIAL RATE: 81 BPM
CALCULATED P AXIS, ECG09: 112 DEGREES
CALCULATED R AXIS, ECG10: 57 DEGREES
CALCULATED T AXIS, ECG11: 51 DEGREES
DIAGNOSIS, 93000: NORMAL
P-R INTERVAL, ECG05: 240 MS
Q-T INTERVAL, ECG07: 388 MS
QRS DURATION, ECG06: 90 MS
QTC CALCULATION (BEZET), ECG08: 450 MS
VENTRICULAR RATE, ECG03: 81 BPM

## 2022-08-28 NOTE — DISCHARGE INSTRUCTIONS
--Midodrine dosin.5-10 mg 2-3 times daily. Max dose per day: 40 mg.  --Try to increase his intake of salt. Gatorade and sports drinks are sometimes helpful.

## 2022-08-28 NOTE — ED PROVIDER NOTES
EMERGENCY DEPARTMENT HISTORY AND PHYSICAL EXAM          Date: 8/27/2022  Patient Name: Kody Soares    History of Presenting Illness     Chief Complaint   Patient presents with    Hypotension    Diarrhea    Fatigue       History Provided By: Patient    HPI: Kody Soares is a 69 y.o. male, pmhx orthostatic hypotension in addition to the other diagnoses listed below, who was brought to the ED by his wife because of fatigue and low blood pressures over the last 2 days. He was seen in the ED 2 days ago and given IV fluids for dehydration, but since he has gone home he has not really gotten any better. His wife has been giving him the midodrine, but she was not sure how much she could give him. At one point she gave him 1.5 mg and then another time she gave 0.5 mg. (UpTo Date recommends 2.5-10 mg 2-3 times daily.) He has had 3 episodes of diarrhea today and some nausea but no vomiting. His wife has been pushing po fluids, but he has trouble drinking much. PCP: Shun Muro MD    Allergies: NKDA  Social Hx: No tobacco, EtOH; Lives locally c wife. There are no other complaints, changes, or physical findings at this time. Current Outpatient Medications   Medication Sig Dispense Refill    clonazePAM (KlonoPIN) 0.5 mg tablet Take 1 Tablet by mouth nightly. Max Daily Amount: 0.5 mg. 90 Tablet 0    atorvastatin (LIPITOR) 40 mg tablet Take 1 Tablet by mouth in the morning. 90 Tablet 0    gabapentin (NEURONTIN) 600 mg tablet Take 0.5 Tabs by mouth three (3) times daily. Max Daily Amount: 900 mg. (Patient taking differently: Take 300 mg by mouth two (2) times a day.) 135 Tab 0    pyridostigmine bromide 30 mg tab Take 30 mg by mouth three (3) times daily. 270 Tab 1    fludrocortisone (FLORINEF) 0.1 mg tablet TAKE 1 TABLET BY MOUTH TWICE DAILY 28 Tab 3    midodrine (PROAMITINE) 5 mg tablet Take 1 Tab by mouth daily (with dinner).  90 Tab 0    DULoxetine (CYMBALTA) 60 mg capsule TAKE 1 CAPSULE BY MOUTH DAILY 90 Cap 1    acetaminophen 500 mg tab 500 mg, diphenhydrAMINE 25 mg cap 25 mg Take 1 Dose by mouth nightly as needed for Other (sleep/pain relief). acetaminophen (TYLENOL 8 HOUR) 650 mg TbER Take 650 mg by mouth every eight (8) hours as needed for Pain. aspirin 81 mg chewable tablet Take 1 Tab by mouth daily. 90 Tab 2    finasteride (PROSCAR) 5 mg tablet TAKE 1 TABLET BY MOUTH DAILY(PROSTATE) 90 Tab 0    guaiFENesin ER (MUCINEX) 600 mg ER tablet Take 1 Tab by mouth every twelve (12) hours. (Patient taking differently: Take 600 mg by mouth every twelve (12) hours.  As needed) 30 Tab 0       Past History     Past Medical History:  Past Medical History:   Diagnosis Date    Altered mental status, unspecified 11/9/2018    Anxiety     Ataxia 11/9/2018    Back pain     Blurred vision     Bradycardia 10/2/2018    Chest pain     Depression     Dizziness     Dizziness and giddiness 10/17/2019    Dysautonomia ([address]     Fast heart beat     Fever 11/9/2018    Frequent headaches     Frequent urination     Hip dysplasia, congenital     Hx of syncope 10/24/2018    Hypotension     Ingrown left big toenail 3/31/2017    Joint pain     Joint swelling     Lumbar spinal stenosis     Memory deficit 9/11/2018    Muscle pain     Neuropathy     Orthostatic hypotension     asymptomatic    Pacemaker     new pacemaker July 2019    Recent change in weight     Risk for falls 12/13/2016    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 11/8/2018    Syncope     Tiredness     Trouble in sleeping     Weakness        Past Surgical History:  Past Surgical History:   Procedure Laterality Date    HX HIP REPLACEMENT Bilateral 1993    HX PACEMAKER PLACEMENT  10/16/2018    HX ROTATOR CUFF REPAIR Right 11/16/2017    [place] CARDIAC SURG PROCEDURE UNLIST      [place] INS NEW/RPLCMT PRM PM W/TRANSV ELTRD ATRIAL&VENT N/A 7/30/2019    INSERT PPM DUAL performed by Pola Miranda MD at [facility] CARDIAC CATH LAB       Family History:  Family History   Problem Relation Age of Onset    Cancer Mother     Cancer Maternal Aunt        Social History:  Social History     Tobacco Use    Smoking status: Former     Packs/day: 0.25     Years: 50.00     Pack years: 12.50     Types: Cigarettes     Quit date: 8/3/2019     Years since quitting: 3.0    Smokeless tobacco: Never   Substance Use Topics    Alcohol use: Not Currently     Alcohol/week: 28.0 standard drinks     Types: 28 Cans of beer per week     Comment: None now    Drug use: No       Allergies:  No Known Allergies      Review of Systems   Review of Systems   Constitutional:  Positive for fatigue. Negative for appetite change and fever. HENT:  Negative for congestion and sore throat. Respiratory:  Negative for cough and chest tightness. Cardiovascular:  Negative for chest pain and leg swelling. Gastrointestinal:  Positive for diarrhea and nausea. Negative for abdominal pain and vomiting. Genitourinary:  Negative for hematuria. Musculoskeletal:  Negative for back pain and joint swelling. Skin:  Negative for pallor. Neurological:  Negative for light-headedness and headaches. Hematological:  Does not bruise/bleed easily. Physical Exam   Physical Exam  Vitals reviewed. Constitutional:       General: He is not in acute distress. Appearance: He is well-developed. He is not diaphoretic. Comments: Appears tired. HENT:      Head: Normocephalic and atraumatic. Right Ear: External ear normal.      Left Ear: External ear normal.      Nose: Nose normal.      Mouth/Throat:      Mouth: Mucous membranes are moist.      Pharynx: No oropharyngeal exudate. Eyes:      General: No scleral icterus. Right eye: No discharge. Left eye: No discharge.       Conjunctiva/sclera: Conjunctivae normal.      Pupils: Pupils are equal, round, and reactive to light. Neck:      Thyroid: No thyromegaly. Vascular: No JVD. Trachea: No tracheal deviation. Cardiovascular:      Rate and Rhythm: Normal rate and regular rhythm. Heart sounds: Normal heart sounds. No murmur heard. No friction rub. No gallop. Pulmonary:      Effort: Pulmonary effort is normal. No respiratory distress. Breath sounds: No wheezing or rales. Abdominal:      General: Bowel sounds are normal. There is no distension. Palpations: Abdomen is soft. Tenderness: There is no abdominal tenderness. There is no rebound. Musculoskeletal:         General: No tenderness or deformity. Normal range of motion. Cervical back: Normal range of motion and neck supple. Skin:     General: Skin is warm and dry. Neurological:      General: No focal deficit present. Mental Status: He is alert and oriented to person, place, and time. Cranial Nerves: No cranial nerve deficit. Coordination: Coordination normal.      Deep Tendon Reflexes: Reflexes are normal and symmetric. Psychiatric:         Behavior: Behavior normal.       Diagnostic Study Results     Labs -     Recent Results (from the past 12 hour(s))   CBC WITH AUTOMATED DIFF    Collection Time: 08/27/22 11:10 PM   Result Value Ref Range    WBC 7.1 4.1 - 11.1 K/uL    RBC 3.91 (L) 4.10 - 5.70 M/uL    HGB 12.6 12.1 - 17.0 g/dL    HCT 37.4 36.6 - 50.3 %    MCV 95.7 80.0 - 99.0 FL    MCH 32.2 26.0 - 34.0 PG    MCHC 33.7 30.0 - 36.5 g/dL    RDW 12.9 11.5 - 14.5 %    PLATELET 892 034 - 940 K/uL    MPV 10.0 8.9 - 12.9 FL    NRBC 0.0 0  WBC    ABSOLUTE NRBC 0.00 0.00 - 0.01 K/uL    NEUTROPHILS 63 32 - 75 %    LYMPHOCYTES 25 12 - 49 %    MONOCYTES 7 5 - 13 %    EOSINOPHILS 3 0 - 7 %    BASOPHILS 1 0 - 1 %    IMMATURE GRANULOCYTES 1 (H) 0.0 - 0.5 %    ABS. NEUTROPHILS 4.5 1.8 - 8.0 K/UL    ABS. LYMPHOCYTES 1.8 0.8 - 3.5 K/UL    ABS. MONOCYTES 0.5 0.0 - 1.0 K/UL    ABS.  EOSINOPHILS 0.2 0.0 - 0.4 K/UL    ABS. BASOPHILS 0.1 0.0 - 0.1 K/UL    ABS. IMM. GRANS. 0.0 0.00 - 0.04 K/UL    DF AUTOMATED     METABOLIC PANEL, COMPREHENSIVE    Collection Time: 08/27/22 11:10 PM   Result Value Ref Range    Sodium 141 136 - 145 mmol/L    Potassium 3.7 3.5 - 5.1 mmol/L    Chloride 103 97 - 108 mmol/L    CO2 28 21 - 32 mmol/L    Anion gap 10 5 - 15 mmol/L    Glucose 107 (H) 65 - 100 mg/dL    BUN 20 6 - 20 MG/DL    Creatinine 1.38 (H) 0.70 - 1.30 MG/DL    BUN/Creatinine ratio 14 12 - 20      GFR est AA >60 >60 ml/min/1.73m2    GFR est non-AA 50 (L) >60 ml/min/1.73m2    Calcium 8.3 (L) 8.5 - 10.1 MG/DL    Bilirubin, total 0.4 0.2 - 1.0 MG/DL    ALT (SGPT) 20 12 - 78 U/L    AST (SGOT) 15 15 - 37 U/L    Alk. phosphatase 100 45 - 117 U/L    Protein, total 6.3 (L) 6.4 - 8.2 g/dL    Albumin 3.7 3.5 - 5.0 g/dL    Globulin 2.6 2.0 - 4.0 g/dL    A-G Ratio 1.4 1.1 - 2.2     TROPONIN-HIGH SENSITIVITY    Collection Time: 08/27/22 11:10 PM   Result Value Ref Range    Troponin-High Sensitivity 7 0 - 76 ng/L   EKG, 12 LEAD, INITIAL    Collection Time: 08/27/22 11:13 PM   Result Value Ref Range    Ventricular Rate 81 BPM    Atrial Rate 81 BPM    P-R Interval 240 ms    QRS Duration 90 ms    Q-T Interval 388 ms    QTC Calculation (Bezet) 450 ms    Calculated P Axis 112 degrees    Calculated R Axis 57 degrees    Calculated T Axis 51 degrees    Diagnosis       Atrial-paced rhythm with prolonged AV conduction  Abnormal ECG  When compared with ECG of 26-AUG-2022 12:03,  No significant change was found             Medical Decision Making   I am the first provider for this patient. I reviewed the vital signs, available nursing notes, past medical history, past surgical history, family history and social history. Vital Signs-Reviewed the patient's vital signs.   Patient Vitals for the past 12 hrs:   Temp Pulse Resp BP SpO2   08/28/22 0113 -- 78 16 (!) 164/84 99 %   08/27/22 2315 -- 82 20 (!) 155/77 97 %   08/27/22 2226 97.7 °F (36.5 °C) 76 18 129/64 94 %       Pulse Oximetry Analysis - 99% on RA    Cardiac Monitor:   Rate: 78 bpm  Rhythm: Paced rhythm     Records Reviewed: Nursing Notes, Old Medical Records, Previous electrocardiograms, Previous Radiology Studies, and Previous Laboratory Studies    Provider Notes (Medical Decision Making):   MDM: Pt with a long h/o orthostatic hypotension, now with persistent hypotension for the last 2 days. Need to rule out ACS vs pneumonia vs  CHF vs renal insufficiency    ED Course:   Initial assessment performed. The patients presenting problems have been discussed, and they are in agreement with the care plan formulated and outlined with them. I have encouraged them to ask questions as they arise throughout their visit. EKG interpretation: (Preliminary)  Rhythm: Atrial sensed/ventricular paced rhythm. No change from 8/26. This EKG was interpreted by ED Provider Dr Madelaine Thomas MD    PROGRESS NOTE:  Pt given 1000 ml NS IV and labs found to be normal. Feeling better, ready for discharge. Wife given instructions re dosing of midodrine. Discharge note:  Pt re-evaluated and noted to be feeling better, ready for discharge. Updated pt on all final lab findings. Will follow up as instructed. All questions have been answered, pt voiced understanding and agreement with plan. Specific return precautions provided as well as instructions to return to the ED should sx worsen at any time. Vital signs stable for discharge. Critical Care Time: 0      Diagnosis     Clinical Impression:   1. Orthostatic hypotension        PLAN:  1. Discharge Medication List as of 8/28/2022  1:08 AM        2.    Follow-up Information       Follow up With Specialties Details Why Contact Info    Guero Molina MD Family Medicine In 3 days  [address] [place] [id number]  [phone]            Return to ED if worse     Disposition:  Home

## 2022-08-28 NOTE — ED TRIAGE NOTES
Patient had multiple episodes hypotension throughout the day, patient was given his prescribed medication. Patient reports anxiety and diarrhea that started around 1200, last bowel movement 2100. No medication taken for the diarrhea.

## 2022-09-02 NOTE — PROGRESS NOTES
Report received from [facility] [employer] [facility] , [facility]. SBAR were discussed. Patient is orthostatic as he has been but asymptomatic. Assisted to JT Velazquez RN [Time Spent: ___ minutes] : I have spent [unfilled] minutes of time on the encounter.

## 2022-09-16 NOTE — PROGRESS NOTES
Nicanor Torres is a 69 y.o. male who presents today with c/o   Chief Complaint   Patient presents with    Follow-up    Nasal Congestion    Cough       Assessment/ Plan:         ICD-10-CM ICD-9-CM    1. History of CVA (cerebrovascular accident)  Z86.73 V12.54 LIPID PANEL      METABOLIC PANEL, COMPREHENSIVE      CBC WITH AUTOMATED DIFF      COLLECTION VENOUS BLOOD,VENIPUNCTURE      CBC WITH AUTOMATED DIFF      METABOLIC PANEL, COMPREHENSIVE      LIPID PANEL      2. Nasal congestion  R09.81 478.19 AMB POC COVID-19 COV      3. Cough  R05.9 786.2 AMB POC COVID-19 COV      4. PTSD (post-traumatic stress disorder)  F43.10 309.81       5. BPH with obstruction/lower urinary tract symptoms  N40.1 600.01     N13.8 599.69       6. Hypercholesterolemia  E78.00 272.0         Check labs today  Covid test was negative today  F/U: 3 months for gabapentin and klonopin      Orders Placed This Encounter    COLLECTION VENOUS BLOOD,VENIPUNCTURE    LIPID PANEL     Standing Status:   Future     Number of Occurrences:   1     Standing Expiration Date:   9/23/0369    METABOLIC PANEL, COMPREHENSIVE     Standing Status:   Future     Number of Occurrences:   1     Standing Expiration Date:   9/17/2023    CBC WITH AUTOMATED DIFF     Standing Status:   Future     Number of Occurrences:   1     Standing Expiration Date:   9/17/2023    AMB POC COVID-19 COV     Order Specific Question:   Is this test for diagnosis or screening? Answer:   Diagnosis of ill patient     Order Specific Question:   Symptomatic for COVID-19 as defined by CDC? Answer:   Yes     Order Specific Question:   Date of Symptom Onset     Answer:   9/5/2022     Order Specific Question:   Hospitalized for COVID-19? Answer:   No     Order Specific Question:   Admitted to ICU for COVID-19? Answer:   No     Order Specific Question:   Employed in healthcare setting? Answer:   No     Order Specific Question:   Resident in a congregate (group) care setting?      Answer: No     Order Specific Question:   Previously tested for COVID-19? Answer:   Yes       Follow-up and Dispositions    Return in about 3 months (around 12/19/2022). Subjective:  Taurus Artis is a 69 y.o. male here today for medication refills. Patient of Dr. Sariah Handley. New issues:   Cough and congestion: He received his moderna covid booster over a week ago and he reports a cough ever since associated with a post-nasal drip. Feels his symptoms are related to the covid vaccine. Denies fevers, denies shortness of breath, denies chest pain. Covid test today was negative. Advised him to follow-up if symptoms do not continue to improve which he agrees with. Right knee pain: States his right knee started hurting a few days ago. Denies recent trauma. States the knee just started hurting. Feels 'stiff'. Denies swelling to the knee. He does report having arthritis. Takes gabapentin for his neuropathy. He does not wish to get an XR and will let me know if his pain does not continue to improve. He has full ROM in the office today and he is having no difficulty ambulating. HPI  Hypercholesterolemia: Taking atorvastatin. Tanner well. Lab Results   Component Value Date/Time    Cholesterol, total 112 09/27/2019 02:37 PM    HDL Cholesterol 40 09/27/2019 02:37 PM    LDL, calculated 53 09/27/2019 02:37 PM    VLDL, calculated 19 09/27/2019 02:37 PM    Triglyceride 96 09/27/2019 02:37 PM     PTSD: Managed with clonazepam and duloxetine. Tanner well. BPH: Taking proscar. Tanner well. Hypotension: Has an ongoing history of low blood pressure. Admits to not drinking enough water. Taking midodrine daily. B/P is stable today.       Patient Active Problem List   Diagnosis Code    PTSD (post-traumatic stress disorder) F43.10    Lumbar spinal stenosis M48.061    Alcoholism in remission ([facility] [address] F10.21    BPH with obstruction/lower urinary tract symptoms N40.1, N13.8    Age-related macular degeneration, dry, both eyes H35.0539 S/P hip replacement, bilateral Z96.643    S/P right rotator cuff repair Z98.890    Idiopathic small and large fiber sensory neuropathy G60.8    Memory disturbance R41.3    Degenerative cervical spinal stenosis M48.02    History of lumbar laminectomy Z98.890    Degenerative lumbar spinal stenosis M48.061    Heart block I45.9    Bilateral carotid artery stenosis I65.23    Autonomic orthostatic hypotension I95.1    Incidental lung nodule, greater than or equal to 8mm R91.1       Past Medical History:   Diagnosis Date    Altered mental status, unspecified 11/9/2018    Anxiety     Ataxia 11/9/2018    Back pain     Blurred vision     Bradycardia 10/2/2018    Chest pain     Depression     Dizziness     Dizziness and giddiness 10/17/2019    Dysautonomia ([address]     Fast heart beat     Fever 11/9/2018    Frequent headaches     Frequent urination     Hip dysplasia, congenital     Hx of syncope 10/24/2018    Hypotension     Ingrown left big toenail 3/31/2017    Joint pain     Joint swelling     Lumbar spinal stenosis     Memory deficit 9/11/2018    Muscle pain     Neuropathy     Orthostatic hypotension     asymptomatic    Pacemaker     new pacemaker July 2019    Recent change in weight     Risk for falls 12/13/2016    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 Max Daily Amount: 0.5 mg., Disp: 90 Tablet, Rfl: 0    atorvastatin (LIPITOR) 40 mg tablet, Take 1 Tablet by mouth in the morning., Disp: 90 Tablet, Rfl: 0    gabapentin (NEURONTIN) 600 mg tablet, Take 0.5 Tabs by mouth three (3) times daily. Max Daily Amount: 900 mg.  (Patient taking differently: Take 300 mg by mouth two (2) times a day.), Disp: 135 Tab, Rfl: 0    pyridostigmine bromide 30 mg tab, Take 30 mg by mouth three (3) times daily. , Disp: 270 Tab, Rfl: 1    fludrocortisone (FLORINEF) 0.1 mg tablet, TAKE 1 TABLET BY MOUTH TWICE DAILY, Disp: 28 Tab, Rfl: 3    midodrine (PROAMITINE) 5 mg tablet, Take 1 Tab by mouth daily (with dinner). , Disp: 90 Tab, Rfl: 0    DULoxetine (CYMBALTA) 60 mg capsule, TAKE 1 CAPSULE BY MOUTH DAILY, Disp: 90 Cap, Rfl: 1    acetaminophen 500 mg tab 500 mg, diphenhydrAMINE 25 mg cap 25 mg, Take 1 Dose by mouth nightly as needed for Other (sleep/pain relief). , Disp: , Rfl:     acetaminophen (TYLENOL) 650 mg TbER, Take 650 mg by mouth every eight (8) hours as needed for Pain., Disp: , Rfl:     aspirin 81 mg chewable tablet, Take 1 Tab by mouth daily. , Disp: 90 Tab, Rfl: 2    finasteride (PROSCAR) 5 mg tablet, TAKE 1 TABLET BY MOUTH DAILY(PROSTATE), Disp: 90 Tab, Rfl: 0    guaiFENesin ER (MUCINEX) 600 mg ER tablet, Take 1 Tab by mouth every twelve (12) hours. (Patient taking differently: Take 600 mg by mouth every twelve (12) hours.  As needed), Disp: 30 Tab, Rfl: 0    No Known Allergies    Past Surgical History:   Procedure Laterality Date    HX HIP REPLACEMENT Bilateral 1993    HX PACEMAKER PLACEMENT  10/16/2018    HX ROTATOR CUFF REPAIR Right 11/16/2017    ID CARDIAC SURG PROCEDURE UNLIST      ID INS NEW/RPLCMT PRM PM W/TRANSV ELTRD ATRIAL&VENT N/A 7/30/2019    INSERT PPM DUAL performed by Marylou Clemens MD at [facility] CARDIAC CATH LAB       Social History     Tobacco Use   Smoking Status Former    Packs/day: 0.25    Years: 50.00    Pack years: 12.50    Types: Cigarettes    Quit date: 8/3/2019    Years since quitting: 3.1   Smokeless Tobacco Never       Social History     Socioeconomic History    Marital status:    Tobacco Use    Smoking status: Former     Packs/day: 0.25     Years: 50.00     Pack years: 12.50     Types: Cigarettes     Quit date: 8/3/2019     Years since quitting: 3.1    Smokeless tobacco: Never   Substance and Sexual Activity    Alcohol use: Not Currently     Alcohol/week: 28.0 standard drinks     Types: 28 Cans of beer per week     Comment: None now    Drug use: No    Sexual activity: Never       Family History   Problem Relation Age of Onset    Cancer Mother     Cancer Maternal Aunt        ROS:  Review of Systems   Constitutional:  Negative for chills and fever. HENT:  Positive for congestion. Eyes:  Negative for blurred vision and double vision. Respiratory:  Positive for cough. Cardiovascular:  Negative for chest pain. Gastrointestinal:  Negative for heartburn, nausea and vomiting. Genitourinary:  Negative for dysuria and urgency. Musculoskeletal:  Positive for joint pain. Negative for myalgias. Skin:  Negative for itching and rash. Neurological:  Negative for headaches. Objective:     Visit Vitals  BP 97/65 (BP 1 Location: Left upper arm, BP Patient Position: Sitting, BP Cuff Size: Adult)   Pulse 71   Temp 97 °F (36.1 °C) (Temporal)   Resp 18   Ht 5' 11\" (1.803 m)   SpO2 95%   BMI 23.71 kg/m²     Body mass index is 23.71 kg/m². Physical Exam  Vitals reviewed. Constitutional:       General: He is not in acute distress. Appearance: He is not ill-appearing or toxic-appearing. Comments: Ambulatory in the office today. HENT:      Head: Normocephalic. Right Ear: External ear normal.      Left Ear: External ear normal.      Nose: Nose normal.      Mouth/Throat:      Mouth: Mucous membranes are moist.   Eyes:      Pupils: Pupils are equal, round, and reactive to light. Cardiovascular:      Rate and Rhythm: Normal rate. Pulses: Normal pulses. Pulmonary:      Effort: Pulmonary effort is normal. No respiratory distress. Breath sounds: Normal breath sounds. No wheezing or rhonchi. Abdominal:      General: Abdomen is flat. Musculoskeletal:         General: No swelling, tenderness or deformity. Normal range of motion. Cervical back: Normal range of motion. Skin:     General: Skin is warm. Neurological:      Mental Status: He is alert and oriented to person, place, and time. Coordination: Coordination abnormal (H/O CVA). Psychiatric:         Mood and Affect: Mood normal.         Behavior: Behavior normal.         Results for orders placed or performed in visit on 09/19/22   AMB POC COVID-19 COV   Result Value Ref Range    SARS-COV-2 RNA POC Negative Negative    LOT NUMBER SWAB [id number]     EXP DATE SWAB 12/27/2023     LOT NUMBER SOLUTION [id number]     EXP DATE SOLUTION 12/27/2023        Results for orders placed or performed in visit on 09/19/22   AMB POC COVID-19 COV   Result Value Ref Range    SARS-COV-2 RNA POC Negative Negative    LOT NUMBER SWAB [id number]     EXP DATE SWAB 12/27/2023     LOT NUMBER SOLUTION [id number]     EXP DATE SOLUTION 12/27/2023            Verbal and written instructions (see AVS) provided. Patient expresses understanding of diagnosis and treatment plan. Follow-up and Dispositions    Return in about 3 months (around 12/19/2022). Patient has been advised to contact practice or seek care if condition persists or worsens.      Charity Morel NP

## 2022-09-19 ENCOUNTER — OFFICE VISIT (OUTPATIENT)
Dept: FAMILY MEDICINE CLINIC | Age: 77
End: 2022-09-19
Payer: OTHER GOVERNMENT

## 2022-09-19 VITALS
HEART RATE: 71 BPM | BODY MASS INDEX: 23.71 KG/M2 | SYSTOLIC BLOOD PRESSURE: 97 MMHG | DIASTOLIC BLOOD PRESSURE: 65 MMHG | RESPIRATION RATE: 18 BRPM | OXYGEN SATURATION: 95 % | HEIGHT: 71 IN | TEMPERATURE: 97 F

## 2022-09-19 DIAGNOSIS — G62.9 NEUROPATHY: ICD-10-CM

## 2022-09-19 DIAGNOSIS — R73.09 ELEVATED GLUCOSE: ICD-10-CM

## 2022-09-19 DIAGNOSIS — F43.10 PTSD (POST-TRAUMATIC STRESS DISORDER): ICD-10-CM

## 2022-09-19 DIAGNOSIS — N40.1 BPH WITH OBSTRUCTION/LOWER URINARY TRACT SYMPTOMS: ICD-10-CM

## 2022-09-19 DIAGNOSIS — N13.8 BPH WITH OBSTRUCTION/LOWER URINARY TRACT SYMPTOMS: ICD-10-CM

## 2022-09-19 DIAGNOSIS — Z86.73 HISTORY OF CVA (CEREBROVASCULAR ACCIDENT): Primary | ICD-10-CM

## 2022-09-19 DIAGNOSIS — R09.81 NASAL CONGESTION: ICD-10-CM

## 2022-09-19 DIAGNOSIS — E78.00 HYPERCHOLESTEROLEMIA: ICD-10-CM

## 2022-09-19 DIAGNOSIS — R05.9 COUGH: ICD-10-CM

## 2022-09-19 LAB
EXP DATE SOLUTION: NORMAL
EXP DATE SWAB: NORMAL
LOT NUMBER SOLUTION: NORMAL
LOT NUMBER SWAB: NORMAL
SARS-COV-2 RNA POC: NEGATIVE

## 2022-09-19 PROCEDURE — 99214 OFFICE O/P EST MOD 30 MIN: CPT | Performed by: NURSE PRACTITIONER

## 2022-09-19 PROCEDURE — 1123F ACP DISCUSS/DSCN MKR DOCD: CPT | Performed by: NURSE PRACTITIONER

## 2022-09-19 PROCEDURE — 87635 SARS-COV-2 COVID-19 AMP PRB: CPT | Performed by: NURSE PRACTITIONER

## 2022-09-19 NOTE — PROGRESS NOTES
1. \"Have you been to the ER, urgent care clinic since your last visit? Hospitalized since your last visit? \" No    2. \"Have you seen or consulted any other health care providers outside of the [address] [place] [address] since your last visit? \" No     3. For patients aged 39-70: Has the patient had a colonoscopy / FIT/ Cologuard? Yes - no Care Gap present      If the patient is female:    4. For patients aged 41-77: Has the patient had a mammogram within the past 2 years? NA - based on age or sex      11. For patients aged 21-65: Has the patient had a pap smear?  NA - based on age or sex

## 2022-09-20 DIAGNOSIS — R73.09 ELEVATED GLUCOSE: Primary | ICD-10-CM

## 2022-09-20 LAB
ALBUMIN SERPL-MCNC: 3.8 G/DL (ref 3.5–5)
ALBUMIN/GLOB SERPL: 1.3 {RATIO} (ref 1.1–2.2)
ALP SERPL-CCNC: 96 U/L (ref 45–117)
ALT SERPL-CCNC: 20 U/L (ref 12–78)
ANION GAP SERPL CALC-SCNC: 5 MMOL/L (ref 5–15)
AST SERPL-CCNC: 11 U/L (ref 15–37)
BASOPHILS # BLD: 0 K/UL (ref 0–0.1)
BASOPHILS NFR BLD: 1 % (ref 0–1)
BILIRUB SERPL-MCNC: 0.8 MG/DL (ref 0.2–1)
BUN SERPL-MCNC: 19 MG/DL (ref 6–20)
BUN/CREAT SERPL: 16 (ref 12–20)
CALCIUM SERPL-MCNC: 8.9 MG/DL (ref 8.5–10.1)
CHLORIDE SERPL-SCNC: 109 MMOL/L (ref 97–108)
CHOLEST SERPL-MCNC: 95 MG/DL
CO2 SERPL-SCNC: 27 MMOL/L (ref 21–32)
CREAT SERPL-MCNC: 1.17 MG/DL (ref 0.7–1.3)
DIFFERENTIAL METHOD BLD: ABNORMAL
EOSINOPHIL # BLD: 0.2 K/UL (ref 0–0.4)
EOSINOPHIL NFR BLD: 3 % (ref 0–7)
ERYTHROCYTE [DISTWIDTH] IN BLOOD BY AUTOMATED COUNT: 12.3 % (ref 11.5–14.5)
EST. AVERAGE GLUCOSE BLD GHB EST-MCNC: 123 MG/DL
GLOBULIN SER CALC-MCNC: 3 G/DL (ref 2–4)
GLUCOSE SERPL-MCNC: 145 MG/DL (ref 65–100)
HBA1C MFR BLD: 5.9 % (ref 4–5.6)
HCT VFR BLD AUTO: 39.7 % (ref 36.6–50.3)
HDLC SERPL-MCNC: 48 MG/DL
HDLC SERPL: 2 {RATIO} (ref 0–5)
HGB BLD-MCNC: 13.2 G/DL (ref 12.1–17)
IMM GRANULOCYTES # BLD AUTO: 0 K/UL (ref 0–0.04)
IMM GRANULOCYTES NFR BLD AUTO: 0 % (ref 0–0.5)
LDLC SERPL CALC-MCNC: 28.4 MG/DL (ref 0–100)
LYMPHOCYTES # BLD: 0.9 K/UL (ref 0.8–3.5)
LYMPHOCYTES NFR BLD: 15 % (ref 12–49)
MCH RBC QN AUTO: 32.5 PG (ref 26–34)
MCHC RBC AUTO-ENTMCNC: 33.2 G/DL (ref 30–36.5)
MCV RBC AUTO: 97.8 FL (ref 80–99)
MONOCYTES # BLD: 0.4 K/UL (ref 0–1)
MONOCYTES NFR BLD: 7 % (ref 5–13)
NEUTS SEG # BLD: 4.6 K/UL (ref 1.8–8)
NEUTS SEG NFR BLD: 74 % (ref 32–75)
NRBC # BLD: 0 K/UL (ref 0–0.01)
NRBC BLD-RTO: 0 PER 100 WBC
PLATELET # BLD AUTO: 195 K/UL (ref 150–400)
PMV BLD AUTO: 10.6 FL (ref 8.9–12.9)
POTASSIUM SERPL-SCNC: 4 MMOL/L (ref 3.5–5.1)
PROT SERPL-MCNC: 6.8 G/DL (ref 6.4–8.2)
RBC # BLD AUTO: 4.06 M/UL (ref 4.1–5.7)
SODIUM SERPL-SCNC: 141 MMOL/L (ref 136–145)
TRIGL SERPL-MCNC: 93 MG/DL (ref ?–150)
VLDLC SERPL CALC-MCNC: 18.6 MG/DL
WBC # BLD AUTO: 6.2 K/UL (ref 4.1–11.1)

## 2022-09-20 NOTE — PROGRESS NOTES
Labs are okay, but blood sugars are running a little higher than two years ago--would like him to work on low carb diet to avoid being started on medications.

## 2022-10-07 ENCOUNTER — APPOINTMENT (OUTPATIENT)
Dept: CT IMAGING | Age: 77
End: 2022-10-07
Attending: EMERGENCY MEDICINE
Payer: OTHER GOVERNMENT

## 2022-10-07 ENCOUNTER — HOSPITAL ENCOUNTER (EMERGENCY)
Age: 77
Discharge: HOME | End: 2022-10-07
Attending: EMERGENCY MEDICINE
Payer: OTHER GOVERNMENT

## 2022-10-07 ENCOUNTER — APPOINTMENT (OUTPATIENT)
Dept: GENERAL RADIOLOGY | Age: 77
End: 2022-10-07
Attending: EMERGENCY MEDICINE
Payer: OTHER GOVERNMENT

## 2022-10-07 VITALS
TEMPERATURE: 98.2 F | DIASTOLIC BLOOD PRESSURE: 70 MMHG | SYSTOLIC BLOOD PRESSURE: 107 MMHG | HEIGHT: 71 IN | HEART RATE: 84 BPM | OXYGEN SATURATION: 95 % | BODY MASS INDEX: 23.8 KG/M2 | RESPIRATION RATE: 16 BRPM | WEIGHT: 169.97 LBS

## 2022-10-07 DIAGNOSIS — I95.1 ORTHOSTATIC HYPOTENSION: Primary | ICD-10-CM

## 2022-10-07 DIAGNOSIS — E86.0 DEHYDRATION: ICD-10-CM

## 2022-10-07 LAB
ANION GAP SERPL CALC-SCNC: 8 MMOL/L (ref 5–15)
BASOPHILS # BLD: 0.1 K/UL (ref 0–0.1)
BASOPHILS NFR BLD: 1 % (ref 0–1)
BNP SERPL-MCNC: 369 PG/ML (ref 0–450)
BUN SERPL-MCNC: 17 MG/DL (ref 6–20)
BUN/CREAT SERPL: 12 (ref 12–20)
CALCIUM SERPL-MCNC: 9.2 MG/DL (ref 8.5–10.1)
CHLORIDE SERPL-SCNC: 107 MMOL/L (ref 97–108)
CO2 SERPL-SCNC: 31 MMOL/L (ref 21–32)
CREAT SERPL-MCNC: 1.47 MG/DL (ref 0.7–1.3)
DIFFERENTIAL METHOD BLD: ABNORMAL
EOSINOPHIL # BLD: 0.2 K/UL (ref 0–0.4)
EOSINOPHIL NFR BLD: 2 % (ref 0–7)
ERYTHROCYTE [DISTWIDTH] IN BLOOD BY AUTOMATED COUNT: 12.5 % (ref 11.5–14.5)
GLUCOSE SERPL-MCNC: 103 MG/DL (ref 65–100)
HCT VFR BLD AUTO: 38.7 % (ref 36.6–50.3)
HGB BLD-MCNC: 12.8 G/DL (ref 12.1–17)
IMM GRANULOCYTES # BLD AUTO: 0 K/UL (ref 0–0.04)
IMM GRANULOCYTES NFR BLD AUTO: 0 % (ref 0–0.5)
LYMPHOCYTES # BLD: 1.3 K/UL (ref 0.8–3.5)
LYMPHOCYTES NFR BLD: 18 % (ref 12–49)
MCH RBC QN AUTO: 31.8 PG (ref 26–34)
MCHC RBC AUTO-ENTMCNC: 33.1 G/DL (ref 30–36.5)
MCV RBC AUTO: 96 FL (ref 80–99)
MONOCYTES # BLD: 0.5 K/UL (ref 0–1)
MONOCYTES NFR BLD: 7 % (ref 5–13)
NEUTS SEG # BLD: 5.2 K/UL (ref 1.8–8)
NEUTS SEG NFR BLD: 72 % (ref 32–75)
NRBC # BLD: 0 K/UL (ref 0–0.01)
NRBC BLD-RTO: 0 PER 100 WBC
PLATELET # BLD AUTO: 175 K/UL (ref 150–400)
PMV BLD AUTO: 9.2 FL (ref 8.9–12.9)
POTASSIUM SERPL-SCNC: 4 MMOL/L (ref 3.5–5.1)
RBC # BLD AUTO: 4.03 M/UL (ref 4.1–5.7)
SODIUM SERPL-SCNC: 146 MMOL/L (ref 136–145)
TROPONIN-HIGH SENSITIVITY: 11 NG/L (ref 0–76)
WBC # BLD AUTO: 7.3 K/UL (ref 4.1–11.1)

## 2022-10-07 PROCEDURE — 71045 X-RAY EXAM CHEST 1 VIEW: CPT

## 2022-10-07 PROCEDURE — 99285 EMERGENCY DEPT VISIT HI MDM: CPT

## 2022-10-07 PROCEDURE — 70450 CT HEAD/BRAIN W/O DYE: CPT

## 2022-10-07 PROCEDURE — 74011250636 HC RX REV CODE- 250/636: Performed by: EMERGENCY MEDICINE

## 2022-10-07 PROCEDURE — 84484 ASSAY OF TROPONIN QUANT: CPT

## 2022-10-07 PROCEDURE — 96360 HYDRATION IV INFUSION INIT: CPT

## 2022-10-07 PROCEDURE — 85025 COMPLETE CBC W/AUTO DIFF WBC: CPT

## 2022-10-07 PROCEDURE — 83880 ASSAY OF NATRIURETIC PEPTIDE: CPT

## 2022-10-07 PROCEDURE — 36415 COLL VENOUS BLD VENIPUNCTURE: CPT

## 2022-10-07 PROCEDURE — 80048 BASIC METABOLIC PNL TOTAL CA: CPT

## 2022-10-07 RX ADMIN — SODIUM CHLORIDE 1000 ML: 9 INJECTION, SOLUTION INTRAVENOUS at 16:31

## 2022-10-07 NOTE — ED NOTES
Pt ambulated to restroom. Pt tolerated well. IV discontinued. RN reviewed DC instructions with pt and wife. All questions answered.
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
## 2022-10-17 ENCOUNTER — OFFICE VISIT (OUTPATIENT)
Dept: SURGERY | Age: 77
End: 2022-10-17
Payer: OTHER GOVERNMENT

## 2022-10-17 VITALS
HEIGHT: 71 IN | HEART RATE: 80 BPM | SYSTOLIC BLOOD PRESSURE: 122 MMHG | BODY MASS INDEX: 23.71 KG/M2 | TEMPERATURE: 98.6 F | DIASTOLIC BLOOD PRESSURE: 76 MMHG

## 2022-10-17 DIAGNOSIS — L72.3 SEBACEOUS CYST: Primary | ICD-10-CM

## 2022-10-17 PROCEDURE — 1123F ACP DISCUSS/DSCN MKR DOCD: CPT | Performed by: SURGERY

## 2022-10-17 PROCEDURE — 99203 OFFICE O/P NEW LOW 30 MIN: CPT | Performed by: SURGERY

## 2022-10-17 NOTE — PROGRESS NOTES
Bry Zaldivar is a 69 y.o. male who presents today with the following:  Chief Complaint   Patient presents with    New Patient    Cyst     Mid back cyst       HPI    61-year-old male who presents with his wife as a referral by Dr. Joanna Gustafson for evaluation of a recurrent sebaceous cyst in his mid thoracic region. They state that he had this initially removed over 10 years ago while he was in [place] and it slowly came back. He states it is slowly increased in size. He can \"feel it\" but has no pain and no drainage. It is really causing him no discomfort. He is not sure if it is changed in size recently. He apparently is having some issues with management of his blood pressure and has had multiple ED evaluations according to his spouse. He has a pacemaker that apparently was infected and changed out 2 to 3 years ago.   Past Medical History:   Diagnosis Date    Altered mental status, unspecified 11/9/2018    Anxiety     Ataxia 11/9/2018    Back pain     Blurred vision     Bradycardia 10/2/2018    Chest pain     Depression     Dizziness     Dizziness and giddiness 10/17/2019    Dysautonomia ([address]     Fast heart beat     Fever 11/9/2018    Frequent headaches     Frequent urination     Hip dysplasia, congenital     Hx of syncope 10/24/2018    Hypotension     Ingrown left big toenail 3/31/2017    Joint pain     Joint swelling     Lumbar spinal stenosis     Memory deficit 9/11/2018    Muscle pain     Neuropathy     Orthostatic hypotension     asymptomatic    Pacemaker     new pacemaker July 2019    Recent change in weight     Risk for falls 12/13/2016    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 11/8/2018    Syncope     Tiredness     Trouble in sleeping     Weakness        Past Surgical History:   Procedure Laterality Date    HX HIP REPLACEMENT Bilateral 1993    HX PACEMAKER PLACEMENT  10/16/2018    HX ROTATOR CUFF REPAIR Right 11/16/2017    [place] CARDIAC SURG PROCEDURE UNLIST      [place] INS NEW/RPLCMT PRM PM W/TRANSV ELTRD ATRIAL&VENT N/A 7/30/2019    INSERT PPM DUAL performed by Dexter Appiah MD at [facility] CARDIAC CATH LAB       Social History     Socioeconomic History    Marital status:      Spouse name: Not on file    Number of children: Not on file    Years of education: Not on file    Highest education level: Not on file   Occupational History    Not on file   Tobacco Use    Smoking status: Former     Packs/day: 0.25     Years: 50.00     Pack years: 12.50     Types: Cigarettes     Quit date: 8/3/2019     Years since quitting: 3.2    Smokeless tobacco: Never   Substance and Sexual Activity    Alcohol use: Not Currently     Alcohol/week: 28.0 standard drinks     Types: 28 Cans of beer per week     Comment: None now    Drug use: No    Sexual activity: Never   Other Topics Concern    Not on file   Social History Narrative    Not on file     Social Determinants of Health     Financial Resource Strain: Not on file   Food Insecurity: Not on file   Transportation Needs: Not on file   Physical Activity: Not on file   Stress: Not on file   Social Connections: Not on file   Intimate Partner Violence: Not on file   Housing Stability: Not on file       Family History   Problem Relation Age of Onset    Cancer Mother     Cancer Maternal Aunt        No Known Allergies    Current Outpatient Medications   Medication Sig    clonazePAM (KlonoPIN) 0.5 mg tablet Take 1 Tablet by mouth nightly. Max Daily Amount: 0.5 mg.    atorvastatin (LIPITOR) 40 mg tablet Take 1 Tablet by mouth in the morning.    gabapentin (NEURONTIN) 600 mg tablet Take 0.5 Tabs by mouth three (3) times daily. Max Daily Amount: 900 mg. (Patient taking differently: Take 300 mg by mouth two (2) times a day.)    pyridostigmine bromide 30 mg tab Take 30 mg by mouth three (3) times daily. fludrocortisone (FLORINEF) 0.1 mg tablet TAKE 1 TABLET BY MOUTH TWICE DAILY    midodrine (PROAMITINE) 5 mg tablet Take 1 Tab by mouth daily (with dinner). DULoxetine (CYMBALTA) 60 mg capsule TAKE 1 CAPSULE BY MOUTH DAILY    acetaminophen 500 mg tab 500 mg, diphenhydrAMINE 25 mg cap 25 mg Take 1 Dose by mouth nightly as needed for Other (sleep/pain relief). acetaminophen (TYLENOL) 650 mg TbER Take 650 mg by mouth every eight (8) hours as needed for Pain. aspirin 81 mg chewable tablet Take 1 Tab by mouth daily. finasteride (PROSCAR) 5 mg tablet TAKE 1 TABLET BY MOUTH DAILY(PROSTATE)    guaiFENesin ER (MUCINEX) 600 mg ER tablet Take 1 Tab by mouth every twelve (12) hours. (Patient taking differently: Take 600 mg by mouth every twelve (12) hours. As needed)     No current facility-administered medications for this visit. The above histories, medications and allergies have been reviewed. ROS    Visit Vitals  /76   Pulse 80   Temp 98.6 °F (37 °C) (Temporal)   Ht 5' 11\" (1.803 m)   BMI 23.71 kg/m²     Physical Exam  Constitutional:       Appearance: Normal appearance. Skin:     Comments: In the posterior midthoracic region there is a 3 cm x 2-1/2 cm well-circumscribed noninflamed mobile subcutaneous cyst consistent with a sebaceous cyst   Neurological:      Mental Status: He is alert. 1. Sebaceous cyst  We discussed options. One option that was offered was elective excision. Risks and benefits were shared. The other option that was given was observation. Risks and benefits were shared. After discussion both him and his spouse would like to observe at this point. I have told him that if it increases in size or starts becoming more symptomatic or if it becomes infected or inflamed we can plan on intervention. If he desires intervention sooner and his blood pressure is more stable we certainly can plan on elective excision at any time. Follow-up as needed.       Follow-up and Dispositions    Return if symptoms worsen or fail to improve.          Marcos Rivera MD

## 2022-10-17 NOTE — LETTER
10/17/2022    Patient: Marck Marcelo   YOB: 1945   Date of Visit: 10/17/2022     Marylene Staples., MD  [address] [facility]  [address] [facility] [address]    Dear Marylene Staples., MD,      Thank you for referring Mr. Mallorie Fernandez to [address] [place] [address] for evaluation. My notes for this consultation are attached. If you have questions, please do not hesitate to call me. I look forward to following your patient along with you.       Sincerely,    Dilia Lassiter MD

## 2022-10-17 NOTE — PROGRESS NOTES
Identified pt with two pt identifiers(name and ). Reviewed record in preparation for visit and have obtained necessary documentation. All patient medications has been reviewed. Chief Complaint   Patient presents with    New Patient    Cyst     Mid back cyst       Health Maintenance Due   Topic    COVID-19 Vaccine (3 - Booster for Moderna series)    Shingrix Vaccine Age 50> (2 of 2)    Flu Vaccine (1)       There were no vitals filed for this visit. 4.Have you been to the ER, urgent care clinic since your last visit? Hospitalized since your last visit? Yes Where: [facility] Reason for visit: low BP    5. Have you seen or consulted any other health care providers outside of the [address] [place] [address] since your last visit? Include any pap smears or colon screening. Yes Where: [facility]      Patient is accompanied by wife I have received verbal consent from Promise Patel to discuss any/all medical information while they are present in the room.

## 2022-10-26 ENCOUNTER — NURSE TRIAGE (OUTPATIENT)
Dept: OTHER | Facility: CLINIC | Age: 77
End: 2022-10-26

## 2022-10-26 ENCOUNTER — HOSPITAL ENCOUNTER (EMERGENCY)
Age: 77
Discharge: HOME | End: 2022-10-26
Attending: EMERGENCY MEDICINE
Payer: OTHER GOVERNMENT

## 2022-10-26 ENCOUNTER — TELEPHONE (OUTPATIENT)
Dept: FAMILY MEDICINE CLINIC | Age: 77
End: 2022-10-26

## 2022-10-26 VITALS
SYSTOLIC BLOOD PRESSURE: 162 MMHG | TEMPERATURE: 97.9 F | DIASTOLIC BLOOD PRESSURE: 79 MMHG | BODY MASS INDEX: 23.57 KG/M2 | WEIGHT: 169 LBS | HEART RATE: 80 BPM | RESPIRATION RATE: 16 BRPM | OXYGEN SATURATION: 99 %

## 2022-10-26 DIAGNOSIS — I95.1 ORTHOSTATIC HYPOTENSION: Primary | ICD-10-CM

## 2022-10-26 DIAGNOSIS — E87.6 HYPOKALEMIA: ICD-10-CM

## 2022-10-26 LAB
ANION GAP SERPL CALC-SCNC: 10 MMOL/L (ref 5–15)
APPEARANCE UR: CLEAR
BACTERIA URNS QL MICRO: NEGATIVE /HPF
BILIRUB UR QL: NEGATIVE
BUN SERPL-MCNC: 17 MG/DL (ref 6–20)
BUN/CREAT SERPL: 13 (ref 12–20)
CALCIUM SERPL-MCNC: 8.7 MG/DL (ref 8.5–10.1)
CHLORIDE SERPL-SCNC: 105 MMOL/L (ref 97–108)
CO2 SERPL-SCNC: 31 MMOL/L (ref 21–32)
COLOR UR: ABNORMAL
CREAT SERPL-MCNC: 1.29 MG/DL (ref 0.7–1.3)
EPITH CASTS URNS QL MICRO: ABNORMAL /LPF
GLUCOSE SERPL-MCNC: 182 MG/DL (ref 65–100)
GLUCOSE UR STRIP.AUTO-MCNC: NEGATIVE MG/DL
HGB UR QL STRIP: ABNORMAL
KETONES UR QL STRIP.AUTO: NEGATIVE MG/DL
LEUKOCYTE ESTERASE UR QL STRIP.AUTO: NEGATIVE
NITRITE UR QL STRIP.AUTO: NEGATIVE
PH UR STRIP: 7 [PH] (ref 5–8)
POTASSIUM SERPL-SCNC: 3.2 MMOL/L (ref 3.5–5.1)
PROT UR STRIP-MCNC: NEGATIVE MG/DL
RBC #/AREA URNS HPF: ABNORMAL /HPF (ref 0–5)
SODIUM SERPL-SCNC: 146 MMOL/L (ref 136–145)
SP GR UR REFRACTOMETRY: 1.01 (ref 1–1.03)
UROBILINOGEN UR QL STRIP.AUTO: 0.2 EU/DL (ref 0.2–1)
WBC URNS QL MICRO: ABNORMAL /HPF (ref 0–4)

## 2022-10-26 PROCEDURE — 99284 EMERGENCY DEPT VISIT MOD MDM: CPT

## 2022-10-26 PROCEDURE — 81001 URINALYSIS AUTO W/SCOPE: CPT

## 2022-10-26 PROCEDURE — 36415 COLL VENOUS BLD VENIPUNCTURE: CPT

## 2022-10-26 PROCEDURE — 74011250637 HC RX REV CODE- 250/637: Performed by: EMERGENCY MEDICINE

## 2022-10-26 PROCEDURE — 74011250636 HC RX REV CODE- 250/636: Performed by: EMERGENCY MEDICINE

## 2022-10-26 PROCEDURE — 80048 BASIC METABOLIC PNL TOTAL CA: CPT

## 2022-10-26 PROCEDURE — 96360 HYDRATION IV INFUSION INIT: CPT

## 2022-10-26 RX ORDER — POTASSIUM CHLORIDE 750 MG/1
40 TABLET, FILM COATED, EXTENDED RELEASE ORAL
Status: COMPLETED | OUTPATIENT
Start: 2022-10-26 | End: 2022-10-26

## 2022-10-26 RX ADMIN — SODIUM CHLORIDE 1000 ML: 9 INJECTION, SOLUTION INTRAVENOUS at 17:24

## 2022-10-26 RX ADMIN — POTASSIUM CHLORIDE 40 MEQ: 750 TABLET, FILM COATED, EXTENDED RELEASE ORAL at 19:15

## 2022-10-26 NOTE — TELEPHONE ENCOUNTER
Spoke to pts wife Roni Kelly regarding his low blood pressure 64/41. She called [facility] but then decided pt didn't need to go to the hospital because his bp was improving. Pt is still not feeling his best but she stated she just wanted to monitor him & if not better she'll seek emergency care. We did advise pt to go to the er.

## 2022-10-26 NOTE — TELEPHONE ENCOUNTER
Location of patient: [address] briones from [facility] at [facility] with [employer]. Subjective: Caller states \"The EMS was here for a B/P of 64/41, and we encourage fluids and then B/P 170/?. He is still pale, weak and dizzy. \"     Current Symptoms: dizziness(off balance), can stand, generalized weakness, pale. Near syncope with B/P of 64/41 Gave his Midodrine    Onset: today    Associated Symptoms: reduced activity, reduced appetite    Pain Severity:chronic pain with arthritis     Temperature: n/a     What has been tried: fluids    LMP: NA Pregnant: NA    Recommended disposition: See in Office Today    Care advice provided, patient verbalizes understanding; denies any other questions or concerns; instructed to call back for any new or worsening symptoms. Patient/Caller agrees with recommended disposition; writer provided warm transfer to Katherine Buckner at [facility] for appointment scheduling    Attention Provider: Thank you for allowing me to participate in the care of your patient. The patient was connected to triage in response to information provided to the [facility]. Please do not respond through this encounter as the response is not directed to a shared pool.         Reason for Disposition   MODERATE dizziness (e.g., interferes with normal activities) (Exception: dizziness caused by heat exposure, sudden standing, or poor fluid intake)    Protocols used: Dizziness-ADULT-OH

## 2022-10-26 NOTE — ED TRIAGE NOTES
Pt arrives with hypotension. He has pmhx of hypotension and takes midodren for this. Took morning and afternoon dose today.

## 2022-10-26 NOTE — ED PROVIDER NOTES
EMERGENCY DEPARTMENT HISTORY AND PHYSICAL EXAM      Date: 10/26/2022  Patient Name: Fatou Marquis    History of Presenting Illness     Chief Complaint   Patient presents with    Hypotension       History Provided By: Patient    HPI: Fatou Marquis, 69 y.o. male with PMHx significant for the Orthostatic hypotension, presents via private vehicle to the ED with cc of low blood pressure. Patient has a history of orthostatic hypotension. Multiple recent ER visits for the same. He was most recently seen by me on October 7 for the same complaints. He is on midodrine. He reports some lightheadedness and dizziness and generalized fatigue. He denies any chest pain or shortness of breath. He is normally followed by Dr. Maynor Lino with cardiology. PMHx: Significant for orthostatic hypotension  PSHx: Significant for pacemaker placement  Social Hx: non smoker; There are no other complaints, changes, or physical findings at this time. PCP: Romi Neely., MD    No current facility-administered medications on file prior to encounter. Current Outpatient Medications on File Prior to Encounter   Medication Sig Dispense Refill    clonazePAM (KlonoPIN) 0.5 mg tablet Take 1 Tablet by mouth nightly. Max Daily Amount: 0.5 mg. 90 Tablet 0    atorvastatin (LIPITOR) 40 mg tablet Take 1 Tablet by mouth in the morning. 90 Tablet 0    gabapentin (NEURONTIN) 600 mg tablet Take 0.5 Tabs by mouth three (3) times daily. Max Daily Amount: 900 mg. (Patient taking differently: Take 300 mg by mouth two (2) times a day.) 135 Tab 0    pyridostigmine bromide 30 mg tab Take 30 mg by mouth three (3) times daily. 270 Tab 1    fludrocortisone (FLORINEF) 0.1 mg tablet TAKE 1 TABLET BY MOUTH TWICE DAILY 28 Tab 3    midodrine (PROAMITINE) 5 mg tablet Take 1 Tab by mouth daily (with dinner).  90 Tab 0    DULoxetine (CYMBALTA) 60 mg capsule TAKE 1 CAPSULE BY MOUTH DAILY 90 Cap 1    acetaminophen 500 mg tab 500 mg, diphenhydrAMINE 25 mg cap 25 mg Take 1 Dose by mouth nightly as needed for Other (sleep/pain relief). acetaminophen (TYLENOL) 650 mg TbER Take 650 mg by mouth every eight (8) hours as needed for Pain. aspirin 81 mg chewable tablet Take 1 Tab by mouth daily. 90 Tab 2    finasteride (PROSCAR) 5 mg tablet TAKE 1 TABLET BY MOUTH DAILY(PROSTATE) 90 Tab 0    guaiFENesin ER (MUCINEX) 600 mg ER tablet Take 1 Tab by mouth every twelve (12) hours. (Patient taking differently: Take 600 mg by mouth every twelve (12) hours.  As needed) 30 Tab 0       Past History     Past Medical History:  Past Medical History:   Diagnosis Date    Altered mental status, unspecified 11/9/2018    Anxiety     Ataxia 11/9/2018    Back pain     Blurred vision     Bradycardia 10/2/2018    Chest pain     Depression     Dizziness     Dizziness and giddiness 10/17/2019    Dysautonomia ([address]     Fast heart beat     Fever 11/9/2018    Frequent headaches     Frequent urination     Hip dysplasia, congenital     Hx of syncope 10/24/2018    Hypotension     Ingrown left big toenail 3/31/2017    Joint pain     Joint swelling     Lumbar spinal stenosis     Memory deficit 9/11/2018    Muscle pain     Neuropathy     Orthostatic hypotension     asymptomatic    Pacemaker     new pacemaker July 2019    Recent change in weight     Risk for falls 12/13/2016    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 11/8/2018    Syncope     Tiredness     Trouble in sleeping     Weakness        Past Surgical History:  Past Surgical History:   Procedure Laterality Date    HX HIP REPLACEMENT Bilateral 1993    HX PACEMAKER PLACEMENT  10/16/2018    HX ROTATOR CUFF REPAIR Right 11/16/2017    [place] CARDIAC SURG PROCEDURE UNLIST      [place] INS NEW/RPLCMT PRM PM W/TRANSV ELTRD ATRIAL&VENT N/A 7/30/2019    INSERT PPM DUAL performed by Chirag Mercado MD at [facility] CARDIAC CATH LAB       Family History:  Family History   Problem Relation Age of Onset    Cancer Mother     Cancer Maternal Aunt        Social History:  Social History     Tobacco Use    Smoking status: Former     Packs/day: 0.25     Years: 50.00     Pack years: 12.50     Types: Cigarettes     Quit date: 8/3/2019     Years since quitting: 3.2    Smokeless tobacco: Never   Substance Use Topics    Alcohol use: Not Currently     Alcohol/week: 28.0 standard drinks     Types: 28 Cans of beer per week     Comment: None now    Drug use: No       Allergies:  No Known Allergies      Review of Systems   Review of Systems   Constitutional:  Positive for fatigue. Negative for activity change, chills and fever. HENT:  Negative for congestion and sore throat. Eyes:  Negative for pain and redness. Respiratory:  Negative for cough, chest tightness and shortness of breath. Cardiovascular:  Negative for chest pain and palpitations. Gastrointestinal:  Negative for abdominal pain, diarrhea, nausea and vomiting. Genitourinary:  Negative for dysuria, frequency and urgency. Musculoskeletal:  Negative for back pain and neck pain. Skin:  Negative for rash. Neurological:  Positive for dizziness and light-headedness. Negative for syncope and headaches. Psychiatric/Behavioral:  Negative for confusion. All other systems reviewed and are negative. Physical Exam   Physical Exam  Vitals and nursing note reviewed. Constitutional:       General: He is not in acute distress. Appearance: He is well-developed. He is not diaphoretic. HENT:      Head: Normocephalic. Nose: Nose normal.      Mouth/Throat:      Pharynx: No oropharyngeal exudate. Eyes:      General: No scleral icterus. Conjunctiva/sclera: Conjunctivae normal.      Pupils: Pupils are equal, round, and reactive to light. Neck:      Thyroid: No thyromegaly. Vascular: No JVD. Trachea: No tracheal deviation. Cardiovascular:      Rate and Rhythm: Normal rate and regular rhythm. Heart sounds: No murmur heard. No friction rub. No gallop. Pulmonary:      Effort: Pulmonary effort is normal. No respiratory distress. Breath sounds: Normal breath sounds. No stridor. No wheezing or rales. Abdominal:      General: Bowel sounds are normal. There is no distension. Palpations: Abdomen is soft. Tenderness: There is no abdominal tenderness. There is no guarding or rebound. Musculoskeletal:         General: Normal range of motion. Cervical back: Normal range of motion and neck supple. Lymphadenopathy:      Cervical: No cervical adenopathy. Skin:     General: Skin is warm and dry. Findings: No erythema or rash. Neurological:      Mental Status: He is alert and oriented to person, place, and time. Cranial Nerves: No cranial nerve deficit. Motor: No abnormal muscle tone. Coordination: Coordination normal.   Psychiatric:         Behavior: Behavior normal.           Diagnostic Study Results     Labs -   No results found for this or any previous visit (from the past 12 hour(s)). Radiologic Studies -   No orders to display     CT Results  (Last 48 hours)      None          CXR Results  (Last 48 hours)      None              Medical Decision Making   I am the first provider for this patient. I reviewed the vital signs, available nursing notes, past medical history, past surgical history, family history and social history. Vital Signs-Reviewed the patient's vital signs. No data found. Pulse Oximetry Analysis - 99% on RA    Cardiac Monitor:   Rate: 80 bpm            Records Reviewed: Nursing Notes and Old Medical Records    Provider Notes (Medical Decision Making):   DDx: Orthostatic hypotension, dehydration    ED Course:   Initial assessment performed.  The patients presenting problems have been discussed, and they are in agreement with the care plan formulated and outlined with them. I have encouraged them to ask questions as they arise throughout their visit. PROGRESS NOTE    Pt reevaluated. Patient feeling better after IV fluids. Normal creatinine. Actually hypertensive now. Discharge home with cardiology follow-up. Written by Elbert Wright MD     Progress note:    Pt noted to be feeling better , ready for discharge. Updated pt and/or family on all final lab and imaging findings. Will follow up as instructed. All questions have been answered, pt voiced understanding and agreement with plan. Specific return precautions provided as well as instructions to return to the ED should sx worsen at any time. Vital signs stable for discharge. I have also put together some discharge instructions for them that include: 1) educational information regarding their diagnosis, 2) how to care for their diagnosis at home, as well a 3) list of reasons why they would want to return to the ED prior to their follow-up appointment, should their condition change. Written by Elbert Wright MD        Critical Care Time:   0    Disposition:  Discharge    PLAN:  1. Discharge Medication List as of 10/26/2022  7:16 PM        2. Follow-up Information       Follow up With Specialties Details Why Contact Info    Michelle Molina MD Family Medicine Call in 1 day  [address]  [phone]      Madisyn Calderon MD Cardio Vascular Surgery, Clinical Cardiac Electrophysiology Physician, Cardiovascular Disease Physician   [address]  [address]  [facility]  [phone]            Return to ED if worse     Diagnosis     Clinical Impression:   1. Orthostatic hypotension    2. Hypokalemia              Please note that this dictation was completed with Cynny, the [employer] voice recognition software.   Quite often unanticipated grammatical, syntax, homophones, and other interpretive errors are inadvertently transcribed by the computer software. Please disregard these errors. Please excuse any errors that have escaped final proofreading.

## 2022-11-06 ENCOUNTER — HOSPITAL ENCOUNTER (OUTPATIENT)
Age: 77
Setting detail: OBSERVATION
Discharge: HOME HEALTH | End: 2022-11-10
Attending: FAMILY MEDICINE | Admitting: INTERNAL MEDICINE
Payer: COMMERCIAL

## 2022-11-06 ENCOUNTER — APPOINTMENT (OUTPATIENT)
Dept: CT IMAGING | Age: 77
End: 2022-11-06
Attending: FAMILY MEDICINE
Payer: COMMERCIAL

## 2022-11-06 DIAGNOSIS — I95.1 ORTHOSTATIC HYPOTENSION: ICD-10-CM

## 2022-11-06 DIAGNOSIS — G62.9 NEUROPATHY: ICD-10-CM

## 2022-11-06 DIAGNOSIS — R56.9 SEIZURE-LIKE ACTIVITY (HCC): Primary | ICD-10-CM

## 2022-11-06 LAB
ALBUMIN SERPL-MCNC: 4.1 G/DL (ref 3.5–5)
ALBUMIN/GLOB SERPL: 1.3 {RATIO} (ref 1.1–2.2)
ALP SERPL-CCNC: 117 U/L (ref 45–117)
ALT SERPL-CCNC: 22 U/L (ref 12–78)
ANION GAP SERPL CALC-SCNC: 8 MMOL/L (ref 5–15)
APPEARANCE UR: CLEAR
AST SERPL-CCNC: 12 U/L (ref 15–37)
BACTERIA URNS QL MICRO: NEGATIVE /HPF
BASOPHILS # BLD: 0.1 K/UL (ref 0–0.1)
BASOPHILS NFR BLD: 1 % (ref 0–1)
BILIRUB SERPL-MCNC: 0.6 MG/DL (ref 0.2–1)
BILIRUB UR QL: NEGATIVE
BUN SERPL-MCNC: 27 MG/DL (ref 6–20)
BUN/CREAT SERPL: 19 (ref 12–20)
CALCIUM SERPL-MCNC: 8.9 MG/DL (ref 8.5–10.1)
CHLORIDE SERPL-SCNC: 103 MMOL/L (ref 97–108)
CO2 SERPL-SCNC: 30 MMOL/L (ref 21–32)
COLOR UR: ABNORMAL
CREAT SERPL-MCNC: 1.41 MG/DL (ref 0.7–1.3)
DIFFERENTIAL METHOD BLD: ABNORMAL
EOSINOPHIL # BLD: 0.1 K/UL (ref 0–0.4)
EOSINOPHIL NFR BLD: 2 % (ref 0–7)
EPITH CASTS URNS QL MICRO: ABNORMAL /LPF
ERYTHROCYTE [DISTWIDTH] IN BLOOD BY AUTOMATED COUNT: 12.4 % (ref 11.5–14.5)
GLOBULIN SER CALC-MCNC: 3.1 G/DL (ref 2–4)
GLUCOSE SERPL-MCNC: 123 MG/DL (ref 65–100)
GLUCOSE UR STRIP.AUTO-MCNC: NEGATIVE MG/DL
HCT VFR BLD AUTO: 41.9 % (ref 36.6–50.3)
HGB BLD-MCNC: 14.3 G/DL (ref 12.1–17)
HGB UR QL STRIP: ABNORMAL
IMM GRANULOCYTES # BLD AUTO: 0.1 K/UL (ref 0–0.04)
IMM GRANULOCYTES NFR BLD AUTO: 1 % (ref 0–0.5)
KETONES UR QL STRIP.AUTO: NEGATIVE MG/DL
LEUKOCYTE ESTERASE UR QL STRIP.AUTO: NEGATIVE
LYMPHOCYTES # BLD: 1.4 K/UL (ref 0.8–3.5)
LYMPHOCYTES NFR BLD: 20 % (ref 12–49)
MCH RBC QN AUTO: 31.5 PG (ref 26–34)
MCHC RBC AUTO-ENTMCNC: 34.1 G/DL (ref 30–36.5)
MCV RBC AUTO: 92.3 FL (ref 80–99)
MONOCYTES # BLD: 0.5 K/UL (ref 0–1)
MONOCYTES NFR BLD: 8 % (ref 5–13)
NEUTS SEG # BLD: 4.9 K/UL (ref 1.8–8)
NEUTS SEG NFR BLD: 68 % (ref 32–75)
NITRITE UR QL STRIP.AUTO: NEGATIVE
NRBC # BLD: 0 K/UL (ref 0–0.01)
NRBC BLD-RTO: 0 PER 100 WBC
PH UR STRIP: 7 [PH] (ref 5–8)
PLATELET # BLD AUTO: 200 K/UL (ref 150–400)
PMV BLD AUTO: 10.3 FL (ref 8.9–12.9)
POTASSIUM SERPL-SCNC: 4.1 MMOL/L (ref 3.5–5.1)
PROT SERPL-MCNC: 7.2 G/DL (ref 6.4–8.2)
PROT UR STRIP-MCNC: NEGATIVE MG/DL
RBC # BLD AUTO: 4.54 M/UL (ref 4.1–5.7)
RBC #/AREA URNS HPF: ABNORMAL /HPF (ref 0–5)
SODIUM SERPL-SCNC: 141 MMOL/L (ref 136–145)
SP GR UR REFRACTOMETRY: 1.01 (ref 1–1.03)
TROPONIN-HIGH SENSITIVITY: 10 NG/L (ref 0–76)
UROBILINOGEN UR QL STRIP.AUTO: 0.2 EU/DL (ref 0.2–1)
WBC # BLD AUTO: 7 K/UL (ref 4.1–11.1)
WBC URNS QL MICRO: ABNORMAL /HPF (ref 0–4)

## 2022-11-06 PROCEDURE — 80053 COMPREHEN METABOLIC PANEL: CPT

## 2022-11-06 PROCEDURE — 72125 CT NECK SPINE W/O DYE: CPT

## 2022-11-06 PROCEDURE — 74011000258 HC RX REV CODE- 258: Performed by: FAMILY MEDICINE

## 2022-11-06 PROCEDURE — 84484 ASSAY OF TROPONIN QUANT: CPT

## 2022-11-06 PROCEDURE — 81001 URINALYSIS AUTO W/SCOPE: CPT

## 2022-11-06 PROCEDURE — 96361 HYDRATE IV INFUSION ADD-ON: CPT

## 2022-11-06 PROCEDURE — 70450 CT HEAD/BRAIN W/O DYE: CPT

## 2022-11-06 PROCEDURE — 99285 EMERGENCY DEPT VISIT HI MDM: CPT

## 2022-11-06 PROCEDURE — 74011250636 HC RX REV CODE- 250/636: Performed by: FAMILY MEDICINE

## 2022-11-06 PROCEDURE — 85025 COMPLETE CBC W/AUTO DIFF WBC: CPT

## 2022-11-06 PROCEDURE — 96365 THER/PROPH/DIAG IV INF INIT: CPT

## 2022-11-06 PROCEDURE — 36415 COLL VENOUS BLD VENIPUNCTURE: CPT

## 2022-11-06 RX ORDER — FINASTERIDE 5 MG/1
5 TABLET, FILM COATED ORAL DAILY
Status: DISCONTINUED | OUTPATIENT
Start: 2022-11-07 | End: 2022-11-10 | Stop reason: HOSPADM

## 2022-11-06 RX ORDER — MIDODRINE HYDROCHLORIDE 5 MG/1
5 TABLET ORAL
Status: DISCONTINUED | OUTPATIENT
Start: 2022-11-07 | End: 2022-11-07

## 2022-11-06 RX ORDER — GABAPENTIN 300 MG/1
300 CAPSULE ORAL 3 TIMES DAILY
Status: DISCONTINUED | OUTPATIENT
Start: 2022-11-07 | End: 2022-11-07

## 2022-11-06 RX ORDER — SODIUM CHLORIDE 9 MG/ML
100 INJECTION, SOLUTION INTRAVENOUS ONCE
Status: COMPLETED | OUTPATIENT
Start: 2022-11-06 | End: 2022-11-06

## 2022-11-06 RX ORDER — DULOXETIN HYDROCHLORIDE 30 MG/1
60 CAPSULE, DELAYED RELEASE ORAL DAILY
Status: DISCONTINUED | OUTPATIENT
Start: 2022-11-07 | End: 2022-11-10 | Stop reason: HOSPADM

## 2022-11-06 RX ORDER — FLUDROCORTISONE ACETATE 0.1 MG/1
0.1 TABLET ORAL 2 TIMES DAILY
Status: DISCONTINUED | OUTPATIENT
Start: 2022-11-07 | End: 2022-11-10 | Stop reason: HOSPADM

## 2022-11-06 RX ADMIN — SODIUM CHLORIDE 1000 ML: 9 INJECTION, SOLUTION INTRAVENOUS at 21:59

## 2022-11-06 RX ADMIN — SODIUM CHLORIDE 500 MG: 9 INJECTION, SOLUTION INTRAVENOUS at 20:55

## 2022-11-06 RX ADMIN — SODIUM CHLORIDE 100 ML/HR: 9 INJECTION, SOLUTION INTRAVENOUS at 20:51

## 2022-11-06 NOTE — Clinical Note
Patient Class[de-identified] OBSERVATION [104]   Type of Bed: Telemetry [19]   Cardiac Monitoring Required?: Yes   Reason for Observation: new onset seizure   Admitting Diagnosis: Seizure ([facility] [[id number]]   Admitting Diagnosis: Orthostatic hypotension [458. 0. ICD-9-CM]   Admitting Physician: Herminia Jones [[id number]]   Attending Physician: Roger Quijano

## 2022-11-07 ENCOUNTER — TELEPHONE (OUTPATIENT)
Dept: FAMILY MEDICINE CLINIC | Age: 77
End: 2022-11-07

## 2022-11-07 ENCOUNTER — APPOINTMENT (OUTPATIENT)
Dept: GENERAL RADIOLOGY | Age: 77
End: 2022-11-07
Attending: INTERNAL MEDICINE
Payer: COMMERCIAL

## 2022-11-07 ENCOUNTER — APPOINTMENT (OUTPATIENT)
Dept: MRI IMAGING | Age: 77
End: 2022-11-07
Attending: FAMILY MEDICINE
Payer: COMMERCIAL

## 2022-11-07 PROBLEM — R91.1 PULMONARY NODULE: Status: ACTIVE | Noted: 2022-07-29

## 2022-11-07 PROBLEM — J43.9 PULMONARY EMPHYSEMA (HCC): Status: ACTIVE | Noted: 2022-11-07

## 2022-11-07 PROBLEM — Z95.0 PACEMAKER: Status: ACTIVE | Noted: 2018-10-24

## 2022-11-07 PROBLEM — J43.9 PULMONARY EMPHYSEMA (HCC): Chronic | Status: ACTIVE | Noted: 2022-11-07

## 2022-11-07 LAB
AMMONIA PLAS-SCNC: <10 UMOL/L
ANION GAP SERPL CALC-SCNC: 7 MMOL/L (ref 5–15)
BUN SERPL-MCNC: 21 MG/DL (ref 6–20)
BUN/CREAT SERPL: 18 (ref 12–20)
CALCIUM SERPL-MCNC: 8.5 MG/DL (ref 8.5–10.1)
CHLORIDE SERPL-SCNC: 107 MMOL/L (ref 97–108)
CO2 SERPL-SCNC: 28 MMOL/L (ref 21–32)
CREAT SERPL-MCNC: 1.14 MG/DL (ref 0.7–1.3)
ERYTHROCYTE [DISTWIDTH] IN BLOOD BY AUTOMATED COUNT: 12.4 % (ref 11.5–14.5)
GLUCOSE SERPL-MCNC: 107 MG/DL (ref 65–100)
HCT VFR BLD AUTO: 36.7 % (ref 36.6–50.3)
HGB BLD-MCNC: 12.4 G/DL (ref 12.1–17)
MCH RBC QN AUTO: 31.5 PG (ref 26–34)
MCHC RBC AUTO-ENTMCNC: 33.8 G/DL (ref 30–36.5)
MCV RBC AUTO: 93.1 FL (ref 80–99)
NRBC # BLD: 0 K/UL (ref 0–0.01)
NRBC BLD-RTO: 0 PER 100 WBC
PLATELET # BLD AUTO: 168 K/UL (ref 150–400)
PMV BLD AUTO: 10.2 FL (ref 8.9–12.9)
POTASSIUM SERPL-SCNC: 3.8 MMOL/L (ref 3.5–5.1)
RBC # BLD AUTO: 3.94 M/UL (ref 4.1–5.7)
SODIUM SERPL-SCNC: 142 MMOL/L (ref 136–145)
T4 FREE SERPL-MCNC: 1 NG/DL (ref 0.8–1.5)
TSH SERPL DL<=0.05 MIU/L-ACNC: 0.52 UIU/ML (ref 0.36–3.74)
WBC # BLD AUTO: 6.7 K/UL (ref 4.1–11.1)

## 2022-11-07 PROCEDURE — 84443 ASSAY THYROID STIM HORMONE: CPT

## 2022-11-07 PROCEDURE — 94760 N-INVAS EAR/PLS OXIMETRY 1: CPT

## 2022-11-07 PROCEDURE — 74011250637 HC RX REV CODE- 250/637: Performed by: FAMILY MEDICINE

## 2022-11-07 PROCEDURE — 97162 PT EVAL MOD COMPLEX 30 MIN: CPT

## 2022-11-07 PROCEDURE — G0378 HOSPITAL OBSERVATION PER HR: HCPCS

## 2022-11-07 PROCEDURE — 36415 COLL VENOUS BLD VENIPUNCTURE: CPT

## 2022-11-07 PROCEDURE — 71045 X-RAY EXAM CHEST 1 VIEW: CPT

## 2022-11-07 PROCEDURE — 96372 THER/PROPH/DIAG INJ SC/IM: CPT

## 2022-11-07 PROCEDURE — 74011250636 HC RX REV CODE- 250/636: Performed by: INTERNAL MEDICINE

## 2022-11-07 PROCEDURE — 93005 ELECTROCARDIOGRAM TRACING: CPT

## 2022-11-07 PROCEDURE — 84439 ASSAY OF FREE THYROXINE: CPT

## 2022-11-07 PROCEDURE — 85027 COMPLETE CBC AUTOMATED: CPT

## 2022-11-07 PROCEDURE — 74011000250 HC RX REV CODE- 250: Performed by: INTERNAL MEDICINE

## 2022-11-07 PROCEDURE — 97530 THERAPEUTIC ACTIVITIES: CPT

## 2022-11-07 PROCEDURE — 82140 ASSAY OF AMMONIA: CPT

## 2022-11-07 PROCEDURE — 97165 OT EVAL LOW COMPLEX 30 MIN: CPT

## 2022-11-07 PROCEDURE — 97535 SELF CARE MNGMENT TRAINING: CPT

## 2022-11-07 PROCEDURE — 74011250637 HC RX REV CODE- 250/637: Performed by: INTERNAL MEDICINE

## 2022-11-07 PROCEDURE — 97166 OT EVAL MOD COMPLEX 45 MIN: CPT

## 2022-11-07 PROCEDURE — 80048 BASIC METABOLIC PNL TOTAL CA: CPT

## 2022-11-07 RX ORDER — SODIUM CHLORIDE 0.9 % (FLUSH) 0.9 %
5-40 SYRINGE (ML) INJECTION EVERY 8 HOURS
Status: DISCONTINUED | OUTPATIENT
Start: 2022-11-07 | End: 2022-11-10 | Stop reason: HOSPADM

## 2022-11-07 RX ORDER — SODIUM CHLORIDE 0.9 % (FLUSH) 0.9 %
5-40 SYRINGE (ML) INJECTION AS NEEDED
Status: DISCONTINUED | OUTPATIENT
Start: 2022-11-07 | End: 2022-11-10 | Stop reason: HOSPADM

## 2022-11-07 RX ORDER — GUAIFENESIN 100 MG/5ML
81 LIQUID (ML) ORAL DAILY
Status: DISCONTINUED | OUTPATIENT
Start: 2022-11-07 | End: 2022-11-10 | Stop reason: HOSPADM

## 2022-11-07 RX ORDER — MIDODRINE HYDROCHLORIDE 5 MG/1
5 TABLET ORAL
Status: DISCONTINUED | OUTPATIENT
Start: 2022-11-07 | End: 2022-11-10 | Stop reason: HOSPADM

## 2022-11-07 RX ORDER — MIDODRINE HYDROCHLORIDE 5 MG/1
5 TABLET ORAL 2 TIMES DAILY WITH MEALS
Status: DISCONTINUED | OUTPATIENT
Start: 2022-11-07 | End: 2022-11-07

## 2022-11-07 RX ORDER — ATORVASTATIN CALCIUM 40 MG/1
40 TABLET, FILM COATED ORAL
Status: DISCONTINUED | OUTPATIENT
Start: 2022-11-07 | End: 2022-11-10 | Stop reason: HOSPADM

## 2022-11-07 RX ORDER — ENOXAPARIN SODIUM 100 MG/ML
40 INJECTION SUBCUTANEOUS DAILY
Status: DISCONTINUED | OUTPATIENT
Start: 2022-11-07 | End: 2022-11-10 | Stop reason: HOSPADM

## 2022-11-07 RX ORDER — GABAPENTIN 300 MG/1
300 CAPSULE ORAL 2 TIMES DAILY
Status: DISCONTINUED | OUTPATIENT
Start: 2022-11-07 | End: 2022-11-10 | Stop reason: HOSPADM

## 2022-11-07 RX ORDER — CLONAZEPAM 0.5 MG/1
0.5 TABLET ORAL
Status: DISCONTINUED | OUTPATIENT
Start: 2022-11-07 | End: 2022-11-10 | Stop reason: HOSPADM

## 2022-11-07 RX ORDER — MIDODRINE HYDROCHLORIDE 5 MG/1
10 TABLET ORAL EVERY MORNING
Status: DISCONTINUED | OUTPATIENT
Start: 2022-11-07 | End: 2022-11-07

## 2022-11-07 RX ORDER — ACETAMINOPHEN 325 MG/1
650 TABLET ORAL
Status: DISCONTINUED | OUTPATIENT
Start: 2022-11-07 | End: 2022-11-10 | Stop reason: HOSPADM

## 2022-11-07 RX ORDER — POLYETHYLENE GLYCOL 3350 17 G/17G
17 POWDER, FOR SOLUTION ORAL DAILY PRN
Status: DISCONTINUED | OUTPATIENT
Start: 2022-11-07 | End: 2022-11-10 | Stop reason: HOSPADM

## 2022-11-07 RX ORDER — SODIUM CHLORIDE 9 MG/ML
75 INJECTION, SOLUTION INTRAVENOUS CONTINUOUS
Status: DISCONTINUED | OUTPATIENT
Start: 2022-11-07 | End: 2022-11-08

## 2022-11-07 RX ADMIN — MIDODRINE HYDROCHLORIDE 5 MG: 5 TABLET ORAL at 17:33

## 2022-11-07 RX ADMIN — CLONAZEPAM 0.5 MG: 0.5 TABLET ORAL at 21:45

## 2022-11-07 RX ADMIN — SODIUM CHLORIDE, PRESERVATIVE FREE 10 ML: 5 INJECTION INTRAVENOUS at 21:52

## 2022-11-07 RX ADMIN — GABAPENTIN 300 MG: 300 CAPSULE ORAL at 08:55

## 2022-11-07 RX ADMIN — SODIUM CHLORIDE 75 ML/HR: 9 INJECTION, SOLUTION INTRAVENOUS at 21:48

## 2022-11-07 RX ADMIN — MIDODRINE HYDROCHLORIDE 5 MG: 5 TABLET ORAL at 21:45

## 2022-11-07 RX ADMIN — ASPIRIN 81 MG 81 MG: 81 TABLET ORAL at 12:04

## 2022-11-07 RX ADMIN — MIDODRINE HYDROCHLORIDE 5 MG: 5 TABLET ORAL at 13:50

## 2022-11-07 RX ADMIN — ENOXAPARIN SODIUM 40 MG: 100 INJECTION SUBCUTANEOUS at 12:05

## 2022-11-07 RX ADMIN — SODIUM CHLORIDE, PRESERVATIVE FREE 10 ML: 5 INJECTION INTRAVENOUS at 13:54

## 2022-11-07 RX ADMIN — FLUDROCORTISONE ACETATE 0.1 MG: 0.1 TABLET ORAL at 17:33

## 2022-11-07 RX ADMIN — FLUDROCORTISONE ACETATE 0.1 MG: 0.1 TABLET ORAL at 08:56

## 2022-11-07 RX ADMIN — DULOXETINE HYDROCHLORIDE 60 MG: 30 CAPSULE, DELAYED RELEASE ORAL at 08:55

## 2022-11-07 RX ADMIN — FINASTERIDE 5 MG: 5 TABLET, FILM COATED ORAL at 08:55

## 2022-11-07 RX ADMIN — SODIUM CHLORIDE 75 ML/HR: 9 INJECTION, SOLUTION INTRAVENOUS at 06:06

## 2022-11-07 RX ADMIN — GABAPENTIN 300 MG: 300 CAPSULE ORAL at 17:33

## 2022-11-07 RX ADMIN — MIDODRINE HYDROCHLORIDE 10 MG: 5 TABLET ORAL at 10:09

## 2022-11-07 RX ADMIN — ATORVASTATIN CALCIUM 40 MG: 40 TABLET, FILM COATED ORAL at 21:45

## 2022-11-07 RX ADMIN — GABAPENTIN 300 MG: 300 CAPSULE ORAL at 12:04

## 2022-11-07 NOTE — ED TRIAGE NOTES
Pt arrievs via EMS after a syncopal episode. Per wife pt had LOC for 1 minute. Pt has hx of hypotension and is on rx for it. Reportedly has had a med change. EMS states that wife reports a decline in pt mental status, pt is now incontinent at times and unable to dress himself.

## 2022-11-07 NOTE — PROGRESS NOTES
Problem: Mobility Impaired (Adult and Pediatric)  Goal: *Acute Goals and Plan of Care (Insert Text)  Outcome: Progressing Towards Goal     Problem: Patient Education: Go to Patient Education Activity  Goal: Patient/Family Education  Outcome: Progressing Towards Goal  FUNCTIONAL STATUS PRIOR TO ADMISSION:  Pt lives w/ supportive spouse in a handicapped home w/ ramp to enter; spouse assists pt w/ ADLs PRN (particularly LB ADLs); pt has a rollator but apparently does not use it very often; pt has some noted confusion and cognitive impairment so spouse had to fill in some of the [facility] FACILITY and PLOF details; she notes that he has had about 5 falls in past month    [address]:  as noted above    Physical Therapy Goals  Initiated 11/7/2022  1. Patient will move from supine to sit and sit to supine  in bed with modified independence within 7 day(s). 2.  Patient will transfer from bed to chair and chair to bed with SBA using the least restrictive device within 7 day(s). 3.  Patient will perform sit to stand with SBA within 7 day(s). 4.  Patient will ambulate with SBA for 200 feet with the least restrictive device within 7 day(s). PHYSICAL THERAPY EVALUATION  Patient: Truett Bloch (56 y.o. male)  Date: 11/7/2022  Primary Diagnosis: Seizure ([facility] [R56.9]  Orthostatic hypotension [I95.1]       Precautions: some confusion & cognitive impairment,  Fall, Seizure, (+) large BP drop w/o symptoms      ASSESSMENT  Based on the objective data described below, the patient presents with B LE weakness, impaired functional mobility, impaired balance, reduced activity tolerance and pt demonstrated gait abnormalities w/ AD. Pt has a well known long standing issue w/ orthostatic hypotension. His BP in supine after 1 minute: 191/93, seated after 1 minute 104/65 and then standing after 1 minute w/ 2WW = 91/55; asymptomatic; MD aware and meds had recently been adjusted.  As this BP issue was known and pt was asymptomatic it was decided to have pt engage in some additional gentle activities like walking into the bathroom and sitting on commode as it was noticed that his linens had gotten soiled. Pt did a few things during session to reveal some impaired cognition and motor planning. Upon transferring to stand from EOB pt picked the 2WW up off the floor then stood up holding the AD off the floor. He required frequent and repeated verbal and tactile cues on how to properly and safely use the AD. Once standing up from the toilet pt turned the 2WW around and then tried to use it while it was backwards; again cues offered to correct. After performing some ADLs at the sink pt was returned to supine; seizure pads in place; bed rails up and pt was left w/ call bell in reach; all needs met as able; spouse in the hallway. Current Level of Function Impacting Discharge (mobility/balance): transfers CGA; balance w/ AD fair (-)    Functional Outcome Measure: The patient scored Total Score: 10/28 on the Tinetti outcome measure which is indicative of high fall risk. Other factors to consider for discharge: impaired safety awareness; currently below baseline status yet spouse noted today was the best she has seen him in nearly a week; supportive family     Patient will benefit from skilled therapy intervention to address the above noted impairments. PLAN :  Recommendations and Planned Interventions: bed mobility training, transfer training, gait training, therapeutic exercises, neuromuscular re-education, patient and family training/education, and therapeutic activities      Frequency/Duration: Patient will be followed by physical therapy:  5 times a week to address goals.     Recommendation for discharge: (in order for the patient to meet his/her long term goals)  To be determined: SNF vs. Home w/ assistance and HH pending progress and medical status    This discharge recommendation:  Has been made in collaboration with the attending provider and/or case management    IF patient discharges home will need the following DME: rolling walker?; TBD         SUBJECTIVE:   Patient stated I'm ok.     OBJECTIVE DATA SUMMARY:   HISTORY:    Past Medical History:   Diagnosis Date    Altered mental status, unspecified 11/9/2018    Anxiety     Ataxia 11/9/2018    Back pain     Blurred vision     Bradycardia 10/2/2018    Chest pain     Depression     Dizziness     Dizziness and giddiness 10/17/2019    Dysautonomia ([address]     Fast heart beat     Fever 11/9/2018    Frequent headaches     Frequent urination     Hip dysplasia, congenital     Hx of syncope 10/24/2018    Hypotension     Ingrown left big toenail 3/31/2017    Joint pain     Joint swelling     Lumbar spinal stenosis     Memory deficit 9/11/2018    Muscle pain     Neuropathy     Orthostatic hypotension     asymptomatic    Pacemaker     new pacemaker July 2019    Recent change in weight     Risk for falls 12/13/2016    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 11/8/2018    Syncope     Tiredness     Trouble in sleeping     Weakness      Past Surgical History:   Procedure Laterality Date    HX HIP REPLACEMENT Bilateral 1993    HX PACEMAKER PLACEMENT  10/16/2018    HX ROTATOR CUFF REPAIR Right 11/16/2017    ND CARDIAC SURG PROCEDURE UNLIST      ND INS NEW/RPLCMT PRM PM W/TRANSV ELTRD ATRIAL&VENT N/A 7/30/2019    INSERT PPM DUAL performed by Dexter Appiah MD at [facility]: Private residence  # Steps to Enter: 0  Wheelchair Ramp: Yes  One/Two Story Residence: One story  Living Alone: No  Support Systems: Spouse/Significant Other  Patient Expects to be Discharged to[de-identified] Home with home health  Current DME Used/Available at Home:  (has a \"walking stick\")  Tub or Shower Type: Shower    EXAMINATION/PRESENTATION/DECISION MAKING:   Critical Behavior:  Neurologic State: Alert, Confused  Orientation Level: Oriented to person, Disoriented to place (Knows that he is in a hospital but unsure which one or what town he is in)  Cognition: Follows commands  Safety/Judgement: Decreased awareness of need for assistance, Decreased awareness of need for safety, Decreased insight into deficits  Hearing: Auditory  Auditory Impairment: None  Range Of Motion:  AROM: Generally decreased, functional                       Strength:    Strength: Generally decreased, functional                    Tone & Sensation:   Tone: Normal              Sensation: Intact               Coordination:  Coordination: Generally decreased, functional  Vision:      Functional Mobility:  Bed Mobility:  Rolling: Stand-by assistance  Supine to Sit: Stand-by assistance  Sit to Supine: Stand-by assistance  Scooting: Stand-by assistance  Transfers:  Sit to Stand: Contact guard assistance  Stand to Sit: Contact guard assistance                       Balance:   Sitting: Intact  Standing: Impaired; With support  Standing - Static: Fair;Constant support  Standing - Dynamic : Constant support;Fair;Occasional  Ambulation/Gait Training:  Distance (ft): 20 Feet (ft)  Assistive Device: Gait belt;Walker, rolling  Ambulation - Level of Assistance: Contact guard assistance;Minimal assistance     Gait Description (WDL): Exceptions to WDL  Gait Abnormalities: Decreased step clearance; Path deviations; Step to gait;Trunk sway increased; Other (Lateral rotation B hips w/ hx of congenital dysplasia and B THRs)        Base of Support: Widened     Speed/Dhara: Slow  Step Length: Left shortened;Right shortened                 Functional Measure:  Tinetti test:    Sitting Balance: 1  Arises: 2  Attempts to Rise: 1  Immediate Standing Balance: 0  Standing Balance: 1  Nudged: 0  Eyes Closed: 0  Turn 360 Degrees - Continuous/Discontinuous: 1  Turn 360 Degrees - Steady/Unsteady: 0  Sitting Down: 1  Balance Score: 7 Balance total score  Indication of Gait: 0  R Step Length/Height: 0  L Step Length/Height: 0  R Foot Clearance: 1  L Foot Clearance: 1  Step Symmetry: 1  Step Continuity: 0  Path: 0  Trunk: 0  Walking Time: 0  Gait Score: 3 Gait total score  Total Score: 10/28 Overall total score         Tinetti Tool Score Risk of Falls  <19 = High Fall Risk  19-24 = Moderate Fall Risk  25-28 = Low Fall Risk  Tinetti ME. Performance-Oriented Assessment of Mobility Problems in Elderly Patients. [facility]; [id number]. (Scoring Description: PT Bulletin Feb. 10, 1993)    Older adults: Aristides Walters et al, 2009; n = 1000 [facility] elderly evaluated with ABC, JESUSITA, ADL, and IADL)  · Mean JESUSITA score for males aged 69-68 years = 26.21(3.40)  · Mean JESUSITA score for females age 69-68 years = 25.16(4.30)  · Mean JESUSITA score for males over 80 years = 23.29(6.02)  · Mean JESUSITA score for females over 80 years = 17.20(8.32)        Physical Therapy Evaluation Charge Determination   History Examination Presentation Decision-Making   MEDIUM  Complexity : 1-2 comorbidities / personal factors will impact the outcome/ POC  MEDIUM Complexity : 3 Standardized tests and measures addressing body structure, function, activity limitation and / or participation in recreation  MEDIUM Complexity : Evolving with changing characteristics  MEDIUM Complexity : FOTO score of 26-74      Based on the above components, the patient evaluation is determined to be of the following complexity level: MEDIUM    Pain Rating:  denied    Activity Tolerance:   Fair and requires rest breaks      After treatment patient left in no apparent distress:   Supine in bed, Call bell within reach, Bed / chair alarm activated, Caregiver / family present, and Side rails x 3    COMMUNICATION/EDUCATION:   The patients plan of care was discussed with: Occupational therapist, Registered nurse, and Physician.      Fall prevention education was provided and the patient/caregiver indicated understanding., Patient/family have participated as able in goal setting and plan of care. , and Patient/family agree to work toward stated goals and plan of care.     Thank you for this referral.  Indu Marie, PT   Time Calculation: 48 mins

## 2022-11-07 NOTE — PROGRESS NOTES
Problem: Self Care Deficits Care Plan (Adult)  Goal: *Acute Goals and Plan of Care (Insert Text)  Description: FUNCTIONAL STATUS PRIOR TO ADMISSION: Patient was modified independent using a rollator occasionally for functional mobility. Patient required assistance from wife for LB ADLs (dressing/bathing) but able to complete all other ADLs independently. Per wife, patient's level of function has declined over the past several days    HOME SUPPORT: The patient lived alone with wife to provide assistance. Occupational Therapy Goals  Initiated 11/7/2022  1. Patient will perform grooming with modified independence within 7 day(s). 2.  Patient will perform upper body dressing with modified independence within 7 day(s). 3.  Patient will perform toilet transfers with supervision/set-up within 7 day(s). 4.  Patient will perform all aspects of toileting with supervision/set-up within 7 day(s). 5.  Patient will participate in upper extremity therapeutic exercise/activities with supervision/set-up for 8 minutes within 7 day(s). Outcome: Not Met     OCCUPATIONAL THERAPY EVALUATION  Patient: Arturo Obrien (28 y.o. male)  Date: 11/7/2022  Primary Diagnosis: Seizure ([facility] [address] [R56.9]  Orthostatic hypotension [I95.1]       Precautions:  Fall, Seizure    ASSESSMENT  Based on the objective data described below, the patient presents with deficits in self-care, primarily due to chronic orthostatic hypotension, decreased activity tolerance, general weakness, impaired standing balance, and confusion/decreased safety. Patient seen for therapy session this PM, received supine in bed and agreeable to OT session. Wife in room and received most of the home/prior function details from her as patient has been declining in mentation. Patient BP taken at rest, initially 202/103 but retaken when lying completely flat on the bed. Orthostatic details listed below.  Patient reports no dizziness throughout activity and agreeable to ADLs in bathroom. Patient ambulated to bathroom with CGA to complete posterior hygiene on toilet and handwashing at sink. Patient requires significant verbal cues and min A for walker management as he attempted to turn the walker sideways while standing at the sink. No fine motor deficits noted during handwashing at sink. Returned to sitting EOB for gown replacement, Mod A to manage gown d/t IV placement. Patient confused when donning L sleeve which was not impeded by IV, attempted to unsnap the sleeve. Patient returned to supine in bed and left with all needs met. Patient will benefit from skilled OT treatment to maximize independence and safety with ADLs prior to d/c.       11/07/22 1430   Vital Signs   Pulse (Heart Rate) 80   BP (!) 191/93   MAP (Calculated) 126   BP 1 Location Right upper arm   BP 1 Method Automatic   BP Patient Position At rest;Supine   Additional Blood Pressure/Pulse Data   BP 2 104/65   MAP 2 (Calculated) 78   BP 2 Location Right arm   BP Method 2 Automatic   Patient Position 2 Sitting   BP 3 91/55   MAP 3 (Calculated) 67   BP 3 Location Right arm   BP Method 3 Automatic   Patient Position 3 Standing       Current Level of Function Impacting Discharge (ADLs/self-care): Up to Max A for LB ADLs    Functional Outcome Measure: The patient scored Total: 45/100 on the Barthel Index outcome measure which is indicative of being partially dependent in basic self-care. Other factors to consider for discharge: increasing level of assistance over the past few months, chronic orthostatic hypotension, mentation     Patient will benefit from skilled therapy intervention to address the above noted impairments.        PLAN :  Recommendations and Planned Interventions: self care training, functional mobility training, therapeutic exercise, balance training, therapeutic activities, endurance activities, patient education, home safety training, and family training/education    Frequency/Duration: Patient will be followed by occupational therapy 5 times a week to address goals. Recommendation for discharge: (in order for the patient to meet his/her long term goals)  To be determined: SNF vs HHOT and 24/7 supervision pending functional improvement     This discharge recommendation:  Has been made in collaboration with the attending provider and/or case management    IF patient discharges home will need the following DME: walker: rolling       SUBJECTIVE:   Patient stated I'm feeling good, I'm not dizzy at all.     OBJECTIVE DATA SUMMARY:   HISTORY:   Past Medical History:   Diagnosis Date    Altered mental status, unspecified 11/9/2018    Anxiety     Ataxia 11/9/2018    Back pain     Blurred vision     Bradycardia 10/2/2018    Chest pain     Depression     Dizziness     Dizziness and giddiness 10/17/2019    Dysautonomia ([address]     Fast heart beat     Fever 11/9/2018    Frequent headaches     Frequent urination     Hip dysplasia, congenital     Hx of syncope 10/24/2018    Hypotension     Ingrown left big toenail 3/31/2017    Joint pain     Joint swelling     Lumbar spinal stenosis     Memory deficit 9/11/2018    Muscle pain     Neuropathy     Orthostatic hypotension     asymptomatic    Pacemaker     new pacemaker July 2019    Recent change in weight     Risk for falls 12/13/2016    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 11/8/2018    Syncope     Tiredness     Trouble in sleeping     Weakness      Past Surgical History:   Procedure Laterality Date    HX HIP REPLACEMENT Bilateral 1993    HX PACEMAKER PLACEMENT  10/16/2018    HX ROTATOR CUFF REPAIR Right 11/16/2017    [place] CARDIAC SURG PROCEDURE UNLIST      OH INS NEW/RPLCMT PRM PM W/TRANSV ELTRD ATRIAL&VENT N/A 7/30/2019    INSERT PPM DUAL performed by Joel Walker MD at [facility] CARDIAC CATH LAB       Expanded or extensive additional review of patient history:     Home Situation  Home Environment: Private residence  Wheelchair Ramp: Yes  One/Two Story Residence: One story  Living Alone: No  Support Systems: Spouse/Significant Other  Patient Expects to be Discharged to[de-identified] Home with home health  Current DME Used/Available at Home: Walker, rollator, Cane, straight, Shower chair  Tub or Shower Type: Shower    Hand dominance: Right    EXAMINATION OF PERFORMANCE DEFICITS:  Cognitive/Behavioral Status:  Neurologic State: Alert;Confused  Orientation Level: Oriented to person;Disoriented to place (Knows that he is in a hospital but unsure which one or what town he is in)  Cognition: Follows commands  Perception: Appears intact  Perseveration: No perseveration noted  Safety/Judgement: Decreased awareness of need for safety;Decreased awareness of need for assistance;Decreased awareness of environment    Skin: Visible skin intact    Hearing: Auditory  Auditory Impairment: None    Range of Motion:  AROM: Generally decreased, functional     Strength:  Strength: Generally decreased, functional       Coordination:  Coordination: Generally decreased, functional  Fine Motor Skills-Upper: Left Intact; Right Intact (dupytren's contractures in B hands but ability to manipulate and grasp objects [facility])    Gross Motor Skills-Upper: Left Intact; Right Intact    Tone & Sensation:  Tone: Normal  Sensation: Impaired (numbness in fingers, patient reports it's chronic)                      Balance:  Sitting: Intact  Standing: Impaired; With support  Standing - Static: Fair;Constant support  Standing - Dynamic : Fair;Constant support    Functional Mobility and Transfers for ADLs:  Bed Mobility:  Rolling: Stand-by assistance  Supine to Sit: Stand-by assistance  Sit to Supine: Stand-by assistance  Scooting: Stand-by assistance    Transfers:  Sit to Stand: Contact guard assistance  Stand to Sit: Contact guard assistance  Bathroom Mobility: Minimum assistance (for walker management)  Toilet Transfer : Contact guard assistance    ADL Assessment:  Feeding: Modified independent    Oral Facial Hygiene/Grooming: Setup    Bathing: Moderate assistance         Upper Body Dressing: Moderate assistance    Lower Body Dressing: Moderate assistance    Toileting: Minimum assistance                ADL Intervention and task modifications:    Upper Body Dressing Assistance  Dressing Assistance: Moderate assistance  Hospital Gown: Moderate assistance (significant confusion when donning gown, requires therapist assistance to snap R sleeve d/t IV and prevent patient from unsnapping)         Toileting  Toileting Assistance: Contact guard assistance  Bladder Hygiene: Contact guard assistance  Bowel Hygiene: Contact guard assistance    Cognitive Retraining  Safety/Judgement: Decreased awareness of need for safety;Decreased awareness of need for assistance;Decreased awareness of environment    Functional Measure:    Barthel Index:  Bathin  Bladder: 5  Bowels: 5  Groomin  Dressin  Feeding: 10  Mobility: 0  Stairs: 0  Toilet Use: 5  Transfer (Bed to Chair and Back): 10  Total: 45/100      The Barthel ADL Index: Guidelines  1. The index should be used as a record of what a patient does, not as a record of what a patient could do. 2. The main aim is to establish degree of independence from any help, physical or verbal, however minor and for whatever reason. 3. The need for supervision renders the patient not independent. 4. A patient's performance should be established using the best available evidence. Asking the patient, friends/relatives and nurses are the usual sources, but direct observation and common sense are also important. However direct testing is not needed. 5. Usually the patient's performance over the preceding 24-48 hours is important, but occasionally longer periods will be relevant.   6. Middle categories imply that the patient supplies over 50 per cent of the effort. 7. Use of aids to be independent is allowed. Score Interpretation (from [facility])    Independent   60-79 Minimally independent   40-59 Partially dependent   20-39 Very dependent   <20 Totally dependent     -Abbey Reyes., Barthel, D.W. (1965). Functional evaluation: the Barthel Index. [address] ([address] (1997). The Barthel activities of daily living index: self-reporting versus actual performance in the old (> or = 75 years). Journal of [address] [place] [address] 45(7), 14 [facility], J..M.F, Suzan Cates., [place]. (1999). Measuring the change in disability after inpatient rehabilitation; comparison of the responsiveness of the Barthel Index and Functional Pettis Measure. Journal of Neurology, Neurosurgery, and Psychiatry, 66(4), 599-884. Ludmila Stephens, NLUIS.A, LEONORA Gunderson, & Paula Allan MDerekA. (2004) Assessment of post-stroke quality of life in cost-effectiveness studies: The usefulness of the Barthel Index and the EuroQoL-5D. Quality of Life Research, 15, 164-71     Occupational Therapy Evaluation Charge Determination   History Examination Decision-Making   MEDIUM Complexity : Expanded review of history including physical, cognitive and psychosocial  history  MEDIUM Complexity : 3-5 performance deficits relating to physical, cognitive , or psychosocial skils that result in activity limitations and / or participation restrictions MEDIUM Complexity : Patient may present with comorbidities that affect occupational performnce.  Miniml to moderate modification of tasks or assistance (eg, physical or verbal ) with assesment(s) is necessary to enable patient to complete evaluation       Based on the above components, the patient evaluation is determined to be of the following complexity level: MEDIUM  Pain Rating:  None    Activity Tolerance:   Fair and signs and symptoms of orthostatic hypotension    After treatment patient left in no apparent distress:    Supine in bed, Heels elevated for pressure relief, Call bell within reach, Bed / chair alarm activated, and side rails x4 with seizure pads placed    COMMUNICATION/EDUCATION:   The patients plan of care was discussed with: Physical therapist, Registered nurse, Physician, and Case management. Patient/family agree to work toward stated goals and plan of care. This patients plan of care is appropriate for delegation to S[facility].     Thank you for this referral.  Carl Mark OT  Time Calculation: 39 mins

## 2022-11-07 NOTE — PROGRESS NOTES
Care Management Interventions  PCP Verified by CM: Yes Tim You MD)  Last Visit to PCP: 09/19/22  Palliative Care Criteria Met (RRAT>21 & CHF Dx)?: No (No MD order)  Transition of Care Consult (CM Consult): Discharge Planning  Physical Therapy Consult: Yes  Occupational Therapy Consult: Yes  Speech Therapy Consult: No  Support Systems: Spouse/Significant Other  Confirm Follow Up Transport: Family  The Plan for Transition of Care is Related to the Following Treatment Goals : Treat seizure/orthostatic hypotension  Discharge Location  Patient Expects to be Discharged to[de-identified] Home with home health    Patient lives at home with his wife Elle Valles. Patient has an active ACP document on file stating his wife/life partner Alfredo Martins is primary and his brother Misael Manjarrez is secondary healthcare decision maker. Patient states this is active. Patient has a \"handicapped\" home with a ramp. He has a walker but doesn't use it normally. Patient [facility] NOT have [facility]are[facility] to [place] in [place] but is not sure which clinic. Sees a Dr. Miracle West. He would like medical records sent to [place] to ensure payment. [place] [facility]ation Notice (Manav Marshall) provided to patient/representative with verbal explanation of the notice. Time allotted for questions regarding the notice. Patient /representative provided a completed copy of the VOON notice. Copy placed on bedside chart. Patient and wife given care management info and told to call if he has any questions or concerns during his stay. Reason for Admission:  Syncope/orthostatic hypotension                     RUR Score:          N/A PT IN OBS STATUS          RRAT: 7 LOW            Plan for utilizing home health:   TBD        PCP: First and Last name:  Sudhir Doan MD     Name of Practice: [facility].     Are you a current patient: Yes/No: yes   Approximate date of last visit: 9/18/2922   Can you participate in a virtual visit with your PCP: No                    Current Advanced Directive/Advance Care Plan: DNR      Healthcare Decision Maker:   Click here to complete HealthCare Decision Makers including selection of the Healthcare Decision Maker Relationship (ie \"Primary\")             Primary Decision Maker: Divya Ba - Spouse - [phone]    Secondary Decision Maker: Kali Cristobal - [phone]                  Transition of Care Plan:                  Home when medically stable. Advance Care Planning     General Advance Care Planning (ACP) Conversation      Date of Conversation: 11/6/2022  Conducted with: Patient with Decision Making Capacity    Healthcare Decision Maker:     Primary Decision Maker: Divya Ba - Spouse - [phone]    Secondary Decision Maker: Jean Clemente  Noemíer - [phone]  Click here to complete [address] including selection of the Healthcare Decision Maker Relationship (ie \"Primary\")    Today we documented Decision Maker(s) consistent with ACP documents on file. Content/Action Overview:    Has ACP document(s) on file - reflects the patient's care preferences  Reviewed DNR/DNI and patient   Topics discussed: treatment goals  Additional Comments : n/a      Length of Voluntary ACP Conversation in minutes:  16 minutes    Carl

## 2022-11-07 NOTE — ED NOTES
Dr. Brandon Collins at bedside speaking with pt. This RN has spoken to pt wife over phone. Pt had 1 minute episode of being unresponsive per wife Jimi Late, difficulty standing up. Pt lost control of bladder and bowels? Pt does not report remembering exactly what happened. Pt also had syncopal event a few days prior. Pt recent loose stools, chills, mental status change, headaches. Pt recent change of mididrine formulary through [facility] as [place] would not refill rx. Symptoms coincide with this change.

## 2022-11-07 NOTE — ED NOTES
Unable to create Keppra drip, pyxis would not dispense vial. [facility] called, states to place order again. Unable to return, medication wasted in pyxis despite not being to pull, order placed again by ED Provider.

## 2022-11-07 NOTE — PROGRESS NOTES
Problem: Falls - Risk of  Goal: *Absence of Falls  Description: Document Cherylle Cockayne Fall Risk and appropriate interventions in the flowsheet.   Outcome: Progressing Towards Goal  Note: Fall Risk Interventions:  Mobility Interventions: Bed/chair exit alarm         Medication Interventions: Bed/chair exit alarm    Elimination Interventions: Call light in reach    History of Falls Interventions: Bed/chair exit alarm         Problem: Patient Education: Go to Patient Education Activity  Goal: Patient/Family Education  Outcome: Progressing Towards Goal     Problem: General Medical Care Plan  Goal: *Vital signs within specified parameters  Outcome: Progressing Towards Goal  Goal: *Labs within defined limits  Outcome: Progressing Towards Goal  Goal: *Optimal pain control at patient's stated goal  Outcome: Progressing Towards Goal  Goal: *Skin integrity maintained  Outcome: Progressing Towards Goal  Goal: *Fluid volume balance  Outcome: Progressing Towards Goal  Goal: *Optimize nutritional status  Outcome: Progressing Towards Goal

## 2022-11-07 NOTE — PROGRESS NOTES
Bedside shift change report given to Jacki (oncoming nurse) by Luis Meehan RN (offgoing nurse). Report included the following information Kardex.

## 2022-11-07 NOTE — ED PROVIDER NOTES
EMERGENCY DEPARTMENT HISTORY AND PHYSICAL EXAM          Date: 11/6/2022  Patient Name: Jacqui Ibanez    History of Presenting Illness     Chief Complaint   Patient presents with    Syncope       History Provided By: Patient and Patient's Wife    HPI: Jacqui Ibanez is a 69 y.o. male, pmhx PTSD, orthostatic hypotension, who was brought to the ED by EMS because of AMS and weakness for the last several days. His wife reports that he has not gotten out of bed for the last 6 days. She notes that the day before this occurred he had changed the  of his midodrine, but kept the dosage the same as what he has been taking. He has been having headaches and dizziness when he gets up. Tonight she went to the grocery store for about 20 minutes, and when she came back she noted that he was standing in the corner, and it looks like he was struggling to stay upright. He seemed confused to her at that time. She found soiled clothes in the hamper and evidence of a disturbance, possibly a seizure, in the bedroom. Pt reports that he has had a problem with constipation for the last few days. His wife gave him Miralax earlier today, and he reported that he had good results     PCP: Wilder Bonilla MD    Allergies: NKDA  Social Hx: No tobacco, vaping, EtOH, Illicit Drugs; Lives locally c wife    There are no other complaints, changes, or physical findings at this time.      Current Facility-Administered Medications   Medication Dose Route Frequency Provider Last Rate Last Admin    0.9% sodium chloride infusion  75 mL/hr IntraVENous CONTINUOUS Salvador Townsend MD        gabapentin (NEURONTIN) capsule 300 mg  300 mg Oral TID Nathalia Winter MD        midodrine (PROAMATINE) tablet 5 mg  5 mg Oral TID WITH MEALS Claudetta Pyles, [place], MD        finasteride (PROSCAR) tablet 5 mg  5 mg Oral DAILY Claudetta Pyles, [place], MD        fludrocortisone (FLORINEF) tablet 0.1 mg  0.1 mg Oral BID Nathalia Winter MD        DULoxetine (CYMBALTA) capsule 60 mg  60 mg Oral DAILY Chase Lebron MD           Past History     Past Medical History:  Past Medical History:   Diagnosis Date    Altered mental status, unspecified 11/9/2018    Anxiety     Ataxia 11/9/2018    Back pain     Blurred vision     Bradycardia 10/2/2018    Chest pain     Depression     Dizziness     Dizziness and giddiness 10/17/2019    Dysautonomia ([address]     Fast heart beat     Fever 11/9/2018    Frequent headaches     Frequent urination     Hip dysplasia, congenital     Hx of syncope 10/24/2018    Hypotension     Ingrown left big toenail 3/31/2017    Joint pain     Joint swelling     Lumbar spinal stenosis     Memory deficit 9/11/2018    Muscle pain     Neuropathy     Orthostatic hypotension     asymptomatic    Pacemaker     new pacemaker July 2019    Recent change in weight     Risk for falls 12/13/2016    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 11/8/2018    Syncope     Tiredness     Trouble in sleeping     Weakness        Past Surgical History:  Past Surgical History:   Procedure Laterality Date    HX HIP REPLACEMENT Bilateral 1993    HX PACEMAKER PLACEMENT  10/16/2018    HX ROTATOR CUFF REPAIR Right 11/16/2017    [facility] CARDIAC SURG PROCEDURE UNLIST      [place] [facility] NEW/RPLCMT PRM PM W/TRANSV ELTRD ATRIAL&VENT N/A 7/30/2019    INSERT PPM DUAL performed by Dana Eddy MD at [facility] CARDIAC CATH LAB       Family History:  Family History   Problem Relation Age of Onset    Cancer Mother     Cancer Maternal Aunt        Social History:  Social History     Tobacco Use    Smoking status: Former     Packs/day: 0.25     Years: 50.00     Pack years: 12.50     Types: Cigarettes     Quit date: 8/3/2019     Years since quitting: 3.2    Smokeless tobacco: Never   Substance Use Topics    Alcohol use: Not Currently     Alcohol/week: 28.0 standard drinks     Types: 28 Cans of beer per week     Comment: None now    Drug use: No       Allergies:  No Known Allergies      Review of Systems   Review of Systems   Constitutional:  Positive for appetite change. Negative for fever. HENT:  Negative for congestion and sore throat. Respiratory:  Negative for chest tightness and shortness of breath. Cardiovascular:  Negative for chest pain and leg swelling. Gastrointestinal:  Positive for constipation and vomiting. Negative for abdominal pain. Genitourinary:  Negative for dysuria. Musculoskeletal:  Negative for back pain, myalgias and neck pain. Skin:  Negative for rash. Neurological:  Positive for weakness and headaches. Hematological:  Does not bruise/bleed easily. Physical Exam   Physical Exam  Vitals reviewed. Constitutional:       General: He is not in acute distress. Appearance: He is well-developed. He is not diaphoretic. HENT:      Head: Normocephalic and atraumatic. Right Ear: External ear normal.      Left Ear: External ear normal.      Nose: Nose normal.      Mouth/Throat:      Mouth: Mucous membranes are moist.      Pharynx: No oropharyngeal exudate. Eyes:      General: No scleral icterus. Right eye: No discharge. Left eye: No discharge. Conjunctiva/sclera: Conjunctivae normal.      Pupils: Pupils are equal, round, and reactive to light. Neck:      Thyroid: No thyromegaly. Vascular: No JVD. Trachea: No tracheal deviation. Cardiovascular:      Rate and Rhythm: Normal rate and regular rhythm. Heart sounds: Normal heart sounds. No murmur heard. No friction rub. No gallop. Pulmonary:      Effort: Pulmonary effort is normal. No respiratory distress. Breath sounds: No wheezing or rales. Abdominal:      General: Bowel sounds are normal. There is no distension. Palpations: Abdomen is soft. Tenderness: There is no abdominal tenderness. There is no rebound. Musculoskeletal:         General: No tenderness or deformity. Normal range of motion. Cervical back: Normal range of motion and neck supple. Skin:     General: Skin is warm and dry. Comments: Left elbow skin tear, 2 cm in length. Clean, hemostatic. Neurological:      General: No focal deficit present. Mental Status: He is alert and oriented to person, place, and time. Cranial Nerves: No cranial nerve deficit. Coordination: Coordination normal.      Deep Tendon Reflexes: Reflexes are normal and symmetric. Psychiatric:         Behavior: Behavior normal.       Diagnostic Study Results     Labs -     Recent Results (from the past 12 hour(s))   CBC WITH AUTOMATED DIFF    Collection Time: 11/06/22  7:10 PM   Result Value Ref Range    WBC 7.0 4.1 - 11.1 K/uL    RBC 4.54 4.10 - 5.70 M/uL    HGB 14.3 12.1 - 17.0 g/dL    HCT 41.9 36.6 - 50.3 %    MCV 92.3 80.0 - 99.0 FL    MCH 31.5 26.0 - 34.0 PG    MCHC 34.1 30.0 - 36.5 g/dL    RDW 12.4 11.5 - 14.5 %    PLATELET 339 622 - 943 K/uL    MPV 10.3 8.9 - 12.9 FL    NRBC 0.0 0  WBC    ABSOLUTE NRBC 0.00 0.00 - 0.01 K/uL    NEUTROPHILS 68 32 - 75 %    LYMPHOCYTES 20 12 - 49 %    MONOCYTES 8 5 - 13 %    EOSINOPHILS 2 0 - 7 %    BASOPHILS 1 0 - 1 %    IMMATURE GRANULOCYTES 1 (H) 0.0 - 0.5 %    ABS. NEUTROPHILS 4.9 1.8 - 8.0 K/UL    ABS. LYMPHOCYTES 1.4 0.8 - 3.5 K/UL    ABS. MONOCYTES 0.5 0.0 - 1.0 K/UL    ABS. EOSINOPHILS 0.1 0.0 - 0.4 K/UL    ABS. BASOPHILS 0.1 0.0 - 0.1 K/UL    ABS. IMM.  GRANS. 0.1 (H) 0.00 - 0.04 K/UL    DF AUTOMATED     METABOLIC PANEL, COMPREHENSIVE    Collection Time: 11/06/22  7:10 PM   Result Value Ref Range    Sodium 141 136 - 145 mmol/L    Potassium 4.1 3.5 - 5.1 mmol/L    Chloride 103 97 - 108 mmol/L    CO2 30 21 - 32 mmol/L    Anion gap 8 5 - 15 mmol/L    Glucose 123 (H) 65 - 100 mg/dL    BUN 27 (H) 6 - 20 MG/DL    Creatinine 1.41 (H) 0.70 - 1.30 MG/DL    BUN/Creatinine ratio 19 12 - 20      eGFR 52 (L) >60 ml/min/1.73m2    Calcium 8.9 8.5 - 10.1 MG/DL    Bilirubin, total 0.6 0.2 - 1.0 MG/DL    ALT (SGPT) 22 12 - 78 U/L    AST (SGOT) 12 (L) 15 - 37 U/L    Alk. phosphatase 117 45 - 117 U/L    Protein, total 7.2 6.4 - 8.2 g/dL    Albumin 4.1 3.5 - 5.0 g/dL    Globulin 3.1 2.0 - 4.0 g/dL    A-G Ratio 1.3 1.1 - 2.2     TROPONIN-HIGH SENSITIVITY    Collection Time: 11/06/22  7:10 PM   Result Value Ref Range    Troponin-High Sensitivity 10 0 - 76 ng/L   URINALYSIS W/MICROSCOPIC    Collection Time: 11/06/22  7:15 PM   Result Value Ref Range    Color YELLOW/STRAW      Appearance CLEAR CLEAR      Specific gravity 1.010 1.003 - 1.030      pH (UA) 7.0 5.0 - 8.0      Protein Negative NEG mg/dL    Glucose Negative NEG mg/dL    Ketone Negative NEG mg/dL    Bilirubin Negative NEG      Blood SMALL (A) NEG      Urobilinogen 0.2 0.2 - 1.0 EU/dL    Nitrites Negative NEG      Leukocyte Esterase Negative NEG      WBC 0-4 0 - 4 /hpf    RBC 10-20 0 - 5 /hpf    Epithelial cells FEW FEW /lpf    Bacteria Negative NEG /hpf       Radiologic Studies -   CT HEAD WO CONT   Final Result         No change or acute abnormality      CT SPINE CERV WO CONT   Final Result   1. No acute abnormality            CT Results  (Last 48 hours)                 11/06/22 2020  CT HEAD WO CONT Final result    Impression:          No change or acute abnormality       Narrative:  EXAM: CT HEAD WO CONT       INDICATION: fall       COMPARISON: 10/7/2022. CONTRAST: None. TECHNIQUE: Unenhanced CT of the head was performed using 5 mm images. Brain and   bone windows were generated. Coronal and sagittal reformats. CT dose reduction   was achieved through use of a standardized protocol tailored for this   examination and automatic exposure control for dose modulation. FINDINGS:   The ventricles and sulci are normal in size, shape and configuration. . Chronic   lacunar infarct right basal ganglia unchanged.  There is no intracranial   hemorrhage, extra-axial collection, or mass effect. The basilar cisterns are   open. No CT evidence of acute infarct. The bone windows demonstrate no abnormalities. The visualized portions of the   paranasal sinuses and mastoid air cells are clear. 11/06/22 2020  CT SPINE CERV WO CONT Final result    Impression:  1. No acute abnormality           Narrative:  INDICATION:  fall, struck head        STUDY:  CT SPINE CERV WO CONT        Examination: Cervical spine CT. No contrast or comparisons. Thin section axial   images were obtained with sagittal and coronal reformats. CT dose reduction was   achieved through use of a standardized protocol tailored for this examination   and automatic exposure control for dose modulation. FINDINGS: Alignment of the cervical spine is normal. There is no bony   destructive lesion or evidence of acute fracture. There are vascular   calcifications. Multilevel degenerative change and diffuse idiopathic skeletal   hyperostosis. . 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-C7                     Medical Decision Making   I am the first provider for this patient. I reviewed the vital signs, available nursing notes, past medical history, past surgical history, family history and social history. Vital Signs-Reviewed the patient's vital signs.   Patient Vitals for the past 12 hrs:   Temp Pulse Resp BP SpO2   11/07/22 0404 (P) 98.9 °F (37.2 °C) (P) 82 (P) 18 (!) (P) 145/81 (P) 95 %   11/07/22 0055 97.7 °F (36.5 °C) 90 20 134/79 97 %   11/06/22 2200 -- 81 17 136/68 98 %   11/06/22 2132 -- 84 14 119/79 98 %   11/06/22 2115 -- 80 16 (!) 162/77 98 %   11/06/22 2100 -- 80 16 (!) 163/80 97 %   11/06/22 2045 -- 83 18 (!) 146/75 97 %   11/06/22 2030 -- 81 18 (!) 148/75 100 %   11/06/22 2021 -- 91 18 (!) 204/120 100 %   11/06/22 1945 -- 80 16 (!) 149/86 98 %   11/06/22 1930 -- 80 18 (!) 179/96 --   11/06/22 1900 -- 80 14 (!) 186/108 97 %   11/06/22 1844 98 °F (36.7 °C) 80 18 (!) 199/98 100 %       Pulse Oximetry Analysis - 95 % on RA    Cardiac Monitor:   Rate: 80 bpm  Rhythm: Normal Sinus Rhythm      Records Reviewed: Nursing Notes, Old Medical Records, Previous Radiology Studies, and Previous Laboratory Studies    Provider Notes (Medical Decision Making):   MDM: Pt with a known problem of disautomnomia and orthostatic hypotension. Three things seem to have happened this week: changing the dosage of his midodrine, being unable to get out of bed, new onset of seizure like activity. In speaking with the neurologist on call at [facility], Dr. Harry Up, he may have had cardiogenic syncope, and not neurologic seizure. The fact that there was such a short postictal period suggests that this is the case. Keppra was given after this seizure like activity, but it may be more prudent to hold the medication and observe him. ED Course:   Initial assessment performed. The patients presenting problems have been discussed, and they are in agreement with the care plan formulated and outlined with them. I have encouraged them to ask questions as they arise throughout their visit. PROGRESS NOTE:    Neurologically in tact during the initial period that he was in the ED. When he returned from CT, standing at the bedside using the urinal. Wife witnessed a 2 minute generalized seizure; she guided him into the chair and called for help. This physician was present and was able to see the patient with somewhat typical generalized seizure activity, but the tonic-clonic activity was less vigorous than most generalized seizures. In addition, he was extremely pale. Pt able to respond fairly normally after only 2-3 minutes, after nurse and physician able to help him back into bed. Discharge note:  Pt re-evaluated and noted to be feeling better, ready for discharge. Updated pt on all final lab  findings. Will follow up as instructed. All questions have been answered, pt voiced understanding and agreement with plan.  Specific return precautions provided as well as instructions to return to the ED should sx worsen at any time. Vital signs stable for discharge. Critical Care Time: 0      Diagnosis     Clinical Impression:   1. Seizure-like activity ([address]    2.  Orthostatic hypotension        PLAN:    Admit [address] telemetry    MRI c contrast 11/7    Consult neurology

## 2022-11-07 NOTE — H&P
[facility]   Admission History & Physical        11/7/2022 9:12 AM  Patient: Yas Mejia 1945  PCP: Idris Mercado MD    HISTORY  Chief Complaint:   Chief Complaint   Patient presents with    Syncope       HPI: 69 y.o. male presenting for admission to [facility] for further evaluation and treatment for Seizure [facility]). He  has a past medical history of Altered mental status, unspecified (11/9/2018), Anxiety, Ataxia (11/9/2018), Back pain, Blurred vision, Bradycardia (10/2/2018), Chest pain, Depression, Dizziness, Dizziness and giddiness (10/17/2019), Dysautonomia ([address] Fast heart beat, Fever (11/9/2018), Frequent headaches, Frequent urination, Hip dysplasia, congenital, syncope (10/24/2018), Hypotension, Ingrown left big toenail (3/31/2017), Joint pain, Joint swelling, Lumbar spinal stenosis, Memory deficit (9/11/2018), Muscle pain, Neuropathy, Orthostatic hypotension, Pacemaker, Recent change in weight, Risk for falls (12/13/2016), 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 .     Patient presenting to the ED last evening following a suspected seizure. His wife described an episode where he was unresponsive for about 1 minute, was shaky, and had difficulty standing. She described a loss of bowel and bladder control with incontinence. The patient had had a syncopal/fall event several days prior. He has had functional decline over several months. He has had increased altered mentation as well as weakness for several days. His wife states he has not gotten out of bed, been active-for the last 6 days. Notes his midodrine was recently filled with a different company product, the milligram dosage was not changed.   Cardiology has Rx 10mg tid 8/12/16, but he is symptomatic by 7pm when the spell happened last night. He becomes symptomatic with headache and dizziness when standing. He has had recent constipation, but loose last night following AM Miralax. Yesterday he apparently had a loose stool soiling his clothing prior to presentation. The patient's wife is his primary caregiver and does a very good job. She is however reaching the point where she may not be able to manage him at home. When he has these spells he stiffens up and clenches onto her bringing concern that both will fall. She does well keeping up with medications but note he continues to be lightheaded and orthostatic just hours after completing a 10 mg dose of midodrine. While on 5 mg tabs she would double up at times when he was more symptomatic. She has been warned that 30 mg/day is the maximum daily dose. She is rightfully concerned since he gets symptomatic in several hours following the 10 mg dose. He has been diagnosed with a stroke in the past, bringing concerned that these episodes could be epileptic as opposed to being hypotensive /orthostatic. Current Providers:  PCP:  Meg Walker, [facility]  Card:  Evonne Mendes MD, [address]  Pulm:   Annemarie Hart DO [facility]  Neuro Jessica Pisano MD  [address]      Past Medical History:  Past Medical History:   Diagnosis Date    Altered mental status, unspecified 11/9/2018    Anxiety     Ataxia 11/9/2018    Back pain     Blurred vision     Bradycardia 10/2/2018    Chest pain     Depression     Dizziness     Dizziness and giddiness 10/17/2019    Dysautonomia ([address]     Fast heart beat     Fever 11/9/2018    Frequent headaches     Frequent urination     Hip dysplasia, congenital     Hx of syncope 10/24/2018    Hypotension     Ingrown left big toenail 3/31/2017    Joint pain     Joint swelling     Lumbar spinal stenosis     Memory deficit 9/11/2018    Muscle pain     Neuropathy Orthostatic hypotension     asymptomatic    Pacemaker     new pacemaker July 2019    Recent change in weight     Risk for falls 12/13/2016    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 11/8/2018    Syncope     Tiredness     Trouble in sleeping     Weakness        Past Surgical History:  Past Surgical History:   Procedure Laterality Date    HX HIP REPLACEMENT Bilateral 1993    HX PACEMAKER PLACEMENT  10/16/2018    HX ROTATOR CUFF REPAIR Right 11/16/2017    MI CARDIAC SURG PROCEDURE UNLIST      MI INS NEW/RPLCMT PRM PM W/TRANSV ELTRD ATRIAL&VENT N/A 7/30/2019    INSERT PPM DUAL performed by Joel Walker MD at [facility] CARDIAC CATH LAB       Medication:  Prior to Admission medications    Medication Sig Start Date End Date Taking? Authorizing Provider   atorvastatin (LIPITOR) 40 mg tablet Take 1 Tablet by mouth in the morning. 7/29/22  Yes Kourtney Alex NP   fludrocortisone (FLORINEF) 0.1 mg tablet TAKE 1 TABLET BY MOUTH TWICE DAILY 12/27/19  Yes Samson Molina., MD   midodrine (PROAMITINE) 5 mg tablet Take 1 Tab by mouth daily (with dinner). 10/30/19  Yes Nela GARCIA NP   DULoxetine (CYMBALTA) 60 mg capsule TAKE 1 CAPSULE BY MOUTH DAILY 10/30/19  Yes Nela GARCIA NP   acetaminophen (TYLENOL) 650 mg TbER Take 650 mg by mouth every eight (8) hours as needed for Pain. 10/13/19  Yes Samson Molina., MD   finasteride (PROSCAR) 5 mg tablet TAKE 1 TABLET BY MOUTH DAILY(PROSTATE) 2/1/19  Yes Samson Molina., MD   clonazePAM (KlonoPIN) 0.5 mg tablet Take 1 Tablet by mouth nightly. Max Daily Amount: 0.5 mg. 8/22/22   Nela GARCIA NP   gabapentin (NEURONTIN) 600 mg tablet Take 0.5 Tabs by mouth three (3) times daily. Max Daily Amount: 900 mg.   Patient taking differently: Take 300 mg by mouth two (2) times a day. 3/31/21   Minna Molina MD   acetaminophen 500 mg tab 500 mg, diphenhydrAMINE 25 mg cap 25 mg Take 1 Dose by mouth nightly as needed for Other (sleep/pain relief). Patient not taking: Reported on 11/7/2022 10/13/19   Minna Molina MD   aspirin 81 mg chewable tablet Take 1 Tab by mouth daily. Patient not taking: Reported on 11/7/2022 9/3/19   Gio Glass NP   guaiFENesin ER [facility]) 600 mg ER tablet Take 1 Tab by mouth every twelve (12) hours.   Patient not taking: Reported on 11/7/2022 1/3/19   Jennifer Whelan ANP       Allergies:  No Known Allergies    Social History:  Social History     Tobacco Use    Smoking status: Former     Packs/day: 0.25     Years: 50.00     Pack years: 12.50     Types: Cigarettes     Quit date: 8/3/2019     Years since quitting: 3.2    Smokeless tobacco: Never   Substance Use Topics    Alcohol use: Not Currently     Alcohol/week: 28.0 standard drinks     Types: 28 Cans of beer per week     Comment: None now    Drug use: No       Family History:  Family History   Problem Relation Age of Onset    Cancer Mother     Cancer Maternal Aunt        ROS:  Total of 12 systems reviewed as follows:  POSITIVE= bolded text  Negative = text not bolded       General:  fever, chills, sweats, generalized weakness, weight loss/gain, loss of appetite   Eyes:    blurred vision, eye pain, loss of vision, double vision  ENT:    rhinorrhea, pharyngitis   Respiratory:  cough, sputum production, SOB, PATEL, wheezing, pleuritic pain   Cardiology:   chest pain, palpitations, orthopnea, PND, edema, syncope   Gastrointestinal:  abdominal pain , N/V, diarrhea, dysphagia, constipation, bleeding   Genitourinary:  frequency, urgency, dysuria, hematuria, incontinence, prostatism   Muskuloskeletal: arthralgia, myalgia, back pain  Hematology:   easy bruising, nose or gum bleeding, lymphadenopathy   Dermatological: rash, ulceration, pruritis, color change / jaundice  Endocrine:   hot flashes or polydipsia   Neurological:  headache, dizziness, confusion, focal weakness, paresthesia, speech difficulties, memory loss, gait difficulty  Psychological: feelings of anxiety, depression, agitation      PHYSICAL EXAM:  Patient Vitals for the past 24 hrs:   Temp Pulse Resp BP SpO2   11/07/22 0655 98.4 °F (36.9 °C) 84 18 (!) 155/85 97 %   11/07/22 0404 (P) 98.9 °F (37.2 °C) (P) 82 (P) 18 (!) (P) 145/81 (P) 95 %   11/07/22 0055 97.7 °F (36.5 °C) 90 20 134/79 97 %   11/06/22 2200 -- 81 17 136/68 98 %   11/06/22 2132 -- 84 14 119/79 98 %   11/06/22 2115 -- 80 16 (!) 162/77 98 %   11/06/22 2100 -- 80 16 (!) 163/80 97 %   11/06/22 2045 -- 83 18 (!) 146/75 97 %   11/06/22 2030 -- 81 18 (!) 148/75 100 %   11/06/22 2021 -- 91 18 (!) 204/120 100 %   11/06/22 1945 -- 80 16 (!) 149/86 98 %   11/06/22 1930 -- 80 18 (!) 179/96 --   11/06/22 1900 -- 80 14 (!) 186/108 97 %   11/06/22 1844 98 °F (36.7 °C) 80 18 (!) 199/98 100 %       General:    Alert, cooperative, no distress, appears stated age. HEENT: Atraumatic, anicteric sclerae, pink conjunctivae     No oral ulcers, mucosa moist, throat clear, dentition fair  Neck:  Supple, symmetrical;   thyroid non tender  Lungs:   Clear to auscultation bilaterally. No wheezing or rhonchi. No rales. Chest wall:  No tenderness. No accessory muscle use. Heart:   Regular rhythm. No  murmur. No edema  Abdomen:   Soft, non-tender. Not distended. Bowel sounds normal  Extremities: No cyanosis. No clubbing      Capillary refill normal,  Radial pulse 2+,  DP 1+  Skin:     Not pale. Not jaundiced. No rashes   Psych:  Not depressed. Not anxious or agitated. Neurologic: EOMs intact. No facial asymmetry. No aphasia or slurred speech. R UE weakness 4/5 with drift / reduced  compared to L  Sensation grossly intact. Alert and oriented X 4.     Lab Data Reviewed:    Recent Results (from the past 24 hour(s))   CBC WITH AUTOMATED DIFF    Collection Time: 11/06/22  7:10 PM   Result Value Ref Range    WBC 7.0 4.1 - 11.1 K/uL    RBC 4.54 4.10 - 5.70 M/uL    HGB 14.3 12.1 - 17.0 g/dL    HCT 41.9 36.6 - 50.3 %    MCV 92.3 80.0 - 99.0 FL    MCH 31.5 26.0 - 34.0 PG    MCHC 34.1 30.0 - 36.5 g/dL    RDW 12.4 11.5 - 14.5 %    PLATELET 776 044 - 586 K/uL    MPV 10.3 8.9 - 12.9 FL    NRBC 0.0 0  WBC    ABSOLUTE NRBC 0.00 0.00 - 0.01 K/uL    NEUTROPHILS 68 32 - 75 %    LYMPHOCYTES 20 12 - 49 %    MONOCYTES 8 5 - 13 %    EOSINOPHILS 2 0 - 7 %    BASOPHILS 1 0 - 1 %    IMMATURE GRANULOCYTES 1 (H) 0.0 - 0.5 %    ABS. NEUTROPHILS 4.9 1.8 - 8.0 K/UL    ABS. LYMPHOCYTES 1.4 0.8 - 3.5 K/UL    ABS. MONOCYTES 0.5 0.0 - 1.0 K/UL    ABS. EOSINOPHILS 0.1 0.0 - 0.4 K/UL    ABS. BASOPHILS 0.1 0.0 - 0.1 K/UL    ABS. IMM. GRANS. 0.1 (H) 0.00 - 0.04 K/UL    DF AUTOMATED     METABOLIC PANEL, COMPREHENSIVE    Collection Time: 11/06/22  7:10 PM   Result Value Ref Range    Sodium 141 136 - 145 mmol/L    Potassium 4.1 3.5 - 5.1 mmol/L    Chloride 103 97 - 108 mmol/L    CO2 30 21 - 32 mmol/L    Anion gap 8 5 - 15 mmol/L    Glucose 123 (H) 65 - 100 mg/dL    BUN 27 (H) 6 - 20 MG/DL    Creatinine 1.41 (H) 0.70 - 1.30 MG/DL    BUN/Creatinine ratio 19 12 - 20      eGFR 52 (L) >60 ml/min/1.73m2    Calcium 8.9 8.5 - 10.1 MG/DL    Bilirubin, total 0.6 0.2 - 1.0 MG/DL    ALT (SGPT) 22 12 - 78 U/L    AST (SGOT) 12 (L) 15 - 37 U/L    Alk.  phosphatase 117 45 - 117 U/L    Protein, total 7.2 6.4 - 8.2 g/dL    Albumin 4.1 3.5 - 5.0 g/dL    Globulin 3.1 2.0 - 4.0 g/dL    A-G Ratio 1.3 1.1 - 2.2     TROPONIN-HIGH SENSITIVITY    Collection Time: 11/06/22  7:10 PM   Result Value Ref Range    Troponin-High Sensitivity 10 0 - 76 ng/L   URINALYSIS W/MICROSCOPIC    Collection Time: 11/06/22  7:15 PM   Result Value Ref Range    Color YELLOW/STRAW      Appearance CLEAR CLEAR      Specific gravity 1.010 1.003 - 1.030      pH (UA) 7.0 5.0 - 8.0      Protein Negative NEG mg/dL    Glucose Negative NEG mg/dL    Ketone Negative NEG mg/dL    Bilirubin Negative NEG      Blood SMALL (A) NEG      Urobilinogen 0.2 0.2 - 1.0 EU/dL    Nitrites Negative NEG      Leukocyte Esterase Negative NEG      WBC 0-4 0 - 4 /hpf    RBC 10-20 0 - 5 /hpf    Epithelial cells FEW FEW /lpf    Bacteria Negative NEG /hpf   CBC W/O DIFF    Collection Time: 22  5:25 AM   Result Value Ref Range    WBC 6.7 4.1 - 11.1 K/uL    RBC 3.94 (L) 4.10 - 5.70 M/uL    HGB 12.4 12.1 - 17.0 g/dL    HCT 36.7 36.6 - 50.3 %    MCV 93.1 80.0 - 99.0 FL    MCH 31.5 26.0 - 34.0 PG    MCHC 33.8 30.0 - 36.5 g/dL    RDW 12.4 11.5 - 14.5 %    PLATELET 851 836 - 769 K/uL    MPV 10.2 8.9 - 12.9 FL    NRBC 0.0 0  WBC    ABSOLUTE NRBC 0.00 0.00 - 9.04 K/uL   METABOLIC PANEL, BASIC    Collection Time: 22  5:25 AM   Result Value Ref Range    Sodium 142 136 - 145 mmol/L    Potassium 3.8 3.5 - 5.1 mmol/L    Chloride 107 97 - 108 mmol/L    CO2 28 21 - 32 mmol/L    Anion gap 7 5 - 15 mmol/L    Glucose 107 (H) 65 - 100 mg/dL    BUN 21 (H) 6 - 20 MG/DL    Creatinine 1.14 0.70 - 1.30 MG/DL    BUN/Creatinine ratio 18 12 - 20      eGFR >60 >60 ml/min/1.73m2    Calcium 8.5 8.5 - 10.1 MG/DL         Radiology:  CT HEAD :   The ventricles and sulci are normal in size, shape and configuration. . Chronic  lacunar infarct right basal ganglia unchanged. There is no intracranial  hemorrhage, extra-axial collection, or mass effect. The basilar cisterns are  open. No CT evidence of acute infarct. The bone windows demonstrate no abnormalities. The visualized portions of the  paranasal sinuses and mastoid air cells are clear. IMPRESSION:  No change or acute abnormality    CT C SPINE :  Alignment of the cervical spine is normal. There is no bony  destructive lesion or evidence of acute fracture. There are vascular  calcifications. Multilevel degenerative change and diffuse idiopathic skeletal  hyperostosis. . 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-C7   IMPRESSION:  No acute abnormality    pCXR  pending:    EK/7:  pending Care Plan discussed with:   Patient x    Family     RN x         Consultant      Expected  Disposition:   Home with Family x   HH/PT/OT/RN    SNF/LTC    SUSAN      TOTAL TIME:  54 Minutes      Comments    x Reviewed previous records   >50% of visit spent in counseling and coordination of care x Discussion with patient and/or family and questions answered       _______________________________________________________  Given the patient's current clinical presentation, I have a high level of concern for decompensation if discharged from the emergency department. Complex decision making was performed, which includes reviewing the patient's available past medical records, laboratory results, and x-ray films. My assessment of this patient's clinical condition and my plan of care is as follows.     ASSESSMENT / PLAN    Principal Problem:    Seizure ([address] (11/6/2022)  Observed episode in the ED with altered mentation and extremity shaking while standing at the bedside  Phone call consult with neurology at [facility] suggested admission  MRI scheduled is not possible at this institution  Likely was associated with his known orthostatic hypotension  Keppra is not been initiated at this point  Will need neurology evaluation for epilepsy concern as well as for his functional decline and dementia  We will assess thyroid, ammonia, B12, folate  PT and OT consult  Limit sedatives    Active Problems:    Orthostatic hypotension (11/6/2022)  Encourage fluids  Maintain Florinef 0.1 mg twice daily  Advanced midodrine to 5 mg twice daily with meals  Monitor orthostatic pressure  PT OT    Pacemaker (10/24/2018)    Heart block (7/30/2019)  Telemetry monitoring      Pulmonary nodule (7/29/2022)      Overview: 1.1cm lung nodule on cervical spine CT from [facility] done 7-2022- he has been       referred to Pulm    Pulmonary emphysema ([address] (11/7/2022)  Recently assessed by pulmonary medicine  was started on Spiriva  Nodule was stable on follow-up CT, considering CT versus PET at subsequent follow-up      Bilateral carotid artery stenosis (9/30/2019)  Unable to have MRI at [facility] due to pacemaker, would have to be arranged at a facility equipped for this  Resume aspirin 81 mg daily, maintain Lipitor nightly      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)  Maintain Proscar 5 mg daily  Monitor bladder checks for retention        SAFETY:   Code Status:DNR  DVT prophylaxis:Lovenox  Stress Ulcer prophylaxis: Not Indicated  Bladder catheter:no  Family Contact Info:  Primary Emergency Contact: Divya Ba Home Phone: [phone]: [facility] Room 130/01  Disposition: TBD, likely home when stable  Admission status:  Observation    -Tentative plan of care discussed with patient / family, who demonstrated understanding and is in agreement to the above  -Case was reviewed with the ED Provider, MD Brenda Condon MD  [facility] Hospitalist  [phone]

## 2022-11-07 NOTE — TELEPHONE ENCOUNTER
Received a call from pt's wife stating he has been admitted to the hospital for seizures. She feels the hospital is not communicating with her. She would really like if Vega Jerome could call her .

## 2022-11-07 NOTE — PROGRESS NOTES
Problem: Falls - Risk of  Goal: *Absence of Falls  Description: Document Lucita Counts Fall Risk and appropriate interventions in the flowsheet.   Outcome: Progressing Towards Goal  Note: Fall Risk Interventions:  Mobility Interventions: Bed/chair exit alarm, Patient to call before getting OOB         Medication Interventions: Bed/chair exit alarm, Patient to call before getting OOB, Evaluate medications/consider consulting pharmacy    Elimination Interventions: Call light in reach    History of Falls Interventions: Bed/chair exit alarm         Problem: General Medical Care Plan  Goal: *Skin integrity maintained  Outcome: Progressing Towards Goal  Goal: *Optimize nutritional status  Outcome: Progressing Towards Goal

## 2022-11-07 NOTE — ROUTINE PROCESS
8403:  Receiving report from Jayme Santoyo. Will resume care of patient. 1009:  Notified Dr. Shashi Romeo of pt c/o during after turing onto his right side. 1020: Wife to room. Let her know will let provider know of her arrival.    1114:  pt reports \"feeling a buzz\" soon after moved up in bed to sitting position. BP was similar to the lying position. 1129:  pt still feels dizzy in any position while in the bed. prefers his HOB at or below 30 degree. 1310: Took patient's own Spiriva for pharmacist to verify. 1350 pr report dizzy \"a little bit\" but less dizzy compare to this morning. 1559: Worked ROM exercise with patient. Pt currently resting comfortable in bed; denies dizziness a this time.

## 2022-11-07 NOTE — ED NOTES
Attempts to stand pt at bedside, initially pt did well, reported feeling fine. Gradually, and before being able to take a BP pt began to shake, both forearms, report needing to sit back down, assisted to bed by this RN. Wife Onur Martinez at bedside reports this has been happening in the past few days and appears similar to earlier occurences. Pt appears disheartened but attempting to take this in good humor, joking lightly.

## 2022-11-07 NOTE — PROGRESS NOTES
Spiritual Care Assessment/Progress Note  John Galvan      NAME: Aaron Duran      MRN: [id number]  AGE: 69 y.o.  SEX: male  Baptism Affiliation: Anabaptist   Language: English     11/7/2022     Total Time (in minutes): 21     Spiritual Assessment begun in [facility] MED/[facility]UR[facility] through conversation with:         [x]Patient        [x] Family    [] Friend(s)        Reason for Consult: Initial/Spiritual assessment, patient floor     Spiritual beliefs: (Please include comment if needed)     [x] Identifies with a momo tradition:         [x] Supported by a momo community:            [] Claims no spiritual orientation:           [] Seeking spiritual identity:                [] Adheres to an individual form of spirituality:           [] Not able to assess:                           Identified resources for coping:      [] Prayer                               [] Music                  [] Guided Imagery     [x] Family/friends                 [] Pet visits     [] Devotional reading                         [] Unknown     [] Other:                                               Interventions offered during this visit: (See comments for more details)    Patient Interventions: Affirmation of emotions/emotional suffering, Affirmation of momo, Iconic (affirming the presence of God/Higher Power), Initial/Spiritual assessment, patient floor, Prayer (assurance of)           Plan of Care:     [x] Support spiritual and/or cultural needs    [] Support AMD and/or advance care planning process      [] Support grieving process   [] Coordinate Rites and/or Rituals    [] Coordination with community clergy   [] No spiritual needs identified at this time   [] Detailed Plan of Care below (See Comments)  [] Make referral to Music Therapy  [] Make referral to Pet Therapy     [] Make referral to Addiction services  [] Make referral to [facility]  [] Make referral to Spiritual Care Partner  [] No future visits requested        [] Contact Spiritual Care for further referrals     Comments: Initial spiritual assessment in Rm 130  Patient and wife were present during visit  Wife talked about their different paces they lived as well as patients issues  Provided empathic listening and spiritual support  Advised of  Availability    [address] [place] [address]

## 2022-11-07 NOTE — PROGRESS NOTES
Patient asked that we notify the [address] in [place] about the patient's admission to the hospital in Observation. Spoke with Yessenia Gaston: Community Emergency Treatment Reporting and care Coordination([phone]. Yessenia Gaston provided a Notification Number:    [id number]. This number is to be used with this case. Will provide a copy of the number to the patient as well. Yessenia Gaston stated that the information provided will go for review and we should receiving a fax from the [place] requesting more info or just information containing the notification number.

## 2022-11-07 NOTE — PROGRESS NOTES
Remote Hospitalist ED sign out/admission acceptance    Chief complaint:  Syncope and LOC reported by wife    History of dysautonomia, low BP reported by EMS    Reason For admission:  Probable syncope  Abnormal body movement while in the ED, suspected Seizure      Admission Status:Observation      ED Physician giving sign out: Dr. Kelly Torrez      Patient Vitals for the past 12 hrs:   Temp Pulse Resp BP SpO2   11/07/22 0404 (P) 98.9 °F (37.2 °C) (P) 82 (P) 18 (!) (P) 145/81 (P) 95 %   11/07/22 0055 97.7 °F (36.5 °C) 90 20 134/79 97 %   11/06/22 2200 -- 81 17 136/68 98 %   11/06/22 2132 -- 84 14 119/79 98 %   11/06/22 2115 -- 80 16 (!) 162/77 98 %   11/06/22 2100 -- 80 16 (!) 163/80 97 %   11/06/22 2045 -- 83 18 (!) 146/75 97 %   11/06/22 2030 -- 81 18 (!) 148/75 100 %   11/06/22 2021 -- 91 18 (!) 204/120 100 %   11/06/22 1945 -- 80 16 (!) 149/86 98 %   11/06/22 1930 -- 80 18 (!) 179/96 --   11/06/22 1900 -- 80 14 (!) 186/108 97 %   11/06/22 1844 98 °F (36.7 °C) 80 18 (!) 199/98 100 %         Recent Results (from the past 24 hour(s))   CBC WITH AUTOMATED DIFF    Collection Time: 11/06/22  7:10 PM   Result Value Ref Range    WBC 7.0 4.1 - 11.1 K/uL    RBC 4.54 4.10 - 5.70 M/uL    HGB 14.3 12.1 - 17.0 g/dL    HCT 41.9 36.6 - 50.3 %    MCV 92.3 80.0 - 99.0 FL    MCH 31.5 26.0 - 34.0 PG    MCHC 34.1 30.0 - 36.5 g/dL    RDW 12.4 11.5 - 14.5 %    PLATELET 736 832 - 482 K/uL    MPV 10.3 8.9 - 12.9 FL    NRBC 0.0 0  WBC    ABSOLUTE NRBC 0.00 0.00 - 0.01 K/uL    NEUTROPHILS 68 32 - 75 %    LYMPHOCYTES 20 12 - 49 %    MONOCYTES 8 5 - 13 %    EOSINOPHILS 2 0 - 7 %    BASOPHILS 1 0 - 1 %    IMMATURE GRANULOCYTES 1 (H) 0.0 - 0.5 %    ABS. NEUTROPHILS 4.9 1.8 - 8.0 K/UL    ABS. LYMPHOCYTES 1.4 0.8 - 3.5 K/UL    ABS. MONOCYTES 0.5 0.0 - 1.0 K/UL    ABS. EOSINOPHILS 0.1 0.0 - 0.4 K/UL    ABS. BASOPHILS 0.1 0.0 - 0.1 K/UL    ABS. IMM.  GRANS. 0.1 (H) 0.00 - 0.04 K/UL    DF AUTOMATED     METABOLIC PANEL, COMPREHENSIVE    Collection Time: 11/06/22  7:10 PM   Result Value Ref Range    Sodium 141 136 - 145 mmol/L    Potassium 4.1 3.5 - 5.1 mmol/L    Chloride 103 97 - 108 mmol/L    CO2 30 21 - 32 mmol/L    Anion gap 8 5 - 15 mmol/L    Glucose 123 (H) 65 - 100 mg/dL    BUN 27 (H) 6 - 20 MG/DL    Creatinine 1.41 (H) 0.70 - 1.30 MG/DL    BUN/Creatinine ratio 19 12 - 20      eGFR 52 (L) >60 ml/min/1.73m2    Calcium 8.9 8.5 - 10.1 MG/DL    Bilirubin, total 0.6 0.2 - 1.0 MG/DL    ALT (SGPT) 22 12 - 78 U/L    AST (SGOT) 12 (L) 15 - 37 U/L    Alk. phosphatase 117 45 - 117 U/L    Protein, total 7.2 6.4 - 8.2 g/dL    Albumin 4.1 3.5 - 5.0 g/dL    Globulin 3.1 2.0 - 4.0 g/dL    A-G Ratio 1.3 1.1 - 2.2     TROPONIN-HIGH SENSITIVITY    Collection Time: 11/06/22  7:10 PM   Result Value Ref Range    Troponin-High Sensitivity 10 0 - 76 ng/L   URINALYSIS W/MICROSCOPIC    Collection Time: 11/06/22  7:15 PM   Result Value Ref Range    Color YELLOW/STRAW      Appearance CLEAR CLEAR      Specific gravity 1.010 1.003 - 1.030      pH (UA) 7.0 5.0 - 8.0      Protein Negative NEG mg/dL    Glucose Negative NEG mg/dL    Ketone Negative NEG mg/dL    Bilirubin Negative NEG      Blood SMALL (A) NEG      Urobilinogen 0.2 0.2 - 1.0 EU/dL    Nitrites Negative NEG      Leukocyte Esterase Negative NEG      WBC 0-4 0 - 4 /hpf    RBC 10-20 0 - 5 /hpf    Epithelial cells FEW FEW /lpf    Bacteria Negative NEG /hpf         CT HEAD WO CONT   Final Result         No change or acute abnormality      CT SPINE CERV WO CONT   Final Result   1.  No acute abnormality                  Results for orders placed or performed in visit on 10/15/19   AMB POC URINALYSIS DIP STICK AUTO W/O MICRO     Status: None   Result Value Ref Range Status    Color (UA POC) Yellow  Final    Clarity (UA POC) Clear  Final    Glucose (UA POC) Negative Negative Final    Bilirubin (UA POC) Negative Negative Final    Ketones (UA POC) Negative Negative Final    Specific gravity (UA POC) 1.015 1.001 - 1.035 Final    Blood (UA POC) Trace Negative Final    pH (UA POC) 6.5 4.6 - 8.0 Final    Protein (UA POC) Negative Negative Final    Urobilinogen (UA POC) 0.2 mg/dL 0.2 - 1 Final    Nitrites (UA POC) Negative Negative Final    Leukocyte esterase (UA POC) Negative Negative Final

## 2022-11-08 LAB
ANION GAP SERPL CALC-SCNC: 8 MMOL/L (ref 5–15)
BASOPHILS # BLD: 0.1 K/UL (ref 0–0.1)
BASOPHILS NFR BLD: 1 % (ref 0–1)
BUN SERPL-MCNC: 16 MG/DL (ref 6–20)
BUN/CREAT SERPL: 17 (ref 12–20)
CALCIUM SERPL-MCNC: 8.4 MG/DL (ref 8.5–10.1)
CHLORIDE SERPL-SCNC: 109 MMOL/L (ref 97–108)
CO2 SERPL-SCNC: 27 MMOL/L (ref 21–32)
CREAT SERPL-MCNC: 0.96 MG/DL (ref 0.7–1.3)
DIFFERENTIAL METHOD BLD: ABNORMAL
EOSINOPHIL # BLD: 0.2 K/UL (ref 0–0.4)
EOSINOPHIL NFR BLD: 3 % (ref 0–7)
ERYTHROCYTE [DISTWIDTH] IN BLOOD BY AUTOMATED COUNT: 12.5 % (ref 11.5–14.5)
FOLATE SERPL-MCNC: 14.3 NG/ML (ref 5–21)
GLUCOSE SERPL-MCNC: 101 MG/DL (ref 65–100)
HCT VFR BLD AUTO: 35.6 % (ref 36.6–50.3)
HGB BLD-MCNC: 12 G/DL (ref 12.1–17)
IMM GRANULOCYTES # BLD AUTO: 0 K/UL (ref 0–0.04)
IMM GRANULOCYTES NFR BLD AUTO: 0 % (ref 0–0.5)
LYMPHOCYTES # BLD: 1.7 K/UL (ref 0.8–3.5)
LYMPHOCYTES NFR BLD: 26 % (ref 12–49)
MAGNESIUM SERPL-MCNC: 2 MG/DL (ref 1.6–2.4)
MCH RBC QN AUTO: 31.5 PG (ref 26–34)
MCHC RBC AUTO-ENTMCNC: 33.7 G/DL (ref 30–36.5)
MCV RBC AUTO: 93.4 FL (ref 80–99)
MONOCYTES # BLD: 0.6 K/UL (ref 0–1)
MONOCYTES NFR BLD: 9 % (ref 5–13)
NEUTS SEG # BLD: 4 K/UL (ref 1.8–8)
NEUTS SEG NFR BLD: 61 % (ref 32–75)
NRBC # BLD: 0 K/UL (ref 0–0.01)
NRBC BLD-RTO: 0 PER 100 WBC
PLATELET # BLD AUTO: 154 K/UL (ref 150–400)
PMV BLD AUTO: 10.2 FL (ref 8.9–12.9)
POTASSIUM SERPL-SCNC: 3.6 MMOL/L (ref 3.5–5.1)
RBC # BLD AUTO: 3.81 M/UL (ref 4.1–5.7)
SODIUM SERPL-SCNC: 144 MMOL/L (ref 136–145)
VIT B12 SERPL-MCNC: 1080 PG/ML (ref 193–986)
WBC # BLD AUTO: 6.6 K/UL (ref 4.1–11.1)

## 2022-11-08 PROCEDURE — 85025 COMPLETE CBC W/AUTO DIFF WBC: CPT

## 2022-11-08 PROCEDURE — 96372 THER/PROPH/DIAG INJ SC/IM: CPT

## 2022-11-08 PROCEDURE — 36415 COLL VENOUS BLD VENIPUNCTURE: CPT

## 2022-11-08 PROCEDURE — G0378 HOSPITAL OBSERVATION PER HR: HCPCS

## 2022-11-08 PROCEDURE — 74011250636 HC RX REV CODE- 250/636: Performed by: INTERNAL MEDICINE

## 2022-11-08 PROCEDURE — 82607 VITAMIN B-12: CPT

## 2022-11-08 PROCEDURE — 74011000250 HC RX REV CODE- 250: Performed by: INTERNAL MEDICINE

## 2022-11-08 PROCEDURE — 80048 BASIC METABOLIC PNL TOTAL CA: CPT

## 2022-11-08 PROCEDURE — 83735 ASSAY OF MAGNESIUM: CPT

## 2022-11-08 PROCEDURE — 74011250637 HC RX REV CODE- 250/637: Performed by: INTERNAL MEDICINE

## 2022-11-08 PROCEDURE — 94760 N-INVAS EAR/PLS OXIMETRY 1: CPT

## 2022-11-08 PROCEDURE — 97116 GAIT TRAINING THERAPY: CPT

## 2022-11-08 PROCEDURE — 97530 THERAPEUTIC ACTIVITIES: CPT

## 2022-11-08 PROCEDURE — 97535 SELF CARE MNGMENT TRAINING: CPT

## 2022-11-08 RX ADMIN — ENOXAPARIN SODIUM 40 MG: 100 INJECTION SUBCUTANEOUS at 08:14

## 2022-11-08 RX ADMIN — SODIUM CHLORIDE, PRESERVATIVE FREE 10 ML: 5 INJECTION INTRAVENOUS at 14:31

## 2022-11-08 RX ADMIN — FLUDROCORTISONE ACETATE 0.1 MG: 0.1 TABLET ORAL at 08:15

## 2022-11-08 RX ADMIN — MIDODRINE HYDROCHLORIDE 5 MG: 5 TABLET ORAL at 21:46

## 2022-11-08 RX ADMIN — GABAPENTIN 300 MG: 300 CAPSULE ORAL at 18:28

## 2022-11-08 RX ADMIN — ATORVASTATIN CALCIUM 40 MG: 40 TABLET, FILM COATED ORAL at 21:45

## 2022-11-08 RX ADMIN — FLUDROCORTISONE ACETATE 0.1 MG: 0.1 TABLET ORAL at 18:28

## 2022-11-08 RX ADMIN — SODIUM CHLORIDE, PRESERVATIVE FREE 10 ML: 5 INJECTION INTRAVENOUS at 21:46

## 2022-11-08 RX ADMIN — DULOXETINE HYDROCHLORIDE 60 MG: 30 CAPSULE, DELAYED RELEASE ORAL at 08:15

## 2022-11-08 RX ADMIN — CLONAZEPAM 0.5 MG: 0.5 TABLET ORAL at 21:45

## 2022-11-08 RX ADMIN — MIDODRINE HYDROCHLORIDE 5 MG: 5 TABLET ORAL at 18:28

## 2022-11-08 RX ADMIN — ASPIRIN 81 MG 81 MG: 81 TABLET ORAL at 08:15

## 2022-11-08 RX ADMIN — MIDODRINE HYDROCHLORIDE 5 MG: 5 TABLET ORAL at 08:15

## 2022-11-08 RX ADMIN — FINASTERIDE 5 MG: 5 TABLET, FILM COATED ORAL at 08:15

## 2022-11-08 RX ADMIN — SODIUM CHLORIDE 75 ML/HR: 9 INJECTION, SOLUTION INTRAVENOUS at 14:31

## 2022-11-08 RX ADMIN — MIDODRINE HYDROCHLORIDE 5 MG: 5 TABLET ORAL at 14:31

## 2022-11-08 RX ADMIN — GABAPENTIN 300 MG: 300 CAPSULE ORAL at 08:15

## 2022-11-08 RX ADMIN — MIDODRINE HYDROCHLORIDE 5 MG: 5 TABLET ORAL at 11:33

## 2022-11-08 NOTE — PROGRESS NOTES
Problem: Mobility Impaired (Adult and Pediatric)  Goal: *Acute Goals and Plan of Care (Insert Text)  Outcome: Progressing Towards Goal     PHYSICAL THERAPY TREATMENT  Patient: Nanci Warner (6 y.o. male)  Date: 11/8/2022  Diagnosis: Seizure ([address] [R56.9]  Orthostatic hypotension [I95.1] Seizure ([address]      Precautions: Fall, Seizure  Chart, physical therapy assessment, plan of care and goals were reviewed. ASSESSMENT  Patient continues with skilled PT services and is progressing towards goals. As PT & OT did yesterday, orthostatic BPs were taken. Patient had a 100 point drop and standing BP got down to 87/58. However pt was asymptomatic, so therapists continued to work with patient in ambulation and standing activities. He moves fairly well though gait is not steady, he is confused and  he has gait deviations. Wife cam after rehab today and both PT & OT spolke with her. Apparent;ly BP goes even lower and she has to call EMT. Falling is a definite concern with his hypotension, but he did well today with no dizziness or lightheadedness. See vitals . 11/08/22 1300   Vital Signs   BP (!) 189/89   MAP (Calculated) 122   BP 1 Location Right upper arm   BP 1 Method Automatic   BP Patient Position Supine   O2 Sat (%) 96 %   O2 Device None (Room air)   Additional Blood Pressure/Pulse Data   Pulse 2 88   BP 2 106/66   MAP 2 (Calculated) 79   BP 2 Location Right arm   Patient Position 2 Sitting   Pulse 3 82   BP 3 (!) 87/58   MAP 3 (Calculated) 68   BP Method 3 Automatic   Patient Position 3 Standing       Current Level of Function Impacting Discharge (mobility/balance): ambulation with RW    Other factors to consider for discharge: seizures (none today)         PLAN :  Patient continues to benefit from skilled intervention to address the above impairments. Continue treatment per established plan of care. to address goals.     Recommendation for discharge: (in order for the patient to meet his/her long term goals)  Physical therapy at least 2 days/week in the home     This discharge recommendation:  Has not yet been discussed the attending provider and/or case management    IF patient discharges home will need the following DME: rolling walker (has)       SUBJECTIVE:   Patient stated I feel ok.     OBJECTIVE DATA SUMMARY:   Critical Behavior:  Neurologic State: Alert, Confused  Orientation Level: Oriented to person, Oriented to place, Disoriented to situation, Disoriented to time  Cognition: Follows commands  Safety/Judgement: Decreased awareness of need for assistance, Decreased awareness of need for safety, Decreased insight into deficits  Functional Mobility Training:  Bed Mobility:  Rolling: Stand-by assistance  Supine to Sit: Stand-by assistance  Sit to Supine: Stand-by assistance  Scooting: Stand-by assistance        Transfers:  Sit to Stand: Contact guard assistance  Stand to Sit: Contact guard assistance                             Balance:  Sitting: Intact  Standing: Impaired; With support  Standing - Static: Fair;Constant support  Standing - Dynamic : Fair;Constant support  Ambulation/Gait Training:  Distance (ft): 150 Feet (ft)  Assistive Device: Gait belt;Walker, rolling  Ambulation - Level of Assistance: Contact guard assistance;Minimal assistance        Gait Abnormalities: Decreased step clearance;Shuffling gait        Base of Support: Widened        Step Length: Right shortened;Left shortened     Stairs:  Patient has a WC ramp to get into the house      Therapeutic Exercises:       Pain Rating:      Activity Tolerance:   Fair - orthostatic hypotension    After treatment patient left in no apparent distress:   Supine in bed    COMMUNICATION/COLLABORATION:   The patients plan of care was discussed with: Occupational therapist, Registered nurse, and Physician.      Chad Mendoza, PT

## 2022-11-08 NOTE — PROGRESS NOTES
Patients VS assessed and BP found to be elevated- 209/98. Dr. Greg Olmos made aware and requested to check BP with patient sitting to see if it dropped- BP dropped to 115/69 sitting on side of bed. Patient endorsed feeling slightly dizzy. Dr. Greg Olmos updated and ordered to give dose of scheduled Midodrine. Will continue to monitor.

## 2022-11-08 NOTE — PROGRESS NOTES
Problem: Falls - Risk of  Goal: *Absence of Falls  Description: Document Virjocelineda Gamma Fall Risk and appropriate interventions in the flowsheet.   Outcome: Progressing Towards Goal  Note: Fall Risk Interventions:  Mobility Interventions: Bed/chair exit alarm         Medication Interventions: Bed/chair exit alarm    Elimination Interventions: Urinal in reach    History of Falls Interventions: Bed/chair exit alarm         Problem: General Medical Care Plan  Goal: *Vital signs within specified parameters  Outcome: Progressing Towards Goal  Goal: *Labs within defined limits  Outcome: Progressing Towards Goal  Goal: *Absence of infection signs and symptoms  Outcome: Progressing Towards Goal  Goal: *Optimal pain control at patient's stated goal  Outcome: Progressing Towards Goal  Goal: *Skin integrity maintained  Outcome: Progressing Towards Goal  Goal: *Fluid volume balance  Outcome: Progressing Towards Goal  Goal: *Optimize nutritional status  Outcome: Progressing Towards Goal  Goal: *Anxiety reduced or absent  Outcome: Progressing Towards Goal  Goal: *Progressive mobility and function (eg: ADL's)  Outcome: Progressing Towards Goal     Problem: Patient Education: Go to Patient Education Activity  Goal: Patient/Family Education  Outcome: Progressing Towards Goal     Problem: Patient Education: Go to Patient Education Activity  Goal: Patient/Family Education  Outcome: Progressing Towards Goal     Problem: Patient Education: Go to Patient Education Activity  Goal: Patient/Family Education  Outcome: Progressing Towards Goal

## 2022-11-08 NOTE — TELEPHONE ENCOUNTER
Patients wife not happy with patients current care at [facility] and wanted Bonifacio Bosch and Dr. Jimmy Hammans to get involved I explained it is not the policy for a PCP to discuss patients care while in hospital advised her to talk with his current physician at the hospital

## 2022-11-08 NOTE — ROUTINE PROCESS
Bedside and Verbal shift change report given to [facility] (oncoming nurse) by Aline Putnam (offgoing nurse). Report included the following information SBAR, Kardex, ED Summary, Intake/Output, MAR, Recent Results, and Cardiac Rhythm A Paced .

## 2022-11-08 NOTE — PROGRESS NOTES
Problem: Self Care Deficits Care Plan (Adult)  Goal: *Acute Goals and Plan of Care (Insert Text)  Description: FUNCTIONAL STATUS PRIOR TO ADMISSION: Patient was modified independent using a rollator occasionally for functional mobility. Patient required assistance from wife for LB ADLs (dressing/bathing) but able to complete all other ADLs independently. Per wife, patient's level of function has declined over the past several days    HOME SUPPORT: The patient lived alone with wife to provide assistance. Occupational Therapy Goals  Initiated 11/7/2022  1. Patient will perform grooming with modified independence within 7 day(s). 2.  Patient will perform upper body dressing with modified independence within 7 day(s). 3.  Patient will perform toilet transfers with supervision/set-up within 7 day(s). 4.  Patient will perform all aspects of toileting with supervision/set-up within 7 day(s). 5.  Patient will participate in upper extremity therapeutic exercise/activities with supervision/set-up for 8 minutes within 7 day(s). Outcome: Progressing Towards Goal     OCCUPATIONAL THERAPY TREATMENT  Patient: Promise Patel (56 y.o. male)  Date: 11/8/2022  Diagnosis: Seizure ([address] [R56.9]  Orthostatic hypotension [I95.1] Seizure ([address]      Precautions: Fall, Seizure  Chart, occupational therapy assessment, plan of care, and goals were reviewed. ASSESSMENT  Patient continues with skilled OT services and is progressing towards goals. Patient seen for therapy session this PM, alert and agreeable to therapy. Orthostatics taken again today, patient continues to have a significant drop in blood pressure but is asymptomatic throughout and able to complete activity with second person to maintain safety. Patient completed functional mobility in hallway with second person to manage IV line, returned to bathroom where he completed standing grooming with CGA. Unsteady gait and fair standing balance throughout.  Patient returned to bed, BP 89/57 even after walking and completing activity. Returned to bed with all needs met. 11/08/22 1300   Vital Signs   BP (!) 189/89   MAP (Calculated) 122   BP 1 Location Right upper arm   BP 1 Method Automatic   BP Patient Position Supine   O2 Sat (%) 96 %   O2 Device None (Room air)   Additional Blood Pressure/Pulse Data   Pulse 2 88   BP 2 106/66   MAP 2 (Calculated) 79   BP 2 Location Right arm   Patient Position 2 Sitting   Pulse 3 82   BP 3 (!) 87/58   MAP 3 (Calculated) 68   BP Method 3 Automatic   Patient Position 3 Standing         Current Level of Function Impacting Discharge (ADLs): Up to Max A    Other factors to consider for discharge: fall risk, seizures (none today), patient's wife arranging extra care         PLAN :  Patient continues to benefit from skilled intervention to address the above impairments. Continue treatment per established plan of care to address goals. Recommendation for discharge: (in order for the patient to meet his/her long term goals)  Occupational therapy at least 2 days/week in the home AND ensure assist and/or supervision for safety    This discharge recommendation:  Has been made in collaboration with the attending provider and/or case management    IF patient discharges home will need the following DME: patient owns DME required for discharge       SUBJECTIVE:   Patient stated I feel fine.     OBJECTIVE DATA SUMMARY:   Cognitive/Behavioral Status:  Neurologic State: Alert;Confused  Orientation Level: Oriented to person;Oriented to place; Disoriented to situation;Disoriented to time  Cognition: Follows commands             Functional Mobility and Transfers for ADLs:  Bed Mobility:  Rolling: Stand-by assistance  Supine to Sit: Stand-by assistance  Sit to Supine: Stand-by assistance  Scooting: Stand-by assistance    Transfers:  Sit to Stand: Contact guard assistance          Balance:  Sitting: Intact  Standing: Impaired; With support  Standing - Static: Fair;Constant support  Standing - Dynamic : Fair;Constant support    ADL Intervention:       Grooming  Grooming Assistance: Contact guard assistance  Position Performed: Standing  Brushing Teeth: Contact guard assistance         Pain:  None    Activity Tolerance:   Fair, tolerates ADLs without rest breaks, and signs and symptoms of orthostatic hypotension    After treatment patient left in no apparent distress:   Supine in bed, Heels elevated for pressure relief, Call bell within reach, Bed / chair alarm activated, and side rails x4 with seizure pads in place    COMMUNICATION/COLLABORATION:   The patients plan of care was discussed with: Physical therapist, Registered nurse, Physician, and Case management.      Daniella Shukla OT  Time Calculation: 34 mins

## 2022-11-08 NOTE — PROGRESS NOTES
Bedside shift change report given to Cassidy (oncoming nurse) by Hedy Blue (offgoing nurse). Report included the following information SBAR, Kardex, Intake/Output, MAR, Recent Results, and Med Rec Status.

## 2022-11-08 NOTE — PROGRESS NOTES
IDR Team; MD, Care Manager , Nursing Supervisor, [address] [place] [address], Dietitian, Pharmacy and Therapy, met to review patient's plan of care. Discussed goals, interventions, barriers and progress. RUR:  MERLIN  Observation        Team will continue to monitor progress and report any concerns to the physician and care management as indicated. Transition of Care Plan:    Contacted the [address] [place] [address] and spoke with ORI([phone]). She stated the admission was approved and the Approval number that was given is: [id number]. She will be faxing documentation which will be placed in the patient's chart confirming the information received from Candy Rowland.

## 2022-11-09 PROCEDURE — 96372 THER/PROPH/DIAG INJ SC/IM: CPT

## 2022-11-09 PROCEDURE — 74011250637 HC RX REV CODE- 250/637: Performed by: INTERNAL MEDICINE

## 2022-11-09 PROCEDURE — 97530 THERAPEUTIC ACTIVITIES: CPT

## 2022-11-09 PROCEDURE — G0378 HOSPITAL OBSERVATION PER HR: HCPCS

## 2022-11-09 PROCEDURE — 74011000250 HC RX REV CODE- 250: Performed by: INTERNAL MEDICINE

## 2022-11-09 PROCEDURE — 74011250636 HC RX REV CODE- 250/636: Performed by: INTERNAL MEDICINE

## 2022-11-09 PROCEDURE — 84300 ASSAY OF URINE SODIUM: CPT

## 2022-11-09 PROCEDURE — 97530 THERAPEUTIC ACTIVITIES: CPT | Performed by: PHYSICAL THERAPIST

## 2022-11-09 PROCEDURE — 97535 SELF CARE MNGMENT TRAINING: CPT

## 2022-11-09 PROCEDURE — 97116 GAIT TRAINING THERAPY: CPT | Performed by: PHYSICAL THERAPIST

## 2022-11-09 RX ADMIN — DULOXETINE HYDROCHLORIDE 60 MG: 30 CAPSULE, DELAYED RELEASE ORAL at 08:24

## 2022-11-09 RX ADMIN — ASPIRIN 81 MG 81 MG: 81 TABLET ORAL at 08:24

## 2022-11-09 RX ADMIN — GABAPENTIN 300 MG: 300 CAPSULE ORAL at 08:24

## 2022-11-09 RX ADMIN — ENOXAPARIN SODIUM 40 MG: 100 INJECTION SUBCUTANEOUS at 08:24

## 2022-11-09 RX ADMIN — ACETAMINOPHEN 650 MG: 325 TABLET, FILM COATED ORAL at 12:01

## 2022-11-09 RX ADMIN — FINASTERIDE 5 MG: 5 TABLET, FILM COATED ORAL at 08:41

## 2022-11-09 RX ADMIN — MIDODRINE HYDROCHLORIDE 5 MG: 5 TABLET ORAL at 12:01

## 2022-11-09 RX ADMIN — SODIUM CHLORIDE, PRESERVATIVE FREE 10 ML: 5 INJECTION INTRAVENOUS at 06:20

## 2022-11-09 RX ADMIN — MIDODRINE HYDROCHLORIDE 5 MG: 5 TABLET ORAL at 08:24

## 2022-11-09 RX ADMIN — SODIUM CHLORIDE, PRESERVATIVE FREE 10 ML: 5 INJECTION INTRAVENOUS at 21:24

## 2022-11-09 RX ADMIN — FLUDROCORTISONE ACETATE 0.1 MG: 0.1 TABLET ORAL at 08:24

## 2022-11-09 RX ADMIN — MIDODRINE HYDROCHLORIDE 5 MG: 5 TABLET ORAL at 18:07

## 2022-11-09 RX ADMIN — MIDODRINE HYDROCHLORIDE 5 MG: 5 TABLET ORAL at 21:25

## 2022-11-09 RX ADMIN — POLYETHYLENE GLYCOL 3350 17 G: 17 POWDER, FOR SOLUTION ORAL at 08:35

## 2022-11-09 RX ADMIN — CLONAZEPAM 0.5 MG: 0.5 TABLET ORAL at 21:24

## 2022-11-09 RX ADMIN — MIDODRINE HYDROCHLORIDE 5 MG: 5 TABLET ORAL at 14:48

## 2022-11-09 RX ADMIN — ATORVASTATIN CALCIUM 40 MG: 40 TABLET, FILM COATED ORAL at 21:24

## 2022-11-09 RX ADMIN — GABAPENTIN 300 MG: 300 CAPSULE ORAL at 18:07

## 2022-11-09 RX ADMIN — FLUDROCORTISONE ACETATE 0.1 MG: 0.1 TABLET ORAL at 18:07

## 2022-11-09 RX ADMIN — SODIUM CHLORIDE, PRESERVATIVE FREE 10 ML: 5 INJECTION INTRAVENOUS at 14:48

## 2022-11-09 NOTE — PROGRESS NOTES
Bedside shift change report given to PALOMA Booker Rn and Javier Bucio Rn (oncoming nurse) by NILES Sosa (offgoing nurse). Report included the following information SBAR, Kardex, Intake/Output, MAR, Recent Results, and Quality Measures.

## 2022-11-09 NOTE — PROGRESS NOTES
Problem: Falls - Risk of  Goal: *Absence of Falls  Description: Document [place] Fall Risk and appropriate interventions in the flowsheet.   Outcome: Progressing Towards Goal  Note: Fall Risk Interventions:  Mobility Interventions: Bed/chair exit alarm, Patient to call before getting OOB         Medication Interventions: Bed/chair exit alarm    Elimination Interventions: Bed/chair exit alarm, Call light in reach, Patient to call for help with toileting needs, Urinal in reach    History of Falls Interventions: Bed/chair exit alarm, Door open when patient unattended, Room close to nurse's station

## 2022-11-09 NOTE — PROGRESS NOTES
Pt's bp 181/89 lying, 111/64 sitting. Dr. Delonte Santos notified and advised to administer midodrine as scheduled.

## 2022-11-09 NOTE — PROGRESS NOTES
Bedside and Verbal shift change report given to NILES Bergman RN (oncoming nurse) by Babak Horn RN (offgoing nurse). Report included the following information SBAR, Kardex, Intake/Output, MAR, and Recent Results.

## 2022-11-09 NOTE — PROGRESS NOTES
Dr. Tosha Orlando notified about pt's bp, lying 200/118, sitting 162/97, standing 98/73. Per Dr. Tosha Orlando, it is ok to administer midodrine, have patient sit in recliner for dinner and then recheck bp in about an hour after administration. Will continue to monitor.

## 2022-11-09 NOTE — PROGRESS NOTES
Follow up visit with patient in  130  Provided empathic listening and spiritual support  Patient shared about living in [place] and making new friends here.   Advised of  Availability    [address] [place] [address]

## 2022-11-09 NOTE — PROGRESS NOTES
Problem: Mobility Impaired (Adult and Pediatric)  Goal: *Acute Goals and Plan of Care (Insert Text)  Outcome: Progressing Towards Goal  Note:   PHYSICAL THERAPY TREATMENT  Patient: Brian Batista (36 y.o. male)  Date: 11/9/2022  Diagnosis: Seizure ([facility] [address] [R56.9]  Orthostatic hypotension [I95.1] Seizure ([address]      Precautions: Fall, Seizure  Chart, physical therapy assessment, plan of care and goals were reviewed. ASSESSMENT  Patient continues with skilled PT services and is slowly progressing towards goals. Patient in semi fowlers position upon arrival; agreeable to mobility. Able to come to EOB with SBA with good balance noted. Patient complains of slight dizziness upon sitting and BP with a significant drop (see docflow). Symptoms resolve and patient ambulates into bathroom to work with OT. After coming out of bathroom, patient rest sin chair 2-3 minutes before ambulating in palacios with rolling walker and CGA/min assist.x 1. Patient is slightly unsteady with slow pace and shortened steps. Patient with some weakness noted left hip, which patient states is normal for him. Up in chair at end of session with BP stable and alarm set. Recommend home with HHPT and additional assistance from spouse as needed. Current Level of Function Impacting Discharge (mobility/balance): CGA    Other factors to consider for discharge: orthostatic hypotension         PLAN :  Patient continues to benefit from skilled intervention to address the above impairments. Continue treatment per established plan of care. to address goals.     Recommendation for discharge: (in order for the patient to meet his/her long term goals)  Physical therapy at least 2 days/week in the home AND ensure assist and/or supervision for safety with mobility    This discharge recommendation:  Has been made in collaboration with the attending provider and/or case management    IF patient discharges home will need the following DME: patient owns DME required for discharge       SUBJECTIVE:   Patient stated I don't know where my wife is.     OBJECTIVE DATA SUMMARY:   Critical Behavior:  Neurologic State: Alert  Orientation Level: Oriented X4  Cognition: Follows commands  Safety/Judgement: Decreased awareness of need for assistance, Decreased awareness of need for safety, Decreased insight into deficits  Functional Mobility Training:  Bed Mobility:     Supine to Sit: Stand-by assistance     Scooting: Supervision        Transfers:  Sit to Stand: Contact guard assistance  Stand to Sit: Contact guard assistance                             Balance:  Sitting: Intact  Standing: Impaired  Standing - Static: Constant support; Fair  Standing - Dynamic : Fair;Constant support  Ambulation/Gait Training:  Distance (ft): 140 Feet (ft)  Assistive Device: Gait belt;Walker, rolling  Ambulation - Level of Assistance: Contact guard assistance        Gait Abnormalities: Decreased step clearance        Base of Support: Widened     Speed/Dhara: Pace decreased (<100 feet/min)  Step Length: Left shortened;Right shortened                               Pain Rating:  None reported    Activity Tolerance:   Fair and requires rest breaks    After treatment patient left in no apparent distress:   Sitting in chair, Call bell within reach, and Bed / chair alarm activated    COMMUNICATION/COLLABORATION:   The patients plan of care was discussed with: Occupational therapist and Registered nurse.      Munir Waterman, PT   Time Calculation: 33 mins

## 2022-11-09 NOTE — PROGRESS NOTES
Problem: Self Care Deficits Care Plan (Adult)  Goal: *Acute Goals and Plan of Care (Insert Text)  Description: FUNCTIONAL STATUS PRIOR TO ADMISSION: Patient was modified independent using a rollator occasionally for functional mobility. Patient required assistance from wife for LB ADLs (dressing/bathing) but able to complete all other ADLs independently. Per wife, patient's level of function has declined over the past several days    HOME SUPPORT: The patient lived alone with wife to provide assistance. Occupational Therapy Goals  Initiated 11/7/2022  1. Patient will perform grooming with modified independence within 7 day(s). 2.  Patient will perform upper body dressing with modified independence within 7 day(s). 3.  Patient will perform toilet transfers with supervision/set-up within 7 day(s). 4.  Patient will perform all aspects of toileting with supervision/set-up within 7 day(s). 5.  Patient will participate in upper extremity therapeutic exercise/activities with supervision/set-up for 8 minutes within 7 day(s). Outcome: Progressing Towards Goal     OCCUPATIONAL THERAPY TREATMENT  Patient: Yesi Angel (31 y.o. male)  Date: 11/9/2022  Diagnosis: Seizure ([facility] [address] [R56.9]  Orthostatic hypotension [I95.1] Seizure ([address]      Precautions: Fall, Seizure  Chart, occupational therapy assessment, plan of care, and goals were reviewed. ASSESSMENT  Patient continues with skilled OT services and is progressing towards goals. Patient seen for OT session, received supine in bed and alert/agreeable to therapy. Patient vitals taken throughout, see flowsheet for details. Initial drop but stable after activity. Patient completed standing grooming in bathroom with CGA and walked in hallway with PT. Reported initial dizziness when sitting up but resolved after few minutes. BP stable enough to return to recliner chair with CGA and stay up through lunch.  Patient educated on benefits of mobility and sitting up for few hours a day in order to regulate BP. Patient left seated in recliner with all needs met. Pending d/c home tomorrow 11/10. Current Level of Function Impacting Discharge (ADLs): CGA    Other factors to consider for discharge: Orthostatic hypotension         PLAN :  Patient continues to benefit from skilled intervention to address the above impairments. Continue treatment per established plan of care to address goals. Recommendation for discharge: (in order for the patient to meet his/her long term goals)  Occupational therapy at least 2 days/week in the home AND ensure assist and/or supervision for safety with functional mobility and ADLs    This discharge recommendation:  Has been made in collaboration with the attending provider and/or case management    IF patient discharges home will need the following DME: patient owns DME required for discharge       SUBJECTIVE:   Patient stated I'm a little nervous; have you seen my wife?     OBJECTIVE DATA SUMMARY:   Cognitive/Behavioral Status:  Neurologic State: Alert  Orientation Level: Oriented X4  Cognition: Follows commands             Functional Mobility and Transfers for ADLs:  Bed Mobility:  Supine to Sit: Stand-by assistance  Scooting: Supervision    Transfers:  Sit to Stand: Contact guard assistance  Functional Transfers  Bathroom Mobility: Contact guard assistance       Balance:  Sitting: Intact  Standing: Impaired  Standing - Static: Constant support; Fair  Standing - Dynamic : Fair;Constant support    ADL Intervention:       Grooming  Grooming Assistance: Contact guard assistance  Position Performed: Standing  Washing Face: Contact guard assistance  Brushing Teeth: Contact guard assistance      Pain:  None    Activity Tolerance:   Fair and requires rest breaks    After treatment patient left in no apparent distress:   Sitting in chair, Heels elevated for pressure relief, Call bell within reach, and Bed / chair alarm activated    COMMUNICATION/COLLABORATION:   The patients plan of care was discussed with: Physical therapist, Registered nurse, Physician, and Case management.      Romaine Soto OT  Time Calculation: 31 mins

## 2022-11-09 NOTE — PROGRESS NOTES
[facility]  Hospitalist Progress Note    NAME: Deborah Handley   :  1945   MRN:  [id number]     Total duration of encounter: 2 days      Interim Hospital Summary: 69 y.o. male who presented on 2022 with Seizure ([facility] [address] He has a past medical history of Altered mental status, unspecified (2018), Anxiety, Ataxia (2018), Back pain, Blurred vision, Bradycardia (10/2/2018), Chest pain, Depression, Dizziness, Dizziness and giddiness (10/17/2019), Dysautonomia ([address] Fast heart beat, Fever (2018), Frequent headaches, Frequent urination, Hip dysplasia, congenital, syncope (10/24/2018), Hypotension, Ingrown left big toenail (3/31/2017), Joint pain, Joint swelling, Lumbar spinal stenosis, Memory deficit (2018), Muscle pain, Neuropathy, Orthostatic hypotension, Pacemaker, Recent change in weight, Risk for falls (2016), Sensory ataxia (2018), Sinus problem, Skin disease, Smoker (2018), SOB (shortness of breath), Sore throat, SSS (sick sinus syndrome) ([facility] [address] Stiffness of joints, multiple sites, Stress, Stumbling gait (2018), Syncope, Tiredness, Trouble in sleeping, and Weakness. Karla Yanez Pt presenting to ED with exacerbation orthostatic hypotension episode with syncope / convulsive episode. Meera Foster did not recommend antiepileptic tx. Pt is followed by Neurology in [place]  Tx IVF and adjusted Midodrine dosing          Subjective:     Chief Complaint / Reason for Physician Visit  \"Feel OK\".   Discussed with RN   Ambulated w/o \"spell\"    Staff concerned with  pt drop in Sys BP  Maintained 85-90 Sys standing w/o symptoms    WIfe notes he does OK unless Sys drops to 60 range    Review of Systems:  Symptom Y/N Comments  Symptom Y/N Comments   Fever/Chills n   Chest Pain n    Poor Appetite n   Edema n    Cough n   Abdominal Pain n    Sputum n   Joint Pain n    SOB/PATEL n   Pruritis/Rash     Nausea/vomit n   Tolerating PT/OT y    Diarrhea n   Tolerating Diet y Constipation n   Other         Current Facility-Administered Medications:     0.9% sodium chloride infusion, 75 mL/hr, IntraVENous, CONTINUOUS, Joseph Valera MD, Last Rate: 75 mL/hr at 11/08/22 1431, 75 mL/hr at 11/08/22 1431    gabapentin (NEURONTIN) capsule 300 mg, 300 mg, Oral, BID, Joseph Valera MD, 300 mg at 11/08/22 1828    acetaminophen (TYLENOL) tablet 650 mg, 650 mg, Oral, Q6H PRN, Joseph Valera MD    aspirin chewable tablet 81 mg, 81 mg, Oral, DAILY, Joseph Valera MD, 81 mg at 11/08/22 0815    atorvastatin (LIPITOR) tablet 40 mg, 40 mg, Oral, QHS, Joseph Valera MD, 40 mg at 11/07/22 2145    clonazePAM (KlonoPIN) tablet 0.5 mg, 0.5 mg, Oral, QHS, Joseph Valera MD, 0.5 mg at 11/07/22 2145    sodium chloride (NS) flush 5-40 mL, 5-40 mL, IntraVENous, Q8H, Georgeann Klinefelter A, MD, 10 mL at 11/08/22 1431    sodium chloride (NS) flush 5-40 mL, 5-40 mL, IntraVENous, PRN, Joseph Valera MD    polyethylene glycol (MIRALAX) packet 17 g, 17 g, Oral, DAILY PRN, Joseph Valera MD    enoxaparin (LOVENOX) injection 40 mg, 40 mg, SubCUTAneous, DAILY, Joseph Valera MD, 40 mg at 11/08/22 0814    midodrine (PROAMATINE) tablet 5 mg, 5 mg, Oral, 5XD, Joseph Valera MD, 5 mg at 11/08/22 1828    tiotropium bromide (SPIRIVA RESPIMAT) 2.5 mcg /actuation (Patient Supplied), 2 Puff, Inhalation, DAILY, Salvador Townsend MD, 2 Puff at 11/08/22 0823    finasteride (PROSCAR) tablet 5 mg, 5 mg, Oral, DAILY, Joseph Valera MD, 5 mg at 11/08/22 0815    fludrocortisone (FLORINEF) tablet 0.1 mg, 0.1 mg, Oral, BID, Joseph Valera MD, 0.1 mg at 11/08/22 1828    DULoxetine (CYMBALTA) capsule 60 mg, 60 mg, Oral, DAILY, Joseph Valera MD, 60 mg at 11/08/22 0815    Objective:     VITALS:   Patient Vitals for the past 12 hrs:   Temp Pulse Resp BP SpO2   11/08/22 1700 -- 96 -- 115/69 --   11/08/22 1600 97.9 °F (36.6 °C) 83 20 (!) 209/98 94 %   11/08/22 1300 -- -- -- (!) 189/89 96 %   11/08/22 1154 97.4 °F (36.3 °C) 89 20 (!) 155/93 94 % Intake/Output Summary (Last 24 hours) at 11/8/2022 1939  Last data filed at 11/8/2022 1035  Gross per 24 hour   Intake 180 ml   Output 1125 ml   Net -945 ml        PHYSICAL EXAM:  General: WD, WN. Alert, cooperative, no acute distress    EENT:  EOMI. Anicteric sclerae. MMM  Resp:  CTA bilaterally, no wheezing or rales. No accessory muscle use  CV:  Regular  rhythm,  No edema  GI:  Soft, Non distended, Non tender. +Bowel sounds  Neurologic:  Alert and oriented X 3, normal speech, no focal weakness  Psych:   Not anxious nor agitated  Skin:  No rashes. No jaundice    LABS:  I reviewed today's most current labs and imaging studies. Pertinent labs include:  Recent Labs     11/08/22 0544 11/07/22  0525 11/06/22 1910   WBC 6.6 6.7 7.0   HGB 12.0* 12.4 14.3   HCT 35.6* 36.7 41.9    168 200     Recent Labs     11/08/22  0544 11/07/22  0525 11/06/22 1910    142 141   K 3.6 3.8 4.1   * 107 103   CO2 27 28 30   * 107* 123*   BUN 16 21* 27*   CREA 0.96 1.14 1.41*   CA 8.4* 8.5 8.9   MG 2.0  --   --    ALB  --   --  4.1   TBILI  --   --  0.6   ALT  --   --  22      11/7/22 12:09 11/8/22 05:44   Vitamin B12  1,080 (H)   Folate  14.3   Ammonia, plasma <10       11/7/22 12:09   T4, Free 1.0   TSH 0.52         Radiology:  CT HEAD 11/6:   The ventricles and sulci are normal in size, shape and configuration. . Chronic  lacunar infarct right basal ganglia unchanged. There is no intracranial  hemorrhage, extra-axial collection, or mass effect. The basilar cisterns are  open. No CT evidence of acute infarct. The bone windows demonstrate no abnormalities. The visualized portions of the  paranasal sinuses and mastoid air cells are clear. IMPRESSION:  No change or acute abnormality     CT C SPINE 11/6:  Alignment of the cervical spine is normal. There is no bony  destructive lesion or evidence of acute fracture. There are vascular  calcifications.  Multilevel degenerative change and diffuse idiopathic skeletal  hyperostosis. . 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 Right chest wall port with atrial and ventricular  leads. Heart size and mediastinal contours are normal. No pleural effusion or  pneumothorax. Bones are age-appropriate. \   IMPRESSION: No acute cardiopulmonary abnormality    EK/7:  Atrial paced 81 bpm, NSC    Procedures: see electronic medical records for all procedures/Xrays and details which were not copied into this note but were reviewed prior to creation of Plan.         Assessment / Plan:    Principal Problem:    Seizure ([address] (2022)  Observed episode in the ED with altered mentation and extremity shaking while standing at the bedside  Phone call consult with neurology at [facility] suggested admission  MRI scheduled is not possible at this institution  Likely was associated with his known orthostatic hypotension  Keppra is not been initiated at this point  Will need neurology evaluation for epilepsy concern as well as for his functional decline and dementia  We will assess thyroid, ammonia, B12, folate - unremarkable  PT and OT consult - limited by BP  Need to adjust daily Midodrine with activity planned     Active Problems:    Orthostatic hypotension (2022)  Encourage fluids  Maintain Florinef 0.1 mg twice daily  Advanced midodrine to 5 mg twice q 3-4 hrs up to 5 doses per day as needed  Monitor orthostatic pressure -note  pt drop lying to standing  PT OT      Pacemaker (10/24/2018)    Heart block (2019)  Telemetry monitoring       Pulmonary nodule (2022)      Overview: 1.1cm lung nodule on cervical spine CT from [facility] done - he has been       referred to Pulm    Pulmonary emphysema ([address] (2022)  Recently assessed by pulmonary medicine  was started on Spiriva  Nodule was stable on follow-up CT, considering CT versus PET at subsequent follow-up       Bilateral carotid artery stenosis (2019)  Unable to have MRI at [facility] due to pacemaker, would have to be arranged at a facility equipped for this  Resume aspirin 81 mg daily, maintain Lipitor nightly       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)  Maintain Proscar 5 mg daily  Monitor bladder checks for retention        SAFETY:   Code Status:DNR  DVT prophylaxis:Lovenox  Stress Ulcer prophylaxis: Not Indicated  Bladder catheter:no  Family Contact Info:  Primary Emergency Contact: Divya Ba, Home Phone: [phone]  Bedded: [facility] Room 130/01  Disposition: TBD, likely home when stable  Admission status:  Observation      Reviewed most current lab test results and cultures  YES  Reviewed most current radiology test results   YES  Review and summation of old records today    NO  Reviewed patient's current orders and MAR    YES  PMH/SH reviewed - no change compared to H&P    Care Plan discussed with:                                   Comments  Patient x     Family  x Wife - in room   RN x     Care Manager  x     Consultant                          x Multidiciplinary team rounds were held today with , nursing, pharmacist and clinical coordinator. Patient's plan of care was discussed; medications were reviewed and discharge planning was addressed.         ____________________________________________    Total NON Critical Care TIME:  35   Minutes        Comments   >50% of visit spent in counseling and coordination of care   x      Signed: Sunitha Neville MD  [facility] Hospitalist  [phone]

## 2022-11-09 NOTE — PROGRESS NOTES
Spoke with Ms. Mejia, patient's significant other. Informed that the patient may be discharged tomorrow. Have referred the patient to [facility] per her request because that's who she said cared for the patient a while back. Spoke with Moustapha Alfaro at [place], information faxed to her [employer], Medicare card) to see if they will be able to take the patient pending his insurance. Information provided for  services and sitters list for future needs for assistance when patient goes home.

## 2022-11-09 NOTE — PROGRESS NOTES
Problem: Falls - Risk of  Goal: *Absence of Falls  Description: Document Vicki Barber Fall Risk and appropriate interventions in the flowsheet.   Outcome: Progressing Towards Goal  Note: Fall Risk Interventions:  Mobility Interventions: Bed/chair exit alarm         Medication Interventions: Bed/chair exit alarm    Elimination Interventions: Bed/chair exit alarm    History of Falls Interventions: Bed/chair exit alarm         Problem: General Medical Care Plan  Goal: *Vital signs within specified parameters  Outcome: Progressing Towards Goal  Goal: *Labs within defined limits  Outcome: Progressing Towards Goal  Goal: *Absence of infection signs and symptoms  Outcome: Progressing Towards Goal  Goal: *Optimal pain control at patient's stated goal  Outcome: Progressing Towards Goal  Goal: *Skin integrity maintained  Outcome: Progressing Towards Goal  Goal: *Fluid volume balance  Outcome: Progressing Towards Goal  Goal: *Optimize nutritional status  Outcome: Progressing Towards Goal  Goal: *Anxiety reduced or absent  Outcome: Progressing Towards Goal  Goal: *Progressive mobility and function (eg: ADL's)  Outcome: Progressing Towards Goal

## 2022-11-09 NOTE — PROGRESS NOTES
IDR Team:  MD, Nursing, Care Manager,  , Therapy and Pharmacy met to review patient's plan of care. Discussed goals, interventions, barriers and progress. RUR:  NA  Observation    Team will continue to monitor progress and report any concerns to the physician and care management as indicated. Transition of Care Plan:    Tentative plan is to discharge the patient tomorrow. According to the attending, wife may need someone to stay with him when she has be outside of the home(running errands, etc) Patient remains on Midodrine to assist with hypotensive episodes when he is up out of bed. Patient will benefit from home health. Will find out if the [place] will provide home health for the patient. Will interview the patient and his wife to find out exactly what their needs will be. She is requesting home health but [facility] is primary [facility]. Informed Ms. Lee that I will contact the home health agencies to find out which ones work with the [place]

## 2022-11-10 ENCOUNTER — TELEPHONE (OUTPATIENT)
Dept: FAMILY MEDICINE CLINIC | Age: 77
End: 2022-11-10

## 2022-11-10 VITALS
DIASTOLIC BLOOD PRESSURE: 69 MMHG | SYSTOLIC BLOOD PRESSURE: 116 MMHG | BODY MASS INDEX: 22.78 KG/M2 | TEMPERATURE: 97.9 F | WEIGHT: 162.7 LBS | RESPIRATION RATE: 18 BRPM | HEIGHT: 71 IN | OXYGEN SATURATION: 96 % | HEART RATE: 88 BPM

## 2022-11-10 LAB
ANION GAP SERPL CALC-SCNC: 10 MMOL/L (ref 5–15)
ATRIAL RATE: 80 BPM
BASOPHILS # BLD: 0.1 K/UL (ref 0–0.1)
BASOPHILS NFR BLD: 1 % (ref 0–1)
BUN SERPL-MCNC: 17 MG/DL (ref 6–20)
BUN/CREAT SERPL: 17 (ref 12–20)
CALCIUM SERPL-MCNC: 8.7 MG/DL (ref 8.5–10.1)
CALCULATED P AXIS, ECG09: 112 DEGREES
CALCULATED R AXIS, ECG10: 57 DEGREES
CALCULATED T AXIS, ECG11: 49 DEGREES
CHLORIDE SERPL-SCNC: 107 MMOL/L (ref 97–108)
CO2 SERPL-SCNC: 28 MMOL/L (ref 21–32)
CREAT SERPL-MCNC: 1.01 MG/DL (ref 0.7–1.3)
DIAGNOSIS, 93000: NORMAL
DIFFERENTIAL METHOD BLD: ABNORMAL
EOSINOPHIL # BLD: 0.2 K/UL (ref 0–0.4)
EOSINOPHIL NFR BLD: 3 % (ref 0–7)
ERYTHROCYTE [DISTWIDTH] IN BLOOD BY AUTOMATED COUNT: 12.3 % (ref 11.5–14.5)
GLUCOSE SERPL-MCNC: 112 MG/DL (ref 65–100)
HCT VFR BLD AUTO: 34.6 % (ref 36.6–50.3)
HGB BLD-MCNC: 11.8 G/DL (ref 12.1–17)
IMM GRANULOCYTES # BLD AUTO: 0 K/UL (ref 0–0.04)
IMM GRANULOCYTES NFR BLD AUTO: 0 % (ref 0–0.5)
LYMPHOCYTES # BLD: 1.6 K/UL (ref 0.8–3.5)
LYMPHOCYTES NFR BLD: 23 % (ref 12–49)
MCH RBC QN AUTO: 31.3 PG (ref 26–34)
MCHC RBC AUTO-ENTMCNC: 34.1 G/DL (ref 30–36.5)
MCV RBC AUTO: 91.8 FL (ref 80–99)
MONOCYTES # BLD: 0.6 K/UL (ref 0–1)
MONOCYTES NFR BLD: 9 % (ref 5–13)
NEUTS SEG # BLD: 4.6 K/UL (ref 1.8–8)
NEUTS SEG NFR BLD: 64 % (ref 32–75)
NRBC # BLD: 0 K/UL (ref 0–0.01)
NRBC BLD-RTO: 0 PER 100 WBC
P-R INTERVAL, ECG05: 114 MS
PLATELET # BLD AUTO: 155 K/UL (ref 150–400)
PMV BLD AUTO: 10.1 FL (ref 8.9–12.9)
POTASSIUM SERPL-SCNC: 3.2 MMOL/L (ref 3.5–5.1)
Q-T INTERVAL, ECG07: 396 MS
QRS DURATION, ECG06: 84 MS
QTC CALCULATION (BEZET), ECG08: 456 MS
RBC # BLD AUTO: 3.77 M/UL (ref 4.1–5.7)
SODIUM SERPL-SCNC: 145 MMOL/L (ref 136–145)
SODIUM UR-SCNC: 76 MMOL/L
VENTRICULAR RATE, ECG03: 80 BPM
WBC # BLD AUTO: 7.1 K/UL (ref 4.1–11.1)

## 2022-11-10 PROCEDURE — 94760 N-INVAS EAR/PLS OXIMETRY 1: CPT

## 2022-11-10 PROCEDURE — 74011250637 HC RX REV CODE- 250/637: Performed by: INTERNAL MEDICINE

## 2022-11-10 PROCEDURE — 77030038554 HC DRSG WND THERAHONEY MDII -Z

## 2022-11-10 PROCEDURE — 97116 GAIT TRAINING THERAPY: CPT

## 2022-11-10 PROCEDURE — 96372 THER/PROPH/DIAG INJ SC/IM: CPT

## 2022-11-10 PROCEDURE — 80048 BASIC METABOLIC PNL TOTAL CA: CPT

## 2022-11-10 PROCEDURE — 97530 THERAPEUTIC ACTIVITIES: CPT

## 2022-11-10 PROCEDURE — 85025 COMPLETE CBC W/AUTO DIFF WBC: CPT

## 2022-11-10 PROCEDURE — 74011250636 HC RX REV CODE- 250/636: Performed by: INTERNAL MEDICINE

## 2022-11-10 PROCEDURE — G0378 HOSPITAL OBSERVATION PER HR: HCPCS

## 2022-11-10 PROCEDURE — 36415 COLL VENOUS BLD VENIPUNCTURE: CPT

## 2022-11-10 PROCEDURE — 74011000250 HC RX REV CODE- 250: Performed by: INTERNAL MEDICINE

## 2022-11-10 RX ORDER — POLYETHYLENE GLYCOL 3350 17 G/17G
17 POWDER, FOR SOLUTION ORAL
Status: SHIPPED | COMMUNITY
Start: 2022-11-10

## 2022-11-10 RX ORDER — MIDODRINE HYDROCHLORIDE 5 MG/1
5 TABLET ORAL
Qty: 150 TABLET | Refills: 0 | Status: ON HOLD | OUTPATIENT
Start: 2022-11-10 | End: 2022-11-29 | Stop reason: SDUPTHER

## 2022-11-10 RX ORDER — GABAPENTIN 600 MG/1
300 TABLET ORAL 2 TIMES DAILY
Qty: 1 TABLET | Refills: 0 | Status: SHIPPED
Start: 2022-11-10 | End: 2022-11-30

## 2022-11-10 RX ADMIN — ENOXAPARIN SODIUM 40 MG: 100 INJECTION SUBCUTANEOUS at 09:24

## 2022-11-10 RX ADMIN — MIDODRINE HYDROCHLORIDE 5 MG: 5 TABLET ORAL at 11:25

## 2022-11-10 RX ADMIN — ASPIRIN 81 MG 81 MG: 81 TABLET ORAL at 09:23

## 2022-11-10 RX ADMIN — ACETAMINOPHEN 650 MG: 325 TABLET, FILM COATED ORAL at 13:20

## 2022-11-10 RX ADMIN — DULOXETINE HYDROCHLORIDE 60 MG: 30 CAPSULE, DELAYED RELEASE ORAL at 09:23

## 2022-11-10 RX ADMIN — POLYETHYLENE GLYCOL 3350 17 G: 17 POWDER, FOR SOLUTION ORAL at 09:31

## 2022-11-10 RX ADMIN — GABAPENTIN 300 MG: 300 CAPSULE ORAL at 09:24

## 2022-11-10 RX ADMIN — SODIUM CHLORIDE, PRESERVATIVE FREE 10 ML: 5 INJECTION INTRAVENOUS at 06:55

## 2022-11-10 RX ADMIN — MIDODRINE HYDROCHLORIDE 5 MG: 5 TABLET ORAL at 06:54

## 2022-11-10 RX ADMIN — MIDODRINE HYDROCHLORIDE 5 MG: 5 TABLET ORAL at 13:20

## 2022-11-10 RX ADMIN — FINASTERIDE 5 MG: 5 TABLET, FILM COATED ORAL at 09:23

## 2022-11-10 RX ADMIN — FLUDROCORTISONE ACETATE 0.1 MG: 0.1 TABLET ORAL at 09:24

## 2022-11-10 NOTE — PROGRESS NOTES
Bedside shift change report given to DIAZ Betancourt Rn (oncoming nurse) by NILES Cadet (offgoing nurse). Report included the following information SBAR, Kardex, Intake/Output, MAR, Recent Results, and Quality Measures.

## 2022-11-10 NOTE — PROGRESS NOTES
Problem: Self Care Deficits Care Plan (Adult)  Goal: *Acute Goals and Plan of Care (Insert Text)  Description: FUNCTIONAL STATUS PRIOR TO ADMISSION: Patient was modified independent using a rollator occasionally for functional mobility. Patient required assistance from wife for LB ADLs (dressing/bathing) but able to complete all other ADLs independently. Per wife, patient's level of function has declined over the past several days    HOME SUPPORT: The patient lived alone with wife to provide assistance. Occupational Therapy Goals  Initiated 11/7/2022  1. Patient will perform grooming with modified independence within 7 day(s). 2.  Patient will perform upper body dressing with modified independence within 7 day(s). 3.  Patient will perform toilet transfers with supervision/set-up within 7 day(s). 4.  Patient will perform all aspects of toileting with supervision/set-up within 7 day(s). 5.  Patient will participate in upper extremity therapeutic exercise/activities with supervision/set-up for 8 minutes within 7 day(s). Outcome: Progressing Towards Goal     OCCUPATIONAL THERAPY TREATMENT  Patient: Joan Styles (89 y.o. male)  Date: 11/10/2022  Diagnosis: Seizure ([facility] [address] [R56.9]  Orthostatic hypotension [I95.1] Seizure [facility])      Precautions: Fall, Seizure  Chart, occupational therapy assessment, plan of care, and goals were reviewed. ASSESSMENT  Patient continues with skilled OT services and is slowly progressing towards goals. Patient seen for therapy session this PM, alert and agreeable to functional mobility prior to d/c. Declined any ADL needs and is already dressed in home clothes, reports he completed it independently. Patient completed functional mobility around nursing station with CGA and RW. Vital signs stable, no c/o dizziness throughout. Still benefits from Home OT to assess home safety and independence with ADLs/IADLs.     Current Level of Function Impacting Discharge (ADLs): CGA to supervision with functional mobility and standing ADLs    Other factors to consider for discharge: chronically low orthostatics         PLAN :  Patient continues to benefit from skilled intervention to address the above impairments. Continue treatment per established plan of care to address goals. Recommendation for discharge: (in order for the patient to meet his/her long term goals)  Occupational therapy at least 2 days/week in the home AND ensure assist and/or supervision for safety with functional mobility    This discharge recommendation:  Has been made in collaboration with the attending provider and/or case management    IF patient discharges home will need the following DME: patient owns DME required for discharge       SUBJECTIVE:   Patient stated I'll be heading out soon.     OBJECTIVE DATA SUMMARY:   Cognitive/Behavioral Status:  Neurologic State: Alert;Confused (periodic confusion/memory loss)  Orientation Level: Disoriented to time;Oriented to person;Oriented to place  Cognition: Memory loss; Follows commands             Functional Mobility and Transfers for ADLs:  Bed Mobility:  Sit to Supine: Supervision    Transfers:  Sit to Stand: Stand-by assistance          Balance:  Sitting: Intact  Standing: Impaired  Standing - Static: Constant support;Good  Standing - Dynamic : Constant support; Fair    ADL Intervention:  Declined    Pain:  None    Activity Tolerance:   Good    After treatment patient left in no apparent distress:   Sitting in chair, Call bell within reach, Caregiver / family present, and MD present    COMMUNICATION/COLLABORATION:   The patients plan of care was discussed with: Physical therapist, Registered nurse, Physician, and Case management.      Juan Andres OT  Time Calculation: 13 mins

## 2022-11-10 NOTE — DISCHARGE SUMMARY
[facility]    Patient ID:  Kizzy Young  [id number]  69 y.o.  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 male presenting for admission to [facility] for further evaluation and treatment for Seizure ([address] He  has a past medical history of Altered mental status, unspecified (11/9/2018), Anxiety, Ataxia (11/9/2018), Back pain, Blurred vision, Bradycardia (10/2/2018), Chest pain, Depression, Dizziness, Dizziness and giddiness (10/17/2019), Dysautonomia ([address] Fast heart beat, Fever (11/9/2018), Frequent headaches, Frequent urination, Hip dysplasia, congenital, syncope (10/24/2018), Hypotension, Ingrown left big toenail (3/31/2017), Joint pain, Joint swelling, Lumbar spinal stenosis, Memory deficit (9/11/2018), Muscle pain, Neuropathy, Orthostatic hypotension, Pacemaker, Recent change in weight, Risk for falls (12/13/2016), 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 .      Patient presenting to the ED last evening following a suspected seizure.   His wife described an episode where he was unresponsive for about 1 minute, was shaky, and had difficulty standing. She described a loss of bowel and bladder control with incontinence. The patient had had a syncopal/fall event several days prior. He has had functional decline over several months. He has had increased altered mentation as well as weakness for several days. His wife states he has not gotten out of bed, been active-for the last 6 days. Notes his midodrine was recently filled with a different company product, the milligram dosage was not changed. Cardiology has Rx 10mg tid 8/12/16, but he is symptomatic by 7pm when the spell happened last night. He becomes symptomatic with headache and dizziness when standing. He has had recent constipation, but loose last night following AM Miralax. Yesterday he apparently had a loose stool soiling his clothing prior to presentation. The patient's wife is his primary caregiver and does a very good job. She is however reaching the point where she may not be able to manage him at home. When he has these spells he stiffens up and clenches onto her bringing concern that both will fall. She does well keeping up with medications but note he continues to be lightheaded and orthostatic just hours after completing a 10 mg dose of midodrine. While on 5 mg tabs she would double up at times when he was more symptomatic. She has been warned that 30 mg/day is the maximum daily dose. She is rightfully concerned since he gets symptomatic in several hours following the 10 mg dose. He has been diagnosed with a stroke in the past, bringing concerned that these episodes could be epileptic as opposed to being hypotensive /orthostatic. Current Providers:  PCP:  Kassi Cassidy, [facility]  Card:  Alma Bates MD, [address]  Pulm:   Bill Tompkins,  [facility], [address] [facility] [address] Kevin Sears    ______________________________________________________________________  DISCHARGE SUMMARY/HOSPITAL COURSE:  for full details see H&P, daily progress notes, labs, consult notes. Pt presenting to ED with exacerbation orthostatic hypotension episode with syncope / convulsive episode. Ramón Morgan did not recommend antiepileptic tx. Pt is followed by Neurology in [place], was not able to reach Dr. Nilay Virgen to discuss current problems. Tx IVF and adjusted Midodrine dosing. Encourage oral fluids.   Support hose on d/c    Principal Problem:    Seizure ([address] (11/6/2022)  Observed episode in the ED with altered mentation and extremity shaking while standing at the bedside  Phone call consult with neurology at [facility] suggested admission  MRI scheduled was not possible at this institution - transfer was considered buy not able to get pt accepted at [facility] or [place] or [facility]  Likely was associated with his known orthostatic hypotension  Keppra was not initiated at this point, not felt to be indicated  Will need neurology evaluation for epilepsy concern as well as for his functional decline and dementia  Lab assessment of  thyroid, ammonia, B12, folate - unremarkable  PT and OT consult - more active each day w/o dizzy c/o (inspite of documented orthostasis)  Need to adjust daily Midodrine with activity planned,  wife is quite good with this concept  Support Hose on d/c     Active Problems:    Orthostatic hypotension (11/6/2022)  Encourage fluids  Maintain Florinef 0.1 mg twice daily  Advanced midodrine to 5 mg 1-2 tabs q 3-4 hrs up to 5 tablets per day as needed  Monitor orthostatic pressure -note  pt drop lying to standing  PT OT  To sit promptly when standing if develops symptoms       Pacemaker (10/24/2018)    Heart block (7/30/2019)  Telemetry monitoring  No arrhythmia / malfunction documented       Pulmonary nodule (7/29/2022)      Overview: 1.1cm lung nodule on cervical spine CT from [facility] done 7-2022- he has been       referred to Pulm    Pulmonary emphysema ([address] (11/7/2022)  Recently assessed by pulmonary medicine  was started on Spiriva / Respimat - maintain  Nodule was stable on follow-up CT, considering CT versus PET at subsequent follow-up       Bilateral carotid artery stenosis (9/30/2019)  Unable to have MRI at hospitals due to pacemaker, would have to be arranged at a facility equipped for this  Resume aspirin 81 mg daily, maintain Lipitor nightly       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 Pertinent Findings:  Visit Vitals  /69 (BP Patient Position: Sitting)   Pulse 88   Temp 97.9 °F (36.6 °C)   Resp 18   Ht 5' 11\" (1.803 m)   Wt 73.8 kg (162 lb 11.2 oz)   SpO2 96%   BMI 22.69 kg/m²     Gen:    Not in distress  Chest: Nonlabored respiration, Clear lungs  CVS:   Regular rhythm. No edema  Abd:  Soft, not distended, not tender  Neuro:  Alert, nonfocal, weak    LABS:   11/6/22 19:10 11/7/22 05:25 11/8/22 05:44 11/10/22 05:53   WBC 7.0 6.7 6.6 7.1   NRBC 0.0 0.0 0.0 0.0   RBC 4.54 3.94 (L) 3.81 (L) 3.77 (L)   HGB 14.3 12.4 12.0 (L) 11.8 (L)   HCT 41.9 36.7 35.6 (L) 34.6 (L)   MCV 92.3 93.1 93.4 91.8   MCH 31.5 31.5 31.5 31.3   MCHC 34.1 33.8 33.7 34.1   RDW 12.4 12.4 12.5 12.3   PLATELET 996 870 715 155   MPV 10.3 10.2 10.2 10.1   NEUTROPHILS 68  61 64   LYMPHOCYTES 20  26 23   MONOCYTES 8  9 9   EOSINOPHILS 2  3 3   BASOPHILS 1  1 1      11/6/22 19:15   Color YELLOW/STRAW   Appearance CLEAR   Specific gravity 1.010   pH (UA) 7.0   Protein Negative   Glucose Negative   Ketone Negative   Blood SMALL !    Bilirubin Negative   Urobilinogen 0.2   Nitrites Negative   Leukocyte Esterase Negative   Epithelial cells FEW   WBC 0-4   RBC 10-20   Bacteria Negative 22 19:10 22 05:25 22 12:09 22 05:44 11/10/22 05:53   Sodium 141 142  144 145   Potassium 4.1 3.8  3.6 3.2 (L)   Chloride 103 107  109 (H) 107   CO2 30 28  27 28   Anion gap 8 7  8 10   Glucose 123 (H) 107 (H)  101 (H) 112 (H)   BUN 27 (H) 21 (H)  16 17   Creatinine 1.41 (H) 1.14  0.96 1.01   BUN/Creatinine ratio 19 18  17 17   Calcium 8.9 8.5  8.4 (L) 8.7   Magnesium    2.0    eGFR 52 (L) >60  >60 >60   Bilirubin, total 0.6       Protein, total 7.2       Albumin 4.1       Globulin 3.1       A-G Ratio 1.3       ALT 22       AST 12 (L)       Alk. phosphatase 117       Troponin-High Sensitivity 10       Vitamin B12    1,080 (H)    Folate    14.3    Ammonia, plasma   <10        22 21:29   Sodium,urine random 76      22 12:09   T4, Free 1.0   TSH 0.52     RADIOLOGY:    CT HEAD :   The ventricles and sulci are normal in size, shape and configuration. . Chronic  lacunar infarct right basal ganglia unchanged. There is no intracranial  hemorrhage, extra-axial collection, or mass effect. The basilar cisterns are  open. No CT evidence of acute infarct. The bone windows demonstrate no abnormalities. The visualized portions of the  paranasal sinuses and mastoid air cells are clear. IMPRESSION:  No change or acute abnormality     CT C SPINE :  Alignment of the cervical spine is normal. There is no bony  destructive lesion or evidence of acute fracture. There are vascular  calcifications. Multilevel degenerative change and diffuse idiopathic skeletal  hyperostosis. . 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 Right chest wall port with atrial and ventricular  leads. Heart size and mediastinal contours are normal. No pleural effusion or  pneumothorax. Bones are age-appropriate. \   IMPRESSION: No acute cardiopulmonary abnormality     EK/7:  Atrial paced 81 bpm, Shauna      _______________________________________________________________________  DISCHARGE MEDICATIONS:   Current Discharge Medication List        START taking these medications    Details   polyethylene glycol (MIRALAX) 17 gram packet Take 1 Packet by mouth. Start date: 11/10/2022      tiotropium bromide (Spiriva Respimat) 2.5 mcg/actuation inhaler Take 2 Puffs by inhalation daily. Indications: bronchospasm prevention with COPD, Continue using as prior to admission  Qty: 4 g, Refills: 0  Start date: 11/11/2022           CONTINUE these medications which have CHANGED    Details   gabapentin (NEURONTIN) 600 mg tablet Take 0.5 Tablets by mouth two (2) times a day. Max Daily Amount: 600 mg. Qty: 1 Tablet, Refills: 0  Start date: 11/10/2022    Associated Diagnoses: Neuropathy      midodrine (PROAMATINE) 5 mg tablet Take 1 Tablet by mouth five (5) times daily for 30 days. Qty: 150 Tablet, Refills: 0  Start date: 11/10/2022, End date: 12/10/2022           CONTINUE these medications which have NOT CHANGED    Details   atorvastatin (LIPITOR) 40 mg tablet Take 1 Tablet by mouth in the morning. Qty: 90 Tablet, Refills: 0    Associated Diagnoses: History of CVA (cerebrovascular accident)      fludrocortisone (FLORINEF) 0.1 mg tablet TAKE 1 TABLET BY MOUTH TWICE DAILY  Qty: 28 Tab, Refills: 3      DULoxetine (CYMBALTA) 60 mg capsule TAKE 1 CAPSULE BY MOUTH DAILY  Qty: 90 Cap, Refills: 1    Associated Diagnoses: PTSD (post-traumatic stress disorder)      acetaminophen (TYLENOL) 650 mg TbER Take 650 mg by mouth every eight (8) hours as needed for Pain. finasteride (PROSCAR) 5 mg tablet TAKE 1 TABLET BY MOUTH DAILY(PROSTATE)  Qty: 90 Tab, Refills: 0      clonazePAM (KlonoPIN) 0.5 mg tablet Take 1 Tablet by mouth nightly. Max Daily Amount: 0.5 mg.  Qty: 90 Tablet, Refills: 0    Associated Diagnoses: PTSD (post-traumatic stress disorder)      aspirin 81 mg chewable tablet Take 1 Tab by mouth daily.   Qty: 90 Tab, Refills: 2 STOP taking these medications       acetaminophen 500 mg tab 500 mg, diphenhydrAMINE 25 mg cap 25 mg Comments:   Reason for Stopping:         guaiFENesin ER (MUCINEX) 600 mg ER tablet Comments:   Reason for Stopping:             **  was already taking Respimat PTA.   Tapering Midodrine from 10mg dosing to 5mg dosing   Will self adjust Midodrine pending activity planned and sitting / standing BPs    My Recommended  Diet: Regular  Activity: Ad Gracia  Wound Care: none  Follow-up labs: routine    HOSPITALIST RECOMMENDATIONS  Resume 5mg using 1-2 tabs every 3-4 hours to maintain standing Sys BP >85 / avoiding lightheaded feeling and falls  Drink plenty of fluids  Resume using support hose  Follow with [facility] to arrange for [facility], Home PT/OT, Wheelchair, Home Occupational evaluation for safety    Internet Hose - [employer] ,  Allegro cotton mild to moderate compression thigh     PA MD SARA   [phone]      _____________________________________________________________________  DISPOSITION:    Home with Family: x   Home with HH/PT/OT/RN: [facility]   SNF/LTC:    SUSAN:    OTHER:        Condition at Discharge:  Stable  _____________________________________________________________________  Follow up with:   PCP : Marcos Molina MD  Follow-up Information       Follow up With Specialties Details Why Contact Info    Marcos Molina MD Family Medicine Follow up in 1 week(s)  [address]  [address] [facility]  [phone]            Keep plans for [facility] followup  [facility] to arrange for [facility], Home Therapy, Home Occupational Evaluation / Design       Total time in minutes spent coordinating this discharge (includes going over instructions, follow-up, prescriptions, and preparing report for sign off to her PCP) :35 minutes    Signed:  Fransisco Herrera MD  [facility] Hospitalist  [phone]

## 2022-11-10 NOTE — TELEPHONE ENCOUNTER
Needs a JOSE call.  Being discharged 11/10/22 at [facility] and has a follow up with Dr. Luis Fernando Ford on 11/17 at 1:20

## 2022-11-10 NOTE — PROGRESS NOTES
Problem: Falls - Risk of  Goal: *Absence of Falls  Description: Document Megan Lloyd Fall Risk and appropriate interventions in the flowsheet.   Outcome: Progressing Towards Goal  Note: Fall Risk Interventions:  Mobility Interventions: Bed/chair exit alarm, Patient to call before getting OOB         Medication Interventions: Bed/chair exit alarm, Patient to call before getting OOB    Elimination Interventions: Bed/chair exit alarm, Call light in reach, Patient to call for help with toileting needs, Urinal in reach    History of Falls Interventions: Bed/chair exit alarm, Door open when patient unattended, Room close to nurse's station

## 2022-11-10 NOTE — PROGRESS NOTES
Bedside and Verbal shift change report given to Yenny Hall RN (oncoming nurse) by Sofia Sebastian RN (offgoing nurse). Report included the following information SBAR, Kardex, MAR, and Recent Results.

## 2022-11-10 NOTE — PROGRESS NOTES
Care Management Interventions  PCP Verified by CM: Yes Ashely Harp MD)  Last Visit to PCP: 09/19/22  Palliative Care Criteria Met (RRAT>21 & CHF Dx)?: No (No MD order)  Transition of Care Consult (CM Consult): Discharge Planning  Physical Therapy Consult: Yes  Occupational Therapy Consult: Yes  Speech Therapy Consult: No  Support Systems: Spouse/Significant Other  Confirm Follow Up Transport: Family  The Plan for Transition of Care is Related to the Following Treatment Goals : Treat seizure/orthostatic hypotension  Discharge Location  Patient Expects to be Discharged to[de-identified] Home with home health     Mr. Anthony Harrell is going home today 11/10/22. Mrs. Anthony Harrell is with Mr. Anthony Harrell and able to take him home. There is a ramp at the home. They have a transfer wheelchair and Mr. Anthony Harrell has a walker at home. I talked to Vivian Graves at [facility] (information was also sent to [facility] [employer] but Joesph Kristen can take the insurance). Per request, information faxed to Deena to include order, face sheet, H&P, med list, therapy notes. Mrs. Lee said that [facility] therapy staff is going to evaluate bathroom for safety/mobility renovations. Transition of Care (JOSE) Plan:      RUR: N/A Observation    RRAT: 7 LOW    JOSE Transportation:       How is patient being transported at discharge? POV/Spouse     When? 11/10/22     Is transport scheduled? N/A    Follow-up appointment and transportation: Mrs. Lee will be taking Mr. Lee to his [address] at the [facility] for hospital discharge follow up      Click here to [address] including selection of the Healthcare Decision Maker Relationship (ie \"Primary\")  @[email or web address]

## 2022-11-10 NOTE — PROGRESS NOTES
I called [facility] and spoke with Rj calzada, [phone]. She requested the face sheet and copies of  amnan Lee's insurance cards, [employer] and [place]. I faxed these items to her [phone].

## 2022-11-10 NOTE — DISCHARGE INSTRUCTIONS
GENERAL DISCHARGE SUMMARY:    PATIENT INSTRUCTIONS:    What to do at Home:  Recommended activity: Activity as tolerated,       *  Please give a list of your current medications to your Primary Care Provider. *  Please update this list whenever your medications are discontinued, doses are      changed, or new medications (including over-the-counter products) are added. *  Please carry medication information at all times in case of emergency situations. These are general instructions for a healthy lifestyle:    No smoking/ No tobacco products/ Avoid exposure to second hand smoke  Surgeon General's Warning:  Quitting smoking now greatly reduces serious risk to your health. Obesity, smoking, and sedentary lifestyle greatly increases your risk for illness    A healthy diet, regular physical exercise & weight monitoring are important for maintaining a healthy lifestyle    You may be retaining fluid if you have a history of heart failure or if you experience any of the following symptoms:  Weight gain of 3 pounds or more overnight or 5 pounds in a week, increased swelling in our hands or feet or shortness of breath while lying flat in bed. Please call your doctor as soon as you notice any of these symptoms; do not wait until your next office visit. The discharge information has been reviewed with the patient and spouse. The patient and spouse verbalized understanding. Discharge medications reviewed with the patient and spouse and appropriate educational materials and side effects teaching were provided.   ___________________________________________________          HOSPITALIST RECOMMENDATIONS    Resume 5mg using 1-2 tabs every 3-4 hours to maintain standing Sys BP >85 / avoiding lightheaded feeling and falls  Drink plenty of fluids  Resume using support hose  [facility], Home PT/OT, Wheelchair, Home Occupational evaluation for safety    Internet Hose - [employer] , Allegro cotton mild to moderate compression thigh   PA MD SARA   [phone]

## 2022-11-10 NOTE — PROGRESS NOTES
Discharge instructions reviewed with patient and spouse  Personal belongings returned: n/a  Home meds returned: Spiriva  To front entrance via w/c  Discharged home with  spouse @ [phone]

## 2022-11-10 NOTE — PROGRESS NOTES
Problem: Mobility Impaired (Adult and Pediatric)  Goal: *Acute Goals and Plan of Care (Insert Text)  Outcome: Progressing Towards Goal   PHYSICAL THERAPY TREATMENT  Patient: Louie Barahona (89 y.o. male)  Date: 11/10/2022  Diagnosis: Seizure ([facility] [address] [R56.9]  Orthostatic hypotension [I95.1] Seizure ([facility] [address]      Precautions: Fall, Seizure  Chart, physical therapy assessment, plan of care and goals were reviewed. ASSESSMENT  Patient continues with skilled PT services and is slowlyprogressing towards goals. Patient agreeable to ambulate if therapist will assist him back to bed afterwards. Patient able to perform sit to stand with stand by assist. He was able to ambulate 150 feet using a wheeled walker with CGA to supervision with slow but steady gait. He had not c/o dizziness or lightheadedness with activity today. Current Level of Function Impacting Discharge (mobility/balance): CGA to supervision with ambulation    Other factors to consider for discharge:          PLAN :  Patient continues to benefit from skilled intervention to address the above impairments. Continue treatment per established plan of care. to address goals. Recommendation for discharge: (in order for the patient to meet his/her long term goals)  Physical therapy at least 2 days/week in the home AND ensure assist and/or supervision for safety with out of bed mobility    This discharge recommendation:  A follow-up discussion with the attending provider and/or case management is planned    IF patient discharges home will need the following DME: patient owns DME required for discharge       SUBJECTIVE:   Patient stated I want to go home today.     OBJECTIVE DATA SUMMARY:   Critical Behavior:  Neurologic State: Alert, Confused (periodic confusion/memory loss)  Orientation Level: Disoriented to time, Oriented to person, Oriented to place  Cognition: Memory loss, Follows commands  Safety/Judgement: Decreased awareness of need for assistance, Decreased awareness of need for safety, Decreased insight into deficits  Functional Mobility Training:  Bed Mobility:   Sit to Supine: Supervision      Transfers:  Sit to Stand: Stand-by assistance  Stand to Sit: Stand-by assistance            Balance:  Sitting: Intact  Standing: Impaired  Standing - Static: Constant support;Good  Standing - Dynamic : Constant support; Fair  Ambulation/Gait Training:  Distance (ft): 150 Feet (ft)  Assistive Device: Gait belt;Walker, rolling  Ambulation - Level of Assistance: Contact guard assistance   Gait Abnormalities: Decreased step clearance   Base of Support: Widened     Speed/Dhara: Slow  Step Length: Left shortened;Right shortened      Pain Rating:  No complaint    Activity Tolerance:   Good    After treatment patient left in no apparent distress:   Supine in bed, Call bell within reach, and Bed / chair alarm activated    COMMUNICATION/COLLABORATION:   The patients plan of care was discussed with: Registered nurse.      Adriana Cr, PT   Time Calculation: 16 mins

## 2022-11-10 NOTE — PROGRESS NOTES
[facility]  Hospitalist Progress Note    NAME: Fatou Marquis   :  1945   MRN:  [id number]     Total duration of encounter: 3 days      Interim Hospital Summary: 69 y.o. male who presented on 2022 with Seizure ([facility] [address] He has a past medical history of Altered mental status, unspecified (2018), Anxiety, Ataxia (2018), Back pain, Blurred vision, Bradycardia (10/2/2018), Chest pain, Depression, Dizziness, Dizziness and giddiness (10/17/2019), Dysautonomia ([address] Fast heart beat, Fever (2018), Frequent headaches, Frequent urination, Hip dysplasia, congenital, syncope (10/24/2018), Hypotension, Ingrown left big toenail (3/31/2017), Joint pain, Joint swelling, Lumbar spinal stenosis, Memory deficit (2018), Muscle pain, Neuropathy, Orthostatic hypotension, Pacemaker, Recent change in weight, Risk for falls (2016), Sensory ataxia (2018), Sinus problem, Skin disease, Smoker (2018), SOB (shortness of breath), Sore throat, SSS (sick sinus syndrome) ([facility] [address] Stiffness of joints, multiple sites, Stress, Stumbling gait (2018), Syncope, Tiredness, Trouble in sleeping, and Weakness. Brent Sanches Pt presenting to ED with exacerbation orthostatic hypotension episode with syncope / convulsive episode. Asaf Gonsales did not recommend antiepileptic tx. Pt is followed by Neurology in [facility]  Tx IVF and adjusted Midodrine dosing. Encourage oral fluids. Support hose on d/c         Subjective:     Chief Complaint / Reason for Physician Visit  \"No c/o\".   Discussed with RN   Ambulated again w/o \"spell\"    Staff concerned with  pt drop in Sys BP  Maintained 85-90 Sys standing w/o symptoms  Supine BP does elevate, however sitting BP better    WIfe notes he does OK unless Sys drops to 60 range    Review of Systems:  Symptom Y/N Comments  Symptom Y/N Comments   Fever/Chills n   Chest Pain n    Poor Appetite n   Edema n    Cough n   Abdominal Pain n    Sputum n   Joint Pain n SOB/PATEL n   Pruritis/Rash     Nausea/vomit n   Tolerating PT/OT y    Diarrhea n   Tolerating Diet y    Constipation n   Other         Current Facility-Administered Medications:     gabapentin (NEURONTIN) capsule 300 mg, 300 mg, Oral, BID, Clay Bills MD, 300 mg at 11/09/22 1807    acetaminophen (TYLENOL) tablet 650 mg, 650 mg, Oral, Q6H PRN, Clay Bills MD, 650 mg at 11/09/22 1201    aspirin chewable tablet 81 mg, 81 mg, Oral, DAILY, Clay Bills MD, 81 mg at 11/09/22 0824    atorvastatin (LIPITOR) tablet 40 mg, 40 mg, Oral, QHS, Clay Bills MD, 40 mg at 11/08/22 2145    clonazePAM (KlonoPIN) tablet 0.5 mg, 0.5 mg, Oral, QHS, Clay Bills MD, 0.5 mg at 11/08/22 2145    sodium chloride (NS) flush 5-40 mL, 5-40 mL, IntraVENous, Q8H, Clay Bills MD, 10 mL at 11/09/22 1448    sodium chloride (NS) flush 5-40 mL, 5-40 mL, IntraVENous, PRN, Clay Bills MD    polyethylene glycol (MIRALAX) packet 17 g, 17 g, Oral, DAILY PRN, Clay Bills MD, 17 g at 11/09/22 0835    enoxaparin (LOVENOX) injection 40 mg, 40 mg, SubCUTAneous, DAILY, Clay Bills MD, 40 mg at 11/09/22 0824    midodrine (PROAMATINE) tablet 5 mg, 5 mg, Oral, 5XD, Clay Bills MD, 5 mg at 11/09/22 1807    tiotropium bromide (SPIRIVA RESPIMAT) 2.5 mcg /actuation (Patient Supplied), 2 Puff, Inhalation, DAILY, Salvador Townsend MD, 2 Puff at 11/09/22 0825    finasteride (PROSCAR) tablet 5 mg, 5 mg, Oral, DAILY, Clay Bills MD, 5 mg at 11/09/22 0841    fludrocortisone (FLORINEF) tablet 0.1 mg, 0.1 mg, Oral, BID, Clay Bills MD, 0.1 mg at 11/09/22 1807    DULoxetine (CYMBALTA) capsule 60 mg, 60 mg, Oral, DAILY, Clay Bills MD, 60 mg at 11/09/22 0824    Objective:     VITALS:   Patient Vitals for the past 12 hrs:   Temp Pulse Resp BP SpO2   11/09/22 1830 -- 84 -- 132/76 --   11/09/22 1805 -- 81 -- 134/79 --   11/09/22 1615 98.2 °F (36.8 °C) (!) 102 20 (!) 216/105 96 %   11/09/22 1610 -- 88 -- 98/73 --   11/09/22 1608 -- 90 -- (!) 162/97 --   11/09/22 1440 -- 86 -- 130/86 --   11/09/22 1140 98 °F (36.7 °C) 86 18 138/78 96 %   11/09/22 1045 -- -- -- (!) 179/94 --   11/09/22 0816 -- -- -- 111/64 96 %   11/09/22 0810 98 °F (36.7 °C) 80 18 (!) 181/98 94 %         Intake/Output Summary (Last 24 hours) at 11/9/2022 1910  Last data filed at 11/9/2022 1800  Gross per 24 hour   Intake 720 ml   Output 1775 ml   Net -1055 ml          PHYSICAL EXAM:  General: WD, WN. Alert, cooperative, no acute distress    EENT:  EOMI. Anicteric sclerae. MMM  Resp:  CTA bilaterally, no wheezing or rales. No accessory muscle use  CV:  Regular  rhythm,  No edema  GI:  Soft, Non distended, Non tender. +Bowel sounds  Neurologic:  Alert and oriented X 3, normal speech, no focal weakness  Psych:   Not anxious nor agitated  Skin:  No rashes. No jaundice    LABS:  I reviewed today's most current labs and imaging studies. Pertinent labs include:  Recent Labs     11/08/22  0544 11/07/22  0525   WBC 6.6 6.7   HGB 12.0* 12.4   HCT 35.6* 36.7    168       Recent Labs     11/08/22  0544 11/07/22  0525    142   K 3.6 3.8   * 107   CO2 27 28   * 107*   BUN 16 21*   CREA 0.96 1.14   CA 8.4* 8.5   MG 2.0  --         11/7/22 12:09 11/8/22 05:44   Vitamin B12  1,080 (H)   Folate  14.3   Ammonia, plasma <10       11/7/22 12:09   T4, Free 1.0   TSH 0.52         Radiology:  CT HEAD 11/6:   The ventricles and sulci are normal in size, shape and configuration. . Chronic  lacunar infarct right basal ganglia unchanged. There is no intracranial  hemorrhage, extra-axial collection, or mass effect. The basilar cisterns are  open. No CT evidence of acute infarct. The bone windows demonstrate no abnormalities. The visualized portions of the  paranasal sinuses and mastoid air cells are clear. IMPRESSION:  No change or acute abnormality     CT C SPINE 11/6:  Alignment of the cervical spine is normal. There is no bony  destructive lesion or evidence of acute fracture.  There are vascular  calcifications. Multilevel degenerative change and diffuse idiopathic skeletal  hyperostosis. . 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 Right chest wall port with atrial and ventricular  leads. Heart size and mediastinal contours are normal. No pleural effusion or  pneumothorax. Bones are age-appropriate. \   IMPRESSION: No acute cardiopulmonary abnormality    EK/7:  Atrial paced 81 bpm, NSC    Procedures: see electronic medical records for all procedures/Xrays and details which were not copied into this note but were reviewed prior to creation of Plan.         Assessment / Plan:    Principal Problem:    Seizure ([address] (2022)  Observed episode in the ED with altered mentation and extremity shaking while standing at the bedside  Phone call consult with neurology at [facility] suggested admission  MRI scheduled is not possible at this institution  Likely was associated with his known orthostatic hypotension  Keppra is not been initiated at this point  Will need neurology evaluation for epilepsy concern as well as for his functional decline and dementia  We will assess thyroid, ammonia, B12, folate - unremarkable  PT and OT consult - more active today w/o dizzy c/o  Need to adjust daily Midodrine with activity planned  Support Hose on d/c     Active Problems:    Orthostatic hypotension (2022)  Encourage fluids  Maintain Florinef 0.1 mg twice daily  Advanced midodrine to 5 mg twice q 3-4 hrs up to 5 doses per day as needed  Monitor orthostatic pressure -note  pt drop lying to standing  PT OT      Pacemaker (10/24/2018)    Heart block (2019)  Telemetry monitoring       Pulmonary nodule (2022)      Overview: 1.1cm lung nodule on cervical spine CT from [facility] done - he has been       referred to Pulm    Pulmonary emphysema ([facility] [address] (2022)  Recently assessed by pulmonary medicine  was started on Spiriva  Nodule was stable on follow-up CT, considering CT versus PET at subsequent follow-up       Bilateral carotid artery stenosis (9/30/2019)  Unable to have MRI at [facility] due to pacemaker, would have to be arranged at a facility equipped for this  Resume aspirin 81 mg daily, maintain Lipitor nightly       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)  Maintain Proscar 5 mg daily  Monitor bladder checks for retention        SAFETY:   Code Status:DNR  DVT prophylaxis:Lovenox  Stress Ulcer prophylaxis: Not Indicated  Bladder catheter:no  Family Contact Info:  Primary Emergency Contact: MejiaDivya, Home Phone: [phone]  Bedded: [facility] Room 130/01  Disposition: TBD, likely home when stable  Admission status:  Observation      Reviewed most current lab test results and cultures  YES  Reviewed most current radiology test results   YES  Review and summation of old records today    NO  Reviewed patient's current orders and MAR    YES  PMH/SH reviewed - no change compared to H&P    Care Plan discussed with:                                   Comments  Patient x     Family  x Wife - in room   RN x     Care Manager  x     Consultant                          x Multidiciplinary team rounds were held today with , nursing, pharmacist and clinical coordinator. Patient's plan of care was discussed; medications were reviewed and discharge planning was addressed.         ____________________________________________    Total NON Critical Care TIME:  30   Minutes        Comments   >50% of visit spent in counseling and coordination of care   x      Signed: Romaine Downing MD  [facility] Hospitalist  [phone]

## 2022-11-11 NOTE — TELEPHONE ENCOUNTER
Care Transitions Initial Follow Up Call    Outreach made within 2 business days of discharge: Yes    Patient: Arturo Obrien Patient : 1945   MRN: [id number]  Reason for Admission: low blood pressure and seizures  Discharge Date: 11/10/22       Spoke with: patients wife    Discharge department/facility: 11/10/2022    TCM Interactive Patient Contact:  Was patient able to fill all prescriptions: Yes  Was patient instructed to bring all medications to the follow-up visit: Yes  Is patient taking all medications as directed in the discharge summary?  Yes  Does patient understand their discharge instructions: Yes  Does patient have questions or concerns that need addressed prior to 7-14 day follow up office visit: yes    Scheduled appointment with PCP within 7-14 days    Follow Up  Future Appointments   Date Time Provider Maddie Hidalgo   2022  1:20 PM Ricardo Molina MD [facility] MAIN BS [facility]   2022 11:00 AM Ricardo Molina MD [facility] Brian LPN

## 2022-11-11 NOTE — TELEPHONE ENCOUNTER
Care Transitions Initial Follow Up Call    Outreach made within 2 business days of discharge: Yes    Patient: Alexandra Labor Patient : 1945   MRN: [id number]  Reason for Admission:   Discharge Date: 11/10/22       Spoke with: left message for call back first attempt  Scheduled appointment with PCP within 7-14 days    Follow Up  Future Appointments   Date Time Provider Maddie Hidalgo   2022  1:20 PM Leticia Molina MD [facility] MAIN BS AMB   2022 11:00 AM Leticia Molina MD [facility] MAIN BS AMB       David Doty LPN

## 2022-11-15 ENCOUNTER — TELEPHONE (OUTPATIENT)
Dept: FAMILY MEDICINE CLINIC | Age: 77
End: 2022-11-15

## 2022-11-15 NOTE — TELEPHONE ENCOUNTER
Please call Coleman Felix back to give verbal order for OT, Mr Ford Erp has follow up appt with Tracy tomorrow.

## 2022-11-16 ENCOUNTER — HOSPITAL ENCOUNTER (INPATIENT)
Age: 77
LOS: 14 days | Discharge: NURSING, REHAB OR LONG-TERM CARE | DRG: 312 | End: 2022-11-30
Attending: EMERGENCY MEDICINE | Admitting: FAMILY MEDICINE
Payer: MEDICARE

## 2022-11-16 ENCOUNTER — HOSPITAL ENCOUNTER (EMERGENCY)
Age: 77
Discharge: ANOTHER HOSPITAL | End: 2022-11-16
Attending: FAMILY MEDICINE
Payer: OTHER GOVERNMENT

## 2022-11-16 VITALS
WEIGHT: 171 LBS | SYSTOLIC BLOOD PRESSURE: 180 MMHG | BODY MASS INDEX: 23.85 KG/M2 | DIASTOLIC BLOOD PRESSURE: 91 MMHG | OXYGEN SATURATION: 93 % | HEART RATE: 81 BPM | RESPIRATION RATE: 12 BRPM

## 2022-11-16 DIAGNOSIS — Z51.5 PALLIATIVE CARE BY SPECIALIST: ICD-10-CM

## 2022-11-16 DIAGNOSIS — Z66 DNR (DO NOT RESUSCITATE): ICD-10-CM

## 2022-11-16 DIAGNOSIS — I95.1 ORTHOSTATIC HYPOTENSION: ICD-10-CM

## 2022-11-16 DIAGNOSIS — R55 SYNCOPE AND COLLAPSE: Primary | ICD-10-CM

## 2022-11-16 DIAGNOSIS — Z71.89 GOALS OF CARE, COUNSELING/DISCUSSION: ICD-10-CM

## 2022-11-16 DIAGNOSIS — I95.1 ORTHOSTATIC HYPOTENSION: Primary | ICD-10-CM

## 2022-11-16 LAB
ALBUMIN SERPL-MCNC: 3.5 G/DL (ref 3.5–5)
ALBUMIN/GLOB SERPL: 1.2 {RATIO} (ref 1.1–2.2)
ALP SERPL-CCNC: 99 U/L (ref 45–117)
ALT SERPL-CCNC: 17 U/L (ref 12–78)
ANION GAP SERPL CALC-SCNC: 4 MMOL/L (ref 5–15)
ANION GAP SERPL CALC-SCNC: 4 MMOL/L (ref 5–15)
AST SERPL-CCNC: 12 U/L (ref 15–37)
ATRIAL RATE: 80 BPM
BASOPHILS # BLD: 0 K/UL (ref 0–0.1)
BASOPHILS # BLD: 0.1 K/UL (ref 0–0.1)
BASOPHILS NFR BLD: 1 % (ref 0–1)
BASOPHILS NFR BLD: 1 % (ref 0–1)
BILIRUB SERPL-MCNC: 0.8 MG/DL (ref 0.2–1)
BUN SERPL-MCNC: 17 MG/DL (ref 6–20)
BUN SERPL-MCNC: 23 MG/DL (ref 6–20)
BUN/CREAT SERPL: 20 (ref 12–20)
BUN/CREAT SERPL: 20 (ref 12–20)
CALCIUM SERPL-MCNC: 8.2 MG/DL (ref 8.5–10.1)
CALCIUM SERPL-MCNC: 8.5 MG/DL (ref 8.5–10.1)
CALCULATED R AXIS, ECG10: 41 DEGREES
CALCULATED T AXIS, ECG11: 42 DEGREES
CHLORIDE SERPL-SCNC: 107 MMOL/L (ref 97–108)
CHLORIDE SERPL-SCNC: 109 MMOL/L (ref 97–108)
CK SERPL-CCNC: 61 U/L (ref 39–308)
CO2 SERPL-SCNC: 31 MMOL/L (ref 21–32)
CO2 SERPL-SCNC: 32 MMOL/L (ref 21–32)
CREAT SERPL-MCNC: 0.87 MG/DL (ref 0.7–1.3)
CREAT SERPL-MCNC: 1.16 MG/DL (ref 0.7–1.3)
DIAGNOSIS, 93000: NORMAL
DIFFERENTIAL METHOD BLD: ABNORMAL
DIFFERENTIAL METHOD BLD: ABNORMAL
EOSINOPHIL # BLD: 0.2 K/UL (ref 0–0.4)
EOSINOPHIL # BLD: 0.3 K/UL (ref 0–0.4)
EOSINOPHIL NFR BLD: 3 % (ref 0–7)
EOSINOPHIL NFR BLD: 3 % (ref 0–7)
ERYTHROCYTE [DISTWIDTH] IN BLOOD BY AUTOMATED COUNT: 12.5 % (ref 11.5–14.5)
ERYTHROCYTE [DISTWIDTH] IN BLOOD BY AUTOMATED COUNT: 12.7 % (ref 11.5–14.5)
GLOBULIN SER CALC-MCNC: 3 G/DL (ref 2–4)
GLUCOSE SERPL-MCNC: 115 MG/DL (ref 65–100)
GLUCOSE SERPL-MCNC: 121 MG/DL (ref 65–100)
HCT VFR BLD AUTO: 34.6 % (ref 36.6–50.3)
HCT VFR BLD AUTO: 35.3 % (ref 36.6–50.3)
HGB BLD-MCNC: 11.7 G/DL (ref 12.1–17)
HGB BLD-MCNC: 11.8 G/DL (ref 12.1–17)
IMM GRANULOCYTES # BLD AUTO: 0 K/UL (ref 0–0.04)
IMM GRANULOCYTES # BLD AUTO: 0 K/UL (ref 0–0.04)
IMM GRANULOCYTES NFR BLD AUTO: 0 % (ref 0–0.5)
IMM GRANULOCYTES NFR BLD AUTO: 0 % (ref 0–0.5)
LACTATE SERPL-SCNC: 1 MMOL/L (ref 0.4–2)
LYMPHOCYTES # BLD: 1.3 K/UL (ref 0.8–3.5)
LYMPHOCYTES # BLD: 1.7 K/UL (ref 0.8–3.5)
LYMPHOCYTES NFR BLD: 19 % (ref 12–49)
LYMPHOCYTES NFR BLD: 22 % (ref 12–49)
MAGNESIUM SERPL-MCNC: 2.4 MG/DL (ref 1.6–2.4)
MCH RBC QN AUTO: 31.6 PG (ref 26–34)
MCH RBC QN AUTO: 32.1 PG (ref 26–34)
MCHC RBC AUTO-ENTMCNC: 33.4 G/DL (ref 30–36.5)
MCHC RBC AUTO-ENTMCNC: 33.8 G/DL (ref 30–36.5)
MCV RBC AUTO: 94.4 FL (ref 80–99)
MCV RBC AUTO: 94.8 FL (ref 80–99)
MONOCYTES # BLD: 0.5 K/UL (ref 0–1)
MONOCYTES # BLD: 0.7 K/UL (ref 0–1)
MONOCYTES NFR BLD: 7 % (ref 5–13)
MONOCYTES NFR BLD: 9 % (ref 5–13)
NEUTS SEG # BLD: 4.7 K/UL (ref 1.8–8)
NEUTS SEG # BLD: 5 K/UL (ref 1.8–8)
NEUTS SEG NFR BLD: 65 % (ref 32–75)
NEUTS SEG NFR BLD: 70 % (ref 32–75)
NRBC # BLD: 0 K/UL (ref 0–0.01)
NRBC # BLD: 0 K/UL (ref 0–0.01)
NRBC BLD-RTO: 0 PER 100 WBC
NRBC BLD-RTO: 0 PER 100 WBC
P-R INTERVAL, ECG05: 194 MS
PLATELET # BLD AUTO: 167 K/UL (ref 150–400)
PLATELET # BLD AUTO: 184 K/UL (ref 150–400)
PMV BLD AUTO: 10.1 FL (ref 8.9–12.9)
PMV BLD AUTO: 9.7 FL (ref 8.9–12.9)
POTASSIUM SERPL-SCNC: 3.2 MMOL/L (ref 3.5–5.1)
POTASSIUM SERPL-SCNC: 3.4 MMOL/L (ref 3.5–5.1)
PROT SERPL-MCNC: 6.5 G/DL (ref 6.4–8.2)
Q-T INTERVAL, ECG07: 388 MS
QRS DURATION, ECG06: 86 MS
QTC CALCULATION (BEZET), ECG08: 447 MS
RBC # BLD AUTO: 3.65 M/UL (ref 4.1–5.7)
RBC # BLD AUTO: 3.74 M/UL (ref 4.1–5.7)
SODIUM SERPL-SCNC: 143 MMOL/L (ref 136–145)
SODIUM SERPL-SCNC: 144 MMOL/L (ref 136–145)
TROPONIN-HIGH SENSITIVITY: 10 NG/L (ref 0–76)
TROPONIN-HIGH SENSITIVITY: 9 NG/L (ref 0–76)
TSH SERPL DL<=0.05 MIU/L-ACNC: 0.83 UIU/ML (ref 0.36–3.74)
VENTRICULAR RATE, ECG03: 80 BPM
WBC # BLD AUTO: 6.8 K/UL (ref 4.1–11.1)
WBC # BLD AUTO: 7.7 K/UL (ref 4.1–11.1)

## 2022-11-16 PROCEDURE — 93005 ELECTROCARDIOGRAM TRACING: CPT

## 2022-11-16 PROCEDURE — 99285 EMERGENCY DEPT VISIT HI MDM: CPT

## 2022-11-16 PROCEDURE — 82550 ASSAY OF CK (CPK): CPT

## 2022-11-16 PROCEDURE — 84443 ASSAY THYROID STIM HORMONE: CPT

## 2022-11-16 PROCEDURE — 84484 ASSAY OF TROPONIN QUANT: CPT

## 2022-11-16 PROCEDURE — 74011250637 HC RX REV CODE- 250/637: Performed by: FAMILY MEDICINE

## 2022-11-16 PROCEDURE — 80053 COMPREHEN METABOLIC PANEL: CPT

## 2022-11-16 PROCEDURE — 65270000046 HC RM TELEMETRY

## 2022-11-16 PROCEDURE — 74011250636 HC RX REV CODE- 250/636: Performed by: FAMILY MEDICINE

## 2022-11-16 PROCEDURE — 74011000250 HC RX REV CODE- 250: Performed by: FAMILY MEDICINE

## 2022-11-16 PROCEDURE — 36415 COLL VENOUS BLD VENIPUNCTURE: CPT

## 2022-11-16 PROCEDURE — 83735 ASSAY OF MAGNESIUM: CPT

## 2022-11-16 PROCEDURE — 83605 ASSAY OF LACTIC ACID: CPT

## 2022-11-16 PROCEDURE — 85025 COMPLETE CBC W/AUTO DIFF WBC: CPT

## 2022-11-16 PROCEDURE — 80048 BASIC METABOLIC PNL TOTAL CA: CPT

## 2022-11-16 RX ORDER — MIDODRINE HYDROCHLORIDE 5 MG/1
5 TABLET ORAL
Status: DISCONTINUED | OUTPATIENT
Start: 2022-11-16 | End: 2022-11-22

## 2022-11-16 RX ORDER — SODIUM CHLORIDE 0.9 % (FLUSH) 0.9 %
5-10 SYRINGE (ML) INJECTION ONCE
Status: DISCONTINUED | OUTPATIENT
Start: 2022-11-16 | End: 2022-11-16 | Stop reason: HOSPADM

## 2022-11-16 RX ORDER — ATORVASTATIN CALCIUM 40 MG/1
40 TABLET, FILM COATED ORAL DAILY
Status: DISCONTINUED | OUTPATIENT
Start: 2022-11-16 | End: 2022-11-30 | Stop reason: HOSPADM

## 2022-11-16 RX ORDER — DONEPEZIL HYDROCHLORIDE 10 MG/1
5 TABLET, FILM COATED ORAL
COMMUNITY
End: 2022-11-30

## 2022-11-16 RX ORDER — SODIUM CHLORIDE 9 MG/ML
25 INJECTION, SOLUTION INTRAVENOUS ONCE
Status: COMPLETED | OUTPATIENT
Start: 2022-11-16 | End: 2022-11-16

## 2022-11-16 RX ORDER — HEPARIN SODIUM 5000 [USP'U]/ML
5000 INJECTION, SOLUTION INTRAVENOUS; SUBCUTANEOUS EVERY 8 HOURS
Status: DISCONTINUED | OUTPATIENT
Start: 2022-11-16 | End: 2022-11-30 | Stop reason: HOSPADM

## 2022-11-16 RX ORDER — FLUDROCORTISONE ACETATE 0.1 MG/1
0.1 TABLET ORAL 2 TIMES DAILY
Status: DISCONTINUED | OUTPATIENT
Start: 2022-11-16 | End: 2022-11-23

## 2022-11-16 RX ORDER — FINASTERIDE 5 MG/1
5 TABLET, FILM COATED ORAL DAILY
Status: DISCONTINUED | OUTPATIENT
Start: 2022-11-16 | End: 2022-11-18

## 2022-11-16 RX ORDER — GUAIFENESIN 100 MG/5ML
81 LIQUID (ML) ORAL DAILY
Status: DISCONTINUED | OUTPATIENT
Start: 2022-11-16 | End: 2022-11-30 | Stop reason: HOSPADM

## 2022-11-16 RX ORDER — SODIUM CHLORIDE 0.9 % (FLUSH) 0.9 %
5-40 SYRINGE (ML) INJECTION AS NEEDED
Status: DISCONTINUED | OUTPATIENT
Start: 2022-11-16 | End: 2022-11-30 | Stop reason: HOSPADM

## 2022-11-16 RX ORDER — ACETAMINOPHEN 325 MG/1
650 TABLET ORAL
Status: DISCONTINUED | OUTPATIENT
Start: 2022-11-16 | End: 2022-11-30 | Stop reason: HOSPADM

## 2022-11-16 RX ORDER — SODIUM CHLORIDE 0.9 % (FLUSH) 0.9 %
5-40 SYRINGE (ML) INJECTION EVERY 8 HOURS
Status: DISCONTINUED | OUTPATIENT
Start: 2022-11-16 | End: 2022-11-30 | Stop reason: HOSPADM

## 2022-11-16 RX ORDER — DULOXETIN HYDROCHLORIDE 60 MG/1
60 CAPSULE, DELAYED RELEASE ORAL DAILY
Status: DISCONTINUED | OUTPATIENT
Start: 2022-11-16 | End: 2022-11-30 | Stop reason: HOSPADM

## 2022-11-16 RX ORDER — SODIUM CHLORIDE 9 MG/ML
75 INJECTION, SOLUTION INTRAVENOUS CONTINUOUS
Status: DISCONTINUED | OUTPATIENT
Start: 2022-11-16 | End: 2022-11-19

## 2022-11-16 RX ADMIN — HEPARIN SODIUM 5000 UNITS: 5000 INJECTION INTRAVENOUS; SUBCUTANEOUS at 11:27

## 2022-11-16 RX ADMIN — SODIUM CHLORIDE, PRESERVATIVE FREE 10 ML: 5 INJECTION INTRAVENOUS at 23:49

## 2022-11-16 RX ADMIN — HEPARIN SODIUM 5000 UNITS: 5000 INJECTION INTRAVENOUS; SUBCUTANEOUS at 17:24

## 2022-11-16 RX ADMIN — FLUDROCORTISONE ACETATE 0.1 MG: 0.1 TABLET ORAL at 11:27

## 2022-11-16 RX ADMIN — FLUDROCORTISONE ACETATE 0.1 MG: 0.1 TABLET ORAL at 17:24

## 2022-11-16 RX ADMIN — ATORVASTATIN CALCIUM 40 MG: 40 TABLET, FILM COATED ORAL at 11:27

## 2022-11-16 RX ADMIN — SODIUM CHLORIDE 25 ML/HR: 9 INJECTION, SOLUTION INTRAVENOUS at 04:30

## 2022-11-16 RX ADMIN — FINASTERIDE 5 MG: 5 TABLET, FILM COATED ORAL at 12:44

## 2022-11-16 RX ADMIN — ASPIRIN 81 MG 81 MG: 81 TABLET ORAL at 11:27

## 2022-11-16 RX ADMIN — DULOXETINE HYDROCHLORIDE 60 MG: 60 CAPSULE, DELAYED RELEASE ORAL at 12:44

## 2022-11-16 RX ADMIN — MIDODRINE HYDROCHLORIDE 5 MG: 5 TABLET ORAL at 11:27

## 2022-11-16 RX ADMIN — MIDODRINE HYDROCHLORIDE 5 MG: 5 TABLET ORAL at 14:00

## 2022-11-16 RX ADMIN — SODIUM CHLORIDE 75 ML/HR: 900 INJECTION, SOLUTION INTRAVENOUS at 10:02

## 2022-11-16 RX ADMIN — MIDODRINE HYDROCHLORIDE 5 MG: 5 TABLET ORAL at 17:24

## 2022-11-16 NOTE — ED NOTES
TRANSFER - OUT REPORT:    Verbal report given to MANUEL Berrios(name) on Kim Flores  being transferred to [facility](unit) for routine progression of care       Report consisted of patients Situation, Background, Assessment and   Recommendations(SBAR). Information from the following report(s) SBAR, Kardex, ED Summary, MAR, Accordion, and Recent Results was reviewed with the receiving nurse. Lines:   Peripheral IV 11/16/22 Left Forearm (Active)   Site Assessment Clean, dry, & intact 11/16/22 1001   Phlebitis Assessment 0 11/16/22 0416   Infiltration Assessment 0 11/16/22 0416   Dressing Status Clean, dry, & intact 11/16/22 1001   Dressing Type Transparent 11/16/22 1001        Opportunity for questions and clarification was provided.       Patient transported with:   [employer]

## 2022-11-16 NOTE — ED TRIAGE NOTES
Patient arrived EMS after a ground level fall in bathroom. Patient states he did not hit his head and does not have any pain at this time. EMS reports from spouse that patient had seizure like activity att home. Pt has history of orthostatic hypotension. Pt alert and oriented and able to answer questions.

## 2022-11-16 NOTE — H&P
[address] [facility]  Hospitalist Group  History and Physical    Date of Service:  11/16/2022  Primary Care Provider: Dominique Yuan MD  Source of information: The patient and Chart review    Chief Complaint: Syncope      History of Presenting Illness:   Yesi Angel is a 69 y.o. male who presents with syncope. Patient presents to the ER at [facility] with an episode of syncope versus seizure, patient has a history of orthostatic hypotension, woke up at 3 AM this morning and became confused, and collapsed within the toilet and the wall, patient reports that after a while he called his wife, and was brought to the hospital, not sure whether he had a seizure or syncope, was requested to be admitted to the hospitalist service, currently patient denies any complaints or problems    The patient denies any headache, blurry vision, sore throat, trouble swallowing, trouble with speech, chest pain, SOB, cough, fever, chills, N/V/D, abd pain, urinary symptoms, constipation, recent travels, sick contacts, focal or generalized neurological symptoms,  rashes, contact with COVID-19 diagnosed patients, hematemesis, melena, hemoptysis, hematuria, rashes, denies starting any new medications and denies any other concerns or problems besides as mentioned above. REVIEW OF SYSTEMS:  A comprehensive review of systems was negative except for that written in the History of Present Illness.      Past Medical History:   Diagnosis Date    Altered mental status, unspecified 11/9/2018    Anxiety     Ataxia 11/9/2018    Back pain     Blurred vision     Bradycardia 10/2/2018    Chest pain     Depression     Dizziness     Dizziness and giddiness 10/17/2019    Dysautonomia ([address]     Fast heart beat     Fever 11/9/2018    Frequent headaches     Frequent urination     Hip dysplasia, congenital     Hx of syncope 10/24/2018    Hypotension     Ingrown left big toenail 3/31/2017    Joint pain     Joint swelling Lumbar spinal stenosis     Memory deficit 9/11/2018    Muscle pain     Neuropathy     Orthostatic hypotension     asymptomatic    Pacemaker     new pacemaker July 2019    Recent change in weight     Risk for falls 12/13/2016    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 11/8/2018    Syncope     Tiredness     Trouble in sleeping     Weakness       Past Surgical History:   Procedure Laterality Date    HX HIP REPLACEMENT Bilateral 1993    HX PACEMAKER PLACEMENT  10/16/2018    HX ROTATOR CUFF REPAIR Right 11/16/2017    DE CARDIAC SURG PROCEDURE UNLIST      DE INS NEW/RPLCMT PRM PM W/TRANSV ELTRD ATRIAL&VENT N/A 7/30/2019    INSERT PPM DUAL performed by Veronika Kidd MD at [place] [address] LAB     Prior to Admission medications    Medication Sig Start Date End Date Taking? Authorizing Provider   polyethylene glycol (MIRALAX) 17 gram packet Take 1 Packet by mouth. 11/10/22   Sandra Su MD   gabapentin (NEURONTIN) 600 mg tablet Take 0.5 Tablets by mouth two (2) times a day. Max Daily Amount: 600 mg. 11/10/22   Sandra Su MD   midodrine (PROAMATINE) 5 mg tablet Take 1 Tablet by mouth five (5) times daily for 30 days. 11/10/22 12/10/22  Sandra Su MD   tiotropium bromide (Spiriva Respimat) 2.5 mcg/actuation inhaler Take 2 Puffs by inhalation daily. Indications: bronchospasm prevention with COPD, Continue using as prior to admission 11/11/22   Sandra Su MD   clonazePAM (KlonoPIN) 0.5 mg tablet Take 1 Tablet by mouth nightly.  Max Daily Amount: 0.5 mg. 8/22/22   Jono GARCIA NP   atorvastatin (LIPITOR) 40 mg tablet Take 1 Tablet by mouth in the morning. 7/29/22   Jono GARCIA NP   fludrocortisone (FLORINEF) 0.1 mg tablet TAKE 1 TABLET BY MOUTH TWICE DAILY 12/27/19   Urbano Molina MD   DULoxetine (CYMBALTA) 60 mg capsule TAKE 1 CAPSULE BY MOUTH DAILY 10/30/19   Michael GARCIA NP   acetaminophen (TYLENOL) 650 mg TbER Take 650 mg by mouth every eight (8) hours as needed for Pain. 10/13/19   Maiyto Molina MD   aspirin 81 mg chewable tablet Take 1 Tab by mouth daily. 9/3/19   Joann Delcid NP   finasteride (PROSCAR) 5 mg tablet TAKE 1 TABLET BY MOUTH DAILY(PROSTATE) 2/1/19   Mayito Molina MD     No Known Allergies   Family History   Problem Relation Age of Onset    Cancer Mother     Cancer Maternal Aunt       Social History:  reports that he quit smoking about 3 years ago. His smoking use included cigarettes. He has a 12.50 pack-year smoking history. He has never used smokeless tobacco. He reports that he does not currently use alcohol after a past usage of about 28.0 standard drinks per week. He reports that he does not use drugs. Family and social history were personally reviewed, all pertinent and relevant details are outlined as above. Objective:   Visit Vitals  BP (!) 156/90 (BP 1 Location: Left upper arm, BP Patient Position: At rest)   Pulse 82   Temp 98.3 °F (36.8 °C)   Resp 18   Ht 5' 11\" (1.803 m)   Wt 77.6 kg (171 lb)   SpO2 95%   BMI 23.85 kg/m²      O2 Device: None (Room air)    PHYSICAL EXAM:   General: Alert x oriented x 3, awake,  HEENT: PEERL, EOMI, moist mucus membranes  Neck: Supple, no JVD, no meningeal signs  Chest: Clear to auscultation bilaterally   CVS: RRR, S1 S2 heard, no murmurs/rubs/gallops  Abd: Soft, non-tender, non-distended, +bowel sounds   Ext: No clubbing, no cyanosis, no edema  Neuro/Psych: Pleasant mood and affect, CN 2-12 grossly intact, sensory grossly within normal limit, Strength 5/5 in all extremities, DTR 1+ x 4  Cap refill: Brisk, less than 3 seconds  Pulses: 2+, symmetric in all extremities  Skin: Warm, dry, without rashes or lesions    Data Review: All diagnostic labs and studies have been reviewed.     Abnormal Labs Reviewed - No abnormal labs to display    All Micro Results       None            IMAGING:   No orders to display        ECG/ECHO:    Results for orders placed or performed during the hospital encounter of 11/16/22   EKG, 12 LEAD, INITIAL   Result Value Ref Range    Ventricular Rate 80 BPM    Atrial Rate 80 BPM    P-R Interval 194 ms    QRS Duration 86 ms    Q-T Interval 388 ms    QTC Calculation (Bezet) 447 ms    Calculated R Axis 41 degrees    Calculated T Axis 42 degrees    Diagnosis       Atrial-paced rhythm  Nonspecific ST abnormality  Abnormal ECG  When compared with ECG of 16-NOV-2022 05:23,  MANUAL COMPARISON REQUIRED, DATA IS UNCONFIRMED          Assessment:   Given the patient's current clinical presentation, there is a high level of concern for decompensation if discharged from the emergency department. Complex decision making was performed, which includes reviewing the patient's available past medical records, laboratory results, and imaging studies. Syncope  Accelerated hypertension  Hypokalemia  Dehydration  Plan:     Patient will be admitted on a telemetry bed, most likely syncope, seizure unlikely, orthostatic vitals, TSH, B12, echocardiogram, continue Florinef, PT OT consult, IV hydration, if symptoms persist may consider further intervention and diagnostics  Optimize blood pressure control, continue to monitor  Replace potassium check BMP  IV hydration        DIET: ADULT DIET Regular; 4 carb choices (60 gm/meal); Low Fat/Low Chol/High Fiber/GIO; Low Sodium (2 gm)   ISOLATION PRECAUTIONS: There are currently no Active Isolations  CODE STATUS: Full Code   DVT PROPHYLAXIS: Heparin  FUNCTIONAL STATUS PRIOR TO HOSPITALIZATION: Fully active and ambulatory; able to carry on all self-care without restriction. Ambulatory status/function: By self   EARLY MOBILITY ASSESSMENT: Recommend an assessment from physical therapy and/or occupational therapy  ANTICIPATED DISCHARGE: 24-48 hours.   ANTICIPATED DISPOSITION: Home with Home Healthcare      Signed By: Milagros Vargas MD     November 16, 2022         Please note that this dictation may have been completed with Dragon, the computer voice recognition software. Quite often unanticipated grammatical, syntax, homophones, and other interpretive errors are inadvertently transcribed by the computer software. Please disregard these errors. Please excuse any errors that have escaped final proofreading.

## 2022-11-16 NOTE — ED PROVIDER NOTES
EMERGENCY DEPARTMENT HISTORY AND PHYSICAL EXAM          Date: 11/16/2022  Patient Name: Purvi Mcclelland    History of Presenting Illness     Chief Complaint   Patient presents with    Fall       History Provided By: Patient and Patient's Wife    HPI: Purvi Mcclelland is a 69 y.o. male, pmhx below, who was brought to the ED by EMS because of an episode of seizing this am. He has had several months of worsening orthostatic hypotension and was recently admitted to [facility] for 4 days, from 11/06 to 11/10. His doses of midodrine were adjusted, and then he was sent home. He has been able to get by at home, but he has still had episodes of hypotension whenever he stands up. This am at 0300 he got up to walk to the bathroom and he became confused as he got to the toilet and collapsed between the toilet and the wall. After he was lying on the floor, he was able to think more clearly. His wife called EMS, who transported the patient to the ED in stable condition, able to answer questions in his halting baseline manner of speech. PCP: Corazon Betancourt MD    Allergies: NKDA  Social Hx: -tobacco, -vaping, -EtOH, -Illicit Drugs; Lives locally c wife. There are no other complaints, changes, or physical findings at this time. Current Facility-Administered Medications   Medication Dose Route Frequency Provider Last Rate Last Admin    sodium chloride (NS) flush 5-10 mL  5-10 mL IntraVENous Radha Manzanares MD         Current Outpatient Medications   Medication Sig Dispense Refill    polyethylene glycol (MIRALAX) 17 gram packet Take 1 Packet by mouth.      gabapentin (NEURONTIN) 600 mg tablet Take 0.5 Tablets by mouth two (2) times a day. Max Daily Amount: 600 mg. 1 Tablet 0    midodrine (PROAMATINE) 5 mg tablet Take 1 Tablet by mouth five (5) times daily for 30 days. 150 Tablet 0    tiotropium bromide (Spiriva Respimat) 2.5 mcg/actuation inhaler Take 2 Puffs by inhalation daily.  Indications: bronchospasm prevention with COPD, Continue using as prior to admission 4 g 0    clonazePAM (KlonoPIN) 0.5 mg tablet Take 1 Tablet by mouth nightly. Max Daily Amount: 0.5 mg. 90 Tablet 0    atorvastatin (LIPITOR) 40 mg tablet Take 1 Tablet by mouth in the morning. 90 Tablet 0    fludrocortisone (FLORINEF) 0.1 mg tablet TAKE 1 TABLET BY MOUTH TWICE DAILY 28 Tab 3    DULoxetine (CYMBALTA) 60 mg capsule TAKE 1 CAPSULE BY MOUTH DAILY 90 Cap 1    acetaminophen (TYLENOL) 650 mg TbER Take 650 mg by mouth every eight (8) hours as needed for Pain. aspirin 81 mg chewable tablet Take 1 Tab by mouth daily.  90 Tab 2    finasteride (PROSCAR) 5 mg tablet TAKE 1 TABLET BY MOUTH DAILY(PROSTATE) 90 Tab 0       Past History     Past Medical History:  Past Medical History:   Diagnosis Date    Altered mental status, unspecified 11/9/2018    Anxiety     Ataxia 11/9/2018    Back pain     Blurred vision     Bradycardia 10/2/2018    Chest pain     Depression     Dizziness     Dizziness and giddiness 10/17/2019    Dysautonomia ([address]     Fast heart beat     Fever 11/9/2018    Frequent headaches     Frequent urination     Hip dysplasia, congenital     Hx of syncope 10/24/2018    Hypotension     Ingrown left big toenail 3/31/2017    Joint pain     Joint swelling     Lumbar spinal stenosis     Memory deficit 9/11/2018    Muscle pain     Neuropathy     Orthostatic hypotension     asymptomatic    Pacemaker     new pacemaker July 2019    Recent change in weight     Risk for falls 12/13/2016    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 11/8/2018    Syncope     Tiredness     Trouble in sleeping     Weakness        Past Surgical History:  Past Surgical History:   Procedure Laterality Date    HX HIP REPLACEMENT Bilateral 1993    HX PACEMAKER PLACEMENT  10/16/2018    HX ROTATOR CUFF REPAIR Right 11/16/2017    [facility]ED[facility]LIST      [facility] INS NEW/RPLCMT PRM PM W/TRANSV ELTRD ATRIAL&VENT N/A 7/30/2019    INSERT PPM DUAL performed by Shella Goodell, MD at \[facility]\"" CARDIAC CATH LAB       Family History:  Family History   Problem Relation Age of Onset    Cancer Mother     Cancer Maternal Aunt        Social History:  Social History     Tobacco Use    Smoking status: Former     Packs/day: 0.25     Years: 50.00     Pack years: 12.50     Types: Cigarettes     Quit date: 8/3/2019     Years since quitting: 3.2    Smokeless tobacco: Never   Substance Use Topics    Alcohol use: Not Currently     Alcohol/week: 28.0 standard drinks     Types: 28 Cans of beer per week     Comment: None now    Drug use: No       Allergies:  No Known Allergies      Review of Systems   Review of Systems   Constitutional:  Negative for appetite change and fever. HENT:  Negative for congestion, nosebleeds and trouble swallowing. Respiratory:  Negative for cough and shortness of breath. Cardiovascular:  Negative for chest pain and leg swelling. Gastrointestinal:  Negative for abdominal pain, constipation and nausea. Genitourinary:  Negative for dysuria and flank pain. Musculoskeletal:  Negative for back pain and neck pain. Skin:  Negative for rash. Neurological:  Negative for light-headedness and headaches. Hematological:  Does not bruise/bleed easily. Physical Exam   Physical Exam  Vitals reviewed. Nursing note reviewed: Older pleasant man lying in bed, head at 30 degrees. Constitutional:       Appearance: Normal appearance. HENT:      Head: Normocephalic. Right Ear: External ear normal.      Left Ear: External ear normal.      Nose: Nose normal.      Mouth/Throat:      Mouth: Mucous membranes are moist.   Eyes:      Pupils: Pupils are equal, round, and reactive to light. Cardiovascular:      Rate and Rhythm: Normal rate and regular rhythm. Pulses: Normal pulses.    Pulmonary:      Effort: Pulmonary effort is normal.      Breath sounds: No wheezing or rales. Abdominal:      General: Abdomen is flat. Palpations: Abdomen is soft. Tenderness: There is no abdominal tenderness. There is no guarding. Musculoskeletal:         General: No deformity or signs of injury. Cervical back: Normal range of motion and neck supple. No tenderness. Right lower leg: No edema. Left lower leg: No edema. Skin:     General: Skin is warm and dry. Neurological:      Mental Status: He is alert and oriented to person, place, and time. Mental status is at baseline. Cranial Nerves: No cranial nerve deficit. Sensory: No sensory deficit. Motor: No weakness. Diagnostic Study Results     Labs -     Recent Results (from the past 12 hour(s))   CBC WITH AUTOMATED DIFF    Collection Time: 11/16/22  4:02 AM   Result Value Ref Range    WBC 7.7 4.1 - 11.1 K/uL    RBC 3.74 (L) 4.10 - 5.70 M/uL    HGB 11.8 (L) 12.1 - 17.0 g/dL    HCT 35.3 (L) 36.6 - 50.3 %    MCV 94.4 80.0 - 99.0 FL    MCH 31.6 26.0 - 34.0 PG    MCHC 33.4 30.0 - 36.5 g/dL    RDW 12.7 11.5 - 14.5 %    PLATELET 051 829 - 717 K/uL    MPV 10.1 8.9 - 12.9 FL    NRBC 0.0 0  WBC    ABSOLUTE NRBC 0.00 0.00 - 0.01 K/uL    NEUTROPHILS 65 32 - 75 %    LYMPHOCYTES 22 12 - 49 %    MONOCYTES 9 5 - 13 %    EOSINOPHILS 3 0 - 7 %    BASOPHILS 1 0 - 1 %    IMMATURE GRANULOCYTES 0 0.0 - 0.5 %    ABS. NEUTROPHILS 5.0 1.8 - 8.0 K/UL    ABS. LYMPHOCYTES 1.7 0.8 - 3.5 K/UL    ABS. MONOCYTES 0.7 0.0 - 1.0 K/UL    ABS. EOSINOPHILS 0.3 0.0 - 0.4 K/UL    ABS. BASOPHILS 0.1 0.0 - 0.1 K/UL    ABS. IMM.  GRANS. 0.0 0.00 - 0.04 K/UL    DF AUTOMATED     METABOLIC PANEL, COMPREHENSIVE    Collection Time: 11/16/22  4:02 AM   Result Value Ref Range    Sodium 143 136 - 145 mmol/L    Potassium 3.4 (L) 3.5 - 5.1 mmol/L    Chloride 107 97 - 108 mmol/L    CO2 32 21 - 32 mmol/L    Anion gap 4 (L) 5 - 15 mmol/L    Glucose 115 (H) 65 - 100 mg/dL    BUN 23 (H) 6 - 20 MG/DL    Creatinine 1.16 0.70 - 1.30 MG/DL    BUN/Creatinine ratio 20 12 - 20      eGFR >60 >60 ml/min/1.73m2    Calcium 8.5 8.5 - 10.1 MG/DL    Bilirubin, total 0.8 0.2 - 1.0 MG/DL    ALT (SGPT) 17 12 - 78 U/L    AST (SGOT) 12 (L) 15 - 37 U/L    Alk. phosphatase 99 45 - 117 U/L    Protein, total 6.5 6.4 - 8.2 g/dL    Albumin 3.5 3.5 - 5.0 g/dL    Globulin 3.0 2.0 - 4.0 g/dL    A-G Ratio 1.2 1.1 - 2.2     TROPONIN-HIGH SENSITIVITY    Collection Time: 11/16/22  4:02 AM   Result Value Ref Range    Troponin-High Sensitivity 9 0 - 76 ng/L   EKG, 12 LEAD, INITIAL    Collection Time: 11/16/22  4:06 AM   Result Value Ref Range    Ventricular Rate 81 BPM    Atrial Rate 75 BPM    QRS Duration 84 ms    Q-T Interval 382 ms    QTC Calculation (Bezet) 443 ms    Calculated R Axis 42 degrees    Calculated T Axis 43 degrees    Diagnosis       * Suspect unspecified pacemaker failure  Atrial fibrillation  Abnormal ECG  When compared with ECG of 07-NOV-2022 12:49,  Atrial fibrillation has replaced Electronic atrial pacemaker     LACTIC ACID    Collection Time: 11/16/22  4:23 AM   Result Value Ref Range    Lactic acid 1.0 0.4 - 2.0 MMOL/L   EKG, 12 LEAD, INITIAL    Collection Time: 11/16/22  5:23 AM   Result Value Ref Range    Ventricular Rate 81 BPM    Atrial Rate 81 BPM    P-R Interval 118 ms    QRS Duration 92 ms    Q-T Interval 402 ms    QTC Calculation (Bezet) 466 ms    Calculated R Axis 42 degrees    Calculated T Axis 28 degrees    Diagnosis        Suspect unspecified pacemaker failure  Normal sinus rhythm with sinus arrhythmia  Normal ECG  When compared with ECG of 16-NOV-2022 04:06,  MANUAL COMPARISON REQUIRED, DATA IS UNCONFIRMED             Medical Decision Making   I am the first provider for this patient. I reviewed the vital signs, available nursing notes, past medical history, past surgical history, family history and social history. Vital Signs-Reviewed the patient's vital signs.   Patient Vitals for the past 12 hrs:   Pulse Resp BP SpO2   11/16/22 0647 81 12 (!) 180/91 93 %   11/16/22 0632 80 19 (!) 185/81 92 %   11/16/22 0619 80 17 (!) 192/98 93 %   11/16/22 0604 80 19 (!) 193/91 93 %   11/16/22 0549 81 11 (!) 190/92 90 %   11/16/22 0538 82 15 -- 93 %   11/16/22 0523 80 19 (!) 191/89 91 %   11/16/22 0516 80 16 (!) 200/93 93 %   11/16/22 0501 80 14 (!) 189/91 91 %   11/16/22 0500 80 15 -- 90 %   11/16/22 0456 80 15 -- 91 %   11/16/22 0445 80 15 (!) 192/93 91 %   11/16/22 0430 80 15 (!) 176/130 90 %   11/16/22 0415 -- -- (!) 187/90 --   11/16/22 0413 80 15 (!) 203/85 90 %       Pulse Oximetry Analysis - 93% on RA    Cardiac Monitor:   Rate: 80 bpm  Rhythm: Paced      Records Reviewed: Nursing Notes, Old Medical Records, Previous electrocardiograms, Previous Radiology Studies, and Previous Laboratory Studies    Provider Notes (Medical Decision Making):   MDM: Patient with severe orthostatic hypotension under treatment for several months, worsening. Has been admitted here 2 weeks ago. Needs higher level of care. ED Course:   Initial assessment performed. The patients presenting problems have been discussed, and they are in agreement with the care plan formulated and outlined with them. I have encouraged them to ask questions as they arise throughout their visit. EKG interpretation: (Preliminary)  Rhythm: Atrial paced rhythm, HR 80, No ST changes. This EKG was interpreted by ED Provider Dr Keturah Lee MD    PROGRESS NOTE:    ED Course as of 11/16/22 0723 Wed Nov 16, 2022   0648 Case discussed with Dr. Shu Pedro (Hospitalist) and Dr. Arline Walters (ED).  Accepted to the ED at [facility]. [VG]      ED Course User Index  [VG] Izabel Myles MD          CRITICAL CARE NOTE :    7:27 AM    IMPENDING DETERIORATION -Airway, Cardiovascular, and CNS      ASSOCIATED RISK FACTORS - Hypotension, Shock, CNS Decompensation    MANAGEMENT- Bedside Assessment, Supervision of Care, and Transfer    INTERPRETATION -  Blood Pressure    INTERVENTIONS - hemodynamic mngmt, vascular control, and Neurologic interventions     CASE REVIEW - Hospitalist/Intensivist and Medical Sub-Specialist    TREATMENT RESPONSE -Stable    PERFORMED BY - Self      NOTES   :    I have spent 45 minutes of critical care time involved in lab review, consultations with specialist, family decision- making, bedside attention and documentation. During this entire length of time I was immediately available to the patient . Jayda Osborn MD      Diagnosis     Clinical Impression:   1. Orthostatic hypotension        PLAN:    Transfer [facility] ED          ADDENDUM   1 Dec 2022    Critical care time excludes separate billable procedures.

## 2022-11-16 NOTE — ED NOTES
Spoke with Saint Haymaker at [facility] to verify that Pacemaker interrogation was received. Representative to be called by Saint Haymaker to verify.

## 2022-11-16 NOTE — ED TRIAGE NOTES
Pt arrives as transfer from [facility] for neuro and cardiology consult. Seen for syncope, has pmh of orthostatic hypotension, takes midodrine PRN. States he has been taking midodrine more over the last few days. Arrives without complaints.

## 2022-11-16 NOTE — PROGRESS NOTES
Contacted [facility] to arrange ALS transport of patient to [facility] ED. Spoke with Per Collazo. Discussed patient condition, and need for cardiac monitoring. ETA is 0730. ED is aware.

## 2022-11-16 NOTE — PROGRESS NOTES
Admission Medication Reconciliation:    Information obtained from:  Spouse via telephone  RxQuery data available¹:  YES    Comments/Recommendations: Updated PTA meds/reviewed patient's allergies. 1)  PTA med list reviewed with spouse via telephone    2)  Medication changes (since last review):       -None   3)  Donepezil was stopped on recent discharge from the hospital.  Remains on the list for reference purposes. ¹RxQuery pharmacy benefit data reflects medications filled and processed through the patient's insurance, however   this data does NOT capture whether the medication was picked up or is currently being taken by the patient. Allergies:  Patient has no known allergies.     Significant PMH/Disease States:   Past Medical History:   Diagnosis Date    Altered mental status, unspecified 11/9/2018    Anxiety     Ataxia 11/9/2018    Back pain     Blurred vision     Bradycardia 10/2/2018    Chest pain     Depression     Dizziness     Dizziness and giddiness 10/17/2019    Dysautonomia ([address]     Fast heart beat     Fever 11/9/2018    Frequent headaches     Frequent urination     Hip dysplasia, congenital     Hx of syncope 10/24/2018    Hypotension     Ingrown left big toenail 3/31/2017    Joint pain     Joint swelling     Lumbar spinal stenosis     Memory deficit 9/11/2018    Muscle pain     Neuropathy     Orthostatic hypotension     asymptomatic    Pacemaker     new pacemaker July 2019    Recent change in weight     Risk for falls 12/13/2016    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 DULoxetine (CYMBALTA) 60 mg capsule   Yes   Sig: TAKE 1 CAPSULE BY MOUTH DAILY   acetaminophen (TYLENOL) 650 mg TbER   Yes   Sig: Take 650 mg by mouth every eight (8) hours as needed for Pain. aspirin 81 mg chewable tablet   No   Sig: Take 1 Tab by mouth daily. atorvastatin (LIPITOR) 40 mg tablet   Yes   Sig: Take 1 Tablet by mouth in the morning. clonazePAM (KlonoPIN) 0.5 mg tablet   Yes   Sig: Take 1 Tablet by mouth nightly. Max Daily Amount: 0.5 mg.   donepeziL (ARICEPT) 10 mg tablet Not Taking  No   Sig: Take 5 mg by mouth nightly. Patient not taking: Reported on 11/16/2022   finasteride (PROSCAR) 5 mg tablet   Yes   Sig: TAKE 1 TABLET BY MOUTH DAILY(PROSTATE)   fludrocortisone (FLORINEF) 0.1 mg tablet   Yes   Sig: TAKE 1 TABLET BY MOUTH TWICE DAILY   gabapentin (NEURONTIN) 600 mg tablet   Yes   Sig: Take 0.5 Tablets by mouth two (2) times a day. Max Daily Amount: 600 mg.   midodrine (PROAMATINE) 5 mg tablet   Yes   Sig: Take 1 Tablet by mouth five (5) times daily for 30 days. polyethylene glycol (MIRALAX) 17 gram packet   No   Sig: Take 17 g by mouth daily as needed for Constipation. tiotropium bromide (Spiriva Respimat) 2.5 mcg/actuation inhaler   Yes   Sig: Take 2 Puffs by inhalation daily. Indications: bronchospasm prevention with COPD, Continue using as prior to admission      Facility-Administered Medications: None     Please contact the main inpatient pharmacy with any questions or concerns at [phone] and we will direct you to the clinical pharmacist covering this patient's care while in-house.    Sulma Barber PHARMD

## 2022-11-16 NOTE — ED NOTES
TRANSFER - OUT REPORT:    Verbal report given to  [facility] on João Adorno  being transferred to [facility] ED for routine progression of care       Report consisted of patients Situation, Background, Assessment and   Recommendations(SBAR). Information from the following report(s) SBAR, ED Summary, Intake/Output and MAR was reviewed with the receiving nurse. Lines:   Peripheral IV 11/16/22 Left Forearm (Active)   Site Assessment Clean, dry, & intact 11/16/22 0416   Phlebitis Assessment 0 11/16/22 0416   Infiltration Assessment 0 11/16/22 0416   Dressing Status Clean, dry, & intact 11/16/22 0416        Opportunity for questions and clarification was provided.

## 2022-11-16 NOTE — ED NOTES
TRANSFER - OUT REPORT:    Verbal report given to Matthew Darnell RN on [facility]  being transferred to [facility] ED) for routine progression of care       Report consisted of patients Situation, Background, Assessment and   Recommendations(SBAR). Information from the following report(s) SBAR, ED Summary, Intake/Output and MAR was reviewed with the receiving nurse. Lines:   Peripheral IV 11/16/22 Left Forearm (Active)   Site Assessment Clean, dry, & intact 11/16/22 0416   Phlebitis Assessment 0 11/16/22 0416   Infiltration Assessment 0 11/16/22 0416   Dressing Status Clean, dry, & intact 11/16/22 0416        Opportunity for questions and clarification was provided.       Patient transported via [facility] stretcher

## 2022-11-16 NOTE — ED NOTES
Spoke with patients wife who reports patient has been declining over the last few weeks, reporting pt has tremors and at times gets \"hard to handle\" as he won't take criticism. Pt also endorses frequent urination and need to take increasing amounts of midodrine. She spoke to PMD who referred them to ER and wants them to get neurology, urology and psychiatric consults. She is requesting to speak with the MD that is in charge of her husbands care, Dr Jeimy Underwood updated and he states he will call her soon.

## 2022-11-16 NOTE — ED PROVIDER NOTES
60-year-old male with history of anxiety, ataxia, back pain, bradycardia, dysautonomia, without hypertension, headaches, syncope, pacemaker, smoking presents to the emergency department as transfer from [facility] emergency room for admission, accepted by the hospitalist and prior emergency physician. He reports that he apparently had an episode of syncope or seizures morning. He remembers getting up to go the bathroom and then waking up on the floor. Previous hospital diagnosed him with orthostatic hypotension. Vitals at the outside hospital demonstrate hypertension. The history is provided by the patient, the EMS personnel and medical records. Syncope   This is a recurrent problem. The problem has been resolved. Pertinent negatives include no chest pain, no fever, no abdominal pain, no nausea, no vomiting, no headaches and no weakness. His past medical history is significant for syncope.       Past Medical History:   Diagnosis Date    Altered mental status, unspecified 11/9/2018    Anxiety     Ataxia 11/9/2018    Back pain     Blurred vision     Bradycardia 10/2/2018    Chest pain     Depression     Dizziness     Dizziness and giddiness 10/17/2019    Dysautonomia ([address]     Fast heart beat     Fever 11/9/2018    Frequent headaches     Frequent urination     Hip dysplasia, congenital     Hx of syncope 10/24/2018    Hypotension     Ingrown left big toenail 3/31/2017    Joint pain     Joint swelling     Lumbar spinal stenosis     Memory deficit 9/11/2018    Muscle pain     Neuropathy     Orthostatic hypotension     asymptomatic    Pacemaker     new pacemaker July 2019    Recent change in weight     Risk for falls 12/13/2016    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 11/8/2018    Syncope     Tiredness     Trouble in sleeping     Weakness        Past Surgical History:   Procedure Laterality Date    HX HIP REPLACEMENT Bilateral 1993    HX PACEMAKER PLACEMENT  10/16/2018    HX ROTATOR CUFF REPAIR Right 11/16/2017    WA CARDIAC SURG PROCEDURE UNLIST      WA INS NEW/RPLCMT PRM PM W/TRANSV ELTRD ATRIAL&VENT N/A 7/30/2019    INSERT PPM DUAL performed by Preeti Gautam MD at [facility] CARDIAC CATH LAB         Family History:   Problem Relation Age of Onset    Cancer Mother     Cancer Maternal Aunt        Social History     Socioeconomic History    Marital status:      Spouse name: Not on file    Number of children: Not on file    Years of education: Not on file    Highest education level: Not on file   Occupational History    Not on file   Tobacco Use    Smoking status: Former     Packs/day: 0.25     Years: 50.00     Pack years: 12.50     Types: Cigarettes     Quit date: 8/3/2019     Years since quitting: 3.2    Smokeless tobacco: Never   Substance and Sexual Activity    Alcohol use: Not Currently     Alcohol/week: 28.0 standard drinks     Types: 28 Cans of beer per week     Comment: None now    Drug use: No    Sexual activity: Never   Other Topics Concern    Not on file   Social History Narrative    Not on file     Social Determinants of Health     Financial Resource Strain: Not on file   Food Insecurity: Not on file   Transportation Needs: Not on file   Physical Activity: Not on file   Stress: Not on file   Social Connections: Not on file   Intimate Partner Violence: Not on file   Housing Stability: Not on file         ALLERGIES: Patient has no known allergies. Review of Systems   Constitutional:  Negative for fatigue and fever. HENT:  Negative for sneezing and sore throat. Respiratory:  Negative for cough and shortness of breath. Cardiovascular:  Positive for syncope. Negative for chest pain and leg swelling. Gastrointestinal:  Negative for abdominal pain, diarrhea, nausea and vomiting.    Genitourinary:  Negative for difficulty urinating and dysuria. Musculoskeletal:  Negative for arthralgias and myalgias. Skin:  Negative for color change and rash. Neurological:  Positive for syncope. Negative for weakness and headaches. Psychiatric/Behavioral:  Negative for agitation and behavioral problems. There were no vitals filed for this visit. Physical Exam  Vitals and nursing note reviewed. Constitutional:       General: He is not in acute distress. Appearance: Normal appearance. He is well-developed. He is not ill-appearing, toxic-appearing or diaphoretic. HENT:      Head: Normocephalic and atraumatic. Nose: Nose normal.      Mouth/Throat:      Mouth: Mucous membranes are moist.      Pharynx: Oropharynx is clear. Eyes:      Extraocular Movements: Extraocular movements intact. Conjunctiva/sclera: Conjunctivae normal.      Pupils: Pupils are equal, round, and reactive to light. Cardiovascular:      Rate and Rhythm: Normal rate and regular rhythm. Pulses: Normal pulses. Heart sounds: No murmur heard. Pulmonary:      Effort: Pulmonary effort is normal. No respiratory distress. Breath sounds: Normal breath sounds. No wheezing. Chest:      Chest wall: No mass or tenderness. Abdominal:      General: There is no distension. Palpations: Abdomen is soft. Tenderness: There is no abdominal tenderness. There is no guarding or rebound. Musculoskeletal:         General: No swelling, tenderness, deformity or signs of injury. Normal range of motion. Cervical back: Normal range of motion and neck supple. No rigidity. No muscular tenderness. Right lower leg: No tenderness. No edema. Left lower leg: No tenderness. No edema. Skin:     General: Skin is warm and dry. Capillary Refill: Capillary refill takes less than 2 seconds. Neurological:      General: No focal deficit present. Mental Status: He is alert and oriented to person, place, and time. Psychiatric:         Mood and Affect: Mood normal.         Behavior: Behavior normal.        MDM  Number of Diagnoses or Management Options  Diagnosis management comments: 69 old male presents as above with syncopal episode. He was sent to the emergency department transfer for admission. We will contact the hospitalist.           Procedures               Perfect Serve Consult for Admission  9:44 AM    ED Room Number: ER15/15  Patient Name and age:  Mercedes Houston 69 y.o.  male  Working Diagnosis:   1.  Syncope and collapse        COVID-19 Suspicion:  no  Sepsis present:  no  Reassessment needed: N/A  Code Status:  Full Code  Readmission: no  Isolation Requirements:  no  Recommended Level of Care:  telemetry  Department:[facility] Adult ED - 21   Other: Transfer from [address] [place] [address], accepted by Dr. Doris Whitt in transfer

## 2022-11-17 ENCOUNTER — APPOINTMENT (OUTPATIENT)
Dept: NON INVASIVE DIAGNOSTICS | Age: 77
DRG: 312 | End: 2022-11-17
Attending: FAMILY MEDICINE
Payer: MEDICARE

## 2022-11-17 LAB
ECHO AV PEAK GRADIENT: 8 MMHG
ECHO AV PEAK VELOCITY: 1.5 M/S
ECHO AV VELOCITY RATIO: 0.67
ECHO LV EJECTION FRACTION A2C: 39 %
ECHO LV EJECTION FRACTION A4C: 50 %
ECHO LVOT PEAK GRADIENT: 4 MMHG
ECHO LVOT PEAK VELOCITY: 1 M/S
ECHO MV A VELOCITY: 1.2 M/S
ECHO MV AREA PHT: 2.2 CM2
ECHO MV E DECELERATION TIME (DT): 347.4 MS
ECHO MV E VELOCITY: 0.85 M/S
ECHO MV E/A RATIO: 0.71
ECHO MV PRESSURE HALF TIME (PHT): 100.8 MS
ECHO PV MAX VELOCITY: 0.8 M/S
ECHO PV PEAK GRADIENT: 2 MMHG
GLUCOSE BLD STRIP.AUTO-MCNC: 141 MG/DL (ref 65–117)
GLUCOSE BLD STRIP.AUTO-MCNC: 166 MG/DL (ref 65–117)
SERVICE CMNT-IMP: ABNORMAL
SERVICE CMNT-IMP: ABNORMAL

## 2022-11-17 PROCEDURE — 94760 N-INVAS EAR/PLS OXIMETRY 1: CPT

## 2022-11-17 PROCEDURE — 82962 GLUCOSE BLOOD TEST: CPT

## 2022-11-17 PROCEDURE — 74011250637 HC RX REV CODE- 250/637: Performed by: FAMILY MEDICINE

## 2022-11-17 PROCEDURE — 74011250636 HC RX REV CODE- 250/636: Performed by: FAMILY MEDICINE

## 2022-11-17 PROCEDURE — 65270000046 HC RM TELEMETRY

## 2022-11-17 PROCEDURE — 95816 EEG AWAKE AND DROWSY: CPT | Performed by: PSYCHIATRY & NEUROLOGY

## 2022-11-17 PROCEDURE — 94762 N-INVAS EAR/PLS OXIMTRY CONT: CPT

## 2022-11-17 PROCEDURE — 99223 1ST HOSP IP/OBS HIGH 75: CPT | Performed by: PSYCHIATRY & NEUROLOGY

## 2022-11-17 PROCEDURE — 77010033678 HC OXYGEN DAILY

## 2022-11-17 PROCEDURE — 93306 TTE W/DOPPLER COMPLETE: CPT

## 2022-11-17 RX ADMIN — FLUDROCORTISONE ACETATE 0.1 MG: 0.1 TABLET ORAL at 17:59

## 2022-11-17 RX ADMIN — HEPARIN SODIUM 5000 UNITS: 5000 INJECTION INTRAVENOUS; SUBCUTANEOUS at 03:14

## 2022-11-17 RX ADMIN — HEPARIN SODIUM 5000 UNITS: 5000 INJECTION INTRAVENOUS; SUBCUTANEOUS at 17:59

## 2022-11-17 RX ADMIN — HEPARIN SODIUM 5000 UNITS: 5000 INJECTION INTRAVENOUS; SUBCUTANEOUS at 09:44

## 2022-11-17 RX ADMIN — FINASTERIDE 5 MG: 5 TABLET, FILM COATED ORAL at 09:42

## 2022-11-17 RX ADMIN — FLUDROCORTISONE ACETATE 0.1 MG: 0.1 TABLET ORAL at 09:43

## 2022-11-17 RX ADMIN — ASPIRIN 81 MG 81 MG: 81 TABLET ORAL at 09:43

## 2022-11-17 RX ADMIN — DULOXETINE HYDROCHLORIDE 60 MG: 60 CAPSULE, DELAYED RELEASE ORAL at 09:44

## 2022-11-17 RX ADMIN — ATORVASTATIN CALCIUM 40 MG: 40 TABLET, FILM COATED ORAL at 09:43

## 2022-11-17 NOTE — PROGRESS NOTES
[facility] Adult  Hospitalist Group                                                                                          Hospitalist Progress Note  Elin Richardson MD  Answering service: [phone] OR [phone] from in house phone        Date of Service:  2022  NAME:  Monica Heard  :  1945  MRN:  [id number]      Admission Summary:   Monica Heard is a 69 y.o. male who presents with syncope. Patient presents to the ER at [facility] with an episode of syncope versus seizure, patient has a history of orthostatic hypotension, woke up at 3 AM this morning and became confused, and collapsed within the toilet and the wall, patient reports that after a while he called his wife, and was brought to the hospital, not sure whether he had a seizure or syncope, was requested to be admitted to the hospitalist service, currently patient denies any complaints or problems    The patient denies any headache, blurry vision, sore throat, trouble swallowing, trouble with speech, chest pain, SOB, cough, fever, chills, N/V/D, abd pain, urinary symptoms, constipation, recent travels, sick contacts, focal or generalized neurological symptoms,  rashes, contact with COVID-19 diagnosed patients, hematemesis, melena, hemoptysis, hematuria, rashes, denies starting any new medications and denies any other concerns or problems besides as mentioned above. Interval history / Subjective: Additional history was obtained from patient's wife. It looks like patient \"may have had\" a stroke about a year ago, but wife is not sure. He was at some point placed on an antiseizure medication, but per wife, there was not enough evidence to support seizures, and patient was taken off of it. Per wife, he becomes rigid and has rhythmic activity of both head and body, he is not able to see or answer during those episodes, he has lost control of bladder and bowels on many occasions, and hit his head.   He also has paranoid thoughts, that she attributes to PTSD. For instance, he wakes up at night, and \"runs\" to the bathroom, because \"I need to help those people\". Assessment & Plan:         Recurrent syncope:  - history is highly suggestive of seizures;   - Neurology consulted;   - per wife, he recently had an MRI of the brain, so I will hold off on ordering one;     Uncontrolled Hypertension:  - patient has a history of orthostatic hypotension, used to be on Midodrine, was taken off of it due to possibly urinary side effects; he was placed on Florinef instead;     PTSD:  - consult Psychiatry;     BPH:  - on Proscar, will change administration to evening time; Dyslipidemia:  - on statin;       Code status:  Full  Prophylaxis:  B SCD's  Care Plan discussed with: patient, wife at the bedside, RN, LAURA;   Anticipated Disposition: wife requested transfer to the [facility];     Complex decision making was performed, which includes reviewing the patient's available past medical records, laboratory results, and imaging studies. Hospital Problems  Date Reviewed: 9/19/2022            Codes Class Noted POA    Syncope ICD-10-CM: R55  ICD-9-CM: 780.2  11/16/2022 Unknown             Review of Systems:   A comprehensive review of systems was negative except for that written in the HPI. Vital Signs:    Last 24hrs VS reviewed since prior progress note.  Most recent are:  Visit Vitals  BP (!) 168/74 (BP 1 Location: Left upper arm, BP Patient Position: At rest)   Pulse 81   Temp 98.7 °F (37.1 °C)   Resp 26   Ht 5' 11\" (1.803 m)   Wt 77.6 kg (171 lb)   SpO2 100%   BMI 23.85 kg/m²         Intake/Output Summary (Last 24 hours) at 11/17/2022 1426  Last data filed at 11/17/2022 1153  Gross per 24 hour   Intake 1421.25 ml   Output 1275 ml   Net 146.25 ml        Physical Examination:     I had a face to face encounter with this patient and independently examined them on 11/17/2022 as outlined below:          Constitutional: No acute distress, cooperative, pleasant    ENT:  Oral mucosa moist, oropharynx benign. Resp:  CTA bilaterally. No wheezing/rhonchi/rales. No accessory muscle use. CV:  Regular rhythm, normal rate, no murmurs, gallops, rubs    GI:  Soft, non distended, non tender. normoactive bowel sounds, no hepatosplenomegaly     Musculoskeletal:  No edema, warm, 2+ pulses throughout    Neurologic:  Moves all extremities. AAOx3, CN II-XII reviewed            Data Review:    Review and/or order of clinical lab test  Review and/or order of tests in the radiology section of CPT  Review and/or order of tests in the medicine section of CPT      Labs:     Recent Labs     11/16/22  1023 11/16/22  0402   WBC 6.8 7.7   HGB 11.7* 11.8*   HCT 34.6* 35.3*    184     Recent Labs     11/16/22  1023 11/16/22  0402    143   K 3.2* 3.4*   * 107   CO2 31 32   BUN 17 23*   CREA 0.87 1.16   * 115*   CA 8.2* 8.5   MG 2.4  --      Recent Labs     11/16/22  0402   ALT 17   AP 99   TBILI 0.8   TP 6.5   ALB 3.5   GLOB 3.0     No results for input(s): INR, PTP, APTT, INREXT in the last 72 hours. No results for input(s): FE, TIBC, PSAT, FERR in the last 72 hours. Lab Results   Component Value Date/Time    Folate 14.3 11/08/2022 05:44 AM      No results for input(s): PH, PCO2, PO2 in the last 72 hours.   Recent Labs     11/16/22  1023   CPK 61     Lab Results   Component Value Date/Time    Cholesterol, total 95 09/19/2022 12:13 PM    HDL Cholesterol 48 09/19/2022 12:13 PM    LDL, calculated 28.4 09/19/2022 12:13 PM    Triglyceride 93 09/19/2022 12:13 PM    CHOL/HDL Ratio 2.0 09/19/2022 12:13 PM     Lab Results   Component Value Date/Time    Glucose (POC) 166 (H) 11/17/2022 11:52 AM    Glucose (POC) 110 (H) 07/02/2019 07:41 PM    Glucose (POC) 68 07/02/2019 05:51 PM     Lab Results   Component Value Date/Time    Color YELLOW/STRAW 11/06/2022 07:15 PM    Appearance CLEAR 11/06/2022 07:15 PM    Specific gravity 1.010 11/06/2022 07:15 PM    Specific gravity 1.006 10/26/2022 06:25 PM    pH (UA) 7.0 11/06/2022 07:15 PM    Protein Negative 11/06/2022 07:15 PM    Glucose Negative 11/06/2022 07:15 PM    Ketone Negative 11/06/2022 07:15 PM    Bilirubin Negative 11/06/2022 07:15 PM    Urobilinogen 0.2 11/06/2022 07:15 PM    Nitrites Negative 11/06/2022 07:15 PM    Leukocyte Esterase Negative 11/06/2022 07:15 PM    Epithelial cells FEW 11/06/2022 07:15 PM    Bacteria Negative 11/06/2022 07:15 PM    WBC 0-4 11/06/2022 07:15 PM    RBC 10-20 11/06/2022 07:15 PM         Medications Reviewed:     Current Facility-Administered Medications   Medication Dose Route Frequency    aspirin chewable tablet 81 mg  81 mg Oral DAILY    atorvastatin (LIPITOR) tablet 40 mg  40 mg Oral DAILY    DULoxetine (CYMBALTA) capsule 60 mg  60 mg Oral DAILY    finasteride (PROSCAR) tablet 5 mg  5 mg Oral DAILY    fludrocortisone (FLORINEF) tablet 0.1 mg  0.1 mg Oral BID    [Held by provider] midodrine (PROAMATINE) tablet 5 mg  5 mg Oral 5XD    sodium chloride (NS) flush 5-40 mL  5-40 mL IntraVENous Q8H    sodium chloride (NS) flush 5-40 mL  5-40 mL IntraVENous PRN    0.9% sodium chloride infusion  75 mL/hr IntraVENous CONTINUOUS    acetaminophen (TYLENOL) tablet 650 mg  650 mg Oral Q4H PRN    heparin (porcine) injection 5,000 Units  5,000 Units SubCUTAneous Q8H     ______________________________________________________________________  EXPECTED LENGTH OF STAY: 2d 7h  ACTUAL LENGTH OF STAY:          1                 Sarahi Wilkerson MD

## 2022-11-17 NOTE — PROGRESS NOTES
JOSE:  RUR: 12%    Disposition:  12%        Chart reviewed,  Patient transferred here from [facility] ([facility]) on 11/16/22. The patient had a recent hospitalization at [facility] 11/6 to 11/11/22. Prior to this hospitalization, the patient  lived with his wife (Cristal Lacy [phone]) in a 1-story residence. There is a wheelchair ramp attached to the residence. PharmCosimo Pew  Previous: formerly [facility]  [address] 2019  Per last CM note. Home health had been set up with [facility]  DME: transfer chair, wheelchair, walker, w/c ramp    IPR: [facility] 2018    CM met with the patient to introduce self and role. The patient was very guarded in his responses. He pointed to equipment in the room as his wife \"Divya:    Patient responded multiple time that his information was confidential and he was unable to release information without authorization. This was in response to CM asking patient where he lived and his last PCP visit. The patient is retired and served in the  [address] [place] [address] ([employer]). He confirms being connected to the [facility] in [place], [place]. Per previous CM note, wife had noticed  decline in patient's functional status over days leading up to his hospitalization at  [facility].    12:30pm  CM met with patient's wife Arline Thomas at bedside. She is requesting patient transfer to [facility]. CM called Dallas Torre RN at [facility] ([phone] ext [phone] leaving a voicemail message. CM faxing clinicals to [facility] at [phone]. CM assisting Harrison Whiteside (spouse) with referral to [facility]. CM downloaded [facility] Transfer documents and will complete and fax in the morning. CM continuing to follow. Mindy Canavan, [facility], [facility] [phone]    1:38p  CM [facility] for [facility].

## 2022-11-17 NOTE — TELEPHONE ENCOUNTER
Jovany Sevilla with verbal oK for OT and she did get message but wanted to let us know he had been admitted to hospital

## 2022-11-17 NOTE — PROGRESS NOTES
Problem: Falls - Risk of  Goal: *Absence of Falls  Description: Document Castillo Kamara Fall Risk and appropriate interventions in the flowsheet. Outcome: Progressing Towards Goal  Note: Fall Risk Interventions:  Mobility Interventions: Bed/chair exit alarm, Patient to call before getting OOB, PT Consult for mobility concerns    Mentation Interventions: Adequate sleep, hydration, pain control    Medication Interventions: Assess postural VS orthostatic hypotension, Patient to call before getting OOB, Bed/chair exit alarm    Elimination Interventions:  Toilet paper/wipes in reach, Call light in reach, Bed/chair exit alarm    History of Falls Interventions: Evaluate medications/consider consulting pharmacy, Door open when patient unattended, Bed/chair exit alarm         Problem: Patient Education: Go to Patient Education Activity  Goal: Patient/Family Education  Outcome: Progressing Towards Goal     Problem: Altered Thought Process (Adult/Pediatric)  Goal: *STG: Participates in treatment plan  Outcome: Progressing Towards Goal  Goal: *STG: Remains safe in hospital  Outcome: Progressing Towards Goal  Goal: *STG: Seeks staff when feelings of anxiety and fear arise  Outcome: Progressing Towards Goal  Goal: *STG: Complies with medication therapy  Outcome: Progressing Towards Goal  Goal: *STG: Attends activities and groups  Outcome: Progressing Towards Goal  Goal: *STG: Decreased delusional thinking  Outcome: Progressing Towards Goal  Goal: *STG: Decreased hallucinations  Outcome: Progressing Towards Goal  Goal: *STG: Absence of lethality  Outcome: Progressing Towards Goal  Goal: *STG: Demonstrates ability to understand and use improved judgment in daily activities and relationships  Outcome: Progressing Towards Goal  Goal: *LTG: Returns to baseline functioning  Outcome: Progressing Towards Goal  Goal: Interventions  Outcome: Progressing Towards Goal     Problem: Patient Education: Go to Patient Education Activity  Goal: Patient/Family Education  Outcome: Progressing Towards Goal

## 2022-11-17 NOTE — TELEPHONE ENCOUNTER
Can someone follow up with Brent Esocbar to see if she got the verbal, this message was routed to nurse pool then someone sent to Tracy

## 2022-11-17 NOTE — PROGRESS NOTES
2000 Received patient from [facility], [facility]. Shift Summary: pt confused and trying to get out of bed frequently. 0730 Bedside shift change report given to MANUEL Andrews (oncoming nurse) by Teresita Paez RN. Report included the following information SBAR, Kardex, MAR, Recent Results, and Cardiac Rhythm NSR. Problem: Falls - Risk of  Goal: *Absence of Falls  Description: Document Germán Olsen Fall Risk and appropriate interventions in the flowsheet. Outcome: Progressing Towards Goal  Note: Fall Risk Interventions:  Mobility Interventions: Bed/chair exit alarm, Patient to call before getting OOB    Mentation Interventions: Adequate sleep, hydration, pain control, Door open when patient unattended, Bed/chair exit alarm    Medication Interventions: Assess postural VS orthostatic hypotension, Patient to call before getting OOB    Elimination Interventions:  Toileting schedule/hourly rounds, Bed/chair exit alarm

## 2022-11-17 NOTE — CONSULTS
Neurology Consult Note     NAME: Promise Sargent   :  1945   MRN:  [id number]   DATE:  2022       HPI:  Pt is a 77yo [address] male admitted 22 on transfer from [facility] for syncope, just admitted 22 - 11/10/22 for worsening orthostatic hypotension. I am asked to see the pt for possible seizure. It is unclear why seizure is a concern. According to the OSH ED report, pt has hypotension whenever he stands up and at 0300 on day of admission, he got up to walk to the bathroom, became confused and collapsed between toilet and wall. After lying on the floor, he was able to think more clearly. No h/o seizures. In discussion with his wife, she raised the question of seizure. He has spells like this frequently, she states that he gets up rapidly out of bed and runs to the BR, he then proceeds to stand up in front of the toilet, he becomes rigid, then arms and legs tremor, he seems to ignore her instructions to sit down and in fact starts hanging on to whatever he can reach to try to keep standing, including his wife, causing her bruising. She has to wrestle him to sit down. After sitting, he seems to \"come to\" and will try to get up fast again. He is  taking midodrine 5mg five times a day, Florinef 0.1mg bid at home. He is followed at [facility] by Dr. María Cervantes in Neurology, according to [address] 8/15/22, he was taking mestinon at that time as well. He also mentioned a trial of Northera. She denies he has significant dementia, but answers most questions for him due to severe word finding. She also mentions that he has become very paranoid and protective recently. He was seen by Dr. Bria Taveras at [facility] for progressive memory loss in 2018 and suspicion for MSA has been raised due to progressive dysautonomia. MRA H/N 10/4/19 without significant stenosis.      PMH:  HLD  Orthostatic hypotension due to dysautonomia  Cervical myelopathy  Lumbar radiculopathy/laminectomy  Right BG CVA  Ataxia since at least 2015  Dementia since at least 2018   PM  Depression  Anxiety  THR bilaterally  S/p rotator cuff repair on right    ROS:  Limited due to pt factors    MEDS:  Home:  Prior to Admission Medications   Prescriptions Last Dose Informant Patient Reported? Taking? DULoxetine (CYMBALTA) 60 mg capsule   No Yes   Sig: TAKE 1 CAPSULE BY MOUTH DAILY   acetaminophen (TYLENOL) 650 mg TbER   Yes Yes   Sig: Take 650 mg by mouth every eight (8) hours as needed for Pain. aspirin 81 mg chewable tablet   No No   Sig: Take 1 Tab by mouth daily. atorvastatin (LIPITOR) 40 mg tablet   No Yes   Sig: Take 1 Tablet by mouth in the morning. clonazePAM (KlonoPIN) 0.5 mg tablet   No Yes   Sig: Take 1 Tablet by mouth nightly. Max Daily Amount: 0.5 mg.   donepeziL (ARICEPT) 10 mg tablet Not Taking  Yes No   Sig: Take 5 mg by mouth nightly. Patient not taking: Reported on 11/16/2022   finasteride (PROSCAR) 5 mg tablet   No Yes   Sig: TAKE 1 TABLET BY MOUTH DAILY(PROSTATE)   fludrocortisone (FLORINEF) 0.1 mg tablet   No Yes   Sig: TAKE 1 TABLET BY MOUTH TWICE DAILY   gabapentin (NEURONTIN) 600 mg tablet   No Yes   Sig: Take 0.5 Tablets by mouth two (2) times a day. Max Daily Amount: 600 mg.   midodrine (PROAMATINE) 5 mg tablet   No Yes   Sig: Take 1 Tablet by mouth five (5) times daily for 30 days. polyethylene glycol (MIRALAX) 17 gram packet   Yes No   Sig: Take 17 g by mouth daily as needed for Constipation. tiotropium bromide (Spiriva Respimat) 2.5 mcg/actuation inhaler   No Yes   Sig: Take 2 Puffs by inhalation daily.  Indications: bronchospasm prevention with COPD, Continue using as prior to admission      Facility-Administered Medications: None         Current Facility-Administered Medications:     aspirin chewable tablet 81 mg, 81 mg, Oral, DAILY, Tc Aviles MD, 81 mg at 11/17/22 0958    atorvastatin (LIPITOR) tablet 40 mg, 40 mg, Oral, DAILY, Irina Cervantes MD, 40 mg at 22 0943    DULoxetine (CYMBALTA) capsule 60 mg, 60 mg, Oral, DAILY, Irina Cervantes MD, 60 mg at 22 0944    finasteride (PROSCAR) tablet 5 mg, 5 mg, Oral, DAILY, Irina Cervantes MD, 5 mg at 22 7229    fludrocortisone (FLORINEF) tablet 0.1 mg, 0.1 mg, Oral, BID, Irina Cervantes MD, 0.1 mg at 22 0943    [Held by provider] midodrine (PROAMATINE) tablet 5 mg, 5 mg, Oral, 5XD, Irina Cervantes MD, 5 mg at 22 1724    sodium chloride (NS) flush 5-40 mL, 5-40 mL, IntraVENous, Q8H, Murphy Joel MD, 10 mL at 22 2349    sodium chloride (NS) flush 5-40 mL, 5-40 mL, IntraVENous, PRN, Irina Cervantes MD    0.9% sodium chloride infusion, 75 mL/hr, IntraVENous, CONTINUOUS, Irina Cervantes MD, Last Rate: 75 mL/hr at 22 1002, 75 mL/hr at 22 1002    acetaminophen (TYLENOL) tablet 650 mg, 650 mg, Oral, Q4H PRN, Irina Cervantes MD    heparin (porcine) injection 5,000 Units, 5,000 Units, SubCUTAneous, Q8H, Irina Cervantes MD, 5,000 Units at 22 0944      No Known Allergies      SH:  Lives with wife in [place]  Ret[employer]  History of heavy alcohol consumption  Quit smoking in 2019, no drug use     FH:  Brother - Alzheimers  M- CA      PHYSICAL EXAM:    Visit Vitals  BP (!) 168/74 (BP 1 Location: Left upper arm, BP Patient Position: At rest)   Pulse 95   Temp 98.7 °F (37.1 °C)   Resp 26   Ht 5' 11\" (1.803 m)   Wt 171 lb (77.6 kg)   SpO2 100%   BMI 23.85 kg/m²     Temp (24hrs), Av.1 °F (36.7 °C), Min:97.6 °F (36.4 °C), Max:98.9 °F (37.2 °C)        General: Well developed well nourished patient in no apparent distress. Cardiac: Irregular rhythm with no murmurs. Carotids: 2+ symmetric, no bruits  Extremities: 2+ Radial pulses, no cyanosis or edema    Neurological Exam:  Mental Status: Oriented to hospital, \"[facility]\", 2022, \"\" \"Friday. \" Speech intact, Language - severe word finding, he is able to name and repeat, has trouble with complex commands. Attention and fund of knowledge appropriate. Impaired memory. Cranial Nerves:   VFF, PERRL, EOMI, no nystagmus, no diplopia, no ptosis. Facial sensation is normal. Facial movement is symmetric. Palate is midline. Tongue is midline. Hearing is intact bilaterally. Trap/SCM 5/5   Motor:  5/5 strength in upper and lower proximal and distal muscles. Normal bulk and tone. No PD. No tremors. Reflexes:   Deep tendon reflexes 2+ and symmetric. Toes upgoing-chronic   Sensory:   Intact to LT/PP   Gait:     Cerebellar:  Intact FTN, LINDA          STUDIES AND REPORTS:  Recent Results (from the past 24 hour(s))   ECHO ADULT COMPLETE    Collection Time: 11/17/22 10:22 AM   Result Value Ref Range    LV Ejection Fraction A2C 39 %    LV Ejection Fraction A4C 50 %    LVOT Peak Gradient 4 mmHg    LVOT Peak Velocity 1.0 m/s    AV Peak Gradient 8 mmHg    AV Peak Velocity 1.5 m/s    MV A Velocity 1.20 m/s    MV E Wave Deceleration Time 347.4 ms    MV E Velocity 0.85 m/s    MV .8 ms    MV Area by PHT 2.2 cm2    PV Peak Gradient 2 mmHg    PV Max Velocity 0.8 m/s    MV E/A 0.71     AV Velocity Ratio 0.67    GLUCOSE, POC    Collection Time: 11/17/22 11:52 AM   Result Value Ref Range    Glucose (POC) 166 (H) 65 - 117 mg/dL    Performed by Karla Argueta      MRI Results (most recent):  Results from [place] encounter on 09/29/19    MRA NECK WO CONT    Narrative  INDICATION: Headaches    COMPARISON:  MR brain October 2, 2019    TECHNIQUE:  3-D time-of-flight MRA of the brain was performed. 2-D  time-of-flight MRA of the neck was performed. Multiplanar reconstructions were  obtained. FINDINGS:    MRA NECK    Common Carotids/Internal Carotids (visualized segments):  No flow limiting  stenosis by NASCET criteria. Vertebral Arteries (visualized segments):  No flow limiting stenosis. MRA HEAD    Posterior Circulation:  Patent.  Mild irregularity throughout the bilateral  posterior cerebral arteries. Anterior Circulation:  Mild to moderate irregularity bilateral cavernous and  supraclinoid internal carotid artery segments without flow limitation. Mild  irregularity throughout the bilateral middle and anterior cerebral arteries may  be accentuated by artifact. No flow-limiting stenosis or occlusion. Other:  No aneurysm or vascular malformation. Impression  IMPRESSION:  Mild to moderate intracranial atherosclerosis as above with no flow-limiting  stenosis or occlusion. CT Results (most recent):  Results from Hospital Encounter encounter on 11/06/22    CT SPINE CERV WO CONT    Narrative  INDICATION:  fall, struck head    STUDY:  CT SPINE CERV WO CONT    Examination: Cervical spine CT. No contrast or comparisons. Thin section axial  images were obtained with sagittal and coronal reformats. CT dose reduction was  achieved through use of a standardized protocol tailored for this examination  and automatic exposure control for dose modulation. FINDINGS: Alignment of the cervical spine is normal. There is no bony  destructive lesion or evidence of acute fracture. There are vascular  calcifications. Multilevel degenerative change and diffuse idiopathic skeletal  hyperostosis. . 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-C7    Impression  1. No acute abnormality      Assessment and Plan:   Pt is a 75yo LH male with severe dysautonomia causing orthostatic hypotension, on midodrine 5mg five times a day and florinef 0.1mg bid. He also has dementia with significant impulsivity and inability to recall safety precautions such as sitting on the side of the bed before standing. These spells are related to his known orthostasis, they are not seizures. Exam with severe word finding, though he is oriented to date, unable to provide coherent history, o/w non-focal. Discussed this difficult situation with the pt and his wife. This problem will only worsen.   A trial of Northera through his primary neurologist, Dr. Nathan Dupree is worth a try. He likely needs a WC for safety and a bedside commode. His wife does not feel she can manage him at home. Continue current meds, despite high supine pressures, o/w he cannot get out of bed. I did order an EEG, given wife's concern for seizure. She mentioned nephrology consult for hyponatremia and left atrophic kidney. He is not hyponatremic and atrophic kidney is chronic. D/w hospitalist.  Please call if needed. Signed: Bob Zaragoza MD

## 2022-11-18 LAB
ANION GAP SERPL CALC-SCNC: 7 MMOL/L (ref 5–15)
BASOPHILS # BLD: 0 K/UL (ref 0–0.1)
BASOPHILS NFR BLD: 1 % (ref 0–1)
BUN SERPL-MCNC: 14 MG/DL (ref 6–20)
BUN/CREAT SERPL: 16 (ref 12–20)
CALCIUM SERPL-MCNC: 8.9 MG/DL (ref 8.5–10.1)
CHLORIDE SERPL-SCNC: 106 MMOL/L (ref 97–108)
CO2 SERPL-SCNC: 27 MMOL/L (ref 21–32)
CREAT SERPL-MCNC: 0.89 MG/DL (ref 0.7–1.3)
DIFFERENTIAL METHOD BLD: ABNORMAL
EOSINOPHIL # BLD: 0 K/UL (ref 0–0.4)
EOSINOPHIL NFR BLD: 0 % (ref 0–7)
ERYTHROCYTE [DISTWIDTH] IN BLOOD BY AUTOMATED COUNT: 12.5 % (ref 11.5–14.5)
GLUCOSE SERPL-MCNC: 126 MG/DL (ref 65–100)
HCT VFR BLD AUTO: 36.8 % (ref 36.6–50.3)
HGB BLD-MCNC: 12.6 G/DL (ref 12.1–17)
IMM GRANULOCYTES # BLD AUTO: 0 K/UL (ref 0–0.04)
IMM GRANULOCYTES NFR BLD AUTO: 0 % (ref 0–0.5)
LYMPHOCYTES # BLD: 1.2 K/UL (ref 0.8–3.5)
LYMPHOCYTES NFR BLD: 14 % (ref 12–49)
MCH RBC QN AUTO: 32.2 PG (ref 26–34)
MCHC RBC AUTO-ENTMCNC: 34.2 G/DL (ref 30–36.5)
MCV RBC AUTO: 94.1 FL (ref 80–99)
MONOCYTES # BLD: 0.6 K/UL (ref 0–1)
MONOCYTES NFR BLD: 8 % (ref 5–13)
NEUTS SEG # BLD: 6.2 K/UL (ref 1.8–8)
NEUTS SEG NFR BLD: 77 % (ref 32–75)
NRBC # BLD: 0 K/UL (ref 0–0.01)
NRBC BLD-RTO: 0 PER 100 WBC
PLATELET # BLD AUTO: 205 K/UL (ref 150–400)
PMV BLD AUTO: 9.9 FL (ref 8.9–12.9)
POTASSIUM SERPL-SCNC: 3 MMOL/L (ref 3.5–5.1)
RBC # BLD AUTO: 3.91 M/UL (ref 4.1–5.7)
SODIUM SERPL-SCNC: 140 MMOL/L (ref 136–145)
WBC # BLD AUTO: 8 K/UL (ref 4.1–11.1)

## 2022-11-18 PROCEDURE — 80048 BASIC METABOLIC PNL TOTAL CA: CPT

## 2022-11-18 PROCEDURE — 85025 COMPLETE CBC W/AUTO DIFF WBC: CPT

## 2022-11-18 PROCEDURE — 74011000250 HC RX REV CODE- 250: Performed by: FAMILY MEDICINE

## 2022-11-18 PROCEDURE — 95816 EEG AWAKE AND DROWSY: CPT | Performed by: PSYCHIATRY & NEUROLOGY

## 2022-11-18 PROCEDURE — 36415 COLL VENOUS BLD VENIPUNCTURE: CPT

## 2022-11-18 PROCEDURE — 77010033678 HC OXYGEN DAILY

## 2022-11-18 PROCEDURE — 74011250636 HC RX REV CODE- 250/636: Performed by: FAMILY MEDICINE

## 2022-11-18 PROCEDURE — 74011250637 HC RX REV CODE- 250/637: Performed by: FAMILY MEDICINE

## 2022-11-18 PROCEDURE — 65270000046 HC RM TELEMETRY

## 2022-11-18 RX ORDER — FINASTERIDE 5 MG/1
5 TABLET, FILM COATED ORAL
Status: DISCONTINUED | OUTPATIENT
Start: 2022-11-19 | End: 2022-11-30 | Stop reason: HOSPADM

## 2022-11-18 RX ADMIN — SODIUM CHLORIDE, PRESERVATIVE FREE 10 ML: 5 INJECTION INTRAVENOUS at 23:41

## 2022-11-18 RX ADMIN — DULOXETINE HYDROCHLORIDE 60 MG: 60 CAPSULE, DELAYED RELEASE ORAL at 10:25

## 2022-11-18 RX ADMIN — FLUDROCORTISONE ACETATE 0.1 MG: 0.1 TABLET ORAL at 10:25

## 2022-11-18 RX ADMIN — ASPIRIN 81 MG 81 MG: 81 TABLET ORAL at 10:25

## 2022-11-18 RX ADMIN — SODIUM CHLORIDE, PRESERVATIVE FREE 10 ML: 5 INJECTION INTRAVENOUS at 17:47

## 2022-11-18 RX ADMIN — HEPARIN SODIUM 5000 UNITS: 5000 INJECTION INTRAVENOUS; SUBCUTANEOUS at 10:25

## 2022-11-18 RX ADMIN — ATORVASTATIN CALCIUM 40 MG: 40 TABLET, FILM COATED ORAL at 10:25

## 2022-11-18 RX ADMIN — SODIUM CHLORIDE, PRESERVATIVE FREE 10 ML: 5 INJECTION INTRAVENOUS at 01:54

## 2022-11-18 RX ADMIN — HEPARIN SODIUM 5000 UNITS: 5000 INJECTION INTRAVENOUS; SUBCUTANEOUS at 17:46

## 2022-11-18 RX ADMIN — FINASTERIDE 5 MG: 5 TABLET, FILM COATED ORAL at 10:25

## 2022-11-18 RX ADMIN — HEPARIN SODIUM 5000 UNITS: 5000 INJECTION INTRAVENOUS; SUBCUTANEOUS at 01:53

## 2022-11-18 RX ADMIN — FLUDROCORTISONE ACETATE 0.1 MG: 0.1 TABLET ORAL at 17:46

## 2022-11-18 RX ADMIN — ACETAMINOPHEN 650 MG: 325 TABLET ORAL at 10:25

## 2022-11-18 NOTE — PROCEDURES
PROCEDURE: ROUTINE INPATIENT EEG  NAME:   Deborah Handley  ACCOUNT NUMBER : [de-identified]  MRN:   [id number]  DATE OF SERVICE: 11/18/2022     HISTORY/INDICATION: Pt is a 77yo male with dementia and severe orthostatic hypotension with spells of shaking and confusion while standing. EEG to exclude epilepsy as an etiology. MEDICATIONS:   Current Facility-Administered Medications   Medication Dose Route Frequency Provider Last Rate Last Admin    [START ON 11/19/2022] finasteride (PROSCAR) tablet 5 mg  5 mg Oral QHS Corey Cordero MD        aspirin chewable tablet 81 mg  81 mg Oral DAILY Marcos Whitney MD   81 mg at 11/18/22 1025    atorvastatin (LIPITOR) tablet 40 mg  40 mg Oral DAILY Epimenio MD Chelo   40 mg at 11/18/22 1025    DULoxetine (CYMBALTA) capsule 60 mg  60 mg Oral DAILY Marcos Whitney MD   60 mg at 11/18/22 1025    fludrocortisone (FLORINEF) tablet 0.1 mg  0.1 mg Oral BID Marcos Whitney MD   0.1 mg at 11/18/22 1025    [Held by provider] midodrine (PROAMATINE) tablet 5 mg  5 mg Oral 5XD Marcos Whitney MD   5 mg at 11/16/22 1724    sodium chloride (NS) flush 5-40 mL  5-40 mL IntraVENous Q8H Marcos Whitney MD   10 mL at 11/18/22 0154    sodium chloride (NS) flush 5-40 mL  5-40 mL IntraVENous PRN Marcos Whitney MD        0.9% sodium chloride infusion  75 mL/hr IntraVENous CONTINUOUS Marcos Whitney MD   Stopped at 11/17/22 1300    acetaminophen (TYLENOL) tablet 650 mg  650 mg Oral Q4H PRN Marcos Whitney MD   650 mg at 11/18/22 1025    heparin (porcine) injection 5,000 Units  5,000 Units SubCUTAneous Q8H Marcos Whitney MD   5,000 Units at 11/18/22 1025       CONDITIONS OF RECORDING: This is a routine 21-channel EEG recording performed in accordance with the international 10-20 system with one channel devoted to limited EKG. This study was done during a state of wakefulness. No activating procedures were performed.      DESCRIPTION:   Upon maximal arousal the posterior dominant rhythm has a frequency of 7Hz with an amplitude of 20uV. This activity is symmetric over the bilateral posterior derivations and attenuates with eye opening. No sleep is seen. There are no focal abnormalities, epileptiform discharges, or electrographic seizures seen. INTERPRETATION: Abnormal awake EEG due to theta slowing of the background. CLINICAL CORRELATION: Finding is suggestive of a mild encephalopathy which can have many etiologies including toxic, metabolic, or medication effect, or could be baseline due to dementia. Clinical correlation advised.      Fito Wright MD

## 2022-11-18 NOTE — PROGRESS NOTES
2330 Bedside and Verbal shift change report given to Chandrika Mckeon RN (oncoming nurse) by Davida Alvarez RN (offgoing nurse). Report included the following information SBAR, Kardex, ED Summary, Intake/Output, MAR, Recent Results, Med Rec Status, and Cardiac Rhythm A-paced . Shift summary: Patient continues to be confused required a sitter at bedside for safety. Spoke to Huyen Florez NP because SBP's have been >200. Orthostatic BP's obtained and NP updated: patient was 209/92 laying; 116/83 sitting on side of bed with a lot of dizziness; unable to obtain standing BP d/t dizziness worsening. No interventions ordered for high BP d/t orthostatics; midodrine d/c. Patient did not sleep all night and at one point was slightly combative with sitter. 0800 Bedside and Verbal shift change report given to Mehrdad Trujillo RN (oncoming nurse) by Chandrika Mckeon RN (offgoing nurse). Report included the following information SBAR, Kardex, ED Summary, Intake/Output, MAR, Recent Results, Med Rec Status, and Cardiac Rhythm A-paced .

## 2022-11-18 NOTE — PROGRESS NOTES
JOSE:  RUR: 12%     Disposition:  possible transfer to [place]  Additional clinicals and VA transfer documents faxed to the transfer center today. Barrier:  [facility] is on Diversion as of 4:30pm today. Wife stated today that she is unable to care for patient in  home due to his AMS/Dementia. CM suggested that while awaiting possible transfer to SNF that she consider  possible SNF with transition to LTC and possible home with support services via [place]. Patient has been denied this benefit in the past, however wife is applying for community support services again. The VA has contracts with the following SNF's  in  patient's locality: [email or web address], [facility], [facility], and [facility]. In the [place] area [place] has contracts with [facility] in [place]. Patient is known to the [place] in [place]. VA Transfer Center Contact:  Sylvain Strakey RN at [place] ([phone] ext [phone]     fax at [phone]. Chart reviewed,  Patient transferred here from [facility] on 11/16/22. The patient had a recent hospitalization at [facility] 11/6 to 11/11/22. Prior to this hospitalization, the patient  lived with his wife ([address] [place] [address] [phone]) in a 1-story residence. There is a wheelchair ramp attached to the residence. PharmChani Hernandez  [facility]  Per last CM note. Home health had been set up with [facility]  DME: transfer chair, wheelchair, walker, w/c ramp    Wife informed of possibility of transfer to another unit over the weekend. Evangelina . Micaela Russell, [address]

## 2022-11-18 NOTE — CONSULTS
PSYCHIATRY CONSULT NOTE    REASON FOR CONSULT:dementia      HISTORY OF PRESENTING COMPLAINT:  Fran Mendoza is a 69 y.o. WHITE/NON- male who is currently admitted to the medical floor at [facility]. Patient is admitted with a diagnosis of recurrent syncope. Psychiatry is asked to see patient due to dementia. Patient is alert, calm and cooperative. He describes his mood as \"pretty good\". He is  confused and reports his current location as home. He is able to state the day, month, year and current president. The date is unknown. He reports he came to the hospital due to back pain. Patient reports being depressed \"about most unexpected happenings in my life and I have to deal with it. \". He denies anxiety, suicidal/homicidal thoughts and when asked about hallucinations, he states \"mostly in my sleep. ..for example running a race\". He denies seeing or hearing things currently. He reports intermittent nightmares and flashbacks \"mostly war related\" and states that he was in the [facility]. He denies paranoia and delusional thoughts, he denies sleep and appetite concerns. PAST PSYCHIATRIC HISTORY:Patient reports seeing a psychiatrist years ago but denies hx of prior psychiatric hospitalization and suicide attempt. SUBSTANCE ABUSE HISTORY: Patient reports using marijuana in the past and denies any other drug use. PAST MEDICAL HISTORY:    Please see H&P for details.      Past Medical History:   Diagnosis Date    Altered mental status, unspecified 11/9/2018    Anxiety     Ataxia 11/9/2018    Back pain     Blurred vision     Bradycardia 10/2/2018    Chest pain     Depression     Dizziness     Dizziness and giddiness 10/17/2019    Dysautonomia ([address]     Fast heart beat     Fever 11/9/2018    Frequent headaches     Frequent urination     Hip dysplasia, congenital     Hx of syncope 10/24/2018    Hypotension     Ingrown left big toenail 3/31/2017    Joint pain     Joint swelling     Lumbar spinal stenosis Memory deficit 9/11/2018    Muscle pain     Neuropathy     Orthostatic hypotension     asymptomatic    Pacemaker     new pacemaker July 2019    Recent change in weight     Risk for falls 12/13/2016    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 Authorizing Provider   gabapentin (NEURONTIN) 600 mg tablet Take 0.5 Tablets by mouth two (2) times a day. Max Daily Amount: 600 mg. 11/10/22  Yes Jalen Villeda MD   midodrine (PROAMATINE) 5 mg tablet Take 1 Tablet by mouth five (5) times daily for 30 days. 11/10/22 12/10/22 Yes Jalen Villeda MD   tiotropium bromide (Spiriva Respimat) 2.5 mcg/actuation inhaler Take 2 Puffs by inhalation daily. Indications: bronchospasm prevention with COPD, Continue using as prior to admission 11/11/22  Yes Jalen Villeda MD   clonazePAM (KlonoPIN) 0.5 mg tablet Take 1 Tablet by mouth nightly. Max Daily Amount: 0.5 mg. 8/22/22  Yes Dominic GARCIA NP   atorvastatin (LIPITOR) 40 mg tablet Take 1 Tablet by mouth in the morning. 7/29/22  Yes Dominic GARCIA NP   fludrocortisone (FLORINEF) 0.1 mg tablet TAKE 1 TABLET BY MOUTH TWICE DAILY 12/27/19  Yes Sherrill Molina MD   DULoxetine (CYMBALTA) 60 mg capsule TAKE 1 CAPSULE BY MOUTH DAILY 10/30/19  Yes Dominic AGRCIA NP   acetaminophen (TYLENOL) 650 mg TbER Take 650 mg by mouth every eight (8) hours as needed for Pain. 10/13/19  Yes Sherrill Molina MD   finasteride (PROSCAR) 5 mg tablet TAKE 1 TABLET BY MOUTH DAILY(PROSTATE) 2/1/19  Yes Sherrill Molina MD   donepeziL (ARICEPT) 10 mg tablet Take 5 mg by mouth nightly.   Patient not taking: Reported on 11/16/2022    Provider, Historical polyethylene glycol (MIRALAX) 17 gram packet Take 17 g by mouth daily as needed for Constipation. 11/10/22   Kathy Hendrickson MD   aspirin 81 mg chewable tablet Take 1 Tab by mouth daily. 9/3/19   Kristen Winkler, INDIRA     Vitals:    11/18/22 0456 11/18/22 0459 11/18/22 0600 11/18/22 0804   BP: (!) 225/100   (!) 171/97   Pulse: 84  85 82   Resp: 20   16   Temp: 97.9 °F (36.6 °C)   97.9 °F (36.6 °C)   SpO2: (!) 88% 94%     Weight:       Height:         Lab Results   Component Value Date/Time    WBC 8.0 11/18/2022 07:15 AM    HGB 12.6 11/18/2022 07:15 AM    HCT 36.8 11/18/2022 07:15 AM    PLATELET 044 35/70/5810 07:15 AM    MCV 94.1 11/18/2022 07:15 AM     Lab Results   Component Value Date/Time    Sodium 140 11/18/2022 07:15 AM    Potassium 3.0 (L) 11/18/2022 07:15 AM    Chloride 106 11/18/2022 07:15 AM    CO2 27 11/18/2022 07:15 AM    Anion gap 7 11/18/2022 07:15 AM    Glucose 126 (H) 11/18/2022 07:15 AM    BUN 14 11/18/2022 07:15 AM    Creatinine 0.89 11/18/2022 07:15 AM    BUN/Creatinine ratio 16 11/18/2022 07:15 AM    GFR est AA >60 09/19/2022 12:13 PM    GFR est non-AA >60 09/19/2022 12:13 PM    Calcium 8.9 11/18/2022 07:15 AM    Bilirubin, total 0.8 11/16/2022 04:02 AM    Alk. phosphatase 99 11/16/2022 04:02 AM    Protein, total 6.5 11/16/2022 04:02 AM    Albumin 3.5 11/16/2022 04:02 AM    Globulin 3.0 11/16/2022 04:02 AM    A-G Ratio 1.2 11/16/2022 04:02 AM    ALT (SGPT) 17 11/16/2022 04:02 AM    AST (SGOT) 12 (L) 11/16/2022 04:02 AM     No results found for: VALF2, VALAC, VALP, VALPR, DS6, CRBAM, CRBAMP, CARB2, XCRBAM  No results found for: LITHM  RADIOLOGY REPORTS:(reviewed/updated 11/18/2022)  CT HEAD WO CONT    Result Date: 11/6/2022  EXAM: CT HEAD WO CONT INDICATION: fall COMPARISON: 10/7/2022. CONTRAST: None. TECHNIQUE: Unenhanced CT of the head was performed using 5 mm images. Brain and bone windows were generated. Coronal and sagittal reformats.  CT dose reduction was achieved through use of a standardized protocol tailored for this examination and automatic exposure control for dose modulation. FINDINGS: The ventricles and sulci are normal in size, shape and configuration. . Chronic lacunar infarct right basal ganglia unchanged. There is no intracranial hemorrhage, extra-axial collection, or mass effect. The basilar cisterns are open. No CT evidence of acute infarct. The bone windows demonstrate no abnormalities. The visualized portions of the paranasal sinuses and mastoid air cells are clear. No change or acute abnormality    CT HEAD WO CONT    Result Date: 10/7/2022  INDICATION: dizzy, fall Exam: Noncontrast CT of the brain is performed with 5 mm collimation. CT dose reduction was achieved with the use of the standardized protocol tailored for this examination and automatic exposure control for dose modulation. Direct comparison made to prior CT dated April 2020. FINDINGS: There is mild diffuse cortical atrophy. There is no acute intracranial hemorrhage, mass, mass effect or herniation. Ventricular system is normal. There is a chronic 1 cm right basal ganglia lacune which is new compared to prior CT dated April 2020. There is no evidence of acute territorial infarct. The mastoid air cells are well pneumatized. The visualized paranasal sinuses are normal.     Chronic 1 cm right basal ganglia lacune, new compared to prior CT dated April 2020. No acute intracranial hemorrhage, mass or infarct. CT SPINE CERV WO CONT    Result Date: 11/6/2022  INDICATION:  fall, struck head STUDY:  CT SPINE CERV WO CONT Examination: Cervical spine CT. No contrast or comparisons. Thin section axial images were obtained with sagittal and coronal reformats. CT dose reduction was achieved through use of a standardized protocol tailored for this examination and automatic exposure control for dose modulation.  FINDINGS: Alignment of the cervical spine is normal. There is no bony destructive lesion or evidence of acute fracture. There are vascular calcifications. Multilevel degenerative change and diffuse idiopathic skeletal hyperostosis. . 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-C7     1. No acute abnormality      XR CHEST PORT    Result Date: 11/7/2022  EXAM:  XR CHEST PORT INDICATION:  Altered mental status COMPARISON: October 7, 2022. TECHNIQUE: AP portable chest radiograph. FINDINGS: The lungs are clear. Right chest wall port with atrial and ventricular leads. Heart size and mediastinal contours are normal. No pleural effusion or pneumothorax. Bones are age-appropriate. No acute cardiopulmonary abnormality. XR CHEST PORT    Result Date: 10/7/2022  EXAM: XR CHEST PORT INDICATION: Chest Pain COMPARISON: 7/8/2022 FINDINGS: A portable AP radiograph of the chest was obtained at 1523 hours. Cardiac monitoring leads. Right subclavian pacemaker. . The lungs are clear. The cardiac and mediastinal contours and pulmonary vascularity are normal.  The bones and soft tissues are grossly within normal limits. No acute cardiopulmonary process. ECHO ADULT COMPLETE    Result Date: 11/17/2022    Left Ventricle: Normal left ventricular systolic function with a visually estimated EF of 60 - 65%. Not well visualized. Left ventricle size is normal. Normal wall thickness. Unable to assess wall motion. Diastolic dysfunction present with normal LV EF. Mitral Valve: Moderately thickened leaflet. Mild regurgitation. No results found for: Anatoly Cannon [id number], [id number], [id number], PREGU, POCHCG, MHCGN, HCGQR, THCGA1, SHCG, HCGN, HCGSERUM, HCGURQLPOC    PSYCHOSOCIAL HISTORY: Patient reports he is  with 3 children. He states that he used to be a  after he left the [facility]. MENTAL STATUS EXAM:  General appearance: moderately   groomed, psychomotor activity is wnl  Eye contact: good eye contact  Speech: Spontaneous, soft, decreased output.    Affect : Depressed, decreased range  Mood: \"pretty good \"  Thought Process: Linear  Perception: Denies AH or VH. Thought Content: denies SI/HI or Plan  Insight: poor  Judgement: poor  Cognition:awake and alert    ASSESSMENT AND PLAN:  Jacqui Ibanez meets criteria for a diagnosis of PTSD, dementia by history. Would recommend starting patient with prazosin 1mg at bedtime to help with nightmares and flash backs. Monitor his BP. Continue with cymbalta as prescribed. There is not incidation for inpatient behavioral health admission at this time. Thank your your consult. Please feel free to consult us again as needed.

## 2022-11-18 NOTE — BSMART NOTE
Initial BSMART Liaison Assessment Form     Section I - Integrated Summary    Chief Complaint is Syncope. LOS:  2     Presenting problem/Summary:  Pt came to [facility] ED via EMS after a fall, reported as a seizure by spouse. He was transferred to [facility] ED  for [facility] and cardiology consults and admitted to [facility] Medical unit for further evaluation and treatment.  Psychiatry services are consulted d/t Dementia Dx. Pt was received laying in bed with 1:1 sitter at bedside. He was alert, oriented and calm. He reported his mood as \"okay\" and described keeping his \"head above water. \" He reported some depression and anxiety but denied SI and HI. He reported having PTSD from his services in the [employer] and has AVH, flash back and nightmares. The information is given by the patient, spouse/SO, and past medical records. Current Psychiatrist and/or  is N/A. Previous Hospitalizations/Treatment: N/A    Lethality Assessment:  The potential for suicide noted by the following: not noted . The potential for homicide is not noted. The patient has not been a perpetrator of sexual or physical abuse. There are not pending charges. The patient is not felt to be at risk for self-harm or harm to others. Section II - Psychosocial  The patient's overall mood and attitude is calm and cooperative. Feelings of helplessness and hopelessness are not observed. Generalized anxiety is not observed. Panic is not observed. Phobias are not observed. Obsessive compulsive tendencies are not observed. Section III - Mental Status Exam  The patient's appearance shows no evidence of impairment. The patient's behavior shows no evidence of impairment. The patient is oriented to time, place, person and situation. The patient's speech is slurred. The patient's mood is calm. The range of affect is constricted. The patient's thought content demonstrates no evidence of impairment.   The thought process shows no evidence of impairment. The patient's perception shows no evidence of impairment. The patient's memory shows no evidence of impairment. The patient's appetite shows no evidence of impairment. The patient's sleep shows no evidence of impairment. The patient's insight shows no evidence of impairment. The patient's judgement shows no evidence of impairment. The patient has demonstrated mental capacity to provide informed consent. Section IV - Substance Abuse  The patient is using substances. The patient is using tobacco and alcohol   The patient has experienced the following withdrawal symptoms: N/A. UDS result: not collected BAL: not collected    Section V - Medical  Past Medical History:   Diagnosis Date    Altered mental status, unspecified 11/9/2018    Anxiety     Ataxia 11/9/2018    Back pain     Blurred vision     Bradycardia 10/2/2018    Chest pain     Depression     Dizziness     Dizziness and giddiness 10/17/2019    Dysautonomia ([address]     Fast heart beat     Fever 11/9/2018    Frequent headaches     Frequent urination     Hip dysplasia, congenital     Hx of syncope 10/24/2018    Hypotension     Ingrown left big toenail 3/31/2017    Joint pain     Joint swelling     Lumbar spinal stenosis     Memory deficit 9/11/2018    Muscle pain     Neuropathy     Orthostatic hypotension     asymptomatic    Pacemaker     new pacemaker July 2019    Recent change in weight     Risk for falls 12/13/2016    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 The patient lives with a spouse. The patient has 6 adult children (3 biological, 3 step). The patient does plan to return home upon discharge.  The patient's source of income comes from social security. The patient's greatest support comes from wife and this person will be involved with the treatment. The patient has been in an event described as horrible or outside the realm of ordinary life experience either currently or in the past. The patient has not been a victim of sexual/physical abuse. Section VII - Other Areas of Clinical Concern    The highest grade achieved is not assessed. The patient is currently unemployed and speaks [place] as a primary language. The patient has no communication impairments affecting communication. The patient's preference for learning can be described as: can read and write adequately. The patient's hearing is normal.  The patient's vision is impaired and  wears glasses or contacts.     The patient reports coping skills include: boating, spending time outdoors, yard work    Jennifer Orozco

## 2022-11-18 NOTE — PROGRESS NOTES
Bedside shift change report given to Davida Alvarez RN (oncoming nurse) by Jose Walton (offgoing nurse). Report included the following information SBAR, Kardex, Intake/Output, MAR, and Cardiac Rhythm A-Paced .

## 2022-11-18 NOTE — PROGRESS NOTES
JOSE:  RUR: 12%     Disposition:  possible transfer to [place]  Additional clinicals and VA transfer documents faxed to the transfer center today. CM spoke with patient's wife and provided updates. [facility] Transfer [facility] Contact:  Kya Lockett RN at [facility] ([phone]     fax at [phone]. Chart reviewed,  Patient [facility] on 11/16/22. The patient had a recent hospitalization at [facility] 11/6 to 11/11/22. Prior to this hospitalization, the patient  lived with his wife (Cristal Lacy [phone]) in a 1-story residence. There is a wheelchair ramp attached to the residence. Beck Chavez  Previous: [facility] 2019  Per last CM note.  Home health had been set up with [facility] [address]  DME: transfer chair, wheelchair, walker, w/c ramp     IPR: [facility] 2018    Reason for Admission:  AMS                     RUR Score:      12% low               Plan for utilizing home health:    pre-arranged with [facility] [address]      PCP: First and Last name:  Nichole Mccollum MD     Name of Practice: [facility]   Are you a current patient: Yes/No: yes   Approximate date of last visit:    Can you participate in a virtual visit with your PCP:                     Current Advanced Directive/Advance Care Plan: Full Code      Healthcare Decision Maker:   Click here to complete [address] including selection of the [address] Relationship (ie \"Primary\")             Primary Decision Maker: Divya Ba - Spouse - [phone]    Secondary Decision Maker: Arsen Horan - [phone]                  Transition of Care Plan:      [facility] [address]

## 2022-11-18 NOTE — PROGRESS NOTES
[address] [facility] Adult  Hospitalist Group                                                                                          Hospitalist Progress Note  Britney Cunningham MD  Answering service: [phone] or [phone] from in house phone        Date of Service:  2022  NAME:  Alexandra Jackson  :  1945  MRN:  [id number]      Admission Summary:   Alexandra Jackson is a 69 y.o. male who presents with syncope. Patient presents to the ER at [facility] with an episode of syncope versus seizure, patient has a history of orthostatic hypotension, woke up at 3 AM this morning and became confused, and collapsed within the toilet and the wall, patient reports that after a while he called his wife, and was brought to the hospital, not sure whether he had a seizure or syncope, was requested to be admitted to the hospitalist service, currently patient denies any complaints or problems    The patient denies any headache, blurry vision, sore throat, trouble swallowing, trouble with speech, chest pain, SOB, cough, fever, chills, N/V/D, abd pain, urinary symptoms, constipation, recent travels, sick contacts, focal or generalized neurological symptoms,  rashes, contact with COVID-19 diagnosed patients, hematemesis, melena, hemoptysis, hematuria, rashes, denies starting any new medications and denies any other concerns or problems besides as mentioned above. Interval history / Subjective:   Patient was evaluated by Neurology yesterday, all symptoms of confusion are being attributed to dementia. EEG being done this morning. Wife has requested for patient to be transferred to the [facility], I explained that patient does not ave any urgent medical concerns to be transferred and that we will complete the medical work-up here in the hospital.    Patient has no history of CVA, and no history of seizures. I attempted to discus discharge planning with patient's wife, but she was not receptive. She has also requested a Nephrology evaluation, as patient Jes Sen has one kidney\". Assessment & Plan:         Recurrent \"syncope\":  - episodes of rigidity, but no history suggestive of postictal state;   - Neurology consulted;   - per wife, he recently had an MRI of the brain, so I will hold off on ordering one;   - EEG ordered and completed 11/18; Severe autonomic dysregulation:  - on Midodrine; discussed with Neurology: patient is supposed to be on Midodrine 5 mg 5 times a day;  - per wife, recently switched to Florinef;   - recommend wheelchair for safety and a bedside commode;   - patient is at high risk of recurrent falls and injury due to Orthostatic hypotension and impulsivity;     PTSD:  - consulted Psychiatry;     BPH:  - on Proscar, will change administration to evening time; Dyslipidemia:  - on statin; Hyperglycemia, without history of DM:  - HgA1c 5.9 in 09/22; Code status:  Full  Prophylaxis:  B SCD's  Care Plan discussed with: patient, wife at the bedside, RN, LAURA;   Anticipated Disposition: wife requested transfer to the [facility] - on diversion;   - at this time patient does not need to be transferred to an acute care hospital;   - his labwork does not show any signs of CKD, needs outpatient follow-up with Nephrology, no acute concerns, no electrolyte abnormalities;   - discharge planning: SNF when bed available;  - patient is medically stable for discharge today; Complex decision making was performed, which includes reviewing the patient's available past medical records, laboratory results, and imaging studies. Hospital Problems  Date Reviewed: 9/19/2022            Codes Class Noted POA    Syncope ICD-10-CM: R55  ICD-9-CM: 780.2  11/16/2022 Unknown           Review of Systems:   A comprehensive review of systems was negative except for that written in the HPI. Vital Signs:    Last 24hrs VS reviewed since prior progress note.  Most recent are:  Visit Vitals  BP (!) 142/72 (BP 1 Location: Right upper arm, BP Patient Position: Sitting; At rest)   Pulse 96   Temp 97.9 °F (36.6 °C)   Resp 16   Ht 5' 11\" (1.803 m)   Wt 77.6 kg (171 lb)   SpO2 94%   BMI 23.85 kg/m²         Intake/Output Summary (Last 24 hours) at 11/18/2022 1207  Last data filed at 11/18/2022 0710  Gross per 24 hour   Intake --   Output 600 ml   Net -600 ml          Physical Examination:     I had a face to face encounter with this patient and independently examined them on 11/18/2022 as outlined below:          Constitutional:  No acute distress, cooperative, pleasant    ENT:  Oral mucosa moist, oropharynx benign. Resp:  CTA bilaterally. No wheezing/rhonchi/rales. No accessory muscle use. CV:  Regular rhythm, normal rate, no murmurs, gallops, rubs    GI:  Soft, non distended, non tender. normoactive bowel sounds, no hepatosplenomegaly     Musculoskeletal:  No edema, warm, 2+ pulses throughout    Neurologic:  Moves all extremities. AAOx3, CN II-XII reviewed            Data Review:    Review and/or order of clinical lab test  Review and/or order of tests in the radiology section of CPT  Review and/or order of tests in the medicine section of CPT      Labs:     Recent Labs     11/18/22  0715 11/16/22  1023   WBC 8.0 6.8   HGB 12.6 11.7*   HCT 36.8 34.6*    167       Recent Labs     11/18/22  0715 11/16/22  1023 11/16/22  0402    144 143   K 3.0* 3.2* 3.4*    109* 107   CO2 27 31 32   BUN 14 17 23*   CREA 0.89 0.87 1.16   * 121* 115*   CA 8.9 8.2* 8.5   MG  --  2.4  --        Recent Labs     11/16/22  0402   ALT 17   AP 99   TBILI 0.8   TP 6.5   ALB 3.5   GLOB 3.0       No results for input(s): INR, PTP, APTT, INREXT, INREXT in the last 72 hours. No results for input(s): FE, TIBC, PSAT, FERR in the last 72 hours. Lab Results   Component Value Date/Time    Folate 14.3 11/08/2022 05:44 AM        No results for input(s): PH, PCO2, PO2 in the last 72 hours.   Recent Labs 11/16/22  1023   CPK 61       Lab Results   Component Value Date/Time    Cholesterol, total 95 09/19/2022 12:13 PM    HDL Cholesterol 48 09/19/2022 12:13 PM    LDL, calculated 28.4 09/19/2022 12:13 PM    Triglyceride 93 09/19/2022 12:13 PM    CHOL/HDL Ratio 2.0 09/19/2022 12:13 PM     Lab Results   Component Value Date/Time    Glucose (POC) 141 (H) 11/17/2022 04:57 PM    Glucose (POC) 166 (H) 11/17/2022 11:52 AM    Glucose (POC) 110 (H) 07/02/2019 07:41 PM    Glucose (POC) 68 07/02/2019 05:51 PM     Lab Results   Component Value Date/Time    Color YELLOW/STRAW 11/06/2022 07:15 PM    Appearance CLEAR 11/06/2022 07:15 PM    Specific gravity 1.010 11/06/2022 07:15 PM    Specific gravity 1.006 10/26/2022 06:25 PM    pH (UA) 7.0 11/06/2022 07:15 PM    Protein Negative 11/06/2022 07:15 PM    Glucose Negative 11/06/2022 07:15 PM    Ketone Negative 11/06/2022 07:15 PM    Bilirubin Negative 11/06/2022 07:15 PM    Urobilinogen 0.2 11/06/2022 07:15 PM    Nitrites Negative 11/06/2022 07:15 PM    Leukocyte Esterase Negative 11/06/2022 07:15 PM    Epithelial cells FEW 11/06/2022 07:15 PM    Bacteria Negative 11/06/2022 07:15 PM    WBC 0-4 11/06/2022 07:15 PM    RBC 10-20 11/06/2022 07:15 PM         Medications Reviewed:     Current Facility-Administered Medications   Medication Dose Route Frequency    aspirin chewable tablet 81 mg  81 mg Oral DAILY    atorvastatin (LIPITOR) tablet 40 mg  40 mg Oral DAILY    DULoxetine (CYMBALTA) capsule 60 mg  60 mg Oral DAILY    finasteride (PROSCAR) tablet 5 mg  5 mg Oral DAILY    fludrocortisone (FLORINEF) tablet 0.1 mg  0.1 mg Oral BID    [Held by provider] midodrine (PROAMATINE) tablet 5 mg  5 mg Oral 5XD    sodium chloride (NS) flush 5-40 mL  5-40 mL IntraVENous Q8H    sodium chloride (NS) flush 5-40 mL  5-40 mL IntraVENous PRN    0.9% sodium chloride infusion  75 mL/hr IntraVENous CONTINUOUS    acetaminophen (TYLENOL) tablet 650 mg  650 mg Oral Q4H PRN    heparin (porcine) injection 5,000 Units  5,000 Units SubCUTAneous Q8H     ______________________________________________________________________  EXPECTED LENGTH OF STAY: 2d 7h  ACTUAL LENGTH OF STAY:          2                 Danielle Daugherty MD

## 2022-11-19 LAB
ATRIAL RATE: 75 BPM
ATRIAL RATE: 81 BPM
CALCULATED R AXIS, ECG10: 42 DEGREES
CALCULATED R AXIS, ECG10: 42 DEGREES
CALCULATED T AXIS, ECG11: 28 DEGREES
CALCULATED T AXIS, ECG11: 43 DEGREES
DIAGNOSIS, 93000: NORMAL
DIAGNOSIS, 93000: NORMAL
P-R INTERVAL, ECG05: 118 MS
Q-T INTERVAL, ECG07: 382 MS
Q-T INTERVAL, ECG07: 402 MS
QRS DURATION, ECG06: 84 MS
QRS DURATION, ECG06: 92 MS
QTC CALCULATION (BEZET), ECG08: 443 MS
QTC CALCULATION (BEZET), ECG08: 466 MS
VENTRICULAR RATE, ECG03: 81 BPM
VENTRICULAR RATE, ECG03: 81 BPM

## 2022-11-19 PROCEDURE — 74011250636 HC RX REV CODE- 250/636: Performed by: FAMILY MEDICINE

## 2022-11-19 PROCEDURE — 74011000250 HC RX REV CODE- 250: Performed by: FAMILY MEDICINE

## 2022-11-19 PROCEDURE — 65270000029 HC RM PRIVATE

## 2022-11-19 PROCEDURE — 74011250637 HC RX REV CODE- 250/637: Performed by: INTERNAL MEDICINE

## 2022-11-19 PROCEDURE — 74011250637 HC RX REV CODE- 250/637: Performed by: FAMILY MEDICINE

## 2022-11-19 RX ORDER — LANOLIN ALCOHOL/MO/W.PET/CERES
3 CREAM (GRAM) TOPICAL
Status: DISCONTINUED | OUTPATIENT
Start: 2022-11-19 | End: 2022-11-30 | Stop reason: HOSPADM

## 2022-11-19 RX ORDER — POTASSIUM CHLORIDE 750 MG/1
40 TABLET, FILM COATED, EXTENDED RELEASE ORAL EVERY 4 HOURS
Status: COMPLETED | OUTPATIENT
Start: 2022-11-19 | End: 2022-11-19

## 2022-11-19 RX ADMIN — FLUDROCORTISONE ACETATE 0.1 MG: 0.1 TABLET ORAL at 09:15

## 2022-11-19 RX ADMIN — POTASSIUM CHLORIDE 40 MEQ: 750 TABLET, FILM COATED, EXTENDED RELEASE ORAL at 09:14

## 2022-11-19 RX ADMIN — ATORVASTATIN CALCIUM 40 MG: 40 TABLET, FILM COATED ORAL at 09:15

## 2022-11-19 RX ADMIN — ASPIRIN 81 MG 81 MG: 81 TABLET ORAL at 09:14

## 2022-11-19 RX ADMIN — HEPARIN SODIUM 5000 UNITS: 5000 INJECTION INTRAVENOUS; SUBCUTANEOUS at 09:15

## 2022-11-19 RX ADMIN — HEPARIN SODIUM 5000 UNITS: 5000 INJECTION INTRAVENOUS; SUBCUTANEOUS at 04:25

## 2022-11-19 RX ADMIN — SODIUM CHLORIDE, PRESERVATIVE FREE 10 ML: 5 INJECTION INTRAVENOUS at 06:00

## 2022-11-19 RX ADMIN — FLUDROCORTISONE ACETATE 0.1 MG: 0.1 TABLET ORAL at 17:36

## 2022-11-19 RX ADMIN — POTASSIUM CHLORIDE 40 MEQ: 750 TABLET, FILM COATED, EXTENDED RELEASE ORAL at 13:06

## 2022-11-19 RX ADMIN — SODIUM CHLORIDE, PRESERVATIVE FREE 10 ML: 5 INJECTION INTRAVENOUS at 13:07

## 2022-11-19 RX ADMIN — HEPARIN SODIUM 5000 UNITS: 5000 INJECTION INTRAVENOUS; SUBCUTANEOUS at 17:36

## 2022-11-19 RX ADMIN — ACETAMINOPHEN 650 MG: 325 TABLET ORAL at 13:06

## 2022-11-19 RX ADMIN — DULOXETINE HYDROCHLORIDE 60 MG: 60 CAPSULE, DELAYED RELEASE ORAL at 09:15

## 2022-11-19 NOTE — PROGRESS NOTES
[address] [facility] Adult  Hospitalist Group                                                                                          Hospitalist Progress Note  Heidy Gao MD  Answering service: [phone] or [phone] from in house phone        Date of Service:  2022  NAME:  Promise Sargent  :  1945  MRN:  [id number]      Admission Summary:   Promise Sargent is a 69 y.o. male who presents with syncope. Patient presents to the ER at [facility] with an episode of syncope versus seizure, patient has a history of orthostatic hypotension, woke up at 3 AM this morning and became confused, and collapsed within the toilet and the wall, patient reports that after a while he called his wife, and was brought to the hospital, not sure whether he had a seizure or syncope, was requested to be admitted to the hospitalist service, currently patient denies any complaints or problems    The patient denies any headache, blurry vision, sore throat, trouble swallowing, trouble with speech, chest pain, SOB, cough, fever, chills, N/V/D, abd pain, urinary symptoms, constipation, recent travels, sick contacts, focal or generalized neurological symptoms,  rashes, contact with COVID-19 diagnosed patients, hematemesis, melena, hemoptysis, hematuria, rashes, denies starting any new medications and denies any other concerns or problems besides as mentioned above. Interval history / Subjective:   Patient is resting in bed, appears comfortable. He is pleasantly confused, not really able to participate in conversation. He has some purposeless movements of his hands, playing with the bed sheet. Defers questions to his wife. Assessment & Plan:         Recurrent \"syncope\":  - episodes of rigidity, but no history suggestive of postictal state;   - Neurology consulted;   - based on chart review, the patient has no history of CVA, and no history of seizures.   - per wife, he recently had an MRI of the brain, so I will hold off on ordering one;   - EEG 11/18: no epileptiform discharges;   - evaluated by Neurology, all symptoms of confusion are being attributed to dementia; Severe autonomic dysregulation:  - on Midodrine; discussed with Neurology: patient is supposed to be on Midodrine 5 mg 5 times a day;  - per wife, recently switched to Florinef;   - recommend wheelchair for safety and a bedside commode;   - patient is at high risk of recurrent falls and injury due to Orthostatic hypotension and impulsivity;   - BP very labile, I will discontinue Florinef and restart Midodrine;     PTSD:  - consulted Psychiatry;   - continue home medications as prior to admission;     BPH:  - on Proscar, will change administration to evening time; Dyslipidemia:  - on statin; Hyperglycemia, without history of DM:  - HgA1c 5.9 in 09/22; Code status:  Full  Prophylaxis:  B SCD's  Care Plan discussed with: patient, wife at the bedside, RN, CM;   Anticipated Disposition:   - wife requested transfer to the [facility] - on diversion;   - patient does not have any urgent medical concerns necessitating a transfer to another acute care hospital;   - wife is requesting a Urology consult, because a recent scan showed \"only one kidney\". Patient's labwork does not show any signs of CKD, needs outpatient follow-up with Nephrology for BUN/Cr monitoring, no acute concerns, no electrolyte abnormalities;   - discharge planning: SNF when bed available;  - patient is medically stable for discharge today, awaiting placement; Complex decision making was performed, which includes reviewing the patient's available past medical records, laboratory results, and imaging studies. Hospital Problems  Date Reviewed: 9/19/2022            Codes Class Noted POA    Syncope ICD-10-CM: R55  ICD-9-CM: 780.2  11/16/2022 Unknown         Review of Systems:   A comprehensive review of systems was negative except for that written in the HPI. Vital Signs:    Last 24hrs VS reviewed since prior progress note. Most recent are:  Visit Vitals  /80   Pulse 84   Temp 97.9 °F (36.6 °C)   Resp 23   Ht 5' 11\" (1.803 m)   Wt 77.6 kg (171 lb)   SpO2 96%   BMI 23.85 kg/m²         Intake/Output Summary (Last 24 hours) at 11/19/2022 1547  Last data filed at 11/19/2022 0400  Gross per 24 hour   Intake --   Output 300 ml   Net -300 ml          Physical Examination:     I had a face to face encounter with this patient and independently examined them on 11/19/2022 as outlined below:          Constitutional:  No acute distress, cooperative, pleasant    ENT:  Oral mucosa moist, oropharynx benign. Resp:  CTA bilaterally. No wheezing/rhonchi/rales. No accessory muscle use. CV:  Regular rhythm, normal rate, no murmurs, gallops, rubs    GI:  Soft, non distended, non tender. normoactive bowel sounds, no hepatosplenomegaly     Musculoskeletal:  No edema, warm, 2+ pulses throughout    Neurologic:  Moves all extremities. Awake, cooperative; pleasantly confused; Data Review:    Review and/or order of clinical lab test  Review and/or order of tests in the radiology section of CPT  Review and/or order of tests in the medicine section of CPT      Labs:     Recent Labs     11/18/22  0715   WBC 8.0   HGB 12.6   HCT 36.8          Recent Labs     11/18/22  0715      K 3.0*      CO2 27   BUN 14   CREA 0.89   *   CA 8.9       No results for input(s): ALT, AP, TBIL, TBILI, TP, ALB, GLOB, GGT, AML, LPSE in the last 72 hours. No lab exists for component: SGOT, GPT, AMYP, HLPSE    No results for input(s): INR, PTP, APTT, INREXT, INREXT in the last 72 hours. No results for input(s): FE, TIBC, PSAT, FERR in the last 72 hours. Lab Results   Component Value Date/Time    Folate 14.3 11/08/2022 05:44 AM        No results for input(s): PH, PCO2, PO2 in the last 72 hours.   No results for input(s): CPK, CKNDX, TROIQ in the last 72 hours.    No lab exists for component: CPKMB    Lab Results   Component Value Date/Time    Cholesterol, total 95 09/19/2022 12:13 PM    HDL Cholesterol 48 09/19/2022 12:13 PM    LDL, calculated 28.4 09/19/2022 12:13 PM    Triglyceride 93 09/19/2022 12:13 PM    CHOL/HDL Ratio 2.0 09/19/2022 12:13 PM     Lab Results   Component Value Date/Time    Glucose (POC) 141 (H) 11/17/2022 04:57 PM    Glucose (POC) 166 (H) 11/17/2022 11:52 AM    Glucose (POC) 110 (H) 07/02/2019 07:41 PM    Glucose (POC) 68 07/02/2019 05:51 PM     Lab Results   Component Value Date/Time    Color YELLOW/STRAW 11/06/2022 07:15 PM    Appearance CLEAR 11/06/2022 07:15 PM    Specific gravity 1.010 11/06/2022 07:15 PM    Specific gravity 1.006 10/26/2022 06:25 PM    pH (UA) 7.0 11/06/2022 07:15 PM    Protein Negative 11/06/2022 07:15 PM    Glucose Negative 11/06/2022 07:15 PM    Ketone Negative 11/06/2022 07:15 PM    Bilirubin Negative 11/06/2022 07:15 PM    Urobilinogen 0.2 11/06/2022 07:15 PM    Nitrites Negative 11/06/2022 07:15 PM    Leukocyte Esterase Negative 11/06/2022 07:15 PM    Epithelial cells FEW 11/06/2022 07:15 PM    Bacteria Negative 11/06/2022 07:15 PM    WBC 0-4 11/06/2022 07:15 PM    RBC 10-20 11/06/2022 07:15 PM         Medications Reviewed:     Current Facility-Administered Medications   Medication Dose Route Frequency    finasteride (PROSCAR) tablet 5 mg  5 mg Oral QHS    aspirin chewable tablet 81 mg  81 mg Oral DAILY    atorvastatin (LIPITOR) tablet 40 mg  40 mg Oral DAILY    DULoxetine (CYMBALTA) capsule 60 mg  60 mg Oral DAILY    fludrocortisone (FLORINEF) tablet 0.1 mg  0.1 mg Oral BID    [Held by provider] midodrine (PROAMATINE) tablet 5 mg  5 mg Oral 5XD    sodium chloride (NS) flush 5-40 mL  5-40 mL IntraVENous Q8H    sodium chloride (NS) flush 5-40 mL  5-40 mL IntraVENous PRN    acetaminophen (TYLENOL) tablet 650 mg  650 mg Oral Q4H PRN    heparin (porcine) injection 5,000 Units  5,000 Units SubCUTAneous Q8H ______________________________________________________________________  EXPECTED LENGTH OF STAY: 2d 7h  ACTUAL LENGTH OF STAY:          3                 Berry Goddard MD

## 2022-11-19 NOTE — PROGRESS NOTES
Bedside shift change report given to Ermelinda Varela (oncoming nurse) by Mauricio Whittaker (offgoing nurse). Report included the following information SBAR, Kardex, and Recent Results.

## 2022-11-19 NOTE — PROGRESS NOTES
Bedside shift change report given to [address] [place] [address] (oncoming nurse) by Rosibel Warren (offgoing nurse). Report included the following information SBAR, Kardex, Intake/Output, MAR, and Cardiac Rhythm NSR . Problem: Falls - Risk of  Goal: *Absence of Falls  Description: Document Patricia Chapa Fall Risk and appropriate interventions in the flowsheet.   Outcome: Progressing Towards Goal  Note: Fall Risk Interventions:  Mobility Interventions: (P) Assess mobility with egress test, Patient to call before getting OOB, PT Consult for mobility concerns    Mentation Interventions: (P) Adequate sleep, hydration, pain control, Bed/chair exit alarm    Medication Interventions: (P) Assess postural VS orthostatic hypotension, Bed/chair exit alarm, Patient to call before getting OOB, Evaluate medications/consider consulting pharmacy    Elimination Interventions: (P) Bed/chair exit alarm, Call light in reach, Patient to call for help with toileting needs    History of Falls Interventions: (P) Bed/chair exit alarm, Room close to nurse's station         Problem: Patient Education: Go to Patient Education Activity  Goal: Patient/Family Education  Outcome: Progressing Towards Goal     Problem: Altered Thought Process (Adult/Pediatric)  Goal: *STG: Participates in treatment plan  Outcome: Progressing Towards Goal  Goal: *STG: Remains safe in hospital  Outcome: Progressing Towards Goal  Goal: *STG: Seeks staff when feelings of anxiety and fear arise  Outcome: Progressing Towards Goal  Goal: *STG: Complies with medication therapy  Outcome: Progressing Towards Goal  Goal: *STG: Attends activities and groups  Outcome: Progressing Towards Goal  Goal: *STG: Decreased delusional thinking  Outcome: Progressing Towards Goal  Goal: *STG: Decreased hallucinations  Outcome: Progressing Towards Goal  Goal: *STG: Absence of lethality  Outcome: Progressing Towards Goal  Goal: *STG: Demonstrates ability to understand and use improved judgment in daily activities and relationships  Outcome: Progressing Towards Goal  Goal: *LTG: Returns to baseline functioning  Outcome: Progressing Towards Goal  Goal: Interventions  Outcome: Progressing Towards Goal     Problem: Patient Education: Go to Patient Education Activity  Goal: Patient/Family Education  Outcome: Progressing Towards Goal

## 2022-11-19 NOTE — PROGRESS NOTES
Chart reviewed, cleared by RN for PT eval. Pt receive semi alvarado in bed, sitter in room, on [facility] and tele. BP taken prior to mobilization and retaken in same and contralateral arm. Found to be 200's/190's over 100's/90's and session aborted. RN aware. Will attempt to eval tomorrow once medically appropriate.     Benita Davis PT, DPT

## 2022-11-19 NOTE — PROGRESS NOTES
Bedside shift change report given to Macario Jovan Valles (oncoming nurse) by Michaela Guevara (offgoing nurse). Report included the following information SBAR, Kardex, Intake/Output, MAR, and Cardiac Rhythm NSR . Problem: Falls - Risk of  Goal: *Absence of Falls  Description: Document Becki Litter Fall Risk and appropriate interventions in the flowsheet.   Outcome: Progressing Towards Goal  Note: Fall Risk Interventions:  Mobility Interventions: Assess mobility with egress test, Patient to call before getting OOB, PT Consult for mobility concerns    Mentation Interventions: Adequate sleep, hydration, pain control, Bed/chair exit alarm, Reorient patient    Medication Interventions: Evaluate medications/consider consulting pharmacy, Patient to call before getting OOB, Teach patient to arise slowly    Elimination Interventions: Bed/chair exit alarm, Call light in reach, Patient to call for help with toileting needs    History of Falls Interventions: Bed/chair exit alarm, Room close to nurse's station         Problem: Patient Education: Go to Patient Education Activity  Goal: Patient/Family Education  Outcome: Progressing Towards Goal     Problem: Altered Thought Process (Adult/Pediatric)  Goal: *STG: Participates in treatment plan  Outcome: Progressing Towards Goal  Goal: *STG: Remains safe in hospital  Outcome: Progressing Towards Goal  Goal: *STG: Seeks staff when feelings of anxiety and fear arise  Outcome: Progressing Towards Goal  Goal: *STG: Complies with medication therapy  Outcome: Progressing Towards Goal  Goal: *STG: Attends activities and groups  Outcome: Progressing Towards Goal  Goal: *STG: Decreased delusional thinking  Outcome: Progressing Towards Goal  Goal: *STG: Decreased hallucinations  Outcome: Progressing Towards Goal  Goal: *STG: Absence of lethality  Outcome: Progressing Towards Goal  Goal: *STG: Demonstrates ability to understand and use improved judgment in daily activities and relationships  Outcome: Progressing Towards Goal  Goal: *LTG: Returns to baseline functioning  Outcome: Progressing Towards Goal  Goal: Interventions  Outcome: Progressing Towards Goal     Problem: Patient Education: Go to Patient Education Activity  Goal: Patient/Family Education  Outcome: Progressing Towards Goal

## 2022-11-19 NOTE — PROGRESS NOTES
Occupational Therapy    Chart reviewed, patient received reclined in bed, amenable to session. BP attempted in BUE with 200s/100s multiple times. Aborted session and RN aware. Will continue to follow and attempt as medically appropriate.      Andrew Mon MS, OTR/L

## 2022-11-20 PROCEDURE — 97161 PT EVAL LOW COMPLEX 20 MIN: CPT

## 2022-11-20 PROCEDURE — 97530 THERAPEUTIC ACTIVITIES: CPT

## 2022-11-20 PROCEDURE — 65270000029 HC RM PRIVATE

## 2022-11-20 PROCEDURE — 97165 OT EVAL LOW COMPLEX 30 MIN: CPT

## 2022-11-20 PROCEDURE — 74011000250 HC RX REV CODE- 250: Performed by: FAMILY MEDICINE

## 2022-11-20 PROCEDURE — 74011250637 HC RX REV CODE- 250/637: Performed by: INTERNAL MEDICINE

## 2022-11-20 PROCEDURE — 74011250637 HC RX REV CODE- 250/637: Performed by: FAMILY MEDICINE

## 2022-11-20 PROCEDURE — 74011250636 HC RX REV CODE- 250/636: Performed by: FAMILY MEDICINE

## 2022-11-20 PROCEDURE — 97535 SELF CARE MNGMENT TRAINING: CPT

## 2022-11-20 RX ADMIN — ACETAMINOPHEN 650 MG: 325 TABLET ORAL at 00:13

## 2022-11-20 RX ADMIN — DULOXETINE HYDROCHLORIDE 60 MG: 60 CAPSULE, DELAYED RELEASE ORAL at 08:48

## 2022-11-20 RX ADMIN — SODIUM CHLORIDE, PRESERVATIVE FREE 10 ML: 5 INJECTION INTRAVENOUS at 23:01

## 2022-11-20 RX ADMIN — Medication 3 MG: at 00:13

## 2022-11-20 RX ADMIN — HEPARIN SODIUM 5000 UNITS: 5000 INJECTION INTRAVENOUS; SUBCUTANEOUS at 17:32

## 2022-11-20 RX ADMIN — FLUDROCORTISONE ACETATE 0.1 MG: 0.1 TABLET ORAL at 17:32

## 2022-11-20 RX ADMIN — ATORVASTATIN CALCIUM 40 MG: 40 TABLET, FILM COATED ORAL at 08:48

## 2022-11-20 RX ADMIN — FINASTERIDE 5 MG: 5 TABLET, FILM COATED ORAL at 22:46

## 2022-11-20 RX ADMIN — FLUDROCORTISONE ACETATE 0.1 MG: 0.1 TABLET ORAL at 08:48

## 2022-11-20 RX ADMIN — HEPARIN SODIUM 5000 UNITS: 5000 INJECTION INTRAVENOUS; SUBCUTANEOUS at 09:02

## 2022-11-20 RX ADMIN — SODIUM CHLORIDE, PRESERVATIVE FREE 10 ML: 5 INJECTION INTRAVENOUS at 14:00

## 2022-11-20 RX ADMIN — ASPIRIN 81 MG 81 MG: 81 TABLET ORAL at 08:48

## 2022-11-20 NOTE — PROGRESS NOTES
TRANSFER - OUT REPORT:    Verbal report given to [facility] RN(name) on Babatunde Hare  being transferred to [place] RN(unit) for change in patient condition(downgrade)       Report consisted of patients Situation, Background, Assessment and   Recommendations(SBAR). Information from the following report(s) SBAR, Kardex, Intake/Output, MAR, and Cardiac Rhythm NSR  was reviewed with the receiving nurse. Lines:   Peripheral IV 11/17/22 Right Forearm (Active)   Site Assessment Clean, dry, & intact 11/20/22 0834   Phlebitis Assessment 0 11/20/22 0834   Infiltration Assessment 0 11/20/22 0834   Dressing Status Clean, dry, & intact 11/20/22 0834   Dressing Type Transparent;Gauze;Tape 11/20/22 0834   Hub Color/Line Status Pink 11/20/22 0834   Action Taken Open ports on tubing capped 11/20/22 0834   Alcohol Cap Used Yes 11/20/22 0834        Opportunity for questions and clarification was provided.       Patient transported with:   [employer]

## 2022-11-20 NOTE — PROGRESS NOTES
[facility]                                                                                          Hospitalist Progress Note  Sarahi Wilkerson MD  Answering service: [phone] or [phone] from in house phone        Date of Service:  2022  NAME:  Penne Felty  :  1945  MRN:  [id number]      Admission Summary:   Penne Felty is a 69 y.o. male who presents with syncope. Patient presents to the ER at [facility] with an episode of syncope versus seizure, patient has a history of orthostatic hypotension, woke up at 3 AM this morning and became confused, and collapsed within the toilet and the wall, patient reports that after a while he called his wife, and was brought to the hospital, not sure whether he had a seizure or syncope, was requested to be admitted to the hospitalist service, currently patient denies any complaints or problems    The patient denies any headache, blurry vision, sore throat, trouble swallowing, trouble with speech, chest pain, SOB, cough, fever, chills, N/V/D, abd pain, urinary symptoms, constipation, recent travels, sick contacts, focal or generalized neurological symptoms,  rashes, contact with COVID-19 diagnosed patients, hematemesis, melena, hemoptysis, hematuria, rashes, denies starting any new medications and denies any other concerns or problems besides as mentioned above. Interval history / Subjective:   Patient is sitting up in bed, on RA, appears comfortable. Vitals reviewed: BP is 148/60 on the monitor at the time of my visit. Patient is smiling, denies any complaints. Wife updated at the bedside. She then asked me to step out of the room to get additional information. She asked about the results of the EEG, and I answered that it did not show any seizure activity, that is was negative for seizures. She asked me \"What did the Neurologist say? \"   Per Neurology, all symptoms are attributable to dementia. \"What about his psych status? \"   Patient was evaluated by Psychiatry and the recommendation is to continue his medications as prior to admission for PTSD. \"What about therapy? \"   I explained that this would need to be arranged through his Psychiatrist, it is not something we do in the hospital.    Per wife, [facility] Barone is no one where we live, there is no one within 60 miles of where we live, we have to come to [place]\". I suggested reaching out to his own Psychiatrist and possibly the [place] for additional assistance with that. She asked about a Nephrology consult and I explained that this is not an acute issue and he should follow-up with Nephrology as outpatient. She asked me to document that I am \"disagreeing\" with his PCP, which I politely explained that I would not document such a thing, because I am not in \"disagreement\" with anything, I simply do not see any urgent indication for a Nephrology consult, since renal indices are within normal range, and the Nephrologist will not do anything different at this point. Wife said that \"Dr. Kristina Cervantes did a lot more\" than just look at the kidney numbers, he \"did everything\", including managing the BP. I again reiterated that patient is medically stable and does not need to stay in the hospital.  Wife is waiting for him to be transferred to the [place], which I explained, might not happen, as we have been trying since Thursday to transfer him, and in my experience, the hospital is frequently on diversion. We have completed the medical work-up here at [facility]. \"They have rehab there, don't they? \"  \"I cannot take him home\". \"You would not send your family member to one of those places, would you? \"  I deferred the remainder of discharge planning to . I assured her that we are trying to coordinate his transfer to the [place], but it is up to them to accept the patient.   Patient's wife states she has an appt at 12:15 at the [place] (she said today), and plans to also talk to the patient advocate tomorrow. Consult placed for patient advocate. Assessment & Plan:         Recurrent \"syncope\":  - episodes of rigidity, but no history suggestive of postictal state;   - Neurology consulted;   - based on chart review, the patient has no history of CVA, and no history of seizures. - per wife, he recently had an MRI of the brain, so I will hold off on ordering one;   - EEG 11/18: no epileptiform discharges;   - evaluated by Neurology, all symptoms of confusion are being attributed to dementia; Severe autonomic dysregulation:  - on Midodrine; discussed with Neurology: patient is supposed to be on Midodrine 5 mg 5 times a day;  - per wife, recently switched to Florinef;   - recommend wheelchair for safety and a bedside commode;   - patient is at high risk of recurrent falls and injury due to Orthostatic hypotension and impulsivity;   - BP very labile, patient is at high risk of falls;   - per Neurology, the safest for him might be to stay at a wheelchair bound level of functioning/activity;    PTSD:  - consulted Psychiatry;   - continue home medications as prior to admission;     BPH:  - on Proscar, will change administration to evening time; Dyslipidemia:  - on statin; Hyperglycemia, without history of DM:  - HgA1c 5.9 in 09/22; Code status:  Full  Prophylaxis:  B SCD's  Care Plan discussed with: patient, wife at the bedside, RN, LAURA;   Anticipated Disposition:   - wife requested transfer to the [facility] - on diversion;   - patient does not have any urgent medical concerns necessitating a transfer to another acute care hospital;   - wife is requesting a Urology consult, because a recent scan showed \"only one kidney\".   Patient's labwork does not show any signs of CKD, needs outpatient follow-up with Nephrology for BUN/Cr monitoring, no acute concerns, no electrolyte abnormalities;   - discharge planning: SNF when bed available;  - patient is medically stable for discharge today, awaiting placement; Complex decision making was performed, which includes reviewing the patient's available past medical records, laboratory results, and imaging studies. Hospital Problems  Date Reviewed: 9/19/2022            Codes Class Noted POA    Syncope ICD-10-CM: R55  ICD-9-CM: 780.2  11/16/2022 Unknown         Review of Systems:   A comprehensive review of systems was negative except for that written in the HPI. Vital Signs:    Last 24hrs VS reviewed since prior progress note. Most recent are:  Visit Vitals  /60 (BP 1 Location: Left upper arm, BP Patient Position: At rest;Supine)   Pulse 81   Temp 98.1 °F (36.7 °C)   Resp 17   Ht 5' 11\" (1.803 m)   Wt 77.6 kg (171 lb)   SpO2 91%   BMI 23.85 kg/m²       No intake or output data in the 24 hours ending 11/20/22 1147       Physical Examination:     I had a face to face encounter with this patient and independently examined them on 11/20/2022 as outlined below:          Constitutional:  No acute distress, cooperative, pleasant    ENT:  Oral mucosa moist, oropharynx benign. Resp:  CTA bilaterally. No wheezing/rhonchi/rales. No accessory muscle use. CV:  Regular rhythm, normal rate, no murmurs, gallops, rubs    GI:  Soft, non distended, non tender. normoactive bowel sounds, no hepatosplenomegaly     Musculoskeletal:  No edema, warm, 2+ pulses throughout    Neurologic:  Moves all extremities. Awake, cooperative; pleasantly confused;              Data Review:    Review and/or order of clinical lab test  Review and/or order of tests in the radiology section of CPT  Review and/or order of tests in the medicine section of CPT      Labs:     Recent Labs     11/18/22  0715   WBC 8.0   HGB 12.6   HCT 36.8          Recent Labs     11/18/22  0715      K 3.0*      CO2 27   BUN 14   CREA 0.89   *   CA 8.9       No results for input(s): ALT, AP, TBIL, TBILI, TP, ALB, GLOB, GGT, AML, LPSE in the last 72 hours.    No lab exists for component: SGOT, GPT, AMYP, HLPSE    No results for input(s): INR, PTP, APTT, INREXT, INREXT in the last 72 hours. No results for input(s): FE, TIBC, PSAT, FERR in the last 72 hours. Lab Results   Component Value Date/Time    Folate 14.3 11/08/2022 05:44 AM        No results for input(s): PH, PCO2, PO2 in the last 72 hours. No results for input(s): CPK, CKNDX, TROIQ in the last 72 hours.     No lab exists for component: CPKMB    Lab Results   Component Value Date/Time    Cholesterol, total 95 09/19/2022 12:13 PM    HDL Cholesterol 48 09/19/2022 12:13 PM    LDL, calculated 28.4 09/19/2022 12:13 PM    Triglyceride 93 09/19/2022 12:13 PM    CHOL/HDL Ratio 2.0 09/19/2022 12:13 PM     Lab Results   Component Value Date/Time    Glucose (POC) 141 (H) 11/17/2022 04:57 PM    Glucose (POC) 166 (H) 11/17/2022 11:52 AM    Glucose (POC) 110 (H) 07/02/2019 07:41 PM    Glucose (POC) 68 07/02/2019 05:51 PM     Lab Results   Component Value Date/Time    Color YELLOW/STRAW 11/06/2022 07:15 PM    Appearance CLEAR 11/06/2022 07:15 PM    Specific gravity 1.010 11/06/2022 07:15 PM    Specific gravity 1.006 10/26/2022 06:25 PM    pH (UA) 7.0 11/06/2022 07:15 PM    Protein Negative 11/06/2022 07:15 PM    Glucose Negative 11/06/2022 07:15 PM    Ketone Negative 11/06/2022 07:15 PM    Bilirubin Negative 11/06/2022 07:15 PM    Urobilinogen 0.2 11/06/2022 07:15 PM    Nitrites Negative 11/06/2022 07:15 PM    Leukocyte Esterase Negative 11/06/2022 07:15 PM    Epithelial cells FEW 11/06/2022 07:15 PM    Bacteria Negative 11/06/2022 07:15 PM    WBC 0-4 11/06/2022 07:15 PM    RBC 10-20 11/06/2022 07:15 PM         Medications Reviewed:     Current Facility-Administered Medications   Medication Dose Route Frequency    melatonin tablet 3 mg  3 mg Oral QHS PRN    finasteride (PROSCAR) tablet 5 mg  5 mg Oral QHS    aspirin chewable tablet 81 mg  81 mg Oral DAILY    atorvastatin (LIPITOR) tablet 40 mg  40 mg Oral DAILY DULoxetine (CYMBALTA) capsule 60 mg  60 mg Oral DAILY    fludrocortisone (FLORINEF) tablet 0.1 mg  0.1 mg Oral BID    [Held by provider] midodrine (PROAMATINE) tablet 5 mg  5 mg Oral 5XD    sodium chloride (NS) flush 5-40 mL  5-40 mL IntraVENous Q8H    sodium chloride (NS) flush 5-40 mL  5-40 mL IntraVENous PRN    acetaminophen (TYLENOL) tablet 650 mg  650 mg Oral Q4H PRN    heparin (porcine) injection 5,000 Units  5,000 Units SubCUTAneous Q8H     ______________________________________________________________________  EXPECTED LENGTH OF STAY: 2d 7h  ACTUAL LENGTH OF STAY:          4                 Nilo Diaz MD

## 2022-11-20 NOTE — PROGRESS NOTES
1709: Perfect serv message sent to Chelsy Wood MD regarding patient -    \"Patient currently refusing telemetry and to answer orientation questions. When I attempted to hook him back up to telemetry he swatted at me so I deferred for safety. Sitter present. \"    No new orders. 2330: Unable to obtain sitter for patient. Charge RN able to sit for a brief period and another RN able to administer melatonin and tylenol at 0013.     0115: This RN currently sitting at patient's bedside. Patient sleeping, appears comfortable. 0840: Bedside and Verbal shift change report given to [address] [place] [address] Dallin (oncoming nurse) by Sydney Mills (offgoing nurse). Report included the following information SBAR, Kardex, ED Summary, Intake/Output, MAR, Recent Results, and Cardiac Rhythm NSR . Problem: Falls - Risk of  Goal: *Absence of Falls  Description: Document Darlen Holiday Fall Risk and appropriate interventions in the flowsheet.   Outcome: Progressing Towards Goal  Note: Fall Risk Interventions:  Mobility Interventions: Assess mobility with egress test, Patient to call before getting OOB, PT Consult for mobility concerns    Mentation Interventions: Adequate sleep, hydration, pain control, Bed/chair exit alarm, Reorient patient    Medication Interventions: Evaluate medications/consider consulting pharmacy, Patient to call before getting OOB, Teach patient to arise slowly    Elimination Interventions: Bed/chair exit alarm, Call light in reach, Patient to call for help with toileting needs    History of Falls Interventions: Bed/chair exit alarm, Room close to nurse's station         Problem: Patient Education: Go to Patient Education Activity  Goal: Patient/Family Education  Outcome: Progressing Towards Goal     Problem: Altered Thought Process (Adult/Pediatric)  Goal: *STG: Participates in treatment plan  Outcome: Progressing Towards Goal  Goal: *STG: Remains safe in hospital  Outcome: Progressing Towards Goal  Goal: *STG: Seeks staff when feelings of anxiety and fear arise  Outcome: Progressing Towards Goal  Goal: *STG: Complies with medication therapy  Outcome: Progressing Towards Goal  Goal: *STG: Attends activities and groups  Outcome: Progressing Towards Goal  Goal: *STG: Decreased delusional thinking  Outcome: Progressing Towards Goal  Goal: *STG: Decreased hallucinations  Outcome: Progressing Towards Goal  Goal: *STG: Absence of lethality  Outcome: Progressing Towards Goal  Goal: *STG: Demonstrates ability to understand and use improved judgment in daily activities and relationships  Outcome: Progressing Towards Goal  Goal: *LTG: Returns to baseline functioning  Outcome: Progressing Towards Goal  Goal: Interventions  Outcome: Progressing Towards Goal     Problem: Patient Education: Go to Patient Education Activity  Goal: Patient/Family Education  Outcome: Progressing Towards Goal

## 2022-11-20 NOTE — PROGRESS NOTES
Problem: Self Care Deficits Care Plan (Adult)  Goal: *Acute Goals and Plan of Care (Insert Text)  Description: FUNCTIONAL STATUS PRIOR TO ADMISSION: Immediately PTA patient was modified independent using a rolling walker for functional mobility. At his true baseline pt was independent and active without use of DME.     HOME SUPPORT: At pt's baseline he lived with his wife but did not require assist for basic ADL tasks. Occupational Therapy Goals  Initiated 11/20/2022  1. Patient will perform standing grooming routine with supervision/set-up within 7 day(s). 2.  Patient will perform upper and lower body bathing with supervision/set-up within 7 day(s). 3.  Patient will perform upper and lower body dressing with supervision/set-up within 7 day(s). 4.  Patient will perform toilet transfers with supervision/set-up within 7 day(s). 5.  Patient will perform all aspects of toileting with supervision/set-up within 7 day(s). Outcome: Not Met   OCCUPATIONAL THERAPY EVALUATION  Patient: Jacqui Ibanez (67 y.o. male)  Date: 11/20/2022  Primary Diagnosis: Syncope [R55]       Precautions:   Fall    ASSESSMENT  Based on the objective data described below, the patient presents with impaired standing balance, activity tolerance, cognition (baseline dementia, attention, insight), generalized weakness, and asymptomatic orthostatic hypotension. Pt received semi-supine, with sitter present, and agreeable to participation in therapy session. He demonstrated bed mobility and functional transfers with SBA to CGA for safety, however with positional change pt's BP with significant drop (please see vitals below). Pt asymptomatic, therefore trialed basic EOB ADLs and BSC transfer however little to no improvement in BP. Pt returned to supine, bed placed in chair position, and BP recovered. During chart review, noted pt's wife concerned for return home d/t pt's cognitive deficits, fall risk, and overall safety.  At this time recommend d/c to SNF vs home with HHOT and assist/supervision for safety with all OOB mobility/ADL/IADL tasks. Current Level of Function Impacting Discharge (ADLs/self-care): SBA to CGA functional transfers/bed mobility in prep for ADL tasks, seated UB/LB dressing set-up, toileting CGA    Functional Outcome Measure: The patient scored Total: 50/100 on the Barthel Index outcome measure which is indicative of being partially dependent in basic self-care. Other factors to consider for discharge: PLOF, asymptomatic orthostatic hypotension      Patient will benefit from skilled therapy intervention to address the above noted impairments. PLAN :  Recommendations and Planned Interventions: self care training, functional mobility training, therapeutic exercise, balance training, therapeutic activities, endurance activities, patient education, and home safety training    Frequency/Duration: Patient will be followed by occupational therapy 5 times a week to address goals. Recommendation for discharge: (in order for the patient to meet his/her long term goals)  During chart review, noted pt's wife concerned for return home d/t pt's cognitive deficits, fall risk, and overall safety. At this time recommend d/c to SNF vs home with HHOT and assist/supervision for safety with all OOB mobility/ADL/IADL tasks. This discharge recommendation:  A follow-up discussion with the attending provider and/or case management is planned    IF patient discharges home will need the following DME: TBD       SUBJECTIVE:   Patient stated I didn't bring any money with me.     OBJECTIVE DATA SUMMARY:   HISTORY:   Past Medical History:   Diagnosis Date    Altered mental status, unspecified 11/9/2018    Anxiety     Ataxia 11/9/2018    Back pain     Blurred vision     Bradycardia 10/2/2018    Chest pain     Depression     Dizziness     Dizziness and giddiness 10/17/2019    Dysautonomia ([address]     Fast heart beat     Fever 11/9/2018 Frequent headaches     Frequent urination     Hip dysplasia, congenital     Hx of syncope 10/24/2018    Hypotension     Ingrown left big toenail 3/31/2017    Joint pain     Joint swelling     Lumbar spinal stenosis     Memory deficit 9/11/2018    Muscle pain     Neuropathy     Orthostatic hypotension     asymptomatic    Pacemaker     new pacemaker July 2019    Recent change in weight     Risk for falls 12/13/2016    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 11/8/2018    Syncope     Tiredness     Trouble in sleeping     Weakness      Past Surgical History:   Procedure Laterality Date    HX HIP REPLACEMENT Bilateral 1993    HX PACEMAKER PLACEMENT  10/16/2018    HX ROTATOR CUFF REPAIR Right 11/16/2017    DC CARDIAC SURG PROCEDURE UNLIST      DC INS NEW/RPLCMT PRM PM W/TRANSV ELTRD ATRIAL&VENT N/A 7/30/2019    INSERT PPM DUAL performed by Andrea Mckeon MD at [place] [address] LAB       Expanded or extensive additional review of patient history:     Home Situation  Home Environment: Private residence  Wheelchair Ramp: Yes  One/Two Story Residence: One story  Living Alone: No  Support Systems: Spouse/Significant Other  Patient Expects to be Discharged to[de-identified] Home  Current DME Used/Available at Home: Walker, rolling, Wheelchair (transport chair)  Tub or Shower Type: Shower      EXAMINATION OF PERFORMANCE DEFICITS:  Cognitive/Behavioral Status:  Neurologic State: Alert;Confused  Orientation Level: Oriented to person;Oriented to place;Oriented to time;Disoriented to situation  Cognition: Follows commands;Decreased attention/concentration  Perception: Appears intact  Perseveration: No perseveration noted  Safety/Judgement: Decreased awareness of environment;Decreased awareness of need for assistance;Decreased awareness of need for safety;Decreased insight into deficits    Skin: visually intact     Edema: none noted     Hearing: Auditory  Auditory Impairment: None    Vision/Perceptual:                           Acuity: Within Defined Limits;Able to read clock/calendar on wall without difficulty    Corrective Lenses: Reading glasses    Range of Motion:    AROM: Within functional limits  PROM: Within functional limits                      Strength:    Strength: Generally decreased, functional                Coordination:  Coordination: Generally decreased, functional            Tone & Sensation:    Tone: Normal  Sensation: Intact                      Balance:  Sitting: Impaired; Without support  Sitting - Static: Good (unsupported)  Sitting - Dynamic: Good (unsupported); Fair (occasional)  Standing: Impaired; With support  Standing - Static: Good;Constant support  Standing - Dynamic : Fair;Constant support    Functional Mobility and Transfers for ADLs:  Bed Mobility:  Supine to Sit: Stand-by assistance  Sit to Supine: Stand-by assistance  Scooting: Stand-by assistance (to EOB)    Transfers:  Sit to Stand: Contact guard assistance  Stand to Sit: Contact guard assistance  Toilet Transfer : Contact guard assistance (to [facility])    ADL Assessment:  Feeding: Modified independent (inferred)    Oral Facial Hygiene/Grooming: Contact guard assistance (inferred, standing at sink)    Bathing: Contact guard assistance (inferred, seated, assist for transfer)    Type of Bath: Full;Chlorhexidine (CHG)    Upper Body Dressing: Setup (seated)    Lower Body Dressing: Setup (seated)    Toileting: Contact guard assistance (for transfer and balance with hygiene)                ADL Intervention and task modifications:                 Type of Bath: Full;Chlorhexidine (CHG)         Upper Body Dressing Assistance  Pullover Shirt: Set-up    Lower Body Dressing Assistance  Socks: Set-up  Position Performed: Seated edge of bed    Toileting  Toileting Assistance: Contact guard assistance (on [facility])  Bladder Hygiene: Contact guard assistance  Bowel Hygiene: Contact guard assistance  Clothing Management: Contact guard assistance    Cognitive Retraining  Safety/Judgement: Decreased awareness of environment;Decreased awareness of need for assistance;Decreased awareness of need for safety;Decreased insight into deficits    Functional Measure:    Barthel Index:  Bathin  Bladder: 10  Bowels: 10  Groomin  Dressin  Feeding: 10  Mobility: 0  Stairs: 0  Toilet Use: 5  Transfer (Bed to Chair and Back): 10  Total: 50/100      The Barthel ADL Index: Guidelines  1. The index should be used as a record of what a patient does, not as a record of what a patient could do. 2. The main aim is to establish degree of independence from any help, physical or verbal, however minor and for whatever reason. 3. The need for supervision renders the patient not independent. 4. A patient's performance should be established using the best available evidence. Asking the patient, friends/relatives and nurses are the usual sources, but direct observation and common sense are also important. However direct testing is not needed. 5. Usually the patient's performance over the preceding 24-48 hours is important, but occasionally longer periods will be relevant. 6. Middle categories imply that the patient supplies over 50 per cent of the effort. 7. Use of aids to be independent is allowed. Score Interpretation (from [address] [place] [address])    Independent   60-79 Minimally independent   40-59 Partially dependent   20-39 Very dependent   <20 Totally dependent     -Abbey Reyes., Barthel, D.W. (1965). Functional evaluation: the Barthel Index. [facility] ([address] (1997). The Barthel activities of daily living index: self-reporting versus actual performance in the old (> or = 75 years).  Journal of [address] [place] [address] 45(7), [facility], J.JTOBY, Trisha Millard., Lalita Breaux. (1999). Measuring the change in disability after inpatient rehabilitation; comparison of the responsiveness of the Barthel Index and Functional [place]upe Measure. Journal of Neurology, Neurosurgery, and Psychiatry, 66(4), 791-231. CHERYL Hanna, LEONORA Gunderson, & Joseph Burks M.A. (2004) Assessment of post-stroke quality of life in cost-effectiveness studies: The usefulness of the Barthel Index and the EuroQoL-5D. Quality of Life Research, 15, 294-44     Occupational Therapy Evaluation Charge Determination   History Examination Decision-Making   LOW Complexity : Brief history review  LOW Complexity : 1-3 performance deficits relating to physical, cognitive , or psychosocial skils that result in activity limitations and / or participation restrictions  MEDIUM Complexity : Patient may present with comorbidities that affect occupational performnce.  Miniml to moderate modification of tasks or assistance (eg, physical or verbal ) with assesment(s) is necessary to enable patient to complete evaluation       Based on the above components, the patient evaluation is determined to be of the following complexity level: LOW   Pain Rating:  None reported     Activity Tolerance:   Fair and signs and symptoms of orthostatic hypotension      11/20/22 1404 11/20/22 1406 11/20/22 1408   Vital Signs   BP (!) 189/86 125/74 (!) 86/60   MAP (Calculated) 120 91 69   BP 1 Location Left upper arm Left upper arm Left upper arm   BP 1 Method Automatic Automatic Automatic   BP Patient Position Semi fowlers Sitting  (EOB) Standing        11/20/22 1410 11/20/22 1412   Vital Signs   /67 (!) 183/93   MAP (Calculated) 78 123   BP 1 Location Left upper arm Left upper arm   BP 1 Method Automatic Automatic   BP Patient Position Sitting  (on BSC) Semi fowlers      After treatment patient left in no apparent distress:    Call bell within reach, Bed / chair alarm activated, and Side rails x 3, sitter present, bed in modified chair position     COMMUNICATION/EDUCATION:   The patients plan of care was discussed with: Registered nurse. Patient/family have participated as able in goal setting and plan of care. and Patient/family agree to work toward stated goals and plan of care. This patients plan of care is appropriate for delegation to [facility].     Thank you for this referral.  Reny Genao, THERESA, OTR/L  Time Calculation: 25 mins

## 2022-11-20 NOTE — PROGRESS NOTES
TRANSFER - IN REPORT:    Verbal report received from Jess (4W)  on Juliocesar Primrose  being received from [address] [place] [address] (unit) for routine progression of care      Report consisted of patients Situation, Background, Assessment and   Recommendations(SBAR). Information from the following report(s) SBAR and Kardex was reviewed with the receiving nurse. Opportunity for questions and clarification was provided. Assessment completed upon patients arrival to unit and care assumed.

## 2022-11-20 NOTE — PROGRESS NOTES
Problem: Mobility Impaired (Adult and Pediatric)  Goal: *Acute Goals and Plan of Care (Insert Text)  Description: FUNCTIONAL STATUS PRIOR TO ADMISSION: Pt is typically IND without use of DME. Per wife, has recently required use of RW for mobility. Wife had to assist with safety at time of recent fall. HOME SUPPORT PRIOR TO ADMISSION: The patient lived with wife but did not require assist. Was able to perform ADLs IND. Recently has required increased assistance for these tasks. Physical Therapy Goals  Initiated 11/20/2022  1. Patient will move from supine to sit and sit to supine  in bed with supervision/set-up within 7 day(s). 2.  Patient will transfer from bed to chair and chair to bed with supervision/set-up using the least restrictive device within 7 day(s). 3.  Patient will perform sit to stand with supervision/set-up within 7 day(s). 4.  Patient will ambulate with supervision/set-up for 150 feet with the least restrictive device within 7 day(s). Outcome: Progressing Towards Goal    PHYSICAL THERAPY EVALUATION  Patient: Bry Zaldivar (65 y.o. male)  Date: 11/20/2022  Primary Diagnosis: Syncope [R55]       Precautions:   Fall    ASSESSMENT  Based on the objective data described below, the patient presents with decreased strength, impaired balance, and symptomatic orthostatic HoTN limiting functional mobility tolerance. Pt requires Min-ModA for bed mobility and sit <> stand transfers with standing tolerance limited 2/2 dizziness. Hopeful that with continued acute PT, pt could progress to discharge home with HHPT, but at this time, recommend discharge to SNF for continued therapies to address mobility deficits. Will continue to follow while in hospital to progress mobility as able.      Supine /74  Seated /59  Unable to obtain standing BP d/t dizziness, upon sitting BP 87/62  Return to supine at end of session  148/75    Current Level of Function Impacting Discharge (mobility/balance): Min-ModA    Functional Outcome Measure: The patient scored 3 on the Tinetti outcome measure which is indicative of high fall risk. Other factors to consider for discharge: orthostatic HoTN, level of assistance wife able to provide     Patient will benefit from skilled therapy intervention to address the above noted impairments. PLAN :  Recommendations and Planned Interventions: bed mobility training, transfer training, gait training, therapeutic exercises, patient and family training/education, and therapeutic activities      Frequency/Duration: Patient will be followed by physical therapy:  5 times a week to address goals.     Recommendation for discharge: (in order for the patient to meet his/her long term goals)  Therapy up to 5 days/week in SNF setting    This discharge recommendation:  Has not yet been discussed the attending provider and/or case management    IF patient discharges home will need the following DME: wheelchair         SUBJECTIVE:   Patient stated agreeable to participate with PT.    OBJECTIVE DATA SUMMARY:   HISTORY:    Past Medical History:   Diagnosis Date    Altered mental status, unspecified 11/9/2018    Anxiety     Ataxia 11/9/2018    Back pain     Blurred vision     Bradycardia 10/2/2018    Chest pain     Depression     Dizziness     Dizziness and giddiness 10/17/2019    Dysautonomia ([address]     Fast heart beat     Fever 11/9/2018    Frequent headaches     Frequent urination     Hip dysplasia, congenital     Hx of syncope 10/24/2018    Hypotension     Ingrown left big toenail 3/31/2017    Joint pain     Joint swelling     Lumbar spinal stenosis     Memory deficit 9/11/2018    Muscle pain     Neuropathy     Orthostatic hypotension     asymptomatic    Pacemaker     new pacemaker July 2019    Recent change in weight     Risk for falls 12/13/2016    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 11/8/2018    Syncope     Tiredness     Trouble in sleeping     Weakness      Past Surgical History:   Procedure Laterality Date    HX HIP REPLACEMENT Bilateral 1993    HX PACEMAKER PLACEMENT  10/16/2018    HX ROTATOR CUFF REPAIR Right 11/16/2017    MI CARDIAC SURG PROCEDURE UNLIST      MI INS NEW/RPLCMT PRM PM W/TRANSV ELTRD ATRIAL&VENT N/A 7/30/2019    INSERT PPM DUAL performed by Paramjit Wilkins MD at [facility] CARDIAC CATH LAB       Personal factors and/or comorbidities impacting plan of care:     Home Situation  Home Environment: Private residence  Wheelchair Ramp: Yes  One/Two Story Residence: One story  Living Alone: No  Support Systems: Spouse/Significant Other  Patient Expects to be Discharged to[de-identified] Home  Current DME Used/Available at Home: Walker, rolling, Wheelchair (transport chair)    EXAMINATION/PRESENTATION/DECISION MAKING:   Critical Behavior:  Neurologic State: Alert, Confused  Orientation Level: Disoriented to time  Cognition: Poor safety awareness, Follows commands          Skin:  Visible skin intact    Range Of Motion:  AROM: Within functional limits           PROM: Within functional limits           Strength:    Strength: Generally decreased, functional                    Tone & Sensation:       - SILT BLEs                            Functional Mobility:  Bed Mobility:     Supine to Sit: Moderate assistance  Sit to Supine: Stand-by assistance  - Pt requires ModA for trunk management     Transfers:  Sit to Stand: Minimum assistance  Stand to Sit: Minimum assistance         - From EOB x 2 reps with Mimi, pt pulling on RW despite Vc'ing for hand placement        - Requires seated RB between trials d/t dizziness        Balance:   Sitting: Impaired; With support  Sitting - Static: Fair (occasional)  Sitting - Dynamic: Fair (occasional)  Standing: Impaired; With support  Standing - Static: Fair;Constant support  Standing - Dynamic : Fair;Constant support    - Pt requires constant CGA while seated EOB, initally demos increased L lateral lean , able to sit upright with Vc'ing.  - Requires Vc'ing for ant trunk lean seated EOB d/t increased post trunk lean    - Static standing requires Mimi, Tc'ing for upright posture, BUE support on RW  - Pt able to march in place standing with Mimi x 3 reps               Therapeutic Exercises:   - Seated heel raises x 10 reps  - Seated LAQs x 10 reps    Functional Measure:  Tinetti test:    Sitting Balance: 0  Arises: 0  Attempts to Rise: 1  Immediate Standing Balance: 0  Standing Balance: 1  Nudged: 0  Eyes Closed: 0  Turn 360 Degrees - Continuous/Discontinuous: 0  Turn 360 Degrees - Steady/Unsteady: 0  Sitting Down: 1  Balance Score: 3 Balance total score  Indication of Gait: 0  R Step Length/Height: 0  L Step Length/Height: 0  R Foot Clearance: 0  L Foot Clearance: 0  Step Symmetry: 0  Step Continuity: 0  Path: 0  Trunk: 0  Walking Time: 0  Gait Score: 0 Gait total score  Total Score: 3/28 Overall total score         Tinetti Tool Score Risk of Falls  <19 = High Fall Risk  19-24 = Moderate Fall Risk  25-28 = Low Fall Risk  Tinetti ME. Performance-Oriented Assessment of Mobility Problems in Elderly Patients. Jj 66; [id number].  (Scoring Description: PT Bulletin Feb. 10, 1993)    Older adults: Nicki Logan et al, 2009; n = 1000 [facility] elderly evaluated with ABC, JESUSITA, ADL, and IADL)  · Mean JESUSITA score for males aged 69-68 years = 26.21(3.40)  · Mean JESUSITA score for females age 69-68 years = 25.16(4.30)  · Mean JESUSITA score for males over 80 years = 23.29(6.02)  · Mean JESUSITA score for females over 80 years = 17.20(8.32)           Physical Therapy Evaluation Charge Determination   History Examination Presentation Decision-Making   LOW Complexity : Zero comorbidities / personal factors that will impact the outcome / POC LOW Complexity : 1-2 Standardized tests and measures addressing body structure, function, activity limitation and / or participation in recreation  LOW Complexity : Stable, uncomplicated  LOW Complexity : FOTO score of       Based on the above components, the patient evaluation is determined to be of the following complexity level: LOW     Pain Rating:  No reports of pain     Activity Tolerance:   Fair and signs and symptoms of orthostatic hypotension    After treatment patient left in no apparent distress:   Supine in bed, Call bell within reach, Caregiver / family present, Side rails x 3, and Sitter present  - HOB elevated to facilitate upright positioning       COMMUNICATION/EDUCATION:   The patients plan of care was discussed with: Occupational therapist and Registered nurse. Fall prevention education was provided and the patient/caregiver indicated understanding., Patient/family have participated as able in goal setting and plan of care. , and Patient/family agree to work toward stated goals and plan of care.   - Pt and pt's wife educated on importance of upright positioning, OOB mobility to assist with improvement of orthostasis     Thank you for this referral.  Donell Hoff, PT   Time Calculation: 42 mins

## 2022-11-20 NOTE — PROGRESS NOTES
Problem: Falls - Risk of  Goal: *Absence of Falls  Description: Document Dayron Valencia Fall Risk and appropriate interventions in the flowsheet. Outcome: Progressing Towards Goal  Note: Fall Risk Interventions:  Mobility Interventions: Assess mobility with egress test, Bed/chair exit alarm    Mentation Interventions: Adequate sleep, hydration, pain control, Bed/chair exit alarm    Medication Interventions: Bed/chair exit alarm    Elimination Interventions: Bed/chair exit alarm, Call light in reach, Patient to call for help with toileting needs    History of Falls Interventions: Bed/chair exit alarm, Room close to nurse's station         Problem: Patient Education: Go to Patient Education Activity  Goal: Patient/Family Education  Outcome: Progressing Towards Goal     Problem: Pressure Injury - Risk of  Goal: *Prevention of pressure injury  Description: Document Kane Scale and appropriate interventions in the flowsheet.   Outcome: Progressing Towards Goal  Note: Pressure Injury Interventions:  Sensory Interventions: Assess changes in LOC    Moisture Interventions: Absorbent underpads    Activity Interventions: PT/OT evaluation    Mobility Interventions: HOB 30 degrees or less    Nutrition Interventions: Document food/fluid/supplement intake

## 2022-11-21 PROCEDURE — 94760 N-INVAS EAR/PLS OXIMETRY 1: CPT

## 2022-11-21 PROCEDURE — 74011250636 HC RX REV CODE- 250/636: Performed by: FAMILY MEDICINE

## 2022-11-21 PROCEDURE — 74011250637 HC RX REV CODE- 250/637: Performed by: FAMILY MEDICINE

## 2022-11-21 PROCEDURE — 74011000250 HC RX REV CODE- 250: Performed by: FAMILY MEDICINE

## 2022-11-21 PROCEDURE — 77010033678 HC OXYGEN DAILY

## 2022-11-21 PROCEDURE — 74011250637 HC RX REV CODE- 250/637: Performed by: INTERNAL MEDICINE

## 2022-11-21 PROCEDURE — 65270000029 HC RM PRIVATE

## 2022-11-21 RX ADMIN — HEPARIN SODIUM 5000 UNITS: 5000 INJECTION INTRAVENOUS; SUBCUTANEOUS at 09:21

## 2022-11-21 RX ADMIN — FLUDROCORTISONE ACETATE 0.1 MG: 0.1 TABLET ORAL at 09:11

## 2022-11-21 RX ADMIN — Medication 3 MG: at 03:56

## 2022-11-21 RX ADMIN — FINASTERIDE 5 MG: 5 TABLET, FILM COATED ORAL at 21:55

## 2022-11-21 RX ADMIN — SODIUM CHLORIDE, PRESERVATIVE FREE 10 ML: 5 INJECTION INTRAVENOUS at 14:00

## 2022-11-21 RX ADMIN — ASPIRIN 81 MG 81 MG: 81 TABLET ORAL at 09:12

## 2022-11-21 RX ADMIN — SODIUM CHLORIDE, PRESERVATIVE FREE 10 ML: 5 INJECTION INTRAVENOUS at 21:56

## 2022-11-21 RX ADMIN — ATORVASTATIN CALCIUM 40 MG: 40 TABLET, FILM COATED ORAL at 09:12

## 2022-11-21 RX ADMIN — ACETAMINOPHEN 650 MG: 325 TABLET ORAL at 03:56

## 2022-11-21 RX ADMIN — Medication 3 MG: at 22:45

## 2022-11-21 RX ADMIN — HEPARIN SODIUM 5000 UNITS: 5000 INJECTION INTRAVENOUS; SUBCUTANEOUS at 03:56

## 2022-11-21 RX ADMIN — SODIUM CHLORIDE, PRESERVATIVE FREE 10 ML: 5 INJECTION INTRAVENOUS at 04:06

## 2022-11-21 RX ADMIN — DULOXETINE HYDROCHLORIDE 60 MG: 60 CAPSULE, DELAYED RELEASE ORAL at 09:10

## 2022-11-21 RX ADMIN — FLUDROCORTISONE ACETATE 0.1 MG: 0.1 TABLET ORAL at 18:34

## 2022-11-21 RX ADMIN — HEPARIN SODIUM 5000 UNITS: 5000 INJECTION INTRAVENOUS; SUBCUTANEOUS at 18:30

## 2022-11-21 NOTE — PROGRESS NOTES
[facility] Adult  Hospitalist Group                                                                                          Hospitalist Progress Note  Aide Sharma MD  Answering service: [phone] OR [phone] from in house phone        Date of Service:  2022  NAME:  Joan Styles  :  1945  MRN:  [id number]      Admission Summary:   Joan Styles is a 69 y.o. male who presents with syncope. Patient presents to the ER at [facility] with an episode of syncope versus seizure, patient has a history of orthostatic hypotension, woke up at 3 AM this morning and became confused, and collapsed within the toilet and the wall, patient reports that after a while he called his wife, and was brought to the hospital, not sure whether he had a seizure or syncope, was requested to be admitted to the hospitalist service, currently patient denies any complaints or problems    The patient denies any headache, blurry vision, sore throat, trouble swallowing, trouble with speech, chest pain, SOB, cough, fever, chills, N/V/D, abd pain, urinary symptoms, constipation, recent travels, sick contacts, focal or generalized neurological symptoms,  rashes, contact with COVID-19 diagnosed patients, hematemesis, melena, hemoptysis, hematuria, rashes, denies starting any new medications and denies any other concerns or problems besides as mentioned above. Interval history / Subjective:   Patient is sitting up in bed, did not sleep well, has made multiple attempts to get up out of bed last night, and has a sitter at the bedside this morning. Sitter mentioned that wife wanted to talk to me. Wife arrived from the cafeteria, as I was walking out of the room. I asked if she had any questions for me, but she said \"Not really\". \"I would like to speak with \". CM notified. Plans for discharge are pending.      Assessment & Plan:         Recurrent \"syncope\":  - episodes of rigidity, but no history suggestive of postictal state;   - Neurology consulted;   - based on chart review, the patient has no history of CVA, and no history of seizures. - per wife, he recently had an MRI of the brain, so I will hold off on ordering one;   - EEG 11/18: no epileptiform discharges;   - evaluated by Neurology, all symptoms of confusion are being attributed to dementia; Severe autonomic dysregulation:  - on Midodrine; discussed with Neurology: patient is supposed to be on Midodrine 5 mg 5 times a day;  - per wife, recently switched to Florinef;   - recommend wheelchair for safety and a bedside commode;   - patient is at high risk of recurrent falls and injury due to Orthostatic hypotension and impulsivity;   - BP very labile, patient is at high risk of falls;   - per Neurology, the safest for him might be to stay at a wheelchair bound level of functioning/activity;    PTSD:  - consulted Psychiatry;   - continue home medications as prior to admission;     BPH:  - on Proscar, will change administration to evening time; Dyslipidemia:  - on statin; Hyperglycemia, without history of DM:  - HgA1c 5.9 in 09/22; Code status:  Full  Prophylaxis:  B SCD's  Care Plan discussed with: patient, CM;   Anticipated Disposition:   - wife requested transfer to the [facility] - on diversion, open request since Thursday (5th day today);   - patient does not have any urgent medical concerns necessitating a transfer to another acute care hospital;   - wife is requesting a Urology consult, because a recent scan showed \"only one kidney\".   Patient's labwork does not show any signs of CKD, needs outpatient follow-up with Nephrology for BUN/Cr monitoring, no acute concerns, no electrolyte abnormalities;   - discharge planning:  patient is medically stable for discharge today to a lower level of care, awaiting placement;   - noted ongoing discussion by the CM with wife;   - patient has advanced dementia, and per wife, he has had multiple falls at home, has hit his head multiple times; patient is not safe to return home with his wife unless he has care around the clock (in addition to his wife, who said she cannot take care of him); Hospital Problems  Date Reviewed: 9/19/2022            Codes Class Noted POA    Syncope ICD-10-CM: R55  ICD-9-CM: 780.2  11/16/2022 Unknown       Review of Systems:   A comprehensive review of systems was negative except for that written in the HPI. Vital Signs:    Last 24hrs VS reviewed since prior progress note. Most recent are:  Visit Vitals  BP (!) 159/80 (BP 1 Location: Left upper arm, BP Patient Position: Semi fowlers)   Pulse 82   Temp 97.9 °F (36.6 °C)   Resp 20   Ht 5' 11\" (1.803 m)   Wt 77.6 kg (171 lb)   SpO2 94%   BMI 23.85 kg/m²         Intake/Output Summary (Last 24 hours) at 11/21/2022 1330  Last data filed at 11/21/2022 0744  Gross per 24 hour   Intake --   Output 250 ml   Net -250 ml          Physical Examination:     I had a face to face encounter with this patient and independently examined them on 11/21/2022 as outlined below:          Constitutional:  No acute distress, cooperative, pleasant    ENT:  Oral mucosa moist, oropharynx benign. Resp:  CTA bilaterally. No wheezing/rhonchi/rales. No accessory muscle use. CV:  Regular rhythm, normal rate, no murmurs, gallops, rubs    GI:  Soft, non distended, non tender. normoactive bowel sounds, no hepatosplenomegaly     Musculoskeletal:  No edema, warm, 2+ pulses throughout    Neurologic:  Moves all extremities. Awake, cooperative; pleasantly confused; Data Review:    Review and/or order of clinical lab test  Review and/or order of tests in the radiology section of CPT  Review and/or order of tests in the medicine section of CPT      Labs:     No results for input(s): WBC, HGB, HCT, PLT, HGBEXT, HCTEXT, PLTEXT, HGBEXT, HCTEXT, PLTEXT in the last 72 hours.     No results for input(s): NA, K, CL, CO2, BUN, CREA, GLU, CA, MG, PHOS, URICA in the last 72 hours. No results for input(s): ALT, AP, TBIL, TBILI, TP, ALB, GLOB, GGT, AML, LPSE in the last 72 hours. No lab exists for component: SGOT, GPT, AMYP, HLPSE    No results for input(s): INR, PTP, APTT, INREXT, INREXT in the last 72 hours. No results for input(s): FE, TIBC, PSAT, FERR in the last 72 hours. Lab Results   Component Value Date/Time    Folate 14.3 11/08/2022 05:44 AM        No results for input(s): PH, PCO2, PO2 in the last 72 hours. No results for input(s): CPK, CKNDX, TROIQ in the last 72 hours.     No lab exists for component: CPKMB    Lab Results   Component Value Date/Time    Cholesterol, total 95 09/19/2022 12:13 PM    HDL Cholesterol 48 09/19/2022 12:13 PM    LDL, calculated 28.4 09/19/2022 12:13 PM    Triglyceride 93 09/19/2022 12:13 PM    CHOL/HDL Ratio 2.0 09/19/2022 12:13 PM     Lab Results   Component Value Date/Time    Glucose (POC) 141 (H) 11/17/2022 04:57 PM    Glucose (POC) 166 (H) 11/17/2022 11:52 AM    Glucose (POC) 110 (H) 07/02/2019 07:41 PM    Glucose (POC) 68 07/02/2019 05:51 PM     Lab Results   Component Value Date/Time    Color YELLOW/STRAW 11/06/2022 07:15 PM    Appearance CLEAR 11/06/2022 07:15 PM    Specific gravity 1.010 11/06/2022 07:15 PM    Specific gravity 1.006 10/26/2022 06:25 PM    pH (UA) 7.0 11/06/2022 07:15 PM    Protein Negative 11/06/2022 07:15 PM    Glucose Negative 11/06/2022 07:15 PM    Ketone Negative 11/06/2022 07:15 PM    Bilirubin Negative 11/06/2022 07:15 PM    Urobilinogen 0.2 11/06/2022 07:15 PM    Nitrites Negative 11/06/2022 07:15 PM    Leukocyte Esterase Negative 11/06/2022 07:15 PM    Epithelial cells FEW 11/06/2022 07:15 PM    Bacteria Negative 11/06/2022 07:15 PM    WBC 0-4 11/06/2022 07:15 PM    RBC 10-20 11/06/2022 07:15 PM         Medications Reviewed:     Current Facility-Administered Medications   Medication Dose Route Frequency    melatonin tablet 3 mg  3 mg Oral QHS PRN    finasteride (PROSCAR) tablet 5 mg  5 mg Oral QHS    aspirin chewable tablet 81 mg  81 mg Oral DAILY    atorvastatin (LIPITOR) tablet 40 mg  40 mg Oral DAILY    DULoxetine (CYMBALTA) capsule 60 mg  60 mg Oral DAILY    fludrocortisone (FLORINEF) tablet 0.1 mg  0.1 mg Oral BID    [Held by provider] midodrine (PROAMATINE) tablet 5 mg  5 mg Oral 5XD    sodium chloride (NS) flush 5-40 mL  5-40 mL IntraVENous Q8H    sodium chloride (NS) flush 5-40 mL  5-40 mL IntraVENous PRN    acetaminophen (TYLENOL) tablet 650 mg  650 mg Oral Q4H PRN    heparin (porcine) injection 5,000 Units  5,000 Units SubCUTAneous Q8H     ______________________________________________________________________  EXPECTED LENGTH OF STAY: 2d 7h  ACTUAL LENGTH OF STAY:          5                 Adair Grijalva MD

## 2022-11-21 NOTE — PROGRESS NOTES
Problem: Falls - Risk of  Goal: *Absence of Falls  Description: Document Gloria Skill Fall Risk and appropriate interventions in the flowsheet. Outcome: Progressing Towards Goal  Note: Fall Risk Interventions:  Mobility Interventions: Assess mobility with egress test, Bed/chair exit alarm    Mentation Interventions: Adequate sleep, hydration, pain control, Bed/chair exit alarm    Medication Interventions: Bed/chair exit alarm    Elimination Interventions: Bed/chair exit alarm    History of Falls Interventions: Bed/chair exit alarm         Problem: Patient Education: Go to Patient Education Activity  Goal: Patient/Family Education  Outcome: Progressing Towards Goal     Problem: Altered Thought Process (Adult/Pediatric)  Goal: *STG: Participates in treatment plan  Outcome: Progressing Towards Goal  Goal: *STG: Remains safe in hospital  Outcome: Progressing Towards Goal  Goal: *STG: Seeks staff when feelings of anxiety and fear arise  Outcome: Progressing Towards Goal  Goal: *STG: Complies with medication therapy  Outcome: Progressing Towards Goal  Goal: *STG: Attends activities and groups  Outcome: Progressing Towards Goal  Goal: *STG: Decreased delusional thinking  Outcome: Progressing Towards Goal  Goal: *STG: Decreased hallucinations  Outcome: Progressing Towards Goal  Goal: *STG: Absence of lethality  Outcome: Progressing Towards Goal  Goal: *STG: Demonstrates ability to understand and use improved judgment in daily activities and relationships  Outcome: Progressing Towards Goal  Goal: *LTG: Returns to baseline functioning  Outcome: Progressing Towards Goal  Goal: Interventions  Outcome: Progressing Towards Goal     Problem: Patient Education: Go to Patient Education Activity  Goal: Patient/Family Education  Outcome: Progressing Towards Goal     Problem: Pressure Injury - Risk of  Goal: *Prevention of pressure injury  Description: Document Kane Scale and appropriate interventions in the flowsheet.   Outcome: Progressing Towards Goal  Note: Pressure Injury Interventions:  Sensory Interventions: Assess changes in LOC    Moisture Interventions: Absorbent underpads    Activity Interventions: PT/OT evaluation    Mobility Interventions: HOB 30 degrees or less    Nutrition Interventions: Document food/fluid/supplement intake                     Problem: Patient Education: Go to Patient Education Activity  Goal: Patient/Family Education  Outcome: Progressing Towards Goal     Problem: Patient Education: Go to Patient Education Activity  Goal: Patient/Family Education  Outcome: Progressing Towards Goal     Problem: Patient Education: Go to Patient Education Activity  Goal: Patient/Family Education  Outcome: Progressing Towards Goal

## 2022-11-21 NOTE — ROUTINE PROCESS
Patient's wife and sitter say patient is intermittently saying he sees butterflies in the room and was recently calling to a dog to the room and was seeing a dog that wasn't there. Wife is concerned that he may be getting discharged home and it isnt safe for him. Sitter confirms patient was seeing things and calling to a dog that wasn't there.

## 2022-11-21 NOTE — PROGRESS NOTES
JOSE:  1. RUR-13%  2. SNF referrals sent, pending acceptance. 3. BLS transport. 4. Patient also has a sitter due to prior medical history of PTSD. CM met with patient wife at bedside this morning, she is aware that patient is nearing  discharge. She continues to follow up about  [facility] referral, but also aware that he does not have a medical need to transfer per attending MD. CM attempting to work with wife on SNF/LTC placement. She would like referral sent to [facility] in [place] because this is closer to her home. Wife, Yesy Rodriguez then mentions him going to [facility]. CM explained to her that he would have to qualify for IPR service, and first PT session as yesterday. She also has mentioned bringing him home with a hospital bed, home health, and other DME needed. Prior to this cm, wife has mentioned she is unable to care for him at home. Yesy Rodriguez continues to address her concerns with CM about attending hospitalist.  Cm informed her to work towards a discharge plan, and if her concerns are needing to be address to follow up with patient advocate. Attending MD suggested the option to her as well. Yesy Rodriguez acknowledges the above information, but presenting with some confusion as to what to do. CM will continue to encourage her to confirm a discharge plan for her . LAURA spoke with Carmita Finley at [facility] [phone], she requested a faxed referral to her at [phone]. Patient wife, Yesy Rodriguez also provided this  [facility] policy information. CM emailed Shalonda Ragland to update the [facility] to reflect this information. Cm will also place copies in patient unit chart.       Abel BenjaminKi[facility]

## 2022-11-22 ENCOUNTER — APPOINTMENT (OUTPATIENT)
Dept: ULTRASOUND IMAGING | Age: 77
DRG: 312 | End: 2022-11-22
Attending: NURSE PRACTITIONER
Payer: MEDICARE

## 2022-11-22 LAB
ANION GAP SERPL CALC-SCNC: 6 MMOL/L (ref 5–15)
BUN SERPL-MCNC: 17 MG/DL (ref 6–20)
BUN/CREAT SERPL: 14 (ref 12–20)
CALCIUM SERPL-MCNC: 8.2 MG/DL (ref 8.5–10.1)
CHLORIDE SERPL-SCNC: 105 MMOL/L (ref 97–108)
CO2 SERPL-SCNC: 26 MMOL/L (ref 21–32)
CREAT SERPL-MCNC: 1.24 MG/DL (ref 0.7–1.3)
ERYTHROCYTE [DISTWIDTH] IN BLOOD BY AUTOMATED COUNT: 12.8 % (ref 11.5–14.5)
GLUCOSE SERPL-MCNC: 175 MG/DL (ref 65–100)
HCT VFR BLD AUTO: 36.7 % (ref 36.6–50.3)
HGB BLD-MCNC: 12.5 G/DL (ref 12.1–17)
MAGNESIUM SERPL-MCNC: 2.3 MG/DL (ref 1.6–2.4)
MCH RBC QN AUTO: 32.6 PG (ref 26–34)
MCHC RBC AUTO-ENTMCNC: 34.1 G/DL (ref 30–36.5)
MCV RBC AUTO: 95.8 FL (ref 80–99)
NRBC # BLD: 0 K/UL (ref 0–0.01)
NRBC BLD-RTO: 0 PER 100 WBC
PHOSPHATE SERPL-MCNC: 4 MG/DL (ref 2.6–4.7)
PLATELET # BLD AUTO: 194 K/UL (ref 150–400)
PMV BLD AUTO: 10.2 FL (ref 8.9–12.9)
POTASSIUM SERPL-SCNC: 3.2 MMOL/L (ref 3.5–5.1)
RBC # BLD AUTO: 3.83 M/UL (ref 4.1–5.7)
SODIUM SERPL-SCNC: 137 MMOL/L (ref 136–145)
WBC # BLD AUTO: 7.6 K/UL (ref 4.1–11.1)

## 2022-11-22 PROCEDURE — 74011000250 HC RX REV CODE- 250: Performed by: FAMILY MEDICINE

## 2022-11-22 PROCEDURE — 65270000029 HC RM PRIVATE

## 2022-11-22 PROCEDURE — 76770 US EXAM ABDO BACK WALL COMP: CPT

## 2022-11-22 PROCEDURE — 97530 THERAPEUTIC ACTIVITIES: CPT

## 2022-11-22 PROCEDURE — 74011250637 HC RX REV CODE- 250/637: Performed by: FAMILY MEDICINE

## 2022-11-22 PROCEDURE — 74011250637 HC RX REV CODE- 250/637: Performed by: INTERNAL MEDICINE

## 2022-11-22 PROCEDURE — 83735 ASSAY OF MAGNESIUM: CPT

## 2022-11-22 PROCEDURE — 36415 COLL VENOUS BLD VENIPUNCTURE: CPT

## 2022-11-22 PROCEDURE — 84100 ASSAY OF PHOSPHORUS: CPT

## 2022-11-22 PROCEDURE — 74011250636 HC RX REV CODE- 250/636: Performed by: FAMILY MEDICINE

## 2022-11-22 PROCEDURE — 97535 SELF CARE MNGMENT TRAINING: CPT

## 2022-11-22 PROCEDURE — 74011250636 HC RX REV CODE- 250/636: Performed by: INTERNAL MEDICINE

## 2022-11-22 PROCEDURE — 85027 COMPLETE CBC AUTOMATED: CPT

## 2022-11-22 PROCEDURE — 82384 ASSAY THREE CATECHOLAMINES: CPT

## 2022-11-22 PROCEDURE — 80048 BASIC METABOLIC PNL TOTAL CA: CPT

## 2022-11-22 RX ORDER — MIDODRINE HYDROCHLORIDE 5 MG/1
5 TABLET ORAL 4 TIMES DAILY
Status: DISCONTINUED | OUTPATIENT
Start: 2022-11-22 | End: 2022-11-23

## 2022-11-22 RX ORDER — POTASSIUM CHLORIDE 750 MG/1
40 TABLET, FILM COATED, EXTENDED RELEASE ORAL EVERY 4 HOURS
Status: COMPLETED | OUTPATIENT
Start: 2022-11-22 | End: 2022-11-22

## 2022-11-22 RX ADMIN — HEPARIN SODIUM 5000 UNITS: 5000 INJECTION INTRAVENOUS; SUBCUTANEOUS at 17:48

## 2022-11-22 RX ADMIN — SODIUM CHLORIDE, PRESERVATIVE FREE 10 ML: 5 INJECTION INTRAVENOUS at 21:00

## 2022-11-22 RX ADMIN — MIDODRINE HYDROCHLORIDE 5 MG: 5 TABLET ORAL at 17:49

## 2022-11-22 RX ADMIN — DULOXETINE HYDROCHLORIDE 60 MG: 60 CAPSULE, DELAYED RELEASE ORAL at 08:29

## 2022-11-22 RX ADMIN — POTASSIUM CHLORIDE 40 MEQ: 750 TABLET, FILM COATED, EXTENDED RELEASE ORAL at 17:48

## 2022-11-22 RX ADMIN — FLUDROCORTISONE ACETATE 0.1 MG: 0.1 TABLET ORAL at 08:29

## 2022-11-22 RX ADMIN — ATORVASTATIN CALCIUM 40 MG: 40 TABLET, FILM COATED ORAL at 08:29

## 2022-11-22 RX ADMIN — HEPARIN SODIUM 5000 UNITS: 5000 INJECTION INTRAVENOUS; SUBCUTANEOUS at 10:02

## 2022-11-22 RX ADMIN — POTASSIUM CHLORIDE 40 MEQ: 750 TABLET, FILM COATED, EXTENDED RELEASE ORAL at 20:53

## 2022-11-22 RX ADMIN — SODIUM CHLORIDE 1000 ML: 9 INJECTION, SOLUTION INTRAVENOUS at 13:30

## 2022-11-22 RX ADMIN — MIDODRINE HYDROCHLORIDE 5 MG: 5 TABLET ORAL at 14:49

## 2022-11-22 RX ADMIN — ACETAMINOPHEN 650 MG: 325 TABLET ORAL at 20:53

## 2022-11-22 RX ADMIN — Medication 3 MG: at 20:53

## 2022-11-22 RX ADMIN — FINASTERIDE 5 MG: 5 TABLET, FILM COATED ORAL at 20:53

## 2022-11-22 RX ADMIN — ASPIRIN 81 MG 81 MG: 81 TABLET ORAL at 08:29

## 2022-11-22 RX ADMIN — HEPARIN SODIUM 5000 UNITS: 5000 INJECTION INTRAVENOUS; SUBCUTANEOUS at 01:38

## 2022-11-22 NOTE — PROGRESS NOTES
Problem: Self Care Deficits Care Plan (Adult)  Goal: *Acute Goals and Plan of Care (Insert Text)  Description: FUNCTIONAL STATUS PRIOR TO ADMISSION: Immediately PTA patient was modified independent using a rolling walker for functional mobility. At his true baseline pt was independent and active without use of DME.     HOME SUPPORT: At pt's baseline he lived with his wife but did not require assist for basic ADL tasks. Occupational Therapy Goals  Initiated 11/20/2022  1. Patient will perform standing grooming routine with supervision/set-up within 7 day(s). 2.  Patient will perform upper and lower body bathing with supervision/set-up within 7 day(s). 3.  Patient will perform upper and lower body dressing with supervision/set-up within 7 day(s). 4.  Patient will perform toilet transfers with supervision/set-up within 7 day(s). 5.  Patient will perform all aspects of toileting with supervision/set-up within 7 day(s). Outcome: Progressing Towards Goal     OCCUPATIONAL THERAPY TREATMENT  Patient: Monica Heard (49 y.o. male)  Date: 11/22/2022  Diagnosis: Syncope [R55] <principal problem not specified>      Precautions: Fall  Chart, occupational therapy assessment, plan of care, and goals were reviewed. ASSESSMENT  Patient continues with skilled OT services and is progressing towards goals. Remains limited by mild confusion, Ox3, impaired safety awareness, impaired insight into deficits, and impaired balance. Note PT session this AM was limited by severe orthostatic hypotension, symptomatic with dizziness. Patient seen later in afternoon for OT treatment after receiving IV fluids. Patient was still positive for orthostatic hypotension, but less severe, and was asymptomatic . Progressed to ambulate ~60 feet using RW, then had a seated rest break, then ambulated to/ from bathroom for toileting.   Patient required frequent steadying assistance with mobility, up to minimum assistance, as well as verbal cues for RW management/ positioning (recently started using a RW PTA). He is below baseline and at high risk for additional falls. Recommend SNF at d/c. Vitals:     11/22/22 1423 11/22/22 1427 11/22/22 1432 11/22/22 1439   BP: (!) 170/85 (!) 144/75 (!) 149/78 (!) 111/43   BP 1 Location: Left upper arm Left upper arm Left upper arm Left upper arm   BP Patient Position: Supine Sitting Sitting  Comment: after seated BLE AROM exercises Standing  Comment: after walking ~60 feet   Pulse: 82 83 86 (!) 104   SpO2: 94%  Comment: on 2. 5LPM NC              Current Level of Function Impacting Discharge (ADLs): up to minimum assistance       PLAN :  Patient continues to benefit from skilled intervention to address the above impairments. Continue treatment per established plan of care to address goals. Recommendation for discharge: (in order for the patient to meet his/her long term goals)  Therapy up to 5 days/week in SNF setting    This discharge recommendation:  Has not yet been discussed the attending provider and/or case management       SUBJECTIVE:   Patient stated I don't feel dizzy.     OBJECTIVE DATA SUMMARY:   Cognitive/Behavioral Status:  Neurologic State: Alert  Orientation Level: Oriented to person;Oriented to place;Oriented to time;Disoriented to situation  Cognition: Follows commands  Perception: Appears intact          Functional Mobility and Transfers for ADLs:  Bed Mobility:  Supine to Sit: Minimum assistance  Scooting: Supervision    Transfers:  Sit to Stand: Contact guard assistance     Bed to Chair: Contact guard assistance    Balance:  Sitting: Impaired; Without support  Sitting - Static: Good (unsupported)  Sitting - Dynamic: Good (unsupported)  Standing: Impaired; With support  Standing - Static: Good  Standing - Dynamic : Fair    ADL Intervention:          Toileting: Minimum assistance.   Managed clothing and urinated standing with steadying assistance       Type of Bath: Chlorhexidine (CHG); Basin/Soap/Water       Pain:  Patient did not report pain    Activity Tolerance:   Good    After treatment patient left in no apparent distress:   Sitting in chair, Call bell within reach, Bed / chair alarm activated, and Caregiver / family present    COMMUNICATION/COLLABORATION:   The patients plan of care was discussed with: Physical therapist and Registered nurse.      Tamir Vaughan OT  Time Calculation: 49 mins

## 2022-11-22 NOTE — PROGRESS NOTES
Vitals:    11/22/22 1423 11/22/22 1427 11/22/22 1432 11/22/22 1439   BP: (!) 170/85 (!) 144/75 (!) 149/78 (!) 111/43   BP 1 Location: Left upper arm Left upper arm Left upper arm Left upper arm   BP Patient Position: Supine Sitting Sitting  Comment: after seated BLE AROM exercises Standing  Comment: after walking ~60 feet   Pulse: 82 83 86 (!) 104   SpO2: 94%  Comment: on 2. 5LPM NC

## 2022-11-22 NOTE — PROGRESS NOTES
Our office was called for nephrology consult. I came and saw the patient but looks like another nephrology group already has been consulted and seen the patient.

## 2022-11-22 NOTE — BSMART NOTE
BSMART Liaison Team Note     LOS:  6     Patient goal(s) for today: communicate needs to staff, continue prescribed medications  BSMART Liaison team focus/goals: Assess MH needs, provide support, education, and encouragement to pt    Progress note:   Pt is received sitting up in a chair with his wife present. He is alert, oriented x3, mild confusion with blunted affect. He denies SI/HI/AVH, stating his VH resolved, last night. He reports no depression or anxiety. He reluctantly shares he attended a police function, last night, and came to the hospital because he fell while at the function. 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 She states his dementia has been very difficult on the family and they are doing the best they can. The conversation ended abruptly as Mrs. Lee received a phone call from pt's doctor . Pt's CM reports she is working with the pt and wife on discharging to a SNF. Barriers to Discharge: medical clearance  Guns in the home: no     Outpatient provider(s):  no psychiatric providers; [facility] info/prescription coverage:  VA MEDICARE/VA MEDICARE PART A; [facility] PPO    Diagnosis: PTSD, dementia by history    Plan:  CM currently working on discharge plan with pt's family for possible SNF placement.    Follow up Psych Consult placed? no.   Psychiatrist updated? no       Participating treatment team members: Marquita Grigsby LSW,

## 2022-11-22 NOTE — PROGRESS NOTES
JOSE:  1. RUR-14%  2. [facility] rehab accepted in [place] pending Melody Valentine from [place]. Contact number for Samantha Mckay, [phone]. 3. BLS transport. CM attempted to speak with [facility] about referral review, no answer. The number Melisa from the facility called me from yesterday is no longer in service, ([phone]). CM sent referral to [address] [place] [address] and rehab (patient wife preference). Spoke with Samantha Mckay at the facility who is going to review the referral and contact this cm, [phone]. CM to follow. [facility] Contact:  Dayton Chau RN at [place] ([phone] ext [phone]     fax at [phone]. As of  11/17, [facility] is on diversion. CM left voicemail. 1448- CM spoke with Samantha Mckay at [facility] rehab. She stated Mrs. Solorzano with [facility] has requested this cm to fax clinicals to her at [phone]. LAURA completed this task and Samantha Mckay will follow up with cm about decision for approval to their facility. Patient wife made aware of this information. Patient is 100% service connnected.     Sanjeev Cervantes, [facility]

## 2022-11-22 NOTE — PROGRESS NOTES
Problem: Mobility Impaired (Adult and Pediatric)  Goal: *Acute Goals and Plan of Care (Insert Text)  Description: FUNCTIONAL STATUS PRIOR TO ADMISSION: Pt is typically IND without use of DME. Per wife, has recently required use of RW for mobility. HOME SUPPORT PRIOR TO ADMISSION: The patient lived with wife but did not require assist. Was able to perform ADLs IND. Recently has required increased assistance for these tasks. Physical Therapy Goals  Initiated 11/20/2022  1. Patient will move from supine to sit and sit to supine  in bed with supervision/set-up within 7 day(s). 2.  Patient will transfer from bed to chair and chair to bed with supervision/set-up using the least restrictive device within 7 day(s). 3.  Patient will perform sit to stand with supervision/set-up within 7 day(s). 4.  Patient will ambulate with supervision/set-up for 150 feet with the least restrictive device within 7 day(s). Outcome: Progressing Towards Goal   PHYSICAL THERAPY TREATMENT  Patient: Zenaida Ventura (89 y.o. male)  Date: 11/22/2022  Diagnosis: Syncope [R55] <principal problem not specified>      Precautions: Fall  Chart, physical therapy assessment, plan of care and goals were reviewed. ASSESSMENT  Patient continues with skilled PT services and is progressing towards goals. Patient received in bed and wife present. Most agreeable to therapy. Orthostatic blood pressures performed and patient was symptomatic as well as clearly orthostatic. Prior to getting up donned his compression socks. Able to transfer to recliner chair as well as BSC. Please note pressures below. Patient may benefit from a trial of an abdominal binder . safety at this time is impacted by his orthostasis and is at high risk for falls. Will benefit from rehab but feel if the BP issues not addressed may end up back in acute setting.     Of note when I returned to the room after getting BSC bucket, wife reports patient talking confused; did not more confusion during time of getting to [facility] and low BP. Seemed to resolve after settled in recliner. Current Level of Function Impacting Discharge (mobility/balance): minimal assist    Other factors to consider for discharge:          PLAN :  Patient continues to benefit from skilled intervention to address the above impairments. Continue treatment per established plan of care. to address goals. Recommendation for discharge: (in order for the patient to meet his/her long term goals)  Therapy up to 5 days/week in SNF setting    This discharge recommendation:  Has not yet been discussed the attending provider and/or case management    IF patient discharges home will need the following DME: wheelchair due to orthostatic blood pressure       SUBJECTIVE:   Patient stated I'm feeling it now. while standing    OBJECTIVE DATA SUMMARY:   Critical Behavior:  Neurologic State: Alert, Confused  Orientation Level: Oriented to time, Oriented to place, Oriented to person, Disoriented to situation  Cognition: Follows commands, Impulsive, Memory loss  Safety/Judgement: Decreased awareness of environment, Decreased awareness of need for assistance, Decreased awareness of need for safety, Decreased insight into deficits  Functional Mobility Training:  Bed Mobility:     Supine to Sit: Minimum assistance     Scooting: Supervision        Transfers:  Sit to Stand: Contact guard assistance  Stand to Sit: Contact guard assistance        Bed to Chair: Contact guard assistance                    Balance:  Sitting: Impaired; Without support  Sitting - Static: Good (unsupported)  Sitting - Dynamic: Good (unsupported)  Standing: Impaired; With support  Standing - Static: Good  Standing - Dynamic : Fair  Ambulation/Gait Training:   Utilized Rw to take a few steps to chair/BSC        Activity Tolerance:   signs and symptoms of orthostatic hypotension    Patient Vitals for the past 4 hrs:   Temp Pulse Resp BP SpO2   11/22/22 1024 -- -- -- 108/62 --sitting   11/22/22 1016 -- -- -- (!) 82/54 --sitting   11/22/22 1012 -- -- -- (!) 92/46 --sitting   11/22/22 1009 -- -- -- (!) 91/57 --sitting   11/22/22 1006 -- -- -- (!) 79/52 --standing   11/22/22 1002 -- -- -- 101/70 --   11/22/22 0952 -- -- -- 106/70 93 % sitting   11/22/22 0942 -- -- -- 132/75 92 % supine    11/22/22 0824 98.5 °F (36.9 °C) 85 18 (!) 159/91 92 %              After treatment patient left in no apparent distress:   Sitting in chair, Call bell within reach, and Bed / chair alarm activated    COMMUNICATION/COLLABORATION:   The patients plan of care was discussed with: Registered nurse.      Connie Glaser, PT   Time Calculation: 47 mins

## 2022-11-22 NOTE — PROGRESS NOTES
[facility] Adult  Hospitalist Group                                                                                          Hospitalist Progress Note  Elin Richardson MD  Answering service: [phone] OR [phone] from in house phone        Date of Service:  2022  NAME:  Monica Heard  :  1945  MRN:  [id number]      Admission Summary:   Monica Heard is a 69 y.o. male who presents with syncope. Patient presents to the ER at [facility] with an episode of syncope versus seizure, patient has a history of orthostatic hypotension, woke up at 3 AM this morning and became confused, and collapsed within the toilet and the wall, patient reports that after a while he called his wife, and was brought to the hospital, not sure whether he had a seizure or syncope, was requested to be admitted to the hospitalist service, currently patient denies any complaints or problems    The patient denies any headache, blurry vision, sore throat, trouble swallowing, trouble with speech, chest pain, SOB, cough, fever, chills, N/V/D, abd pain, urinary symptoms, constipation, recent travels, sick contacts, focal or generalized neurological symptoms,  rashes, contact with COVID-19 diagnosed patients, hematemesis, melena, hemoptysis, hematuria, rashes, denies starting any new medications and denies any other concerns or problems besides as mentioned above. Interval history / Subjective:   Patient is sitting up in a chair. Severe orthostatic hypotension when getting out of bed. Florinef changed to Midodrine. Bolus of NS IV given. Labs this afternoon show a K  Plans for discharge are pending. Assessment & Plan:         Recurrent \"syncope\":  - episodes of rigidity, but no history suggestive of postictal state;   - Neurology consulted;   - based on chart review, the patient has no history of CVA, and no history of seizures.   - per wife, he recently had an MRI of the brain, so I will hold off on ordering one;   - EEG 11/18: no epileptiform discharges;   - evaluated by Neurology, all symptoms of confusion are being attributed to dementia; Severe autonomic dysregulation:  - on Midodrine; discussed with Neurology: patient is supposed to be on Midodrine 5 mg 5 times a day;  - per wife, recently switched to Florinef;   - recommend wheelchair for safety and a bedside commode;   - patient is at high risk of recurrent falls and injury due to Orthostatic hypotension and impulsivity;   - BP very labile, patient is at high risk of falls;   - per Neurology, the safest for him might be to stay at a wheelchair bound level of functioning/activity;  - 11/22:  significant orthostatic hypotension persists, BP goes up significantly with FLorinef (SBP as high as 205 mmHg; hod Florinef, restart Midodrine; per wife he takes 5mg 4x a day, between 7 am and 5 pm; no evening dose;   - Nephrology consulted per wife's request;     PTSD:  - consulted Psychiatry;   - continue home medications as prior to admission;     BPH:  - on Proscar, will change administration to evening time; Dyslipidemia:  - on statin; Hyperglycemia, without history of DM:  - HgA1c 5.9 in 09/22; Code status:  Full  Prophylaxis:  B SCD's  Care Plan discussed with: patient, CM;   Anticipated Disposition:   - wife requested transfer to the [facility] - on diversion, open request since Thursday;   - patient does not have any urgent medical concerns necessitating a transfer to another acute care hospital;   - wife is requesting a Urology consult, because a recent scan showed \"only one kidney\".   Patient's labwork does not show any signs of CKD, needs outpatient follow-up with Nephrology for BUN/Cr monitoring, no acute concerns, no electrolyte abnormalities;   - discharge planning:  patient is medically stable for discharge today to a lower level of care, awaiting placement;   - noted ongoing discussion by the CM with wife;   - patient has advanced dementia, and per wife, he has had multiple falls at home, has hit his head multiple times; patient is not safe to return home with his wife unless he has care around the clock (in addition to his wife, who said she cannot take care of him);  - Palliative Medicine consulted;  - Ethics consulted;   - continue PT/OT; Hospital Problems  Date Reviewed: 9/19/2022            Codes Class Noted POA    Syncope ICD-10-CM: R55  ICD-9-CM: 780.2  11/16/2022 Unknown       Review of Systems:   A comprehensive review of systems was negative except for that written in the HPI. Vital Signs:    Last 24hrs VS reviewed since prior progress note. Most recent are:  Visit Vitals  BP (!) 146/77 (BP 1 Location: Left upper arm, BP Patient Position: Sitting)   Pulse 81   Temp 98.6 °F (37 °C)   Resp 18   Ht 5' 11\" (1.803 m)   Wt 77.6 kg (171 lb)   SpO2 94%   BMI 23.85 kg/m²         Intake/Output Summary (Last 24 hours) at 11/22/2022 1655  Last data filed at 11/22/2022 1400  Gross per 24 hour   Intake 250 ml   Output 300 ml   Net -50 ml          Physical Examination:     I had a face to face encounter with this patient and independently examined them on 11/22/2022 as outlined below:          Constitutional:  No acute distress, cooperative, pleasant    ENT:  Oral mucosa moist, oropharynx benign. Resp:  CTA bilaterally. No wheezing/rhonchi/rales. No accessory muscle use. CV:  Regular rhythm, normal rate, no murmurs, gallops, rubs    GI:  Soft, non distended, non tender. normoactive bowel sounds, no hepatosplenomegaly     Musculoskeletal:  No edema, warm, 2+ pulses throughout    Neurologic:  Moves all extremities. Awake, cooperative; pleasantly confused;              Data Review:    Review and/or order of clinical lab test  Review and/or order of tests in the radiology section of CPT  Review and/or order of tests in the medicine section of CPT      Labs:     Recent Labs     11/22/22  1337   WBC 7.6   HGB 12.5   HCT 36.7          Recent Labs     11/22/22  1337      K 3.2*      CO2 26   BUN 17   CREA 1.24   *   CA 8.2*   MG 2.3   PHOS 4.0       No results for input(s): ALT, AP, TBIL, TBILI, TP, ALB, GLOB, GGT, AML, LPSE in the last 72 hours. No lab exists for component: SGOT, GPT, AMYP, HLPSE    No results for input(s): INR, PTP, APTT, INREXT, INREXT in the last 72 hours. No results for input(s): FE, TIBC, PSAT, FERR in the last 72 hours. Lab Results   Component Value Date/Time    Folate 14.3 11/08/2022 05:44 AM        No results for input(s): PH, PCO2, PO2 in the last 72 hours. No results for input(s): CPK, CKNDX, TROIQ in the last 72 hours.     No lab exists for component: CPKMB    Lab Results   Component Value Date/Time    Cholesterol, total 95 09/19/2022 12:13 PM    HDL Cholesterol 48 09/19/2022 12:13 PM    LDL, calculated 28.4 09/19/2022 12:13 PM    Triglyceride 93 09/19/2022 12:13 PM    CHOL/HDL Ratio 2.0 09/19/2022 12:13 PM     Lab Results   Component Value Date/Time    Glucose (POC) 141 (H) 11/17/2022 04:57 PM    Glucose (POC) 166 (H) 11/17/2022 11:52 AM    Glucose (POC) 110 (H) 07/02/2019 07:41 PM    Glucose (POC) 68 07/02/2019 05:51 PM     Lab Results   Component Value Date/Time    Color YELLOW/STRAW 11/06/2022 07:15 PM    Appearance CLEAR 11/06/2022 07:15 PM    Specific gravity 1.010 11/06/2022 07:15 PM    Specific gravity 1.006 10/26/2022 06:25 PM    pH (UA) 7.0 11/06/2022 07:15 PM    Protein Negative 11/06/2022 07:15 PM    Glucose Negative 11/06/2022 07:15 PM    Ketone Negative 11/06/2022 07:15 PM    Bilirubin Negative 11/06/2022 07:15 PM    Urobilinogen 0.2 11/06/2022 07:15 PM    Nitrites Negative 11/06/2022 07:15 PM    Leukocyte Esterase Negative 11/06/2022 07:15 PM    Epithelial cells FEW 11/06/2022 07:15 PM    Bacteria Negative 11/06/2022 07:15 PM    WBC 0-4 11/06/2022 07:15 PM    RBC 10-20 11/06/2022 07:15 PM         Medications Reviewed:     Current Facility-Administered Medications   Medication Dose Route Frequency    midodrine (PROAMATINE) tablet 5 mg  5 mg Oral QID    melatonin tablet 3 mg  3 mg Oral QHS PRN    finasteride (PROSCAR) tablet 5 mg  5 mg Oral QHS    aspirin chewable tablet 81 mg  81 mg Oral DAILY    atorvastatin (LIPITOR) tablet 40 mg  40 mg Oral DAILY    DULoxetine (CYMBALTA) capsule 60 mg  60 mg Oral DAILY    [Held by provider] fludrocortisone (FLORINEF) tablet 0.1 mg  0.1 mg Oral BID    sodium chloride (NS) flush 5-40 mL  5-40 mL IntraVENous Q8H    sodium chloride (NS) flush 5-40 mL  5-40 mL IntraVENous PRN    acetaminophen (TYLENOL) tablet 650 mg  650 mg Oral Q4H PRN    heparin (porcine) injection 5,000 Units  5,000 Units SubCUTAneous Q8H     ______________________________________________________________________  EXPECTED LENGTH OF STAY: 2d 7h  ACTUAL LENGTH OF STAY:          6                 Jose A Madrid MD

## 2022-11-22 NOTE — PROGRESS NOTES
0800: Bedside and Verbal shift change report given to [address] [place] [address] (oncoming nurse) by Jennifer Thompson RN (offgoing nurse). Report included the following information SBAR and ED Summary. Wife at bedside. 0915: PT at bedside. Pt orthostatic, attempting to have BM- passed gas. Pt up in recliner. Perfect serve message sent to Dr. Veda Calix to discuss plans about treatment options. 1300: PCT at bedside for hygiene care. 1345: Labs drawn, fluid bolus initiated. 1400: Consult called for palliative care provider, Lawrence Hamilton. Consult called for nephrology provider, Dr. Corrie Andres. 1430: OT at bedside. 1: Pt's wife discussing concerns about timing of midodrine, currently ordered 5x/day and she states \"he gets hypertensive at night if he takes this medication this frequently, he does better with 5 mg midodrine 4x/day and last dose no later than 1700. \" Perfect serve sent to Dr. Veda Claix. Wife going home to [place] at this time. 1530: Bedside and Verbal shift change report given to Martinez Wise (oncoming nurse) by [address] [place] [address] (offgoing nurse). Report included the following information SBAR and Kardex.

## 2022-11-22 NOTE — PROGRESS NOTES
I was notified by RN and PT that patient's BP dropped significantly during PT session. Patient has a well documented history of Orthostatic hypotension and autonomic dysregulation. He was previously on Midodrine 5 mg 5x a day, and Florinef (current dose ), but per wife, patient was taken off Midodrine, because it was interfering with his urination (she was not clear about the details). Midodrine seemed to work well for him per outpatient Neuro notes. He is currently on Florinef 0.1 mg BID and his SBP at rest has been as high as 205 mmHg. He also wears B compression stockings for OOB. Plan:   Patient's conditions are chronic and he has specialists seeing him as outpatient. He has had multiple falls at home and per wife, she cannot take care of him. She has been contacting patient's old physicians, who suggested that patient needs to be seen by Nephrology and Cardiology. - he was evaluated by Neurology on admission; Today:                                                                                                                                                                                                                                                                                                                   - Give patient an IV bolus of NS.  - Check labs (ordered yesterday but not collected). - Switch Florinef to Midodrine to see if it makes a difference. - Can resume Florinef if Midodrine alone is not effective;   - Again, his orthostatic hypotension is a chronic condition, worsening over time, Neurology recommends continuing current meds so that he can get out of bed.    - Considering the drop in SBP from 160 mmHg to 80's, patient may need to remain wheelchair bound.   - continue PT/OT;  - fall precautions;   - PT suggests and abdominal binder, which I agree with;   - I consulted Nephrology per patient's wife request;   - consult Palliative to address long term goals of care;   - patient is not safe to return home unless he goes home with Hospice and 24h care (someone other than his wife who cannot physically prevent him from falling or help him up); patient is at high risk of recurrent falls and injury;   - consult Ethics;   - discussed with CM;      Dana Cota MD

## 2022-11-23 LAB
ANION GAP SERPL CALC-SCNC: 8 MMOL/L (ref 5–15)
BUN SERPL-MCNC: 13 MG/DL (ref 6–20)
BUN/CREAT SERPL: 14 (ref 12–20)
CALCIUM SERPL-MCNC: 8.6 MG/DL (ref 8.5–10.1)
CHLORIDE SERPL-SCNC: 110 MMOL/L (ref 97–108)
CO2 SERPL-SCNC: 25 MMOL/L (ref 21–32)
CREAT SERPL-MCNC: 0.94 MG/DL (ref 0.7–1.3)
ERYTHROCYTE [DISTWIDTH] IN BLOOD BY AUTOMATED COUNT: 12.9 % (ref 11.5–14.5)
GLUCOSE SERPL-MCNC: 114 MG/DL (ref 65–100)
HCT VFR BLD AUTO: 36.7 % (ref 36.6–50.3)
HGB BLD-MCNC: 12.6 G/DL (ref 12.1–17)
MAGNESIUM SERPL-MCNC: 2.3 MG/DL (ref 1.6–2.4)
MCH RBC QN AUTO: 32.6 PG (ref 26–34)
MCHC RBC AUTO-ENTMCNC: 34.3 G/DL (ref 30–36.5)
MCV RBC AUTO: 95.1 FL (ref 80–99)
NRBC # BLD: 0 K/UL (ref 0–0.01)
NRBC BLD-RTO: 0 PER 100 WBC
PHOSPHATE SERPL-MCNC: 3.6 MG/DL (ref 2.6–4.7)
PLATELET # BLD AUTO: 195 K/UL (ref 150–400)
PMV BLD AUTO: 10.1 FL (ref 8.9–12.9)
POTASSIUM SERPL-SCNC: 3.7 MMOL/L (ref 3.5–5.1)
RBC # BLD AUTO: 3.86 M/UL (ref 4.1–5.7)
SODIUM SERPL-SCNC: 143 MMOL/L (ref 136–145)
WBC # BLD AUTO: 6.8 K/UL (ref 4.1–11.1)

## 2022-11-23 PROCEDURE — 83735 ASSAY OF MAGNESIUM: CPT

## 2022-11-23 PROCEDURE — 74011250637 HC RX REV CODE- 250/637: Performed by: FAMILY MEDICINE

## 2022-11-23 PROCEDURE — 74011250637 HC RX REV CODE- 250/637: Performed by: NURSE PRACTITIONER

## 2022-11-23 PROCEDURE — 80048 BASIC METABOLIC PNL TOTAL CA: CPT

## 2022-11-23 PROCEDURE — 74011000250 HC RX REV CODE- 250: Performed by: FAMILY MEDICINE

## 2022-11-23 PROCEDURE — 74011250636 HC RX REV CODE- 250/636: Performed by: FAMILY MEDICINE

## 2022-11-23 PROCEDURE — 65270000029 HC RM PRIVATE

## 2022-11-23 PROCEDURE — 97530 THERAPEUTIC ACTIVITIES: CPT | Performed by: PHYSICAL THERAPIST

## 2022-11-23 PROCEDURE — 97116 GAIT TRAINING THERAPY: CPT | Performed by: PHYSICAL THERAPIST

## 2022-11-23 PROCEDURE — 74011250637 HC RX REV CODE- 250/637: Performed by: INTERNAL MEDICINE

## 2022-11-23 PROCEDURE — 84100 ASSAY OF PHOSPHORUS: CPT

## 2022-11-23 PROCEDURE — 85027 COMPLETE CBC AUTOMATED: CPT

## 2022-11-23 PROCEDURE — 36415 COLL VENOUS BLD VENIPUNCTURE: CPT

## 2022-11-23 RX ORDER — MIDODRINE HYDROCHLORIDE 5 MG/1
10 TABLET ORAL
Status: DISCONTINUED | OUTPATIENT
Start: 2022-11-24 | End: 2022-11-30 | Stop reason: HOSPADM

## 2022-11-23 RX ORDER — FLUDROCORTISONE ACETATE 0.1 MG/1
0.05 TABLET ORAL DAILY
Status: DISCONTINUED | OUTPATIENT
Start: 2022-11-24 | End: 2022-11-30 | Stop reason: HOSPADM

## 2022-11-23 RX ORDER — MIDODRINE HYDROCHLORIDE 5 MG/1
10 TABLET ORAL 3 TIMES DAILY
Status: DISCONTINUED | OUTPATIENT
Start: 2022-11-23 | End: 2022-11-23

## 2022-11-23 RX ORDER — MIDODRINE HYDROCHLORIDE 5 MG/1
5 TABLET ORAL
Status: DISCONTINUED | OUTPATIENT
Start: 2022-11-24 | End: 2022-11-30 | Stop reason: HOSPADM

## 2022-11-23 RX ADMIN — SODIUM CHLORIDE, PRESERVATIVE FREE 10 ML: 5 INJECTION INTRAVENOUS at 22:25

## 2022-11-23 RX ADMIN — FINASTERIDE 5 MG: 5 TABLET, FILM COATED ORAL at 22:24

## 2022-11-23 RX ADMIN — DULOXETINE HYDROCHLORIDE 60 MG: 60 CAPSULE, DELAYED RELEASE ORAL at 10:09

## 2022-11-23 RX ADMIN — HEPARIN SODIUM 5000 UNITS: 5000 INJECTION INTRAVENOUS; SUBCUTANEOUS at 10:08

## 2022-11-23 RX ADMIN — HEPARIN SODIUM 5000 UNITS: 5000 INJECTION INTRAVENOUS; SUBCUTANEOUS at 19:10

## 2022-11-23 RX ADMIN — SODIUM CHLORIDE, PRESERVATIVE FREE 10 ML: 5 INJECTION INTRAVENOUS at 06:25

## 2022-11-23 RX ADMIN — ASPIRIN 81 MG 81 MG: 81 TABLET ORAL at 10:09

## 2022-11-23 RX ADMIN — SODIUM CHLORIDE, PRESERVATIVE FREE 10 ML: 5 INJECTION INTRAVENOUS at 14:04

## 2022-11-23 RX ADMIN — Medication 3 MG: at 22:24

## 2022-11-23 RX ADMIN — MIDODRINE HYDROCHLORIDE 10 MG: 5 TABLET ORAL at 16:19

## 2022-11-23 RX ADMIN — HEPARIN SODIUM 5000 UNITS: 5000 INJECTION INTRAVENOUS; SUBCUTANEOUS at 00:54

## 2022-11-23 RX ADMIN — ATORVASTATIN CALCIUM 40 MG: 40 TABLET, FILM COATED ORAL at 10:09

## 2022-11-23 NOTE — ROUTINE PROCESS
/91,(pt restless, turning in bed and sitting up)  message sent to Dr. Malin  via perfect serve. No orders received. Will recheck BP in one hour.

## 2022-11-23 NOTE — ROUTINE PROCESS
/96 Left arm, 203/109 Right arm, pt remains restless in bed. INDIRA Colon notified via perfect serve.

## 2022-11-23 NOTE — PROGRESS NOTES
[address] [facility] Adult  Hospitalist Group                                                                                          Hospitalist Progress Note  Raquel Mraie NP  Answering service: [phone] OR 36 from in house phone        Date of Service:  2022  NAME:  Joan Styles  :  1945  MRN:  [id number]      Admission Summary:   Joan Styles is a 69 y.o. male who presents with syncope. Patient presents to the ER at [facility] with an episode of syncope versus seizure, patient has a history of orthostatic hypotension, woke up at 3 AM this morning and became confused, and collapsed within the toilet and the wall, patient reports that after a while he called his wife, and was brought to the hospital, not sure whether he had a seizure or syncope, was requested to be admitted to the hospitalist service, currently patient denies any complaints or problems    The patient denies any headache, blurry vision, sore throat, trouble swallowing, trouble with speech, chest pain, SOB, cough, fever, chills, N/V/D, abd pain, urinary symptoms, constipation, recent travels, sick contacts, focal or generalized neurological symptoms,  rashes, contact with COVID-19 diagnosed patients, hematemesis, melena, hemoptysis, hematuria, rashes, denies starting any new medications and denies any other concerns or problems besides as mentioned above. Interval history / Subjective:   I saw the patient this morning on rounds. No complaints the moment of the encounter       Assessment & Plan:         Recurrent \"syncope\":  - episodes of rigidity, but no history suggestive of postictal state;   - Neurology consulted;   - based on chart review, the patient has no history of CVA, and no history of seizures.   - per wife, he recently had an MRI of the brain, so I will hold off on ordering one;   - EEG : no epileptiform discharges;   - evaluated by Neurology, all symptoms of confusion are being attributed to dementia; Was orthostatic today when getting up with PT. Mild symptoms of dizziness at that time    Severe autonomic dysregulation:  - on Midodrine; discussed with Neurology: patient is supposed to be on Midodrine 5 mg 5 times a day;  - per wife, recently switched to Florinef;   - recommend wheelchair for safety and a bedside commode;   - patient is at high risk of recurrent falls and injury due to Orthostatic hypotension and impulsivity;   - per Neurology, the safest for him might be to stay at a wheelchair bound level of functioning/activity; Patient with very labile blood pressures today this morning with systolic blood pressure of 196. Later in the morning it was in the 150 range, physical therapy got him up, blood pressure then down to 79/56. I did discuss this with neurology, recommending increasing midodrine to 10 mg 3 times daily. I also received a phone call from cardiology at [facility], apparently the wife had requested a consult with cardiology, his normal cardiologist Dr. Isabelle Mosquera who does not come here. I I have placed a consult with cardiology, [facility] group    PTSD:  - consulted Psychiatry;   - continue home medications as prior to admission;     BPH:  - on Proscar, will change administration to evening time; Dyslipidemia:  - on statin; Hyperglycemia, without history of DM:  - HgA1c 5.9 in 09/22; Code status:  Full  Prophylaxis:  B SCD's  Care Plan discussed with: patient, CM;   Anticipated Disposition: Per the medical record, there was request for transfer to [place]. Patient is medically stable. He does have advanced dementia, with his orthostatic hypotension, per neurology would best be remain in wheelchair. Otherwise patient for [facility] rehab, pending authorization from the [place].            Hospital Problems  Date Reviewed: 9/19/2022            Codes Class Noted POA    Syncope ICD-10-CM: R55  ICD-9-CM: 780.2  11/16/2022 Unknown       Review of Systems: A comprehensive review of systems was negative except for that written in the HPI. Vital Signs:    Last 24hrs VS reviewed since prior progress note. Most recent are:  Visit Vitals  BP (!) 196/89 (BP 1 Location: Left upper arm, BP Patient Position: At rest;Lying)   Pulse 94   Temp 98.1 °F (36.7 °C)   Resp 18   Ht 5' 11\" (1.803 m)   Wt 77.6 kg (171 lb)   SpO2 92%   BMI 23.85 kg/m²         Intake/Output Summary (Last 24 hours) at 11/23/2022 0755  Last data filed at 11/23/2022 3919  Gross per 24 hour   Intake 250 ml   Output 350 ml   Net -100 ml          Physical Examination:     I had a face to face encounter with this patient and independently examined them on 11/23/2022 as outlined below:          Constitutional:  No acute distress, cooperative, pleasant    ENT:  Oral mucosa moist, oropharynx benign. Resp:  CTA bilaterally. No wheezing/rhonchi/rales. No accessory muscle use. CV:  Regular rhythm, normal rate, no murmurs, gallops, rubs    GI:  Soft, non distended, non tender. normoactive bowel sounds, no hepatosplenomegaly     Musculoskeletal:  No edema, warm, 2+ pulses throughout    Neurologic:  Moves all extremities. Awake, cooperative; pleasantly confused; Data Review:    Review and/or order of clinical lab test  Review and/or order of tests in the radiology section of CPT  Review and/or order of tests in the medicine section of CPT      Labs:     Recent Labs     11/23/22  0049 11/22/22  1337   WBC 6.8 7.6   HGB 12.6 12.5   HCT 36.7 36.7    194       Recent Labs     11/23/22  0049 11/22/22  1337    137   K 3.7 3.2*   * 105   CO2 25 26   BUN 13 17   CREA 0.94 1.24   * 175*   CA 8.6 8.2*   MG 2.3 2.3   PHOS 3.6 4.0       No results for input(s): ALT, AP, TBIL, TBILI, TP, ALB, GLOB, GGT, AML, LPSE in the last 72 hours. No lab exists for component: SGOT, GPT, AMYP, HLPSE    No results for input(s): INR, PTP, APTT, INREXT, INREXT in the last 72 hours.    No results for input(s): FE, TIBC, PSAT, FERR in the last 72 hours. Lab Results   Component Value Date/Time    Folate 14.3 11/08/2022 05:44 AM        No results for input(s): PH, PCO2, PO2 in the last 72 hours. No results for input(s): CPK, CKNDX, TROIQ in the last 72 hours.     No lab exists for component: CPKMB    Lab Results   Component Value Date/Time    Cholesterol, total 95 09/19/2022 12:13 PM    HDL Cholesterol 48 09/19/2022 12:13 PM    LDL, calculated 28.4 09/19/2022 12:13 PM    Triglyceride 93 09/19/2022 12:13 PM    CHOL/HDL Ratio 2.0 09/19/2022 12:13 PM     Lab Results   Component Value Date/Time    Glucose (POC) 141 (H) 11/17/2022 04:57 PM    Glucose (POC) 166 (H) 11/17/2022 11:52 AM    Glucose (POC) 110 (H) 07/02/2019 07:41 PM    Glucose (POC) 68 07/02/2019 05:51 PM     Lab Results   Component Value Date/Time    Color YELLOW/STRAW 11/06/2022 07:15 PM    Appearance CLEAR 11/06/2022 07:15 PM    Specific gravity 1.010 11/06/2022 07:15 PM    Specific gravity 1.006 10/26/2022 06:25 PM    pH (UA) 7.0 11/06/2022 07:15 PM    Protein Negative 11/06/2022 07:15 PM    Glucose Negative 11/06/2022 07:15 PM    Ketone Negative 11/06/2022 07:15 PM    Bilirubin Negative 11/06/2022 07:15 PM    Urobilinogen 0.2 11/06/2022 07:15 PM    Nitrites Negative 11/06/2022 07:15 PM    Leukocyte Esterase Negative 11/06/2022 07:15 PM    Epithelial cells FEW 11/06/2022 07:15 PM    Bacteria Negative 11/06/2022 07:15 PM    WBC 0-4 11/06/2022 07:15 PM    RBC 10-20 11/06/2022 07:15 PM         Medications Reviewed:     Current Facility-Administered Medications   Medication Dose Route Frequency    midodrine (PROAMATINE) tablet 5 mg  5 mg Oral QID    melatonin tablet 3 mg  3 mg Oral QHS PRN    finasteride (PROSCAR) tablet 5 mg  5 mg Oral QHS    aspirin chewable tablet 81 mg  81 mg Oral DAILY    atorvastatin (LIPITOR) tablet 40 mg  40 mg Oral DAILY    DULoxetine (CYMBALTA) capsule 60 mg  60 mg Oral DAILY    [Held by provider] fludrocortisone (FLORINEF) tablet 0.1 mg  0.1 mg Oral BID    sodium chloride (NS) flush 5-40 mL  5-40 mL IntraVENous Q8H    sodium chloride (NS) flush 5-40 mL  5-40 mL IntraVENous PRN    acetaminophen (TYLENOL) tablet 650 mg  650 mg Oral Q4H PRN    heparin (porcine) injection 5,000 Units  5,000 Units SubCUTAneous Q8H     ______________________________________________________________________  EXPECTED LENGTH OF STAY: 2d 7h  ACTUAL LENGTH OF STAY:          7                 Shea Lomas NP
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
## 2022-11-23 NOTE — PROGRESS NOTES
[address] visit attempted. Mr. Bonita Loomis was sitting up in his chair and on the phone.   Prayer for spiritual communion offered for his well-being,     . LINDA Sorenson, RN, ACSW, LCSW   Page:  [phone]

## 2022-11-23 NOTE — PROGRESS NOTES
Spiritual Care Assessment/Progress Note  [facility]      NAME: Louie Barahona      MRN: [id number]  AGE: 69 y.o. SEX: male  Congregation Affiliation: Protestant   Language: English     11/23/2022     Total Time (in minutes): 25     Spiritual Assessment begun in [facility] through conversation with:         [x]Patient        [] Family    [] Friend(s)        Reason for Consult: Palliative Care, Initial/Spiritual Assessment     Spiritual beliefs: (Please include comment if needed)     [x] Identifies with a momo tradition: Tiffani Mccauley       [] Supported by a momo community:            [] Claims no spiritual orientation:           [] Seeking spiritual identity:                [] Adheres to an individual form of spirituality:           [] Not able to assess:                           Identified resources for coping:      [] Prayer                               [] Music                  [] Guided Imagery     [x] Family/friends                 [] Pet visits     [] Devotional reading                         [] Unknown     [] Other:                                               Interventions offered during this visit: (See comments for more details)    Patient Interventions: Affirmation of emotions/emotional suffering           Plan of Care:     [] Support spiritual and/or cultural needs    [] Support AMD and/or advance care planning process      [] Support grieving process   [] Coordinate Rites and/or Rituals    [] Coordination with community clergy   [] No spiritual needs identified at this time   [] Detailed Plan of Care below (See Comments)  [] Make referral to Music Therapy  [] Make referral to Pet Therapy     [] Make referral to Addiction services  [] Make referral to [facility]  [] Make referral to Spiritual Care Partner  [] No future visits requested        [x] Follow up visits as needed     Visited patient for palliative initial spiritual assessment. His chart was consulted.  He was pleasantly confused at time of visit. He told me that he was here not as a part of the \"family,\" rather a part of the [place] and [place]. \" He went on to say that he was enjoying being here. He was unable to engage in any meaningful conversation.     Chaplain Purdy MDiv, MS, [facility]

## 2022-11-23 NOTE — PROGRESS NOTES
Problem: Mobility Impaired (Adult and Pediatric)  Goal: *Acute Goals and Plan of Care (Insert Text)  Description: FUNCTIONAL STATUS PRIOR TO ADMISSION: Pt is typically IND without use of DME. Per wife, has recently required use of RW for mobility. HOME SUPPORT PRIOR TO ADMISSION: The patient lived with wife but did not require assist. Was able to perform ADLs IND. Recently has required increased assistance for these tasks. Physical Therapy Goals  Initiated 11/20/2022  1. Patient will move from supine to sit and sit to supine  in bed with supervision/set-up within 7 day(s). 2.  Patient will transfer from bed to chair and chair to bed with supervision/set-up using the least restrictive device within 7 day(s). 3.  Patient will perform sit to stand with supervision/set-up within 7 day(s). 4.  Patient will ambulate with supervision/set-up for 150 feet with the least restrictive device within 7 day(s). Outcome: Progressing Towards Goal   PHYSICAL THERAPY TREATMENT  Patient: Fatou Marquis (89 y.o. male)  Date: 11/23/2022  Diagnosis: Syncope [R55] <principal problem not specified>      Precautions: Fall  Chart, physical therapy assessment, plan of care and goals were reviewed. ASSESSMENT  Patient continues with skilled PT services and is progressing towards goals. Patient overall limited by continued hypotension, confusion, impaired balance, gait instability and decreased activity tolerance. Patient overall needing Fredi to come to EOB and CGA to come to stand. Able to amb approx 15 feet x 2 with Rw and CGA-Fredi with increased symptoms of dizziness. Unable to get standing BP and positioned in recliner position with LE's elevated and /64. Anticipate he may need rehab as he is a high fall risk and hypotension increases this risk in conjunction with memory impairment.        Other factors to consider for discharge: at risk for falls, below baseline         PLAN :  Patient continues to benefit from skilled intervention to address the above impairments. Continue treatment per established plan of care. to address goals. Recommendation for discharge: (in order for the patient to meet his/her long term goals)  Therapy up to 5 days/week in SNF setting        IF patient discharges home will need the following DME: to be determined (TBD)       SUBJECTIVE:   Patient stated I guess I feel dizzy.     OBJECTIVE DATA SUMMARY:   Critical Behavior:  Neurologic State: Restless  Orientation Level: Disoriented to situation, Disoriented to time  Cognition: Impaired decision making, Impulsive  Safety/Judgement: Decreased awareness of environment, Decreased awareness of need for assistance, Decreased awareness of need for safety, Decreased insight into deficits  Functional Mobility Training:  Bed Mobility:  Rolling: Supervision  Supine to Sit: Minimum assistance     Scooting: Supervision        Transfers:  Sit to Stand: Contact guard assistance  Stand to Sit: Contact guard assistance                             Balance:  Sitting: Intact  Standing: Impaired  Standing - Static: Constant support;Good  Standing - Dynamic : Constant support; Fair  Ambulation/Gait Training:  Distance (ft): 15 Feet (ft) (x 2)  Assistive Device: Gait belt;Walker, rolling  Ambulation - Level of Assistance: Contact guard assistance;Minimal assistance        Gait Abnormalities: Decreased step clearance;Shuffling gait; Path deviations        Base of Support: Widened     Speed/Dhara: Slow;Pace decreased (<100 feet/min)  Step Length: Right shortened;Left shortened          Pain Rating:  No c/o pain    Activity Tolerance:   Fair and requires frequent rest breaks    After treatment patient left in no apparent distress:   Sitting in chair, Call bell within reach, and Bed / chair alarm activated    COMMUNICATION/COLLABORATION:   The patients plan of care was discussed with: Physical therapist, Occupational therapist, and Registered nurse.      Honor Ahumada Brain PT, DPT   Time Calculation: 23 mins

## 2022-11-23 NOTE — PROGRESS NOTES
Nephrology Progress Note  Ann Corrales  Date of Admission : 11/16/2022    CC:  Follow up for orthostatic hypotension       Assessment and Plan     Chronic Orthostatic Hypotension:  - cont midodrine 5mg QID for now  - would use compression stockings as well which should help with orthostasis  - if needed, we can increase midodrine dose, for today, would monitor     Pulmonary Nodule  - 1.1 cm concerning for malignancy- biopsy deferred per notes with plan for serial CT  - followed by Dr. Edie Short at [facility] in pulmonary clinic     BPH  - on Proscar  - Renal US neg for acute issues     Hypokalemia:  - resolved, replete PRN     Pulmonary Emphysema  COPD  Prior Tobacco       Interval History:  Seen and examined. Had significant postural hypotension early this AM.  Supine BPs better after stopping florinef. No cp, sob, n/v/d reported. Voiding ok. Current Medications: all current  Medications have been eviewed in EPIC  Review of Systems: Pertinent items are noted in HPI. Objective:  Vitals:    Vitals:    11/23/22 0850 11/23/22 0851 11/23/22 0852 11/23/22 0900   BP: (!) 152/83 129/75 (!) 79/56 102/64   Pulse: 86      Resp: 19      Temp: 98.5 °F (36.9 °C)      SpO2: 92%      Weight:       Height:         Intake and Output:  No intake/output data recorded.   11/21 1901 - 11/23 0700  In: 250 [I.V.:250]  Out: 500 [Urine:500]    Physical Examination:    General: NAD,Conversant   Neck:  Supple, no mass  Resp:  Lungs CTA B/L, no wheezing , normal respiratory effort  CV:  RRR,  no murmur or rub,no LE edema  GI:  Soft, NT, + Bowel sounds, no hepatosplenomegaly  Neurologic:  Non focal  Psych:             AAO x 3 appropriate affect   Skin:  No Rash  :  No shannon    []    High complexity decision making was performed  []    Patient is at high-risk of decompensation with multiple organ involvement    Lab Data Personally Reviewed: I have reviewed all the pertinent labs, microbiology data and radiology studies during assessment.     Recent Labs     11/23/22  0049 11/22/22  1337    137   K 3.7 3.2*   * 105   CO2 25 26   * 175*   BUN 13 17   CREA 0.94 1.24   CA 8.6 8.2*   MG 2.3 2.3   PHOS 3.6 4.0     Recent Labs     11/23/22  0049 11/22/22  1337   WBC 6.8 7.6   HGB 12.6 12.5   HCT 36.7 36.7    194     No results found for: SDES  Lab Results   Component Value Date/Time    Culture result: NO GROWTH 5 DAYS 10/01/2019 06:53 PM    Culture result: NO GROWTH 5 DAYS 11/23/2018 08:45 AM    Culture result: NO GROWTH 1 DAY 11/15/2018 05:05 AM     Recent Results (from the past 24 hour(s))   METABOLIC PANEL, BASIC    Collection Time: 11/22/22  1:37 PM   Result Value Ref Range    Sodium 137 136 - 145 mmol/L    Potassium 3.2 (L) 3.5 - 5.1 mmol/L    Chloride 105 97 - 108 mmol/L    CO2 26 21 - 32 mmol/L    Anion gap 6 5 - 15 mmol/L    Glucose 175 (H) 65 - 100 mg/dL    BUN 17 6 - 20 MG/DL    Creatinine 1.24 0.70 - 1.30 MG/DL    BUN/Creatinine ratio 14 12 - 20      eGFR >60 >60 ml/min/1.73m2    Calcium 8.2 (L) 8.5 - 10.1 MG/DL   CBC W/O DIFF    Collection Time: 11/22/22  1:37 PM   Result Value Ref Range    WBC 7.6 4.1 - 11.1 K/uL    RBC 3.83 (L) 4.10 - 5.70 M/uL    HGB 12.5 12.1 - 17.0 g/dL    HCT 36.7 36.6 - 50.3 %    MCV 95.8 80.0 - 99.0 FL    MCH 32.6 26.0 - 34.0 PG    MCHC 34.1 30.0 - 36.5 g/dL    RDW 12.8 11.5 - 14.5 %    PLATELET 356 096 - 057 K/uL    MPV 10.2 8.9 - 12.9 FL    NRBC 0.0 0  WBC    ABSOLUTE NRBC 0.00 0.00 - 0.01 K/uL   MAGNESIUM    Collection Time: 11/22/22  1:37 PM   Result Value Ref Range    Magnesium 2.3 1.6 - 2.4 mg/dL   PHOSPHORUS    Collection Time: 11/22/22  1:37 PM   Result Value Ref Range    Phosphorus 4.0 2.6 - 4.7 MG/DL   PHOSPHORUS    Collection Time: 11/23/22 12:49 AM   Result Value Ref Range    Phosphorus 3.6 2.6 - 4.7 MG/DL   MAGNESIUM    Collection Time: 11/23/22 12:49 AM   Result Value Ref Range    Magnesium 2.3 1.6 - 2.4 mg/dL   METABOLIC PANEL, BASIC    Collection Time: 11/23/22 12:49 AM   Result Value Ref Range    Sodium 143 136 - 145 mmol/L    Potassium 3.7 3.5 - 5.1 mmol/L    Chloride 110 (H) 97 - 108 mmol/L    CO2 25 21 - 32 mmol/L    Anion gap 8 5 - 15 mmol/L    Glucose 114 (H) 65 - 100 mg/dL    BUN 13 6 - 20 MG/DL    Creatinine 0.94 0.70 - 1.30 MG/DL    BUN/Creatinine ratio 14 12 - 20      eGFR >60 >60 ml/min/1.73m2    Calcium 8.6 8.5 - 10.1 MG/DL   CBC W/O DIFF    Collection Time: 11/23/22 12:49 AM   Result Value Ref Range    WBC 6.8 4.1 - 11.1 K/uL    RBC 3.86 (L) 4.10 - 5.70 M/uL    HGB 12.6 12.1 - 17.0 g/dL    HCT 36.7 36.6 - 50.3 %    MCV 95.1 80.0 - 99.0 FL    MCH 32.6 26.0 - 34.0 PG    MCHC 34.3 30.0 - 36.5 g/dL    RDW 12.9 11.5 - 14.5 %    PLATELET 539 134 - 610 K/uL    MPV 10.1 8.9 - 12.9 FL    NRBC 0.0 0  WBC    ABSOLUTE NRBC 0.00 0.00 - 0.01 K/uL                 Kati Torre MD  [facility]   [address] [address]  Phone - [phone]   Fax - [phone]  [email or web address]

## 2022-11-23 NOTE — CONSULTS
CARDIOLOGY CONSULT                  Subjective:    Date of  Admission:   Admission type:Emergency    Jeniffer Molina MD     This is a 77 yom with severe OH. He has had multiple admissions for the same. He presented after a fall when getting up overnight to urinate and ended up on the floor. He has had his medications titrated during this admission and continues to be significantly orthostatic. His pacemaker has been programmed to have higher HRs to help combat this issues. Device checks have all shown normal function without any significant arrhythmias.     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.8    Memory disturbance R41.3    Degenerative cervical spinal stenosis M48.02    History of lumbar laminectomy Z98.890    Degenerative lumbar spinal stenosis M48.061    Heart block I45.9    Bilateral carotid artery stenosis I65.23    Autonomic orthostatic hypotension I95.1    Pulmonary nodule R91.1    Orthostatic hypotension I95.1    Seizure ([address] R56.9    Pulmonary emphysema (HCC) J43.9    Syncope R55        Past Medical History:   Diagnosis Date    Altered mental status, unspecified 11/9/2018    Anxiety     Ataxia 11/9/2018    Back pain     Blurred vision     Bradycardia 10/2/2018    Chest pain     Depression     Dizziness     Dizziness and giddiness 10/17/2019    Dysautonomia ([address]     Fast heart beat     Fever 11/9/2018    Frequent headaches     Frequent urination     Hip dysplasia, congenital     Hx of syncope 10/24/2018    Hypotension     Ingrown left big toenail 3/31/2017    Joint pain Joint swelling     Lumbar spinal stenosis     Memory deficit 9/11/2018    Muscle pain     Neuropathy     Orthostatic hypotension     asymptomatic    Pacemaker     new pacemaker July 2019    Recent change in weight     Risk for falls 12/13/2016    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 11/8/2018    Syncope     Tiredness     Trouble in sleeping     Weakness       Past Surgical History:   Procedure Laterality Date    HX HIP REPLACEMENT Bilateral 1993    HX PACEMAKER PLACEMENT  10/16/2018    HX ROTATOR CUFF REPAIR Right 11/16/2017    CA CARDIAC SURG PROCEDURE UNLIST      CA INS NEW/RPLCMT PRM PM W/TRANSV ELTRD ATRIAL&VENT N/A 7/30/2019    INSERT PPM DUAL performed by Pete Barbosa MD at [facility] CARDIAC CATH LAB      Family History   Problem Relation Age of Onset    Cancer Mother     Cancer Maternal Aunt       Social History     Socioeconomic History    Marital status:      Spouse name: Not on file    Number of children: Not on file    Years of education: Not on file    Highest education level: Not on file   Occupational History    Not on file   Tobacco Use    Smoking status: Former     Packs/day: 0.25     Years: 50.00     Pack years: 12.50     Types: Cigarettes     Quit date: 8/3/2019     Years since quitting: 3.3    Smokeless tobacco: Never   Substance and Sexual Activity    Alcohol use: Not Currently     Alcohol/week: 28.0 standard drinks     Types: 28 Cans of beer per week     Comment: None now    Drug use: No    Sexual activity: Never   Other Topics Concern    Not on file   Social History Narrative    Not on file     Social Determinants of Health     Financial Resource Strain: Not on file   Food Insecurity: Not on file   Transportation Needs: Not on file   Physical Activity: Not on file   Stress: Not on file Social Connections: Not on file   Intimate Partner Violence: Not on file   Housing Stability: Not on file        Current Facility-Administered Medications   Medication Dose Route Frequency    midodrine (PROAMATINE) tablet 10 mg  10 mg Oral TID    [START ON 11/24/2022] fludrocortisone (FLORINEF) tablet 0.05 mg  0.05 mg Oral DAILY    melatonin tablet 3 mg  3 mg Oral QHS PRN    finasteride (PROSCAR) tablet 5 mg  5 mg Oral QHS    aspirin chewable tablet 81 mg  81 mg Oral DAILY    atorvastatin (LIPITOR) tablet 40 mg  40 mg Oral DAILY    DULoxetine (CYMBALTA) capsule 60 mg  60 mg Oral DAILY    sodium chloride (NS) flush 5-40 mL  5-40 mL IntraVENous Q8H    sodium chloride (NS) flush 5-40 mL  5-40 mL IntraVENous PRN    acetaminophen (TYLENOL) tablet 650 mg  650 mg Oral Q4H PRN    heparin (porcine) injection 5,000 Units  5,000 Units SubCUTAneous Q8H             Review of Symptoms:    Gen - no F/C/S  Eyes - no vision changes  ENT - no sore throat, rhinorrhea, otalgia  CV - no CP, no palpitations, no orthopnea, no PND, no LIV  Resp no cough, no SOB/PATEL  GI - no AP, no n/v/d/c   - no dysuria, no hematuria  MSK - no abnormal joint pains  Skin - no rashes  Neuro - no HA, no numbness, no weakness, no slurred speech  Psych - no change in mood       Physical Exam    /85 (BP 1 Location: Right upper arm, BP Patient Position: Sitting)   Pulse 87   Temp 97.9 °F (36.6 °C)   Resp 18   Ht 5' 11\" (1.803 m)   Wt 77.6 kg (171 lb)   SpO2 94%   BMI 23.85 kg/m²      NAD  Skin warm and dry  Nl conjunctiva  Oropharynx without exudate. Neck supple  Lungs clear  Normal S1/ S2 with occasional ectopy  No Murmurs, click or Rubs  Abdomen soft and non tender  Pulses 2+ radials  Neuro:  Grossly intact  Appropriate       Cardiographics    Telemetry: paced  ECG: paced    Assessment:     This is a 69 yom with severe OH, here with acute exacerbation    Explained to pt and wife that a tailored regimen related to his BP is a challenge here in the hospital due to formal orders needing to be followed and that a titrated approach based on his BP values (similar to what they do at home) is usually the most effective but this is difficult to do here. Added back lower dose fludrocortisone once daily  No room to increase his midodrine, changed it to a front-loaded four times daily dosing regimen  He would likely benefit from [address] but this is unavailable at the hospital and I no longer have access to samples  Can also try Straterra as a second-line agent but I have not had much luck with this in the past  Ensure compression stockings use, raising HOB; have pt seated upright in chair as much as possible  Agree with palliative involvement    Not much else to add from cardiac perspective. D/w Mr. Wang Jones.  Signing off, please call with questions

## 2022-11-23 NOTE — PROGRESS NOTES
RUR: 14% Low    JOSE: [address] accepted in [facility] pending Rod Duran from [place]. Contact number for Cindy, [phone]. Will need BLS transport arranged. Follow-up with specialist.     Primary Contact: Wife, Fernando Zaldivar, [phone]     11:10AM - CM spoke with Cindy at [address] (p: [phone]). Cindy expressed she has called Mrs. Nuno with [facility] [place] and has left several messages and is awaiting a return call to confirm insurance auth approved and contract. Per previous CM note, patient is 100% service connnected. CM called and spoke with Bakari Kam [facility] (p: [phone], ext. [phone]) to inquire on auth and contract. Per Marquis Owusu, the assigned  for patient's case is [facility] [facility]. Per Marquis Owusu, message left with Samira Nevarez to return call with update ASAP. 3:20PM - CM left voicemail for Cindy to obtain most recent update regarding insurance authorization. Awaiting return call. CM provided update to patient and wife at bedside.                                                                                                                                                                                                                                                                                                                                                                                                                                                                                                                                                                                                                                                                                                                                                                                                                                                                                                                                CM will continue to follow as needed.      Samy Estevez, MSW   [phone]

## 2022-11-24 PROCEDURE — 74011250637 HC RX REV CODE- 250/637: Performed by: INTERNAL MEDICINE

## 2022-11-24 PROCEDURE — 74011250636 HC RX REV CODE- 250/636: Performed by: FAMILY MEDICINE

## 2022-11-24 PROCEDURE — 74011250637 HC RX REV CODE- 250/637: Performed by: HOSPITALIST

## 2022-11-24 PROCEDURE — 94760 N-INVAS EAR/PLS OXIMETRY 1: CPT

## 2022-11-24 PROCEDURE — 65270000029 HC RM PRIVATE

## 2022-11-24 PROCEDURE — 74011000250 HC RX REV CODE- 250: Performed by: FAMILY MEDICINE

## 2022-11-24 PROCEDURE — 74011250637 HC RX REV CODE- 250/637: Performed by: FAMILY MEDICINE

## 2022-11-24 RX ORDER — GUAIFENESIN/DEXTROMETHORPHAN 100-10MG/5
5 SYRUP ORAL
Status: DISCONTINUED | OUTPATIENT
Start: 2022-11-24 | End: 2022-11-30 | Stop reason: HOSPADM

## 2022-11-24 RX ADMIN — ATORVASTATIN CALCIUM 40 MG: 40 TABLET, FILM COATED ORAL at 09:59

## 2022-11-24 RX ADMIN — SODIUM CHLORIDE, PRESERVATIVE FREE 10 ML: 5 INJECTION INTRAVENOUS at 20:27

## 2022-11-24 RX ADMIN — MIDODRINE HYDROCHLORIDE 10 MG: 5 TABLET ORAL at 07:21

## 2022-11-24 RX ADMIN — FINASTERIDE 5 MG: 5 TABLET, FILM COATED ORAL at 20:25

## 2022-11-24 RX ADMIN — ACETAMINOPHEN 650 MG: 325 TABLET ORAL at 20:25

## 2022-11-24 RX ADMIN — DULOXETINE HYDROCHLORIDE 60 MG: 60 CAPSULE, DELAYED RELEASE ORAL at 09:59

## 2022-11-24 RX ADMIN — SODIUM CHLORIDE, PRESERVATIVE FREE 10 ML: 5 INJECTION INTRAVENOUS at 17:32

## 2022-11-24 RX ADMIN — HEPARIN SODIUM 5000 UNITS: 5000 INJECTION INTRAVENOUS; SUBCUTANEOUS at 03:50

## 2022-11-24 RX ADMIN — GUAIFENESIN AND DEXTROMETHORPHAN 5 ML: 100; 10 SYRUP ORAL at 18:18

## 2022-11-24 RX ADMIN — SODIUM CHLORIDE, PRESERVATIVE FREE 10 ML: 5 INJECTION INTRAVENOUS at 07:21

## 2022-11-24 RX ADMIN — HEPARIN SODIUM 5000 UNITS: 5000 INJECTION INTRAVENOUS; SUBCUTANEOUS at 17:27

## 2022-11-24 RX ADMIN — MIDODRINE HYDROCHLORIDE 10 MG: 5 TABLET ORAL at 11:49

## 2022-11-24 RX ADMIN — Medication 3 MG: at 20:24

## 2022-11-24 RX ADMIN — HEPARIN SODIUM 5000 UNITS: 5000 INJECTION INTRAVENOUS; SUBCUTANEOUS at 09:59

## 2022-11-24 RX ADMIN — MIDODRINE HYDROCHLORIDE 5 MG: 5 TABLET ORAL at 17:27

## 2022-11-24 RX ADMIN — ASPIRIN 81 MG 81 MG: 81 TABLET ORAL at 09:59

## 2022-11-24 RX ADMIN — FLUDROCORTISONE ACETATE 0.05 MG: 0.1 TABLET ORAL at 09:59

## 2022-11-24 RX ADMIN — MIDODRINE HYDROCHLORIDE 5 MG: 5 TABLET ORAL at 14:16

## 2022-11-24 NOTE — PROGRESS NOTES
Physician Progress Note      [facility]  [facility]N #:                  [id number]  :                       1945  ADMIT DATE:       2022 9:35 AM  DISCH DATE:  RESPONDING  PROVIDER #:        Gely Byrd MD          QUERY TEXT:      Dear attending,    Pt admitted with advanced dementia documented. Pt. requires a sitter as he is noted to be impulsive and keeps getting out of bed. If possible, please document in the progress notes and discharge summary if you are evaluating and / or treating any of the following: The medical record reflects the following:  Risk Factors: hx. dementia  Clinical Indicators: -Per neurology- Pt is a 77yo LH male with severe dysautonomia causing orthostatic hypotension, on midodrine 5mg five times a day and florinef 0.1mg bid. He also has dementia with significant impulsivity and inability to recall safety precautions such as sitting on the side of the bed before standing. These spells are related to his known orthostasis, they are not seizures. Exam with severe word finding, though he is oriented to date, unable to provide coherent history, o/w non-focal. Discussed this difficult situation with the pt and his wife. This problem will only worsen. A trial of Northera through his primary neurologist, Dr. Bryanna Turner is worth a try. He likely needs a WC for safety and a bedside commode  -Per PN- did not sleep well, has made multiple attempts to get up out of bed last night, and has a sitter at the bedside this morning.   Treatment: Sitter, increased nursing monitoring, neurology consult      Thank you,    Jennifer Patel RN, BSN, CRCR, CCDS  Clinical Documentation Improvement  [phone] or via Perfect Serve  Options provided:  -- Dementia without behavioral disturbance  -- Dementia with behavioral disturbance  -- Other - I will add my own diagnosis  -- Disagree - Not applicable / Not valid  -- Disagree - Clinically unable to determine / Unknown  -- Refer to Clinical Documentation Reviewer    PROVIDER RESPONSE TEXT:    This patient has dementia without behavioral disturbances.     Query created by: Abilio Harrison on 11/22/2022 1:58 PM      Electronically signed by:  Karen Garcia MD 11/24/2022 1:52 PM

## 2022-11-24 NOTE — PROGRESS NOTES
[address] [facility] Adult  Hospitalist Group                                                                                          Hospitalist Progress Note  Cira Mccord MD  Answering service: [phone] or [phone] from in house phone        Date of Service:  2022  NAME:  Babatunde Hare YOB: 1945  MRN:  [id number]      Admission Summary:   Babatunde Hare is a 69 y.o. male who presents with syncope. Patient presents to the ER at [facility] with an episode of syncope versus seizure, patient has a history of orthostatic hypotension, woke up at 3 AM this morning and became confused, and collapsed within the toilet and the wall, patient reports that after a while he called his wife, and was brought to the hospital, not sure whether he had a seizure or syncope, was requested to be admitted to the hospitalist service, currently patient denies any complaints or problems    The patient denies any headache, blurry vision, sore throat, trouble swallowing, trouble with speech, chest pain, SOB, cough, fever, chills, N/V/D, abd pain, urinary symptoms, constipation, recent travels, sick contacts, focal or generalized neurological symptoms,  rashes, contact with COVID-19 diagnosed patients, hematemesis, melena, hemoptysis, hematuria, rashes, denies starting any new medications and denies any other concerns or problems besides as mentioned above. Interval history / Subjective:   He denies pain, dyspnea, n/v/d, dizziness. Plan discussed w/ wife at bedside. Assessment & Plan:        Orthostatic hypotension w/ syncope:  - episodes of rigidity, but no history suggestive of postictal state;   - Neurology consulted;   - based on chart review, the patient has no history of CVA, and no history of seizures.   - per wife, he recently had an MRI of the brain  - EEG : no epileptiform discharges;   - evaluated by Neurology, all symptoms of confusion are being attributed to dementia    Severe autonomic dysregulation:  - on Midodrine; dose adjusted per cardiology  - low-dose florinef resumed  - recommend wheelchair for safety and a bedside commode;   - patient is at high risk of recurrent falls and injury due to Orthostatic hypotension and impulsivity   - per Neurology, the safest for him might be to stay at a wheelchair bound level of functioning/activity  - monitor BPs today with med changes from yesterday    PTSD:  - consulted Psychiatry;   - continue home medications as prior to admission;     BPH:  - on Proscar, changed administration to evening time     Dyslipidemia:  - on statin    Hyperglycemia, without history of DM:  - HgA1c 5.9 in 09/22; Code status:  Full  Prophylaxis:  B SCD's  Care Plan discussed with: patient, CM;   Anticipated Disposition: Per the medical record, there was request for transfer to [place]. Patient is medically stable. He does have advanced dementia, with his orthostatic hypotension, per neurology would best be remain in wheelchair. Otherwise patient for [address] rehab, pending authorization from the [place]. Hospital Problems  Date Reviewed: 9/19/2022            Codes Class Noted POA    Syncope ICD-10-CM: R55  ICD-9-CM: 780.2  11/16/2022 Unknown       Review of Systems:   A comprehensive review of systems was negative except for that written in the HPI. Vital Signs:    Last 24hrs VS reviewed since prior progress note.  Most recent are:  Visit Vitals  BP (!) 145/79 (BP 1 Location: Left arm, BP Patient Position: At rest)   Pulse 82   Temp 97.8 °F (36.6 °C)   Resp 18   Ht 5' 11\" (1.803 m)   Wt 77.6 kg (171 lb)   SpO2 94%   BMI 23.85 kg/m²         Intake/Output Summary (Last 24 hours) at 11/24/2022 1332  Last data filed at 11/24/2022 8054  Gross per 24 hour   Intake --   Output 400 ml   Net -400 ml          Physical Examination:     I had a face to face encounter with this patient and independently examined them on 11/24/2022 as outlined below:          Constitutional:  No acute distress, cooperative, pleasant    ENT:  Oral mucosa moist, oropharynx benign. Resp:  CTA bilaterally. No wheezing/rhonchi/rales. No accessory muscle use. CV:  Regular rhythm, normal rate, no murmurs, gallops, rubs    GI:  Soft, non distended, non tender. normoactive bowel sounds, no hepatosplenomegaly     Musculoskeletal:  No edema, warm, 2+ pulses throughout    Neurologic:  Moves all extremities. Awake, cooperative; pleasantly confused; Data Review:    Review and/or order of clinical lab test  Review and/or order of tests in the radiology section of CPT  Review and/or order of tests in the medicine section of CPT      Labs:     Recent Labs     11/23/22 0049 11/22/22  1337   WBC 6.8 7.6   HGB 12.6 12.5   HCT 36.7 36.7    194       Recent Labs     11/23/22 0049 11/22/22  1337    137   K 3.7 3.2*   * 105   CO2 25 26   BUN 13 17   CREA 0.94 1.24   * 175*   CA 8.6 8.2*   MG 2.3 2.3   PHOS 3.6 4.0       No results for input(s): ALT, AP, TBIL, TBILI, TP, ALB, GLOB, GGT, AML, LPSE in the last 72 hours. No lab exists for component: SGOT, GPT, AMYP, HLPSE    No results for input(s): INR, PTP, APTT, INREXT, INREXT in the last 72 hours. No results for input(s): FE, TIBC, PSAT, FERR in the last 72 hours. Lab Results   Component Value Date/Time    Folate 14.3 11/08/2022 05:44 AM        No results for input(s): PH, PCO2, PO2 in the last 72 hours. No results for input(s): CPK, CKNDX, TROIQ in the last 72 hours.     No lab exists for component: CPKMB    Lab Results   Component Value Date/Time    Cholesterol, total 95 09/19/2022 12:13 PM    HDL Cholesterol 48 09/19/2022 12:13 PM    LDL, calculated 28.4 09/19/2022 12:13 PM    Triglyceride 93 09/19/2022 12:13 PM    CHOL/HDL Ratio 2.0 09/19/2022 12:13 PM     Lab Results   Component Value Date/Time    Glucose (POC) 141 (H) 11/17/2022 04:57 PM    Glucose (POC) 166 (H) 11/17/2022 11:52 AM    Glucose (POC) 110 (H) 07/02/2019 07:41 PM    Glucose (POC) 68 07/02/2019 05:51 PM     Lab Results   Component Value Date/Time    Color YELLOW/STRAW 11/06/2022 07:15 PM    Appearance CLEAR 11/06/2022 07:15 PM    Specific gravity 1.010 11/06/2022 07:15 PM    Specific gravity 1.006 10/26/2022 06:25 PM    pH (UA) 7.0 11/06/2022 07:15 PM    Protein Negative 11/06/2022 07:15 PM    Glucose Negative 11/06/2022 07:15 PM    Ketone Negative 11/06/2022 07:15 PM    Bilirubin Negative 11/06/2022 07:15 PM    Urobilinogen 0.2 11/06/2022 07:15 PM    Nitrites Negative 11/06/2022 07:15 PM    Leukocyte Esterase Negative 11/06/2022 07:15 PM    Epithelial cells FEW 11/06/2022 07:15 PM    Bacteria Negative 11/06/2022 07:15 PM    WBC 0-4 11/06/2022 07:15 PM    RBC 10-20 11/06/2022 07:15 PM         Medications Reviewed:     Current Facility-Administered Medications   Medication Dose Route Frequency    guaiFENesin-dextromethorphan (ROBITUSSIN DM) 100-10 mg/5 mL syrup 5 mL  5 mL Oral Q6H PRN    fludrocortisone (FLORINEF) tablet 0.05 mg  0.05 mg Oral DAILY    midodrine (PROAMATINE) tablet 10 mg  10 mg Oral ACB    midodrine (PROAMATINE) tablet 10 mg  10 mg Oral ACL    midodrine (PROAMATINE) tablet 5 mg  5 mg Oral PCL    midodrine (PROAMATINE) tablet 5 mg  5 mg Oral ACD    melatonin tablet 3 mg  3 mg Oral QHS PRN    finasteride (PROSCAR) tablet 5 mg  5 mg Oral QHS    aspirin chewable tablet 81 mg  81 mg Oral DAILY    atorvastatin (LIPITOR) tablet 40 mg  40 mg Oral DAILY    DULoxetine (CYMBALTA) capsule 60 mg  60 mg Oral DAILY    sodium chloride (NS) flush 5-40 mL  5-40 mL IntraVENous Q8H    sodium chloride (NS) flush 5-40 mL  5-40 mL IntraVENous PRN    acetaminophen (TYLENOL) tablet 650 mg  650 mg Oral Q4H PRN    heparin (porcine) injection 5,000 Units  5,000 Units SubCUTAneous Q8H     ______________________________________________________________________  EXPECTED LENGTH OF STAY: 2d 7h  ACTUAL LENGTH OF STAY:          8 Annette Teague MD

## 2022-11-24 NOTE — PROGRESS NOTES
No labs this AM  BPs stable overnight and this AM  Will check back in AM  Call with any issues          Hi Alcantara MD  [facility]   [address] [facility] [address]  Phone - [phone]   Fax - [phone]  [email or web address]

## 2022-11-24 NOTE — PROGRESS NOTES
Pt is laying in bed with even and unlabored respirations on room air. No complaints of pain at this time. No distress noted. Awaiting placement. Pt was orthostatic during the shift and INDIRA Doe was made aware. Pt was started on new dose of midodrine and renee hose. Safety precautions are in place. Bed in low position and locked. Call bell within reach. All documentation performed by Matteo Rivera RN was reviewed by Shon Michaud. Problem: Falls - Risk of  Goal: *Absence of Falls  Description: Document Sandi Marking Fall Risk and appropriate interventions in the flowsheet.   Outcome: Progressing Towards Goal  Note: Fall Risk Interventions:  Mobility Interventions: Bed/chair exit alarm, Utilize walker, cane, or other assistive device, Utilize gait belt for transfers/ambulation, Patient to call before getting OOB    Mentation Interventions: Adequate sleep, hydration, pain control, Bed/chair exit alarm, Door open when patient unattended, Update white board    Medication Interventions: Patient to call before getting OOB, Teach patient to arise slowly, Bed/chair exit alarm    Elimination Interventions: Bed/chair exit alarm, Call light in reach, Patient to call for help with toileting needs, Urinal in reach    History of Falls Interventions: Bed/chair exit alarm, Door open when patient unattended, Room close to nurse's station         Problem: Patient Education: Go to Patient Education Activity  Goal: Patient/Family Education  Outcome: Progressing Towards Goal     Problem: Altered Thought Process (Adult/Pediatric)  Goal: *STG: Participates in treatment plan  Outcome: Progressing Towards Goal  Goal: *STG: Remains safe in hospital  Outcome: Progressing Towards Goal  Goal: *STG: Seeks staff when feelings of anxiety and fear arise  Outcome: Progressing Towards Goal  Goal: *STG: Complies with medication therapy  Outcome: Progressing Towards Goal  Goal: *STG: Attends activities and groups  Outcome: Progressing Towards Goal  Goal: *STG: Decreased delusional thinking  Outcome: Progressing Towards Goal  Goal: *STG: Decreased hallucinations  Outcome: Progressing Towards Goal  Goal: *STG: Absence of lethality  Outcome: Progressing Towards Goal  Goal: *STG: Demonstrates ability to understand and use improved judgment in daily activities and relationships  Outcome: Progressing Towards Goal  Goal: *LTG: Returns to baseline functioning  Outcome: Progressing Towards Goal  Goal: Interventions  Outcome: Progressing Towards Goal     Problem: Patient Education: Go to Patient Education Activity  Goal: Patient/Family Education  Outcome: Progressing Towards Goal     Problem: Pressure Injury - Risk of  Goal: *Prevention of pressure injury  Description: Document Kane Scale and appropriate interventions in the flowsheet.   Outcome: Progressing Towards Goal  Note: Pressure Injury Interventions:  Sensory Interventions: Assess changes in LOC, Keep linens dry and wrinkle-free, Minimize linen layers    Moisture Interventions: Absorbent underpads, Limit adult briefs, Minimize layers    Activity Interventions: Pressure redistribution bed/mattress(bed type), Increase time out of bed    Mobility Interventions: HOB 30 degrees or less, Float heels, Pressure redistribution bed/mattress (bed type)    Nutrition Interventions: Document food/fluid/supplement intake, Offer support with meals,snacks and hydration                     Problem: Patient Education: Go to Patient Education Activity  Goal: Patient/Family Education  Outcome: Progressing Towards Goal     Problem: Patient Education: Go to Patient Education Activity  Goal: Patient/Family Education  Outcome: Progressing Towards Goal     Problem: Patient Education: Go to Patient Education Activity  Goal: Patient/Family Education  Outcome: Progressing Towards Goal

## 2022-11-24 NOTE — PROGRESS NOTES
Patient resting in bed. No complaints of pain. Able to void in urinal with assistance of RN. Bed alarm on, patient has poor safety awareness. Nichole Scherer RN    Problem: Falls - Risk of  Goal: *Absence of Falls  Description: Document Vernia Colder Fall Risk and appropriate interventions in the flowsheet. Outcome: Progressing Towards Goal  Note: Fall Risk Interventions:  Mobility Interventions: Bed/chair exit alarm    Mentation Interventions: Adequate sleep, hydration, pain control, Evaluate medications/consider consulting pharmacy, Door open when patient unattended    Medication Interventions: Assess postural VS orthostatic hypotension, Evaluate medications/consider consulting pharmacy, Bed/chair exit alarm    Elimination Interventions: Urinal in reach, Bed/chair exit alarm, Patient to call for help with toileting needs    History of Falls Interventions: Door open when patient unattended, Bed/chair exit alarm, Room close to nurse's station, Evaluate medications/consider consulting pharmacy         Problem: Pressure Injury - Risk of  Goal: *Prevention of pressure injury  Description: Document Kane Scale and appropriate interventions in the flowsheet.   Outcome: Progressing Towards Goal  Note: Pressure Injury Interventions:  Sensory Interventions: Assess changes in LOC, Maintain/enhance activity level, Discuss PT/OT consult with provider    Moisture Interventions: Absorbent underpads, Apply protective barrier, creams and emollients    Activity Interventions: PT/OT evaluation, Pressure redistribution bed/mattress(bed type)    Mobility Interventions: PT/OT evaluation, Pressure redistribution bed/mattress (bed type)    Nutrition Interventions: Document food/fluid/supplement intake, Discuss nutritional consult with provider, Offer support with meals,snacks and hydration         Problem: Patient Education: Go to Patient Education Activity  Goal: Patient/Family Education  Outcome: Progressing Towards Goal     Problem: Patient Education: Go to Patient Education Activity  Goal: Patient/Family Education  Outcome: Progressing Towards Goal

## 2022-11-25 LAB
ANION GAP SERPL CALC-SCNC: 8 MMOL/L (ref 5–15)
BUN SERPL-MCNC: 19 MG/DL (ref 6–20)
BUN/CREAT SERPL: 17 (ref 12–20)
CALCIUM SERPL-MCNC: 8.5 MG/DL (ref 8.5–10.1)
CHLORIDE SERPL-SCNC: 111 MMOL/L (ref 97–108)
CO2 SERPL-SCNC: 24 MMOL/L (ref 21–32)
COMMENT, HOLDF: NORMAL
CREAT SERPL-MCNC: 1.09 MG/DL (ref 0.7–1.3)
GLUCOSE SERPL-MCNC: 118 MG/DL (ref 65–100)
MAGNESIUM SERPL-MCNC: 2.4 MG/DL (ref 1.6–2.4)
POTASSIUM SERPL-SCNC: 3.7 MMOL/L (ref 3.5–5.1)
SAMPLES BEING HELD,HOLD: NORMAL
SODIUM SERPL-SCNC: 143 MMOL/L (ref 136–145)

## 2022-11-25 PROCEDURE — 92610 EVALUATE SWALLOWING FUNCTION: CPT

## 2022-11-25 PROCEDURE — 97535 SELF CARE MNGMENT TRAINING: CPT

## 2022-11-25 PROCEDURE — 97530 THERAPEUTIC ACTIVITIES: CPT

## 2022-11-25 PROCEDURE — 65270000029 HC RM PRIVATE

## 2022-11-25 PROCEDURE — 74011250636 HC RX REV CODE- 250/636: Performed by: FAMILY MEDICINE

## 2022-11-25 PROCEDURE — 74011000250 HC RX REV CODE- 250: Performed by: FAMILY MEDICINE

## 2022-11-25 PROCEDURE — 83735 ASSAY OF MAGNESIUM: CPT

## 2022-11-25 PROCEDURE — 74011250637 HC RX REV CODE- 250/637: Performed by: INTERNAL MEDICINE

## 2022-11-25 PROCEDURE — 80048 BASIC METABOLIC PNL TOTAL CA: CPT

## 2022-11-25 PROCEDURE — 36415 COLL VENOUS BLD VENIPUNCTURE: CPT

## 2022-11-25 PROCEDURE — 74011250637 HC RX REV CODE- 250/637: Performed by: FAMILY MEDICINE

## 2022-11-25 RX ADMIN — FLUDROCORTISONE ACETATE 0.05 MG: 0.1 TABLET ORAL at 08:01

## 2022-11-25 RX ADMIN — DULOXETINE HYDROCHLORIDE 60 MG: 60 CAPSULE, DELAYED RELEASE ORAL at 08:01

## 2022-11-25 RX ADMIN — MIDODRINE HYDROCHLORIDE 10 MG: 5 TABLET ORAL at 10:30

## 2022-11-25 RX ADMIN — HEPARIN SODIUM 5000 UNITS: 5000 INJECTION INTRAVENOUS; SUBCUTANEOUS at 10:27

## 2022-11-25 RX ADMIN — HEPARIN SODIUM 5000 UNITS: 5000 INJECTION INTRAVENOUS; SUBCUTANEOUS at 17:19

## 2022-11-25 RX ADMIN — MIDODRINE HYDROCHLORIDE 10 MG: 5 TABLET ORAL at 07:31

## 2022-11-25 RX ADMIN — ATORVASTATIN CALCIUM 40 MG: 40 TABLET, FILM COATED ORAL at 08:01

## 2022-11-25 RX ADMIN — HEPARIN SODIUM 5000 UNITS: 5000 INJECTION INTRAVENOUS; SUBCUTANEOUS at 02:45

## 2022-11-25 RX ADMIN — SODIUM CHLORIDE, PRESERVATIVE FREE 10 ML: 5 INJECTION INTRAVENOUS at 07:23

## 2022-11-25 RX ADMIN — MIDODRINE HYDROCHLORIDE 5 MG: 5 TABLET ORAL at 17:19

## 2022-11-25 RX ADMIN — MIDODRINE HYDROCHLORIDE 5 MG: 5 TABLET ORAL at 12:50

## 2022-11-25 RX ADMIN — SODIUM CHLORIDE, PRESERVATIVE FREE 10 ML: 5 INJECTION INTRAVENOUS at 22:00

## 2022-11-25 RX ADMIN — ASPIRIN 81 MG 81 MG: 81 TABLET ORAL at 08:01

## 2022-11-25 RX ADMIN — FINASTERIDE 5 MG: 5 TABLET, FILM COATED ORAL at 22:00

## 2022-11-25 RX ADMIN — SODIUM CHLORIDE, PRESERVATIVE FREE 10 ML: 5 INJECTION INTRAVENOUS at 17:19

## 2022-11-25 NOTE — PROGRESS NOTES
Renal function stable  Continue midodrine and florinef as ordered  Signing off  Call us back with any issues          Nena Tomas MD  [facility]   [address] [facility] [address]  Phone - [phone]   Fax - [phone]  [email or web address]

## 2022-11-25 NOTE — PROGRESS NOTES
[address] [facility] Adult  Hospitalist Group                                                                                          Hospitalist Progress Note  Jose Guadalupe Pathak MD  Answering service: [phone] or [phone] from in house phone        Date of Service:  2022  NAME:  Aaron Duran  :  1945  MRN:  [id number]      Admission Summary:   Aaron Duran is a 69 y.o. male who presents with syncope. Patient presents to the ER at [facility] with an episode of syncope versus seizure, patient has a history of orthostatic hypotension, woke up at 3 AM this morning and became confused, and collapsed within the toilet and the wall, patient reports that after a while he called his wife, and was brought to the hospital, not sure whether he had a seizure or syncope, was requested to be admitted to the hospitalist service, currently patient denies any complaints or problems    The patient denies any headache, blurry vision, sore throat, trouble swallowing, trouble with speech, chest pain, SOB, cough, fever, chills, N/V/D, abd pain, urinary symptoms, constipation, recent travels, sick contacts, focal or generalized neurological symptoms,  rashes, contact with COVID-19 diagnosed patients, hematemesis, melena, hemoptysis, hematuria, rashes, denies starting any new medications and denies any other concerns or problems besides as mentioned above. Interval history / Subjective:   He denies pain, dyspnea, n/v/d, dizziness. Did well yesterday with new med adjustments. Assessment & Plan:        Orthostatic hypotension w/ syncope:  - episodes of rigidity, but no history suggestive of postictal state;   - Neurology consulted;   - based on chart review, the patient has no history of CVA, and no history of seizures.   - per wife, he recently had an MRI of the brain  - EEG : no epileptiform discharges;   - evaluated by Neurology, all symptoms of confusion are being attributed to dementia    Severe autonomic dysregulation:  - on Midodrine; dose adjusted per cardiology  - low-dose florinef resumed  - recommend wheelchair for safety and a bedside commode;   - patient is at high risk of recurrent falls and injury due to Orthostatic hypotension and impulsivity   - per Neurology, the safest for him might be to stay at a wheelchair bound level of functioning/activity  - monitor BPs today with med changes from 11/24; I think this is the best we'll be able to get him to here. He should go to rehab and follow-up as an outpatient. There will be ups and downs with orthostasis and hence the importance of rehab and close monitoring. PTSD:  - consulted Psychiatry;   - continue home medications as prior to admission;     BPH:  - on Proscar, changed administration to evening time     Dyslipidemia:  - on statin    Hyperglycemia, without history of DM:  - HgA1c 5.9 in 09/22; Code status:  Full  Prophylaxis:  B SCD's  Care Plan discussed with: patient's wife, CM;   Anticipated Disposition: SNF           Hospital Problems  Date Reviewed: 9/19/2022            Codes Class Noted POA    Syncope ICD-10-CM: R55  ICD-9-CM: 780.2  11/16/2022 Unknown       Review of Systems:   A comprehensive review of systems was negative except for that written in the HPI. Vital Signs:    Last 24hrs VS reviewed since prior progress note.  Most recent are:  Visit Vitals  BP (!) 88/58 (BP Patient Position: Standing)   Pulse 89   Temp 97.9 °F (36.6 °C)   Resp 17   Ht 5' 11\" (1.803 m)   Wt 77.6 kg (171 lb)   SpO2 94%   BMI 23.85 kg/m²         Intake/Output Summary (Last 24 hours) at 11/25/2022 1322  Last data filed at 11/24/2022 1334  Gross per 24 hour   Intake --   Output 150 ml   Net -150 ml          Physical Examination:     I had a face to face encounter with this patient and independently examined them on 11/25/2022 as outlined below:          Constitutional:  No acute distress, cooperative, pleasant    ENT:  Oral mucosa moist, oropharynx benign. Resp:  CTA bilaterally. No wheezing/rhonchi/rales. No accessory muscle use. CV:  Regular rhythm, normal rate, no murmurs, gallops, rubs    GI:  Soft, non distended, non tender. normoactive bowel sounds, no hepatosplenomegaly     Musculoskeletal:  No edema, warm, 2+ pulses throughout    Neurologic:  Moves all extremities. Awake, cooperative; pleasantly confused; Data Review:    Review and/or order of clinical lab test  Review and/or order of tests in the radiology section of CPT  Review and/or order of tests in the medicine section of [facility]      Labs:     Recent Labs     11/23/22  0049 11/22/22  1337   WBC 6.8 7.6   HGB 12.6 12.5   HCT 36.7 36.7    194       Recent Labs     11/25/22  0234 11/23/22  0049 11/22/22  1337    143 137   K 3.7 3.7 3.2*   * 110* 105   CO2 24 25 26   BUN 19 13 17   CREA 1.09 0.94 1.24   * 114* 175*   CA 8.5 8.6 8.2*   MG 2.4 2.3 2.3   PHOS  --  3.6 4.0       No results for input(s): ALT, AP, TBIL, TBILI, TP, ALB, GLOB, GGT, AML, LPSE in the last 72 hours. No lab exists for component: SGOT, GPT, AMYP, HLPSE    No results for input(s): INR, PTP, APTT, INREXT, INREXT in the last 72 hours. No results for input(s): FE, TIBC, PSAT, FERR in the last 72 hours. Lab Results   Component Value Date/Time    Folate 14.3 11/08/2022 05:44 AM        No results for input(s): PH, PCO2, PO2 in the last 72 hours. No results for input(s): CPK, CKNDX, TROIQ in the last 72 hours.     No lab exists for component: CPKMB    Lab Results   Component Value Date/Time    Cholesterol, total 95 09/19/2022 12:13 PM    HDL Cholesterol 48 09/19/2022 12:13 PM    LDL, calculated 28.4 09/19/2022 12:13 PM    Triglyceride 93 09/19/2022 12:13 PM    CHOL/HDL Ratio 2.0 09/19/2022 12:13 PM     Lab Results   Component Value Date/Time    Glucose (POC) 141 (H) 11/17/2022 04:57 PM    Glucose (POC) 166 (H) 11/17/2022 11:52 AM    Glucose (POC) 110 (H) 07/02/2019 07:41 PM    Glucose (POC) 68 07/02/2019 05:51 PM     Lab Results   Component Value Date/Time    Color YELLOW/STRAW 11/06/2022 07:15 PM    Appearance CLEAR 11/06/2022 07:15 PM    Specific gravity 1.010 11/06/2022 07:15 PM    Specific gravity 1.006 10/26/2022 06:25 PM    pH (UA) 7.0 11/06/2022 07:15 PM    Protein Negative 11/06/2022 07:15 PM    Glucose Negative 11/06/2022 07:15 PM    Ketone Negative 11/06/2022 07:15 PM    Bilirubin Negative 11/06/2022 07:15 PM    Urobilinogen 0.2 11/06/2022 07:15 PM    Nitrites Negative 11/06/2022 07:15 PM    Leukocyte Esterase Negative 11/06/2022 07:15 PM    Epithelial cells FEW 11/06/2022 07:15 PM    Bacteria Negative 11/06/2022 07:15 PM    WBC 0-4 11/06/2022 07:15 PM    RBC 10-20 11/06/2022 07:15 PM         Medications Reviewed:     Current Facility-Administered Medications   Medication Dose Route Frequency    guaiFENesin-dextromethorphan (ROBITUSSIN DM) 100-10 mg/5 mL syrup 5 mL  5 mL Oral Q6H PRN    fludrocortisone (FLORINEF) tablet 0.05 mg  0.05 mg Oral DAILY    midodrine (PROAMATINE) tablet 10 mg  10 mg Oral ACB    midodrine (PROAMATINE) tablet 10 mg  10 mg Oral ACL    midodrine (PROAMATINE) tablet 5 mg  5 mg Oral PCL    midodrine (PROAMATINE) tablet 5 mg  5 mg Oral ACD    melatonin tablet 3 mg  3 mg Oral QHS PRN    finasteride (PROSCAR) tablet 5 mg  5 mg Oral QHS    aspirin chewable tablet 81 mg  81 mg Oral DAILY    atorvastatin (LIPITOR) tablet 40 mg  40 mg Oral DAILY    DULoxetine (CYMBALTA) capsule 60 mg  60 mg Oral DAILY    sodium chloride (NS) flush 5-40 mL  5-40 mL IntraVENous Q8H    sodium chloride (NS) flush 5-40 mL  5-40 mL IntraVENous PRN    acetaminophen (TYLENOL) tablet 650 mg  650 mg Oral Q4H PRN    heparin (porcine) injection 5,000 Units  5,000 Units SubCUTAneous Q8H     ______________________________________________________________________  EXPECTED LENGTH OF STAY: 2d 7h  ACTUAL LENGTH OF STAY:          9                 Cortez Jordan MD

## 2022-11-25 NOTE — PROGRESS NOTES
Obtained orthostatic vitals which showed significant drop from lying to sitting to standing (see flowsheets), however patient remained asymptomatic, steady in place, and denied dizziness. He did report feeling \"a little weak\" after laying back down in bed.

## 2022-11-25 NOTE — PROGRESS NOTES
Problem: Self Care Deficits Care Plan (Adult)  Goal: *Acute Goals and Plan of Care (Insert Text)  Description: FUNCTIONAL STATUS PRIOR TO ADMISSION: Immediately PTA patient was modified independent using a rolling walker for functional mobility. At his true baseline pt was independent and active without use of DME.     HOME SUPPORT: At pt's baseline he lived with his wife but did not require assist for basic ADL tasks. Occupational Therapy Goals  Initiated 11/20/2022  1. Patient will perform standing grooming routine with supervision/set-up within 7 day(s). 2.  Patient will perform upper and lower body bathing with supervision/set-up within 7 day(s). 3.  Patient will perform upper and lower body dressing with supervision/set-up within 7 day(s). 4.  Patient will perform toilet transfers with supervision/set-up within 7 day(s). 5.  Patient will perform all aspects of toileting with supervision/set-up within 7 day(s). Outcome: Progressing Towards Goal   OCCUPATIONAL THERAPY TREATMENT  Patient: Aaron Duran (89 y.o. male)  Date: 11/25/2022  Diagnosis: Syncope [R55] <principal problem not specified>    Precautions: Fall  Chart, occupational therapy assessment, plan of care, and goals were reviewed. ASSESSMENT  Patient continues with skilled OT services and is progressing towards goals. Patient received standing in room with PCT attempting to ambulate to restroom without RW or nonstick socks to restroom, received MCC to bathroom and assisted patient to toilet with RW the remainder of the way. With Orthostatic vitals check patient low but remained stable for 10 minutes of standing toileting and further grooming at sink as documented below. Patient with new complaint of \"soft voice\" which he states he has been trying to elevate and is unable as well as more difficulty clearing/swallowing food per patient and wife at bedside report.  Noted patient also with SOB and wheezing upon return to bed, pulse ox not reading properly initially but reading with dynamap indicating 88% on RA after ~1 minute seated rest break. Patient setup in chair with BP back up and O2 up to 91% on RA. Wife with copious amount of questions surrounding rehab placement and patient activity tolerance and concerns for swallowing. Spoke with RN and SLP consulted and in room to assess at end of session. Continue to recommend further rehab inpatient upon medically stable discharge. Vitals:    11/25/22 1342 11/25/22 1350 11/25/22 1359 11/25/22 1420   BP: (!) 90/57 (!) 86/56 108/70 (!) 156/76   BP 1 Location: Left upper arm Left upper arm Left upper arm Left upper arm   BP Patient Position: Standing Standing Sitting Comment: reclined in bedside recliner   SpO2:  (!) 88% (!) 88% (!) 89%        Current Level of Function Impacting Discharge (ADLs): CGA all mobility/ transfers    Other factors to consider for discharge: PLOF, highly motivated         PLAN :  Patient continues to benefit from skilled intervention to address the above impairments. Continue treatment per established plan of care to address goals. Recommend with staff: OOB to recliner as tolerated. OOB to BSC/bathroom with assist x1 and RW for safety     Recommend next OT session: Continue to progress OOB activity tolerance    Recommendation for discharge: (in order for the patient to meet his/her long term goals)  Therapy up to 5 days/week in SNF setting    This discharge recommendation:  Has been made in collaboration with the attending provider and/or case management    IF patient discharges home will need the following DME: TBD       SUBJECTIVE:   Patient stated I've been having a hard time with my throat: I cant swallow all my food and I try to make my voice loud and it's still so quiet.  re: how he has been feeling today    OBJECTIVE DATA SUMMARY:   Cognitive/Behavioral Status:  Neurologic State: Alert  Orientation Level: Oriented X4  Cognition: Appropriate for age attention/concentration; Follows commands; Impulsive  Perception: Appears intact  Perseveration: No perseveration noted  Safety/Judgement: Decreased awareness of need for assistance;Decreased awareness of need for safety; Awareness of environment;Decreased insight into deficits    Functional Mobility and Transfers for ADLs:  Bed Mobility:  Rolling: Supervision  Supine to Sit: Supervision  Sit to Supine: Supervision  Scooting: Supervision    Transfers:  Sit to Stand: Contact guard assistance  Functional Transfers  Bathroom Mobility: Contact guard assistance  Bed to Chair: Contact guard assistance    Balance:  Sitting: Intact  Sitting - Static: Good (unsupported)  Sitting - Dynamic: Good (unsupported)  Standing: Impaired  Standing - Static: Fair;Occasional  Standing - Dynamic : Fair;Constant support (HHA)    ADL Intervention:    Grooming  Grooming Assistance: Set-up; Contact guard assistance  Position Performed: Standing  Washing Face: Set-up; Contact guard assistance  Washing Hands: Set-up; Contact guard assistance  Brushing Teeth: Set-up; Contact guard assistance  Shaving: Set-up; Contact guard assistance    Upper Body Dressing Assistance  Dressing Assistance: Stand-by assistance  Pullover Shirt: Stand-by assistance    Lower Body Dressing Assistance  Underpants: Stand-by assistance  Pants With Elastic Waist: Stand-by assistance  Position Performed: Seated edge of bed    Toileting  Toileting Assistance: Contact guard assistance  Bladder Hygiene: Contact guard assistance  Clothing Management: Contact guard assistance    Cognitive Retraining  Safety/Judgement: Decreased awareness of need for assistance;Decreased awareness of need for safety; Awareness of environment;Decreased insight into deficits    Pain:  C/o shortness of breath and difficulty with speech/swallowing    Activity Tolerance:   Fair, Poor, desaturates with exertion and requires oxygen, requires rest breaks, observed SOB with activity, and signs and symptoms of orthostatic hypotension    After treatment patient left in no apparent distress:   Sitting in chair, Call bell within reach, Bed / chair alarm activated, and Caregiver / family present    COMMUNICATION/COLLABORATION:   The patients plan of care was discussed with: Registered nurse and Case management.      Irvin Montanez OT  Time Calculation: 54 mins

## 2022-11-25 NOTE — PROGRESS NOTES
Palliative Medicine  [place]: [phone])  [facility]: [phone])    Palliative Medicine SW spoke with Shay Faust NP with Palliative Medicine- some caregiver burden noted, falls at home, noted Case Management working with family on discharge planning. Palliative Medicine SW will plan to follow-up with spouse on Monday if patient still admitted for support and additional resources.      Thank you for including Palliative Medicine in the care of [address] [place] [address]  [phone]
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
## 2022-11-25 NOTE — PROGRESS NOTES
SPEECH PATHOLOGY BEDSIDE SWALLOW EVALUATION/DISCHARGE  Patient: Taylor Marcum (45 y.o. male)  Date: 11/25/2022  Primary Diagnosis: Syncope [R55]       Precautions:   Fall    ASSESSMENT :  Based on the objective data described below, the patient presents with functional oropharyngeal phases of swallow at this time with no overt difficulty nor overt s/s of aspiration. Pt reportedly with some difficulty swallowing solids at times stating that it can feel stuck, but pt did not endorse this to SLP. No risk factors for an oropharyngeal dysphagia at this time. If pt endorses this again, could consider a GI consult given history of alcohol use disorder which can impact esophageal phases of swallow) and difficulty with solids and not liquids. Additionally, pt reportedly has a quieter voice. Unclear etiology, but could certainly follow up with outpatient ENT. Skilled acute therapy provided by a speech-language pathologist is not indicated at this time. PLAN :  Recommendations:  -- regular diet/ thin liquids  -- general aspiration precautions including completely upright for all PO   -- SLP will sign off     Discharge Recommendations: None     SUBJECTIVE:   Patient stated, \"I sure don't think I do when asked if he had trouble swallowing.     OBJECTIVE:     Past Medical History:   Diagnosis Date    Altered mental status, unspecified 11/9/2018    Anxiety     Ataxia 11/9/2018    Back pain     Blurred vision     Bradycardia 10/2/2018    Chest pain     Depression     Dizziness     Dizziness and giddiness 10/17/2019    Dysautonomia ([address]     Fast heart beat     Fever 11/9/2018    Frequent headaches     Frequent urination     Hip dysplasia, congenital     Hx of syncope 10/24/2018    Hypotension     Ingrown left big toenail 3/31/2017    Joint pain     Joint swelling     Lumbar spinal stenosis     Memory deficit 9/11/2018    Muscle pain     Neuropathy     Orthostatic hypotension     asymptomatic    Pacemaker     new pacemaker July 2019    Recent change in weight     Risk for falls 12/13/2016    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 11/8/2018    Syncope     Tiredness     Trouble in sleeping     Weakness      Past Surgical History:   Procedure Laterality Date    HX HIP REPLACEMENT Bilateral 1993    HX PACEMAKER PLACEMENT  10/16/2018    HX ROTATOR CUFF REPAIR Right 11/16/2017    [place]      [place] NEW/RPLCMT PRM PM W/TRANSV ELTRD ATRIAL&VENT N/A 7/30/2019    INSERT PPM DUAL performed by Zonia Willingham MD at [facility] CARDIAC C[facility] LAB     Prior Level of Function/Home Situation:   Home Situation  Home Environment: Private residence  Wheelchair Ramp: Yes  One/Two Story Residence: One story  Living Alone: No  Support Systems: Spouse/Significant Other  Patient Expects to be Discharged to[de-identified] Other:  Current DME Used/Available at Home: Walker, rolling, Wheelchair (transport chair)  Tub or Shower Type: Shower  Diet prior to admission: regular diet/thin liquids  Current Diet:  regular diet/thin liquids   Cognitive and Communication Status:  Neurologic State: Alert  Orientation Level: Oriented X4  Cognition: Appropriate for age attention/concentration, Follows commands, Impulsive  Perception: Appears intact  Perseveration: No perseveration noted  Safety/Judgement: Decreased awareness of need for assistance, Decreased awareness of need for safety, Awareness of environment, Decreased insight into deficits  Oral Assessment:  Oral Assessment  Labial: No impairment  Dentition: Natural  Oral Hygiene: oral mucosa moist and clear of secretions  Lingual: No impairment  Velum: No impairment  Mandible: No impairment  P.O. Trials:  Patient Position: upright in chair  Vocal quality prior to P.O.:  (soft spoken)  Consistency Presented:  Thin liquid; Solid  How Presented: Self-fed/presented;Cup/sip;Straw;Successive swallows     Bolus Acceptance: No impairment  Bolus Formation/Control: No impairment     Propulsion: No impairment  Oral Residue: None  Initiation of Swallow: No impairment  Laryngeal Elevation: Functional  Aspiration Signs/Symptoms: None  Pharyngeal Phase Characteristics: No impairment, issues, or problems              Oral Phase Severity: No impairment  Pharyngeal Phase Severity : No impairment  NOMS:   The NOMS functional outcome measure was used to quantify this patient's level of swallowing impairment. Based on the NOMS, the patient was determined to be at level 7 for swallow function     NOMS Swallowing Levels:  Level 1 (CN): NPO  Level 2 (CM): NPO but takes consistency in therapy  Level 3 (CL): Takes less than 50% of nutrition p.o. and continues with nonoral feedings; and/or safe with mod cues; and/or max diet restriction  Level 4 (CK): Safe swallow but needs mod cues; and/or mod diet restriction; and/or still requires some nonoral feeding/supplements  Level 5 (CJ): Safe swallow with min diet restriction; and/or needs min cues  Level 6 (CI): Independent with p.o.; rare cues; usually self cues; may need to avoid some foods or needs extra time  Level 7 ([facility]): Independent for all p.o.  YUKO. (2003). National Outcomes Measurement System (NOMS): Adult Speech-Language Pathology User's Guide. Pain:  Pain Scale 1: Visual  Pain Intensity 1: 0     After treatment:   Call bell within reach and Nursing notified    COMMUNICATION/EDUCATION:     The patient's plan of care including recommendations, planned interventions, and recommended diet changes were discussed with: Occupational therapist and Registered nurse.      Thank you for this referral.  Yessenia Guzmán, SLP  Time Calculation: 10 mins

## 2022-11-25 NOTE — PROGRESS NOTES
Patient confused and anxious throughout the night, reoriented repeatedly by RN. Safety precautions in place. Bed alarm on. Problem: Falls - Risk of  Goal: *Absence of Falls  Description: Document Vicki Barber Fall Risk and appropriate interventions in the flowsheet. Outcome: Progressing Towards Goal  Note: Fall Risk Interventions:  Mobility Interventions: Bed/chair exit alarm, Communicate number of staff needed for ambulation/transfer, PT Consult for mobility concerns, Utilize walker, cane, or other assistive device    Mentation Interventions: Adequate sleep, hydration, pain control, Bed/chair exit alarm, Door open when patient unattended, Eyeglasses and hearing aids, Reorient patient    Medication Interventions: Assess postural VS orthostatic hypotension, Bed/chair exit alarm, Evaluate medications/consider consulting pharmacy, Patient to call before getting OOB    Elimination Interventions: Bed/chair exit alarm, Call light in reach, Urinal in reach    History of Falls Interventions: Bed/chair exit alarm, Door open when patient unattended, Room close to nurse's station      Problem: Pressure Injury - Risk of  Goal: *Prevention of pressure injury  Description: Document Kane Scale and appropriate interventions in the flowsheet.   Outcome: Progressing Towards Goal  Note: Pressure Injury Interventions:  Sensory Interventions: Assess changes in LOC, Discuss PT/OT consult with provider, Keep linens dry and wrinkle-free    Moisture Interventions: Apply protective barrier, creams and emollients, Absorbent underpads, Maintain skin hydration (lotion/cream)    Activity Interventions: Increase time out of bed, PT/OT evaluation    Mobility Interventions: PT/OT evaluation, Pressure redistribution bed/mattress (bed type), Float heels    Nutrition Interventions: Document food/fluid/supplement intake, Discuss nutritional consult with provider, Offer support with meals,snacks and hydration

## 2022-11-26 PROCEDURE — 74011250637 HC RX REV CODE- 250/637: Performed by: HOSPITALIST

## 2022-11-26 PROCEDURE — 74011250637 HC RX REV CODE- 250/637: Performed by: INTERNAL MEDICINE

## 2022-11-26 PROCEDURE — 65270000029 HC RM PRIVATE

## 2022-11-26 PROCEDURE — 94760 N-INVAS EAR/PLS OXIMETRY 1: CPT

## 2022-11-26 PROCEDURE — 74011250637 HC RX REV CODE- 250/637: Performed by: FAMILY MEDICINE

## 2022-11-26 PROCEDURE — 74011000250 HC RX REV CODE- 250: Performed by: FAMILY MEDICINE

## 2022-11-26 PROCEDURE — 74011250636 HC RX REV CODE- 250/636: Performed by: FAMILY MEDICINE

## 2022-11-26 RX ADMIN — GUAIFENESIN AND DEXTROMETHORPHAN 5 ML: 100; 10 SYRUP ORAL at 21:39

## 2022-11-26 RX ADMIN — DULOXETINE HYDROCHLORIDE 60 MG: 60 CAPSULE, DELAYED RELEASE ORAL at 10:10

## 2022-11-26 RX ADMIN — MIDODRINE HYDROCHLORIDE 10 MG: 5 TABLET ORAL at 12:19

## 2022-11-26 RX ADMIN — SODIUM CHLORIDE, PRESERVATIVE FREE 10 ML: 5 INJECTION INTRAVENOUS at 13:49

## 2022-11-26 RX ADMIN — Medication 3 MG: at 21:39

## 2022-11-26 RX ADMIN — FLUDROCORTISONE ACETATE 0.05 MG: 0.1 TABLET ORAL at 10:10

## 2022-11-26 RX ADMIN — MIDODRINE HYDROCHLORIDE 10 MG: 5 TABLET ORAL at 07:30

## 2022-11-26 RX ADMIN — MIDODRINE HYDROCHLORIDE 5 MG: 5 TABLET ORAL at 16:30

## 2022-11-26 RX ADMIN — HEPARIN SODIUM 5000 UNITS: 5000 INJECTION INTRAVENOUS; SUBCUTANEOUS at 02:02

## 2022-11-26 RX ADMIN — FINASTERIDE 5 MG: 5 TABLET, FILM COATED ORAL at 21:39

## 2022-11-26 RX ADMIN — HEPARIN SODIUM 5000 UNITS: 5000 INJECTION INTRAVENOUS; SUBCUTANEOUS at 10:10

## 2022-11-26 RX ADMIN — SODIUM CHLORIDE, PRESERVATIVE FREE 10 ML: 5 INJECTION INTRAVENOUS at 21:39

## 2022-11-26 RX ADMIN — GUAIFENESIN AND DEXTROMETHORPHAN 5 ML: 100; 10 SYRUP ORAL at 12:21

## 2022-11-26 RX ADMIN — ASPIRIN 81 MG 81 MG: 81 TABLET ORAL at 10:10

## 2022-11-26 RX ADMIN — ATORVASTATIN CALCIUM 40 MG: 40 TABLET, FILM COATED ORAL at 10:10

## 2022-11-26 RX ADMIN — SODIUM CHLORIDE, PRESERVATIVE FREE 10 ML: 5 INJECTION INTRAVENOUS at 06:00

## 2022-11-26 RX ADMIN — HEPARIN SODIUM 5000 UNITS: 5000 INJECTION INTRAVENOUS; SUBCUTANEOUS at 18:25

## 2022-11-26 RX ADMIN — ACETAMINOPHEN 650 MG: 325 TABLET ORAL at 13:51

## 2022-11-26 NOTE — PROGRESS NOTES
Patient assisted to the bathroom to urinate, sat on recliner now back to bed. Compression stockings placed.

## 2022-11-26 NOTE — PROGRESS NOTES
Bedside shift change report given to EMERALD's (oncoming nurse) by Triston Bishop RN (offgoing nurse). Report included the following information SBAR, Kardex, Intake/Output, MAR, and Recent Results.

## 2022-11-26 NOTE — PROGRESS NOTES
Problem: Falls - Risk of  Goal: *Absence of Falls  Description: Document Annabelle Law Fall Risk and appropriate interventions in the flowsheet. Outcome: Progressing Towards Goal  Note: Fall Risk Interventions:  Mobility Interventions: Bed/chair exit alarm    Mentation Interventions: Bed/chair exit alarm    Medication Interventions: Assess postural VS orthostatic hypotension, Bed/chair exit alarm, Evaluate medications/consider consulting pharmacy, Patient to call before getting OOB    Elimination Interventions: Call light in reach    History of Falls Interventions:  Investigate reason for fall         Problem: Patient Education: Go to Patient Education Activity  Goal: Patient/Family Education  Outcome: Progressing Towards Goal     Problem: Altered Thought Process (Adult/Pediatric)  Goal: *STG: Participates in treatment plan  Outcome: Progressing Towards Goal  Goal: *STG: Remains safe in hospital  Outcome: Progressing Towards Goal  Goal: *STG: Seeks staff when feelings of anxiety and fear arise  Outcome: Progressing Towards Goal  Goal: *STG: Complies with medication therapy  Outcome: Progressing Towards Goal  Goal: *STG: Attends activities and groups  Outcome: Progressing Towards Goal  Goal: *STG: Decreased delusional thinking  Outcome: Progressing Towards Goal  Goal: *STG: Decreased hallucinations  Outcome: Progressing Towards Goal  Goal: *STG: Absence of lethality  Outcome: Progressing Towards Goal  Goal: *STG: Demonstrates ability to understand and use improved judgment in daily activities and relationships  Outcome: Progressing Towards Goal  Goal: *LTG: Returns to baseline functioning  Outcome: Progressing Towards Goal  Goal: Interventions  Outcome: Progressing Towards Goal     Problem: Patient Education: Go to Patient Education Activity  Goal: Patient/Family Education  Outcome: Progressing Towards Goal     Problem: Pressure Injury - Risk of  Goal: *Prevention of pressure injury  Description: Document Kane Scale and appropriate interventions in the flowsheet.   Outcome: Progressing Towards Goal  Note: Pressure Injury Interventions:  Sensory Interventions: Check visual cues for pain    Moisture Interventions: Absorbent underpads    Activity Interventions: Increase time out of bed    Mobility Interventions: Pressure redistribution bed/mattress (bed type)    Nutrition Interventions: Document food/fluid/supplement intake                     Problem: Patient Education: Go to Patient Education Activity  Goal: Patient/Family Education  Outcome: Progressing Towards Goal     Problem: Patient Education: Go to Patient Education Activity  Goal: Patient/Family Education  Outcome: Progressing Towards Goal     Problem: Patient Education: Go to Patient Education Activity  Goal: Patient/Family Education  Outcome: Progressing Towards Goal

## 2022-11-26 NOTE — PROGRESS NOTES
[facility] Adult  Hospitalist Group                                                                                          Hospitalist Progress Note  Marjorie Sanders MD  Answering service: [phone] or [phone] from in house phone        Date of Service:  2022  NAME:  Brian Batista  :  1945  MRN:  [id number]      Admission Summary:   Brian Batista is a 69 y.o. male who presents with syncope. Patient presents to the ER at [facility] with an episode of syncope versus seizure, patient has a history of orthostatic hypotension, woke up at 3 AM this morning and became confused, and collapsed within the toilet and the wall, patient reports that after a while he called his wife, and was brought to the hospital, not sure whether he had a seizure or syncope, was requested to be admitted to the hospitalist service, currently patient denies any complaints or problems    The patient denies any headache, blurry vision, sore throat, trouble swallowing, trouble with speech, chest pain, SOB, cough, fever, chills, N/V/D, abd pain, urinary symptoms, constipation, recent travels, sick contacts, focal or generalized neurological symptoms,  rashes, contact with COVID-19 diagnosed patients, hematemesis, melena, hemoptysis, hematuria, rashes, denies starting any new medications and denies any other concerns or problems besides as mentioned above. Interval history / Subjective:   He denies pain, dyspnea, n/v/d, dizziness. Up in chair currently. No acute events overnight. Assessment & Plan:        Orthostatic hypotension w/ syncope:  - episodes of rigidity, but no history suggestive of postictal state;   - Neurology consulted;   - based on chart review, the patient has no history of CVA, and no history of seizures.   - per wife, he recently had an MRI of the brain  - EEG : no epileptiform discharges;   - evaluated by Neurology, all symptoms of confusion are being attributed to dementia    Severe autonomic dysregulation:  - on Midodrine; dose adjusted per cardiology  - low-dose florinef resumed  - recommend wheelchair for safety and a bedside commode;   - patient is at high risk of recurrent falls and injury due to Orthostatic hypotension and impulsivity   - per Neurology, the safest for him might be to stay at a wheelchair bound level of functioning/activity  - I think this is the best we'll be able to get him to here. He should go to rehab and follow-up as an outpatient. There will be ups and downs with orthostasis and hence the importance of rehab and close monitoring. PTSD:  - consulted Psychiatry;   - continue home medications as prior to admission;     BPH:  - on Proscar, changed administration to evening time     Dyslipidemia:  - on statin    Hyperglycemia, without history of DM:  - HgA1c 5.9 in 09/22; Code status:  Full  Care Plan discussed with: RN/CM  Anticipated Disposition: SNF           Hospital Problems  Date Reviewed: 9/19/2022            Codes Class Noted POA    Syncope ICD-10-CM: R55  ICD-9-CM: 780.2  11/16/2022 Unknown       Review of Systems:   A comprehensive review of systems was negative except for that written in the HPI. Vital Signs:    Last 24hrs VS reviewed since prior progress note. Most recent are:  Visit Vitals  BP (!) 173/88   Pulse 62   Temp 97.7 °F (36.5 °C)   Resp 17   Ht 5' 11\" (1.803 m)   Wt 77.6 kg (171 lb)   SpO2 95%   BMI 23.85 kg/m²         Intake/Output Summary (Last 24 hours) at 11/26/2022 1356  Last data filed at 11/26/2022 0353  Gross per 24 hour   Intake --   Output 750 ml   Net -750 ml          Physical Examination:     I had a face to face encounter with this patient and independently examined them on 11/26/2022 as outlined below:          Constitutional:  No acute distress, cooperative, pleasant    ENT:  Oral mucosa moist, oropharynx benign. Resp:  CTA bilaterally. No wheezing/rhonchi/rales. No accessory muscle use.     CV: Regular rhythm, normal rate, no murmurs, gallops, rubs    GI:  Soft, non distended, non tender. normoactive bowel sounds, no hepatosplenomegaly     Musculoskeletal:  No edema, warm    Neurologic:  Moves all extremities. Awake, cooperative; pleasantly confused; Data Review:    Review and/or order of clinical lab test  Review and/or order of tests in the radiology section of CPT  Review and/or order of tests in the medicine section of CPT      Labs:     No results for input(s): WBC, HGB, HCT, PLT, HGBEXT, HCTEXT, PLTEXT, HGBEXT, HCTEXT, PLTEXT in the last 72 hours. Recent Labs     11/25/22  0234      K 3.7   *   CO2 24   BUN 19   CREA 1.09   *   CA 8.5   MG 2.4       No results for input(s): ALT, AP, TBIL, TBILI, TP, ALB, GLOB, GGT, AML, LPSE in the last 72 hours. No lab exists for component: SGOT, GPT, AMYP, HLPSE    No results for input(s): INR, PTP, APTT, INREXT, INREXT in the last 72 hours. No results for input(s): FE, TIBC, PSAT, FERR in the last 72 hours. Lab Results   Component Value Date/Time    Folate 14.3 11/08/2022 05:44 AM        No results for input(s): PH, PCO2, PO2 in the last 72 hours. No results for input(s): CPK, CKNDX, TROIQ in the last 72 hours.     No lab exists for component: CPKMB    Lab Results   Component Value Date/Time    Cholesterol, total 95 09/19/2022 12:13 PM    HDL Cholesterol 48 09/19/2022 12:13 PM    LDL, calculated 28.4 09/19/2022 12:13 PM    Triglyceride 93 09/19/2022 12:13 PM    CHOL/HDL Ratio 2.0 09/19/2022 12:13 PM     Lab Results   Component Value Date/Time    Glucose (POC) 141 (H) 11/17/2022 04:57 PM    Glucose (POC) 166 (H) 11/17/2022 11:52 AM    Glucose (POC) 110 (H) 07/02/2019 07:41 PM    Glucose (POC) 68 07/02/2019 05:51 PM     Lab Results   Component Value Date/Time    Color YELLOW/STRAW 11/06/2022 07:15 PM    Appearance CLEAR 11/06/2022 07:15 PM    Specific gravity 1.010 11/06/2022 07:15 PM    Specific gravity 1.006 10/26/2022 06:25 PM    pH (UA) 7.0 11/06/2022 07:15 PM    Protein Negative 11/06/2022 07:15 PM    Glucose Negative 11/06/2022 07:15 PM    Ketone Negative 11/06/2022 07:15 PM    Bilirubin Negative 11/06/2022 07:15 PM    Urobilinogen 0.2 11/06/2022 07:15 PM    Nitrites Negative 11/06/2022 07:15 PM    Leukocyte Esterase Negative 11/06/2022 07:15 PM    Epithelial cells FEW 11/06/2022 07:15 PM    Bacteria Negative 11/06/2022 07:15 PM    WBC 0-4 11/06/2022 07:15 PM    RBC 10-20 11/06/2022 07:15 PM         Medications Reviewed:     Current Facility-Administered Medications   Medication Dose Route Frequency    guaiFENesin-dextromethorphan (ROBITUSSIN DM) 100-10 mg/5 mL syrup 5 mL  5 mL Oral Q6H PRN    fludrocortisone (FLORINEF) tablet 0.05 mg  0.05 mg Oral DAILY    midodrine (PROAMATINE) tablet 10 mg  10 mg Oral ACB    midodrine (PROAMATINE) tablet 10 mg  10 mg Oral ACL    midodrine (PROAMATINE) tablet 5 mg  5 mg Oral PCL    midodrine (PROAMATINE) tablet 5 mg  5 mg Oral ACD    melatonin tablet 3 mg  3 mg Oral QHS PRN    finasteride (PROSCAR) tablet 5 mg  5 mg Oral QHS    aspirin chewable tablet 81 mg  81 mg Oral DAILY    atorvastatin (LIPITOR) tablet 40 mg  40 mg Oral DAILY    DULoxetine (CYMBALTA) capsule 60 mg  60 mg Oral DAILY    sodium chloride (NS) flush 5-40 mL  5-40 mL IntraVENous Q8H    sodium chloride (NS) flush 5-40 mL  5-40 mL IntraVENous PRN    acetaminophen (TYLENOL) tablet 650 mg  650 mg Oral Q4H PRN    heparin (porcine) injection 5,000 Units  5,000 Units SubCUTAneous Q8H     ______________________________________________________________________  EXPECTED LENGTH OF STAY: 2d 7h  ACTUAL LENGTH OF STAY:          10                 Osmany Jesse, MD

## 2022-11-26 NOTE — PROGRESS NOTES
Patient's wife would like to make sure that Attending updates and share with the patient's nephew:     Dr. Akbar Aid  Email: [email or web address]  Phone #: [phone]

## 2022-11-27 PROCEDURE — 74011250637 HC RX REV CODE- 250/637: Performed by: FAMILY MEDICINE

## 2022-11-27 PROCEDURE — 74011250636 HC RX REV CODE- 250/636: Performed by: FAMILY MEDICINE

## 2022-11-27 PROCEDURE — 74011000250 HC RX REV CODE- 250: Performed by: FAMILY MEDICINE

## 2022-11-27 PROCEDURE — 74011250637 HC RX REV CODE- 250/637: Performed by: INTERNAL MEDICINE

## 2022-11-27 PROCEDURE — 94760 N-INVAS EAR/PLS OXIMETRY 1: CPT

## 2022-11-27 PROCEDURE — 77010033678 HC OXYGEN DAILY

## 2022-11-27 PROCEDURE — 65270000029 HC RM PRIVATE

## 2022-11-27 PROCEDURE — 74011250637 HC RX REV CODE- 250/637: Performed by: HOSPITALIST

## 2022-11-27 RX ADMIN — DULOXETINE HYDROCHLORIDE 60 MG: 60 CAPSULE, DELAYED RELEASE ORAL at 12:52

## 2022-11-27 RX ADMIN — ACETAMINOPHEN 650 MG: 325 TABLET ORAL at 14:58

## 2022-11-27 RX ADMIN — MIDODRINE HYDROCHLORIDE 10 MG: 5 TABLET ORAL at 12:53

## 2022-11-27 RX ADMIN — ATORVASTATIN CALCIUM 40 MG: 40 TABLET, FILM COATED ORAL at 12:54

## 2022-11-27 RX ADMIN — GUAIFENESIN AND DEXTROMETHORPHAN 5 ML: 100; 10 SYRUP ORAL at 20:20

## 2022-11-27 RX ADMIN — HEPARIN SODIUM 5000 UNITS: 5000 INJECTION INTRAVENOUS; SUBCUTANEOUS at 01:54

## 2022-11-27 RX ADMIN — ACETAMINOPHEN 650 MG: 325 TABLET ORAL at 02:14

## 2022-11-27 RX ADMIN — Medication 3 MG: at 20:20

## 2022-11-27 RX ADMIN — ASPIRIN 81 MG 81 MG: 81 TABLET ORAL at 12:52

## 2022-11-27 RX ADMIN — HEPARIN SODIUM 5000 UNITS: 5000 INJECTION INTRAVENOUS; SUBCUTANEOUS at 12:54

## 2022-11-27 RX ADMIN — FLUDROCORTISONE ACETATE 0.05 MG: 0.1 TABLET ORAL at 12:52

## 2022-11-27 RX ADMIN — FINASTERIDE 5 MG: 5 TABLET, FILM COATED ORAL at 20:20

## 2022-11-27 RX ADMIN — ACETAMINOPHEN 650 MG: 325 TABLET ORAL at 20:20

## 2022-11-27 RX ADMIN — SODIUM CHLORIDE, PRESERVATIVE FREE 10 ML: 5 INJECTION INTRAVENOUS at 20:20

## 2022-11-27 NOTE — PROGRESS NOTES
Patient had a near-syncopal episode while standing at the toilet, he was using the assistance of a walker and the nurse was within arms length. Pt did not lose consciousness and reported feeling lightheaded and dizzy. He was pale and became very weak in his legs. Patient able to use walker to keep from falling. Nurse called for assistance and applied gait belt to patient with assistance of other nurse. Pt assisted to chair. Back to bed. Pt denied lightheadedness and dizziness when back in bed. VS done, see VS flowsheet. Pt denied chest pain, SOB, heart palpitations. Pt had reported a headache prior to getting out of bed, PRN tylenol taken. MD to be notified via [employer].

## 2022-11-27 NOTE — PROGRESS NOTES
[facility] Adult  Hospitalist Group                                                                                          Hospitalist Progress Note  Chaim Petersen MD  Answering service: [phone] OR [phone] from in house phone        Date of Service:  2022  NAME:  Pernell Chen  :  1945  MRN:  [id number]      Admission Summary:   Pernell Chen is a 69 y.o. male who presents with syncope. Patient presents to the ER at [facility] with an episode of syncope versus seizure, patient has a history of orthostatic hypotension, woke up at 3 AM this morning and became confused, and collapsed within the toilet and the wall, patient reports that after a while he called his wife, and was brought to the hospital, not sure whether he had a seizure or syncope, was requested to be admitted to the hospitalist service, currently patient denies any complaints or problems    The patient denies any headache, blurry vision, sore throat, trouble swallowing, trouble with speech, chest pain, SOB, cough, fever, chills, N/V/D, abd pain, urinary symptoms, constipation, recent travels, sick contacts, focal or generalized neurological symptoms,  rashes, contact with COVID-19 diagnosed patients, hematemesis, melena, hemoptysis, hematuria, rashes, denies starting any new medications and denies any other concerns or problems besides as mentioned above. Interval history / Subjective:   Episode of dizziness while getting up last night to urinate. No fall or syncope, no n/v/d or dyspnea. Explained importance of staying in bed overnight as meds are wearing off, otherwise he should always only get up with assistance. Assessment & Plan:        Orthostatic hypotension w/ syncope:  - episodes of rigidity, but no history suggestive of postictal state;   - Neurology consulted;   - based on chart review, the patient has no history of CVA, and no history of seizures.   - per wife, he recently had an MRI of the brain  - EEG 11/18: no epileptiform discharges;   - evaluated by Neurology, all symptoms of confusion are being attributed to dementia    Severe autonomic dysregulation:  - on Midodrine; dose adjusted per cardiology  - low-dose florinef resumed  - recommend wheelchair for safety and a bedside commode;   - patient is at high risk of recurrent falls and injury due to Orthostatic hypotension and impulsivity   - per Neurology, the safest for him might be to stay at a wheelchair bound level of functioning/activity  - I think this is the best we'll be able to get him to here. He should go to rehab and follow-up as an outpatient. There will be ups and downs with orthostasis and hence the importance of rehab and close monitoring. PTSD:  - consulted Psychiatry;   - continue home medications as prior to admission;     BPH:  - on Proscar, changed administration to evening time     Dyslipidemia:  - on statin    Hyperglycemia, without history of DM:  - HgA1c 5.9 in 09/22; Code status:  Full  Care Plan discussed with: pt's wife  Anticipated Disposition: SNF when arranged           Hospital Problems  Date Reviewed: 9/19/2022            Codes Class Noted POA    Syncope ICD-10-CM: R55  ICD-9-CM: 780.2  11/16/2022 Unknown       Review of Systems:   A comprehensive review of systems was negative except for that written in the HPI. Vital Signs:    Last 24hrs VS reviewed since prior progress note.  Most recent are:  Visit Vitals  BP (!) 157/85 (BP 1 Location: Left upper arm, BP Patient Position: Lying)   Pulse 84   Temp 98.5 °F (36.9 °C)   Resp 20   Ht 5' 11\" (1.803 m)   Wt 77.6 kg (171 lb)   SpO2 96%   BMI 23.85 kg/m²         Intake/Output Summary (Last 24 hours) at 11/27/2022 1042  Last data filed at 11/27/2022 0148  Gross per 24 hour   Intake 420 ml   Output 800 ml   Net -380 ml          Physical Examination:     I had a face to face encounter with this patient and independently examined them on 11/27/2022 as outlined below:          Constitutional:  No acute distress, cooperative, pleasant    ENT:  Oral mucosa moist, oropharynx benign. Resp:  CTA bilaterally. No wheezing/rhonchi/rales. No accessory muscle use. CV:  Regular rhythm, normal rate, no murmurs, gallops, rubs    GI:  Soft, non distended, non tender. normoactive bowel sounds, no hepatosplenomegaly     Musculoskeletal:  No edema, warm    Neurologic:  Moves all extremities. Awake, cooperative; pleasantly confused; Data Review:    Review and/or order of clinical lab test  Review and/or order of tests in the radiology section of CPT  Review and/or order of tests in the medicine section of CPT      Labs:     No results for input(s): WBC, HGB, HCT, PLT, HGBEXT, HCTEXT, PLTEXT, HGBEXT, HCTEXT, PLTEXT in the last 72 hours. Recent Labs     11/25/22  0234      K 3.7   *   CO2 24   BUN 19   CREA 1.09   *   CA 8.5   MG 2.4       No results for input(s): ALT, AP, TBIL, TBILI, TP, ALB, GLOB, GGT, AML, LPSE in the last 72 hours. No lab exists for component: SGOT, GPT, AMYP, HLPSE    No results for input(s): INR, PTP, APTT, INREXT, INREXT in the last 72 hours. No results for input(s): FE, TIBC, PSAT, FERR in the last 72 hours. Lab Results   Component Value Date/Time    Folate 14.3 11/08/2022 05:44 AM        No results for input(s): PH, PCO2, PO2 in the last 72 hours. No results for input(s): CPK, CKNDX, TROIQ in the last 72 hours.     No lab exists for component: CPKMB    Lab Results   Component Value Date/Time    Cholesterol, total 95 09/19/2022 12:13 PM    HDL Cholesterol 48 09/19/2022 12:13 PM    LDL, calculated 28.4 09/19/2022 12:13 PM    Triglyceride 93 09/19/2022 12:13 PM    CHOL/HDL Ratio 2.0 09/19/2022 12:13 PM     Lab Results   Component Value Date/Time    Glucose (POC) 141 (H) 11/17/2022 04:57 PM    Glucose (POC) 166 (H) 11/17/2022 11:52 AM    Glucose (POC) 110 (H) 07/02/2019 07:41 PM    Glucose (POC) 68 07/02/2019 05:51 PM     Lab Results   Component Value Date/Time    Color YELLOW/STRAW 11/06/2022 07:15 PM    Appearance CLEAR 11/06/2022 07:15 PM    Specific gravity 1.010 11/06/2022 07:15 PM    Specific gravity 1.006 10/26/2022 06:25 PM    pH (UA) 7.0 11/06/2022 07:15 PM    Protein Negative 11/06/2022 07:15 PM    Glucose Negative 11/06/2022 07:15 PM    Ketone Negative 11/06/2022 07:15 PM    Bilirubin Negative 11/06/2022 07:15 PM    Urobilinogen 0.2 11/06/2022 07:15 PM    Nitrites Negative 11/06/2022 07:15 PM    Leukocyte Esterase Negative 11/06/2022 07:15 PM    Epithelial cells FEW 11/06/2022 07:15 PM    Bacteria Negative 11/06/2022 07:15 PM    WBC 0-4 11/06/2022 07:15 PM    RBC 10-20 11/06/2022 07:15 PM         Medications Reviewed:     Current Facility-Administered Medications   Medication Dose Route Frequency    guaiFENesin-dextromethorphan (ROBITUSSIN DM) 100-10 mg/5 mL syrup 5 mL  5 mL Oral Q6H PRN    fludrocortisone (FLORINEF) tablet 0.05 mg  0.05 mg Oral DAILY    midodrine (PROAMATINE) tablet 10 mg  10 mg Oral ACB    midodrine (PROAMATINE) tablet 10 mg  10 mg Oral ACL    midodrine (PROAMATINE) tablet 5 mg  5 mg Oral PCL    midodrine (PROAMATINE) tablet 5 mg  5 mg Oral ACD    melatonin tablet 3 mg  3 mg Oral QHS PRN    finasteride (PROSCAR) tablet 5 mg  5 mg Oral QHS    aspirin chewable tablet 81 mg  81 mg Oral DAILY    atorvastatin (LIPITOR) tablet 40 mg  40 mg Oral DAILY    DULoxetine (CYMBALTA) capsule 60 mg  60 mg Oral DAILY    sodium chloride (NS) flush 5-40 mL  5-40 mL IntraVENous Q8H    sodium chloride (NS) flush 5-40 mL  5-40 mL IntraVENous PRN    acetaminophen (TYLENOL) tablet 650 mg  650 mg Oral Q4H PRN    heparin (porcine) injection 5,000 Units  5,000 Units SubCUTAneous Q8H     ______________________________________________________________________  EXPECTED LENGTH OF STAY: 2d 7h  ACTUAL LENGTH OF STAY:          11                 Aurea Latif MD

## 2022-11-27 NOTE — PROGRESS NOTES
Bedside and Verbal shift change report given to Fartun Marques RN (oncoming nurse) by Dl Lucia RN (offgoing nurse). Report included the following information SBAR, Kardex, Intake/Output, MAR and Recent Results.

## 2022-11-28 LAB
DOPAMINE SERPL-MCNC: 48 PG/ML (ref 0–48)
EPINEPH PLAS-MCNC: 36 PG/ML (ref 0–62)
NOREPINEPH PLAS-MCNC: 308 PG/ML (ref 0–874)

## 2022-11-28 PROCEDURE — 74011250636 HC RX REV CODE- 250/636: Performed by: FAMILY MEDICINE

## 2022-11-28 PROCEDURE — 74011250637 HC RX REV CODE- 250/637: Performed by: INTERNAL MEDICINE

## 2022-11-28 PROCEDURE — 65270000029 HC RM PRIVATE

## 2022-11-28 PROCEDURE — 74011250637 HC RX REV CODE- 250/637: Performed by: FAMILY MEDICINE

## 2022-11-28 PROCEDURE — 74011000250 HC RX REV CODE- 250: Performed by: FAMILY MEDICINE

## 2022-11-28 PROCEDURE — 97116 GAIT TRAINING THERAPY: CPT

## 2022-11-28 PROCEDURE — 97535 SELF CARE MNGMENT TRAINING: CPT

## 2022-11-28 RX ADMIN — ACETAMINOPHEN 650 MG: 325 TABLET ORAL at 23:29

## 2022-11-28 RX ADMIN — MIDODRINE HYDROCHLORIDE 10 MG: 5 TABLET ORAL at 06:34

## 2022-11-28 RX ADMIN — SODIUM CHLORIDE, PRESERVATIVE FREE 10 ML: 5 INJECTION INTRAVENOUS at 22:22

## 2022-11-28 RX ADMIN — SODIUM CHLORIDE, PRESERVATIVE FREE 10 ML: 5 INJECTION INTRAVENOUS at 06:35

## 2022-11-28 RX ADMIN — HEPARIN SODIUM 5000 UNITS: 5000 INJECTION INTRAVENOUS; SUBCUTANEOUS at 02:03

## 2022-11-28 RX ADMIN — SODIUM CHLORIDE, PRESERVATIVE FREE 10 ML: 5 INJECTION INTRAVENOUS at 18:03

## 2022-11-28 RX ADMIN — ASPIRIN 81 MG 81 MG: 81 TABLET ORAL at 10:42

## 2022-11-28 RX ADMIN — Medication 3 MG: at 23:29

## 2022-11-28 RX ADMIN — HEPARIN SODIUM 5000 UNITS: 5000 INJECTION INTRAVENOUS; SUBCUTANEOUS at 10:42

## 2022-11-28 RX ADMIN — MIDODRINE HYDROCHLORIDE 5 MG: 5 TABLET ORAL at 18:03

## 2022-11-28 RX ADMIN — MIDODRINE HYDROCHLORIDE 5 MG: 5 TABLET ORAL at 14:12

## 2022-11-28 RX ADMIN — MIDODRINE HYDROCHLORIDE 10 MG: 5 TABLET ORAL at 10:42

## 2022-11-28 RX ADMIN — FLUDROCORTISONE ACETATE 0.05 MG: 0.1 TABLET ORAL at 10:41

## 2022-11-28 RX ADMIN — ATORVASTATIN CALCIUM 40 MG: 40 TABLET, FILM COATED ORAL at 10:41

## 2022-11-28 RX ADMIN — FINASTERIDE 5 MG: 5 TABLET, FILM COATED ORAL at 22:22

## 2022-11-28 RX ADMIN — HEPARIN SODIUM 5000 UNITS: 5000 INJECTION INTRAVENOUS; SUBCUTANEOUS at 18:03

## 2022-11-28 RX ADMIN — DULOXETINE HYDROCHLORIDE 60 MG: 60 CAPSULE, DELAYED RELEASE ORAL at 10:41

## 2022-11-28 NOTE — PROGRESS NOTES
Problem: Mobility Impaired (Adult and Pediatric)  Goal: *Acute Goals and Plan of Care (Insert Text)  Description: FUNCTIONAL STATUS PRIOR TO ADMISSION: Pt is typically IND without use of DME. Per wife, has recently required use of RW for mobility. HOME SUPPORT PRIOR TO ADMISSION: The patient lived with wife but did not require assist. Was able to perform ADLs IND. Recently has required increased assistance for these tasks. Physical Therapy Goals  Initiated 11/20/2022  1. Patient will move from supine to sit and sit to supine  in bed with supervision/set-up within 7 day(s). 2.  Patient will transfer from bed to chair and chair to bed with supervision/set-up using the least restrictive device within 7 day(s). 3.  Patient will perform sit to stand with supervision/set-up within 7 day(s). 4.  Patient will ambulate with supervision/set-up for 150 feet with the least restrictive device within 7 day(s). Outcome: Progressing Towards Goal   PHYSICAL THERAPY TREATMENT  Patient: Marck Marcelo (47 y.o. male)  Date: 11/28/2022  Diagnosis: Syncope [R55] <principal problem not specified>      Precautions: Fall  Chart, physical therapy assessment, plan of care and goals were reviewed. ASSESSMENT  Patient continues with skilled PT services and is progressing towards goals. Pt received with supportive wife present. Noted knee high compression socks in place bilat. Pt tolerated gait training x multiple trials with increasing distance, RW, CGA/ min A for balance and verbal/ tactile cues for negotiating obstacles on L. Pt demo continued drop in BP with position change, but no associated symptoms.    Vitals:    11/28/22 1019 11/28/22 1210 11/28/22 1325 11/28/22 1331   BP: 117/75  (!) (P) 144/75 (P) 110/73   BP 1 Location: Left upper arm  (P) Left upper arm (P) Left upper arm   BP Patient Position: Comment: after standing activity  (P) Sitting  Comment: EOB (P) Standing  Comment: after amb   Pulse:       Temp: Resp:       Height:  5' 11\" (1.803 m)     Weight:       SpO2:          Pt remains below functional baseline. Will benefit from con't PT for mobility progression as tolerated. Recommend follow up SNF rehab at d/c. Current Level of Function Impacting Discharge (mobility/balance): CGA/ min A with RW    Other factors to consider for discharge: h/o orthostasis         PLAN :  Patient continues to benefit from skilled intervention to address the above impairments. Continue treatment per established plan of care. to address goals. Recommendation for discharge: (in order for the patient to meet his/her long term goals)  Therapy up to 5 days/week in SNF setting    This discharge recommendation:  Has been made in collaboration with the attending provider and/or case management    IF patient discharges home will need the following DME: to be determined (TBD)       SUBJECTIVE:   Patient pleasant and cooperative throughout session    OBJECTIVE DATA SUMMARY:   Critical Behavior:  Neurologic State: Alert  Orientation Level: Oriented to person  Cognition: Follows commands  Safety/Judgement: Decreased awareness of need for assistance, Decreased awareness of need for safety, Awareness of environment, Decreased insight into deficits  Functional Mobility Training:  Bed Mobility:     Supine to Sit: Supervision  Sit to Supine: Supervision           Transfers:  Sit to Stand: Contact guard assistance;Stand-by assistance  Stand to Sit: Stand-by assistance        Bed to Chair: Minimum assistance;Assist x1;Additional time; Adaptive equipment                    Balance:  Sitting: Intact  Sitting - Static: Good (unsupported)  Sitting - Dynamic: Good (unsupported)  Standing: Impaired  Standing - Static: Good;Constant support  Standing - Dynamic : Fair;Constant support  Ambulation/Gait Training:  Distance (ft): 40 Feet (ft) (also 25' x 2 and 30')  Assistive Device: Gait belt;Walker, rolling  Ambulation - Level of Assistance: Contact guard assistance;Minimal assistance        Gait Abnormalities: Decreased step clearance; Path deviations              Speed/Dhara: Pace decreased (<100 feet/min)  Step Length: Left shortened;Right shortened        Pain Rating:  No c/o pain    Activity Tolerance:   Good and requires rest breaks    After treatment patient left in no apparent distress:   Call bell within reach, Bed / chair alarm activated, Caregiver / family present, Side rails x 3, and sitting up in bed    COMMUNICATION/COLLABORATION:   The patients plan of care was discussed with: Registered nurse.      Wilbert Felix, PT   Time Calculation: 35 mins

## 2022-11-28 NOTE — PROGRESS NOTES
Palliative Medicine  [place]: [phone] ([phone])  [facility]: [phone] ([phone])    The Palliative Medicine SW met with the patient's spouse for psychosocial support. The patient's spouse reflected on her 's hospitalization and how difficult it has been- she has been an advocate for her  during this hospitalization in receiving needed care. SW inquired about her strength, where she gets her strength from during this difficult time- she reflected on her family legacy, her grandmother lived to be in her 80's, her mother in her 80's, strong women with \"a force\" she describes, and she shares she is finding the force within herself. She is Jain and also receives strength from her momo and inner strength. The patient's spouse shares that her and the patient have been together for 28 years,  for two years. She shares that she feels like they are in a better spot currently, teams working together for her 's care, and this has not always been the case during his hospitalization. Supportive visit provided to patient's spouse, it has been a roller coaster journey with her  here in the hospital. Empathetic listening and support provided. The goal is to get the patient to rehab to build his strength. Palliative Medicine following along with you.      Thank you for including Palliative Medicine in the care of [address] [place], [place]  [phone]

## 2022-11-28 NOTE — PROGRESS NOTES
Problem: Falls - Risk of  Goal: *Absence of Falls  Description: Document Storm Crawford Fall Risk and appropriate interventions in the flowsheet. Outcome: Progressing Towards Goal  Note: Fall Risk Interventions:  Mobility Interventions: Bed/chair exit alarm, Communicate number of staff needed for ambulation/transfer, PT Consult for mobility concerns    Mentation Interventions: Adequate sleep, hydration, pain control, Eyeglasses and hearing aids, Door open when patient unattended, Increase mobility    Medication Interventions: Bed/chair exit alarm, Patient to call before getting OOB, Assess postural VS orthostatic hypotension    Elimination Interventions: Bed/chair exit alarm, Call light in reach, Patient to call for help with toileting needs, Urinal in reach    History of Falls Interventions: Bed/chair exit alarm, Door open when patient unattended, Investigate reason for fall, Room close to nurse's station         Problem: Pressure Injury - Risk of  Goal: *Prevention of pressure injury  Description: Document Kane Scale and appropriate interventions in the flowsheet.   Outcome: Progressing Towards Goal  Note: Pressure Injury Interventions:  Sensory Interventions: Assess changes in LOC, Check visual cues for pain, Discuss PT/OT consult with provider, Minimize linen layers    Moisture Interventions: Apply protective barrier, creams and emollients, Maintain skin hydration (lotion/cream)    Activity Interventions: Increase time out of bed, PT/OT evaluation    Mobility Interventions: HOB 30 degrees or less, Pressure redistribution bed/mattress (bed type), PT/OT evaluation    Nutrition Interventions: Document food/fluid/supplement intake, Offer support with meals,snacks and hydration

## 2022-11-28 NOTE — PROGRESS NOTES
Occupational Therapy: BP monitored during today's session .  WALTER Rayo/L  Starting BP- supine at 09:39 153/70    Vitals:    11/28/22 0944 11/28/22 0947 11/28/22 1001 11/28/22 1019   BP: 104/66 100/63 134/77 117/75   BP 1 Location: Left upper arm Left upper arm Left upper arm Left upper arm   BP Patient Position: Sitting Sitting Sitting  Comment: with activity Comment: after standing activity   Pulse:       Temp:       Resp:       Height:       Weight:       SpO2:

## 2022-11-28 NOTE — PROGRESS NOTES
Problem: Self Care Deficits Care Plan (Adult)  Goal: *Acute Goals and Plan of Care (Insert Text)  Description: FUNCTIONAL STATUS PRIOR TO ADMISSION: Immediately PTA patient was modified independent using a rolling walker for functional mobility- though spouse reports he often needed reminders to use his walker. 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 Patient has not been able to don compression socks. Spouse assists as needed. Patient has had history of BP issues for over the last 3 years, however worsening symptoms just prior to admit     Occupational Therapy Goals  Initiated 11/20/2022. Weekly re-assessment 11/28/22- continue goals   1. Patient will perform standing grooming routine with supervision/set-up within 7 day(s). 2.  Patient will perform upper and lower body bathing with supervision/set-up within 7 day(s). 3.  Patient will perform upper and lower body dressing with supervision/set-up within 7 day(s). 4.  Patient will perform toilet transfers with supervision/set-up within 7 day(s). 5.  Patient will perform all aspects of toileting with supervision/set-up within 7 day(s). Outcome: Progressing Towards Goal   OCCUPATIONAL THERAPY TREATMENT/WEEKLY RE-ASSESSMENT  Patient: Ann Corrales (77 y.o. male)  Date: 11/28/2022  Diagnosis: Syncope [R55] <principal problem not specified>      Precautions: Fall  Chart, occupational therapy assessment, plan of care, and goals were reviewed. ASSESSMENT  Patient continues with skilled OT services and is progressing towards goals. BP dropped initially from supine to EOB though improved with seated activity, and though some drop in systolic BP noted, was stable after standing tasks during ADL. Separate OT note submitted with BP readings from today's session. Patient did not complain of symptoms during any activity.  Patient demonstrates improving balance in stand, improving standing tolerance during unsupported ADL activity in stand. He is however below his PLOF and requires more physical assistance than spouse can currently safely provide. Patient will benefit from rehab services prior to return home     Current Level of Function Impacting Discharge (ADLs): mod assist for LB dressing, toileting. Min A for transfers [facility]) at  level     Other factors to consider for discharge:          PLAN :  Goals have been updated based on progression since last assessment. Patient continues to benefit from skilled intervention to address the above impairments. Continue to follow patient 5 times a week to address goals. Recommend with staff: in chair for meals    Recommend next OT session: progress POC- trial ADL in stand (watch BP)    Recommendation for discharge: (in order for the patient to meet his/her long term goals)  Therapy up to 5 days/week in rehab setting    This discharge recommendation:  Has been made in collaboration with the attending provider and/or case management    IF patient discharges home will need the following DME: patient owns DME required for discharge       SUBJECTIVE:   Patient pleasant and cooperative     OBJECTIVE DATA SUMMARY:   Cognitive/Behavioral Status:  Neurologic State: Alert     Cognition: Follows commands             Functional Mobility and Transfers for ADLs:  Bed Mobility:  Supine to Sit: Supervision  Sit to Supine: Supervision    Transfers:  Sit to Stand: Contact guard assistance;Stand-by assistance  Functional Transfers  Toilet Transfer : Minimum assistance  Cues: Physical assistance; Tactile cues provided;Verbal cues provided  Adaptive Equipment: Bedside commode;Walker (comment)  Bed to Chair: Minimum assistance;Assist x1;Additional time; Adaptive equipment    Balance:  Sitting: Intact  Sitting - Static: Good (unsupported)  Sitting - Dynamic: Good (unsupported)  Standing: Impaired  Standing - Static: Good;Constant support  Standing - Dynamic : Fair;Constant support    ADL Intervention:       Lower Body Dressing Assistance  Underpants: Minimum assistance  Socks: Minimum assistance (max a for compression socks)  Leg Crossed Method Used: Yes  Position Performed: Seated in chair    Toileting  Bowel Hygiene: Minimum assistance  Clothing Management: Moderate assistance  Cues: Physical assistance for pants down;Physical assistance for pants up; Tactile cues provided;Verbal cues provided  Adaptive Equipment: Walker         Pain:  None reported     Activity Tolerance:   Good    After treatment patient left in no apparent distress:   Sitting in chair, Call bell within reach, Bed / chair alarm activated, and Caregiver / family present    COMMUNICATION/COLLABORATION:   The patients plan of care was discussed with: Physical therapist and Registered nurse.      Elly Sultana OT  Time Calculation: 51 mins

## 2022-11-28 NOTE — PROGRESS NOTES
Comprehensive Nutrition Assessment    Type and Reason for Visit: Initial    Nutrition Recommendations/Plan:   Regular diet  Added glucerna TID, sugar free gatorade BID, snacks per preference       Malnutrition Assessment:  Malnutrition Status:  No malnutrition (11/28/22 1210)           Nutrition Assessment:    PMHx: anxiety, depression, h/o syncope, hypotension, s/p pacemaker 2019    69 y.o. male admitted with syncope, dehydration. Pt was busy in room with medical staff- I talked to his wife. She stated he's lost ~4# over the last few months d/t recurrent hospitalizations. Stated #, current wt 171#. Wt loss is not considered significant. She stated he maintains a good appetite, however he has some fine motor skill issues and so likes to have a slice of bread with his meals, which he uses to scoop and move his food. He's currently on a cardiac and CHO restriction- CHO restriction removed as pts a1c is fine for his age; cardiac restriction removed d/t reduced intake. Pt's wife is trying to help him gain wt- we talked about use of small frequent meals dense in kcals/protein, adding fats to foods, getting calories from liquids. Wife denied any issues with chewing/swallowing. Noted SLP cleared for regular diet/thin liquids. Per wife he likes tuna salad, fruit, gatorade, chocolate/strawb shakes. Labs reviewed. No results for input(s): GLU, BUN, CREA, NA, K, CL, CO2, CA, PHOS, MG in the last 72 hours.       Lab Results   Component Value Date/Time    Hemoglobin A1c 5.9 (H) 09/19/2022 12:13 PM    Hemoglobin A1c 5.7 10/01/2019 12:51 AM    Hemoglobin A1c 4.9 11/10/2018 04:45 AM         Nutritionally Significant Medications:  Lipitor, midodrine      Estimated Daily Nutrient Needs:  Energy Requirements Based On: Kcal/kg  Weight Used for Energy Requirements: Current  Energy (kcal/day): 1950 (25 kcal/kg)  Weight Used for Protein Requirements: Current  Protein (g/day): 94 (1.2 g/kg)     Fluid (ml/day): 1 ml/kcal    Nutrition Related Findings:   Edema: No data recorded    Last BM: 11/27/22,      Wounds: None      Current Nutrition Therapies:  Diet: cardiac 2 g Na, 4 carb choice  Supplements: none  Meal intake: Patient Vitals for the past 168 hrs:   % Diet Eaten   11/27/22 1245 26 - 50%   11/26/22 1800 26 - 50%   11/26/22 1200 51 - 75%   11/26/22 0850 51 - 75%     Supplement intake: No data found. Nutrition Support: none      Anthropometric Measures:  Height: 5' 11\" (180.3 cm)  Ideal Body Weight (IBW): 172 lbs (78 kg)     Current Body Wt:  77.6 kg (171 lb 1.2 oz), 99.5 % IBW.  Not specified  Current BMI (kg/m2): 23.9  Usual Body Weight: 79.4 kg (175 lb)  % Weight Change (Calculated): -2.2  Weight Adjustment: No adjustment     BMI Category: Normal weight (BMI 22.0-24.9) age over 72    Wt Readings from Last 10 Encounters:   11/17/22 77.6 kg (171 lb)   11/16/22 77.6 kg (171 lb)   11/07/22 73.8 kg (162 lb 11.2 oz)   10/26/22 76.7 kg (169 lb)   10/07/22 77.1 kg (169 lb 15.6 oz)   08/27/22 77.1 kg (170 lb)   08/26/22 78 kg (172 lb)   07/08/22 78 kg (172 lb)   07/01/22 78.4 kg (172 lb 12.8 oz)   06/27/22 77.1 kg (170 lb)           Nutrition Diagnosis:   Unintended weight loss related to inadequate protein-energy intake as evidenced by poor intake prior to admission, weight loss    Nutrition Interventions:   Food and/or Nutrient Delivery: Modify current diet, Start oral nutrition supplement, Snacks (specify)  Nutrition Education/Counseling: No recommendations at this time  Coordination of Nutrition Care: Continue to monitor while inpatient       Goals:     Goals: PO intake 50% or greater, by next RD assessment       Nutrition Monitoring and Evaluation:   Behavioral-Environmental Outcomes: None identified  Food/Nutrient Intake Outcomes: Food and nutrient intake, Supplement intake  Physical Signs/Symptoms Outcomes: Biochemical data, Meal time behavior, Weight, Fluid status or edema    Discharge Planning:    Continue oral nutrition supplement    Suze Ceron, RD  Available via [email or web address]

## 2022-11-28 NOTE — PROGRESS NOTES
JOSE:  1. RUR-13%  2. [facility] accepted, [facility]. 3. BLS transport. CM spoke with Ubaldo Victor at [facility], [phone]. She is waiting for auth from [facility]. Cm received fax request from [facility] for updated clinicals. Cm faxed updates to [phone] and informed  Glorya Sacks with patient access, [phone] ext [phone]. CM to follow. 1546- CM faxed PT and OT notes to the [place] at [phone]. Patient wife provided cm with contact information for [facility] or [phone], no answer at either. Cm contacted Ubaldo Victor with [facility] and sent a message through [facility], no response yet.     Jeff Aranda, [facility]

## 2022-11-28 NOTE — PROGRESS NOTES
[facility]  Hospitalist Group                                                                                          Hospitalist Progress Note  Aurea Latif MD  Answering service: [phone] or [phone] from in house phone        Date of Service:  2022  NAME:  Penne Felty  :  1945  MRN:  [id number]      Admission Summary:   Penne Felty is a 69 y.o. male who presents with syncope. Patient presents to the ER at [address] [place] [address] with an episode of syncope versus seizure, patient has a history of orthostatic hypotension, woke up at 3 AM this morning and became confused, and collapsed within the toilet and the wall, patient reports that after a while he called his wife, and was brought to the hospital, not sure whether he had a seizure or syncope, was requested to be admitted to the hospitalist service, currently patient denies any complaints or problems    The patient denies any headache, blurry vision, sore throat, trouble swallowing, trouble with speech, chest pain, SOB, cough, fever, chills, N/V/D, abd pain, urinary symptoms, constipation, recent travels, sick contacts, focal or generalized neurological symptoms,  rashes, contact with COVID-19 diagnosed patients, hematemesis, melena, hemoptysis, hematuria, rashes, denies starting any new medications and denies any other concerns or problems besides as mentioned above. Interval history / Subjective:   No new complaints; no significant dizziness, no pain, dyspnea, n/v/d. Awaiting placement. Assessment & Plan:        Orthostatic hypotension w/ syncope:  - episodes of rigidity, but no history suggestive of postictal state;   - Neurology consulted;   - based on chart review, the patient has no history of CVA, and no history of seizures.   - per wife, he recently had an MRI of the brain  - EEG : no epileptiform discharges;   - evaluated by Neurology, all symptoms of confusion are being attributed to dementia    Severe autonomic dysregulation:  - on Midodrine; dose adjusted per cardiology  - low-dose florinef resumed  - recommend wheelchair for safety and a bedside commode;   - patient is at high risk of recurrent falls and injury due to Orthostatic hypotension and impulsivity   - per Neurology, the safest for him might be to stay at a wheelchair bound level of functioning/activity  - I think this is the best we'll be able to get him to here. He should go to rehab and follow-up as an outpatient. There will be ups and downs with orthostasis and hence the importance of rehab and close monitoring. PTSD:  - consulted Psychiatry;   - continue home medications as prior to admission;     BPH:  - on Proscar, changed administration to evening time     Dyslipidemia:  - on statin    Hyperglycemia, without history of DM:  - HgA1c 5.9 in 09/22; Code status:  Full  Care Plan discussed with: pt's wife  Anticipated Disposition: SNF when arranged           Hospital Problems  Date Reviewed: 9/19/2022            Codes Class Noted POA    Syncope ICD-10-CM: R55  ICD-9-CM: 780.2  11/16/2022 Unknown       Review of Systems:   A comprehensive review of systems was negative except for that written in the HPI. Vital Signs:    Last 24hrs VS reviewed since prior progress note. Most recent are:  Visit Vitals  BP (!) 178/90 (BP 1 Location: Left upper arm, BP Patient Position: At rest)   Pulse 93   Temp 97.8 °F (36.6 °C)   Resp 18   Ht 5' 11\" (1.803 m)   Wt 77.6 kg (171 lb)   SpO2 93%   BMI 23.85 kg/m²         Intake/Output Summary (Last 24 hours) at 11/28/2022 1537  Last data filed at 11/27/2022 2048  Gross per 24 hour   Intake --   Output 300 ml   Net -300 ml          Physical Examination:     I had a face to face encounter with this patient and independently examined them on 11/28/2022 as outlined below:          Constitutional:  No acute distress, cooperative, pleasant    ENT:  Oral mucosa moist, oropharynx benign. Resp: CTA bilaterally. No wheezing/rhonchi/rales. No accessory muscle use. CV:  Regular rhythm, normal rate, no murmurs, gallops, rubs    GI:  Soft, non distended, non tender. normoactive bowel sounds, no hepatosplenomegaly     Musculoskeletal:  No edema, warm    Neurologic:  Moves all extremities. Awake, cooperative; pleasantly confused; Data Review:    Review and/or order of clinical lab test  Review and/or order of tests in the radiology section of CPT  Review and/or order of tests in the medicine section of CPT      Labs:     No results for input(s): WBC, HGB, HCT, PLT, HGBEXT, HCTEXT, PLTEXT, HGBEXT, HCTEXT, PLTEXT in the last 72 hours. No results for input(s): NA, K, CL, CO2, BUN, CREA, GLU, CA, MG, PHOS, URICA in the last 72 hours. No results for input(s): ALT, AP, TBIL, TBILI, TP, ALB, GLOB, GGT, AML, LPSE in the last 72 hours. No lab exists for component: SGOT, GPT, AMYP, HLPSE    No results for input(s): INR, PTP, APTT, INREXT, INREXT in the last 72 hours. No results for input(s): FE, TIBC, PSAT, FERR in the last 72 hours. Lab Results   Component Value Date/Time    Folate 14.3 11/08/2022 05:44 AM        No results for input(s): PH, PCO2, PO2 in the last 72 hours. No results for input(s): CPK, CKNDX, TROIQ in the last 72 hours.     No lab exists for component: CPKMB    Lab Results   Component Value Date/Time    Cholesterol, total 95 09/19/2022 12:13 PM    HDL Cholesterol 48 09/19/2022 12:13 PM    LDL, calculated 28.4 09/19/2022 12:13 PM    Triglyceride 93 09/19/2022 12:13 PM    CHOL/HDL Ratio 2.0 09/19/2022 12:13 PM     Lab Results   Component Value Date/Time    Glucose (POC) 141 (H) 11/17/2022 04:57 PM    Glucose (POC) 166 (H) 11/17/2022 11:52 AM    Glucose (POC) 110 (H) 07/02/2019 07:41 PM    Glucose (POC) 68 07/02/2019 05:51 PM     Lab Results   Component Value Date/Time    Color YELLOW/STRAW 11/06/2022 07:15 PM    Appearance CLEAR 11/06/2022 07:15 PM    Specific gravity 1.010 11/06/2022 07:15 PM    Specific gravity 1.006 10/26/2022 06:25 PM    pH (UA) 7.0 11/06/2022 07:15 PM    Protein Negative 11/06/2022 07:15 PM    Glucose Negative 11/06/2022 07:15 PM    Ketone Negative 11/06/2022 07:15 PM    Bilirubin Negative 11/06/2022 07:15 PM    Urobilinogen 0.2 11/06/2022 07:15 PM    Nitrites Negative 11/06/2022 07:15 PM    Leukocyte Esterase Negative 11/06/2022 07:15 PM    Epithelial cells FEW 11/06/2022 07:15 PM    Bacteria Negative 11/06/2022 07:15 PM    WBC 0-4 11/06/2022 07:15 PM    RBC 10-20 11/06/2022 07:15 PM         Medications Reviewed:     Current Facility-Administered Medications   Medication Dose Route Frequency    guaiFENesin-dextromethorphan (ROBITUSSIN DM) 100-10 mg/5 mL syrup 5 mL  5 mL Oral Q6H PRN    fludrocortisone (FLORINEF) tablet 0.05 mg  0.05 mg Oral DAILY    midodrine (PROAMATINE) tablet 10 mg  10 mg Oral ACB    midodrine (PROAMATINE) tablet 10 mg  10 mg Oral ACL    midodrine (PROAMATINE) tablet 5 mg  5 mg Oral PCL    midodrine (PROAMATINE) tablet 5 mg  5 mg Oral ACD    melatonin tablet 3 mg  3 mg Oral QHS PRN    finasteride (PROSCAR) tablet 5 mg  5 mg Oral QHS    aspirin chewable tablet 81 mg  81 mg Oral DAILY    atorvastatin (LIPITOR) tablet 40 mg  40 mg Oral DAILY    DULoxetine (CYMBALTA) capsule 60 mg  60 mg Oral DAILY    sodium chloride (NS) flush 5-40 mL  5-40 mL IntraVENous Q8H    sodium chloride (NS) flush 5-40 mL  5-40 mL IntraVENous PRN    acetaminophen (TYLENOL) tablet 650 mg  650 mg Oral Q4H PRN    heparin (porcine) injection 5,000 Units  5,000 Units SubCUTAneous Q8H     ______________________________________________________________________  EXPECTED LENGTH OF STAY: 2d 7h  ACTUAL LENGTH OF STAY:          12                 Micki Douglas MD

## 2022-11-28 NOTE — PROGRESS NOTES
[facility] visit. Mr. Michaela Dash was sitting up I his recliner. He received communion and prayer. He is hoping to go home soon. Family member with him on phone.       LINDA Moreno, RN, ACSW, LCSW   Page:  [phone]

## 2022-11-28 NOTE — PROGRESS NOTES
Palliative Medicine  [facility]: [phone]  [facility]: [phone] ([phone])      Palliative Medicine SW attempted to meet with patient and spouse at bedside, patient is receiving nursing care- spouse not at bedside, will attempt to follow up later today as able and appropriate. SW attempted to meet with patient and Life Partner at bedside- she is currently on the phone, requesting SW come back in 15 minutes. SW will come back in 15 minutes. 15:11 p.m.- SW attempted to meet with family again, patient is getting assisted to restroom by therapy team, spouse not at bedside- found spouse in waiting room but on the phone. 15:25 p.m.- SW attempted again to meet with family, spouse on the phone in waiting room- do not want spouse to feel pressured, SW left card with the patient on his bedside table. Will attempt later today vs. Tomorrow, do not want to interrupt life partner while on the phone.      Thank you for including Palliative Medicine in the care of [address] [place] [address]  [phone]

## 2022-11-29 PROCEDURE — 74011250637 HC RX REV CODE- 250/637: Performed by: INTERNAL MEDICINE

## 2022-11-29 PROCEDURE — 74011250636 HC RX REV CODE- 250/636: Performed by: FAMILY MEDICINE

## 2022-11-29 PROCEDURE — 77010033678 HC OXYGEN DAILY

## 2022-11-29 PROCEDURE — 74011250637 HC RX REV CODE- 250/637: Performed by: FAMILY MEDICINE

## 2022-11-29 PROCEDURE — 65270000029 HC RM PRIVATE

## 2022-11-29 PROCEDURE — 74011000250 HC RX REV CODE- 250: Performed by: FAMILY MEDICINE

## 2022-11-29 RX ORDER — FLUDROCORTISONE ACETATE 0.1 MG/1
0.05 TABLET ORAL DAILY
Qty: 28 TABLET | Refills: 3 | Status: SHIPPED
Start: 2022-11-29

## 2022-11-29 RX ORDER — MIDODRINE HYDROCHLORIDE 5 MG/1
TABLET ORAL
Qty: 150 TABLET | Refills: 0 | Status: SHIPPED
Start: 2022-11-29

## 2022-11-29 RX ADMIN — MIDODRINE HYDROCHLORIDE 5 MG: 5 TABLET ORAL at 14:14

## 2022-11-29 RX ADMIN — ASPIRIN 81 MG 81 MG: 81 TABLET ORAL at 10:37

## 2022-11-29 RX ADMIN — MIDODRINE HYDROCHLORIDE 10 MG: 5 TABLET ORAL at 10:37

## 2022-11-29 RX ADMIN — HEPARIN SODIUM 5000 UNITS: 5000 INJECTION INTRAVENOUS; SUBCUTANEOUS at 02:55

## 2022-11-29 RX ADMIN — MIDODRINE HYDROCHLORIDE 10 MG: 5 TABLET ORAL at 07:23

## 2022-11-29 RX ADMIN — HEPARIN SODIUM 5000 UNITS: 5000 INJECTION INTRAVENOUS; SUBCUTANEOUS at 10:37

## 2022-11-29 RX ADMIN — DULOXETINE HYDROCHLORIDE 60 MG: 60 CAPSULE, DELAYED RELEASE ORAL at 10:37

## 2022-11-29 RX ADMIN — FLUDROCORTISONE ACETATE 0.05 MG: 0.1 TABLET ORAL at 10:37

## 2022-11-29 RX ADMIN — SODIUM CHLORIDE, PRESERVATIVE FREE 10 ML: 5 INJECTION INTRAVENOUS at 21:08

## 2022-11-29 RX ADMIN — ATORVASTATIN CALCIUM 40 MG: 40 TABLET, FILM COATED ORAL at 10:37

## 2022-11-29 RX ADMIN — FINASTERIDE 5 MG: 5 TABLET, FILM COATED ORAL at 21:08

## 2022-11-29 RX ADMIN — HEPARIN SODIUM 5000 UNITS: 5000 INJECTION INTRAVENOUS; SUBCUTANEOUS at 17:23

## 2022-11-29 RX ADMIN — SODIUM CHLORIDE, PRESERVATIVE FREE 10 ML: 5 INJECTION INTRAVENOUS at 14:15

## 2022-11-29 RX ADMIN — SODIUM CHLORIDE, PRESERVATIVE FREE 10 ML: 5 INJECTION INTRAVENOUS at 07:23

## 2022-11-29 RX ADMIN — MIDODRINE HYDROCHLORIDE 5 MG: 5 TABLET ORAL at 17:23

## 2022-11-29 NOTE — DISCHARGE SUMMARY
Discharge Summary     Patient: Penne Felty MRN: [id number]  SSN: [id number]    YOB: 1945  Age: 69 y.o.   Sex: male       Admit Date: 11/16/2022    Discharge Date: 11/30/2022      Admission Diagnoses: Syncope [R55]    Discharge Diagnoses:   Problem List as of 11/30/2022 Date Reviewed: 9/19/2022            Codes Class Noted - Resolved    Syncope ICD-10-CM: R55  ICD-9-CM: 780.2  11/16/2022 - Present        Pulmonary emphysema ([facility] (Chronic) ICD-10-CM: J43.9  ICD-9-CM: 492.8  11/7/2022 - Present        Orthostatic hypotension ICD-10-CM: I95.1  ICD-9-CM: 458.0  11/6/2022 - Present        [facility] ICD-10-CM: R56.9  ICD-9-CM: 780.39  11/6/2022 - Present        Pulmonary nodule ICD-10-CM: R91.1  ICD-9-CM: 793.11  7/29/2022 - Present    Overview Signed 7/29/2022  2:48 PM by Donald Delacruz NP     1.1cm lung nodule on cervical spine CT from [facility] done 7-2022- he has been referred to [facility]             Autonomic orthostatic hypotension ICD-10-CM: I95.1  ICD-9-CM: 458.0  10/17/2019 - Present        Bilateral carotid artery stenosis ICD-10-CM: I65.23  ICD-9-CM: 433.10, 433.30  9/30/2019 - Present        Heart block ICD-10-CM: I45.9  ICD-9-CM: 426.9  7/30/2019 - Present        Idiopathic small and large fiber sensory neuropathy ICD-10-CM: G60.8  ICD-9-CM: 356.4  11/9/2018 - Present        Memory disturbance ICD-10-CM: R41.3  ICD-9-CM: 780.93  11/9/2018 - Present        Degenerative cervical spinal stenosis ICD-10-CM: M48.02  ICD-9-CM: 723.0  11/9/2018 - Present        History of lumbar laminectomy ICD-10-CM: Z98.890  ICD-9-CM: V45.89  11/9/2018 - Present        Degenerative lumbar spinal stenosis ICD-10-CM: M48.061  ICD-9-CM: 724.02  11/9/2018 - Present        Pacemaker ICD-10-CM: Z95.0  ICD-9-CM: V45.01  10/24/2018 - Present        Age-related macular degeneration, dry, both eyes ICD-10-CM: H35.3130  ICD-9-CM: 362.51  9/14/2018 - Present    Overview Signed 9/14/2018  1:40 PM by Dariela Mtz MD Last Assessment & Plan:   Pt needs to start AREDS therapy. Patient was counseled on taking AREDS2 vitamins and using daily grid monitoring. Check with PCP prior to starting. Sample to pt. Discuss at next visit. Smoking cessation. BPH with obstruction/lower urinary tract symptoms ICD-10-CM: N40.1, N13.8  ICD-9-CM: 600.01, 599.69  4/19/2018 - Present        S/P right rotator cuff repair ICD-10-CM: V64.616  ICD-9-CM: V45.89  12/1/2017 - Present        S/P hip replacement, bilateral ICD-10-CM: Z75.523  ICD-9-CM: V43.64  10/18/2017 - Present        PTSD (post-traumatic stress disorder) ICD-10-CM: F43.10  ICD-9-CM: 309.81  12/13/2016 - Present        Lumbar spinal stenosis ICD-10-CM: M48.061  ICD-9-CM: 724.02  12/13/2016 - Present        Alcoholism in remission ([facility] ICD-10-CM: F10.21  ICD-9-CM: 303.93  12/13/2016 - Present        RESOLVED: Dizziness and giddiness ICD-10-CM: R42  ICD-9-CM: 780.4  10/17/2019 - 8/12/2020        RESOLVED: Syncope ICD-10-CM: R55  ICD-9-CM: 780.2  9/29/2019 - 8/12/2020        RESOLVED: Fever ICD-10-CM: R50.9  ICD-9-CM: 780.60  11/9/2018 - 8/12/2020        RESOLVED: Ataxia ICD-10-CM: R27.0  ICD-9-CM: 781.3  11/9/2018 - 8/12/2020        RESOLVED: Sensory ataxia ICD-10-CM: R27.8  ICD-9-CM: 781.3  11/9/2018 - 8/12/2020        RESOLVED: Altered mental status, unspecified ICD-10-CM: R41.82  ICD-9-CM: 780.97  11/9/2018 - 8/12/2020        RESOLVED: Pacemaker complications, subsequent encounter ICD-10-CM: T82. 9XXD  ICD-9-CM: V58.89, 996.72  11/8/2018 - 2/8/2019        RESOLVED: Stumbling gait ICD-10-CM: R26.89  ICD-9-CM: 781.2  11/8/2018 - 8/12/2020        RESOLVED: Pacemaker pocket hematoma, initial encounter ICD-10-CM: W59.545M  ICD-9-CM: 996.72  10/24/2018 - 2/8/2019        RESOLVED: Hx of syncope ICD-10-CM: U86.131  ICD-9-CM: V15.89  10/24/2018 - 8/12/2020        RESOLVED: Bradycardia ICD-10-CM: R00.1  ICD-9-CM: 427.89  10/2/2018 - 8/12/2020        RESOLVED: Syncope ICD-10-CM: R55  ICD-9-CM: 780.2  9/11/2018 - 2/8/2019        RESOLVED: Neuropathy ICD-10-CM: G62.9  ICD-9-CM: 355.9  9/11/2018 - 12/1/2020        RESOLVED: Memory deficit ICD-10-CM: R41.3  ICD-9-CM: 780.93  9/11/2018 - 8/12/2020        RESOLVED: Smoker ICD-10-CM: F17.200  ICD-9-CM: 305.1  1/18/2018 - 8/12/2020        RESOLVED: Reactive depression ICD-10-CM: F32.9  ICD-9-CM: 300.4  12/21/2017 - 9/14/2018        RESOLVED: COPD exacerbation ([facility] ICD-10-CM: J44.1  ICD-9-CM: 491.21  4/25/2017 - 1/18/2018        RESOLVED: Ingrown left big toenail ICD-10-CM: L60.0  ICD-9-CM: 703.0  3/31/2017 - 8/12/2020        RESOLVED: Risk for falls ICD-10-CM: Z91.81  ICD-9-CM: V15.88  12/13/2016 - 8/12/2020            Discharge Condition: [facility]: 69 y.o man w/ dementia, long-standing orthostatic hypotension due to autonomic dysregulation, who presented with syncope. There was initial question of seizures. Neurology consulted and work-up unremarkable; suspicion for seizure very low. Cardiology consulted and adjusted the timing and doses of his midodrine and fludrocortisone. This yielded great improvement of his symptoms, but he remains orthostatic and occasionally symptomatic. He also has supine hypertension and unfortunately this risk is unavoidable at this time. For this reason, he should only get out of bed with assistance, and should wear compression stockings as much as possible. Ricardo Gabriela can be considered as an outpatient as well. Of note, it appears he was on clonazepam PTA and this was discontinued here on admission and he has not received it for an extended period of time now. It should not be given to this patient with dementia and severe orthostatic hypotension.      Consults: Cardiology and Neurology    Significant Diagnostic Studies: see above    Disposition: SNF    S: no acute events overnight, seen and examined today, wife at bedside    Visit Vitals  /80   Pulse 85   Temp 97.7 °F (36.5 °C)   Resp 16   Ht 5' 11\" (1.803 m)   Wt 77.6 kg (171 lb)   SpO2 93%   BMI 23.85 kg/m²     NAD  RRR no MRG  Lungs CTAB  Pleasantly confused    Discharge Medications:   Current Discharge Medication List        CONTINUE these medications which have CHANGED    Details   fludrocortisone (FLORINEF) 0.1 mg tablet Take 0.5 Tablets by mouth daily. Qty: 28 Tablet, Refills: 3      midodrine (PROAMATINE) 5 mg tablet Take as follows:   10 mg at 7:30 AM  10 mg at 11:30 AM  5 mg at 1:00 PM  5 mg at 4:30 PM  Qty: 150 Tablet, Refills: 0           CONTINUE these medications which have NOT CHANGED    Details   tiotropium bromide (Spiriva Respimat) 2.5 mcg/actuation inhaler Take 2 Puffs by inhalation daily. Indications: bronchospasm prevention with COPD, Continue using as prior to admission  Qty: 4 g, Refills: 0      atorvastatin (LIPITOR) 40 mg tablet Take 1 Tablet by mouth in the morning. Qty: 90 Tablet, Refills: 0    Associated Diagnoses: History of CVA (cerebrovascular accident)      DULoxetine (CYMBALTA) 60 mg capsule TAKE 1 CAPSULE BY MOUTH DAILY  Qty: 90 Cap, Refills: 1    Associated Diagnoses: PTSD (post-traumatic stress disorder)      acetaminophen (TYLENOL) 650 mg TbER Take 650 mg by mouth every eight (8) hours as needed for Pain. finasteride (PROSCAR) 5 mg tablet TAKE 1 TABLET BY MOUTH DAILY(PROSTATE)  Qty: 90 Tab, Refills: 0      polyethylene glycol (MIRALAX) 17 gram packet Take 17 g by mouth daily as needed for Constipation. aspirin 81 mg chewable tablet Take 1 Tab by mouth daily. Qty: 90 Tab, Refills: 2           STOP taking these medications       gabapentin (NEURONTIN) 600 mg tablet Comments:   Reason for Stopping:         clonazePAM (KlonoPIN) 0.5 mg tablet Comments:   Reason for Stopping:         donepeziL (ARICEPT) 10 mg tablet Comments:   Reason for Stopping:              Follow-up Information       Follow up With Specialties Details Why [facility]   [address] [place] [address]  [phone]    Follow-up with your neurologist  Follow up in 2 week(s)      rUbano Molina MD [address]  [phone]              Signed By: Guerline Pearson MD     November 30, 2022      Greater than 30 minutes spent on discharge management.

## 2022-11-29 NOTE — PROGRESS NOTES
[address] [facility] Adult  Hospitalist Group                                                                                          Hospitalist Progress Note  Chelle Arnold MD  Answering service: [phone] OR [phone] from in house phone        Date of Service:  2022  NAME:  Zenaida Ventura  :  1945  MRN:  [id number]      Admission Summary:   Zenaida Ventura is a 69 y.o. male who presents with syncope. Patient presents to the ER at [facility] with an episode of syncope versus seizure, patient has a history of orthostatic hypotension, woke up at 3 AM this morning and became confused, and collapsed within the toilet and the wall, patient reports that after a while he called his wife, and was brought to the hospital, not sure whether he had a seizure or syncope, was requested to be admitted to the hospitalist service, currently patient denies any complaints or problems    The patient denies any headache, blurry vision, sore throat, trouble swallowing, trouble with speech, chest pain, SOB, cough, fever, chills, N/V/D, abd pain, urinary symptoms, constipation, recent travels, sick contacts, focal or generalized neurological symptoms,  rashes, contact with COVID-19 diagnosed patients, hematemesis, melena, hemoptysis, hematuria, rashes, denies starting any new medications and denies any other concerns or problems besides as mentioned above. Interval history / Subjective:   No new complaints; no significant dizziness, no pain, dyspnea, n/v/d. Assessment & Plan:        Orthostatic hypotension w/ syncope:  - episodes of rigidity, but no history suggestive of postictal state;   - Neurology consulted;   - based on chart review, the patient has no history of CVA, and no history of seizures.   - per wife, he recently had an MRI of the brain  - EEG : no epileptiform discharges;   - evaluated by Neurology, all symptoms of confusion are being attributed to dementia    Severe autonomic dysregulation:  - on Midodrine; dose adjusted per cardiology  - low-dose florinef resumed  - recommend wheelchair for safety and a bedside commode;   - patient is at high risk of recurrent falls and injury due to Orthostatic hypotension and impulsivity   - per Neurology, the safest for him might be to stay at a wheelchair bound level of functioning/activity  - I think this is the best we'll be able to get him to here. He should go to rehab and follow-up as an outpatient. There will be ups and downs with orthostasis and hence the importance of rehab and close monitoring. PTSD:  - consulted Psychiatry;   - continue home medications as prior to admission;     BPH:  - on Proscar, changed administration to evening time     Dyslipidemia:  - on statin    Hyperglycemia, without history of DM:  - HgA1c 5.9 in 09/22; Code status:  Full  Care Plan discussed with: pt's wife, RN, CM  Anticipated Disposition: SNF when arranged           Hospital Problems  Date Reviewed: 9/19/2022            Codes Class Noted POA    Syncope ICD-10-CM: R55  ICD-9-CM: 780.2  11/16/2022 Unknown       Review of Systems:   A comprehensive review of systems was negative except for that written in the HPI. Vital Signs:    Last 24hrs VS reviewed since prior progress note. Most recent are:  Visit Vitals  BP (!) 166/92 (BP 1 Location: Right arm, BP Patient Position: At rest)   Pulse 90   Temp 97.9 °F (36.6 °C)   Resp 18   Ht 5' 11\" (1.803 m)   Wt 77.6 kg (171 lb)   SpO2 94%   BMI 23.85 kg/m²       No intake or output data in the 24 hours ending 11/29/22 1500       Physical Examination:     I had a face to face encounter with this patient and independently examined them on 11/29/2022 as outlined below:          Constitutional:  No acute distress, cooperative, pleasant    ENT:  Oral mucosa moist, oropharynx benign. Resp:  CTA bilaterally. No wheezing/rhonchi/rales. No accessory muscle use.     CV:  Regular rhythm, normal rate, no murmurs, gallops, rubs    GI:  Soft, non distended, non tender. normoactive bowel sounds, no hepatosplenomegaly     Musculoskeletal:  No edema, warm    Neurologic:  Moves all extremities. Awake, cooperative; pleasantly confused; Data Review:    Review and/or order of clinical lab test  Review and/or order of tests in the radiology section of CPT  Review and/or order of tests in the medicine section of CPT      Labs:     No results for input(s): WBC, HGB, HCT, PLT, HGBEXT, HCTEXT, PLTEXT, HGBEXT, HCTEXT, PLTEXT in the last 72 hours. No results for input(s): NA, K, CL, CO2, BUN, CREA, GLU, CA, MG, PHOS, URICA in the last 72 hours. No results for input(s): ALT, AP, TBIL, TBILI, TP, ALB, GLOB, GGT, AML, LPSE in the last 72 hours. No lab exists for component: SGOT, GPT, AMYP, HLPSE    No results for input(s): INR, PTP, APTT, INREXT, INREXT in the last 72 hours. No results for input(s): FE, TIBC, PSAT, FERR in the last 72 hours. Lab Results   Component Value Date/Time    Folate 14.3 11/08/2022 05:44 AM        No results for input(s): PH, PCO2, PO2 in the last 72 hours. No results for input(s): CPK, CKNDX, TROIQ in the last 72 hours.     No lab exists for component: CPKMB    Lab Results   Component Value Date/Time    Cholesterol, total 95 09/19/2022 12:13 PM    HDL Cholesterol 48 09/19/2022 12:13 PM    LDL, calculated 28.4 09/19/2022 12:13 PM    Triglyceride 93 09/19/2022 12:13 PM    CHOL/HDL Ratio 2.0 09/19/2022 12:13 PM     Lab Results   Component Value Date/Time    Glucose (POC) 141 (H) 11/17/2022 04:57 PM    Glucose (POC) 166 (H) 11/17/2022 11:52 AM    Glucose (POC) 110 (H) 07/02/2019 07:41 PM    Glucose (POC) 68 07/02/2019 05:51 PM     Lab Results   Component Value Date/Time    Color YELLOW/STRAW 11/06/2022 07:15 PM    Appearance CLEAR 11/06/2022 07:15 PM    Specific gravity 1.010 11/06/2022 07:15 PM    Specific gravity 1.006 10/26/2022 06:25 PM    pH (UA) 7.0 11/06/2022 07:15 PM    Protein Negative 11/06/2022 07:15 PM    Glucose Negative 11/06/2022 07:15 PM    Ketone Negative 11/06/2022 07:15 PM    Bilirubin Negative 11/06/2022 07:15 PM    Urobilinogen 0.2 11/06/2022 07:15 PM    Nitrites Negative 11/06/2022 07:15 PM    Leukocyte Esterase Negative 11/06/2022 07:15 PM    Epithelial cells FEW 11/06/2022 07:15 PM    Bacteria Negative 11/06/2022 07:15 PM    WBC 0-4 11/06/2022 07:15 PM    RBC 10-20 11/06/2022 07:15 PM         Medications Reviewed:     Current Facility-Administered Medications   Medication Dose Route Frequency    guaiFENesin-dextromethorphan (ROBITUSSIN DM) 100-10 mg/5 mL syrup 5 mL  5 mL Oral Q6H PRN    fludrocortisone (FLORINEF) tablet 0.05 mg  0.05 mg Oral DAILY    midodrine (PROAMATINE) tablet 10 mg  10 mg Oral ACB    midodrine (PROAMATINE) tablet 10 mg  10 mg Oral ACL    midodrine (PROAMATINE) tablet 5 mg  5 mg Oral PCL    midodrine (PROAMATINE) tablet 5 mg  5 mg Oral ACD    melatonin tablet 3 mg  3 mg Oral QHS PRN    finasteride (PROSCAR) tablet 5 mg  5 mg Oral QHS    aspirin chewable tablet 81 mg  81 mg Oral DAILY    atorvastatin (LIPITOR) tablet 40 mg  40 mg Oral DAILY    DULoxetine (CYMBALTA) capsule 60 mg  60 mg Oral DAILY    sodium chloride (NS) flush 5-40 mL  5-40 mL IntraVENous Q8H    sodium chloride (NS) flush 5-40 mL  5-40 mL IntraVENous PRN    acetaminophen (TYLENOL) tablet 650 mg  650 mg Oral Q4H PRN    heparin (porcine) injection 5,000 Units  5,000 Units SubCUTAneous Q8H     ______________________________________________________________________  EXPECTED LENGTH OF STAY: 2d 7h  ACTUAL LENGTH OF STAY:          13                 Guerline Pearson MD

## 2022-11-29 NOTE — DISCHARGE INSTRUCTIONS
The midodrine and fludrocortisone doses have been adjusted. There will always be a degree of dizziness of blood pressure changes. That is why he should only get up with assistance. Wear compression hose/stockings (not just socks) as much as able. Follow-up with your neurologist for consideration of other drugs for orthostatic hypotension.

## 2022-11-29 NOTE — PROGRESS NOTES
JOSE:  1. RUR-13%  2. [facility] accepted, contract completed. 3. S transport arranged by the [facility]. LAURA spoke with Kourtney Kam at [facility], [phone]. She confirmed the [address] [place] [address] is working on a contract and will send to her today, the coordinator is also arranging transportation and will contact laura and Kourtney Kam with time. Patient life partner, Alfredo Martins at bedside and aware of this information. Medicare pt has received, reviewed, and signed 2nd IM letter informing them of their right to appeal the discharge. Signed copy has been placed on pt bedside chart.     1542- CM spoke with [place] transport, they received a transport request from the [facility] for tomorrow at 12 p.m.      [facility]

## 2022-11-30 VITALS
HEIGHT: 71 IN | OXYGEN SATURATION: 90 % | WEIGHT: 171 LBS | TEMPERATURE: 97.8 F | BODY MASS INDEX: 23.94 KG/M2 | DIASTOLIC BLOOD PRESSURE: 83 MMHG | HEART RATE: 82 BPM | RESPIRATION RATE: 16 BRPM | SYSTOLIC BLOOD PRESSURE: 140 MMHG

## 2022-11-30 PROCEDURE — 74011250637 HC RX REV CODE- 250/637: Performed by: FAMILY MEDICINE

## 2022-11-30 PROCEDURE — 74011000250 HC RX REV CODE- 250: Performed by: FAMILY MEDICINE

## 2022-11-30 PROCEDURE — 94760 N-INVAS EAR/PLS OXIMETRY 1: CPT

## 2022-11-30 PROCEDURE — 74011250637 HC RX REV CODE- 250/637: Performed by: INTERNAL MEDICINE

## 2022-11-30 PROCEDURE — 94660 CPAP INITIATION&MGMT: CPT

## 2022-11-30 PROCEDURE — 74011250636 HC RX REV CODE- 250/636: Performed by: FAMILY MEDICINE

## 2022-11-30 RX ADMIN — ATORVASTATIN CALCIUM 40 MG: 40 TABLET, FILM COATED ORAL at 10:08

## 2022-11-30 RX ADMIN — MIDODRINE HYDROCHLORIDE 10 MG: 5 TABLET ORAL at 11:50

## 2022-11-30 RX ADMIN — SODIUM CHLORIDE, PRESERVATIVE FREE 10 ML: 5 INJECTION INTRAVENOUS at 06:29

## 2022-11-30 RX ADMIN — MIDODRINE HYDROCHLORIDE 10 MG: 5 TABLET ORAL at 06:39

## 2022-11-30 RX ADMIN — HEPARIN SODIUM 5000 UNITS: 5000 INJECTION INTRAVENOUS; SUBCUTANEOUS at 02:45

## 2022-11-30 RX ADMIN — DULOXETINE HYDROCHLORIDE 60 MG: 60 CAPSULE, DELAYED RELEASE ORAL at 10:08

## 2022-11-30 RX ADMIN — ASPIRIN 81 MG 81 MG: 81 TABLET ORAL at 10:09

## 2022-11-30 RX ADMIN — HEPARIN SODIUM 5000 UNITS: 5000 INJECTION INTRAVENOUS; SUBCUTANEOUS at 10:15

## 2022-11-30 RX ADMIN — FLUDROCORTISONE ACETATE 0.05 MG: 0.1 TABLET ORAL at 10:08

## 2022-11-30 NOTE — PROGRESS NOTES
Pt is laying in bed with even and unlabored respirations on room air. No complaints of pain at this time. No distress noted. Pts wife wants to speak with Dr. Jeimy Underwood and Dr. Donna Forman tomorrow. Pts wife states pt normally takes anxiety medication at home and asked Rn to message Dr. Donna Forman to ask to order anxiety medication. RN perfect served Donna Forman regarding anxiety medication. No new orders were placed. Safety precautions are in place. Bed in low position and locked. Call bell within reach. All charting performed by Ruma Abebe RN was reviewed by [facility]. Problem: Falls - Risk of  Goal: *Absence of Falls  Description: Document Vernia Colder Fall Risk and appropriate interventions in the flowsheet.   Outcome: Progressing Towards Goal  Note: Fall Risk Interventions:  Mobility Interventions: Bed/chair exit alarm    Mentation Interventions: Adequate sleep, hydration, pain control    Medication Interventions: Bed/chair exit alarm    Elimination Interventions: Bed/chair exit alarm    History of Falls Interventions: Bed/chair exit alarm         Problem: Patient Education: Go to Patient Education Activity  Goal: Patient/Family Education  Outcome: Progressing Towards Goal     Problem: Altered Thought Process (Adult/Pediatric)  Goal: *STG: Participates in treatment plan  Outcome: Progressing Towards Goal  Goal: *STG: Remains safe in hospital  Outcome: Progressing Towards Goal  Goal: *STG: Seeks staff when feelings of anxiety and fear arise  Outcome: Progressing Towards Goal  Goal: *STG: Complies with medication therapy  Outcome: Progressing Towards Goal  Goal: *STG: Attends activities and groups  Outcome: Progressing Towards Goal  Goal: *STG: Decreased delusional thinking  Outcome: Progressing Towards Goal  Goal: *STG: Decreased hallucinations  Outcome: Progressing Towards Goal  Goal: *STG: Absence of lethality  Outcome: Progressing Towards Goal  Goal: *STG: Demonstrates ability to understand and use improved judgment in daily activities and relationships  Outcome: Progressing Towards Goal  Goal: *LTG: Returns to baseline functioning  Outcome: Progressing Towards Goal  Goal: Interventions  Outcome: Progressing Towards Goal     Problem: Patient Education: Go to Patient Education Activity  Goal: Patient/Family Education  Outcome: Progressing Towards Goal     Problem: Pressure Injury - Risk of  Goal: *Prevention of pressure injury  Description: Document Kane Scale and appropriate interventions in the flowsheet.   Outcome: Progressing Towards Goal  Note: Pressure Injury Interventions:  Sensory Interventions: Assess changes in LOC    Moisture Interventions: Absorbent underpads    Activity Interventions: Pressure redistribution bed/mattress(bed type)    Mobility Interventions: Float heels    Nutrition Interventions: Document food/fluid/supplement intake    Friction and Shear Interventions: Apply protective barrier, creams and emollients                Problem: Patient Education: Go to Patient Education Activity  Goal: Patient/Family Education  Outcome: Progressing Towards Goal     Problem: Patient Education: Go to Patient Education Activity  Goal: Patient/Family Education  Outcome: Progressing Towards Goal     Problem: Patient Education: Go to Patient Education Activity  Goal: Patient/Family Education  Outcome: Progressing Towards Goal

## 2022-11-30 NOTE — PROGRESS NOTES
[address] visit attempted. Prayer for spiritual communion offered. Mr. Carmen Chavez was asleep.     LINDA Fletcher, RN, ACSW, LCSW   Page:  [phone]

## 2022-11-30 NOTE — PROGRESS NOTES
TRANSFER - OUT REPORT:    Verbal report given to Adriana Mark LPN  (name) on Landy Soles  being transferred to [facility] (P-9) for routine progression of care       Report consisted of patients Situation, Background, Assessment and   Recommendations(SBAR). Information from the following report(s) SBAR, Kardex, and MAR was reviewed with the receiving nurse. Lines:   Peripheral IV 11/17/22 Right Forearm (Active)   Site Assessment Clean, dry, & intact 11/29/22 2108   Phlebitis Assessment 0 11/29/22 2108   Infiltration Assessment 0 11/29/22 2108   Dressing Status Clean, dry, & intact 11/29/22 2108   Dressing Type Transparent 11/29/22 2108   Hub Color/Line Status Pink;Flushed 11/29/22 2108   Action Taken Open ports on tubing capped 11/29/22 0918   Alcohol Cap Used Yes 11/29/22 2108        Opportunity for questions and clarification was provided.       Patient transported with:   Den[facility] transport

## 2022-11-30 NOTE — PROGRESS NOTES
Transition of Care Plan to SNF/Rehab    SNF/Rehab Transition:  Patient has been accepted to [facility] and Rehab and meets criteria for admission. Patient will transported by [place] transport and expected to leave at 12:00 p.m. Scottie Escobar Communication to Patient/Family:  Met with patient and his partner, Chad Dozier (identified care giver) and they are agreeable to the transition plan. Communication to SNF/Rehab:  Bedside RN, , has been notified to update the transition plan to the facility and call report (phone number [phone]). Discharge information has been updated on the AVS.         Nursing Please include all hard scripts for controlled substances, med rec and dc summary, and AVS in packet. Reviewed and confirmed with facility, [facility] rehab , can manage the patient care needs for the following:     Oc Sanchez with (X) only those applicable:    Medication:  []  Medications will be available at the facility  []  IV Antibiotics  []  Controlled Substance - hard copy to be sent with patient   []  Weekly Labs   Documents:  [x] Hard RX  [x] MAR  [x] Kardex  [x] AVS  [x]Transfer Summary  [x]Discharge   Equipment:  []  CPAP/BiPAP  []  Wound Vacuum  []  Ferris or Urinary Device  []  PICC/Central Line  []  Nebulizer  []  Ventilator   Treatment:  []Isolation (for MRSA, VRE, etc.)  []Surgical Drain Management  []Tracheostomy Care  []Dressing Changes  []Dialysis with transportation and chair time. []PEG Care  []Oxygen  []Daily Weights for Heart Failure   Dietary:  []Any diet limitations  []Tube Feedings   []Total Parenteral Management (TPN)   Eligible for Medicaid Long Term Services and Supports  Yes:  [] Eligible for medical assistance or will become eligible within 180 days and UAI completed. [] Provider/Patient and/or support system has requested screening. [] UAI copy provided to patient or responsible party. [] UAI unavailable at discharge will send once processed to SNF provider.   [] UAI unavailable at discharged mailed to patient  No:   [] Private pay and is not financially eligible for Medicaid within the next 180 days. [] Reside out-of-state.   [] A residents of a state owned/operated facility that is licensed  by [address] [employer] [facility] or [facility]  [] Enrollment in [facility] hospice services  [] [address]  [] Patient /Family declines to have screening completed or provide financial information for screening     Financial Resources:  Medicaid    [] Initiated and application pending   [] Full coverage     Advanced Care Plan:  []Surrogate Decision Maker of Care  []POA  []Communicated Code Status Partial  (DDNR\", \"Full\")    KIOK Urias [facility]

## 2022-11-30 NOTE — PALLIATIVE CARE DISCHARGE
The Palliative Medicine team was consulted as part of your / your loved one's care in the hospital. Our team is a supportive service; we strive to relieve suffering and improve quality of life. You identified the following goal(s) as your main focus for healthcare: Your goal is to go to a rehab facility in order to gain strength. You are aware that hospice is available to you in the future, however, this is NOT want you want right now. You want to continue to work with therapy at rehab to gain and build strength. We reviewed advance care planning information, which includes the following:  Advance Care Planning 11/23/2022   Patient's Healthcare Decision Maker is: Named in scanned ACP document   Primary Decision Maker Name -Scott Slaughter    Primary Decision Maker Relationship to Patient -Spouse   Confirm Advance Directive Yes, on file   Patient Would Like to Complete Advance Directive -N/A   Does the patient have other document types Do Not Resuscitate       We reviewed / discussed your code status as: Partial Code     Full Code means perform CPR in the event of cardiac arrest     [facility] means do NOT perform CPR in the event of cardiac arrest     Partial Code means you have specific preferences, please discuss with your health care team     No Order means this issue was not addressed / resolved during your stay    You have a DDNR on file. Because of the importance of this information, we are providing you with a printed copy to share with other healthcare providers after this hospitalization is complete. If any of the above information is incomplete or incorrect, please contact the Palliative Medicine team at [phone].

## 2022-12-08 NOTE — PROGRESS NOTES
Physician Progress Note      PATIENT:               Brenda Camilo  [facility] #:                  [id number]  :                       1945  ADMIT DATE:       2022 9:35 AM  DISCH DATE:        2022 1:30 PM  RESPONDING  PROVIDER #:        Constance Davenport MD          QUERY TEXT:      Dear attending,    Patient documented to have long-standing orthostatic hypotension due to autonomic dysregulation. Patient is treated with Midodrine and Florinef. After further review, can the neurogenic orthostatic hypotension be further specified as: The medical record reflects the following:  Risk Factors: hx. severe autonomic dysregulation with ortho hypotension on Midodrine 5mg 4x a day and  florinef 0.1mg bid. PTA,  Clinical Indicators:  -Per neurology- Assessment and Plan:  Pt is a 77yo LH male with severe dysautonomia causing orthostatic hypotension, on midodrine 5mg five times a day and He also has dementia with significant impulsivity and inability to recall safety precautions such as sitting on the side of the bed before standing. These spells are related to his known orthostasis, they are not seizures. -Per PN- Severe autonomic dysregulation:  on Midodrine; discussed with Neurology: patient is supposed to be on Midodrine 5 mg 5 times a day;  -per wife, recently switched to Florinef;  recommend wheelchair for safety and a bedside commode;  patient is at high risk of recurrent falls and injury due to Orthostatic hypotension and impulsivity;  - Per PN- :  significant orthostatic hypotension persists, BP goes up significantly with FLorinef (SBP as high as 205 mmHg; hod Florinef, restart Midodrine; per wife he takes 5mg 4x a day, between 7 am and 5 pm; no evening dose  Per dc summary- dc summary  Hospital Course: 69 y.o man w/ dementia, long-standing orthostatic hypotension due to autonomic dysregulation, who presented with syncope. There was initial question of seizures.  Neurology consulted and work-up unremarkable; suspicion for seizure very low. Cardiology consulted and adjusted the timing and doses of his midodrine and fludrocortisone. This yielded great improvement of his symptoms, but he remains orthostatic and occasionally symptomatic. Treatment: Monitor vital signs, labwork, imaging, neurology consult, Midodrine 10 mg po qd before breakfast, 10 mg po qd before lunch, Midodrine 5 mg po qd before dinner, 5 mg po qd after lunch, Florinef 0.05 mg po qd      Thank you,    Jennifer Patel RN, BSN, CRCR, CCDS  Clinical Documentation Improvement  [phone] or via Perfect Serve  Options provided:  -- Neurogenic orthostatic hypotension due to brain dysfunction  -- orthostatic hypotension  -- Other - I will add my own diagnosis  -- Disagree - Not applicable / Not valid  -- Disagree - Clinically unable to determine / Unknown  -- Refer to Clinical Documentation Reviewer    PROVIDER RESPONSE TEXT:    This patient has orthostatic hypotension.     Query created by: Chaparro Oviedo on 12/5/2022 7:28 AM      Electronically signed by:  Ney Guerrier MD 12/8/2022 4:43 PM

## 2023-01-16 ENCOUNTER — APPOINTMENT (OUTPATIENT)
Dept: GENERAL RADIOLOGY | Age: 78
End: 2023-01-16
Attending: EMERGENCY MEDICINE
Payer: OTHER GOVERNMENT

## 2023-01-16 ENCOUNTER — HOSPITAL ENCOUNTER (EMERGENCY)
Age: 78
Discharge: HOME | End: 2023-01-16
Attending: EMERGENCY MEDICINE
Payer: OTHER GOVERNMENT

## 2023-01-16 VITALS
HEART RATE: 78 BPM | TEMPERATURE: 98 F | SYSTOLIC BLOOD PRESSURE: 136 MMHG | HEIGHT: 71 IN | WEIGHT: 180 LBS | OXYGEN SATURATION: 97 % | RESPIRATION RATE: 20 BRPM | BODY MASS INDEX: 25.2 KG/M2 | DIASTOLIC BLOOD PRESSURE: 79 MMHG

## 2023-01-16 DIAGNOSIS — T14.8XXA MULTIPLE SKIN TEARS: ICD-10-CM

## 2023-01-16 DIAGNOSIS — S63.259A DISLOCATION OF FINGER, INITIAL ENCOUNTER: Primary | ICD-10-CM

## 2023-01-16 PROCEDURE — 73140 X-RAY EXAM OF FINGER(S): CPT

## 2023-01-16 PROCEDURE — 75810000303 HC CLSD TRMT  FRACTURE/DISLOCATION W/  ANES

## 2023-01-16 PROCEDURE — 74011000250 HC RX REV CODE- 250: Performed by: EMERGENCY MEDICINE

## 2023-01-16 PROCEDURE — 73130 X-RAY EXAM OF HAND: CPT

## 2023-01-16 PROCEDURE — 99283 EMERGENCY DEPT VISIT LOW MDM: CPT

## 2023-01-16 RX ORDER — BACITRACIN ZINC 500 UNIT/G
1 OINTMENT IN PACKET (EA) TOPICAL
Status: COMPLETED | OUTPATIENT
Start: 2023-01-16 | End: 2023-01-16

## 2023-01-16 RX ORDER — LIDOCAINE HYDROCHLORIDE 10 MG/ML
10 INJECTION INFILTRATION; PERINEURAL ONCE
Status: COMPLETED | OUTPATIENT
Start: 2023-01-16 | End: 2023-01-16

## 2023-01-16 RX ADMIN — BACITRACIN ZINC 1 PACKET: 500 OINTMENT TOPICAL at 12:46

## 2023-01-16 RX ADMIN — LIDOCAINE HYDROCHLORIDE 10 ML: 10 INJECTION, SOLUTION INFILTRATION; PERINEURAL at 11:06

## 2023-01-16 NOTE — DISCHARGE INSTRUCTIONS
Wear the finger splint while awake and asleep for 1 week. Keep your wounds clean with gentle soap and water (no scrubbing).

## 2023-01-16 NOTE — ED TRIAGE NOTES
Pt arrived by POV after a fall, pt reports a GLF and denies LOC. Pt complains of right hand pain and right arm skin tear. Pt reports he thinks he broke his finger. Pt is awake and alert.   Pt and family educated on ER flow

## 2023-01-16 NOTE — ED PROVIDER NOTES
EMERGENCY DEPARTMENT HISTORY AND PHYSICAL EXAM          Date: 1/16/2023  Patient Name: Cheo Churchill    History of Presenting Illness     Chief Complaint   Patient presents with    Fall     Skin tear right arm and hand       History Provided By: Patient    HPI: Cheo Churchill is a 69 y.o. male, pmhx listed below, presents to the emergency department for fall. Patient was walking from bed to bathroom and upon return to bed, walker was not locked and patient slipped and fell. Landed forward on right arm and hand. Now reports having significant finger and hand pain. Denies head injury or LOC. Fall witnessed by wife.   No treatments for symptoms given prior to arrival.        PCP: Javier Vieyra MD        Past History       Past Medical History:  Past Medical History:   Diagnosis Date    Altered mental status, unspecified 11/9/2018    Anxiety     Ataxia 11/9/2018    Back pain     Blurred vision     Bradycardia 10/2/2018    Chest pain     Depression     Dizziness     Dizziness and giddiness 10/17/2019    Dysautonomia ([address]     Fast heart beat     Fever 11/9/2018    Frequent headaches     Frequent urination     Hip dysplasia, congenital     Hx of syncope 10/24/2018    Hypotension     Ingrown left big toenail 3/31/2017    Joint pain     Joint swelling     Lumbar spinal stenosis     Memory deficit 9/11/2018    Muscle pain     Neuropathy     Orthostatic hypotension     asymptomatic    Pacemaker     new pacemaker July 2019    Recent change in weight     Risk for falls 12/13/2016    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 11/8/2018    Syncope     Tiredness     Trouble in sleeping     Weakness        Past Surgical History:  Past Surgical History:   Procedure Laterality Date    HX HIP REPLACEMENT Bilateral 1993    HX PACEMAKER PLACEMENT  10/16/2018    HX ROTATOR CUFF REPAIR Right 11/16/2017    AR INS NEW/RPLCMT PRM PM W/TRANSV ELTRD ATRIAL&VENT N/A 7/30/2019    INSERT PPM DUAL performed by Radha Juan MD at [facility] CARDIAC CATH LAB    AR UNLISTED PROCEDURE CARDIAC SURGERY         Family History:  Family History   Problem Relation Age of Onset    Cancer Mother     Cancer Maternal Aunt        Social History:  Social History     Tobacco Use    Smoking status: Former     Packs/day: 0.25     Years: 50.00     Pack years: 12.50     Types: Cigarettes     Quit date: 8/3/2019     Years since quitting: 3.4    Smokeless tobacco: Never   Substance Use Topics    Alcohol use: Not Currently     Alcohol/week: 28.0 standard drinks     Types: 28 Cans of beer per week     Comment: None now    Drug use: No       Physical Exam     Vital Signs-Reviewed the patient's vital signs. Patient Vitals for the past 12 hrs:   Temp Pulse Resp BP SpO2   01/16/23 1053 98 °F (36.7 °C) 78 20 136/79 97 %       Physical Exam  Vitals reviewed. HENT:      Head: Normocephalic and atraumatic. Mouth/Throat:      Mouth: Mucous membranes are moist.   Cardiovascular:      Rate and Rhythm: Normal rate. Pulmonary:      Effort: Pulmonary effort is normal.   Musculoskeletal:      Comments: Deformity of distal phalanx of right hand, fourth digit. Good cap refill of all fingers. Unable to flex/extend at DIP joint of fourth digit of right hand. Skin tear to dorsum of right hand and right forearm. Joints of hands and left upper extremity otherwise nontender. Skin:     General: Skin is warm and dry. Neurological:      Mental Status: He is alert and oriented to person, place, and time. Psychiatric:         Mood and Affect: Mood normal.       Diagnostic Study Results     Labs -   No results found for this or any previous visit (from the past 12 hour(s)). Radiologic Studies -   XR 4TH FINGER RT MIN 2 V   Final Result   1.  Normal alignment postreduction         XR HAND RT MIN 3 V   Final Result   1. Dislocation of the 4th distal phalanx. XR 4TH FINGER RT MIN 2 V   Final Result   1. Dislocation of the 4th distal phalanx. CT Results  (Last 48 hours)      None          CXR Results  (Last 48 hours)      None                Medical Decision Making   I am the first provider for this patient. I reviewed the vital signs, available nursing notes, past medical history, past surgical history, family history and social history. Records Reviewed: Nursing Notes and Old Medical Records    Provider Notes (Medical Decision Making):   MDM: 41-year-old male with right finger dislocation. Will likely require reduction. Will x-ray first for possible fracture. No bony tenderness of other extremities to suggest alternate fracture. No head injury, LOC, signs of head trauma noted on exam.    Initial assessment performed. The patients presenting problems have been discussed, and they are in agreement with the care plan formulated and outlined with them. I have encouraged them to ask questions as they arise throughout their visit. PROGRESS NOTE:  Successful reduction of finger. X-ray reveals good alignment. Plan for discharge home and finger splint. Wounds dressed. Discharge note:  Pt re-evaluated and noted to be feeling better, ready for discharge. Updated pt on all final results. Will follow up as instructed. All questions have been answered, pt voiced understanding and agreement with plan. Specific return precautions provided as well as instructions to return to the ED should sx worsen at any time. Vital signs stable for discharge.        Reduction of Joint    Date/Time: 1/16/2023 12:43 PM  Performed by: Fidencio Ford MD  Authorized by: Fidencio Ford MD     Consent:     Consent obtained:  Verbal    Consent given by:  Patient  Injury:     Injury location:  Finger    Finger injury location:  R ring finger    MCP joint involved: no      IP joint involved: yes Pre-procedure details:     Distal neurologic exam:  Weakness    Distal perfusion: brisk capillary refill      Range of motion: reduced    Sedation:     Sedation type:  None  Anesthesia:     Anesthesia method:  Nerve block    Block technique:  Digital block    Block injection procedure:  Anatomic landmarks identified    Block outcome:  Anesthesia achieved  Procedure details:     Manipulation performed: yes      Skin traction used: yes      Reduction successful: yes      X-ray confirmed reduction: yes      Immobilization:  Splint    Splint type:  Finger  Post-procedure details:     Distal neurologic exam:  Normal    Range of motion: normal      Procedure completion:  Tolerated      Diagnosis     Clinical Impression:   1. Dislocation of finger, initial encounter    2. Multiple skin tears            Disposition:  Discharged    Current Discharge Medication List            Please note, this dictation was completed with [employer], the computer voice recognition software. Quite often unanticipated grammatical, syntax, homophones, and other interpretive errors are inadvertently transcribed by the computer software. Please disregard these errors. Please excuse any errors that have escaped final proof reading.

## 2023-04-18 ENCOUNTER — APPOINTMENT (OUTPATIENT)
Dept: PHYSICAL THERAPY | Age: 78
End: 2023-04-18
Payer: OTHER GOVERNMENT

## 2023-04-19 ENCOUNTER — HOSPITAL ENCOUNTER (OUTPATIENT)
Dept: PHYSICAL THERAPY | Age: 78
Discharge: HOME | End: 2023-04-19
Payer: OTHER GOVERNMENT

## 2023-04-19 PROCEDURE — 97110 THERAPEUTIC EXERCISES: CPT

## 2023-04-19 NOTE — PROGRESS NOTES
PT DAILY TREATMENT NOTE - [facility]     Patient Name: Stacia Schwartz  Date:2023  : 1945  [x]  Patient  Verified  Payor: [facility] CCN / Plan: [facility] CCN / Product Type: Federal [employer]ed Programs /    In [employer] time:250  Total Treatment Time (min): 45  Total Timed Codes (min): 45  1:1 Treatment Time ([address] only): 39   Visit #: 2 of 16    Treatment Area: Muscle weakness (generalized) [M62.81]    SUBJECTIVE  Pain Level (0-10 scale): 0  Any medication changes, allergies to medications, adverse drug reactions, diagnosis change, or new procedure performed?: [x] No    [] Yes (see summary sheet for update)  Subjective functional status/changes:   [] No changes reported  SOB continues, since hospitalization in January, worse in am, abates by late afternoon. MDs are aware per patient    OBJECTIVE      45 min Therapeutic Exercise:  [x] See flow sheet :   Rationale: increase strength, improve coordination, improve balance, and increase proprioception to improve the patients ability to ambulate and perform functional activities              With   [] TE   [] TA   [] neuro   [] other: Patient Education: [x] Review HEP    [] Progressed/Changed HEP based on:   [] positioning   [] body mechanics   [] transfers   [] heat/ice application    [] other:      Other Objective/Functional Measures: At rest Sp02 98% pulse 82, Sp02 99% and pulse 98 after ambulating 120' and patient feels SOB     Pain Level (0-10 scale) post treatment: 0    ASSESSMENT/Changes in Function: Patient is limited due to SOB, he requires a short rest when transitioning to different activities    Patient will continue to benefit from skilled PT services to modify and progress therapeutic interventions and address functional mobility deficits to attain remaining goals.      [x]  See Plan of Care  []  See progress note/recertification  []  See Discharge Summary         Progress towards goals / Updated goals:  Progressing towards goals    PLAN  [x]  Upgrade activities as tolerated     [x]  Continue plan of care  [x]  Update interventions per flow sheet       []  Discharge due to:_  []  Other:_      Ines Victoria, PT 4/19/2023

## 2023-04-20 ENCOUNTER — APPOINTMENT (OUTPATIENT)
Dept: PHYSICAL THERAPY | Age: 78
End: 2023-04-20
Payer: OTHER GOVERNMENT

## 2023-05-01 ENCOUNTER — APPOINTMENT (OUTPATIENT)
Facility: HOSPITAL | Age: 78
End: 2023-05-01
Payer: OTHER GOVERNMENT

## 2023-05-03 ENCOUNTER — HOSPITAL ENCOUNTER (OUTPATIENT)
Dept: PHYSICAL THERAPY | Age: 78
End: 2023-05-03

## 2023-05-04 ENCOUNTER — HOSPITAL ENCOUNTER (EMERGENCY)
Age: 78
Discharge: HOME | End: 2023-05-05
Attending: FAMILY MEDICINE
Payer: OTHER GOVERNMENT

## 2023-05-04 ENCOUNTER — APPOINTMENT (OUTPATIENT)
Dept: CT IMAGING | Age: 78
End: 2023-05-04
Attending: FAMILY MEDICINE
Payer: OTHER GOVERNMENT

## 2023-05-04 ENCOUNTER — APPOINTMENT (OUTPATIENT)
Dept: GENERAL RADIOLOGY | Age: 78
End: 2023-05-04
Attending: FAMILY MEDICINE
Payer: OTHER GOVERNMENT

## 2023-05-04 VITALS
TEMPERATURE: 98.5 F | RESPIRATION RATE: 20 BRPM | HEART RATE: 80 BPM | WEIGHT: 160 LBS | SYSTOLIC BLOOD PRESSURE: 183 MMHG | DIASTOLIC BLOOD PRESSURE: 89 MMHG | OXYGEN SATURATION: 98 % | BODY MASS INDEX: 22.32 KG/M2

## 2023-05-04 DIAGNOSIS — I95.1 AUTONOMIC ORTHOSTATIC HYPOTENSION: Primary | ICD-10-CM

## 2023-05-04 DIAGNOSIS — R91.1 PULMONARY NODULE: ICD-10-CM

## 2023-05-04 LAB
ALBUMIN SERPL-MCNC: 3.9 G/DL (ref 3.5–5)
ALBUMIN/GLOB SERPL: 1.3 (ref 1.1–2.2)
ALP SERPL-CCNC: 103 U/L (ref 45–117)
ALT SERPL-CCNC: 29 U/L (ref 12–78)
ANION GAP SERPL CALC-SCNC: 11 MMOL/L (ref 5–15)
AST SERPL-CCNC: 21 U/L (ref 15–37)
BASOPHILS # BLD: 0.1 K/UL (ref 0–0.1)
BASOPHILS NFR BLD: 1 % (ref 0–1)
BILIRUB SERPL-MCNC: 0.8 MG/DL (ref 0.2–1)
BUN SERPL-MCNC: 23 MG/DL (ref 6–20)
BUN/CREAT SERPL: 18 (ref 12–20)
CALCIUM SERPL-MCNC: 8.5 MG/DL (ref 8.5–10.1)
CHLORIDE SERPL-SCNC: 102 MMOL/L (ref 97–108)
CO2 SERPL-SCNC: 27 MMOL/L (ref 21–32)
CREAT SERPL-MCNC: 1.3 MG/DL (ref 0.7–1.3)
D DIMER PPP FEU-MCNC: 0.72 MG/L FEU (ref 0–0.65)
DIFFERENTIAL METHOD BLD: ABNORMAL
EOSINOPHIL # BLD: 0.1 K/UL (ref 0–0.4)
EOSINOPHIL NFR BLD: 2 % (ref 0–7)
ERYTHROCYTE [DISTWIDTH] IN BLOOD BY AUTOMATED COUNT: 13.3 % (ref 11.5–14.5)
GLOBULIN SER CALC-MCNC: 3 G/DL (ref 2–4)
GLUCOSE SERPL-MCNC: 109 MG/DL (ref 65–100)
HCT VFR BLD AUTO: 37.8 % (ref 36.6–50.3)
HGB BLD-MCNC: 12.6 G/DL (ref 12.1–17)
IMM GRANULOCYTES # BLD AUTO: 0 K/UL (ref 0–0.04)
IMM GRANULOCYTES NFR BLD AUTO: 1 % (ref 0–0.5)
LYMPHOCYTES # BLD: 1.4 K/UL (ref 0.8–3.5)
LYMPHOCYTES NFR BLD: 25 % (ref 12–49)
MAGNESIUM SERPL-MCNC: 2.2 MG/DL (ref 1.6–2.4)
MCH RBC QN AUTO: 30.8 PG (ref 26–34)
MCHC RBC AUTO-ENTMCNC: 33.3 G/DL (ref 30–36.5)
MCV RBC AUTO: 92.4 FL (ref 80–99)
MONOCYTES # BLD: 0.5 K/UL (ref 0–1)
MONOCYTES NFR BLD: 9 % (ref 5–13)
NEUTS SEG # BLD: 3.4 K/UL (ref 1.8–8)
NEUTS SEG NFR BLD: 62 % (ref 32–75)
NRBC # BLD: 0 K/UL (ref 0–0.01)
NRBC BLD-RTO: 0 PER 100 WBC
PLATELET # BLD AUTO: 171 K/UL (ref 150–400)
PMV BLD AUTO: 9.6 FL (ref 8.9–12.9)
POTASSIUM SERPL-SCNC: 4.5 MMOL/L (ref 3.5–5.1)
PROT SERPL-MCNC: 6.9 G/DL (ref 6.4–8.2)
RBC # BLD AUTO: 4.09 M/UL (ref 4.1–5.7)
SODIUM SERPL-SCNC: 140 MMOL/L (ref 136–145)
TROPONIN I SERPL HS-MCNC: 8 NG/L (ref 0–76)
WBC # BLD AUTO: 5.5 K/UL (ref 4.1–11.1)

## 2023-05-04 PROCEDURE — 99285 EMERGENCY DEPT VISIT HI MDM: CPT

## 2023-05-04 PROCEDURE — 85025 COMPLETE CBC W/AUTO DIFF WBC: CPT

## 2023-05-04 PROCEDURE — 84484 ASSAY OF TROPONIN QUANT: CPT

## 2023-05-04 PROCEDURE — 80053 COMPREHEN METABOLIC PANEL: CPT

## 2023-05-04 PROCEDURE — 71045 X-RAY EXAM CHEST 1 VIEW: CPT

## 2023-05-04 PROCEDURE — 36415 COLL VENOUS BLD VENIPUNCTURE: CPT

## 2023-05-04 PROCEDURE — 71275 CT ANGIOGRAPHY CHEST: CPT

## 2023-05-04 PROCEDURE — 85379 FIBRIN DEGRADATION QUANT: CPT

## 2023-05-04 PROCEDURE — 83735 ASSAY OF MAGNESIUM: CPT

## 2023-05-04 PROCEDURE — 70450 CT HEAD/BRAIN W/O DYE: CPT

## 2023-05-05 PROCEDURE — 74011000636 HC RX REV CODE- 636: Performed by: FAMILY MEDICINE

## 2023-05-05 RX ADMIN — IOPAMIDOL 100 ML: 755 INJECTION, SOLUTION INTRAVENOUS at 00:40

## 2023-05-06 ENCOUNTER — APPOINTMENT (OUTPATIENT)
Facility: HOSPITAL | Age: 78
End: 2023-05-06
Payer: OTHER GOVERNMENT

## 2023-05-06 ENCOUNTER — HOSPITAL ENCOUNTER (EMERGENCY)
Facility: HOSPITAL | Age: 78
Discharge: HOME | End: 2023-05-06
Attending: EMERGENCY MEDICINE
Payer: OTHER GOVERNMENT

## 2023-05-06 VITALS
TEMPERATURE: 97.6 F | RESPIRATION RATE: 20 BRPM | WEIGHT: 167.4 LBS | OXYGEN SATURATION: 98 % | BODY MASS INDEX: 23.35 KG/M2 | DIASTOLIC BLOOD PRESSURE: 110 MMHG | HEART RATE: 82 BPM | SYSTOLIC BLOOD PRESSURE: 197 MMHG

## 2023-05-06 DIAGNOSIS — R30.0 DYSURIA: Primary | ICD-10-CM

## 2023-05-06 DIAGNOSIS — F41.1 ANXIETY STATE: ICD-10-CM

## 2023-05-06 DIAGNOSIS — J43.9 PULMONARY EMPHYSEMA, UNSPECIFIED EMPHYSEMA TYPE (HCC): ICD-10-CM

## 2023-05-06 LAB
ANION GAP SERPL CALC-SCNC: 11 MMOL/L (ref 5–15)
APPEARANCE UR: CLEAR
BACTERIA URNS QL MICRO: NEGATIVE /HPF
BASOPHILS # BLD: 0 K/UL (ref 0–0.1)
BASOPHILS NFR BLD: 1 % (ref 0–1)
BILIRUB UR QL: NEGATIVE
BUN SERPL-MCNC: 28 MG/DL (ref 6–20)
BUN/CREAT SERPL: 23 (ref 12–20)
CALCIUM SERPL-MCNC: 9 MG/DL (ref 8.5–10.1)
CHLORIDE SERPL-SCNC: 105 MMOL/L (ref 97–108)
CO2 SERPL-SCNC: 24 MMOL/L (ref 21–32)
COLOR UR: ABNORMAL
CREAT SERPL-MCNC: 1.23 MG/DL (ref 0.7–1.3)
DIFFERENTIAL METHOD BLD: ABNORMAL
EOSINOPHIL # BLD: 0.1 K/UL (ref 0–0.4)
EOSINOPHIL NFR BLD: 2 % (ref 0–7)
EPITH CASTS URNS QL MICRO: ABNORMAL /LPF
ERYTHROCYTE [DISTWIDTH] IN BLOOD BY AUTOMATED COUNT: 13.4 % (ref 11.5–14.5)
GLUCOSE SERPL-MCNC: 125 MG/DL (ref 65–100)
GLUCOSE UR STRIP.AUTO-MCNC: 100 MG/DL
HCT VFR BLD AUTO: 38.5 % (ref 36.6–50.3)
HGB BLD-MCNC: 12.9 G/DL (ref 12.1–17)
HGB UR QL STRIP: NEGATIVE
IMM GRANULOCYTES # BLD AUTO: 0.1 K/UL (ref 0–0.04)
IMM GRANULOCYTES NFR BLD AUTO: 1 % (ref 0–0.5)
KETONES UR QL STRIP.AUTO: NEGATIVE MG/DL
LEUKOCYTE ESTERASE UR QL STRIP.AUTO: NEGATIVE
LYMPHOCYTES # BLD: 1.5 K/UL (ref 0.8–3.5)
LYMPHOCYTES NFR BLD: 23 % (ref 12–49)
MCH RBC QN AUTO: 30.9 PG (ref 26–34)
MCHC RBC AUTO-ENTMCNC: 33.5 G/DL (ref 30–36.5)
MCV RBC AUTO: 92.3 FL (ref 80–99)
MONOCYTES # BLD: 0.5 K/UL (ref 0–1)
MONOCYTES NFR BLD: 8 % (ref 5–13)
NEUTS SEG # BLD: 4.3 K/UL (ref 1.8–8)
NEUTS SEG NFR BLD: 65 % (ref 32–75)
NITRITE UR QL STRIP.AUTO: NEGATIVE
NRBC # BLD: 0 K/UL (ref 0–0.01)
NRBC BLD-RTO: 0 PER 100 WBC
PH UR STRIP: 6 (ref 5–8)
PLATELET # BLD AUTO: 155 K/UL (ref 150–400)
PMV BLD AUTO: 9.3 FL (ref 8.9–12.9)
POTASSIUM SERPL-SCNC: 4.3 MMOL/L (ref 3.5–5.1)
PROT UR STRIP-MCNC: NEGATIVE MG/DL
RBC # BLD AUTO: 4.17 M/UL (ref 4.1–5.7)
RBC #/AREA URNS HPF: ABNORMAL /HPF (ref 0–5)
SODIUM SERPL-SCNC: 140 MMOL/L (ref 136–145)
SP GR UR REFRACTOMETRY: 1.02 (ref 1–1.03)
TROPONIN I SERPL HS-MCNC: 6 NG/L (ref 0–76)
URINE CULTURE IF INDICATED: ABNORMAL
UROBILINOGEN UR QL STRIP.AUTO: 1 EU/DL (ref 0.2–1)
WBC # BLD AUTO: 6.5 K/UL (ref 4.1–11.1)
WBC URNS QL MICRO: ABNORMAL /HPF (ref 0–4)

## 2023-05-06 PROCEDURE — 93005 ELECTROCARDIOGRAM TRACING: CPT | Performed by: NURSE PRACTITIONER

## 2023-05-06 PROCEDURE — 51798 US URINE CAPACITY MEASURE: CPT

## 2023-05-06 PROCEDURE — 99285 EMERGENCY DEPT VISIT HI MDM: CPT

## 2023-05-06 PROCEDURE — 81001 URINALYSIS AUTO W/SCOPE: CPT

## 2023-05-06 PROCEDURE — 85025 COMPLETE CBC W/AUTO DIFF WBC: CPT

## 2023-05-06 PROCEDURE — 84484 ASSAY OF TROPONIN QUANT: CPT

## 2023-05-06 PROCEDURE — 80048 BASIC METABOLIC PNL TOTAL CA: CPT

## 2023-05-06 PROCEDURE — 71045 X-RAY EXAM CHEST 1 VIEW: CPT

## 2023-05-06 PROCEDURE — 36415 COLL VENOUS BLD VENIPUNCTURE: CPT

## 2023-05-06 RX ORDER — POTASSIUM CHLORIDE 750 MG/1
10 TABLET, EXTENDED RELEASE ORAL DAILY
COMMUNITY

## 2023-05-06 RX ORDER — ALBUTEROL SULFATE 90 UG/1
2 AEROSOL, METERED RESPIRATORY (INHALATION) 4 TIMES DAILY PRN
Qty: 54 G | Refills: 1 | Status: SHIPPED | OUTPATIENT
Start: 2023-05-06

## 2023-05-06 RX ORDER — DONEPEZIL HYDROCHLORIDE 10 MG/1
1 TABLET, FILM COATED ORAL DAILY
COMMUNITY
Start: 2023-02-03

## 2023-05-06 RX ORDER — ERGOCALCIFEROL 1.25 MG/1
CAPSULE ORAL
COMMUNITY
Start: 2020-06-16

## 2023-05-06 RX ORDER — BUSPIRONE HYDROCHLORIDE 10 MG/1
1 TABLET ORAL EVERY 8 HOURS
COMMUNITY

## 2023-05-06 ASSESSMENT — ENCOUNTER SYMPTOMS
WHEEZING: 0
SHORTNESS OF BREATH: 1
COUGH: 0
DIARRHEA: 0
CONSTIPATION: 0
CHEST TIGHTNESS: 0
VOMITING: 0
NAUSEA: 0
ABDOMINAL PAIN: 0

## 2023-05-06 ASSESSMENT — PAIN SCALES - GENERAL: PAINLEVEL_OUTOF10: 0

## 2023-05-06 ASSESSMENT — PAIN - FUNCTIONAL ASSESSMENT: PAIN_FUNCTIONAL_ASSESSMENT: NONE - DENIES PAIN

## 2023-05-06 NOTE — ED TRIAGE NOTES
Patient presents to the ED with difficulty urinating. Per the patient he is unable to empty his bladder. Denies dysuria.

## 2023-05-06 NOTE — ED PROVIDER NOTES
[address] ENCOUNTER       Pt Name: Lorri Calderón  MRN: [id number]  [place] 1945  Date of evaluation: 5/6/2023  Provider: KENDALL Xiong - CLAUS   PCP: Eulalia Bowser MD  Note Started: 3:14 PM 5/6/23     CHIEF COMPLAINT       Chief Complaint   Patient presents with    Urinary Retention        HISTORY OF PRESENT ILLNESS: 1 or more elements      History From: Patient, Patient's Wife, and Caregiver  None     Lorri Calderón is a 69 y.o. male who presents to the ED for evaluation of urinary retention. Patient reports that he has been unable to void this morning. Caregiver at bedside reports that he is followed by urologist, called and spoke with them and they suggested he be checked for UTI which is what prompted his visit today to the ED. Patient reports that he has had intermittent issues with this previously. Patient reports that otherwise he feels well and denies any other sxs or complaints at this time. Nursing Notes were all reviewed and agreed with or any disagreements were addressed in the HPI. REVIEW OF SYSTEMS      Review of Systems   Constitutional:  Negative for chills and fever. HENT:  Negative for congestion. Respiratory:  Positive for shortness of breath. Negative for cough, chest tightness and wheezing. Cardiovascular:  Negative for chest pain, palpitations and leg swelling. Gastrointestinal:  Negative for abdominal pain, constipation, diarrhea, nausea and vomiting. Genitourinary:  Positive for difficulty urinating. Negative for dysuria, flank pain, frequency, hematuria, penile discharge, penile pain, testicular pain and urgency. Neurological:  Negative for dizziness, syncope, facial asymmetry, speech difficulty, light-headedness, numbness and headaches. Psychiatric/Behavioral:  Negative for self-injury, sleep disturbance and suicidal ideas. The patient is nervous/anxious. Positives and Pertinent negatives as per HPI.     PAST HISTORY

## 2023-05-06 NOTE — ED NOTES
I have reviewed discharge instructions with the  wife and patient . The  wife and patient  verbalized understanding. Discharge medications discussed with patient. No questions at this time. Being assisted by wife with dressing at this time.      Clay Schofield RN  35/15/49 [id number]

## 2023-05-06 NOTE — ED NOTES
Pt wife has left to take caregiver home, ED Provider to discuss plan of care with her and pt upon return.      Jayme Correia RN  02/88/65 2474

## 2023-05-06 NOTE — ED NOTES
Pt has used urinal again with assistance, another 100 of urine out.      Julissa Berry, MAYELA  34/36/11 4540

## 2023-05-06 NOTE — ED NOTES
TRANSFER - OUT REPORT:    Verbal report given to  [address] [place] [address] on Michael Christy  for nursing hand off Report consisted of patient's Situation, Background, Assessment and   Recommendations(SBAR). Information from the following report(s) Nurse Handoff Report, Neuro Assessment, and Event Log was reviewed with the receiving nurse. Opportunity for questions and clarification was provided.               Alex Coleman RN  05/06/23 5939

## 2023-05-07 LAB
ATRIAL RATE: 80 BPM
CALCULATED P AXIS, ECG09: 100 DEGREES
CALCULATED R AXIS, ECG10: 62 DEGREES
CALCULATED T AXIS, ECG11: 47 DEGREES
DIAGNOSIS, 93000: NORMAL
EKG ATRIAL RATE: 80 BPM
EKG DIAGNOSIS: NORMAL
EKG P AXIS: 69 DEGREES
EKG P-R INTERVAL: 224 MS
EKG Q-T INTERVAL: 390 MS
EKG QRS DURATION: 86 MS
EKG QTC CALCULATION (BAZETT): 449 MS
EKG R AXIS: 59 DEGREES
EKG T AXIS: 51 DEGREES
EKG VENTRICULAR RATE: 80 BPM
P-R INTERVAL, ECG05: 220 MS
Q-T INTERVAL, ECG07: 404 MS
QRS DURATION, ECG06: 92 MS
QTC CALCULATION (BEZET), ECG08: 465 MS
VENTRICULAR RATE, ECG03: 80 BPM

## 2023-05-09 ENCOUNTER — HOSPITAL ENCOUNTER (OUTPATIENT)
Facility: HOSPITAL | Age: 78
Setting detail: RECURRING SERIES
Discharge: HOME | End: 2023-05-12
Payer: OTHER GOVERNMENT

## 2023-05-09 ENCOUNTER — APPOINTMENT (OUTPATIENT)
Dept: PHYSICAL THERAPY | Age: 78
End: 2023-05-09

## 2023-05-09 PROCEDURE — 97530 THERAPEUTIC ACTIVITIES: CPT

## 2023-05-09 PROCEDURE — 97110 THERAPEUTIC EXERCISES: CPT

## 2023-05-09 NOTE — PROGRESS NOTES
PHYSICAL THERAPY - DAILY TREATMENT NOTE (updated 3/23)      Date: 2023          Patient Name:  Iliana Beavers :  1945   Medical   Diagnosis:  Muscle weakness (generalized) [M62.81] Treatment Diagnosis:  M62.81  GENERAL MUSCLE WEAKNESS    Referral Source:  Dimitri Weston MD Insurance:   Payor: Bobby Favre / Plan: [address] [id number] / Product Type: *No Product type* /                     Patient  verified yes     Visit #   Current  / Total 3 16   Time   In / Out 1045 1130   Total Treatment Time 45   Total Timed Codes 45         SUBJECTIVE    Pain Level (0-10 scale): 0/10    Any medication changes, allergies to medications, adverse drug reactions, diagnosis change, or new procedure performed?: [x] No    [] Yes (see summary sheet for update)  Medications: Verified on Patient Summary List    Subjective functional status/changes:     I just can't figure out the breathing issues I have in the mornings. Its fine in the evening. OBJECTIVE      Therapeutic Procedures: Tx Min Billable or 1:1 Min (if diff from Tx Min) Procedure, Rationale, Specifics   30  00311 Therapeutic Exercise (timed):  increase ROM, strength, coordination, balance, and proprioception to improve patient's ability to progress to PLOF and address remaining functional goals. (see flow sheet as applicable)     Details if applicable:      Seated Marching 20x  Seated LAQ 20x  Supine Hip abd with RTB 20x    Supine dead bug x 20 each   15  85773 Therapeutic Activity (timed):  use of dynamic activities replicating functional movements to increase ROM, strength, coordination, balance, and proprioception in order to improve patient's ability to progress to PLOF and address remaining functional goals. (see flow sheet as applicable)     Details if applicable:      Seated pursed lip breathing education with return demonstration.         45     Total Total       [x]  Patient Education billed concurrently with other procedures

## 2023-05-14 ENCOUNTER — HOSPITAL ENCOUNTER (EMERGENCY)
Facility: HOSPITAL | Age: 78
Discharge: HOME | End: 2023-05-15
Attending: EMERGENCY MEDICINE
Payer: OTHER GOVERNMENT

## 2023-05-14 DIAGNOSIS — I95.1 AUTONOMIC ORTHOSTATIC HYPOTENSION: Primary | ICD-10-CM

## 2023-05-14 LAB
ALBUMIN SERPL-MCNC: 3.7 G/DL (ref 3.5–5)
ALBUMIN/GLOB SERPL: 1.4 (ref 1.1–2.2)
ALP SERPL-CCNC: 95 U/L (ref 45–117)
ALT SERPL-CCNC: 27 U/L (ref 12–78)
ANION GAP SERPL CALC-SCNC: 12 MMOL/L (ref 5–15)
AST SERPL-CCNC: 15 U/L (ref 15–37)
BASOPHILS # BLD: 0.1 K/UL (ref 0–0.1)
BASOPHILS NFR BLD: 1 % (ref 0–1)
BILIRUB SERPL-MCNC: 0.7 MG/DL (ref 0.2–1)
BUN SERPL-MCNC: 20 MG/DL (ref 6–20)
BUN/CREAT SERPL: 14 (ref 12–20)
CALCIUM SERPL-MCNC: 8.4 MG/DL (ref 8.5–10.1)
CHLORIDE SERPL-SCNC: 104 MMOL/L (ref 97–108)
CO2 SERPL-SCNC: 24 MMOL/L (ref 21–32)
CREAT SERPL-MCNC: 1.48 MG/DL (ref 0.7–1.3)
DIFFERENTIAL METHOD BLD: ABNORMAL
EOSINOPHIL # BLD: 0.1 K/UL (ref 0–0.4)
EOSINOPHIL NFR BLD: 2 % (ref 0–7)
ERYTHROCYTE [DISTWIDTH] IN BLOOD BY AUTOMATED COUNT: 13.3 % (ref 11.5–14.5)
GLOBULIN SER CALC-MCNC: 2.7 G/DL (ref 2–4)
GLUCOSE SERPL-MCNC: 153 MG/DL (ref 65–100)
HCT VFR BLD AUTO: 35.4 % (ref 36.6–50.3)
HGB BLD-MCNC: 11.8 G/DL (ref 12.1–17)
IMM GRANULOCYTES # BLD AUTO: 0.1 K/UL (ref 0–0.04)
IMM GRANULOCYTES NFR BLD AUTO: 1 % (ref 0–0.5)
LYMPHOCYTES # BLD: 1.3 K/UL (ref 0.8–3.5)
LYMPHOCYTES NFR BLD: 20 % (ref 12–49)
MAGNESIUM SERPL-MCNC: 2.1 MG/DL (ref 1.6–2.4)
MCH RBC QN AUTO: 31.1 PG (ref 26–34)
MCHC RBC AUTO-ENTMCNC: 33.3 G/DL (ref 30–36.5)
MCV RBC AUTO: 93.2 FL (ref 80–99)
MONOCYTES # BLD: 0.5 K/UL (ref 0–1)
MONOCYTES NFR BLD: 8 % (ref 5–13)
NEUTS SEG # BLD: 4.4 K/UL (ref 1.8–8)
NEUTS SEG NFR BLD: 68 % (ref 32–75)
NRBC # BLD: 0 K/UL (ref 0–0.01)
NRBC BLD-RTO: 0 PER 100 WBC
PLATELET # BLD AUTO: 163 K/UL (ref 150–400)
PMV BLD AUTO: 9.8 FL (ref 8.9–12.9)
POTASSIUM SERPL-SCNC: 4.2 MMOL/L (ref 3.5–5.1)
PROT SERPL-MCNC: 6.4 G/DL (ref 6.4–8.2)
RBC # BLD AUTO: 3.8 M/UL (ref 4.1–5.7)
SODIUM SERPL-SCNC: 140 MMOL/L (ref 136–145)
TROPONIN I SERPL HS-MCNC: 7 NG/L (ref 0–76)
WBC # BLD AUTO: 6.4 K/UL (ref 4.1–11.1)

## 2023-05-14 PROCEDURE — 80053 COMPREHEN METABOLIC PANEL: CPT

## 2023-05-14 PROCEDURE — 36415 COLL VENOUS BLD VENIPUNCTURE: CPT

## 2023-05-14 PROCEDURE — 83735 ASSAY OF MAGNESIUM: CPT

## 2023-05-14 PROCEDURE — 84484 ASSAY OF TROPONIN QUANT: CPT

## 2023-05-14 PROCEDURE — 2580000003 HC RX 258: Performed by: EMERGENCY MEDICINE

## 2023-05-14 PROCEDURE — 96360 HYDRATION IV INFUSION INIT: CPT

## 2023-05-14 PROCEDURE — 85025 COMPLETE CBC W/AUTO DIFF WBC: CPT

## 2023-05-14 PROCEDURE — 99284 EMERGENCY DEPT VISIT MOD MDM: CPT

## 2023-05-14 RX ORDER — 0.9 % SODIUM CHLORIDE 0.9 %
1000 INTRAVENOUS SOLUTION INTRAVENOUS ONCE
Status: COMPLETED | OUTPATIENT
Start: 2023-05-14 | End: 2023-05-15

## 2023-05-14 RX ADMIN — SODIUM CHLORIDE 1000 ML: 9 INJECTION, SOLUTION INTRAVENOUS at 23:16

## 2023-05-14 ASSESSMENT — ENCOUNTER SYMPTOMS
VOMITING: 0
NAUSEA: 0
BACK PAIN: 0
DIARRHEA: 0
VISUAL CHANGE: 0
SHORTNESS OF BREATH: 0

## 2023-05-14 ASSESSMENT — PAIN - FUNCTIONAL ASSESSMENT: PAIN_FUNCTIONAL_ASSESSMENT: NONE - DENIES PAIN

## 2023-05-15 VITALS
OXYGEN SATURATION: 97 % | SYSTOLIC BLOOD PRESSURE: 132 MMHG | TEMPERATURE: 97.2 F | WEIGHT: 178 LBS | HEIGHT: 71 IN | HEART RATE: 83 BPM | BODY MASS INDEX: 24.92 KG/M2 | RESPIRATION RATE: 22 BRPM | DIASTOLIC BLOOD PRESSURE: 81 MMHG

## 2023-05-15 LAB
APPEARANCE UR: CLEAR
BACTERIA URNS QL MICRO: NEGATIVE /HPF
BILIRUB UR QL: NEGATIVE
COLOR UR: ABNORMAL
EPITH CASTS URNS QL MICRO: ABNORMAL /LPF
GLUCOSE UR STRIP.AUTO-MCNC: NEGATIVE MG/DL
HGB UR QL STRIP: ABNORMAL
KETONES UR QL STRIP.AUTO: NEGATIVE MG/DL
LEUKOCYTE ESTERASE UR QL STRIP.AUTO: NEGATIVE
NITRITE UR QL STRIP.AUTO: NEGATIVE
PH UR STRIP: 7 (ref 5–8)
PROT UR STRIP-MCNC: NEGATIVE MG/DL
RBC #/AREA URNS HPF: ABNORMAL /HPF (ref 0–5)
SP GR UR REFRACTOMETRY: <1.005 (ref 1–1.03)
URINE CULTURE IF INDICATED: ABNORMAL
UROBILINOGEN UR QL STRIP.AUTO: 0.2 EU/DL (ref 0.2–1)
WBC URNS QL MICRO: ABNORMAL /HPF (ref 0–4)

## 2023-05-15 PROCEDURE — 81001 URINALYSIS AUTO W/SCOPE: CPT

## 2023-05-15 PROCEDURE — 96361 HYDRATE IV INFUSION ADD-ON: CPT

## 2023-05-15 PROCEDURE — 2580000003 HC RX 258: Performed by: EMERGENCY MEDICINE

## 2023-05-15 RX ADMIN — SODIUM CHLORIDE 1000 ML: 9 INJECTION, SOLUTION INTRAVENOUS at 00:04

## 2023-05-15 NOTE — ED NOTES
verbal and written discharge instructions given.  verbalized understanding      Luis Enrique Keene RN  05/15/23 4997

## 2023-05-15 NOTE — ED PROVIDER NOTES
[facility] EMERGENCY DEP  EMERGENCY DEPARTMENT ENCOUNTER       Pt Name: Nolan Sandoval  MRN: [id number]  [place] 1945  Date of evaluation: 5/14/2023  Provider: Brandon Houston MD   PCP: Kevin Cruz MD  Note Started: 1:16 AM 5/14/23      FINAL IMPRESSION     1. Autonomic orthostatic hypotension          DISPOSITION/PLAN     Disposition:  DISPOSITION Decision To Discharge 05/15/2023 01:13:17 AM      Discharge Note: The patient is stable for discharge home. The signs, symptoms, diagnosis, and discharge instructions have been discussed, understanding conveyed, and agreed upon. The patient is to follow up as recommended or return to ER should their symptoms worsen.       PATIENT REFERRED TO:  Tianna Dillon MD  [address] [place] [address]  [phone]    Schedule an appointment as soon as possible for a visit in 2 days  For Follow Up            DISCHARGE MEDICATIONS:     Medication List        ASK your doctor about these medications      acetaminophen 650 MG extended release tablet  Commonly known as: TYLENOL     albuterol sulfate  (90 Base) MCG/ACT inhaler  Commonly known as: Ventolin HFA  Inhale 2 puffs into the lungs 4 times daily as needed for Wheezing     aspirin 81 MG chewable tablet     atorvastatin 40 MG tablet  Commonly known as: LIPITOR     busPIRone 10 MG tablet  Commonly known as: BUSPAR     donepezil 10 MG tablet  Commonly known as: ARICEPT     DULoxetine 60 MG extended release capsule  Commonly known as: CYMBALTA     ergocalciferol 1.25 MG (76070 UT) capsule  Commonly known as: ERGOCALCIFEROL     finasteride 5 MG tablet  Commonly known as: PROSCAR     fludrocortisone 0.1 MG tablet  Commonly known as: FLORINEF     midodrine 5 MG tablet  Commonly known as: PROAMATINE     Myrbetriq 25 MG Tb24  Generic drug: mirabegron     polyethylene glycol 17 GM/SCOOP powder  Commonly known as: GLYCOLAX     potassium chloride 10 MEQ extended release tablet  Commonly known as: KLOR-CON M     tiotropium

## 2023-05-18 ENCOUNTER — APPOINTMENT (OUTPATIENT)
Facility: HOSPITAL | Age: 78
End: 2023-05-18
Payer: OTHER GOVERNMENT

## 2023-05-24 ENCOUNTER — HOSPITAL ENCOUNTER (OUTPATIENT)
Facility: HOSPITAL | Age: 78
Setting detail: RECURRING SERIES
Discharge: HOME | End: 2023-05-27
Payer: MEDICARE

## 2023-05-24 ENCOUNTER — APPOINTMENT (OUTPATIENT)
Facility: HOSPITAL | Age: 78
End: 2023-05-24
Payer: OTHER GOVERNMENT

## 2023-05-24 PROCEDURE — 97110 THERAPEUTIC EXERCISES: CPT

## 2023-05-24 PROCEDURE — 97166 OT EVAL MOD COMPLEX 45 MIN: CPT

## 2023-05-24 NOTE — PROGRESS NOTES
PHYSICAL THERAPY - DAILY TREATMENT NOTE (updated 3/23)      Date: 2023          Patient Name:  Gil Huynh :  1945   Medical   Diagnosis:  Muscle weakness (generalized) [M62.81] Treatment Diagnosis:  M62.81  GENERAL MUSCLE WEAKNESS and R26.81   Unsteadiness on feet    Referral Source:  Shandra Almanza MD Insurance:   Payor: [employer]ela [employer] / Plan: Mihaela Peto / Product Type: *No Product type* /                     Patient  verified yes     Visit #   Current  / Total 4 16   Time   In / Out 10 1045   Total Treatment Time 45   Total Timed Codes 45         SUBJECTIVE    Pain Level (0-10 scale): 0    Any medication changes, allergies to medications, adverse drug reactions, diagnosis change, or new procedure performed?: [x] No    [] Yes (see summary sheet for update)  Medications: Verified on Patient Summary List    Subjective functional status/changes:     Breathing has been an issue and orthostatic hypotension, was in ER last week and then discharged    OBJECTIVE      Therapeutic Procedures: Tx Min Billable or 1:1 Min (if diff from Tx Min) Procedure, Rationale, Specifics   45 45 18532 Therapeutic Exercise (timed):  increase ROM, strength, coordination, balance, and proprioception to improve patient's ability to progress to PLOF and address remaining functional goals.  (see flow sheet as applicable)     Details if applicable:  updated HEP with resisted hip abduction, hip flex and heel toe raises in sitting, refer to flow sheet                       45 45    Total Total         [x]  Patient Education billed concurrently with other procedures   [x] Review HEP    [] Progressed/Changed HEP, detail:    [] Other detail:         Other Objective/Functional Measures  Sp02 after walking into therapy 97%, pulse 98, however decreased to 80 bpm after resting, home /82, work /69; after 10 min exercise 129/69, Sp02 98, pulse 90  Doty balance score 34/56    Pain Level at end of session (0-10 scale):

## 2023-05-24 NOTE — PROGRESS NOTES
[facility]  [address]  Office [phone]  Fax [phone]       OCCUPATIONAL THERAPY - EVALUATION/PLAN OF CARE NOTE (updated 3/23)      Date: 2023          Patient Name:  Carolynn Bello :  1945   Medical   Diagnosis:  Muscle weakness (generalized) [M62.81] Treatment Diagnosis:  M62.81  GENERAL MUSCLE WEAKNESS and R27.8     Other lack of coordination    Referral Source:  Sameera Gillespie Insurance:  Payor: [employer] / Plan: Ari Connie / Product Type: *No Product type* /         Patient  verified yes     Visit #   Current  / Total 1 15     SUBJECTIVE  Pain Level (0-10 scale): 0/10  []constant []intermittent [x]improving []worsening []no change since onset    Any medication changes, allergies to medications, adverse drug reactions, diagnosis change, or new procedure performed?: [x] No    [] Yes (see summary sheet for update)  Medications: Verified on Patient Summary List    Subjective functional status/changes:     Pt stated he's just started on medication but can't remember the name and he is just taking a portion of a pill for tremors.   Can't see any improvement at this point    Onset Date: 1-2 years ago  Start of Care: 2023  PLOF: independent in all activities  Limitations to PLOF: trouble with eating, has hired assist with showering and fixing lunch, dressing activities like getting pants over feet  Mechanism of Injury: unknown  Current symptoms/Complaints: tremors in both hands while trying to do things, pain in R hand palm w/tight ; numbness and tingling in hands, neck pain with arm movement; weakness in hand R; duyputren's contracture starting in L palm and L index trigger finger sometimes  Previous Treatment/Compliance: Just started a medication to help with tremors but cannot remember the name of med  PMHx/Surgical Hx: bilateral hip replacement; pacemaker  Prior Hospitalization:  several

## 2023-05-26 ENCOUNTER — HOSPITAL ENCOUNTER (OUTPATIENT)
Facility: HOSPITAL | Age: 78
Setting detail: RECURRING SERIES
Discharge: HOME | End: 2023-05-29
Payer: OTHER GOVERNMENT

## 2023-05-26 PROCEDURE — 97112 NEUROMUSCULAR REEDUCATION: CPT

## 2023-05-26 PROCEDURE — 97110 THERAPEUTIC EXERCISES: CPT

## 2023-05-31 ENCOUNTER — HOSPITAL ENCOUNTER (OUTPATIENT)
Facility: HOSPITAL | Age: 78
Setting detail: RECURRING SERIES
Discharge: HOME | End: 2023-06-03
Payer: OTHER GOVERNMENT

## 2023-05-31 PROCEDURE — 97035 APP MDLTY 1+ULTRASOUND EA 15: CPT

## 2023-05-31 PROCEDURE — 97110 THERAPEUTIC EXERCISES: CPT

## 2023-05-31 PROCEDURE — 97535 SELF CARE MNGMENT TRAINING: CPT

## 2023-05-31 PROCEDURE — 97140 MANUAL THERAPY 1/> REGIONS: CPT

## 2023-05-31 NOTE — PROGRESS NOTES
PHYSICAL THERAPY - MEDICARE DAILY TREATMENT NOTE (updated 3/23)      Date: 2023          Patient Name:  Yesenia Larsen :  1945   Medical   Diagnosis:  Muscle weakness (generalized) [M62.81] Treatment Diagnosis:  M62.81  GENERAL MUSCLE WEAKNESS and R26.81   Unsteadiness on feet    Referral Source:  Karla Grider MD Insurance:   Payor: Gray [employer]abil[employer] / Plan: Mcarthur Nabila / Product Type: *No Product type* /                     Patient  verified yes     Visit #   Current  / Total 6 16   Time   In / Out 1005 1045   Total Treatment Time 40   Total Timed Codes 40   1:1 Treatment Time 40      [facility] Totals Reminder:  bill using total billable   min of TIMED therapeutic procedures and modalities. 8-22 min = 1 unit; 23-37 min = 2 units; 38-52 min = 3 units; 53-67 min = 4 units; 68-82 min = 5 units            SUBJECTIVE    Pain Level (0-10 scale): 0    Any medication changes, allergies to medications, adverse drug reactions, diagnosis change, or new procedure performed?: [x] No    [] Yes (see summary sheet for update)  Medications: Verified on Patient Summary List    Subjective functional status/changes:     No pain, 2 teeth extracted yesterday, patient reports he has a lot tension from yesterday, it was stressful    OBJECTIVE      Therapeutic Procedures: Tx Min Billable or 1:1 Min (if diff from Tx Min) Procedure, Rationale, Specifics   40 40 82185 Therapeutic Exercise (timed):  increase ROM, strength, coordination, balance, and proprioception to improve patient's ability to progress to PLOF and address remaining functional goals.  (see flow sheet as applicable)     Details if applicable:  working on deep breathing, upright posture, postural exercise                       40 40    Total Total         [x]  Patient Education billed concurrently with other procedures   [x] Review HEP    [] Progressed/Changed HEP, detail:    [] Other detail:         Other Objective/Functional Measures  After ambulating into gym

## 2023-05-31 NOTE — PROGRESS NOTES
OCCUPATIONAL THERAPY - DAILY TREATMENT NOTE (updated 3/23)      Date: 2023          Patient Name:  Kobe Ho :  1945   Medical   Diagnosis:  Muscle weakness (generalized) [M62.81]  Unspecified lack of coordination [R27.9] Treatment Diagnosis:  M62.81  GENERAL MUSCLE WEAKNESS and R27.8     Other lack of coordination    Referral Source: Davon Wagner* Insurance:   Payor: [place] / Plan: [place] / Product Type: *No Product type* /                     Patient  verified yes     Visit #   Current  / Total 2 15   Time   In / Out 1041 1126   Total Treatment Time 45   Total Timed Codes 45         SUBJECTIVE    Pain Level (0-10 scale): 0/10 but increased SOB    Any medication changes, allergies to medications, adverse drug reactions, diagnosis change, or new procedure performed?: [x] No    [] Yes (see summary sheet for update)  Medications: Verified on Patient Summary List    Subjective functional status/changes:     Pt reports he had 2 teeth pulled yesterday and has had increased SOB. PT alerted OTR and had worked with pt on slowing breathing, noted to be at 100% O2 saturation. OBJECTIVE      Therapeutic Procedures: Tx Min Billable or 1:1 Min (if diff from Tx Min) Procedure, Rationale, Specifics   22 22 24610 Manual Therapy (timed):  increase ROM, increase tissue extensibility, decrease trigger points, and increase postural awareness to improve patient's ability to progress to PLOF and address remaining functional goals. The manual therapy interventions were performed at a separate and distinct time from the therapeutic activities interventions . (see flow sheet as applicable)    Details if applicable:  massage to L palm after US for stretching; application of custom finger splint for trigger finger L index with note to remove if he has redness, tightness.   Massage to the neck with maricruz cream with AAROM and overpressure to tolerance for neck rotation and neck flexion with

## 2023-06-02 ENCOUNTER — HOSPITAL ENCOUNTER (OUTPATIENT)
Facility: HOSPITAL | Age: 78
Setting detail: RECURRING SERIES
Discharge: HOME | End: 2023-06-05
Payer: OTHER GOVERNMENT

## 2023-06-02 PROCEDURE — 97140 MANUAL THERAPY 1/> REGIONS: CPT

## 2023-06-02 PROCEDURE — 97035 APP MDLTY 1+ULTRASOUND EA 15: CPT

## 2023-06-02 PROCEDURE — 97110 THERAPEUTIC EXERCISES: CPT

## 2023-06-02 PROCEDURE — 97530 THERAPEUTIC ACTIVITIES: CPT

## 2023-06-07 ENCOUNTER — HOSPITAL ENCOUNTER (OUTPATIENT)
Facility: HOSPITAL | Age: 78
Setting detail: RECURRING SERIES
Discharge: HOME | End: 2023-06-10
Payer: OTHER GOVERNMENT

## 2023-06-07 PROCEDURE — 97110 THERAPEUTIC EXERCISES: CPT

## 2023-06-07 PROCEDURE — 97140 MANUAL THERAPY 1/> REGIONS: CPT

## 2023-06-07 PROCEDURE — 97035 APP MDLTY 1+ULTRASOUND EA 15: CPT

## 2023-06-07 NOTE — PROGRESS NOTES
flexion using maricruz pain relieving cream   10 17 22818 Therapeutic Exercise (timed):  increase ROM, strength, coordination, balance, and proprioception to improve patient's ability to progress to PLOF and address remaining functional goals. (see flow sheet as applicable)    Details if applicable:  reviewed progress, discussed medications; education on trigger points and myofascia; discussed breathing difficulties                  31 31    Total Total         Modalities Rationale:     increase tissue extensibility to improve patient's ability to progress to PLOF and address remaining functional goals. min [] Estim Unattended,             type/location:       []  w/ice    []  w/heat          min [] Estim Attended,             type/location:       []  w/ice   []  w/heat         []  w/US   []  TENS insruct                min []  Mechanical Traction,        type/lbs:        []  pro      []  sup           []  int       []  cont            []  before manual           []  after manual    13 min [x]  Ultrasound,         settings/location:  L palm over duyptren's contracture 3.3mz; 50% on 1.0wcm2   15 min  unbilled []  Ice     [x]  Heat            location/position: Draped around neck prior to massage            min []  Vasopneumatic Device,      press/temp:   pre-treatment girth :    post-treatment girth :    measured at (landmark       location) : If using vaso (only need to measure limb vaso being performed on)        min []  Other:        Skin assessment post-treatment (if applicable):    [x]  intact    []  redness- no adverse reaction                 []redness - adverse reaction:          [x]  Patient Education billed concurrently with other procedures   [x] Review HEP    [] Progressed/Changed HEP, detail:    [] Other detail:           Pain Level at end of session (0-10 scale): 0/10      Assessment   Pt tolerates US to palm and massage to hand.   He has trigger points superior medial scapula bilaterally and L

## 2023-06-07 NOTE — PROGRESS NOTES
[facility]  [address]  Office [phone]  Fax [phone]   PHYSICAL THERAPY PROGRESS NOTE  Patient Name:  Tiana Álvarez :  1945   Treatment/Medical Diagnosis: Muscle weakness (generalized) [M62.81]   Referral Source:  Raudel Soni MD     Date of Initial Visit:  2023 Attended Visits:  8 Missed Visits:       SUMMARY OF TREATMENT/ASSESSMENT:  Patient is working on improving his strength and mobility and balance to be safer with his mobility. CURRENT STATUS  Patient is limited by your SOB with his walking and mobility. However, when checked with pulse oximeter Sp02 98%. He has improved with his balance, weaver balance score 35/56 which indicates a decreased risk of falls and reports his balance and walking has improved at home. He is working towards goals. Short Term Goals: To be accomplished in 8 weeks:   Patient will be independent in a HEP to improve his balance and strength. MET  Patient will increase his weaver balance score to a 33/56 to decrease risk of falls. MET  Patient will report he is able to walk short distances in his home without an assistive device without loss of balance. MET  Long Term Goals: To be accomplished in 16 treatments:PROGRESSING   Patient will increase his weaver balance score to a 36/56 to decrease risk of falls. Patient will report he is able to perform light activities around his home without difficulty. Patient will be able to ambulate short distances in the clinic without an assistive device safely and independently. RECOMMENDATIONS  Continue skilled PT 1-2 times /week up to 24 visits to work towards goals. Kun Maya, PT       2023       11:08 AM    If you have any questions/comments please contact us directly at [phone]. Thank you for allowing us to assist in the care of your patient.
with a st. Cane, wide CARLOS with supervised assist.  SOB with activity. Patient will continue to benefit from skilled PT / OT services to modify and progress therapeutic interventions, analyze and address functional mobility deficits, analyze and address strength deficits, and analyze and cue for proper movement patterns to address functional deficits and attain remaining goals.     Progress toward goals / Updated goals:  []  See Progress Note/Recertification    Workint towards goals, Doty balance score has increased to 35/56      PLAN  Yes  Continue plan of care    [x]  Upgrade activities as tolerated  []  Discharge due to :  []  Other:      Annia Rollins, PT       6/7/2023       11:21 AM

## 2023-06-16 ENCOUNTER — HOSPITAL ENCOUNTER (OUTPATIENT)
Facility: HOSPITAL | Age: 78
Setting detail: RECURRING SERIES
Discharge: HOME | End: 2023-06-19
Payer: OTHER GOVERNMENT

## 2023-06-16 VITALS — HEART RATE: 81 BPM | SYSTOLIC BLOOD PRESSURE: 149 MMHG | DIASTOLIC BLOOD PRESSURE: 96 MMHG

## 2023-06-16 PROCEDURE — 97530 THERAPEUTIC ACTIVITIES: CPT

## 2023-06-16 PROCEDURE — 97140 MANUAL THERAPY 1/> REGIONS: CPT

## 2023-06-16 PROCEDURE — 97110 THERAPEUTIC EXERCISES: CPT

## 2023-06-21 ENCOUNTER — HOSPITAL ENCOUNTER (OUTPATIENT)
Facility: HOSPITAL | Age: 78
Setting detail: RECURRING SERIES
Discharge: HOME | End: 2023-06-24
Payer: OTHER GOVERNMENT

## 2023-06-21 PROCEDURE — 97110 THERAPEUTIC EXERCISES: CPT

## 2023-06-21 PROCEDURE — 97116 GAIT TRAINING THERAPY: CPT

## 2023-06-21 PROCEDURE — 97535 SELF CARE MNGMENT TRAINING: CPT

## 2023-06-21 PROCEDURE — 97140 MANUAL THERAPY 1/> REGIONS: CPT

## 2023-06-21 NOTE — PROGRESS NOTES
PHYSICAL THERAPY - MEDICARE DAILY TREATMENT NOTE (updated 3/23)      Date: 2023          Patient Name:  Luis Manuel Andrea :  1945   Medical   Diagnosis:  Muscle weakness (generalized) [M62.81] Treatment Diagnosis:  M62.81  GENERAL MUSCLE WEAKNESS and R26.81   Unsteadiness on feet    Referral Source:  Kristina Alexander MD Insurance:   Payor: Beverly Whaley / Plan: [employer] Jovana / Product Type: *No Product type* /                     Patient  verified yes     Visit #   Current  / Total 11 16   Time   In / Out 1050 1130   Total Treatment Time 40   Total Timed Codes 40   1:1 Treatment Time 40      [facility] Totals Reminder:  bill using total billable   min of TIMED therapeutic procedures and modalities. 8-22 min = 1 unit; 23-37 min = 2 units; 38-52 min = 3 units; 53-67 min = 4 units; 68-82 min = 5 units            SUBJECTIVE    Pain Level (0-10 scale): soreness    Any medication changes, allergies to medications, adverse drug reactions, diagnosis change, or new procedure performed?: [x] No    [] Yes (see summary sheet for update)  Medications: Verified on Patient Summary List    Subjective functional status/changes:     Soreness in the back and back of legs today    OBJECTIVE      Therapeutic Procedures: Tx Min Billable or 1:1 Min (if diff from Tx Min) Procedure, Rationale, Specifics   15 15 [id number] Gait Training (timed):    100 x4 feet with st cane (assistive device) over level surfaces with supervised level of assist. Cuing for increasing CARLOS, breathing, foot clearance, and being aware of surroundings. To improve safety and dynamic movement with household/community ambulation. (see flow sheet as applicable)     Details if applicable:  see above   25 25 68165 Therapeutic Exercise (timed):  increase ROM, strength, coordination, balance, and proprioception to improve patient's ability to progress to PLOF and address remaining functional goals.  (see flow sheet as applicable)     Details if applicable:  Nu Step L 2

## 2023-06-21 NOTE — PROGRESS NOTES
OCCUPATIONAL THERAPY - MEDICARE DAILY TREATMENT NOTE (updated 3/23)      Date: 2023          Patient Name:  Chris Price :  1945   Medical   Diagnosis:  Muscle weakness (generalized) [M62.81]  Unspecified lack of coordination [R27.9] Treatment Diagnosis:  M62.81  GENERAL MUSCLE WEAKNESS and R27.8     Other lack of coordination    Referral Source: Vitaly Castillo 86:   Payor: Tl Dahl / Plan: Tl Dahl / Product Type: *No Product type* /                     Patient  verified yes     Visit #   Current  / Total 8 15   Time   In / Out 1000 1045   Total Treatment Time 45   Total Timed Codes 45   1:1 Treatment Time 39      [facility] Totals Reminder:  bill using total billable   min of TIMED therapeutic procedures and modalities. 8-22 min = 1 unit; 23-37 min = 2 units; 38-52 min = 3 units; 53-67 min = 4 units; 68-82 min = 5 units         SUBJECTIVE    Pain Level (0-10 scale): 0/10    Any medication changes, allergies to medications, adverse drug reactions, diagnosis change, or new procedure performed?: [x] No    [] Yes (see summary sheet for update)  Medications: Verified on Patient Summary List    Subjective functional status/changes:     Pt forgot to bring in meds but remembered at end of session he forgot. His caregiver Herb Corrales was present during session. She is able to answer questions about self care. Pt is independent with dressing but still has issues with tremors. Inquires about tremors. Pt and caregiver educated on intention tremors versus numbness and tingling. OTR has been addressing peripheral nerve issues including numbness and tingling thought to be eminating from lack of AROM in the neck and muscle tightness and teaching compensation for intention tremors. OBJECTIVE      Therapeutic Procedures:   Tx Min Billable or 1:1 Min (if diff from Tx Min) Procedure, Rationale, Specifics   15 15 94127 Manual Therapy (timed):  increase ROM, increase tissue extensibility, and

## 2023-06-28 ENCOUNTER — HOSPITAL ENCOUNTER (OUTPATIENT)
Facility: HOSPITAL | Age: 78
Setting detail: RECURRING SERIES
Discharge: HOME | End: 2023-07-01
Payer: OTHER GOVERNMENT

## 2023-06-28 PROCEDURE — 97110 THERAPEUTIC EXERCISES: CPT

## 2023-06-28 PROCEDURE — 97116 GAIT TRAINING THERAPY: CPT

## 2023-07-05 ENCOUNTER — HOSPITAL ENCOUNTER (OUTPATIENT)
Facility: HOSPITAL | Age: 78
Setting detail: RECURRING SERIES
Discharge: HOME | End: 2023-07-08
Payer: OTHER GOVERNMENT

## 2023-07-05 PROCEDURE — 97110 THERAPEUTIC EXERCISES: CPT

## 2023-07-05 PROCEDURE — 97140 MANUAL THERAPY 1/> REGIONS: CPT

## 2023-07-05 NOTE — PROGRESS NOTES
OCCUPATIONAL THERAPY - DAILY TREATMENT NOTE (updated 3/23)      Date: 2023          Patient Name:  Alcides Hazel :  1945   Medical   Diagnosis:  Muscle weakness (generalized) [M62.81]  Unspecified lack of coordination [R27.9] Treatment Diagnosis:  I69.998  Other sequelae following unspecified cerebrovascular disease  (for vision, sensation impairments), M62.81  GENERAL MUSCLE WEAKNESS, and R27.8     Other lack of coordination    Referral Source: Rafael Wanger* Insurance:   Payor: [employer]fish / Plan: jellyfish / Product Type: *No Product type* /                     Patient  verified yes     Visit #   Current  / Total 10 15   Time   In / Out 1001 1045   Total Treatment Time 44   Total Timed Codes 44         SUBJECTIVE    Pain Level (0-10 scale): 0/10    Any medication changes, allergies to medications, adverse drug reactions, diagnosis change, or new procedure performed?: [x] No    [] Yes (see summary sheet for update)  Medications: Verified on Patient Summary List    Subjective functional status/changes:     Pt reports he went to [place] and they gave him microwavable hotpacks. He had further testing completed for breathing issues but not resulted yet. OBJECTIVE      Therapeutic Procedures: Tx Min Billable or 1:1 Min (if diff from Tx Min) Procedure, Rationale, Specifics   19 19 88353 Manual Therapy (timed):  decrease pain, increase ROM, and increase tissue extensibility to improve patient's ability to progress to PLOF and address remaining functional goals. The manual therapy interventions were performed at a separate and distinct time from the therapeutic activities interventions .  (see flow sheet as applicable)    Details if applicable:  deep tissue massage with maricruz cream to the neck and primarily L side of sholder/upper trap with AAROM stretch for neck rotations, lateral flexion, forward flexions with rotation providing overpressure to tolerance holding for 5-10 count each   25 25

## 2023-07-05 NOTE — PROGRESS NOTES
PHYSICAL THERAPY - DAILY TREATMENT NOTE (updated 3/23)      Date: 2023          Patient Name:  Margoth Spencer :  1945   Medical   Diagnosis:  Muscle weakness (generalized) [M62.81] Treatment Diagnosis:  M62.81  GENERAL MUSCLE WEAKNESS    Referral Source:  Sherrill Borja MD Insurance:   Payor: Kelly Merida / Plan: Kelly Merida / Product Type: *No Product type* /                     Patient  verified yes     Visit #   Current  / Total 12 15   Time   In / Out 1045 1130   Total Treatment Time 45   Total Timed Codes 45   1:1 Treatment Time 39      [facility] Totals Reminder:  bill using total billable   min of TIMED therapeutic procedures and modalities. 8-22 min = 1 unit; 23-37 min = 2 units; 38-52 min = 3 units; 53-67 min = 4 units; 68-82 min = 5 units            SUBJECTIVE    Pain Level (0-10 scale): 0/10    Any medication changes, allergies to medications, adverse drug reactions, diagnosis change, or new procedure performed?: [x] No    [] Yes (see summary sheet for update)  Medications: Verified on Patient Summary List    Subjective functional status/changes: The breathing is just not getting better     OBJECTIVE      Therapeutic Procedures: Tx Min Billable or 1:1 Min (if diff from Tx Min) Procedure, Rationale, Specifics   45 45 10978 Therapeutic Exercise (timed):  increase ROM, strength, coordination, balance, and proprioception to improve patient's ability to progress to PLOF and address remaining functional goals.  (see flow sheet as applicable)     Details if applicable:    Nu Step L 1.5  with belly breathing  (3', rest, 2:15', rest, 3',rest  * focus on steps/minutes between 68-72 to help control breathing while working)    Posterior shoulder rolls 15x  Scapular retraction 15x  Re-education in breathing, inhaling and exhaling  Sitting and marching 1# 2x15  Sitting LAQ 1# 2x15     45 45    Total Total     [x]  Patient Education billed concurrently with other procedures   [x] Review HEP    []

## 2023-07-05 NOTE — PROGRESS NOTES
PHYSICAL THERAPY - MEDICARE DAILY TREATMENT NOTE (updated 3/23)      Date: 2023          Patient Name:  Douglas Lopez :  1945   Medical   Diagnosis:  Muscle weakness (generalized) [M62.81] Treatment Diagnosis:  {RVA Dx TGQZ:96796}   Referral Source:  Savannah Awan MD Insurance:   Payor: [employer] / Plan: [employer] / Product Type: *No Product type* /                     Patient  verified {YES/NO DIET:[id number]}     Visit #   Current  / Total *** ***   Time   In / Out *** ***   Total Treatment Time ***   Total Timed Codes ***   1:1 Treatment Time ***      [facility] Totals Reminder:  bill using total billable   min of TIMED therapeutic procedures and modalities. 8-22 min = 1 unit; 23-37 min = 2 units; 38-52 min = 3 units; 53-67 min = 4 units; 68-82 min = 5 units            SUBJECTIVE    Pain Level (0-10 scale): ***    Any medication changes, allergies to medications, adverse drug reactions, diagnosis change, or new procedure performed?: [x] No    [] Yes (see summary sheet for update)  Medications: Verified on Patient Summary List    Subjective functional status/changes:     ***    OBJECTIVE      Therapeutic Procedures: Tx Min Billable or 1:1 Min (if diff from Tx Min) Procedure, Rationale, Specifics     {RVA There Procedures:62304}     Details if applicable:       {RVA There Procedures:31458}     Details if applicable:       {RVA There Procedures:94626}     Details if applicable:       {RVA There Procedures:20469}     Details if applicable:       {RVA There Procedures:31482}     Details if applicable:     *** ***    Total Total       Modalities Rationale:     {[employer] Modality Rationale:51540} to improve patient's ability to progress to PLOF and address remaining functional goals.        min [] Estim Unattended,             type/location:       []  w/ice    []  w/heat        min [] Estim Attended,             type/location:       []  w/ice   []  w/heat         []  w/US   []  TENS insruct

## 2023-07-07 ENCOUNTER — APPOINTMENT (OUTPATIENT)
Facility: HOSPITAL | Age: 78
End: 2023-07-07
Payer: OTHER GOVERNMENT

## 2023-07-12 ENCOUNTER — HOSPITAL ENCOUNTER (OUTPATIENT)
Facility: HOSPITAL | Age: 78
Setting detail: RECURRING SERIES
Discharge: HOME | End: 2023-07-15
Payer: OTHER GOVERNMENT

## 2023-07-12 PROCEDURE — 97530 THERAPEUTIC ACTIVITIES: CPT

## 2023-07-12 PROCEDURE — 97110 THERAPEUTIC EXERCISES: CPT

## 2023-07-12 PROCEDURE — 97140 MANUAL THERAPY 1/> REGIONS: CPT

## 2023-07-12 NOTE — PROGRESS NOTES
OCCUPATIONAL THERAPY - DAILY TREATMENT NOTE (updated 3/23)      Date: 2023          Patient Name:  Ruddy Colón :  1945   Medical   Diagnosis:  Muscle weakness (generalized) [M62.81]  Unspecified lack of coordination [R27.9] Treatment Diagnosis:  I69.998  Other sequelae following unspecified cerebrovascular disease  (for vision, sensation impairments), M62.81  GENERAL MUSCLE WEAKNESS, and R27.8     Other lack of coordination    Referral Source: [place] [address]* Insurance:   Payor: Kell [employer]duz[employer] / Plan: Kell [employer] / Product Type: *No Product type* /                     Patient  verified yes     Visit #   Current  / Total 11 15   Time   In / Out 1000 1045   Total Treatment Time 45   Total Timed Codes 45         SUBJECTIVE    Pain Level (0-10 scale): 0/10    Any medication changes, allergies to medications, adverse drug reactions, diagnosis change, or new procedure performed?: [x] No    [] Yes (see summary sheet for update)  Medications: Verified on Patient Summary List    Subjective functional status/changes:     Pt reports compliance with belly breathing and breathing exercises given last session. OBJECTIVE      Therapeutic Procedures: Tx Min Billable or 1:1 Min (if diff from Tx Min) Procedure, Rationale, Specifics   25 25 38771 Therapeutic Exercise (timed):  increase ROM, strength, coordination, balance, and proprioception to improve patient's ability to progress to PLOF and address remaining functional goals. (see flow sheet as applicable)    Details if applicable:  Pt SOB on entry. Able to get to supine and practice belly breathing for several minutes. Performed shoulder pulleys in sitting practicing breathing and then seated knee kicks practicing breathing. Green putty strengthening on rest break R hand. OTR ambulated w/pt to PT for next appointment practicing breathing with slower ambulation but did not go well.    20 20 30277 Manual Therapy (timed):  increase ROM and increase tissue

## 2023-07-12 NOTE — PROGRESS NOTES
PHYSICAL THERAPY - MEDICARE DAILY TREATMENT NOTE (updated 3/23)      Date: 2023          Patient Name:  Babs Cr :  1945   Medical   Diagnosis:  Muscle weakness (generalized) [M62.81] Treatment Diagnosis:  M62.81  GENERAL MUSCLE WEAKNESS and R26.81   Unsteadiness on feet    Referral Source:  Shayna Temple MD Insurance:   Payor: Mikle Semen / Plan: [employer] / Product Type: *No Product type* /                     Patient  verified yes     Visit #   Current  / Total 14 15   Time   In / Out 1045 1130   Total Treatment Time 45   Total Timed Codes 45   1:1 Treatment Time 1      [facility] Totals Reminder:  bill using total billable   min of TIMED therapeutic procedures and modalities. 8-22 min = 1 unit; 23-37 min = 2 units; 38-52 min = 3 units; 53-67 min = 4 units; 68-82 min = 5 units            SUBJECTIVE    Pain Level (0-10 scale): 3    Any medication changes, allergies to medications, adverse drug reactions, diagnosis change, or new procedure performed?: [x] No    [] Yes (see summary sheet for update)  Medications: Verified on Patient Summary List    Subjective functional status/changes:     Constant back pain today, feels SOB    OBJECTIVE      Therapeutic Procedures: Tx Min Billable or 1:1 Min (if diff from Tx Min) Procedure, Rationale, Specifics   45 45 19135 Therapeutic Exercise (timed):  increase ROM, strength, coordination, balance, and proprioception to improve patient's ability to progress to PLOF and address remaining functional goals.  (see flow sheet as applicable)     Details if applicable:  posterior shoulder rolls 10x  Posterior pelvic tilt sitting in the chair 10x 5'  Working on breathing throughout treatment  Sitting ball lumbar flexion stretch 5'  Sit to stand and standing balance  Ambulation 100' with st. Cane CGA unsteady and SOB, forward flexed posture, working on upright posture                         45 45    Total Total       [x]  Patient Education billed concurrently

## 2023-07-14 ENCOUNTER — APPOINTMENT (OUTPATIENT)
Facility: HOSPITAL | Age: 78
End: 2023-07-14
Payer: OTHER GOVERNMENT

## 2023-07-14 ENCOUNTER — HOSPITAL ENCOUNTER (OUTPATIENT)
Facility: HOSPITAL | Age: 78
Setting detail: RECURRING SERIES
Discharge: HOME | End: 2023-07-17
Payer: OTHER GOVERNMENT

## 2023-07-14 PROCEDURE — 97140 MANUAL THERAPY 1/> REGIONS: CPT

## 2023-07-14 PROCEDURE — 97110 THERAPEUTIC EXERCISES: CPT

## 2023-07-19 ENCOUNTER — HOSPITAL ENCOUNTER (OUTPATIENT)
Facility: HOSPITAL | Age: 78
Setting detail: RECURRING SERIES
Discharge: HOME | End: 2023-07-22
Payer: OTHER GOVERNMENT

## 2023-07-19 PROCEDURE — 97140 MANUAL THERAPY 1/> REGIONS: CPT

## 2023-07-19 PROCEDURE — 97110 THERAPEUTIC EXERCISES: CPT

## 2023-07-19 NOTE — PROGRESS NOTES
PHYSICAL THERAPY - MEDICARE DAILY TREATMENT NOTE (updated 3/23)      Date: 2023          Patient Name:  Marilyn Mishra :  1945   Medical   Diagnosis:  Muscle weakness (generalized) [M62.81] Treatment Diagnosis:  Weakness, decreased gait   Referral Source:  Shonna Silveira MD Insurance:   Payor: Abiel Alvarez / Plan: [employer] / Product Type: *No Product type* /                     Patient  verified yes     Visit #   Current  / Total 15 15   Time   In / Out 1045 1130   Total Treatment Time 45   Total Timed Codes 45   1:1 Treatment Time 39      [facility] Totals Reminder:  bill using total billable   min of TIMED therapeutic procedures and modalities. 8-22 min = 1 unit; 23-37 min = 2 units; 38-52 min = 3 units; 53-67 min = 4 units; 68-82 min = 5 units            SUBJECTIVE    Pain Level (0-10 scale): 0    Any medication changes, allergies to medications, adverse drug reactions, diagnosis change, or new procedure performed?: [x] No    [] Yes (see summary sheet for update)  Medications: Verified on Patient Summary List    Subjective functional status/changes:     Patient reports the breathing is tougher than usual today, following up with more doctors moving forward to assess this    OBJECTIVE      Therapeutic Procedures: Tx Min Billable or 1:1 Min (if diff from Tx Min) Procedure, Rationale, Specifics   45 45 27741 Therapeutic Exercise (timed):  increase ROM, strength, coordination, balance, and proprioception to improve patient's ability to progress to PLOF and address remaining functional goals.  (see flow sheet as applicable)     Details if applicable:  chest press in maintenance gym 3x10 level 3  Hip abd and add in maintenance gym 3x10 level 3  Education  regarding breathing with activities  Gait 100'x2, 50'x2 working on upright posture and breathing  Sit to stand working on standing balance  Education with wife regarding the maintenance exercise program                       45 45      Total Total

## 2023-07-19 NOTE — PROGRESS NOTES
OCCUPATIONAL THERAPY - DAILY TREATMENT NOTE (updated 3/23)      Date: 2023          Patient Name:  Fabiola Almanza :  1945   Medical   Diagnosis:  Muscle weakness (generalized) [M62.81]  Unspecified lack of coordination [R27.9] Treatment Diagnosis:  I69.998  Other sequelae following unspecified cerebrovascular disease  (for vision, sensation impairments), M62.81  GENERAL MUSCLE WEAKNESS, and R27.8     Other lack of coordination    Referral Source: Adonis Wagner* Insurance:   Payor: [employer] / Plan: [employer] / Product Type: *No Product type* /                     Patient  verified yes     Visit #   Current  / Total 13 15   Time   In / Out 1003 1045   Total Treatment Time 42   Total Timed Codes 42         SUBJECTIVE    Pain Level (0-10 scale): 0/10    Any medication changes, allergies to medications, adverse drug reactions, diagnosis change, or new procedure performed?: [x] No    [] Yes (see summary sheet for update)  Medications: Verified on Patient Summary List    Subjective functional status/changes:     Pt reports he's having a bad day for breathing. Wife presents towards the end of session. PT is requesting an extension for therapy for ambulation and she inquired about extending OT.  OTR is not planning on extending his therapy as he is progressing enough that he can continue at home and will train his caregiver on stretches and exercises he should continue to perform at home. OBJECTIVE      Therapeutic Procedures: Tx Min Billable or 1:1 Min (if diff from Tx Min) Procedure, Rationale, Specifics   15  [id number] Manual Therapy (timed):  increase ROM and increase tissue extensibility to improve patient's ability to progress to PLOF and address remaining functional goals. The manual therapy interventions were performed at a separate and distinct time from the therapeutic activities interventions .  (see flow sheet as applicable)    Details if applicable:  deep tissue massage to the L

## 2023-07-21 ENCOUNTER — HOSPITAL ENCOUNTER (OUTPATIENT)
Facility: HOSPITAL | Age: 78
Setting detail: RECURRING SERIES
Discharge: HOME | End: 2023-07-24
Payer: OTHER GOVERNMENT

## 2023-07-21 PROCEDURE — 97140 MANUAL THERAPY 1/> REGIONS: CPT

## 2023-07-21 PROCEDURE — 97110 THERAPEUTIC EXERCISES: CPT

## 2023-07-21 PROCEDURE — 97112 NEUROMUSCULAR REEDUCATION: CPT

## 2023-07-21 NOTE — PROGRESS NOTES
[facility]  [address], [address]  Office [phone]  Fax [phone] THERAPY PROGRESS NOTE  Patient Name:  Madeline Marroquin :  1945   Treatment/Medical Diagnosis:  Muscle weakness (generalized) [M62.81]  Unspecified lack of coordination [R27.9]   Referral Source: Prince Velazquez*     Date of Initial Visit:  23 Attended Visits:  14 Missed Visits:  0     SUMMARY OF TREATMENT/ASSESSMENT:  Pt has received moist heat, manual therapy, thera-ex, self care training since last progress note. Pt shows improvement in sensation perception with monofilament test both hands. He has improved R hand coordination speed but today worse on L. His pain in the R palm has diminished. He reports improved cervical motion although noted to have diminished L rotation and L forward flexion w/rotation. He initially denied sensation changes with arms in the the air (raising above 100 degrees bilaterally) however when arms were brought down after a few minutes he did have sxs. Because sxs in hands for numbness and tingling are not just one nerve (ulnar or medial) there is still a concern for entrapment at the level of brachial plexus. CURRENT STATUS  Monofilament test of sensation:   L hand 3.22 thumb and index, 3.61 middle (but felt in both middle and index) ring and small fingers (initially all were 3.84)  R hand  index and ring 3.22; thumb middle and small finger 3.61 (initially 4.08 diminished protective sensation)     9 hole peg test L hand 38 sec (initial 31 sec); R hand 32 sec (was 34 sec)    Duyptrens in L palm digit III (No Change (nc))    No longer has Pain in R palm at proximal MCP head digit IV    Pt able to elevate both arms above 90 degrees with bilateral hand numbness and tingling still present.   Once arms dropped his sxs resolve within 30 seconds    No sensation changes with neck rotations    Neck has

## 2023-07-21 NOTE — PROGRESS NOTES
OCCUPATIONAL THERAPY - MEDICARE DAILY TREATMENT NOTE (updated 3/23)      Date: 2023          Patient Name:  Everardo Edmonds :  1945   Medical   Diagnosis:  Muscle weakness (generalized) [M62.81]  Unspecified lack of coordination [R27.9] Treatment Diagnosis:  M62.81  GENERAL MUSCLE WEAKNESS and R27.8     Other lack of coordination    Referral Source: [address]:   Payor: Jimena Mendes / Plan: Jimena Mendes / Product Type: *No Product type* /                     Patient  verified yes     Visit #   Current  / Total 14 15   Time   In / Out 0959 1044   Total Treatment Time 45   Total Timed Codes 45   1:1 Treatment Time 39      [facility] Totals Reminder:  bill using total billable   min of TIMED therapeutic procedures and modalities. 8-22 min = 1 unit; 23-37 min = 2 units; 38-52 min = 3 units; 53-67 min = 4 units; 68-82 min = 5 units         SUBJECTIVE    Pain Level (0-10 scale): 0/10    Any medication changes, allergies to medications, adverse drug reactions, diagnosis change, or new procedure performed?: [x] No    [] Yes (see summary sheet for update)  Medications: Verified on Patient Summary List    Subjective functional status/changes:     Pt reports after last visit went home and rode stationary bike for 10 mins but got out of breath. He is now able to cut meat and not having difficulty but still shakes going from plate to mouth using L hand. He is resistant to keep the elbow on the table for stabilization. OBJECTIVE      Therapeutic Procedures: Tx Min Billable or 1:1 Min (if diff from Tx Min) Procedure, Rationale, Specifics   25 25 95804 Manual Therapy (timed):  decrease pain, increase ROM, increase tissue extensibility, and decrease trigger points to improve patient's ability to progress to PLOF and address remaining functional goals. The manual therapy interventions were performed at a separate and distinct time from the therapeutic activities interventions .  (see flow sheet

## 2023-07-21 NOTE — PROGRESS NOTES
PHYSICAL THERAPY - MEDICARE DAILY TREATMENT NOTE (updated 3/23)      Date: 2023          Patient Name:  Marrian Hammans :  1945   Medical   Diagnosis:  Muscle weakness (generalized) [M62.81] Treatment Diagnosis:  M62.81  GENERAL MUSCLE WEAKNESS    Referral Source:  George Fraire MD Insurance:   Payor: [employer] / Plan: [employer] / Product Type: *No Product type* /                     Patient  verified yes     Visit #   Current  / Total 1 6   Time   In / Out 1045 1130   Total Treatment Time 45   Total Timed Codes 45   1:1 Treatment Time 39      [facility] Totals Reminder:  bill using total billable   min of TIMED therapeutic procedures and modalities. 8-22 min = 1 unit; 23-37 min = 2 units; 38-52 min = 3 units; 53-67 min = 4 units; 68-82 min = 5 units            SUBJECTIVE    Pain Level (0-10 scale): 0/10- sore from using my bike    Any medication changes, allergies to medications, adverse drug reactions, diagnosis change, or new procedure performed?: [x] No    [] Yes (see summary sheet for update)  Medications: Verified on Patient Summary List    Subjective functional status/changes:     My groin is a little tight. OBJECTIVE      Therapeutic Procedures: Tx Min Billable or 1:1 Min (if diff from Tx Min) Procedure, Rationale, Specifics   15 15 81720 Therapeutic Exercise (timed):  increase ROM, strength, coordination, balance, and proprioception to improve patient's ability to progress to PLOF and address remaining functional goals.  (see flow sheet as applicable)     Details if applicable:   chest press in maintenance gym 2x15 level 3  Hip abd and add in maintenance gym 2x15  level 4     30 30 94216 Neuromuscular Re-Education (timed):  improve balance, coordination, kinesthetic sense, posture, core stability and proprioception to improve patient's ability to develop conscious control of individual muscles and awareness of position of extremities in order to progress to PLOF and address remaining

## 2023-07-26 ENCOUNTER — HOSPITAL ENCOUNTER (OUTPATIENT)
Facility: HOSPITAL | Age: 78
Setting detail: RECURRING SERIES
Discharge: HOME | End: 2023-07-29
Payer: OTHER GOVERNMENT

## 2023-07-26 PROCEDURE — 97140 MANUAL THERAPY 1/> REGIONS: CPT

## 2023-07-26 PROCEDURE — 97112 NEUROMUSCULAR REEDUCATION: CPT

## 2023-07-26 PROCEDURE — 97110 THERAPEUTIC EXERCISES: CPT

## 2023-07-26 NOTE — PROGRESS NOTES
OCCUPATIONAL THERAPY - DAILY TREATMENT NOTE (updated 3/23)      Date: 2023          Patient Name:  Gregor Saldaña :  1945   Medical   Diagnosis:  Muscle weakness (generalized) [M62.81]  Unspecified lack of coordination [R27.9] Treatment Diagnosis:  I69.998  Other sequelae following unspecified cerebrovascular disease  (for vision, sensation impairments), M62.81  GENERAL MUSCLE WEAKNESS, and R27.8     Other lack of coordination    Referral Source: Judie Wagner* Insurance:   Payor: [employer] / Plan: [employer] / Product Type: *No Product type* /                     Patient  verified yes     Visit #   Current  / Total 15 15   Time   In / Out 1003 1045   Total Treatment Time 42   Total Timed Codes 42         SUBJECTIVE    Pain Level (0-10 scale): 0/10    Any medication changes, allergies to medications, adverse drug reactions, diagnosis change, or new procedure performed?: [x] No    [] Yes (see summary sheet for update)  Medications: Verified on Patient Summary List    Subjective functional status/changes:     Pt reports he was able to get his pants on w/o assist this morning. OBJECTIVE      Therapeutic Procedures: Tx Min Billable or 1:1 Min (if diff from Tx Min) Procedure, Rationale, Specifics   15 21 17919 Manual Therapy (timed):  increase ROM and increase tissue extensibility to improve patient's ability to progress to PLOF and address remaining functional goals. The manual therapy interventions were performed at a separate and distinct time from the therapeutic activities interventions . (see flow sheet as applicable)    Details if applicable:  deep tissue massage to the neck and bilateral upper trap and levator scapula at origin and insertion; AAROM with overpressure for neck rotations and forward flexion with rotations. Ulnar and median nerve flossing holding for 30 sec median nerve and 15 ulnar nerve position bilaterally.    27 62 19728 Therapeutic Exercise (timed):  increase ROM,

## 2023-07-26 NOTE — PROGRESS NOTES
[facility]  [address], [address]   Office [phone]  Fax [phone] THERAPY DISCHARGE SUMMARY  Patient Name: Sarah Jordan : 1945   Treatment/Medical Diagnosis: Muscle weakness (generalized) [M62.81]  Unspecified lack of coordination [R27.9]   Referral Source: Vear Slight*     Date of Initial Visit: 23 Attended Visits: 15 Missed Visits: 0     SUMMARY OF TREATMENT  Moist heat, manual therapy thera exercise, HEP instruction; ultrasound, self care training and splint. OTR initially addressed Dupytren's contracture with US and splint. Focus then shifted to primarily neck as pt had limited AROM, muscle tightness and bilateral hand sensation changes. CURRENT STATUS   L (nc) 60; R 45lbs (initial 44 and was 50lbs last session)  Pinch Meter not working      9 hole peg: L hand 37sec (initial 31)                     R hand 31 sec (initial 34)     Monofilament test of sensation 23:   L hand 3.22 thumb and index, 3.61 middle (but felt in both middle and index) ring and small fingers (initially all were 3.84)  R hand  index and ring 3.22; thumb middle and small finger 3.61 (initially 4.08 diminished protective sensation)      R shoulder flexion in standing 100 degrees, no numbness or tingling but discomfort from old RTC injury     L shoulder flexion 125 with slight numbness/tingling in the fingers      SC joint compression neg bilaterally  First rib compression neg bilaterally        Assessment   Pt has demonstrated improvement in neck AROM, sensory perception in hands, decreased numbness and tingling and improving functional independence. He continues to demonstrate breathing difficulties, getting out of breath with exertion but is able with cues to perform breathing techniques and today demonstrated ability to deep breath to a 4 count and exhale to a 5 count.   He continues to have tremors and

## 2023-07-26 NOTE — PROGRESS NOTES
PHYSICAL THERAPY - MEDICARE DAILY TREATMENT NOTE (updated 3/23)      Date: 2023          Patient Name:  Gregor Saldaña :  1945   Medical   Diagnosis:  Muscle weakness (generalized) [M62.81] Treatment Diagnosis:  M62.81  GENERAL MUSCLE WEAKNESS    Referral Source:  Nanette Lowry MD Insurance:   Payor: [employer] / Plan: [employer] / Product Type: *No Product type* /                     Patient  verified yes     Visit #   Current  / Total 2 6   Time   In / Out 1045 1130   Total Treatment Time 45   Total Timed Codes 45   1:1 Treatment Time 39      [facility] Totals Reminder:  bill using total billable   min of TIMED therapeutic procedures and modalities. 8-22 min = 1 unit; 23-37 min = 2 units; 38-52 min = 3 units; 53-67 min = 4 units; 68-82 min = 5 units            SUBJECTIVE    Pain Level (0-10 scale): 0/10    Any medication changes, allergies to medications, adverse drug reactions, diagnosis change, or new procedure performed?: [x] No    [] Yes (see summary sheet for update)  Medications: Verified on Patient Summary List    Subjective functional status/changes:     I think I have been just overdoing it on my bike. OBJECTIVE      Therapeutic Procedures: Tx Min Billable or 1:1 Min (if diff from Tx Min) Procedure, Rationale, Specifics   15 15 47072 Therapeutic Exercise (timed):  increase ROM, strength, coordination, balance, and proprioception to improve patient's ability to progress to PLOF and address remaining functional goals.  (see flow sheet as applicable)     Details if applicable:      chest press in maintenance gym 2x15 level 3 (2 hand placements)  Hip abd and add in maintenance gym 3x15  level 4  OH press level 5 2x15 reps      30 30 31988 Neuromuscular Re-Education (timed):  improve balance, coordination, kinesthetic sense, posture, core stability and proprioception to improve patient's ability to develop conscious control of individual muscles and awareness of position of extremities

## 2023-07-28 ENCOUNTER — HOSPITAL ENCOUNTER (OUTPATIENT)
Facility: HOSPITAL | Age: 78
Setting detail: RECURRING SERIES
Discharge: HOME | End: 2023-07-31
Payer: OTHER GOVERNMENT

## 2023-07-28 ENCOUNTER — APPOINTMENT (OUTPATIENT)
Facility: HOSPITAL | Age: 78
End: 2023-07-28
Payer: OTHER GOVERNMENT

## 2023-07-28 PROCEDURE — 97116 GAIT TRAINING THERAPY: CPT

## 2023-07-28 PROCEDURE — 97110 THERAPEUTIC EXERCISES: CPT

## 2023-07-28 NOTE — PROGRESS NOTES
PHYSICAL THERAPY - DAILY TREATMENT NOTE (updated 3/23)      Date: 2023          Patient Name:  Mario Fairbanks :  1945   Medical   Diagnosis:  Muscle weakness (generalized) [M62.81] Treatment Diagnosis:  M62.81  GENERAL MUSCLE WEAKNESS    Referral Source:  Sam Arias MD Insurance:   Payor: [facility] / Plan: [facility] / Product Type: *No Product type* /                     Patient  verified yes     Visit #   Current  / Total 3 6   Time   In / Out 1045 1130   Total Treatment Time 45   Total Timed Codes 45         SUBJECTIVE    Pain Level (0-10 scale): 0/10    Any medication changes, allergies to medications, adverse drug reactions, diagnosis change, or new procedure performed?: [x] No    [] Yes (see summary sheet for update)  Medications: Verified on Patient Summary List    Subjective functional status/changes:     I haven't done the bike because I think I may have done too much the other day. OBJECTIVE      Therapeutic Procedures: Tx Min Billable or 1:1 Min (if diff from Tx Min) Procedure, Rationale, Specifics   30 30 48020 Therapeutic Exercise (timed):  increase ROM, strength, coordination, balance, and proprioception to improve patient's ability to progress to PLOF and address remaining functional goals. (see flow sheet as applicable)     Details if applicable:    chest press in maintenance gym 2x15 level 3 (2 hand placements)  Hip abd and add in maintenance gym 3x15  level 4  OH press level 5 2x15 reps     15 15 04225 Gait Training (timed):    100 feet with SPC (assistive device) over level surfaces with SBA level of assist. Cuing for Step length and height as well as breathing strategies. To improve safety and dynamic movement with household/community ambulation.   (see flow sheet as applicable)        45 45    Total Total     [x]  Patient Education billed concurrently with other procedures   [x] Review HEP    [] Progressed/Changed HEP, detail:    [] Other detail:         Other

## 2023-08-02 ENCOUNTER — HOSPITAL ENCOUNTER (OUTPATIENT)
Facility: HOSPITAL | Age: 78
Setting detail: RECURRING SERIES
Discharge: HOME | End: 2023-08-05
Payer: OTHER GOVERNMENT

## 2023-08-02 PROCEDURE — 97110 THERAPEUTIC EXERCISES: CPT

## 2023-08-02 PROCEDURE — 97116 GAIT TRAINING THERAPY: CPT

## 2023-08-02 NOTE — PROGRESS NOTES
PHYSICAL THERAPY - MEDICARE DAILY TREATMENT NOTE (updated 3/23)      Date: 2023          Patient Name:  Douglas Lopez :  1945   Medical   Diagnosis:  Muscle weakness (generalized) [M62.81] Treatment Diagnosis:  Weakness, decreased gait   Referral Source:  Savannah Awan MD Insurance:   Payor: Kyaw Hsu / Plan: [employer] / Product Type: *No Product type* /                     Patient  verified yes     Visit #   Current  / Total 4 6   Time   In / Out 1130 1215   Total Treatment Time 45   Total Timed Codes 45   1:1 Treatment Time 39      [facility] Totals Reminder:  bill using total billable   min of TIMED therapeutic procedures and modalities. 8-22 min = 1 unit; 23-37 min = 2 units; 38-52 min = 3 units; 53-67 min = 4 units; 68-82 min = 5 units            SUBJECTIVE    Pain Level (0-10 scale): 0    Any medication changes, allergies to medications, adverse drug reactions, diagnosis change, or new procedure performed?: [x] No    [] Yes (see summary sheet for update)  Medications: Verified on Patient Summary List    Subjective functional status/changes:     \"Patient reports that he wakes up and goes to bed with a feeling of SOB\"    OBJECTIVE     Therapeutic Procedures: Tx Min Billable or 1:1 Min (if diff from Tx Min) Procedure, Rationale, Specifics   35 35 20416 Therapeutic Exercise (timed):  increase ROM, strength, coordination, balance, and proprioception to improve patient's ability to progress to PLOF and address remaining functional goals. (see flow sheet as applicable)      Details if applicable:    chest press in maintenance gym 2x15 level 3 (2 hand placements)  Hip abd and add in maintenance gym 3x15  level 4  OH press level 5 2x15 reps  Nu step 7' with breaks level 6 in maintenance gym      10 10 62229 Gait Training (timed):    100 feet x3  with SPC (assistive device) over level surfaces with SBA level of assist. Cuing for Step length and height as well as breathing strategies.   To improve

## 2023-08-04 ENCOUNTER — HOSPITAL ENCOUNTER (OUTPATIENT)
Facility: HOSPITAL | Age: 78
Setting detail: RECURRING SERIES
Discharge: HOME | End: 2023-08-07
Payer: OTHER GOVERNMENT

## 2023-08-04 PROCEDURE — 97110 THERAPEUTIC EXERCISES: CPT

## 2023-08-04 PROCEDURE — 97116 GAIT TRAINING THERAPY: CPT

## 2023-08-04 NOTE — PROGRESS NOTES
PHYSICAL THERAPY - MEDICARE DAILY TREATMENT NOTE (updated 3/23)      Date: 2023          Patient Name:  Marleny Call :  1945   Medical   Diagnosis:  Muscle weakness (generalized) [M62.81] Treatment Diagnosis:  M62.81  GENERAL MUSCLE WEAKNESS    Referral Source:  Lilly Marquis MD Insurance:   Payor: [employer] / Plan: Diego Saleh / Product Type: *No Product type* /                     Patient  verified yes     Visit #   Current  / Total 5 6   Time   In / Out 1000 1045   Total Treatment Time 45   Total Timed Codes 45   1:1 Treatment Time 39      [facility] Totals Reminder:  bill using total billable   min of TIMED therapeutic procedures and modalities. 8-22 min = 1 unit; 23-37 min = 2 units; 38-52 min = 3 units; 53-67 min = 4 units; 68-82 min = 5 units            SUBJECTIVE    Pain Level (0-10 scale): 0/10    Any medication changes, allergies to medications, adverse drug reactions, diagnosis change, or new procedure performed?: [x] No    [] Yes (see summary sheet for update)  Medications: Verified on Patient Summary List    Subjective functional status/changes:     I can't change this breathing. Its the weather. OBJECTIVE      Therapeutic Procedures: Tx Min Billable or 1:1 Min (if diff from Tx Min) Procedure, Rationale, Specifics   30 30 04071 Therapeutic Exercise (timed):  increase ROM, strength, coordination, balance, and proprioception to improve patient's ability to progress to PLOF and address remaining functional goals. (see flow sheet as applicable)     Details if applicable:    chest press in maintenance gym 2x15 level 3 (2 hand placements)  Hip abd and add in maintenance gym 3x15  level 4  OH press level 5 2x15 reps  Nu step 7' with breaks level 6 in maintenance gym   15 15 35572 Gait Training (timed):    100 feet x3  with SPC (assistive device) over level surfaces with SBA level of assist. Cuing for Step length and height as well as breathing strategies.   To improve safety and dynamic

## 2023-08-09 ENCOUNTER — HOSPITAL ENCOUNTER (OUTPATIENT)
Facility: HOSPITAL | Age: 78
Setting detail: RECURRING SERIES
Discharge: HOME | End: 2023-08-12
Payer: OTHER GOVERNMENT

## 2023-08-09 PROCEDURE — 97530 THERAPEUTIC ACTIVITIES: CPT

## 2023-08-09 PROCEDURE — 97110 THERAPEUTIC EXERCISES: CPT

## 2023-08-09 PROCEDURE — 97116 GAIT TRAINING THERAPY: CPT

## 2023-08-09 NOTE — PROGRESS NOTES
PHYSICAL THERAPY - MEDICARE DAILY TREATMENT NOTE (updated 3/23)      Date: 2023          Patient Name:  Isha Lord :  1945   Medical   Diagnosis:  Muscle weakness (generalized) [M62.81] Treatment Diagnosis:  M62.81  GENERAL MUSCLE WEAKNESS    Referral Source:  Army Douglas MD Insurance:   Payor: Jodie Late / Plan: Jodie Late / Product Type: *No Product type* /                     Patient  verified yes     Visit #   Current  / Total 6 6   Time   In / Out 1000 1045   Total Treatment Time 45   Total Timed Codes 45   1:1 Treatment Time 39      [facility] Totals Reminder:  bill using total billable   min of TIMED therapeutic procedures and modalities. 8-22 min = 1 unit; 23-37 min = 2 units; 38-52 min = 3 units; 53-67 min = 4 units; 68-82 min = 5 units            SUBJECTIVE    Pain Level (0-10 scale): 0/10 slight LB discomfort    Any medication changes, allergies to medications, adverse drug reactions, diagnosis change, or new procedure performed?: [x] No    [] Yes (see summary sheet for update)  Medications: Verified on Patient Summary List    Subjective functional status/changes:     Its been a busy morning. That's all I can think of that would affect my blood pressure. OBJECTIVE    Therapeutic Procedures: Tx Min Billable or 1:1 Min (if diff from Tx Min) Procedure, Rationale, Specifics   15 15 67396 Therapeutic Exercise (timed):  increase ROM, strength, coordination, balance, and proprioception to improve patient's ability to progress to PLOF and address remaining functional goals. (see flow sheet as applicable)      Details if applicable:    chest press in maintenance gym 1x15 level 3 (2 hand placements)  Hip abd and add in maintenance gym 2x15  level 4  OH press level 5 2x15 reps  Nu step 7' with breaks level 6 in maintenance gym   15 15 91358- Education on maintenance program, return demonstration, and vitals monitoring due to decreased numbers.     15 15 [id number] Gait Training (timed):    100

## 2023-08-09 NOTE — PROGRESS NOTES
[facility]  [address] [place] [address] [address], [address]  Office [phone]  Fax [phone] SUMMARY  Patient Name: Yfn Delgadillo : 1945   Treatment/Medical Diagnosis: Muscle weakness (generalized) [M62.81]   Referral Source: Keron Pedro MD     Date of Initial Visit: 2023 Attended Visits: 21 Missed Visits: 0     SUMMARY OF TREATMENT  Mr. Marco Mercer has been consistently coming to therapy to address his balance/stability, ROM and strengthening. He has done well to decrease his fall risk score as evidenced by the MCKEON balance score increase to 35/56. He continue to have breathing difficulty however is working on his diaphragmatic breathing to assist with recover and decreased dizziness with positional changes. Patient will do well with the continuation of the maintenance program at this time. CURRENT STATUS    Short Term Goals: To be accomplished in 8 weeks:   Patient will be independent in a HEP to improve his balance and strength. MET  Patient will increase his mckeon balance score to a 33/56 to decrease risk of falls. MET  Patient will report he is able to walk short distances in his home without an assistive device without loss of balance. MET  Long Term Goals: To be accomplished in 16 treatments:   Patient will increase his mckeon balance score to a 36/56 to decrease risk of falls. NOT MET  Patient will report he is able to perform light activities around his home without difficulty. MET  Patient will be able to ambulate short distances in the clinic without an assistive device safely and independently. MET     RECOMMENDATIONS  Discontinue therapy due to lack of appreciable progress towards goals. Patient will continue on to the maintenance program 1-2x/week for 4 weeks to carryover activities and remain at his current LOF.      Thank you for the referral.        Pro King, PTA       2023       10:28 AM    If you have

## 2023-09-03 ENCOUNTER — HOSPITAL ENCOUNTER (EMERGENCY)
Facility: HOSPITAL | Age: 78
Discharge: HOME | End: 2023-09-04
Attending: EMERGENCY MEDICINE
Payer: COMMERCIAL

## 2023-09-03 VITALS
SYSTOLIC BLOOD PRESSURE: 118 MMHG | DIASTOLIC BLOOD PRESSURE: 67 MMHG | TEMPERATURE: 98.1 F | HEIGHT: 71 IN | RESPIRATION RATE: 16 BRPM | BODY MASS INDEX: 24.5 KG/M2 | WEIGHT: 175 LBS | HEART RATE: 80 BPM | OXYGEN SATURATION: 93 %

## 2023-09-03 DIAGNOSIS — T83.098A BLOCKED URINARY CATHETER, INITIAL ENCOUNTER (HCC): Primary | ICD-10-CM

## 2023-09-03 PROCEDURE — 51798 US URINE CAPACITY MEASURE: CPT

## 2023-09-03 PROCEDURE — 99283 EMERGENCY DEPT VISIT LOW MDM: CPT

## 2023-09-03 PROCEDURE — 81001 URINALYSIS AUTO W/SCOPE: CPT

## 2023-09-03 ASSESSMENT — PAIN - FUNCTIONAL ASSESSMENT: PAIN_FUNCTIONAL_ASSESSMENT: NONE - DENIES PAIN

## 2023-09-03 ASSESSMENT — LIFESTYLE VARIABLES
HOW OFTEN DO YOU HAVE A DRINK CONTAINING ALCOHOL: NEVER
HOW MANY STANDARD DRINKS CONTAINING ALCOHOL DO YOU HAVE ON A TYPICAL DAY: PATIENT DOES NOT DRINK

## 2023-09-04 LAB
APPEARANCE UR: CLEAR
BACTERIA URNS QL MICRO: NEGATIVE /HPF
BILIRUB UR QL: NEGATIVE
COLOR UR: ABNORMAL
EPITH CASTS URNS QL MICRO: ABNORMAL /LPF
GLUCOSE UR STRIP.AUTO-MCNC: NEGATIVE MG/DL
HGB UR QL STRIP: ABNORMAL
KETONES UR QL STRIP.AUTO: NEGATIVE MG/DL
LEUKOCYTE ESTERASE UR QL STRIP.AUTO: ABNORMAL
NITRITE UR QL STRIP.AUTO: NEGATIVE
PH UR STRIP: 5.5 (ref 5–8)
PROT UR STRIP-MCNC: NEGATIVE MG/DL
RBC #/AREA URNS HPF: ABNORMAL /HPF (ref 0–5)
SP GR UR REFRACTOMETRY: 1.01 (ref 1–1.03)
URINE CULTURE IF INDICATED: ABNORMAL
UROBILINOGEN UR QL STRIP.AUTO: 1 EU/DL (ref 0.2–1)
WBC URNS QL MICRO: ABNORMAL /HPF (ref 0–4)

## 2023-09-04 NOTE — ED NOTES
verbal and written discharge instructions given.  verbalized understanding       Neela Gooden RN  09/04/23 [id number]

## 2023-09-04 NOTE — ED TRIAGE NOTES
Pt had scope done at urologist on Monday (pt has been retaining 20% of urine), post procedure, pt required a catheter. On Friday, pt went back to urologist to get catheter out, but pt was still retaining urine. So a new catheter was placed. Today pt presents with decreased urinary output since 12pm today.  No other pertinent s/s (discomfort, bloody urine, etc...)

## 2024-03-14 ENCOUNTER — HOSPITAL ENCOUNTER (EMERGENCY)
Facility: HOSPITAL | Age: 79
Discharge: HOME | End: 2024-03-14
Attending: EMERGENCY MEDICINE
Payer: OTHER GOVERNMENT

## 2024-03-14 ENCOUNTER — APPOINTMENT (OUTPATIENT)
Facility: HOSPITAL | Age: 79
End: 2024-03-14
Payer: OTHER GOVERNMENT

## 2024-03-14 VITALS
WEIGHT: 186.9 LBS | HEART RATE: 80 BPM | BODY MASS INDEX: 26.17 KG/M2 | RESPIRATION RATE: 16 BRPM | OXYGEN SATURATION: 96 % | TEMPERATURE: 97.9 F | DIASTOLIC BLOOD PRESSURE: 111 MMHG | HEIGHT: 71 IN | SYSTOLIC BLOOD PRESSURE: 179 MMHG

## 2024-03-14 DIAGNOSIS — S30.0XXA CONTUSION OF PELVIS, INITIAL ENCOUNTER: Primary | ICD-10-CM

## 2024-03-14 PROCEDURE — 73502 X-RAY EXAM HIP UNI 2-3 VIEWS: CPT

## 2024-03-14 PROCEDURE — 70450 CT HEAD/BRAIN W/O DYE: CPT

## 2024-03-14 PROCEDURE — 99284 EMERGENCY DEPT VISIT MOD MDM: CPT

## 2024-03-14 RX ORDER — SENNOSIDES 8.6 MG
650 CAPSULE ORAL EVERY 8 HOURS PRN
Qty: 60 TABLET | Refills: 0 | Status: SHIPPED | OUTPATIENT
Start: 2024-03-14

## 2024-03-14 ASSESSMENT — PAIN DESCRIPTION - DESCRIPTORS: DESCRIPTORS: DISCOMFORT

## 2024-03-14 ASSESSMENT — PAIN SCALES - GENERAL: PAINLEVEL_OUTOF10: 8

## 2024-03-14 ASSESSMENT — PAIN DESCRIPTION - ORIENTATION: ORIENTATION: LOWER

## 2024-03-14 ASSESSMENT — PAIN DESCRIPTION - LOCATION: LOCATION: BACK;BUTTOCKS

## 2024-03-14 ASSESSMENT — PAIN - FUNCTIONAL ASSESSMENT: PAIN_FUNCTIONAL_ASSESSMENT: 0-10

## 2024-03-14 ASSESSMENT — LIFESTYLE VARIABLES
HOW MANY STANDARD DRINKS CONTAINING ALCOHOL DO YOU HAVE ON A TYPICAL DAY: PATIENT DOES NOT DRINK
HOW OFTEN DO YOU HAVE A DRINK CONTAINING ALCOHOL: NEVER

## 2024-03-14 NOTE — ED TRIAGE NOTES
Pt arrived with complaint of fall.  Per family pt has a history of positional hypotension and last night got up around 0100 and fell.  Pt with abrasion to right forearm and pt complains of coccyx pain,  family concerned because pt has two artificial hips and does complain of pain when he stands and walks but at rest no pain.  Pt is awake and alert  pt and family educated on ER flow

## 2024-03-14 NOTE — ED NOTES
I have reviewed discharge instructions with the patient and spouse. The patient and spouse verbalized understanding. Discharge medications discussed with patient. No questions at this time. Wheeled to spouse's vehicle.

## 2024-03-15 NOTE — ED PROVIDER NOTES
deficit 2018    Muscle pain     Neuropathy     Orthostatic hypotension     asymptomatic    Pacemaker     new pacemaker 2019    Recent change in weight     Risk for falls 2016    Sensory ataxia 2018    Sinus problem     Skin disease     Smoker 2018    SOB (shortness of breath)     Sore throat     SSS (sick sinus syndrome) (HCC)     Stiffness of joints, multiple sites     Stress     Stumbling gait 2018    Syncope     Tiredness     Trouble in sleeping     Weakness        Past Surgical History:  Past Surgical History:   Procedure Laterality Date    INS NEW/RPLCMT PRM PM W/TRANSV ELTRD ATRIAL&VENT N/A 2019    INSERT PPM DUAL performed by Randy Kern MD at [facility] CARDIAC CATH LAB    PACEMAKER PLACEMENT  10/16/2018    MO UNLISTED PROCEDURE CARDIAC SURGERY      ROTATOR CUFF REPAIR Right 2017    TOTAL HIP ARTHROPLASTY Bilateral 1993       Family History:  Family History   Problem Relation Age of Onset    Cancer Mother     Cancer Maternal Aunt        Social History:  Social History     Tobacco Use    Smoking status: Former     Current packs/day: 0.00     Types: Cigarettes     Quit date: 8/3/2019     Years since quittin.6    Smokeless tobacco: Never   Substance Use Topics    Alcohol use: Not Currently     Alcohol/week: 28.0 standard drinks of alcohol    Drug use: No       Allergies:  No Known Allergies    CURRENT MEDICATIONS      Discharge Medication List as of 3/14/2024  3:31 PM        CONTINUE these medications which have NOT CHANGED    Details   ergocalciferol (ERGOCALCIFEROL) 1.25 MG (37174 UT) capsule Historical Med      donepezil (ARICEPT) 10 MG tablet Take 1 tablet by mouth dailyHistorical Med      busPIRone (BUSPAR) 10 MG tablet Take 1 tablet by mouth every 8 (eight) hoursHistorical Med      mirabegron (MYRBETRIQ) 25 MG TB24 Take 1 tablet by mouth dailyHistorical Med      potassium chloride (KLOR-CON M) 10 MEQ extended release tablet Take 1 tablet by mouth

## 2024-04-08 ENCOUNTER — HOSPITAL ENCOUNTER (EMERGENCY)
Facility: HOSPITAL | Age: 79
Discharge: HOME | End: 2024-04-08
Attending: EMERGENCY MEDICINE
Payer: OTHER GOVERNMENT

## 2024-04-08 VITALS
HEART RATE: 81 BPM | SYSTOLIC BLOOD PRESSURE: 193 MMHG | TEMPERATURE: 98 F | WEIGHT: 184 LBS | DIASTOLIC BLOOD PRESSURE: 95 MMHG | OXYGEN SATURATION: 96 % | BODY MASS INDEX: 25.66 KG/M2 | RESPIRATION RATE: 16 BRPM

## 2024-04-08 DIAGNOSIS — I95.1 ORTHOSTATIC HYPOTENSION: Primary | ICD-10-CM

## 2024-04-08 LAB
ALBUMIN SERPL-MCNC: 3.5 G/DL (ref 3.5–5)
ALBUMIN/GLOB SERPL: 1.1 (ref 1.1–2.2)
ALP SERPL-CCNC: 130 U/L (ref 45–117)
ALT SERPL-CCNC: 19 U/L (ref 12–78)
ANION GAP SERPL CALC-SCNC: 9 MMOL/L (ref 5–15)
APPEARANCE UR: CLEAR
AST SERPL-CCNC: 12 U/L (ref 15–37)
BACTERIA URNS QL MICRO: NEGATIVE /HPF
BASOPHILS # BLD: 0.1 K/UL (ref 0–0.1)
BASOPHILS NFR BLD: 1 % (ref 0–1)
BILIRUB SERPL-MCNC: 0.7 MG/DL (ref 0.2–1)
BILIRUB UR QL: NEGATIVE
BUN SERPL-MCNC: 22 MG/DL (ref 6–20)
BUN/CREAT SERPL: 18 (ref 12–20)
CALCIUM SERPL-MCNC: 8.4 MG/DL (ref 8.5–10.1)
CHLORIDE SERPL-SCNC: 105 MMOL/L (ref 97–108)
CO2 SERPL-SCNC: 26 MMOL/L (ref 21–32)
COLOR UR: NORMAL
CREAT SERPL-MCNC: 1.2 MG/DL (ref 0.7–1.3)
DIFFERENTIAL METHOD BLD: ABNORMAL
EOSINOPHIL # BLD: 0.1 K/UL (ref 0–0.4)
EOSINOPHIL NFR BLD: 2 % (ref 0–7)
EPITH CASTS URNS QL MICRO: NORMAL /LPF
ERYTHROCYTE [DISTWIDTH] IN BLOOD BY AUTOMATED COUNT: 12.8 % (ref 11.5–14.5)
FLUAV RNA SPEC QL NAA+PROBE: NOT DETECTED
FLUBV RNA SPEC QL NAA+PROBE: NOT DETECTED
GLOBULIN SER CALC-MCNC: 3.3 G/DL (ref 2–4)
GLUCOSE BLD STRIP.AUTO-MCNC: 118 MG/DL (ref 65–117)
GLUCOSE SERPL-MCNC: 125 MG/DL (ref 65–100)
GLUCOSE UR STRIP.AUTO-MCNC: NEGATIVE MG/DL
HCT VFR BLD AUTO: 36.5 % (ref 36.6–50.3)
HGB BLD-MCNC: 12.4 G/DL (ref 12.1–17)
HGB UR QL STRIP: NEGATIVE
IMM GRANULOCYTES # BLD AUTO: 0 K/UL (ref 0–0.04)
IMM GRANULOCYTES NFR BLD AUTO: 1 % (ref 0–0.5)
KETONES UR QL STRIP.AUTO: NEGATIVE MG/DL
LEUKOCYTE ESTERASE UR QL STRIP.AUTO: NEGATIVE
LYMPHOCYTES # BLD: 1.3 K/UL (ref 0.8–3.5)
LYMPHOCYTES NFR BLD: 20 % (ref 12–49)
MAGNESIUM SERPL-MCNC: 2 MG/DL (ref 1.6–2.4)
MCH RBC QN AUTO: 31.4 PG (ref 26–34)
MCHC RBC AUTO-ENTMCNC: 34 G/DL (ref 30–36.5)
MCV RBC AUTO: 92.4 FL (ref 80–99)
MONOCYTES # BLD: 0.4 K/UL (ref 0–1)
MONOCYTES NFR BLD: 6 % (ref 5–13)
NEUTS SEG # BLD: 4.5 K/UL (ref 1.8–8)
NEUTS SEG NFR BLD: 70 % (ref 32–75)
NITRITE UR QL STRIP.AUTO: NEGATIVE
NRBC # BLD: 0 K/UL (ref 0–0.01)
NRBC BLD-RTO: 0 PER 100 WBC
PH UR STRIP: 6 (ref 5–8)
PLATELET # BLD AUTO: 187 K/UL (ref 150–400)
PMV BLD AUTO: 9.5 FL (ref 8.9–12.9)
POTASSIUM SERPL-SCNC: 4.2 MMOL/L (ref 3.5–5.1)
PROT SERPL-MCNC: 6.8 G/DL (ref 6.4–8.2)
PROT UR STRIP-MCNC: NEGATIVE MG/DL
RBC # BLD AUTO: 3.95 M/UL (ref 4.1–5.7)
RBC #/AREA URNS HPF: NORMAL /HPF (ref 0–5)
SARS-COV-2 RNA RESP QL NAA+PROBE: NOT DETECTED
SERVICE CMNT-IMP: ABNORMAL
SODIUM SERPL-SCNC: 140 MMOL/L (ref 136–145)
SP GR UR REFRACTOMETRY: 1.01 (ref 1–1.03)
UROBILINOGEN UR QL STRIP.AUTO: 0.2 EU/DL (ref 0.2–1)
WBC # BLD AUTO: 6.3 K/UL (ref 4.1–11.1)
WBC URNS QL MICRO: NORMAL /HPF (ref 0–4)

## 2024-04-08 PROCEDURE — 85025 COMPLETE CBC W/AUTO DIFF WBC: CPT

## 2024-04-08 PROCEDURE — 93005 ELECTROCARDIOGRAM TRACING: CPT | Performed by: EMERGENCY MEDICINE

## 2024-04-08 PROCEDURE — 81001 URINALYSIS AUTO W/SCOPE: CPT

## 2024-04-08 PROCEDURE — 82962 GLUCOSE BLOOD TEST: CPT

## 2024-04-08 PROCEDURE — 99284 EMERGENCY DEPT VISIT MOD MDM: CPT

## 2024-04-08 PROCEDURE — 36415 COLL VENOUS BLD VENIPUNCTURE: CPT

## 2024-04-08 PROCEDURE — 80053 COMPREHEN METABOLIC PANEL: CPT

## 2024-04-08 PROCEDURE — 87636 SARSCOV2 & INF A&B AMP PRB: CPT

## 2024-04-08 PROCEDURE — 83735 ASSAY OF MAGNESIUM: CPT

## 2024-04-08 ASSESSMENT — PAIN SCALES - GENERAL
PAINLEVEL_OUTOF10: 0
PAINLEVEL_OUTOF10: 0

## 2024-04-08 ASSESSMENT — PAIN - FUNCTIONAL ASSESSMENT
PAIN_FUNCTIONAL_ASSESSMENT: 0-10
PAIN_FUNCTIONAL_ASSESSMENT: 0-10

## 2024-04-08 NOTE — ED PROVIDER NOTES
K/UL    Immature Granulocytes Absolute 0.0 0.00 - 0.04 K/UL    Differential Type AUTOMATED     Magnesium    Collection Time: 04/08/24  3:03 PM   Result Value Ref Range    Magnesium 2.0 1.6 - 2.4 mg/dL   COVID-19 & Influenza Combo    Collection Time: 04/08/24  3:40 PM    Specimen: Nasopharyngeal   Result Value Ref Range    SARS-CoV-2, PCR Not detected NOTD      Rapid Influenza A By PCR Not detected NOTD      Rapid Influenza B By PCR Not detected NOTD     Urinalysis with Microscopic    Collection Time: 04/08/24  4:46 PM   Result Value Ref Range    Color, UA YELLOW/STRAW      Appearance CLEAR CLEAR      Specific Gravity, UA 1.007 1.003 - 1.030      pH, Urine 6.0 5.0 - 8.0      Protein, UA Negative NEG mg/dL    Glucose, UA Negative NEG mg/dL    Ketones, Urine Negative NEG mg/dL    Bilirubin Urine Negative NEG      Blood, Urine Negative NEG      Urobilinogen, Urine 0.2 0.2 - 1.0 EU/dL    Nitrite, Urine Negative NEG      Leukocyte Esterase, Urine Negative NEG      WBC, UA 0-4 0 - 4 /hpf    RBC, UA 0-5 0 - 5 /hpf    Epithelial Cells UA FEW FEW /lpf    BACTERIA, URINE Negative NEG /hpf       EKG: If performed, independent interpretation documented below in the MDM section     RADIOLOGY:  Non-plain film images such as CT, Ultrasound and MRI are read by the radiologist. Plain radiographic images are visualized and preliminarily interpreted by the ED Provider with the findings documented in the MDM section.     Interpretation per the Radiologist below, if available at the time of this note:     No orders to display        PROCEDURES   Unless otherwise noted below, none  Procedures     EMERGENCY DEPARTMENT COURSE and DIFFERENTIAL DIAGNOSIS/MDM   Vitals:    Vitals:    04/08/24 1546 04/08/24 1556 04/08/24 1615 04/08/24 1630   BP: (!) 190/98 (!) 175/93 (!) 167/88 (!) 193/95   Pulse: 80 80 80 81   Resp: 12 15 15 16   Temp:       SpO2: 94% 94% 94% 96%   Weight:            Patient was given the following medications:  Medications -

## 2024-04-08 NOTE — ED TRIAGE NOTES
Pt arrived via  EMS with c/o hypotension, unsteadiness and weakness when standing  up and changing positions. Wife states this is an ongoing issue he has a hx of orthostatic hypotension, he takes  midodrine for this problem, had a new pt come  andvisit  the house today who  took his BP and said he was obligated to call EMS. Pt has on complaints at this time  except shortness of breath.

## 2024-04-08 NOTE — ED NOTES
Orthostatic BP's done and while standing the BP dropped to 84/56.  Patient was getting tired than weak. Able to get himself back into the bed

## 2024-04-10 LAB
EKG ATRIAL RATE: 84 BPM
EKG DIAGNOSIS: NORMAL
EKG P-R INTERVAL: 212 MS
EKG Q-T INTERVAL: 372 MS
EKG QRS DURATION: 84 MS
EKG QTC CALCULATION (BAZETT): 439 MS
EKG R AXIS: 48 DEGREES
EKG T AXIS: 55 DEGREES
EKG VENTRICULAR RATE: 84 BPM

## 2024-04-23 ENCOUNTER — APPOINTMENT (OUTPATIENT)
Facility: HOSPITAL | Age: 79
DRG: 177 | End: 2024-04-23
Payer: OTHER GOVERNMENT

## 2024-04-23 ENCOUNTER — HOSPITAL ENCOUNTER (INPATIENT)
Facility: HOSPITAL | Age: 79
LOS: 3 days | Discharge: HOME HEALTH | DRG: 177 | End: 2024-04-30
Attending: EMERGENCY MEDICINE | Admitting: INTERNAL MEDICINE
Payer: OTHER GOVERNMENT

## 2024-04-23 DIAGNOSIS — J96.11 CHRONIC RESPIRATORY FAILURE WITH HYPOXIA (HCC): Primary | ICD-10-CM

## 2024-04-23 PROBLEM — J96.10 CHRONIC RESPIRATORY FAILURE (HCC): Status: ACTIVE | Noted: 2024-04-23

## 2024-04-23 LAB
ALBUMIN SERPL-MCNC: 3.3 G/DL (ref 3.5–5)
ALBUMIN/GLOB SERPL: 0.9 (ref 1.1–2.2)
ALP SERPL-CCNC: 112 U/L (ref 45–117)
ALT SERPL-CCNC: 19 U/L (ref 12–78)
ANION GAP SERPL CALC-SCNC: 13 MMOL/L (ref 5–15)
APPEARANCE UR: CLEAR
ARTERIAL PATENCY WRIST A: YES
AST SERPL-CCNC: 11 U/L (ref 15–37)
BACTERIA URNS QL MICRO: NEGATIVE /HPF
BASE DEFICIT BLDA-SCNC: 3.7 MMOL/L
BASOPHILS # BLD: 0 K/UL (ref 0–0.1)
BASOPHILS NFR BLD: 0 % (ref 0–1)
BDY SITE: ABNORMAL
BILIRUB SERPL-MCNC: 1.7 MG/DL (ref 0.2–1)
BILIRUB UR QL: NEGATIVE
BUN SERPL-MCNC: 23 MG/DL (ref 6–20)
BUN/CREAT SERPL: 17 (ref 12–20)
CALCIUM SERPL-MCNC: 8.6 MG/DL (ref 8.5–10.1)
CHLORIDE SERPL-SCNC: 100 MMOL/L (ref 97–108)
CO2 SERPL-SCNC: 22 MMOL/L (ref 21–32)
COLOR UR: ABNORMAL
CREAT SERPL-MCNC: 1.39 MG/DL (ref 0.7–1.3)
DIFFERENTIAL METHOD BLD: ABNORMAL
EOSINOPHIL # BLD: 0 K/UL (ref 0–0.4)
EOSINOPHIL NFR BLD: 0 % (ref 0–7)
EPITH CASTS URNS QL MICRO: ABNORMAL /LPF
ERYTHROCYTE [DISTWIDTH] IN BLOOD BY AUTOMATED COUNT: 12.9 % (ref 11.5–14.5)
GAS FLOW.O2 O2 DELIVERY SYS: 4 L/MIN
GLOBULIN SER CALC-MCNC: 3.6 G/DL (ref 2–4)
GLUCOSE SERPL-MCNC: 155 MG/DL (ref 65–100)
GLUCOSE UR STRIP.AUTO-MCNC: 250 MG/DL
HCO3 BLDA-SCNC: 17 MMOL/L (ref 22–26)
HCT VFR BLD AUTO: 36.1 % (ref 36.6–50.3)
HGB BLD-MCNC: 12.6 G/DL (ref 12.1–17)
HGB UR QL STRIP: ABNORMAL
IMM GRANULOCYTES # BLD AUTO: 0 K/UL (ref 0–0.04)
IMM GRANULOCYTES NFR BLD AUTO: 0 % (ref 0–0.5)
KETONES UR QL STRIP.AUTO: ABNORMAL MG/DL
LEUKOCYTE ESTERASE UR QL STRIP.AUTO: NEGATIVE
LYMPHOCYTES # BLD: 0.8 K/UL (ref 0.8–3.5)
LYMPHOCYTES NFR BLD: 9 % (ref 12–49)
MCH RBC QN AUTO: 31.4 PG (ref 26–34)
MCHC RBC AUTO-ENTMCNC: 34.9 G/DL (ref 30–36.5)
MCV RBC AUTO: 90 FL (ref 80–99)
MONOCYTES # BLD: 0.3 K/UL (ref 0–1)
MONOCYTES NFR BLD: 3 % (ref 5–13)
NEUTS BAND NFR BLD MANUAL: 8 %
NEUTS SEG # BLD: 7.5 K/UL (ref 1.8–8)
NEUTS SEG NFR BLD: 80 % (ref 32–75)
NITRITE UR QL STRIP.AUTO: NEGATIVE
NRBC # BLD: 0 K/UL (ref 0–0.01)
NRBC BLD-RTO: 0 PER 100 WBC
PCO2 BLDA: 22 MMHG (ref 35–45)
PH BLDA: 7.51 (ref 7.35–7.45)
PH UR STRIP: 6 (ref 5–8)
PLATELET # BLD AUTO: 170 K/UL (ref 150–400)
PLATELET COMMENT: ABNORMAL
PMV BLD AUTO: 10 FL (ref 8.9–12.9)
PO2 BLDA: 50 MMHG (ref 80–100)
POTASSIUM SERPL-SCNC: 3.8 MMOL/L (ref 3.5–5.1)
PROT SERPL-MCNC: 6.9 G/DL (ref 6.4–8.2)
PROT UR STRIP-MCNC: ABNORMAL MG/DL
RBC # BLD AUTO: 4.01 M/UL (ref 4.1–5.7)
RBC #/AREA URNS HPF: ABNORMAL /HPF (ref 0–5)
RBC MORPH BLD: ABNORMAL
SAO2 % BLD: 90 % (ref 92–97)
SAO2% DEVICE SAO2% SENSOR NAME: ABNORMAL
SODIUM SERPL-SCNC: 135 MMOL/L (ref 136–145)
SP GR UR REFRACTOMETRY: 1.02 (ref 1–1.03)
SPECIMEN SITE: ABNORMAL
TROPONIN I SERPL HS-MCNC: 7 NG/L (ref 0–76)
URINE CULTURE IF INDICATED: ABNORMAL
UROBILINOGEN UR QL STRIP.AUTO: 1 EU/DL (ref 0.2–1)
WBC # BLD AUTO: 8.6 K/UL (ref 4.1–11.1)
WBC URNS QL MICRO: ABNORMAL /HPF (ref 0–4)

## 2024-04-23 PROCEDURE — G0378 HOSPITAL OBSERVATION PER HR: HCPCS

## 2024-04-23 PROCEDURE — 85025 COMPLETE CBC W/AUTO DIFF WBC: CPT

## 2024-04-23 PROCEDURE — 80053 COMPREHEN METABOLIC PANEL: CPT

## 2024-04-23 PROCEDURE — 99285 EMERGENCY DEPT VISIT HI MDM: CPT

## 2024-04-23 PROCEDURE — 36600 WITHDRAWAL OF ARTERIAL BLOOD: CPT

## 2024-04-23 PROCEDURE — 81001 URINALYSIS AUTO W/SCOPE: CPT

## 2024-04-23 PROCEDURE — 71045 X-RAY EXAM CHEST 1 VIEW: CPT

## 2024-04-23 PROCEDURE — 2580000003 HC RX 258: Performed by: INTERNAL MEDICINE

## 2024-04-23 PROCEDURE — 82803 BLOOD GASES ANY COMBINATION: CPT

## 2024-04-23 PROCEDURE — 70450 CT HEAD/BRAIN W/O DYE: CPT

## 2024-04-23 PROCEDURE — 93005 ELECTROCARDIOGRAM TRACING: CPT | Performed by: EMERGENCY MEDICINE

## 2024-04-23 PROCEDURE — 36415 COLL VENOUS BLD VENIPUNCTURE: CPT

## 2024-04-23 PROCEDURE — 84484 ASSAY OF TROPONIN QUANT: CPT

## 2024-04-23 RX ORDER — ATORVASTATIN CALCIUM 40 MG/1
40 TABLET, FILM COATED ORAL DAILY
Status: DISCONTINUED | OUTPATIENT
Start: 2024-04-24 | End: 2024-04-30 | Stop reason: HOSPADM

## 2024-04-23 RX ORDER — SODIUM CHLORIDE 0.9 % (FLUSH) 0.9 %
5-40 SYRINGE (ML) INJECTION PRN
Status: DISCONTINUED | OUTPATIENT
Start: 2024-04-23 | End: 2024-04-30 | Stop reason: HOSPADM

## 2024-04-23 RX ORDER — BUSPIRONE HYDROCHLORIDE 10 MG/1
10 TABLET ORAL EVERY 8 HOURS
Status: DISCONTINUED | OUTPATIENT
Start: 2024-04-23 | End: 2024-04-24

## 2024-04-23 RX ORDER — ERGOCALCIFEROL 1.25 MG/1
50000 CAPSULE ORAL WEEKLY
Status: DISCONTINUED | OUTPATIENT
Start: 2024-04-23 | End: 2024-04-24

## 2024-04-23 RX ORDER — POTASSIUM CHLORIDE 750 MG/1
40 TABLET, FILM COATED, EXTENDED RELEASE ORAL PRN
Status: DISCONTINUED | OUTPATIENT
Start: 2024-04-23 | End: 2024-04-24

## 2024-04-23 RX ORDER — DONEPEZIL HYDROCHLORIDE 10 MG/1
10 TABLET, FILM COATED ORAL DAILY
Status: DISCONTINUED | OUTPATIENT
Start: 2024-04-24 | End: 2024-04-27

## 2024-04-23 RX ORDER — IPRATROPIUM BROMIDE AND ALBUTEROL SULFATE 2.5; .5 MG/3ML; MG/3ML
1 SOLUTION RESPIRATORY (INHALATION) EVERY 4 HOURS PRN
Status: DISCONTINUED | OUTPATIENT
Start: 2024-04-23 | End: 2024-04-24

## 2024-04-23 RX ORDER — FLUDROCORTISONE ACETATE 0.1 MG/1
0.05 TABLET ORAL DAILY
Status: DISCONTINUED | OUTPATIENT
Start: 2024-04-24 | End: 2024-04-30 | Stop reason: HOSPADM

## 2024-04-23 RX ORDER — DULOXETIN HYDROCHLORIDE 30 MG/1
60 CAPSULE, DELAYED RELEASE ORAL DAILY
Status: DISCONTINUED | OUTPATIENT
Start: 2024-04-24 | End: 2024-04-24

## 2024-04-23 RX ORDER — ACETAMINOPHEN 325 MG/1
650 TABLET ORAL EVERY 6 HOURS PRN
Status: DISCONTINUED | OUTPATIENT
Start: 2024-04-23 | End: 2024-04-30 | Stop reason: HOSPADM

## 2024-04-23 RX ORDER — 0.9 % SODIUM CHLORIDE 0.9 %
1000 INTRAVENOUS SOLUTION INTRAVENOUS ONCE
Status: DISCONTINUED | OUTPATIENT
Start: 2024-04-23 | End: 2024-04-23

## 2024-04-23 RX ORDER — MAGNESIUM SULFATE IN WATER 40 MG/ML
2000 INJECTION, SOLUTION INTRAVENOUS PRN
Status: DISCONTINUED | OUTPATIENT
Start: 2024-04-23 | End: 2024-04-24

## 2024-04-23 RX ORDER — MIDODRINE HYDROCHLORIDE 5 MG/1
5 TABLET ORAL
Status: DISCONTINUED | OUTPATIENT
Start: 2024-04-24 | End: 2024-04-25

## 2024-04-23 RX ORDER — FINASTERIDE 5 MG/1
5 TABLET, FILM COATED ORAL DAILY
Status: DISCONTINUED | OUTPATIENT
Start: 2024-04-24 | End: 2024-04-30 | Stop reason: HOSPADM

## 2024-04-23 RX ORDER — ONDANSETRON 2 MG/ML
4 INJECTION INTRAMUSCULAR; INTRAVENOUS EVERY 6 HOURS PRN
Status: DISCONTINUED | OUTPATIENT
Start: 2024-04-23 | End: 2024-04-30 | Stop reason: HOSPADM

## 2024-04-23 RX ORDER — SODIUM CHLORIDE 0.9 % (FLUSH) 0.9 %
5-40 SYRINGE (ML) INJECTION EVERY 12 HOURS SCHEDULED
Status: DISCONTINUED | OUTPATIENT
Start: 2024-04-23 | End: 2024-04-30 | Stop reason: HOSPADM

## 2024-04-23 RX ORDER — ACETAMINOPHEN 650 MG/1
650 SUPPOSITORY RECTAL EVERY 6 HOURS PRN
Status: DISCONTINUED | OUTPATIENT
Start: 2024-04-23 | End: 2024-04-30 | Stop reason: HOSPADM

## 2024-04-23 RX ORDER — POLYETHYLENE GLYCOL 3350 17 G/17G
17 POWDER, FOR SOLUTION ORAL DAILY PRN
Status: DISCONTINUED | OUTPATIENT
Start: 2024-04-23 | End: 2024-04-30 | Stop reason: HOSPADM

## 2024-04-23 RX ORDER — SODIUM CHLORIDE 9 MG/ML
INJECTION, SOLUTION INTRAVENOUS PRN
Status: DISCONTINUED | OUTPATIENT
Start: 2024-04-23 | End: 2024-04-30 | Stop reason: HOSPADM

## 2024-04-23 RX ORDER — ONDANSETRON 4 MG/1
4 TABLET, ORALLY DISINTEGRATING ORAL EVERY 8 HOURS PRN
Status: DISCONTINUED | OUTPATIENT
Start: 2024-04-23 | End: 2024-04-30 | Stop reason: HOSPADM

## 2024-04-23 RX ORDER — POTASSIUM CHLORIDE 7.45 MG/ML
10 INJECTION INTRAVENOUS PRN
Status: DISCONTINUED | OUTPATIENT
Start: 2024-04-23 | End: 2024-04-24

## 2024-04-23 RX ADMIN — SODIUM CHLORIDE, PRESERVATIVE FREE 6 ML: 5 INJECTION INTRAVENOUS at 22:30

## 2024-04-23 ASSESSMENT — PAIN - FUNCTIONAL ASSESSMENT: PAIN_FUNCTIONAL_ASSESSMENT: 0-10

## 2024-04-23 ASSESSMENT — PAIN SCALES - GENERAL
PAINLEVEL_OUTOF10: 0
PAINLEVEL_OUTOF10: 0

## 2024-04-23 NOTE — ED NOTES
Admission SBAR Note  Situation/Background:     Patient is being transferred to [facility] ([facility]), Room# 128    Patient's Chief Complaint was Hypoxia and is admitted for chronic respiratory failure w/hypoxia.    CODE STATUS: DNR  CSSRS: 0 - No Risk    ISOLATION/PRECAUTIONS: No  ISOLATION TYPE: n/a    Is this a behavioral health patient? No  Has wanding been completed No  Are belongings secure? N/a    Called outstanding consults: Yes    STAT labs collected: Yes    Repeat Lactic Acid DUE? No  TIME DUE: n/a    All STAT orders are complete: Yes    The following personal items will be sent with the patient during transfer to the floor:     All valuables: clothing pajamas,  with patient at bedside       ASSESSMENT:    NEURO:   NIH SCORE: n/a   KIRAN SWALLOW SCREEN COMPLETE: No  ORIENTATION LEVEL: ORIENTATION LEVEL: Person  Cognition: impaired decision making and memory loss  Speech: shows no evidence of impairment    Is patient impulsive? No  Is patient oriented? No  Do they follow commands? Yes  Is the patient ambulatory? No    FALL RISK? Yes  Interventions: Implemented/recommended use of non-skid footwear, Implemented/recommended use of fall risk identification flag to all team members, and Implemented/recommended environmental changes (remove hazards, lower bed, improve lighting, etc.)    RESPIRATORY:   Is patient on oxygen? Yes  Oxygen therapy: Nasal cannula  O2 rate: 6L    CARDIAC:   Is cardiac monitoring ordered? No    Last Rhythm: Rhythm including paccardio:   Patient to transfer with tele box on? No  Infusions: Meds; iv fluids: none  LINE ACCESS: 20G Peripheral IV , Left Wrist , Iv rate: prn       /GI:   Continent Bowel/Bladder? Yes  Urinary Output: 100  Chronic or Acute: n/a  If Chronic, is it 3 days old, was it changed prior to specimen collection? N/a  Was UA with reflex sent to lab? Yes  If no, collect and send prior to transport to inpatient

## 2024-04-23 NOTE — ED TRIAGE NOTES
Pt brought in by EMS (BLS) for incoherent and hypoxia.  [facility], orthostatic hypotension and hypoxia.  Oxygen was delivered today at patients home and was not set up, EMS also expressed pt wife had reported pt has been fallen and fell last night and EMS was called for lift assist and wife is concerned because the aide leaves at 2 pm and if the patient falls again she will not be able to lift him up and she is not able to care for him at home, Wife also had stated she wants [facility] to [facility].  Pt arrived on 4L NC with SpO2 of 90%. Pt noted to be a mouth breather,  Pt educated on ER flow

## 2024-04-23 NOTE — ED PROVIDER NOTES
No acute intracranial abnormality.         XR CHEST PORTABLE   Final Result   No Acute Disease.           [unfilled]  [unfilled]      Medical Decision Making   I am the first provider for this patient.    I reviewed the vital signs, available nursing notes, past medical history, past surgical history, family history and social history.    Vital Signs-Reviewed the patient's vital signs.  Patient Vitals for the past 12 hrs:   Temp Pulse Resp BP SpO2   04/23/24 1902 -- -- -- -- 92 %   04/23/24 1900 -- 80 22 (!) 159/74 --   04/23/24 1830 -- 91 (!) 32 139/80 --   04/23/24 1827 -- -- -- -- 92 %   04/23/24 1807 -- 83 20 (!) 142/65 (!) 89 %   04/23/24 1800 -- 80 28 (!) 154/69 --   04/23/24 1752 -- 83 25 (!) 154/68 (!) 87 %   04/23/24 1730 -- 80 27 (!) 161/74 --   04/23/24 1713 -- -- -- -- 91 %   04/23/24 1710 98 °F (36.7 °C) 82 20 (!) 162/83 (!) 86 %       Pulse Oximetry Analysis - 91% on 4L    Cardiac Monitor:   Rate: 80 bpm  Rhythm: Atrial paced rhythm      ED EKG interpretation:17:24  Rhythm: Atrial paced rhythm; and regular . Rate (approx.): 80;  This EKG was interpreted by Clive Linton MD,ED Provider.      Records Reviewed: Nursing Notes and Old Medical Records    Provider Notes (Medical Decision Making):   78-year-old male with history of dementia, oxygen dependent COPD/emphysema and orthostatic hypotension [facility] with [facility] for [facility].  Wife was unaware of how burdensome it would be to take care of him at home.  Patient with baseline mental status and oxygen requirements per wife.  Wife requesting transfer to [facility] for the arrangement of inpatient palliative care/placement and further management.  Will obtain basic blood work and chest x-ray ;wife does report that he does have a new cough that is nonproductive.    ED Course:   Initial assessment performed. The patients presenting problems have been discussed, and they are in agreement with the care plan formulated

## 2024-04-23 NOTE — ED NOTES
Pt O2 stats at 89% on 4L nasal and ABG pH 7.51, pCO2 22, pO250, HCO3 17. MD aware and pt put on 6L nasal cannula per orders

## 2024-04-24 PROBLEM — N13.8 BPH WITH OBSTRUCTION/LOWER URINARY TRACT SYMPTOMS: Chronic | Status: ACTIVE | Noted: 2018-04-19

## 2024-04-24 PROBLEM — K21.9 GERD (GASTROESOPHAGEAL REFLUX DISEASE): Chronic | Status: ACTIVE | Noted: 2024-04-24

## 2024-04-24 PROBLEM — J43.9 PULMONARY EMPHYSEMA (HCC): Chronic | Status: ACTIVE | Noted: 2022-11-07

## 2024-04-24 PROBLEM — R41.89 COGNITIVE IMPAIRMENT: Chronic | Status: ACTIVE | Noted: 2018-11-09

## 2024-04-24 PROBLEM — N40.1 BPH WITH OBSTRUCTION/LOWER URINARY TRACT SYMPTOMS: Chronic | Status: ACTIVE | Noted: 2018-04-19

## 2024-04-24 PROBLEM — J96.10 CHRONIC RESPIRATORY FAILURE (HCC): Chronic | Status: ACTIVE | Noted: 2024-04-23

## 2024-04-24 PROBLEM — R41.89 COGNITIVE IMPAIRMENT: Status: ACTIVE | Noted: 2018-11-09

## 2024-04-24 PROBLEM — E78.5 HYPERLIPIDEMIA: Chronic | Status: ACTIVE | Noted: 2024-04-24

## 2024-04-24 PROBLEM — I95.1 ORTHOSTATIC HYPOTENSION: Chronic | Status: ACTIVE | Noted: 2022-11-06

## 2024-04-24 LAB
EKG ATRIAL RATE: 80 BPM
EKG DIAGNOSIS: NORMAL
EKG P AXIS: 55 DEGREES
EKG P-R INTERVAL: 202 MS
EKG Q-T INTERVAL: 360 MS
EKG QRS DURATION: 88 MS
EKG QTC CALCULATION (BAZETT): 415 MS
EKG R AXIS: 43 DEGREES
EKG T AXIS: 58 DEGREES
EKG VENTRICULAR RATE: 80 BPM

## 2024-04-24 PROCEDURE — 2700000000 HC OXYGEN THERAPY PER DAY

## 2024-04-24 PROCEDURE — 2580000003 HC RX 258: Performed by: INTERNAL MEDICINE

## 2024-04-24 PROCEDURE — 6370000000 HC RX 637 (ALT 250 FOR IP): Performed by: INTERNAL MEDICINE

## 2024-04-24 PROCEDURE — 6370000000 HC RX 637 (ALT 250 FOR IP): Performed by: EMERGENCY MEDICINE

## 2024-04-24 PROCEDURE — G0378 HOSPITAL OBSERVATION PER HR: HCPCS

## 2024-04-24 PROCEDURE — 94761 N-INVAS EAR/PLS OXIMETRY MLT: CPT

## 2024-04-24 PROCEDURE — 6360000002 HC RX W HCPCS: Performed by: INTERNAL MEDICINE

## 2024-04-24 PROCEDURE — 94640 AIRWAY INHALATION TREATMENT: CPT

## 2024-04-24 RX ORDER — FLUTICASONE PROPIONATE 50 MCG
1 SPRAY, SUSPENSION (ML) NASAL DAILY
Status: DISCONTINUED | OUTPATIENT
Start: 2024-04-24 | End: 2024-04-30 | Stop reason: HOSPADM

## 2024-04-24 RX ORDER — BUSPIRONE HYDROCHLORIDE 10 MG/1
10 TABLET ORAL 3 TIMES DAILY PRN
Status: DISCONTINUED | OUTPATIENT
Start: 2024-04-24 | End: 2024-04-30 | Stop reason: HOSPADM

## 2024-04-24 RX ORDER — ALBUTEROL SULFATE 2.5 MG/3ML
2.5 SOLUTION RESPIRATORY (INHALATION) EVERY 6 HOURS PRN
Status: DISCONTINUED | OUTPATIENT
Start: 2024-04-24 | End: 2024-04-30 | Stop reason: HOSPADM

## 2024-04-24 RX ORDER — IPRATROPIUM BROMIDE AND ALBUTEROL SULFATE 2.5; .5 MG/3ML; MG/3ML
1 SOLUTION RESPIRATORY (INHALATION)
Status: DISCONTINUED | OUTPATIENT
Start: 2024-04-24 | End: 2024-04-24

## 2024-04-24 RX ORDER — ERGOCALCIFEROL 1.25 MG/1
50000 CAPSULE ORAL WEEKLY
Status: DISCONTINUED | OUTPATIENT
Start: 2024-04-24 | End: 2024-04-24

## 2024-04-24 RX ORDER — ESCITALOPRAM OXALATE 10 MG/1
5 TABLET ORAL DAILY
Status: DISCONTINUED | OUTPATIENT
Start: 2024-04-24 | End: 2024-04-30 | Stop reason: HOSPADM

## 2024-04-24 RX ORDER — PANTOPRAZOLE SODIUM 40 MG/1
40 TABLET, DELAYED RELEASE ORAL
Status: DISCONTINUED | OUTPATIENT
Start: 2024-04-25 | End: 2024-04-30 | Stop reason: HOSPADM

## 2024-04-24 RX ADMIN — MIDODRINE HYDROCHLORIDE 5 MG: 5 TABLET ORAL at 17:42

## 2024-04-24 RX ADMIN — ERGOCALCIFEROL 50000 UNITS: 1.25 CAPSULE ORAL at 09:23

## 2024-04-24 RX ADMIN — BUSPIRONE HYDROCHLORIDE 10 MG: 10 TABLET ORAL at 09:22

## 2024-04-24 RX ADMIN — ESCITALOPRAM OXALATE 5 MG: 10 TABLET ORAL at 15:58

## 2024-04-24 RX ADMIN — FINASTERIDE 5 MG: 5 TABLET, FILM COATED ORAL at 09:24

## 2024-04-24 RX ADMIN — IPRATROPIUM BROMIDE 0.5 MG: 0.5 SOLUTION RESPIRATORY (INHALATION) at 07:51

## 2024-04-24 RX ADMIN — BUSPIRONE HYDROCHLORIDE 10 MG: 10 TABLET ORAL at 00:17

## 2024-04-24 RX ADMIN — IPRATROPIUM BROMIDE 0.5 MG: 0.5 SOLUTION RESPIRATORY (INHALATION) at 11:38

## 2024-04-24 RX ADMIN — BUSPIRONE HYDROCHLORIDE 10 MG: 10 TABLET ORAL at 15:59

## 2024-04-24 RX ADMIN — DONEPEZIL HYDROCHLORIDE 10 MG: 10 TABLET, FILM COATED ORAL at 09:23

## 2024-04-24 RX ADMIN — FLUTICASONE PROPIONATE 1 SPRAY: 50 SPRAY, METERED NASAL at 15:59

## 2024-04-24 RX ADMIN — CHOLECALCIFEROL (VITAMIN D3) 10 MCG (400 UNIT) TABLET 400 UNITS: at 18:42

## 2024-04-24 RX ADMIN — SODIUM CHLORIDE, PRESERVATIVE FREE 10 ML: 5 INJECTION INTRAVENOUS at 09:26

## 2024-04-24 RX ADMIN — SODIUM CHLORIDE, PRESERVATIVE FREE 6 ML: 5 INJECTION INTRAVENOUS at 21:00

## 2024-04-24 RX ADMIN — ATORVASTATIN CALCIUM 40 MG: 40 TABLET, FILM COATED ORAL at 09:23

## 2024-04-24 RX ADMIN — IPRATROPIUM BROMIDE AND ALBUTEROL SULFATE 1 DOSE: .5; 2.5 SOLUTION RESPIRATORY (INHALATION) at 05:52

## 2024-04-24 RX ADMIN — FLUDROCORTISONE ACETATE 0.05 MG: 0.1 TABLET ORAL at 09:24

## 2024-04-24 RX ADMIN — MIDODRINE HYDROCHLORIDE 5 MG: 5 TABLET ORAL at 11:59

## 2024-04-24 RX ADMIN — DULOXETINE HYDROCHLORIDE 60 MG: 30 CAPSULE, DELAYED RELEASE ORAL at 09:23

## 2024-04-24 RX ADMIN — BUSPIRONE HYDROCHLORIDE 10 MG: 10 TABLET ORAL at 23:06

## 2024-04-24 RX ADMIN — MIDODRINE HYDROCHLORIDE 5 MG: 5 TABLET ORAL at 09:22

## 2024-04-24 ASSESSMENT — PAIN SCALES - GENERAL: PAINLEVEL_OUTOF10: 0

## 2024-04-24 NOTE — H&P
History and Physical  Tele-Hospitalist, Tele-Medicine encounter      Patient:  Immanuel Patel    CHIEF COMPLAINT:    Confusion, SOB    HISTORY OF PRESENT ILLNESS:     Immanuel Patel, 78 y.o. male with PMHx significant for orthostatic hypotension, dementia, chronic hypoxemic respiratory failure and is supposed to be on home oxygen, presents via EMS to the ED with cc of shortness of breath and altered mental status.   Patient has underlying dementia and poor historian because of that.  No family members at the bedside.   In Ed,  wife reported [facility].  Patient was offered going to nursing facility and palliative care but wife wanted to take him home.   At home, wife says that she cannot take care of him and wanted to place his in nursing facility under palliative care.  [facility] not [facility] any [facility] and patient will be placed here overnight.        Apparently oxygen was delivered but not set up.  Is unclear how much oxygen he was supposed to be on.  Wife stated He is typically in the 80s at home.  He has been in the 90s on oxygen when he was in the hospital.  She reports no significant change in his mental status or physical condition she is just overwhelmed by his needs at home.    Past Medical History:      Diagnosis Date    Altered mental status, unspecified 11/9/2018    Anxiety     Ataxia 11/9/2018    Back pain     Blurred vision     Bradycardia 10/2/2018    Chest pain     Depression     Dizziness     Dizziness and giddiness 10/17/2019    Dysautonomia (HCC)     Fast heart beat     Fever 11/9/2018    Frequent headaches     Frequent urination     Hip dysplasia, congenital     Hx of syncope 10/24/2018    Hypotension     Ingrown left big toenail 3/31/2017    Joint pain     Joint swelling     Lumbar spinal stenosis     Memory deficit 9/11/2018    Muscle pain     Neuropathy     Orthostatic hypotension     asymptomatic    Pacemaker     new

## 2024-04-24 NOTE — ACP (ADVANCE CARE PLANNING)
Advance Care Planning     General Advance Care Planning (ACP) Conversation    Date of Conversation: 4/24/2024  Conducted with: Patient with Decision Making Capacity    Healthcare Decision Maker:    Primary Decision Maker: Spring Kapoor - Spouse - [phone]    Secondary Decision Maker: Clement Patel - Brother/Sister - [phone]  Click here to complete Healthcare Decision Makers including selection of the Healthcare Decision Maker Relationship (ie \"Primary\").   Today we documented Decision Maker(s) consistent with ACP documents on file.    Content/Action Overview:  Has ACP document(s) on file - reflects the patient's care preferences  Reviewed DNR/DNI and patient confirms current DNR status - completed forms on file (place new order if needed)  treatment goals  N/a    Length of Voluntary ACP Conversation in minutes:  <16 minutes (Non-Billable)    Macy Bernal

## 2024-04-25 PROCEDURE — 94760 N-INVAS EAR/PLS OXIMETRY 1: CPT

## 2024-04-25 PROCEDURE — 94640 AIRWAY INHALATION TREATMENT: CPT

## 2024-04-25 PROCEDURE — G0378 HOSPITAL OBSERVATION PER HR: HCPCS

## 2024-04-25 PROCEDURE — 2580000003 HC RX 258: Performed by: INTERNAL MEDICINE

## 2024-04-25 PROCEDURE — 6370000000 HC RX 637 (ALT 250 FOR IP): Performed by: INTERNAL MEDICINE

## 2024-04-25 PROCEDURE — 2700000000 HC OXYGEN THERAPY PER DAY

## 2024-04-25 RX ORDER — MIDODRINE HYDROCHLORIDE 5 MG/1
10 TABLET ORAL
Status: DISCONTINUED | OUTPATIENT
Start: 2024-04-25 | End: 2024-04-30 | Stop reason: HOSPADM

## 2024-04-25 RX ADMIN — MIDODRINE HYDROCHLORIDE 5 MG: 5 TABLET ORAL at 09:06

## 2024-04-25 RX ADMIN — SODIUM CHLORIDE, PRESERVATIVE FREE 10 ML: 5 INJECTION INTRAVENOUS at 09:08

## 2024-04-25 RX ADMIN — FLUDROCORTISONE ACETATE 0.05 MG: 0.1 TABLET ORAL at 09:07

## 2024-04-25 RX ADMIN — FLUTICASONE PROPIONATE 1 SPRAY: 50 SPRAY, METERED NASAL at 09:07

## 2024-04-25 RX ADMIN — ATORVASTATIN CALCIUM 40 MG: 40 TABLET, FILM COATED ORAL at 09:07

## 2024-04-25 RX ADMIN — MIDODRINE HYDROCHLORIDE 10 MG: 5 TABLET ORAL at 16:55

## 2024-04-25 RX ADMIN — DONEPEZIL HYDROCHLORIDE 10 MG: 10 TABLET, FILM COATED ORAL at 09:07

## 2024-04-25 RX ADMIN — SODIUM CHLORIDE, PRESERVATIVE FREE 10 ML: 5 INJECTION INTRAVENOUS at 20:50

## 2024-04-25 RX ADMIN — PANTOPRAZOLE SODIUM 40 MG: 40 TABLET, DELAYED RELEASE ORAL at 06:33

## 2024-04-25 RX ADMIN — ESCITALOPRAM OXALATE 5 MG: 10 TABLET ORAL at 09:06

## 2024-04-25 RX ADMIN — BUSPIRONE HYDROCHLORIDE 10 MG: 10 TABLET ORAL at 20:50

## 2024-04-25 RX ADMIN — BUSPIRONE HYDROCHLORIDE 10 MG: 10 TABLET ORAL at 09:07

## 2024-04-25 RX ADMIN — CHOLECALCIFEROL (VITAMIN D3) 10 MCG (400 UNIT) TABLET 400 UNITS: at 09:10

## 2024-04-25 RX ADMIN — FINASTERIDE 5 MG: 5 TABLET, FILM COATED ORAL at 09:07

## 2024-04-25 RX ADMIN — MIDODRINE HYDROCHLORIDE 5 MG: 5 TABLET ORAL at 12:30

## 2024-04-25 RX ADMIN — CHOLECALCIFEROL (VITAMIN D3) 10 MCG (400 UNIT) TABLET 400 UNITS: at 18:24

## 2024-04-26 LAB
ANION GAP SERPL CALC-SCNC: 12 MMOL/L (ref 5–15)
BUN SERPL-MCNC: 16 MG/DL (ref 6–20)
BUN/CREAT SERPL: 15 (ref 12–20)
CALCIUM SERPL-MCNC: 8.8 MG/DL (ref 8.5–10.1)
CHLORIDE SERPL-SCNC: 105 MMOL/L (ref 97–108)
CO2 SERPL-SCNC: 24 MMOL/L (ref 21–32)
CREAT SERPL-MCNC: 1.08 MG/DL (ref 0.7–1.3)
ERYTHROCYTE [DISTWIDTH] IN BLOOD BY AUTOMATED COUNT: 12.8 % (ref 11.5–14.5)
GLUCOSE SERPL-MCNC: 139 MG/DL (ref 65–100)
HCT VFR BLD AUTO: 32.1 % (ref 36.6–50.3)
HGB BLD-MCNC: 10.9 G/DL (ref 12.1–17)
MAGNESIUM SERPL-MCNC: 2 MG/DL (ref 1.6–2.4)
MCH RBC QN AUTO: 31.2 PG (ref 26–34)
MCHC RBC AUTO-ENTMCNC: 34 G/DL (ref 30–36.5)
MCV RBC AUTO: 92 FL (ref 80–99)
NRBC # BLD: 0 K/UL (ref 0–0.01)
NRBC BLD-RTO: 0 PER 100 WBC
PLATELET # BLD AUTO: 180 K/UL (ref 150–400)
PMV BLD AUTO: 10.3 FL (ref 8.9–12.9)
POTASSIUM SERPL-SCNC: 3.3 MMOL/L (ref 3.5–5.1)
RBC # BLD AUTO: 3.49 M/UL (ref 4.1–5.7)
SODIUM SERPL-SCNC: 141 MMOL/L (ref 136–145)
TSH SERPL DL<=0.05 MIU/L-ACNC: 0.82 UIU/ML (ref 0.36–3.74)
VIT B12 SERPL-MCNC: 583 PG/ML (ref 193–986)
WBC # BLD AUTO: 7.6 K/UL (ref 4.1–11.1)

## 2024-04-26 PROCEDURE — 94640 AIRWAY INHALATION TREATMENT: CPT

## 2024-04-26 PROCEDURE — 96372 THER/PROPH/DIAG INJ SC/IM: CPT

## 2024-04-26 PROCEDURE — 94761 N-INVAS EAR/PLS OXIMETRY MLT: CPT

## 2024-04-26 PROCEDURE — 84443 ASSAY THYROID STIM HORMONE: CPT

## 2024-04-26 PROCEDURE — 6370000000 HC RX 637 (ALT 250 FOR IP): Performed by: INTERNAL MEDICINE

## 2024-04-26 PROCEDURE — 2580000003 HC RX 258: Performed by: INTERNAL MEDICINE

## 2024-04-26 PROCEDURE — G0378 HOSPITAL OBSERVATION PER HR: HCPCS

## 2024-04-26 PROCEDURE — 82607 VITAMIN B-12: CPT

## 2024-04-26 PROCEDURE — 83735 ASSAY OF MAGNESIUM: CPT

## 2024-04-26 PROCEDURE — 6360000002 HC RX W HCPCS: Performed by: INTERNAL MEDICINE

## 2024-04-26 PROCEDURE — 36415 COLL VENOUS BLD VENIPUNCTURE: CPT

## 2024-04-26 PROCEDURE — 85027 COMPLETE CBC AUTOMATED: CPT

## 2024-04-26 PROCEDURE — 2700000000 HC OXYGEN THERAPY PER DAY

## 2024-04-26 PROCEDURE — 80048 BASIC METABOLIC PNL TOTAL CA: CPT

## 2024-04-26 RX ORDER — DIVALPROEX SODIUM 250 MG/1
250 TABLET, DELAYED RELEASE ORAL 2 TIMES DAILY
Status: DISCONTINUED | OUTPATIENT
Start: 2024-04-26 | End: 2024-04-27

## 2024-04-26 RX ORDER — POTASSIUM CHLORIDE 750 MG/1
40 TABLET, FILM COATED, EXTENDED RELEASE ORAL ONCE
Status: COMPLETED | OUTPATIENT
Start: 2024-04-26 | End: 2024-04-26

## 2024-04-26 RX ORDER — POTASSIUM CHLORIDE 750 MG/1
20 TABLET, FILM COATED, EXTENDED RELEASE ORAL DAILY
Status: DISCONTINUED | OUTPATIENT
Start: 2024-04-27 | End: 2024-04-29

## 2024-04-26 RX ADMIN — SODIUM CHLORIDE, PRESERVATIVE FREE 5 ML: 5 INJECTION INTRAVENOUS at 22:06

## 2024-04-26 RX ADMIN — DIVALPROEX SODIUM 250 MG: 250 TABLET, DELAYED RELEASE ORAL at 12:22

## 2024-04-26 RX ADMIN — MIDODRINE HYDROCHLORIDE 10 MG: 5 TABLET ORAL at 17:49

## 2024-04-26 RX ADMIN — ESCITALOPRAM OXALATE 5 MG: 10 TABLET ORAL at 09:06

## 2024-04-26 RX ADMIN — CHOLECALCIFEROL (VITAMIN D3) 10 MCG (400 UNIT) TABLET 400 UNITS: at 09:06

## 2024-04-26 RX ADMIN — MIDODRINE HYDROCHLORIDE 10 MG: 5 TABLET ORAL at 12:22

## 2024-04-26 RX ADMIN — BUSPIRONE HYDROCHLORIDE 10 MG: 10 TABLET ORAL at 21:25

## 2024-04-26 RX ADMIN — MIDODRINE HYDROCHLORIDE 10 MG: 5 TABLET ORAL at 09:05

## 2024-04-26 RX ADMIN — WATER 5 MG: 1 INJECTION INTRAMUSCULAR; INTRAVENOUS; SUBCUTANEOUS at 01:03

## 2024-04-26 RX ADMIN — CHOLECALCIFEROL (VITAMIN D3) 10 MCG (400 UNIT) TABLET 400 UNITS: at 19:59

## 2024-04-26 RX ADMIN — PANTOPRAZOLE SODIUM 40 MG: 40 TABLET, DELAYED RELEASE ORAL at 09:06

## 2024-04-26 RX ADMIN — DONEPEZIL HYDROCHLORIDE 10 MG: 10 TABLET, FILM COATED ORAL at 09:07

## 2024-04-26 RX ADMIN — ALBUTEROL SULFATE 2.5 MG: 2.5 SOLUTION RESPIRATORY (INHALATION) at 14:11

## 2024-04-26 RX ADMIN — SODIUM CHLORIDE, PRESERVATIVE FREE 10 ML: 5 INJECTION INTRAVENOUS at 09:08

## 2024-04-26 RX ADMIN — DIVALPROEX SODIUM 250 MG: 250 TABLET, DELAYED RELEASE ORAL at 23:30

## 2024-04-26 RX ADMIN — POTASSIUM CHLORIDE 40 MEQ: 750 TABLET, EXTENDED RELEASE ORAL at 09:46

## 2024-04-26 RX ADMIN — FLUTICASONE PROPIONATE 1 SPRAY: 50 SPRAY, METERED NASAL at 09:07

## 2024-04-26 RX ADMIN — FLUDROCORTISONE ACETATE 0.05 MG: 0.1 TABLET ORAL at 09:06

## 2024-04-26 RX ADMIN — ATORVASTATIN CALCIUM 40 MG: 40 TABLET, FILM COATED ORAL at 09:05

## 2024-04-26 RX ADMIN — FINASTERIDE 5 MG: 5 TABLET, FILM COATED ORAL at 09:06

## 2024-04-26 ASSESSMENT — PAIN SCALES - GENERAL: PAINLEVEL_OUTOF10: 0

## 2024-04-27 ENCOUNTER — APPOINTMENT (OUTPATIENT)
Facility: HOSPITAL | Age: 79
DRG: 177 | End: 2024-04-27
Payer: OTHER GOVERNMENT

## 2024-04-27 PROBLEM — R53.1 GENERALIZED WEAKNESS: Status: ACTIVE | Noted: 2024-04-27

## 2024-04-27 LAB
ANION GAP SERPL CALC-SCNC: 11 MMOL/L (ref 5–15)
BUN SERPL-MCNC: 18 MG/DL (ref 6–20)
BUN/CREAT SERPL: 15 (ref 12–20)
CALCIUM SERPL-MCNC: 8.7 MG/DL (ref 8.5–10.1)
CHLORIDE SERPL-SCNC: 105 MMOL/L (ref 97–108)
CO2 SERPL-SCNC: 24 MMOL/L (ref 21–32)
CREAT SERPL-MCNC: 1.19 MG/DL (ref 0.7–1.3)
GLUCOSE SERPL-MCNC: 123 MG/DL (ref 65–100)
MAGNESIUM SERPL-MCNC: 2 MG/DL (ref 1.6–2.4)
POTASSIUM SERPL-SCNC: 3.4 MMOL/L (ref 3.5–5.1)
SODIUM SERPL-SCNC: 140 MMOL/L (ref 136–145)
VIT B12 SERPL-MCNC: 665 PG/ML (ref 193–986)

## 2024-04-27 PROCEDURE — 2500000003 HC RX 250 WO HCPCS: Performed by: INTERNAL MEDICINE

## 2024-04-27 PROCEDURE — 6370000000 HC RX 637 (ALT 250 FOR IP): Performed by: INTERNAL MEDICINE

## 2024-04-27 PROCEDURE — 6360000002 HC RX W HCPCS: Performed by: INTERNAL MEDICINE

## 2024-04-27 PROCEDURE — 80048 BASIC METABOLIC PNL TOTAL CA: CPT

## 2024-04-27 PROCEDURE — 94761 N-INVAS EAR/PLS OXIMETRY MLT: CPT

## 2024-04-27 PROCEDURE — 2700000000 HC OXYGEN THERAPY PER DAY

## 2024-04-27 PROCEDURE — 2580000003 HC RX 258: Performed by: INTERNAL MEDICINE

## 2024-04-27 PROCEDURE — 71045 X-RAY EXAM CHEST 1 VIEW: CPT

## 2024-04-27 PROCEDURE — 83735 ASSAY OF MAGNESIUM: CPT

## 2024-04-27 PROCEDURE — 82607 VITAMIN B-12: CPT

## 2024-04-27 PROCEDURE — 94640 AIRWAY INHALATION TREATMENT: CPT

## 2024-04-27 PROCEDURE — 36415 COLL VENOUS BLD VENIPUNCTURE: CPT

## 2024-04-27 PROCEDURE — 1100000000 HC RM PRIVATE

## 2024-04-27 PROCEDURE — 73080 X-RAY EXAM OF ELBOW: CPT

## 2024-04-27 RX ORDER — MIDODRINE HYDROCHLORIDE 10 MG/1
10 TABLET ORAL
Qty: 90 TABLET | Refills: 3 | Status: SHIPPED | OUTPATIENT
Start: 2024-04-27

## 2024-04-27 RX ORDER — POTASSIUM CHLORIDE 750 MG/1
20 TABLET, FILM COATED, EXTENDED RELEASE ORAL ONCE
Status: DISCONTINUED | OUTPATIENT
Start: 2024-04-27 | End: 2024-04-30 | Stop reason: HOSPADM

## 2024-04-27 RX ORDER — SENNOSIDES 8.6 MG
1300 CAPSULE ORAL
Qty: 60 TABLET | Refills: 0 | Status: SHIPPED | OUTPATIENT
Start: 2024-04-27

## 2024-04-27 RX ORDER — DIVALPROEX SODIUM 125 MG/1
125 CAPSULE, COATED PELLETS ORAL 2 TIMES DAILY
Status: DISCONTINUED | OUTPATIENT
Start: 2024-04-27 | End: 2024-04-29

## 2024-04-27 RX ORDER — ESCITALOPRAM OXALATE 5 MG/1
5 TABLET ORAL DAILY
Qty: 30 TABLET | Refills: 3 | Status: SHIPPED | OUTPATIENT
Start: 2024-04-27

## 2024-04-27 RX ORDER — FUROSEMIDE 10 MG/ML
20 INJECTION INTRAMUSCULAR; INTRAVENOUS ONCE
Status: COMPLETED | OUTPATIENT
Start: 2024-04-27 | End: 2024-04-27

## 2024-04-27 RX ORDER — GUAIFENESIN/DEXTROMETHORPHAN 100-10MG/5
5 SYRUP ORAL EVERY 4 HOURS PRN
Qty: 120 ML | Refills: 0 | Status: SHIPPED | OUTPATIENT
Start: 2024-04-27 | End: 2024-05-07

## 2024-04-27 RX ORDER — POTASSIUM CHLORIDE 1500 MG/1
20 TABLET, EXTENDED RELEASE ORAL DAILY
Qty: 60 TABLET | Refills: 3 | Status: SHIPPED | OUTPATIENT
Start: 2024-04-27

## 2024-04-27 RX ORDER — BACITRACIN ZINC 500 [USP'U]/G
OINTMENT TOPICAL 2 TIMES DAILY
Status: DISCONTINUED | OUTPATIENT
Start: 2024-04-27 | End: 2024-04-30 | Stop reason: HOSPADM

## 2024-04-27 RX ORDER — FLUTICASONE FUROATE, UMECLIDINIUM BROMIDE AND VILANTEROL TRIFENATATE 200; 62.5; 25 UG/1; UG/1; UG/1
1 POWDER RESPIRATORY (INHALATION) DAILY
Qty: 1 EACH | Refills: 1 | Status: SHIPPED | OUTPATIENT
Start: 2024-04-28

## 2024-04-27 RX ORDER — GUAIFENESIN/DEXTROMETHORPHAN 100-10MG/5
5 SYRUP ORAL EVERY 4 HOURS PRN
Status: DISCONTINUED | OUTPATIENT
Start: 2024-04-27 | End: 2024-04-30 | Stop reason: HOSPADM

## 2024-04-27 RX ORDER — DIVALPROEX SODIUM 250 MG/1
250 TABLET, DELAYED RELEASE ORAL 2 TIMES DAILY
Qty: 90 TABLET | Refills: 3 | Status: SHIPPED | OUTPATIENT
Start: 2024-04-27

## 2024-04-27 RX ADMIN — CHOLECALCIFEROL (VITAMIN D3) 10 MCG (400 UNIT) TABLET 400 UNITS: at 17:56

## 2024-04-27 RX ADMIN — FUROSEMIDE 20 MG: 10 INJECTION, SOLUTION INTRAMUSCULAR; INTRAVENOUS at 13:33

## 2024-04-27 RX ADMIN — DIVALPROEX SODIUM 125 MG: 125 CAPSULE, COATED PELLETS ORAL at 22:52

## 2024-04-27 RX ADMIN — POTASSIUM CHLORIDE 20 MEQ: 750 TABLET, EXTENDED RELEASE ORAL at 18:20

## 2024-04-27 RX ADMIN — MIDODRINE HYDROCHLORIDE 10 MG: 5 TABLET ORAL at 17:56

## 2024-04-27 RX ADMIN — PIPERACILLIN AND TAZOBACTAM 3375 MG: 3; .375 INJECTION, POWDER, LYOPHILIZED, FOR SOLUTION INTRAVENOUS at 18:03

## 2024-04-27 RX ADMIN — SODIUM CHLORIDE, PRESERVATIVE FREE 10 ML: 5 INJECTION INTRAVENOUS at 13:30

## 2024-04-27 RX ADMIN — BACITRACIN ZINC: 500 OINTMENT TOPICAL at 22:00

## 2024-04-27 RX ADMIN — PIPERACILLIN AND TAZOBACTAM 4500 MG: 4; .5 INJECTION, POWDER, LYOPHILIZED, FOR SOLUTION INTRAVENOUS at 13:29

## 2024-04-27 RX ADMIN — DOXYCYCLINE 100 MG: 100 INJECTION, POWDER, LYOPHILIZED, FOR SOLUTION INTRAVENOUS at 14:12

## 2024-04-27 RX ADMIN — BACITRACIN ZINC: 500 OINTMENT TOPICAL at 15:09

## 2024-04-27 ASSESSMENT — PAIN SCALES - GENERAL: PAINLEVEL_OUTOF10: 0

## 2024-04-27 NOTE — DISCHARGE INSTRUCTIONS
Patient should follow-up PCP in 3 to 5 days for recheck basic panel and adjustment of his psychotropic medications as needed.    As we discussed to consider hospice.    As we discussed, managing your  at home will be extremely difficult and we felt that perhaps a skilled facility would be better but your preference is to continue to try to take care of him at home.  I written for trazodone to use at night as needed you may end up essentially using it all the time.  Additionally it is written for 25 mg 1/2 tablet, you could consider giving a whole tablet after discussion with your PCP    As recommended by her speech therapist, diet should be:    Diet: Soft and bite sized  Thin liquids  Single bites and sips  Avoid foods that exacerbate symptoms  Consider GI workup for esophageal function if this has not already been done.  Please discuss with PCP

## 2024-04-27 NOTE — SIGNIFICANT EVENT
X-rays suggest patient is developing an infiltrate as well as perhaps worsened pulmonary edema pattern.  This may have been a somewhat progressive issue as he did have some cough yesterday, but also may have had an aspiration event this morning.  Patient is at a risk for complex pneumonia which could include aspiration and/or healthcare associated.  Plan: Initiate antibiotics for HCAP to include Zosyn, doxycycline, consider vancomycin if MRSA nares is positive.  Definitely at a risk at this point for MRSA and or Pseudomonas as well as resistant organisms.

## 2024-04-27 NOTE — SIGNIFICANT EVENT
After general discussion with patient's wife, and following additional evaluation today in her presence, the patient should not be discharged home.  He is a poor history provider, seen this morning as if he was comfortable with no symptoms.  However several issues have become more clear:    *He appears to be having pain and redness right elbow with swelling around the olecranon bursa.  Because of his poor ability to provide a history, evaluation a bit elevated in terms of the degree of pain he is having, but wife indicates this is definitely worse than yesterday if not new.  This may have occurred with his unsteadiness and event last night where he had to be lowered to the ground this may represent more of a progressive process over the last several days since has been more ill.  The area is red and swollen there is a superficial ulcer with some clear drainage.  There is no lymphangitis, or axillary adenopathy.  There is normal passive ROM right shoulder, elbow, hand and wrist.  Assess: Right elbow trauma with traumatic olecranon bursitis, mild superficial ulcer, possible infection                This provides another level of debility and difficulty management.  It is extremely difficult now to get the patient up even with 2 people assist given the degree of discomfort.        Plan: Dry dressing and padding               Right elbow plain films                Watch very closely for infectious development and initiate antibiotics               Cancel discharge    *Left ear pain.  Examination shows mild skin excoriation superficial margin of pinna where it intersects with the scalpel.  This appears likely secondary to oxygen tubing.  Plan: Watch closely, topical bacitracin, pad for oxygen tubing    *Persistent cough       Patient has persistent oxygen requirement in the setting of advanced COPD.       Examination shows lungs with generally good air movement with few crackles bilaterally at the bases.       Plan:

## 2024-04-27 NOTE — DISCHARGE SUMMARY
orthostatic hypotension this may not be the best drug for this person         *Follow up with:        *PCP : 3 to 5 days         *Diet: ADULT DIET; Regular; Low Fat/Low Chol/High Fiber/KENDY          *Activity: Up with assistance                    DISPOSITION:    Home with Family: x   Home with HH/PT/OT/RN: x   SNF/LTC:    [facility]:    OTHER:        Condition at Discharge:  Stable            *DISCHARGE MEDICATIONS:        Medication List        START taking these medications      divalproex 250 MG DR tablet  Commonly known as: DEPAKOTE  Take 1 tablet by mouth in the morning and at bedtime     escitalopram 5 MG tablet  Commonly known as: LEXAPRO  Take 1 tablet by mouth daily     guaiFENesin-dextromethorphan 100-10 MG/5ML syrup  Commonly known as: ROBITUSSIN DM  Take 5 mLs by mouth every 4 hours as needed for Cough     potassium chloride 20 MEQ Tbcr extended release tablet  Commonly known as: K-TAB  Take 1 tablet by mouth daily     Trelegy Ellipta 200-62.5-25 MCG/ACT Aepb inhaler  Generic drug: fluticasone-umeclidin-vilant  Inhale 1 puff into the lungs daily  Start taking on: April 28, 2024            CHANGE how you take these medications      acetaminophen 650 MG extended release tablet  Commonly known as: TYLENOL  Take 2 tablets by mouth nightly  What changed:   how much to take  when to take this  reasons to take this     midodrine 10 MG tablet  Commonly known as: PROAMATINE  Take 1 tablet by mouth 3 times daily (with meals)  What changed:   medication strength  See the new instructions.            CONTINUE taking these medications      albuterol sulfate  (90 Base) MCG/ACT inhaler  Commonly known as: Ventolin HFA  Inhale 2 puffs into the lungs 4 times daily as needed for Wheezing     atorvastatin 40 MG tablet  Commonly known as: LIPITOR     busPIRone 10 MG tablet  Commonly known as: BUSPAR     ergocalciferol 1.25 MG (46628 UT) capsule  Commonly known as: ERGOCALCIFEROL     finasteride 5 MG tablet  Commonly known

## 2024-04-28 ENCOUNTER — APPOINTMENT (OUTPATIENT)
Facility: HOSPITAL | Age: 79
DRG: 177 | End: 2024-04-28
Payer: OTHER GOVERNMENT

## 2024-04-28 LAB
ANION GAP SERPL CALC-SCNC: 10 MMOL/L (ref 5–15)
ANION GAP SERPL CALC-SCNC: 12 MMOL/L (ref 5–15)
BACTERIA SPEC CULT: NORMAL
BACTERIA SPEC CULT: NORMAL
BASOPHILS # BLD: 0 K/UL (ref 0–0.1)
BASOPHILS NFR BLD: 0 % (ref 0–1)
BUN SERPL-MCNC: 18 MG/DL (ref 6–20)
BUN SERPL-MCNC: 18 MG/DL (ref 6–20)
BUN/CREAT SERPL: 13 (ref 12–20)
BUN/CREAT SERPL: 15 (ref 12–20)
CALCIUM SERPL-MCNC: 8.8 MG/DL (ref 8.5–10.1)
CALCIUM SERPL-MCNC: 8.8 MG/DL (ref 8.5–10.1)
CHLORIDE SERPL-SCNC: 101 MMOL/L (ref 97–108)
CHLORIDE SERPL-SCNC: 104 MMOL/L (ref 97–108)
CO2 SERPL-SCNC: 25 MMOL/L (ref 21–32)
CO2 SERPL-SCNC: 26 MMOL/L (ref 21–32)
CREAT SERPL-MCNC: 1.24 MG/DL (ref 0.7–1.3)
CREAT SERPL-MCNC: 1.39 MG/DL (ref 0.7–1.3)
DIFFERENTIAL METHOD BLD: ABNORMAL
EOSINOPHIL # BLD: 0.1 K/UL (ref 0–0.4)
EOSINOPHIL NFR BLD: 1 % (ref 0–7)
ERYTHROCYTE [DISTWIDTH] IN BLOOD BY AUTOMATED COUNT: 13 % (ref 11.5–14.5)
GLUCOSE SERPL-MCNC: 138 MG/DL (ref 65–100)
GLUCOSE SERPL-MCNC: 165 MG/DL (ref 65–100)
HCT VFR BLD AUTO: 31.1 % (ref 36.6–50.3)
HGB BLD-MCNC: 10.5 G/DL (ref 12.1–17)
IMM GRANULOCYTES # BLD AUTO: 0.2 K/UL (ref 0–0.04)
IMM GRANULOCYTES NFR BLD AUTO: 2 % (ref 0–0.5)
LYMPHOCYTES # BLD: 0.8 K/UL (ref 0.8–3.5)
LYMPHOCYTES NFR BLD: 8 % (ref 12–49)
MAGNESIUM SERPL-MCNC: 2 MG/DL (ref 1.6–2.4)
MCH RBC QN AUTO: 31.2 PG (ref 26–34)
MCHC RBC AUTO-ENTMCNC: 33.8 G/DL (ref 30–36.5)
MCV RBC AUTO: 92.3 FL (ref 80–99)
MONOCYTES # BLD: 0.6 K/UL (ref 0–1)
MONOCYTES NFR BLD: 6 % (ref 5–13)
NEUTS SEG # BLD: 8.5 K/UL (ref 1.8–8)
NEUTS SEG NFR BLD: 83 % (ref 32–75)
NRBC # BLD: 0 K/UL (ref 0–0.01)
NRBC BLD-RTO: 0 PER 100 WBC
NT PRO BNP: 500 PG/ML (ref 0–450)
PLATELET # BLD AUTO: 207 K/UL (ref 150–400)
PMV BLD AUTO: 10.1 FL (ref 8.9–12.9)
POTASSIUM SERPL-SCNC: 2.9 MMOL/L (ref 3.5–5.1)
POTASSIUM SERPL-SCNC: 4.5 MMOL/L (ref 3.5–5.1)
RBC # BLD AUTO: 3.37 M/UL (ref 4.1–5.7)
SERVICE CMNT-IMP: NORMAL
SODIUM SERPL-SCNC: 136 MMOL/L (ref 136–145)
SODIUM SERPL-SCNC: 142 MMOL/L (ref 136–145)
WBC # BLD AUTO: 10.2 K/UL (ref 4.1–11.1)

## 2024-04-28 PROCEDURE — 80048 BASIC METABOLIC PNL TOTAL CA: CPT

## 2024-04-28 PROCEDURE — 94640 AIRWAY INHALATION TREATMENT: CPT

## 2024-04-28 PROCEDURE — 85025 COMPLETE CBC W/AUTO DIFF WBC: CPT

## 2024-04-28 PROCEDURE — 1100000000 HC RM PRIVATE

## 2024-04-28 PROCEDURE — 2700000000 HC OXYGEN THERAPY PER DAY

## 2024-04-28 PROCEDURE — 83735 ASSAY OF MAGNESIUM: CPT

## 2024-04-28 PROCEDURE — 6360000002 HC RX W HCPCS: Performed by: INTERNAL MEDICINE

## 2024-04-28 PROCEDURE — 6370000000 HC RX 637 (ALT 250 FOR IP): Performed by: INTERNAL MEDICINE

## 2024-04-28 PROCEDURE — 71045 X-RAY EXAM CHEST 1 VIEW: CPT

## 2024-04-28 PROCEDURE — 2500000003 HC RX 250 WO HCPCS: Performed by: INTERNAL MEDICINE

## 2024-04-28 PROCEDURE — 83880 ASSAY OF NATRIURETIC PEPTIDE: CPT

## 2024-04-28 PROCEDURE — 2580000003 HC RX 258: Performed by: INTERNAL MEDICINE

## 2024-04-28 PROCEDURE — 36415 COLL VENOUS BLD VENIPUNCTURE: CPT

## 2024-04-28 RX ORDER — POTASSIUM CHLORIDE 7.45 MG/ML
10 INJECTION INTRAVENOUS
Status: COMPLETED | OUTPATIENT
Start: 2024-04-28 | End: 2024-04-28

## 2024-04-28 RX ORDER — SODIUM CHLORIDE 9 MG/ML
INJECTION, SOLUTION INTRAVENOUS CONTINUOUS
Status: DISCONTINUED | OUTPATIENT
Start: 2024-04-28 | End: 2024-04-28

## 2024-04-28 RX ORDER — ENOXAPARIN SODIUM 100 MG/ML
40 INJECTION SUBCUTANEOUS DAILY
Status: DISCONTINUED | OUTPATIENT
Start: 2024-04-28 | End: 2024-04-30 | Stop reason: HOSPADM

## 2024-04-28 RX ORDER — SODIUM CHLORIDE AND POTASSIUM CHLORIDE 150; 900 MG/100ML; MG/100ML
INJECTION, SOLUTION INTRAVENOUS CONTINUOUS
Status: DISCONTINUED | OUTPATIENT
Start: 2024-04-28 | End: 2024-04-29

## 2024-04-28 RX ADMIN — POTASSIUM CHLORIDE AND SODIUM CHLORIDE: 900; 150 INJECTION, SOLUTION INTRAVENOUS at 09:59

## 2024-04-28 RX ADMIN — PANTOPRAZOLE SODIUM 40 MG: 40 TABLET, DELAYED RELEASE ORAL at 10:10

## 2024-04-28 RX ADMIN — DIVALPROEX SODIUM 125 MG: 125 CAPSULE, COATED PELLETS ORAL at 23:00

## 2024-04-28 RX ADMIN — POTASSIUM CHLORIDE 10 MEQ: 7.45 INJECTION INTRAVENOUS at 10:01

## 2024-04-28 RX ADMIN — FINASTERIDE 5 MG: 5 TABLET, FILM COATED ORAL at 10:10

## 2024-04-28 RX ADMIN — ENOXAPARIN SODIUM 40 MG: 100 INJECTION SUBCUTANEOUS at 10:08

## 2024-04-28 RX ADMIN — BACITRACIN ZINC: 500 OINTMENT TOPICAL at 23:00

## 2024-04-28 RX ADMIN — POTASSIUM CHLORIDE 20 MEQ: 750 TABLET, EXTENDED RELEASE ORAL at 10:09

## 2024-04-28 RX ADMIN — BACITRACIN ZINC: 500 OINTMENT TOPICAL at 10:09

## 2024-04-28 RX ADMIN — FLUTICASONE PROPIONATE 1 SPRAY: 50 SPRAY, METERED NASAL at 10:08

## 2024-04-28 RX ADMIN — DOXYCYCLINE 100 MG: 100 INJECTION, POWDER, LYOPHILIZED, FOR SOLUTION INTRAVENOUS at 14:32

## 2024-04-28 RX ADMIN — FLUDROCORTISONE ACETATE 0.05 MG: 0.1 TABLET ORAL at 10:10

## 2024-04-28 RX ADMIN — POTASSIUM CHLORIDE 10 MEQ: 7.45 INJECTION INTRAVENOUS at 13:00

## 2024-04-28 RX ADMIN — PIPERACILLIN AND TAZOBACTAM 3375 MG: 3; .375 INJECTION, POWDER, LYOPHILIZED, FOR SOLUTION INTRAVENOUS at 10:27

## 2024-04-28 RX ADMIN — POTASSIUM CHLORIDE 10 MEQ: 7.45 INJECTION INTRAVENOUS at 15:45

## 2024-04-28 RX ADMIN — ESCITALOPRAM OXALATE 5 MG: 10 TABLET ORAL at 10:10

## 2024-04-28 RX ADMIN — DIVALPROEX SODIUM 125 MG: 125 CAPSULE, COATED PELLETS ORAL at 10:14

## 2024-04-28 RX ADMIN — DOXYCYCLINE 100 MG: 100 INJECTION, POWDER, LYOPHILIZED, FOR SOLUTION INTRAVENOUS at 00:15

## 2024-04-28 RX ADMIN — PIPERACILLIN AND TAZOBACTAM 3375 MG: 3; .375 INJECTION, POWDER, LYOPHILIZED, FOR SOLUTION INTRAVENOUS at 18:07

## 2024-04-28 RX ADMIN — PIPERACILLIN AND TAZOBACTAM 3375 MG: 3; .375 INJECTION, POWDER, LYOPHILIZED, FOR SOLUTION INTRAVENOUS at 02:34

## 2024-04-28 ASSESSMENT — PAIN SCALES - GENERAL
PAINLEVEL_OUTOF10: 0

## 2024-04-29 LAB
ANION GAP SERPL CALC-SCNC: 11 MMOL/L (ref 5–15)
ANION GAP SERPL CALC-SCNC: 12 MMOL/L (ref 5–15)
BUN SERPL-MCNC: 13 MG/DL (ref 6–20)
BUN SERPL-MCNC: 15 MG/DL (ref 6–20)
BUN/CREAT SERPL: 12 (ref 12–20)
BUN/CREAT SERPL: 13 (ref 12–20)
CALCIUM SERPL-MCNC: 8.8 MG/DL (ref 8.5–10.1)
CALCIUM SERPL-MCNC: 9 MG/DL (ref 8.5–10.1)
CHLORIDE SERPL-SCNC: 105 MMOL/L (ref 97–108)
CHLORIDE SERPL-SCNC: 106 MMOL/L (ref 97–108)
CO2 SERPL-SCNC: 24 MMOL/L (ref 21–32)
CO2 SERPL-SCNC: 25 MMOL/L (ref 21–32)
CREAT SERPL-MCNC: 1.12 MG/DL (ref 0.7–1.3)
CREAT SERPL-MCNC: 1.17 MG/DL (ref 0.7–1.3)
GLUCOSE SERPL-MCNC: 141 MG/DL (ref 65–100)
GLUCOSE SERPL-MCNC: 168 MG/DL (ref 65–100)
MAGNESIUM SERPL-MCNC: 2.1 MG/DL (ref 1.6–2.4)
POTASSIUM SERPL-SCNC: 3.6 MMOL/L (ref 3.5–5.1)
POTASSIUM SERPL-SCNC: 3.7 MMOL/L (ref 3.5–5.1)
SODIUM SERPL-SCNC: 141 MMOL/L (ref 136–145)
SODIUM SERPL-SCNC: 142 MMOL/L (ref 136–145)

## 2024-04-29 PROCEDURE — 2500000003 HC RX 250 WO HCPCS: Performed by: INTERNAL MEDICINE

## 2024-04-29 PROCEDURE — 6360000002 HC RX W HCPCS: Performed by: INTERNAL MEDICINE

## 2024-04-29 PROCEDURE — 80048 BASIC METABOLIC PNL TOTAL CA: CPT

## 2024-04-29 PROCEDURE — 94640 AIRWAY INHALATION TREATMENT: CPT

## 2024-04-29 PROCEDURE — 36415 COLL VENOUS BLD VENIPUNCTURE: CPT

## 2024-04-29 PROCEDURE — 1100000000 HC RM PRIVATE

## 2024-04-29 PROCEDURE — 6370000000 HC RX 637 (ALT 250 FOR IP): Performed by: INTERNAL MEDICINE

## 2024-04-29 PROCEDURE — 94761 N-INVAS EAR/PLS OXIMETRY MLT: CPT

## 2024-04-29 PROCEDURE — 92610 EVALUATE SWALLOWING FUNCTION: CPT | Performed by: SPEECH-LANGUAGE PATHOLOGIST

## 2024-04-29 PROCEDURE — 83735 ASSAY OF MAGNESIUM: CPT

## 2024-04-29 PROCEDURE — 2580000003 HC RX 258: Performed by: INTERNAL MEDICINE

## 2024-04-29 PROCEDURE — 2700000000 HC OXYGEN THERAPY PER DAY

## 2024-04-29 RX ORDER — DIVALPROEX SODIUM 125 MG/1
250 CAPSULE, COATED PELLETS ORAL 2 TIMES DAILY
Status: DISCONTINUED | OUTPATIENT
Start: 2024-04-29 | End: 2024-04-30 | Stop reason: HOSPADM

## 2024-04-29 RX ORDER — DIVALPROEX SODIUM 125 MG/1
125 CAPSULE, COATED PELLETS ORAL ONCE
Status: COMPLETED | OUTPATIENT
Start: 2024-04-29 | End: 2024-04-29

## 2024-04-29 RX ORDER — POTASSIUM CHLORIDE 750 MG/1
20 TABLET, FILM COATED, EXTENDED RELEASE ORAL DAILY
Status: DISCONTINUED | OUTPATIENT
Start: 2024-04-29 | End: 2024-04-30 | Stop reason: HOSPADM

## 2024-04-29 RX ADMIN — FLUTICASONE PROPIONATE 1 SPRAY: 50 SPRAY, METERED NASAL at 09:50

## 2024-04-29 RX ADMIN — PIPERACILLIN AND TAZOBACTAM 3375 MG: 3; .375 INJECTION, POWDER, LYOPHILIZED, FOR SOLUTION INTRAVENOUS at 09:23

## 2024-04-29 RX ADMIN — DOXYCYCLINE 100 MG: 100 INJECTION, POWDER, LYOPHILIZED, FOR SOLUTION INTRAVENOUS at 00:15

## 2024-04-29 RX ADMIN — ENOXAPARIN SODIUM 40 MG: 100 INJECTION SUBCUTANEOUS at 09:24

## 2024-04-29 RX ADMIN — ESCITALOPRAM OXALATE 5 MG: 10 TABLET ORAL at 09:24

## 2024-04-29 RX ADMIN — DIVALPROEX SODIUM 125 MG: 125 CAPSULE, COATED PELLETS ORAL at 11:07

## 2024-04-29 RX ADMIN — POTASSIUM CHLORIDE 20 MEQ: 750 TABLET, EXTENDED RELEASE ORAL at 12:19

## 2024-04-29 RX ADMIN — BACITRACIN ZINC: 500 OINTMENT TOPICAL at 09:25

## 2024-04-29 RX ADMIN — DOXYCYCLINE 100 MG: 100 INJECTION, POWDER, LYOPHILIZED, FOR SOLUTION INTRAVENOUS at 11:11

## 2024-04-29 RX ADMIN — DIVALPROEX SODIUM 125 MG: 125 CAPSULE, COATED PELLETS ORAL at 12:20

## 2024-04-29 RX ADMIN — PIPERACILLIN AND TAZOBACTAM 3375 MG: 3; .375 INJECTION, POWDER, LYOPHILIZED, FOR SOLUTION INTRAVENOUS at 17:26

## 2024-04-29 RX ADMIN — PIPERACILLIN AND TAZOBACTAM 3375 MG: 3; .375 INJECTION, POWDER, LYOPHILIZED, FOR SOLUTION INTRAVENOUS at 02:19

## 2024-04-29 RX ADMIN — PANTOPRAZOLE SODIUM 40 MG: 40 TABLET, DELAYED RELEASE ORAL at 06:20

## 2024-04-29 RX ADMIN — DIVALPROEX SODIUM 250 MG: 125 CAPSULE, COATED PELLETS ORAL at 23:22

## 2024-04-29 RX ADMIN — FLUDROCORTISONE ACETATE 0.05 MG: 0.1 TABLET ORAL at 09:24

## 2024-04-29 RX ADMIN — FINASTERIDE 5 MG: 5 TABLET, FILM COATED ORAL at 09:24

## 2024-04-29 RX ADMIN — DOXYCYCLINE 100 MG: 100 INJECTION, POWDER, LYOPHILIZED, FOR SOLUTION INTRAVENOUS at 23:58

## 2024-04-29 ASSESSMENT — PAIN SCALES - GENERAL
PAINLEVEL_OUTOF10: 0
PAINLEVEL_OUTOF10: 0

## 2024-04-30 VITALS
RESPIRATION RATE: 20 BRPM | HEART RATE: 81 BPM | SYSTOLIC BLOOD PRESSURE: 142 MMHG | TEMPERATURE: 98.2 F | WEIGHT: 181 LBS | BODY MASS INDEX: 25.34 KG/M2 | DIASTOLIC BLOOD PRESSURE: 76 MMHG | OXYGEN SATURATION: 98 % | HEIGHT: 71 IN

## 2024-04-30 LAB
ANION GAP SERPL CALC-SCNC: 9 MMOL/L (ref 5–15)
BUN SERPL-MCNC: 10 MG/DL (ref 6–20)
BUN/CREAT SERPL: 9 (ref 12–20)
CALCIUM SERPL-MCNC: 9.1 MG/DL (ref 8.5–10.1)
CHLORIDE SERPL-SCNC: 106 MMOL/L (ref 97–108)
CO2 SERPL-SCNC: 27 MMOL/L (ref 21–32)
CREAT SERPL-MCNC: 1.17 MG/DL (ref 0.7–1.3)
GLUCOSE SERPL-MCNC: 156 MG/DL (ref 65–100)
POTASSIUM SERPL-SCNC: 3.9 MMOL/L (ref 3.5–5.1)
SODIUM SERPL-SCNC: 142 MMOL/L (ref 136–145)

## 2024-04-30 PROCEDURE — 80048 BASIC METABOLIC PNL TOTAL CA: CPT

## 2024-04-30 PROCEDURE — 6370000000 HC RX 637 (ALT 250 FOR IP): Performed by: INTERNAL MEDICINE

## 2024-04-30 PROCEDURE — 2580000003 HC RX 258: Performed by: INTERNAL MEDICINE

## 2024-04-30 PROCEDURE — 94761 N-INVAS EAR/PLS OXIMETRY MLT: CPT

## 2024-04-30 PROCEDURE — 94640 AIRWAY INHALATION TREATMENT: CPT

## 2024-04-30 PROCEDURE — 36415 COLL VENOUS BLD VENIPUNCTURE: CPT

## 2024-04-30 PROCEDURE — 2700000000 HC OXYGEN THERAPY PER DAY

## 2024-04-30 PROCEDURE — 6360000002 HC RX W HCPCS: Performed by: INTERNAL MEDICINE

## 2024-04-30 RX ORDER — MECOBALAMIN 5000 MCG
5 TABLET,DISINTEGRATING ORAL NIGHTLY
Qty: 30 TABLET | Refills: 0 | Status: SHIPPED | OUTPATIENT
Start: 2024-04-30 | End: 2024-05-30

## 2024-04-30 RX ORDER — PREDNISONE 20 MG/1
20 TABLET ORAL DAILY
Status: DISCONTINUED | OUTPATIENT
Start: 2024-04-30 | End: 2024-04-30 | Stop reason: HOSPADM

## 2024-04-30 RX ORDER — BACITRACIN ZINC 500 [USP'U]/G
OINTMENT TOPICAL
Qty: 30 G | Refills: 1 | Status: SHIPPED | OUTPATIENT
Start: 2024-04-30 | End: 2024-05-10

## 2024-04-30 RX ORDER — DOXYCYCLINE 100 MG/1
100 CAPSULE ORAL EVERY 12 HOURS SCHEDULED
Status: DISCONTINUED | OUTPATIENT
Start: 2024-04-30 | End: 2024-04-30 | Stop reason: HOSPADM

## 2024-04-30 RX ORDER — PREDNISONE 20 MG/1
20 TABLET ORAL DAILY
Qty: 7 TABLET | Refills: 0 | Status: SHIPPED | OUTPATIENT
Start: 2024-04-30 | End: 2024-05-07

## 2024-04-30 RX ORDER — POTASSIUM CHLORIDE 1500 MG/1
20 TABLET, EXTENDED RELEASE ORAL DAILY
Qty: 60 TABLET | Refills: 3 | Status: SHIPPED | OUTPATIENT
Start: 2024-05-01

## 2024-04-30 RX ORDER — DIVALPROEX SODIUM 125 MG/1
250 CAPSULE, COATED PELLETS ORAL 2 TIMES DAILY
Qty: 60 CAPSULE | Refills: 3 | Status: SHIPPED | OUTPATIENT
Start: 2024-04-30

## 2024-04-30 RX ORDER — TRAZODONE HYDROCHLORIDE 50 MG/1
25 TABLET ORAL NIGHTLY
Status: DISCONTINUED | OUTPATIENT
Start: 2024-04-30 | End: 2024-04-30 | Stop reason: HOSPADM

## 2024-04-30 RX ORDER — FUROSEMIDE 20 MG/1
20 TABLET ORAL DAILY
Status: DISCONTINUED | OUTPATIENT
Start: 2024-05-01 | End: 2024-04-30 | Stop reason: HOSPADM

## 2024-04-30 RX ORDER — FUROSEMIDE 10 MG/ML
20 INJECTION INTRAMUSCULAR; INTRAVENOUS ONCE
Status: COMPLETED | OUTPATIENT
Start: 2024-04-30 | End: 2024-04-30

## 2024-04-30 RX ORDER — CHOLECALCIFEROL (VITAMIN D3) 125 MCG
5 CAPSULE ORAL NIGHTLY
Status: DISCONTINUED | OUTPATIENT
Start: 2024-04-30 | End: 2024-04-30 | Stop reason: HOSPADM

## 2024-04-30 RX ORDER — DOXYCYCLINE 100 MG/1
100 CAPSULE ORAL EVERY 12 HOURS SCHEDULED
Qty: 10 CAPSULE | Refills: 0 | Status: SHIPPED | OUTPATIENT
Start: 2024-04-30 | End: 2024-05-05

## 2024-04-30 RX ORDER — CEFUROXIME AXETIL 250 MG/1
500 TABLET ORAL EVERY 12 HOURS SCHEDULED
Status: DISCONTINUED | OUTPATIENT
Start: 2024-04-30 | End: 2024-04-30 | Stop reason: HOSPADM

## 2024-04-30 RX ORDER — CEFUROXIME AXETIL 500 MG/1
500 TABLET ORAL EVERY 12 HOURS SCHEDULED
Qty: 14 TABLET | Refills: 0 | Status: SHIPPED | OUTPATIENT
Start: 2024-04-30 | End: 2024-05-07

## 2024-04-30 RX ORDER — MECOBALAMIN 5000 MCG
5 TABLET,DISINTEGRATING ORAL NIGHTLY
Status: DISCONTINUED | OUTPATIENT
Start: 2024-04-30 | End: 2024-04-30 | Stop reason: CLARIF

## 2024-04-30 RX ORDER — FUROSEMIDE 20 MG/1
20 TABLET ORAL DAILY
Qty: 60 TABLET | Refills: 3 | Status: SHIPPED | OUTPATIENT
Start: 2024-05-01

## 2024-04-30 RX ORDER — TRAZODONE HYDROCHLORIDE 50 MG/1
25 TABLET ORAL NIGHTLY PRN
Qty: 30 TABLET | Refills: 0 | Status: SHIPPED | OUTPATIENT
Start: 2024-04-30 | End: 2024-06-29

## 2024-04-30 RX ADMIN — POTASSIUM CHLORIDE 20 MEQ: 750 TABLET, EXTENDED RELEASE ORAL at 09:04

## 2024-04-30 RX ADMIN — DIVALPROEX SODIUM 250 MG: 125 CAPSULE, COATED PELLETS ORAL at 12:19

## 2024-04-30 RX ADMIN — PANTOPRAZOLE SODIUM 40 MG: 40 TABLET, DELAYED RELEASE ORAL at 06:25

## 2024-04-30 RX ADMIN — ESCITALOPRAM OXALATE 5 MG: 10 TABLET ORAL at 09:03

## 2024-04-30 RX ADMIN — PIPERACILLIN AND TAZOBACTAM 3375 MG: 3; .375 INJECTION, POWDER, LYOPHILIZED, FOR SOLUTION INTRAVENOUS at 01:12

## 2024-04-30 RX ADMIN — FINASTERIDE 5 MG: 5 TABLET, FILM COATED ORAL at 09:04

## 2024-04-30 RX ADMIN — FUROSEMIDE 20 MG: 10 INJECTION, SOLUTION INTRAMUSCULAR; INTRAVENOUS at 12:49

## 2024-04-30 RX ADMIN — FLUTICASONE PROPIONATE 1 SPRAY: 50 SPRAY, METERED NASAL at 09:05

## 2024-04-30 RX ADMIN — FLUDROCORTISONE ACETATE 0.05 MG: 0.1 TABLET ORAL at 09:04

## 2024-04-30 RX ADMIN — PREDNISONE 20 MG: 20 TABLET ORAL at 12:49

## 2024-04-30 RX ADMIN — BACITRACIN ZINC: 500 OINTMENT TOPICAL at 09:04

## 2024-04-30 RX ADMIN — PIPERACILLIN AND TAZOBACTAM 3375 MG: 3; .375 INJECTION, POWDER, LYOPHILIZED, FOR SOLUTION INTRAVENOUS at 09:10

## 2024-04-30 RX ADMIN — SODIUM CHLORIDE, PRESERVATIVE FREE 10 ML: 5 INJECTION INTRAVENOUS at 09:05

## 2024-04-30 RX ADMIN — ENOXAPARIN SODIUM 40 MG: 100 INJECTION SUBCUTANEOUS at 09:04

## 2024-04-30 NOTE — DISCHARGE SUMMARY
stenosis  Chronic back pain and weakness which does complicate his care  Complicated by Orthostatic BP drop well documented.  Plan: Discharge on previous regimen         PTSD (post-traumatic stress disorder)  Chronic dx on tx with Lexapro 5mg daily,  Buspar 10mg q8h prn.  As above Depakote was added here, and trazodone nightly as needed ordered       BPH with obstruction/lower urinary tract symptoms  Mytrbetriq 25mg daily, Proscar 5mg daily,      Orthostatic hypotension  - Chronic,  on home medications for this.  -Initiation of Depakote or other psychotropics should be done slowly given his chronic orthostasis  -Trazodone in the past had been discontinued due to concerns over hypotension.  However we did touch on risk-benefit ratio on this gentleman who we need to control his restlessness  Plan: On discharge continue home midodrine and Florinef, should have fall precautions at home will be getting home care                Hyperlipidemia   For now given goals of care can hold his Lipitor       GERD (gastroesophageal reflux disease)  Home tx Omeprazole 40mg daily, house Protonix substitute          DATA:           **LABS              Recent Labs     04/28/24  0612 04/28/24  1630 04/29/24  0936 04/29/24  2105 04/30/24  0831      < > 142 141 142   K 2.9*   < > 3.6 3.7 3.9      < > 106 105 106   CO2 25   < > 24 25 27   BUN 18   < > 15 13 10   MG 2.0  --  2.1  --   --     < > = values in this interval not displayed.         Recent Labs     04/28/24  0612   WBC 10.2   HGB 10.5*   HCT 31.1*                    **RADIOLOGY/OTHER                  XR CHEST PORTABLE    Result Date: 4/28/2024    Stable left basilar opacity that may represent infection, atelectasis, or edema.     XR ELBOW RIGHT (MIN 3 VIEWS)    Result Date: 4/27/2024.     No acute abnormality.    XR CHEST PORTABLE    Result Date: 4/27/2024     Similar mild to moderate edema pattern. Worsening patchy hazy left mid to lower lung

## 2024-04-30 NOTE — CARE COORDINATION
Transition of Care Plan:     RUR: n/a obs   Prior Level of Functioning: Requires assistance w/adls/iadls   Disposition:   If SNF or IPR: Date FOC offered:   Date FOC received:   Accepting facility:   Date authorization started with reference number:   Date authorization received and expires:   Follow up appointments:   DME needed:   Transportation at discharge:   IM/IMM Medicare/ letter given:   Is patient a  and connected with VA?               If yes, was  transfer form completed and VA notified?   Caregiver Contact:   Discharge Caregiver contacted prior to discharge?   Care Conference needed?   Barriers to discharge: Medical instability          0926: Spoke with Kristen from [employer]. Said patient's personal care hours were increased to 40 hours. Told her that patient's personal care aide Sara wasn't able to do more hours but  encouraged patient's wife to see if Sara knew of anyone that was able to provide more hours. Kristen said that would be okay. Said there are no time restrictions--patient can have night hours. Patient/family to continue to communicate with [employer] [facility] regarding hours for personal care.       1330: Met with patient and wife. Provided her with personal care list. She will start calling people on list. Whoever she decides to go with will have to go through [facility]. Ms. Kapoor understands this process.   
   04/26/24 1121   Avoidable Days   Community/External       Home Care       Spouse working to obtain more help in the home. Personal care aide Sara assisting making phone calls. CM provided  list of personal care aides yesterday 4/25. CM told Ms. Kapoor to contact [facility] for any assistance.   
   04/30/24 1259   Services At/After Discharge   Transition of Care Consult (CM Consult) Home Health  ([facility])   Internal Home Health No   Reason Outside Agency Chosen Out of service area   [place] Resource Information Provided? No   Mode of Transport at Discharge Self   Confirm Follow Up Transport Family   Condition of Participation: Discharge Planning   The Plan for Transition of Care is related to the following treatment goals: Medical monitoring. Gain strength and endurance.   The Patient and/or Patient Representative was provided with a Choice of Provider? Patient   The Patient and/Or Patient Representative agree with the Discharge Plan? Yes   Freedom of Choice list was provided with basic dialogue that supports the patient's individualized plan of care/goals, treatment preferences, and shares the quality data associated with the providers?  Yes     Mr. Patel is being discharged home today with home health through [facility] and established personal care aides. [facility] has approved for 40 hours. CM provided patient's wife with personal care list last week. Patient/wife are aware and agree with the discharge plan. Told wife to contact CM for any questions or concerns even after discharge.     1406: Checked with Antonio from [facility] regarding ETA of home health. She said that he was actually a patient with them beginning of April but PT discharged. Told Antonio that patient has been in hospital for about a week now and has gotten weaker and will need PT/OT. She is going to check into it further and let me know.     Transition of Care Plan:     RUR: n/a obs   Prior Level of Functioning: Requires assistance w/adls/iadls   Disposition:   If SNF or IPR: Date FOC offered:   Date FOC received:   Accepting facility:   Date authorization started with reference number:   Date authorization received and expires:   Follow up appointments: Patient's spouse to arrange own f/u with "[employer].   DME needed:   Transportation at 
  Transition of Care Plan:     RUR: n/a obs   Prior Level of Functioning: Requires assistance w/adls/iadls   Disposition:   If SNF or IPR: Date FOC offered:   Date FOC received:   Accepting facility:   Date authorization started with reference number:   Date authorization received and expires:   Follow up appointments:   DME needed:   Transportation at discharge:   IM/IMM Medicare/ letter given: n/a obs   Is patient a  and connected with VA?               If yes, was  transfer form completed and VA notified?   Caregiver Contact:   Discharge Caregiver contacted prior to discharge?   Care Conference needed?   Barriers to discharge: david securing more caregiver assistance       1050: Spoke with patient's wife Spring Kapoor. She states she is moving furniture around her home. She is requesting to have patient stay here one additional night to allow her to make more phone calls to caregivers. Should be done around 2:20pm or so today. She is going to have her own room to stay in and her  Mr. Patel is going to have his own room to stay in with a hospital bed. She was advised to do this by patient's caregivers to allow her to have rest at night. She said her personal care aide Sara is making various phone calls to find someone to be there at night with the patient. Mr. Patel approved for 40 hours by VA through [employer]. If she would like more hours they would have to pay out of pocket. Asked Ms. Kapoor is patient is just as confused here as he is at home. She states that he is more confused here than at home likely due to not being in familiar surroundings. Told her that MD would like to change medications around today. Also, would like patient to have lab work done at home. Called Antonio from [facility]. Said with VA it can be a long process to have home health in. Likely HH can start next week.     1118: Spoke with hospitalist. Plan to dc tomorrow to allow wife to obtain more caregivers. Also 
Transition of Care Plan:    RUR: n/a obs   Prior Level of Functioning: Requires assistance w/adls/iadls   Disposition:   If SNF or IPR: Date FOC offered:   Date FOC received:   Accepting facility:   Date authorization started with reference number:   Date authorization received and expires:   Follow up appointments:   DME needed:   Transportation at discharge:   IM/IMM Medicare/ letter given:   Is patient a [place] and connected with VA?    If yes, was  transfer form completed and VA notified?   Caregiver Contact:   Discharge Caregiver contacted prior to discharge?   Care Conference needed?   Barriers to discharge: Medical instability    1216: Called Raoul Tatum at [facility].Explained to him the situation. He transferred me to Victoria Tiburcio. Was forwarded to voicemail. Left  a voicemail. Waiting to hear back.       1510: Spoke with Victoria from [facility]. She said that the program patient is under is called [facility] Program. She said to let the agency know that he requires more help in the home. Victoria was able to provide me the name of the Agency which is [facility]. She told me to just let them know he requires more hours.Called [facility] and spoke with Anali. The  assigned is Kristen. She is not in the rest of the day per Anali. Left her a voicemail. Waiting to hear back.   
of personal care. She is especially intested in someone staying with Mr. Patel at night because he has a hard time sleeping. Suggested maybe the hosptialist could give him something to assist him in sleeping; she then said the sleeping medications make his blood pressure too low. CM then suggested that Sara might know someone who can provide care for patient. Ms. Kapoor said that Sara is looking into it. Told Ms. Patel I would try to get in touch with the VA regarding payment. The VA program is called \"[facility] Direct\". Someone named Kristen Natyrick is in charge of it.  Ideally Ms. Kapoor would like someone there 24/7 but unlikely that VA could do this but will have to check with them to be sure. Told patient and wife that during IDR MD stated they may be interested in being transferred to VA for palliative care. Patient and wife would like patient to be home. They are agreeable to have palliative come to patients home (if an option). VA may have something like this but there are no palliative agencies in area. Confirmed with patient and wife that home o2 was delivered and set up but patient's o2 sat was low so EMS had to bump up o2 to 6L. Plan will be to have patient dc home hopefully with more care giver assistance. Will try to get in touch with the VA to see if this is an option. Told patient I would attempt to contact the VA about this but if they get a call from them to let them know as well and let CM know. Transfer forms sent to VA (to ensure payment)--unlikely transfer will happen since VA usually doesn't have any beds. Mr. Patel wasn't sure if he had medicare or not. He believes he may just have part A. Went ahead and got him to sign both VON and GARCES. [place] Outpatient Observation Notice provided to Immanuel Patel Oral explanation was provided and all questions answered. Signed document placed on the bedside chart to be scanned under the media tab. Copy provided to Immanuel Patel Medicare Outpatient Observation Notice

## 2024-04-30 NOTE — PLAN OF CARE
Problem: Confusion  Goal: Confusion, delirium, dementia, or psychosis is improved or at baseline  Description: INTERVENTIONS:  1. Assess for possible contributors to thought disturbance, including medications, impaired vision or hearing, underlying metabolic abnormalities, dehydration, psychiatric diagnoses, and notify attending LIP  2. [place] high risk fall precautions, as indicated  3. Provide frequent short contacts to provide reality reorientation, refocusing and direction  4. Decrease environmental stimuli, including noise as appropriate  5. Monitor and intervene to maintain adequate nutrition, hydration, elimination, sleep and activity  6. If unable to ensure safety without constant attention obtain sitter and review sitter guidelines with assigned personnel  7. Initiate Psychosocial CNS and Spiritual Care consult, as indicated  Outcome: Not Progressing     Problem: Safety - Adult  Goal: Free from fall injury  Outcome: Progressing     Problem: Pain  Goal: Verbalizes/displays adequate comfort level or baseline comfort level  Outcome: Progressing        
  Problem: Confusion  Goal: Confusion, delirium, dementia, or psychosis is improved or at baseline  Description: INTERVENTIONS:  1. Assess for possible contributors to thought disturbance, including medications, impaired vision or hearing, underlying metabolic abnormalities, dehydration, psychiatric diagnoses, and notify attending LIP  2. [place] high risk fall precautions, as indicated  3. Provide frequent short contacts to provide reality reorientation, refocusing and direction  4. Decrease environmental stimuli, including noise as appropriate  5. Monitor and intervene to maintain adequate nutrition, hydration, elimination, sleep and activity  6. If unable to ensure safety without constant attention obtain sitter and review sitter guidelines with assigned personnel  7. Initiate Psychosocial CNS and Spiritual Care consult, as indicated  4/26/2024 1452 by Kellee Ricks, RN  Outcome: Not Progressing     
  Problem: Respiratory - Adult  Goal: Achieves optimal ventilation and oxygenation  4/28/2024 0627 by Juan Jose Foreman RCP  Outcome: Not Progressing  4/27/2024 1805 by Светлана Spencer RN  Outcome: Progressing     
  Problem: Respiratory - Adult  Goal: Achieves optimal ventilation and oxygenation  4/28/2024 1508 by Danni Varela, RT  Outcome: Not Progressing  4/28/2024 0627 by Juan Jose Foreman RCP  Outcome: Not Progressing     Problem: Respiratory - Adult  Goal: Achieves optimal ventilation and oxygenation  4/28/2024 1508 by Danni Varela, RT  Outcome: Not Progressing  4/28/2024 0627 by Juan Jose Foreman RCP  Outcome: Not Progressing     
  Problem: Respiratory - Adult  Goal: Achieves optimal ventilation and oxygenation  4/30/2024 1229 by Jessie Avila, RT  Outcome: Progressing  4/30/2024 1006 by Waleska Syed RN  Outcome: Progressing     
  Problem: Respiratory - Adult  Goal: Achieves optimal ventilation and oxygenation  Outcome: Not Progressing     Problem: Respiratory - Adult  Goal: Achieves optimal ventilation and oxygenation  Outcome: Not Progressing     
  Problem: Respiratory - Adult  Goal: Achieves optimal ventilation and oxygenation  Outcome: Progressing     
  Problem: Safety - Adult  Goal: Free from fall injury  Outcome: Progressing     Problem: Pain  Goal: Verbalizes/displays adequate comfort level or baseline comfort level  Outcome: Progressing     Problem: Confusion  Goal: Confusion, delirium, dementia, or psychosis is improved or at baseline  Description: INTERVENTIONS:  1. Assess for possible contributors to thought disturbance, including medications, impaired vision or hearing, underlying metabolic abnormalities, dehydration, psychiatric diagnoses, and notify attending LIP  2. [place] high risk fall precautions, as indicated  3. Provide frequent short contacts to provide reality reorientation, refocusing and direction  4. Decrease environmental stimuli, including noise as appropriate  5. Monitor and intervene to maintain adequate nutrition, hydration, elimination, sleep and activity  6. If unable to ensure safety without constant attention obtain sitter and review sitter guidelines with assigned personnel  7. Initiate Psychosocial CNS and Spiritual Care consult, as indicated  Outcome: Progressing     Problem: Respiratory - Adult  Goal: Achieves optimal ventilation and oxygenation  4/27/2024 1805 by Светлана Spencer, RN  Outcome: Progressing  4/27/2024 0748 by Rolando Pennington Sr., RCP  Outcome: Progressing     
  Problem: Safety - Adult  Goal: Free from fall injury  Outcome: Progressing     Problem: Pain  Goal: Verbalizes/displays adequate comfort level or baseline comfort level  Outcome: Progressing     Problem: Confusion  Goal: Confusion, delirium, dementia, or psychosis is improved or at baseline  Description: INTERVENTIONS:  1. Assess for possible contributors to thought disturbance, including medications, impaired vision or hearing, underlying metabolic abnormalities, dehydration, psychiatric diagnoses, and notify attending LIP  2. [place] high risk fall precautions, as indicated  3. Provide frequent short contacts to provide reality reorientation, refocusing and direction  4. Decrease environmental stimuli, including noise as appropriate  5. Monitor and intervene to maintain adequate nutrition, hydration, elimination, sleep and activity  6. If unable to ensure safety without constant attention obtain sitter and review sitter guidelines with assigned personnel  7. Initiate Psychosocial CNS and Spiritual Care consult, as indicated  Outcome: Progressing     Problem: Respiratory - Adult  Goal: Achieves optimal ventilation and oxygenation  4/29/2024 1446 by Kellee Ricks, RN  Outcome: Progressing  4/29/2024 0908 by Jessie Avila, RT  Outcome: Progressing     Problem: Skin/Tissue Integrity  Goal: Absence of new skin breakdown  Description: 1.  Monitor for areas of redness and/or skin breakdown  2.  Assess vascular access sites hourly  3.  Every 4-6 hours minimum:  Change oxygen saturation probe site  4.  Every 4-6 hours:  If on nasal continuous positive airway pressure, respiratory therapy assess nares and determine need for appliance change or resting period.  Outcome: Progressing     
  Problem: Safety - Adult  Goal: Free from fall injury  Outcome: Progressing     Problem: Pain  Goal: Verbalizes/displays adequate comfort level or baseline comfort level  Outcome: Progressing     Problem: Confusion  Goal: Confusion, delirium, dementia, or psychosis is improved or at baseline  Description: INTERVENTIONS:  1. Assess for possible contributors to thought disturbance, including medications, impaired vision or hearing, underlying metabolic abnormalities, dehydration, psychiatric diagnoses, and notify attending LIP  2. [place] high risk fall precautions, as indicated  3. Provide frequent short contacts to provide reality reorientation, refocusing and direction  4. Decrease environmental stimuli, including noise as appropriate  5. Monitor and intervene to maintain adequate nutrition, hydration, elimination, sleep and activity  6. If unable to ensure safety without constant attention obtain sitter and review sitter guidelines with assigned personnel  7. Initiate Psychosocial CNS and Spiritual Care consult, as indicated  Outcome: Progressing     Problem: Respiratory - Adult  Goal: Achieves optimal ventilation and oxygenation  Outcome: Progressing     Problem: Skin/Tissue Integrity  Goal: Absence of new skin breakdown  Description: 1.  Monitor for areas of redness and/or skin breakdown  2.  Assess vascular access sites hourly  3.  Every 4-6 hours minimum:  Change oxygen saturation probe site  4.  Every 4-6 hours:  If on nasal continuous positive airway pressure, respiratory therapy assess nares and determine need for appliance change or resting period.  Outcome: Progressing     
risk fall precautions, as indicated  3. Provide frequent short contacts to provide reality reorientation, refocusing and direction  4. Decrease environmental stimuli, including noise as appropriate  5. Monitor and intervene to maintain adequate nutrition, hydration, elimination, sleep and activity  6. If unable to ensure safety without constant attention obtain sitter and review sitter guidelines with assigned personnel  7. Initiate Psychosocial CNS and Spiritual Care consult, as indicated  4/25/2024 1050 by Waleska Syed RN  Outcome: Progressing  4/25/2024 0355 by Hayden Zuluaga RN  Outcome: Not Progressing     Problem: Respiratory - Adult  Goal: Achieves optimal ventilation and oxygenation  4/25/2024 1050 by Waleska Syed RN  Outcome: Progressing  4/25/2024 0355 by Hayden Zuluaga RN  Outcome: Not Progressing

## 2024-04-30 NOTE — PROGRESS NOTES
POST FALL MANAGEMENT    Immanuel Patel  MEDICAL RECORD NUMBER:  [id number]  AGE: 78 y.o.   GENDER: male  : 1945  TODAYS DATE:  2024    Details     Fall Occurred: Yes    Was the Fall Witnessed:  Yes      Brief Review of Event:Bed check alarmed and staff went to room to assist patient. Pt stated he \"needed to get to work\". Pt ambulated into hallway with staff attempting to assist. Pt pulling away from staff. Gait unsteady and patient lowered to ground by staff. Skin tear obtained on left arm when patient struck his arm on the hallway rail.         Who found the patient: S. Otf HAINES      Where was the patient at the time of the fall: hallway outside of his room      Patient Comments: \"I need to get to work\"       Date Fall Occurred:  2024 .       Time Fall Occurred: :     Assessment     Post Fall Head to Toe Assessment Completed: Yes    Post Fall Predictive Analytic Score Reviewed: Yes     Post Fall Vitals Completed: Yes    Post Fall Neuro Checks Completed: Yes    Injury Occurred(if yes, describe injury):  yes - 1 inch long skin tear on left arm (area cleaned and non-adhering drsg applied           Did the Patient Experience:(Check Naeem all that apply)    [] Patient hit head  [] Loss of consciousness  [] Change in mental status following the fall  [] Patient is on an anticoagulant medication      CT Performed:  no    Follow-up     Persons Notified of Fall:  (Provide names of persons notified)   [x] Physician:   [] ROX:  [x] Nursing Supervisior:  [x] Manager:  [] Pharmacist:  [x] Family:  [x] Other:nursing administrator      Electronically signed by Dawn Kwon RN 2024 at 10:27 PM   
  [facility]  Hospitalist Progress Note    NAME: Immanuel Patel   :  1945   MRN:  [id number]     Total duration of encounter: 2 days      Interim Hospital Summary: 78 y.o. male who presented on 2024 with Chronic respiratory failure (HCC). He has a past medical history of Altered mental status, unspecified, Anxiety, Ataxia, Back pain, Blurred vision, Bradycardia, Chest pain, Depression, Dizziness, Dizziness and giddiness, Dysautonomia (HCC), Fast heart beat, Fever, Frequent headaches, Frequent urination, Hip dysplasia, congenital, Hx of syncope, Hypotension, Ingrown left big toenail, Joint pain, Joint swelling, Lumbar spinal stenosis, Memory deficit, Muscle pain, Neuropathy, Orthostatic hypotension, Pacemaker, Recent change in weight, Risk for falls, Sensory ataxia, Sinus problem, Skin disease, Smoker, SOB (shortness of breath), Sore throat, SSS (sick sinus syndrome) (HCC), Stiffness of joints, multiple sites, Stress, Stumbling gait, Syncope, Tiredness, Trouble in sleeping, and Weakness..       Pt presenting with c/o dyspnea, weakness, and AMS.  He was d/c from the [facility] 2 days earlier with plans for palliative care  Inpatient Palliative was deferred with family opting for home care [facility]    Oxygen was initiated on d/c, he has not required this previously.  He has 5hrs of care per day, with [facility] possibly [facility] to 8hrs daily for 7days per week in May  Pt's wife is not able to manage his care independent.  She is seeking additional nighttime caregiver to assist on d/c     Subjective:     Chief Complaint / Reason for Physician Visit  \"better\".  Discussed with RN   Rested better  Less SOB  Improved cough / congestion  Denies pain.  Has some congestion    No evidence for acute infection  During night O2 NC again advanced to 6 l/min    Wife notes he is eating well  He needs meats cut up and most  He has not used Ensure supplements    He becomes very 
0700 patient is confused about his location and situation, also anxious.  Mutiple pvc observed at 0130, MD updated.  
1st IM Letter explained to POA with an opportunity for questions provided. Signed document placed on bedside chart, copy given to POA.   
2038 Bed check alarming and staff entered room to assist pt. Pt states he \"needs to get to work\". Patient then ambulated into hallway with staff attempting to assist him. Pt pulling away from staff and gait unsteady. Pt lowered to ground by staff. Pt struck his left arm on hallway railing. One inch long skin tear obtained on left forearm. Pt had on  socks and fall precautions in place at time of incident. Virtual sitter also being utilized at time of incident.    2041 Nsg sup called and security called for assist with patient. Pt assisted off of floor and into recliner with three staff assist. Pt cooperative at this time. Chair alarm in place. Staff now sitting with patient 1:1 for safety.    2051 Margie Vazquez nursing administrator made aware of incident by Kayy De La Fuente(nursing supervisor).    2059 Sunshine Britt nurse manager made aware of incident by Kayy De La Fuente (nursing supervisor).    2145 Wife notified of incident by writer. She states she will be in tomorrow morning. Wife also states she has not been able to find any help at home for tomorrow when patient is supposed to be discharged.    2151 Dr. Hall notified of incident.              
7p- The patient spent the duration of the shift on 10L/m  mid/high flow nasal cannula.  An effort was made to titrate fio2 and flow this morning with out success.  The patients Fio2 dropped to unacceptable levels.  He was returned to 10L/M and O2 saturations are holding at 94%-95%  
Assessment done at beginning of shift by this RN. Noted the following:    Pt attempted to sip water and coughed on attempt. Pt respirations 22, oxygen saturations between 87-88% on 4 liters nasal cannula. Respiratory Therapist notified, MD notified.   Respiratory moved patient to 10 liters High Flow Nasal Cannula, MD aware. MD placed pt on NPO.    Red raised circular area on right elbow prominence. MD notified. Area cleansed and covered with mepilex.      Left arm bandage from skin tear documented on shift prior; dressing was removed to assess, cleansed, and applied new.     @1030: This nurse heard the Virtual  speak to the patient. This nurse went to the room, pt was attempting to get out of bed stating \"I need to go to the bathroom\" urgently. Assisted pt with urinal with another nurse. Pt placed back into bed.     @1230: Pt appeared more alert. Eye opening spontaneous. MD aware that patient is more alert. MD requested swallow screen. Pt passed swallow screen. NPO status discontinued. Soft diet with assisted feeding with assisted feeding placed.     @1500: Pt assisted with two nurses using the bedside urinal, then assisted to chair. Pt tolerated well.    Pt satisfied and resting at this time. Chest rise and fall equal.  
Discharge Summary    Immanuel Patel  :  1945  MRN:  [id number]    ADMIT DATE:  2024  DISCHARGE DATE:  2024      Discharge instruction reviewed with Patient, wife, and caregiver.     Home med's returned Yes    Personal belongings returned Yes    Patient Wheeled to front entrance via wheelchair and portable O2 tank on 4L NC with Nurse        SIGNED:    Waleska Syed RN         
[facility]  Hospitalist Progress Note    NAME: Immanuel Patel   :  1945   MRN:  [id number]     Total duration of encounter: 3 days    Admission HPI/Hospitalization Summary To Date:    78-year-old male admitted via ED on  with persistent SOB and altered mentation.  He appears to have a acute on more chronic decline having [facility]  [facility] with similar complaints treated for COPD exacerbation and discharged on 4 L O2.  Wife took him home despite apparent suggestion he be placed in facility with palliative care approach, however she returns now with inability to care for him lift him, feed him,, etc. and initially requested placement-asking to [facility] then be [facility].    ED E/M: Hemodynamically stable.  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  Given IV fluid bolus, magnesium IV        Hospitalization:  Placed on observation, continued home oxygen case management consultation to assist with placement.  Hemodynamically stable maintained mostly on 4 L NC.  Maintain on his usual home medications.  Wife has however continued to wish for patient to go home describing it as a \"family value\" and taking it she wants to try it 1 more time with increased assistance which case management has been able to do.  Interviewing her indicates a big issue is his restlessness.  Also getting up in the middle of the night due to prostatism.      Medical history includes dementia, orthostatic hypotension, COPD spinal stenosis, pacemaker in situ.  Subjective:     Reason for provider encounter today:    Follow-up E/M of hospitalized patient     Chief Complaint :    Denies any problems or concerns.  He is restless and distracted.  Wife wishes to keep him here 1 more day but she continues to 
MD verbalized to have spouse bring in from home the following medications.   1- Spiriva  2- Trelegy  Nurse spoke with spouse and she stated she will  refill of medication from [facility]'s and bring it in tomorrow.   Spouse also left car keys in patient's room. Nurses desk alerted if spouse calls. Nurse called spouse on her mobile and told her that she left her car keys in the patient's room. Spouse was very happy to hear they were found and she would come in the morning to  keys and car. She was going to come tonight but nurse encouraged her to come in the morning light when it was safer for her to see. Spouse agreed and said she would do so.   
Respiratory Note: RRT called to patients room for decreased O2 Saturation of 88-87% on 4 lpm nasal cannula. No signs of SOB or increased WOB, Breathsounds bilat presented some faint crackles in his bases but otherwise Clear and diminished. Since patient consistently \"mouth breaths\" I placed the patient on 10 lpm High Flow cannula and patients O2 Saturation in staying in the 90-93% range. Dr. MELISSA Zavala notified of results of switching over to HFNC.  
Speech LAnguage Pathology EVALUATION/DISCHARGE    Patient: Immanuel Patel (78 y.o. male)  Date: 4/29/2024  Primary Diagnosis: Chronic respiratory failure (HCC) [J96.10]  Chronic respiratory failure with hypoxia (HCC) [J96.11]  Generalized weakness [R53.1]       Precautions: fall                    ASSESSMENT :  Patient presents with adequate oral and presumed functional pharyngeal swallow based on clinical assessment. He does have symptoms that correlate with an esophageal dysphagia. Symptoms are characterized by globus sensation mid throat, along with delayed coughing after eating dry solids. At home, he avoids dry solids unless combined with other foods (crackers with soup). He also avoids hard solids, such as steak and raw vegetables. His wife reports he has siblings with similar symptoms.   Patient's symptoms began about 2 years ago. At that time, he saw SLP at the [facility]. His wife reports he had a FEES completed at that time, showing no food remaining in the pharynx after the swallow and no aspiration. The patient reports symptoms have been ongoing since that time, though they are intermittent. Worse currently while in the hospital. The patient denies history of reflux, though his wife reports he would take numerous Tums throughout the day.   Given symptoms and history, patient may be at elevated risk for post prandial aspiration. He may benefit from GI workup at some point to determine esophageal function, if this has not already been completed.     Patient will be discharged from skilled speech-language pathology services at this time.     PLAN :  Recommendations and Planned Interventions:  Diet: Soft and bite sized  Thin liquids  Single bites and sips  Avoid foods that exacerbate symptoms  Consider GI workup for esophageal function if this has not already been done.          Acute SLP Services: No, patient will be discharged from acute skilled speech-language pathology at this time.    Discharge Recommendations: 
very weak when attempting to stand  This is a/w with spinal stenosis as well as orthostatic hypotension      Review of Systems:  UTO  Symptom Y/N Comments  Symptom Y/N Comments   Fever/Chills    Chest Pain     Poor Appetite    Edema     Cough    Abdominal Pain     Sputum    Joint Pain     SOB/ZHAO    Pruritis/Rash     Nausea/vomit    Tolerating PT/OT     Diarrhea    Tolerating Diet     Constipation    Other         Current Facility-Administered Medications:     ergocalciferol capsule 50,000 Units, 50,000 Units, Oral, Weekly, Rolando Ordonez MD, 50,000 Units at 04/24/24 0923    ipratropium 0.5 mg-albuterol 2.5 mg (DUONEB) nebulizer solution 1 Dose, 1 Dose, Inhalation, Q4H PRN, Clive Linton MD, 1 Dose at 04/24/24 0552    atorvastatin (LIPITOR) tablet 40 mg, 40 mg, Oral, Daily, Shayne Hall MD, 40 mg at 04/24/24 0923    busPIRone (BUSPAR) tablet 10 mg, 10 mg, Oral, q8h, Shayne Hall MD, 10 mg at 04/24/24 0922    donepezil (ARICEPT) tablet 10 mg, 10 mg, Oral, Daily, Shayne Hall MD, 10 mg at 04/24/24 0923    DULoxetine (CYMBALTA) extended release capsule 60 mg, 60 mg, Oral, Daily, Shayne Hall MD, 60 mg at 04/24/24 0923    finasteride (PROSCAR) tablet 5 mg, 5 mg, Oral, Daily, Shayne Hall MD, 5 mg at 04/24/24 0924    fludrocortisone (FLORINEF) tablet 0.05 mg, 0.05 mg, Oral, Daily, Shayne Hall MD, 0.05 mg at 04/24/24 0924    midodrine (PROAMATINE) tablet 5 mg, 5 mg, Oral, TID WC, Shayne Hall MD, 5 mg at 04/24/24 1159    sodium chloride flush 0.9 % injection 5-40 mL, 5-40 mL, IntraVENous, 2 times per day, Shayne Hall MD, 10 mL at 04/24/24 0926    sodium chloride flush 0.9 % injection 5-40 mL, 5-40 mL, IntraVENous, PRN, Shayne Hall MD    0.9 % sodium chloride infusion, , IntraVENous, PRN, Shayne Hall MD    potassium chloride (KLOR-CON) extended release tablet 40 mEq, 40 mEq, Oral, PRN **OR** potassium bicarb-citric acid (EFFER-K) effervescent tablet 40 mEq, 40 mEq, Oral, PRN **OR** potassium chloride 10 mEq/100 mL 
        Care Plan discussed with:                                   Comments  Patient x I answered any and all questions  patient had to their apparent satisfaction   Family    I answered any and all questions family had to their apparent satisfaction   RN  Other  Members  Care Team   (specify)  x       Care Manager       Consultant                           Multidiciplinary team rounds were held today with , nursing, pharmacist and clinical coordinator.  Patient's plan of care was discussed; medications were reviewed and discharge planning was addressed.        ____________________________________________          Signed: VIRGINIA PERRY MD  [facility] Hospitalist  [phone]    
fall precautions at home will be getting home care                   Hyperlipidemia   For now given goals of care can hold his Lipitor       GERD (gastroesophageal reflux disease)  Home tx Omeprazole 40mg daily, house Protonix substitute                   ___________________________________________________________________    SAFETY:   Code Status:DNR   DVT prophylaxis:Lovenox  GI prophylaxis:  Bladder catheter: No  Family Contact Info:  Primary Emergency Contact: Spring Kapoor  Admission status:  Inpatient  Expected disposition:         Care Plan discussed with:                                   Comments  Patient x I answered any and all questions  patient had to their apparent satisfaction   Family    I answered any and all questions family had to their apparent satisfaction   RN  Other  Members  Care Team   (specify)  x       Care Manager       Consultant                           Multidiciplinary team rounds were held today with , nursing, pharmacist and clinical coordinator.  Patient's plan of care was discussed; medications were reviewed and discharge planning was addressed.        ____________________________________________            Signed: VIRGINIA PERRY MD  [facility] Hospitalist  [phone]

## 2024-06-17 ENCOUNTER — HOSPITAL ENCOUNTER (OUTPATIENT)
Facility: HOSPITAL | Age: 79
Setting detail: RECURRING SERIES
Discharge: HOME | End: 2024-06-20
Payer: OTHER GOVERNMENT

## 2024-06-17 PROCEDURE — 97110 THERAPEUTIC EXERCISES: CPT

## 2024-06-17 PROCEDURE — 97161 PT EVAL LOW COMPLEX 20 MIN: CPT

## 2024-06-17 NOTE — PROGRESS NOTES
[facility]  [address] [place] [address]  Office [phone]  Fax [phone]       PHYSICAL THERAPY - MEDICARE EVALUATION/PLAN OF CARE NOTE (updated 3/23)      Date: 2024          Patient Name:  Immanuel Patel :  1945   Medical   Diagnosis:  Other abnormalities of gait and mobility [R26.89] Treatment Diagnosis:  M62.81  GENERAL MUSCLE WEAKNESS, R26.81   Unsteadiness on feet, and R26.89   Abnormalities of gait and mobility    Referral Source:  Isrrael Carrion MD Insurance:  Payor: [employer] / Plan: [employer] / Product Type: *No Product type* /             Patient  verified yes     Visit #   Current  / Total 1 16     SUBJECTIVE  Pain Level (0-10 scale): 0  []constant []intermittent []improving []worsening []no change since onset    Any medication changes, allergies to medications, adverse drug reactions, diagnosis change, or new procedure performed?: [x] No    [] Yes (see summary sheet for update)  Medications: Verified on Patient Summary List    Subjective functional status/changes:     March , fell at home, sacral fx, April hospitalized for a couple days with pnuemonia.  Patient and family feel that balance has decreased.  Has a rollator , does not remember to use it  C/o SOB, was a recue diver and   Patient's wife reports he has come a long way in the past month    Onset Date: month  Start of Care: 2024  PLOF: ambulates with a cane and without a cane, h/o falls, SOB, caregiver/aide 9-5 during the week, wife cares for patient  Comorbidities:depression, pacemaker, anxiety, arthritis, low BP  Pt Goals: improve balance    OBJECTIVE    Posture:  forward head and shoulders  Other Observations:  Sp02 96-99%, pulse 80s to 105  Gait and Functional Mobility:  ambulates without a device and CGA/min A 100' with hand hold assist, trendelenberg  ROM:WFL for mobility     MMT: LE grossly 4-/5  Neurological: Reflexes /

## 2024-06-19 ENCOUNTER — HOSPITAL ENCOUNTER (OUTPATIENT)
Facility: HOSPITAL | Age: 79
Setting detail: RECURRING SERIES
Discharge: HOME | End: 2024-06-22
Payer: OTHER GOVERNMENT

## 2024-06-19 PROCEDURE — 97116 GAIT TRAINING THERAPY: CPT

## 2024-06-19 PROCEDURE — 97110 THERAPEUTIC EXERCISES: CPT

## 2024-06-19 NOTE — PROGRESS NOTES
PHYSICAL THERAPY - MEDICARE DAILY TREATMENT NOTE (updated 3/23)      Date: 2024          Patient Name:  Immanuel Patel :  1945   Medical   Diagnosis:  Other abnormalities of gait and mobility [R26.89] Treatment Diagnosis:  M62.81  GENERAL MUSCLE WEAKNESS, R26.81   Unsteadiness on feet, and R26.89   Abnormalities of gait and mobility    Referral Source:  Isrrael Carrion MD Insurance:   Payor: [employer] / Plan: [employer] / Product Type: *No Product type* /                     Patient  verified yes     Visit #   Current  / Total 2 16   Time   In / Out 10 1045   Total Treatment Time 45   Total Timed Codes 45   1:1 Treatment Time 45      [facility] Totals Reminder:  bill using total billable   min of TIMED therapeutic procedures and modalities.   8-22 min = 1 unit; 23-37 min = 2 units; 38-52 min = 3 units; 53-67 min = 4 units; 68-82 min = 5 units        .episode  SUBJECTIVE    Pain Level (0-10 scale): 0    Any medication changes, allergies to medications, adverse drug reactions, diagnosis change, or new procedure performed?: [x] No    [] Yes (see summary sheet for update)  Medications: Verified on Patient Summary List    Subjective functional status/changes:     Feeling ok today    OBJECTIVE      Therapeutic Procedures:  Tx Min Billable or 1:1 Min (if diff from Tx Min) Procedure, Rationale, Specifics   35 35 [id number] Therapeutic Exercise (timed):  increase ROM, strength, coordination, balance, and proprioception to improve patient's ability to progress to PLOF and address remaining functional goals. (see flow sheet as applicable)     Details if applicable:    Nu step L1, 2' rest, 1.5', rest, 3'  Spo2 97%  Sitting hip flexion with 1# 2x10  Sitting hip adduction 2x10  Sitting hip abduction with GTB 3x10     10 10 [id number] Gait Training (timed):    100 x2 feet with out (assistive device) over level surfaces with cga level of assist. Cuing for increasing foot clearance and breathing.  To improve safety and dynamic

## 2024-06-26 ENCOUNTER — HOSPITAL ENCOUNTER (EMERGENCY)
Facility: HOSPITAL | Age: 79
Discharge: HOME | End: 2024-06-26
Attending: FAMILY MEDICINE | Admitting: FAMILY MEDICINE
Payer: OTHER GOVERNMENT

## 2024-06-26 ENCOUNTER — APPOINTMENT (OUTPATIENT)
Facility: HOSPITAL | Age: 79
End: 2024-06-26
Payer: OTHER GOVERNMENT

## 2024-06-26 VITALS
OXYGEN SATURATION: 94 % | HEART RATE: 96 BPM | RESPIRATION RATE: 30 BRPM | SYSTOLIC BLOOD PRESSURE: 182 MMHG | HEIGHT: 71 IN | DIASTOLIC BLOOD PRESSURE: 86 MMHG | WEIGHT: 175 LBS | BODY MASS INDEX: 24.5 KG/M2

## 2024-06-26 DIAGNOSIS — F41.1 ANXIETY STATE: Primary | ICD-10-CM

## 2024-06-26 LAB
ALBUMIN SERPL-MCNC: 3.9 G/DL (ref 3.5–5)
ALBUMIN/GLOB SERPL: 1.3 (ref 1.1–2.2)
ALP SERPL-CCNC: 98 U/L (ref 45–117)
ALT SERPL-CCNC: 17 U/L (ref 12–78)
ANION GAP SERPL CALC-SCNC: 16 MMOL/L (ref 5–15)
APPEARANCE UR: CLEAR
AST SERPL-CCNC: 15 U/L (ref 15–37)
BACTERIA URNS QL MICRO: NEGATIVE /HPF
BASOPHILS # BLD: 0 K/UL (ref 0–0.1)
BASOPHILS NFR BLD: 0 % (ref 0–1)
BILIRUB SERPL-MCNC: 0.8 MG/DL (ref 0.2–1)
BILIRUB UR QL: NEGATIVE
BUN SERPL-MCNC: 31 MG/DL (ref 6–20)
BUN/CREAT SERPL: 18 (ref 12–20)
CALCIUM SERPL-MCNC: 9 MG/DL (ref 8.5–10.1)
CHLORIDE SERPL-SCNC: 103 MMOL/L (ref 97–108)
CO2 SERPL-SCNC: 20 MMOL/L (ref 21–32)
COLOR UR: ABNORMAL
CREAT SERPL-MCNC: 1.77 MG/DL (ref 0.7–1.3)
DIFFERENTIAL METHOD BLD: ABNORMAL
EKG ATRIAL RATE: 88 BPM
EKG DIAGNOSIS: NORMAL
EKG P AXIS: 108 DEGREES
EKG Q-T INTERVAL: 388 MS
EKG QRS DURATION: 126 MS
EKG QTC CALCULATION (BAZETT): 482 MS
EKG R AXIS: 65 DEGREES
EKG T AXIS: 67 DEGREES
EKG VENTRICULAR RATE: 93 BPM
EOSINOPHIL # BLD: 0 K/UL (ref 0–0.4)
EOSINOPHIL NFR BLD: 0 % (ref 0–7)
EPITH CASTS URNS QL MICRO: ABNORMAL /LPF
ERYTHROCYTE [DISTWIDTH] IN BLOOD BY AUTOMATED COUNT: 13.7 % (ref 11.5–14.5)
GLOBULIN SER CALC-MCNC: 3.1 G/DL (ref 2–4)
GLUCOSE SERPL-MCNC: 134 MG/DL (ref 65–100)
GLUCOSE UR STRIP.AUTO-MCNC: 100 MG/DL
HCT VFR BLD AUTO: 34.9 % (ref 36.6–50.3)
HGB BLD-MCNC: 12 G/DL (ref 12.1–17)
HGB UR QL STRIP: NEGATIVE
IMM GRANULOCYTES # BLD AUTO: 0.1 K/UL (ref 0–0.04)
IMM GRANULOCYTES NFR BLD AUTO: 1 % (ref 0–0.5)
KETONES UR QL STRIP.AUTO: ABNORMAL MG/DL
LEUKOCYTE ESTERASE UR QL STRIP.AUTO: NEGATIVE
LYMPHOCYTES # BLD: 1.1 K/UL (ref 0.8–3.5)
LYMPHOCYTES NFR BLD: 10 % (ref 12–49)
MAGNESIUM SERPL-MCNC: 1.7 MG/DL (ref 1.6–2.4)
MCH RBC QN AUTO: 31.6 PG (ref 26–34)
MCHC RBC AUTO-ENTMCNC: 34.4 G/DL (ref 30–36.5)
MCV RBC AUTO: 91.8 FL (ref 80–99)
MONOCYTES # BLD: 0.8 K/UL (ref 0–1)
MONOCYTES NFR BLD: 7 % (ref 5–13)
NEUTS SEG # BLD: 9.8 K/UL (ref 1.8–8)
NEUTS SEG NFR BLD: 82 % (ref 32–75)
NITRITE UR QL STRIP.AUTO: NEGATIVE
NRBC # BLD: 0 K/UL (ref 0–0.01)
NRBC BLD-RTO: 0 PER 100 WBC
NT PRO BNP: 857 PG/ML (ref 0–450)
PH UR STRIP: 6.5 (ref 5–8)
PLATELET # BLD AUTO: 206 K/UL (ref 150–400)
PMV BLD AUTO: 10.3 FL (ref 8.9–12.9)
POTASSIUM SERPL-SCNC: 4.6 MMOL/L (ref 3.5–5.1)
PROT SERPL-MCNC: 7 G/DL (ref 6.4–8.2)
PROT UR STRIP-MCNC: NEGATIVE MG/DL
RBC # BLD AUTO: 3.8 M/UL (ref 4.1–5.7)
RBC #/AREA URNS HPF: ABNORMAL /HPF (ref 0–5)
SODIUM SERPL-SCNC: 139 MMOL/L (ref 136–145)
SP GR UR REFRACTOMETRY: 1.01 (ref 1–1.03)
TROPONIN I SERPL HS-MCNC: 6 NG/L (ref 0–76)
URINE CULTURE IF INDICATED: ABNORMAL
UROBILINOGEN UR QL STRIP.AUTO: 0.2 EU/DL (ref 0.2–1)
WBC # BLD AUTO: 11.9 K/UL (ref 4.1–11.1)
WBC URNS QL MICRO: ABNORMAL /HPF (ref 0–4)

## 2024-06-26 PROCEDURE — 83735 ASSAY OF MAGNESIUM: CPT

## 2024-06-26 PROCEDURE — 6370000000 HC RX 637 (ALT 250 FOR IP): Performed by: FAMILY MEDICINE

## 2024-06-26 PROCEDURE — 99285 EMERGENCY DEPT VISIT HI MDM: CPT

## 2024-06-26 PROCEDURE — 84484 ASSAY OF TROPONIN QUANT: CPT

## 2024-06-26 PROCEDURE — 71045 X-RAY EXAM CHEST 1 VIEW: CPT

## 2024-06-26 PROCEDURE — 83880 ASSAY OF NATRIURETIC PEPTIDE: CPT

## 2024-06-26 PROCEDURE — 85025 COMPLETE CBC W/AUTO DIFF WBC: CPT

## 2024-06-26 PROCEDURE — 80053 COMPREHEN METABOLIC PANEL: CPT

## 2024-06-26 PROCEDURE — 81001 URINALYSIS AUTO W/SCOPE: CPT

## 2024-06-26 PROCEDURE — 36415 COLL VENOUS BLD VENIPUNCTURE: CPT

## 2024-06-26 RX ORDER — LORAZEPAM 1 MG/1
1 TABLET ORAL ONCE
Status: COMPLETED | OUTPATIENT
Start: 2024-06-26 | End: 2024-06-26

## 2024-06-26 RX ORDER — LORAZEPAM 1 MG/1
1 TABLET ORAL 3 TIMES DAILY PRN
Qty: 12 TABLET | Refills: 0 | Status: SHIPPED | OUTPATIENT
Start: 2024-06-26 | End: 2024-07-26

## 2024-06-26 RX ADMIN — LORAZEPAM 1 MG: 1 TABLET ORAL at 21:06

## 2024-06-26 RX ADMIN — LORAZEPAM 1 MG: 1 TABLET ORAL at 20:18

## 2024-06-26 ASSESSMENT — PAIN SCALES - GENERAL
PAINLEVEL_OUTOF10: 0
PAINLEVEL_OUTOF10: 8

## 2024-06-26 ASSESSMENT — PAIN - FUNCTIONAL ASSESSMENT
PAIN_FUNCTIONAL_ASSESSMENT: 0-10
PAIN_FUNCTIONAL_ASSESSMENT: 0-10

## 2024-06-26 ASSESSMENT — PAIN DESCRIPTION - ORIENTATION: ORIENTATION: RIGHT;LEFT

## 2024-06-26 ASSESSMENT — PAIN DESCRIPTION - LOCATION: LOCATION: LEG

## 2024-06-26 ASSESSMENT — PAIN DESCRIPTION - DESCRIPTORS: DESCRIPTORS: CRAMPING

## 2024-06-26 NOTE — ED PROVIDER NOTES
Constitutional:       Appearance: Normal appearance.   HENT:      Head: Normocephalic and atraumatic.      Right Ear: External ear normal.      Left Ear: External ear normal.      Nose: Nose normal. No congestion or rhinorrhea.      Mouth/Throat:      Mouth: Mucous membranes are moist.   Eyes:      Extraocular Movements: Extraocular movements intact.      Conjunctiva/sclera: Conjunctivae normal.      Pupils: Pupils are equal, round, and reactive to light.   Cardiovascular:      Rate and Rhythm: Normal rate and regular rhythm.      Heart sounds: No murmur heard.  Pulmonary:      Effort: Pulmonary effort is normal. No respiratory distress.      Breath sounds: Normal breath sounds. No wheezing, rhonchi or rales.   Abdominal:      General: Abdomen is flat.      Palpations: Abdomen is soft.      Tenderness: There is no abdominal tenderness. There is no guarding or rebound.   Musculoskeletal:         General: No deformity. Normal range of motion.      Cervical back: Normal range of motion and neck supple. No tenderness.      Right lower leg: No edema.      Left lower leg: No edema.   Skin:     General: Skin is warm and dry.      Capillary Refill: Capillary refill takes less than 2 seconds.      Coloration: Skin is not jaundiced.      Findings: No bruising.   Neurological:      General: No focal deficit present.      Mental Status: He is alert. Mental status is at baseline.      Gait: Gait normal.   Psychiatric:         Behavior: Behavior normal.                DIAGNOSTIC RESULTS   LABS:     Recent Results (from the past 24 hour(s))   EKG 12 Lead    Collection Time: 06/26/24  5:31 PM   Result Value Ref Range    Ventricular Rate 93 BPM    Atrial Rate 88 BPM    QRS Duration 126 ms    Q-T Interval 388 ms    QTc Calculation (Bazett) 482 ms    P Axis 108 degrees    R Axis 65 degrees    T Axis 67 degrees    Diagnosis       Atrial-paced rhythm  Nonspecific intraventricular block  Abnormal ECG  When compared with ECG of

## 2024-06-26 NOTE — ED NOTES
Purewick placed at this time without any difficulty as well as brief change. Pts wife now at bedside.

## 2024-06-26 NOTE — ED TRIAGE NOTES
Called to triage for pt that was SOB.  Pt noted to be sitting in wheelchair hyperventilating.  Pt pulled back to triage room with noted hyperventilating and having a panic attack, pt educated on relaxation breathing techniques  SpO2 100% RR 40.  Pt was able to slow his breathing and stated he got into an argument with his wife and that is when he started to have trouble breathing, pt complains of cramping in his legs and feet.  Pts wife reports pt was not able to sleep last night and was very restless, so she gave him Buspar and Melatonin, pts wife also reports pt has had trouble breathing since Noon.  Pt is awake and alert , pt was able to calm his breathing by following relaxation breathing techniques.  Pt educated on ER flow

## 2024-06-27 NOTE — DISCHARGE INSTRUCTIONS
--Buspar 10 mg 3 times daily. Increase to 20 mg 3 times daily.   --Lorazepam 1 mg every 8 hours as needed for severe anxiety. Try to give a hour after the Buspar.

## 2024-06-27 NOTE — ED NOTES
Discharge instructions reviewed with patient and patient's wife. Opportunity for questions was provided. All questions answered.

## 2024-07-01 LAB
EKG ATRIAL RATE: 88 BPM
EKG DIAGNOSIS: NORMAL
EKG P AXIS: 108 DEGREES
EKG Q-T INTERVAL: 388 MS
EKG QRS DURATION: 126 MS
EKG QTC CALCULATION (BAZETT): 482 MS
EKG R AXIS: 65 DEGREES
EKG T AXIS: 67 DEGREES
EKG VENTRICULAR RATE: 93 BPM

## 2024-07-03 ENCOUNTER — HOSPITAL ENCOUNTER (OUTPATIENT)
Facility: HOSPITAL | Age: 79
Setting detail: RECURRING SERIES
Discharge: HOME | End: 2024-07-06
Payer: OTHER GOVERNMENT

## 2024-07-03 PROCEDURE — 97116 GAIT TRAINING THERAPY: CPT

## 2024-07-03 PROCEDURE — 97110 THERAPEUTIC EXERCISES: CPT

## 2024-07-03 NOTE — PROGRESS NOTES
PHYSICAL THERAPY - DAILY TREATMENT NOTE (updated 3/23)      Date: 7/3/2024          Patient Name:  Immanuel Patel :  1945   Medical   Diagnosis:  Other abnormalities of gait and mobility [R26.89] Treatment Diagnosis:  M62.81  GENERAL MUSCLE WEAKNESS, R26.81   Unsteadiness on feet, and R26.89   Abnormalities of gait and mobility    Referral Source:  Isrrael Carrion MD Insurance:   Payor: [employer] / Plan: [employer] / Product Type: *No Product type* /                     Patient  verified yes     Visit #   Current  / Total 3 16   Time   In / Out 1130 1200   Total Treatment Time 30   Total Timed Codes 30         SUBJECTIVE    Pain Level (0-10 scale): 0    Any medication changes, allergies to medications, adverse drug reactions, diagnosis change, or new procedure performed?: [x] No    [] Yes (see summary sheet for update)  Medications: Verified on Patient Summary List    Subjective functional status/changes:     I have been getting stronger I think    OBJECTIVE      Therapeutic Procedures:  Tx Min Billable or 1:1 Min (if diff from Tx Min) Procedure, Rationale, Specifics   20 20 [id number] Therapeutic Exercise (timed):  increase ROM, strength, coordination, balance, and proprioception to improve patient's ability to progress to PLOF and address remaining functional goals. (see flow sheet as applicable)     Nu step SL 11 lvl 4 , UE/LE  with 3min x2 with noted SOB and rest breaks needed.     Seated alt marches with VC for hold with hip flex 10r x2,  Seated ABD isometric with manual resistance 10s hold 5r x2    Spo2  RA with  post 100'      Details if applicable:     10 10 [id number] Gait Training (timed):    50 feet with no (assistive device) over flat surfaces with CGA level of assist. Cuing for safety awareness and upright posture.  To improve safety and dynamic movement with household/community ambulation.  (see flow sheet as applicable) x2    Pt continued with 100' with no AD noted slight scissoring

## 2024-07-08 ENCOUNTER — HOSPITAL ENCOUNTER (OUTPATIENT)
Facility: HOSPITAL | Age: 79
Setting detail: RECURRING SERIES
Discharge: HOME | End: 2024-07-11
Payer: OTHER GOVERNMENT

## 2024-07-08 PROCEDURE — 97116 GAIT TRAINING THERAPY: CPT

## 2024-07-08 PROCEDURE — 97110 THERAPEUTIC EXERCISES: CPT

## 2024-07-08 NOTE — PROGRESS NOTES
Details if applicable:           Details if applicable:            Details if applicable:     45 45    Total Total           [x]  Patient Education billed concurrently with other procedures   [x] Review HEP    [] Progressed/Changed HEP, detail:    [] Other detail:         Other Objective/Functional Measures  Patient remains very SOB but 02 reading were good  today after activities, education and practice of purse lip breathing today after each activity with decreased HR noted. Patient and caregiver report fall over the weekend in the home with his wife.     Pain Level at end of session (0-10 scale): pain in the elbows where he fell over the weekend.       Assessment   Patient is showing gains in balance and endurance.   Patient will continue to benefit from skilled PT / OT services to modify and progress therapeutic interventions, analyze and address strength deficits, and analyze and address imbalance/dizziness to address functional deficits and attain remaining goals.    Progress toward goals / Updated goals:  []  See Progress Note/Recertification    Progressing slowly toward goals      PLAN  YES Continue plan of care    [x]  Upgrade activities as tolerated  []  Discharge due to :  []  Other:      Angeles Leroy, PT       7/8/2024       9:01 AM

## 2024-07-10 ENCOUNTER — HOSPITAL ENCOUNTER (OUTPATIENT)
Facility: HOSPITAL | Age: 79
Setting detail: RECURRING SERIES
End: 2024-07-10
Payer: OTHER GOVERNMENT

## 2024-07-14 NOTE — PROGRESS NOTES
Blood pressure acceptable on admission  Continue home , coreg  monitor   Chief Complaint: Dizziness  Is a 69-year-old male with medical history of benign prostatic hypertrophy. The patient was admitted with orthostatic hypotension. He was first evaluated by Dr. Kay Duran in November of last year for falls and unsteady gait. He also complained of progressive memory loss. He has a history of alcoholism but has been abstinent for the last 4 years. He was admitted at the end of September for orthostatic hypotension and had was started on Florinef and has been maximized on midodrine. He continues to suffer dizziness and in spite of the medications he remains orthostatic. He is frustrated because he is restricted from getting up and moving about. Medication changes:  Florinef 0.2 mg  Midodrine 10 mg 3 times daily  Donepezil was discontinued        Assesment and Plan  1. Orthostatic hypotension  Florinef may take weeks before any benefit is realized. Will prudent to lower dose of 1 of the alpha-blocker as he is on if tolerated for quicker results. Suggest lowering dose of Proscar. Continue compression stockings and hydration  Continue midodrine  Outpatient considerations include Northera (not available inpatient)  May add Mestinon if removing Proscar is not tolerated or not effective.  -continue fall precautions. May walk with PT with assistance. 2. Dizziness  Secondary to orhtostasis or side effect of alpha blockers    3. 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      Medications  Current Facility-Administered Medications   Medication Dose Route Frequency    fludrocortisone (FLORINEF) tablet 0.2 mg  0.2 mg Oral DAILY    midodrine (PROAMITINE) tablet 10 mg  10 mg Oral TID WITH MEALS    tamsulosin (FLOMAX) capsule 0.4 mg  0.4 mg Oral DAILY    aspirin chewable tablet 81 mg  81 mg Oral DAILY    busPIRone (BUSPAR) tablet 10 mg  10 mg Oral TID    clonazePAM (KlonoPIN) tablet 0.5 mg  0.5 mg Oral BID    albuterol-ipratropium (DUO-NEB) 2.5 MG-0.5 MG/3 ML  3 mL Nebulization Q6H PRN    DULoxetine (CYMBALTA) capsule 60 mg  60 mg Oral DAILY    gabapentin (NEURONTIN) capsule 600 mg  600 mg Oral DAILY    finasteride (PROSCAR) tablet 5 mg  5 mg Oral DAILY    sodium chloride (NS) flush 5-40 mL  5-40 mL IntraVENous Q8H    sodium chloride (NS) flush 5-40 mL  5-40 mL IntraVENous PRN    enoxaparin (LOVENOX) injection 40 mg  40 mg SubCUTAneous DAILY    acetaminophen (TYLENOL) tablet 650 mg  650 mg Oral Q6H PRN        Medical History  Past Medical History:   Diagnosis Date    Anxiety     Back pain     Blurred vision     Chest pain     Depression     Dizziness     Fast heart beat     Frequent headaches     Frequent urination     Hip dysplasia, congenital     Joint pain     Joint swelling     Lumbar spinal stenosis     Muscle pain     Neuropathy     Pacemaker     new pacemaker July 2019    Recent change in weight     Sinus problem     Skin disease     SOB (shortness of breath)     Sore throat     SSS (sick sinus syndrome) (HCC)     Stiffness of joints, multiple sites     Stress     Syncope     Tiredness     Trouble in sleeping     Weakness      Review of Systems   Constitutional: Negative for chills and fever. HENT: Negative for ear pain. Eyes: Negative for pain and discharge. Respiratory: Negative for cough and hemoptysis. Cardiovascular: Negative for chest pain and claudication. Gastrointestinal: Negative for constipation and diarrhea. Genitourinary: Negative for flank pain and hematuria. Musculoskeletal: Positive for myalgias. Negative for back pain. Skin: Negative for itching and rash. Neurological: Positive for dizziness. Negative for headaches. Endo/Heme/Allergies: Negative for environmental allergies. Does not bruise/bleed easily. Psychiatric/Behavioral: Negative for depression and hallucinations. The patient is nervous/anxious.       Exam:    Visit Vitals  /75 (BP Patient Position: Sitting) Pulse 62   Temp 97.5 °F (36.4 °C)   Resp 18   Wt 181 lb 3.5 oz (82.2 kg)   SpO2 93%   BMI 25.27 kg/m²      General: Well developed, well nourished. Patient in no apparent distress   Head: Normocephalic, atraumatic, anicteric sclera   Neck Normal ROM, No thyromegally   Lungs:  Clear to auscultation bilaterally, No wheezes or rubs   Cardiac: Regular rate and rhythm with no murmurs. Abd: Bowel sounds were audible. No tenderness on palpation   Ext: No pedal edema   Skin: Supple no rash     NeurologicExam:  Mental Status: Alert and oriented to person place and time   Speech: Fluent no aphasia or dysarthria. Cranial Nerves:  II - XII Intact   Motor:  Full and symmetric strength of upper and lower proximal and distal muscles. Bradykinetic. + cogwheeling. Reflexes:   Deep tendon reflexes 2+/4 and symmetric. Sensory:   Symmetric and intact with no perceived deficits modalities involving small or large fibers. Gait:  Gait deferred   Tremor:   No tremor noted. Cerebellar:  Coordination intact. Neurovascular: No carotid bruits. No JVD           Imaging  CT Results (most recent):  Results from Hospital Encounter encounter on 09/29/19   CT HEAD WO CONT    Narrative EXAM:  CT HEAD WO CONT    INDICATION: Dizziness. COMPARISON: CT 9/13/2019    TECHNIQUE: Axial noncontrast head CT from foramen magnum to vertex. Coronal and  sagittal reformatted images were obtained. CT dose reduction was achieved  through use of a standardized protocol tailored for this examination and  automatic exposure control for dose modulation. FINDINGS:  There is diffuse age-related parenchymal volume loss. The ventricles  and sulci are age-appropriate without hydrocephalus. There is no mass effect or  midline shift. There is no intracranial hemorrhage or extra-axial fluid  collection. Scattered foci of low attenuation in the periventricular white  matter represent stable chronic microvascular ischemic changes.  The gray-white  matter differentiation is maintained. The basal cisterns are patent. The osseous structures are intact. There is mild mucosal thickening in the  posterior left ethmoid air cells. The remaining paranasal sinuses and mastoid  air cells are clear. Impression IMPRESSION:   No acute intracranial abnormality. MRI Results (most recent):  Results from [facility] encounter on 09/29/19   MRA NECK WO CONT    Narrative INDICATION: Headaches    COMPARISON:  MR brain October 2, 2019    TECHNIQUE:  3-D time-of-flight MRA of the brain was performed. 2-D  time-of-flight MRA of the neck was performed. Multiplanar reconstructions were  obtained. FINDINGS:    MRA NECK    Common Carotids/Internal Carotids (visualized segments):  No flow limiting  stenosis by NASCET criteria. Vertebral Arteries (visualized segments):  No flow limiting stenosis. MRA HEAD    Posterior Circulation:  Patent. Mild irregularity throughout the bilateral  posterior cerebral arteries. Anterior Circulation:  Mild to moderate irregularity bilateral cavernous and  supraclinoid internal carotid artery segments without flow limitation. Mild  irregularity throughout the bilateral middle and anterior cerebral arteries may  be accentuated by artifact. No flow-limiting stenosis or occlusion. Other:  No aneurysm or vascular malformation. Impression IMPRESSION:  Mild to moderate intracranial atherosclerosis as above with no flow-limiting  stenosis or occlusion.             Lab Review  Lab Results   Component Value Date/Time    WBC 6.4 10/05/2019 02:27 AM    HCT 37.7 10/05/2019 02:27 AM    HGB 12.7 10/05/2019 02:27 AM    PLATELET 881 76/05/6255 02:27 AM       Lab Results   Component Value Date/Time    Sodium 143 10/05/2019 02:27 AM    Potassium 3.7 10/05/2019 02:27 AM    Chloride 110 (H) 10/05/2019 02:27 AM    CO2 29 10/05/2019 02:27 AM    Glucose 103 (H) 10/05/2019 02:27 AM    BUN 21 (H) 10/05/2019 02:27 AM    Creatinine 0.95 10/05/2019 02:27 AM    Calcium 8.2 (L) 10/05/2019 02:27 AM       Lab Results   Component Value Date/Time    Hemoglobin A1c 5.7 10/01/2019 12:51 AM        Lab Results   Component Value Date/Time    Vitamin B12 636 11/10/2018 04:45 AM    Folate 15.6 11/10/2018 04:45 AM       Lab Results   Component Value Date/Time    Cholesterol, total 112 09/27/2019 02:37 PM    HDL Cholesterol 40 09/27/2019 02:37 PM    LDL, calculated 53 09/27/2019 02:37 PM    VLDL, calculated 19 09/27/2019 02:37 PM    Triglyceride 96 09/27/2019 02:37 PM

## 2024-08-05 NOTE — PROGRESS NOTES
Problem: Syncope  Goal: *Absence of injury  Outcome: Progressing Towards Goal     Problem: Patient Education: Go to Patient Education Activity  Goal: Patient/Family Education  Outcome: Progressing Towards Goal     Problem: Falls - Risk of  Goal: *Absence of Falls  Description  Document Priscilla Patches Fall Risk and appropriate interventions in the flowsheet.   Outcome: Progressing Towards Goal  Note:   Fall Risk Interventions:  Mobility Interventions: Bed/chair exit alarm, Patient to call before getting OOB         Medication Interventions: Assess postural VS orthostatic hypotension, Bed/chair exit alarm    Elimination Interventions: Bed/chair exit alarm, Call light in reach    History of Falls Interventions: Bed/chair exit alarm Abnormal HR

## 2024-08-22 ENCOUNTER — HOSPITAL ENCOUNTER (EMERGENCY)
Facility: HOSPITAL | Age: 79
Discharge: HOME | End: 2024-08-22
Attending: EMERGENCY MEDICINE
Payer: OTHER GOVERNMENT

## 2024-08-22 VITALS
TEMPERATURE: 98 F | WEIGHT: 180 LBS | OXYGEN SATURATION: 100 % | HEART RATE: 81 BPM | RESPIRATION RATE: 11 BRPM | HEIGHT: 72 IN | DIASTOLIC BLOOD PRESSURE: 102 MMHG | BODY MASS INDEX: 24.38 KG/M2 | SYSTOLIC BLOOD PRESSURE: 135 MMHG

## 2024-08-22 DIAGNOSIS — G90.1 DYSAUTONOMIA (HCC): Primary | ICD-10-CM

## 2024-08-22 DIAGNOSIS — F02.818 LEWY BODY DEMENTIA WITH OTHER BEHAVIORAL DISTURBANCE, UNSPECIFIED DEMENTIA SEVERITY (HCC): ICD-10-CM

## 2024-08-22 DIAGNOSIS — G31.83 LEWY BODY DEMENTIA WITH OTHER BEHAVIORAL DISTURBANCE, UNSPECIFIED DEMENTIA SEVERITY (HCC): ICD-10-CM

## 2024-08-22 LAB
ALBUMIN SERPL-MCNC: 3.6 G/DL (ref 3.5–5)
ALBUMIN/GLOB SERPL: 1.1 (ref 1.1–2.2)
ALP SERPL-CCNC: 80 U/L (ref 45–117)
ALT SERPL-CCNC: 12 U/L (ref 12–78)
ANION GAP SERPL CALC-SCNC: 10 MMOL/L (ref 5–15)
APPEARANCE UR: CLEAR
AST SERPL-CCNC: 8 U/L (ref 15–37)
BACTERIA URNS QL MICRO: NEGATIVE /HPF
BASOPHILS # BLD: 0 K/UL (ref 0–0.1)
BASOPHILS NFR BLD: 1 % (ref 0–1)
BILIRUB SERPL-MCNC: 0.6 MG/DL (ref 0.2–1)
BILIRUB UR QL: NEGATIVE
BUN SERPL-MCNC: 23 MG/DL (ref 6–20)
BUN/CREAT SERPL: 17 (ref 12–20)
CALCIUM SERPL-MCNC: 9 MG/DL (ref 8.5–10.1)
CHLORIDE SERPL-SCNC: 108 MMOL/L (ref 97–108)
CO2 SERPL-SCNC: 26 MMOL/L (ref 21–32)
COLOR UR: ABNORMAL
CREAT SERPL-MCNC: 1.34 MG/DL (ref 0.7–1.3)
DIFFERENTIAL METHOD BLD: ABNORMAL
EKG ATRIAL RATE: 83 BPM
EKG DIAGNOSIS: NORMAL
EKG P AXIS: -15 DEGREES
EKG P-R INTERVAL: 204 MS
EKG Q-T INTERVAL: 396 MS
EKG QRS DURATION: 78 MS
EKG QTC CALCULATION (BAZETT): 465 MS
EKG R AXIS: 47 DEGREES
EKG T AXIS: 59 DEGREES
EKG VENTRICULAR RATE: 83 BPM
EOSINOPHIL # BLD: 0 K/UL (ref 0–0.4)
EOSINOPHIL NFR BLD: 1 % (ref 0–7)
EPITH CASTS URNS QL MICRO: ABNORMAL /LPF
ERYTHROCYTE [DISTWIDTH] IN BLOOD BY AUTOMATED COUNT: 13.7 % (ref 11.5–14.5)
GLOBULIN SER CALC-MCNC: 3.3 G/DL (ref 2–4)
GLUCOSE SERPL-MCNC: 137 MG/DL (ref 65–100)
GLUCOSE UR STRIP.AUTO-MCNC: 100 MG/DL
HCT VFR BLD AUTO: 36.2 % (ref 36.6–50.3)
HGB BLD-MCNC: 12.2 G/DL (ref 12.1–17)
HGB UR QL STRIP: NEGATIVE
IMM GRANULOCYTES # BLD AUTO: 0.1 K/UL (ref 0–0.04)
IMM GRANULOCYTES NFR BLD AUTO: 1 % (ref 0–0.5)
KETONES UR QL STRIP.AUTO: NEGATIVE MG/DL
LEUKOCYTE ESTERASE UR QL STRIP.AUTO: NEGATIVE
LYMPHOCYTES # BLD: 1.2 K/UL (ref 0.8–3.5)
LYMPHOCYTES NFR BLD: 19 % (ref 12–49)
MAGNESIUM SERPL-MCNC: 2.3 MG/DL (ref 1.6–2.4)
MCH RBC QN AUTO: 31.6 PG (ref 26–34)
MCHC RBC AUTO-ENTMCNC: 33.7 G/DL (ref 30–36.5)
MCV RBC AUTO: 93.8 FL (ref 80–99)
MONOCYTES # BLD: 0.4 K/UL (ref 0–1)
MONOCYTES NFR BLD: 7 % (ref 5–13)
NEUTS SEG # BLD: 4.5 K/UL (ref 1.8–8)
NEUTS SEG NFR BLD: 71 % (ref 32–75)
NITRITE UR QL STRIP.AUTO: NEGATIVE
NRBC # BLD: 0 K/UL (ref 0–0.01)
NRBC BLD-RTO: 0 PER 100 WBC
PH UR STRIP: 7 (ref 5–8)
PLATELET # BLD AUTO: 197 K/UL (ref 150–400)
PMV BLD AUTO: 9.3 FL (ref 8.9–12.9)
POTASSIUM SERPL-SCNC: 3.9 MMOL/L (ref 3.5–5.1)
PROT SERPL-MCNC: 6.9 G/DL (ref 6.4–8.2)
PROT UR STRIP-MCNC: NEGATIVE MG/DL
RBC # BLD AUTO: 3.86 M/UL (ref 4.1–5.7)
RBC #/AREA URNS HPF: ABNORMAL /HPF (ref 0–5)
SODIUM SERPL-SCNC: 144 MMOL/L (ref 136–145)
SP GR UR REFRACTOMETRY: 1.01 (ref 1–1.03)
URINE CULTURE IF INDICATED: ABNORMAL
UROBILINOGEN UR QL STRIP.AUTO: 0.2 EU/DL (ref 0.2–1)
WBC # BLD AUTO: 6.2 K/UL (ref 4.1–11.1)
WBC URNS QL MICRO: ABNORMAL /HPF (ref 0–4)

## 2024-08-22 PROCEDURE — 36415 COLL VENOUS BLD VENIPUNCTURE: CPT

## 2024-08-22 PROCEDURE — 85025 COMPLETE CBC W/AUTO DIFF WBC: CPT

## 2024-08-22 PROCEDURE — 81001 URINALYSIS AUTO W/SCOPE: CPT

## 2024-08-22 PROCEDURE — 80053 COMPREHEN METABOLIC PANEL: CPT

## 2024-08-22 PROCEDURE — 99284 EMERGENCY DEPT VISIT MOD MDM: CPT

## 2024-08-22 PROCEDURE — 83735 ASSAY OF MAGNESIUM: CPT

## 2024-08-22 ASSESSMENT — PAIN - FUNCTIONAL ASSESSMENT: PAIN_FUNCTIONAL_ASSESSMENT: 0-10

## 2024-08-22 ASSESSMENT — PAIN SCALES - GENERAL: PAINLEVEL_OUTOF10: 0

## 2024-08-22 NOTE — ED NOTES
Notified by Yuli RN, educator that pt had pulled off all of his monitoring equipment and was out of bed, Yuli assisted pt back to bed and placed pt back on cardiac monitor,  wife was not in the room at this time and when  she returned to the room pts wife was advised to stay in the room with pt and if she needs assistance to use the call light

## 2024-08-22 NOTE — ED NOTES
Knocked on door to announce to pt. Hands washed. Introduced self to pt and updated whiteboard. Explained role of self to pt. ED flow explained to pt. Pt verbalized understanding.

## 2024-08-22 NOTE — ED TRIAGE NOTES
Patient has had 8 events during the night of ? Seizure activity.  H/O MILLER tremors and dementia.  No evidence of stroke.  Alert but not able to recount the nights events. Caregiver states seizure like activity but had shaking events.

## 2024-08-22 NOTE — ED NOTES
Pts wife is insisting pt be transferred to [facility], pts wife updated that she can discuss that with the ER provider, due to the ER provider will have to get an accepting doctor,  Per pts wife she [facility] pt has a 1100 appointment with his PMD

## 2024-08-22 NOTE — ED NOTES
Registration called back and stated she wants pt transferred to [facility]

## 2024-08-22 NOTE — ED PROVIDER NOTES
[facility] EMERGENCY DEP  EMERGENCY DEPARTMENT ENCOUNTER       Pt Name: Immanuel Patel  MRN: [id number]  Birthdate 1945  Date of evaluation: 8/22/2024  Provider: Ann Marie Nguyen MD   PCP: No primary care provider on file.  Note Started: 11:00 AM EDT 8/22/24     CHIEF COMPLAINT       Chief Complaint   Patient presents with    Loss of Consciousness    Fatigue        HISTORY OF PRESENT ILLNESS: 1 or more elements      History From: wife, History limited by: none     Immanuel Patel is a 79 y.o. male presents to the emergency department complaining of episodes in which he is unresponsive.  Wife reports he had about 10 such episodes last night.  Usually has similar episodes about once a day.  See MDM.       Please See MDM for Additional Details of the HPI/PMH  Nursing Notes were all reviewed and agreed with or any disagreements were addressed in the HPI.     REVIEW OF SYSTEMS        Positives and Pertinent negatives as per HPI.    PAST HISTORY     Past Medical History:  Past Medical History:   Diagnosis Date    Altered mental status, unspecified 11/9/2018    Anxiety     Ataxia 11/9/2018    Back pain     Blurred vision     Bradycardia 10/2/2018    Chest pain     Depression     Dizziness     Dizziness and giddiness 10/17/2019    Dysautonomia (HCC)     Fast heart beat     Fever 11/9/2018    Frequent headaches     Frequent urination     Hip dysplasia, congenital     Hx of syncope 10/24/2018    Hypotension     Ingrown left big toenail 3/31/2017    Joint pain     Joint swelling     Lumbar spinal stenosis     Memory deficit 9/11/2018    Muscle pain     Neuropathy     Orthostatic hypotension     asymptomatic    Pacemaker     new pacemaker July 2019    Recent change in weight     Risk for falls 12/13/2016    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

## 2024-08-29 LAB
EKG ATRIAL RATE: 83 BPM
EKG DIAGNOSIS: NORMAL
EKG P AXIS: -15 DEGREES
EKG P-R INTERVAL: 204 MS
EKG Q-T INTERVAL: 396 MS
EKG QRS DURATION: 78 MS
EKG QTC CALCULATION (BAZETT): 465 MS
EKG R AXIS: 47 DEGREES
EKG T AXIS: 59 DEGREES
EKG VENTRICULAR RATE: 83 BPM

## 2024-09-02 ENCOUNTER — APPOINTMENT (OUTPATIENT)
Facility: HOSPITAL | Age: 79
End: 2024-09-02
Payer: OTHER GOVERNMENT

## 2024-09-02 ENCOUNTER — HOSPITAL ENCOUNTER (OUTPATIENT)
Facility: HOSPITAL | Age: 79
Setting detail: OBSERVATION
Discharge: HOSPICE | End: 2024-09-06
Attending: EMERGENCY MEDICINE | Admitting: INTERNAL MEDICINE
Payer: OTHER GOVERNMENT

## 2024-09-02 DIAGNOSIS — F02.80 LEWY BODY DEMENTIA, UNSPECIFIED DEMENTIA SEVERITY, UNSPECIFIED WHETHER BEHAVIORAL, PSYCHOTIC, OR MOOD DISTURBANCE OR ANXIETY (HCC): ICD-10-CM

## 2024-09-02 DIAGNOSIS — G31.83 LEWY BODY DEMENTIA, UNSPECIFIED DEMENTIA SEVERITY, UNSPECIFIED WHETHER BEHAVIORAL, PSYCHOTIC, OR MOOD DISTURBANCE OR ANXIETY (HCC): ICD-10-CM

## 2024-09-02 DIAGNOSIS — R55 SYNCOPE AND COLLAPSE: Primary | ICD-10-CM

## 2024-09-02 PROBLEM — U07.1 COVID-19: Status: ACTIVE | Noted: 2024-09-02

## 2024-09-02 PROBLEM — F03.918: Chronic | Status: ACTIVE | Noted: 2024-09-02

## 2024-09-02 LAB
ALBUMIN SERPL-MCNC: 3.4 G/DL (ref 3.5–5)
ALBUMIN/GLOB SERPL: 1 (ref 1.1–2.2)
ALP SERPL-CCNC: 86 U/L (ref 45–117)
ALT SERPL-CCNC: 16 U/L (ref 12–78)
ANION GAP SERPL CALC-SCNC: 15 MMOL/L (ref 5–15)
APPEARANCE UR: CLEAR
AST SERPL-CCNC: 19 U/L (ref 15–37)
BACTERIA URNS QL MICRO: NEGATIVE /HPF
BASOPHILS # BLD: 0 K/UL (ref 0–0.1)
BASOPHILS NFR BLD: 1 % (ref 0–1)
BILIRUB SERPL-MCNC: 0.6 MG/DL (ref 0.2–1)
BILIRUB UR QL: NEGATIVE
BUN SERPL-MCNC: 22 MG/DL (ref 6–20)
BUN/CREAT SERPL: 15 (ref 12–20)
CALCIUM SERPL-MCNC: 8.8 MG/DL (ref 8.5–10.1)
CHLORIDE SERPL-SCNC: 107 MMOL/L (ref 97–108)
CO2 SERPL-SCNC: 21 MMOL/L (ref 21–32)
COLOR UR: ABNORMAL
CREAT SERPL-MCNC: 1.47 MG/DL (ref 0.7–1.3)
D DIMER PPP FEU-MCNC: 1.18 MG/L FEU (ref 0–0.65)
DIFFERENTIAL METHOD BLD: ABNORMAL
EOSINOPHIL # BLD: 0 K/UL (ref 0–0.4)
EOSINOPHIL NFR BLD: 1 % (ref 0–7)
EPITH CASTS URNS QL MICRO: ABNORMAL /LPF
ERYTHROCYTE [DISTWIDTH] IN BLOOD BY AUTOMATED COUNT: 13.9 % (ref 11.5–14.5)
FLUAV RNA SPEC QL NAA+PROBE: NOT DETECTED
FLUBV RNA SPEC QL NAA+PROBE: NOT DETECTED
GLOBULIN SER CALC-MCNC: 3.4 G/DL (ref 2–4)
GLUCOSE SERPL-MCNC: 189 MG/DL (ref 65–100)
GLUCOSE UR STRIP.AUTO-MCNC: 250 MG/DL
HCT VFR BLD AUTO: 34 % (ref 36.6–50.3)
HGB BLD-MCNC: 11.7 G/DL (ref 12.1–17)
HGB UR QL STRIP: NEGATIVE
IMM GRANULOCYTES # BLD AUTO: 0 K/UL (ref 0–0.04)
IMM GRANULOCYTES NFR BLD AUTO: 1 % (ref 0–0.5)
KETONES UR QL STRIP.AUTO: NEGATIVE MG/DL
LEUKOCYTE ESTERASE UR QL STRIP.AUTO: NEGATIVE
LYMPHOCYTES # BLD: 1.1 K/UL (ref 0.8–3.5)
LYMPHOCYTES NFR BLD: 29 % (ref 12–49)
MAGNESIUM SERPL-MCNC: 2.2 MG/DL (ref 1.6–2.4)
MCH RBC QN AUTO: 31.8 PG (ref 26–34)
MCHC RBC AUTO-ENTMCNC: 34.4 G/DL (ref 30–36.5)
MCV RBC AUTO: 92.4 FL (ref 80–99)
MONOCYTES # BLD: 0.3 K/UL (ref 0–1)
MONOCYTES NFR BLD: 9 % (ref 5–13)
NEUTS SEG # BLD: 2.3 K/UL (ref 1.8–8)
NEUTS SEG NFR BLD: 59 % (ref 32–75)
NITRITE UR QL STRIP.AUTO: NEGATIVE
NRBC # BLD: 0 K/UL (ref 0–0.01)
NRBC BLD-RTO: 0 PER 100 WBC
NT PRO BNP: 714 PG/ML (ref 0–450)
PH UR STRIP: 6.5 (ref 5–8)
PLATELET # BLD AUTO: 188 K/UL (ref 150–400)
PMV BLD AUTO: 9.9 FL (ref 8.9–12.9)
POTASSIUM SERPL-SCNC: 3.6 MMOL/L (ref 3.5–5.1)
PROT SERPL-MCNC: 6.8 G/DL (ref 6.4–8.2)
PROT UR STRIP-MCNC: NEGATIVE MG/DL
RBC # BLD AUTO: 3.68 M/UL (ref 4.1–5.7)
RBC #/AREA URNS HPF: ABNORMAL /HPF (ref 0–5)
SARS-COV-2 RNA RESP QL NAA+PROBE: DETECTED
SODIUM SERPL-SCNC: 143 MMOL/L (ref 136–145)
SOURCE: ABNORMAL
SP GR UR REFRACTOMETRY: 1.01 (ref 1–1.03)
TROPONIN I SERPL HS-MCNC: 10 NG/L (ref 0–76)
TSH SERPL DL<=0.05 MIU/L-ACNC: 0.58 UIU/ML (ref 0.36–3.74)
UROBILINOGEN UR QL STRIP.AUTO: 0.2 EU/DL (ref 0.2–1)
WBC # BLD AUTO: 3.9 K/UL (ref 4.1–11.1)
WBC URNS QL MICRO: ABNORMAL /HPF (ref 0–4)

## 2024-09-02 PROCEDURE — 80053 COMPREHEN METABOLIC PANEL: CPT

## 2024-09-02 PROCEDURE — 83735 ASSAY OF MAGNESIUM: CPT

## 2024-09-02 PROCEDURE — 85025 COMPLETE CBC W/AUTO DIFF WBC: CPT

## 2024-09-02 PROCEDURE — G0378 HOSPITAL OBSERVATION PER HR: HCPCS

## 2024-09-02 PROCEDURE — 71275 CT ANGIOGRAPHY CHEST: CPT

## 2024-09-02 PROCEDURE — 6370000000 HC RX 637 (ALT 250 FOR IP): Performed by: INTERNAL MEDICINE

## 2024-09-02 PROCEDURE — 6370000000 HC RX 637 (ALT 250 FOR IP): Performed by: PHYSICIAN ASSISTANT

## 2024-09-02 PROCEDURE — 87636 SARSCOV2 & INF A&B AMP PRB: CPT

## 2024-09-02 PROCEDURE — 85379 FIBRIN DEGRADATION QUANT: CPT

## 2024-09-02 PROCEDURE — 36415 COLL VENOUS BLD VENIPUNCTURE: CPT

## 2024-09-02 PROCEDURE — 93005 ELECTROCARDIOGRAM TRACING: CPT | Performed by: EMERGENCY MEDICINE

## 2024-09-02 PROCEDURE — 83880 ASSAY OF NATRIURETIC PEPTIDE: CPT

## 2024-09-02 PROCEDURE — 6360000004 HC RX CONTRAST MEDICATION: Performed by: EMERGENCY MEDICINE

## 2024-09-02 PROCEDURE — 99285 EMERGENCY DEPT VISIT HI MDM: CPT

## 2024-09-02 PROCEDURE — 84443 ASSAY THYROID STIM HORMONE: CPT

## 2024-09-02 PROCEDURE — 81001 URINALYSIS AUTO W/SCOPE: CPT

## 2024-09-02 PROCEDURE — 84484 ASSAY OF TROPONIN QUANT: CPT

## 2024-09-02 PROCEDURE — 2580000003 HC RX 258: Performed by: INTERNAL MEDICINE

## 2024-09-02 PROCEDURE — 71045 X-RAY EXAM CHEST 1 VIEW: CPT

## 2024-09-02 PROCEDURE — 70450 CT HEAD/BRAIN W/O DYE: CPT

## 2024-09-02 RX ORDER — ONDANSETRON 4 MG/1
4 TABLET, ORALLY DISINTEGRATING ORAL EVERY 8 HOURS PRN
Status: DISCONTINUED | OUTPATIENT
Start: 2024-09-02 | End: 2024-09-06 | Stop reason: HOSPADM

## 2024-09-02 RX ORDER — DIVALPROEX SODIUM 125 MG/1
250 CAPSULE, COATED PELLETS ORAL 2 TIMES DAILY
Status: DISCONTINUED | OUTPATIENT
Start: 2024-09-02 | End: 2024-09-06 | Stop reason: HOSPADM

## 2024-09-02 RX ORDER — FINASTERIDE 5 MG/1
5 TABLET, FILM COATED ORAL DAILY
Status: DISCONTINUED | OUTPATIENT
Start: 2024-09-02 | End: 2024-09-06 | Stop reason: HOSPADM

## 2024-09-02 RX ORDER — BUSPIRONE HYDROCHLORIDE 10 MG/1
10 TABLET ORAL EVERY 8 HOURS
Status: DISCONTINUED | OUTPATIENT
Start: 2024-09-02 | End: 2024-09-06 | Stop reason: HOSPADM

## 2024-09-02 RX ORDER — BUSPIRONE HYDROCHLORIDE 5 MG/1
10 TABLET ORAL
Status: COMPLETED | OUTPATIENT
Start: 2024-09-02 | End: 2024-09-02

## 2024-09-02 RX ORDER — IOPAMIDOL 755 MG/ML
100 INJECTION, SOLUTION INTRAVASCULAR
Status: COMPLETED | OUTPATIENT
Start: 2024-09-02 | End: 2024-09-02

## 2024-09-02 RX ORDER — SODIUM CHLORIDE 9 MG/ML
INJECTION, SOLUTION INTRAVENOUS PRN
Status: DISCONTINUED | OUTPATIENT
Start: 2024-09-02 | End: 2024-09-06 | Stop reason: HOSPADM

## 2024-09-02 RX ORDER — MIDODRINE HYDROCHLORIDE 5 MG/1
10 TABLET ORAL
Status: DISCONTINUED | OUTPATIENT
Start: 2024-09-02 | End: 2024-09-03

## 2024-09-02 RX ORDER — TROSPIUM CHLORIDE 20 MG/1
20 TABLET, FILM COATED ORAL
Status: DISCONTINUED | OUTPATIENT
Start: 2024-09-02 | End: 2024-09-03

## 2024-09-02 RX ORDER — SODIUM CHLORIDE 9 MG/ML
INJECTION, SOLUTION INTRAVENOUS CONTINUOUS
Status: DISCONTINUED | OUTPATIENT
Start: 2024-09-02 | End: 2024-09-03

## 2024-09-02 RX ORDER — ATORVASTATIN CALCIUM 40 MG/1
40 TABLET, FILM COATED ORAL DAILY
Status: DISCONTINUED | OUTPATIENT
Start: 2024-09-02 | End: 2024-09-06 | Stop reason: HOSPADM

## 2024-09-02 RX ORDER — ASPIRIN 81 MG/1
81 TABLET ORAL DAILY
Status: DISCONTINUED | OUTPATIENT
Start: 2024-09-02 | End: 2024-09-06 | Stop reason: HOSPADM

## 2024-09-02 RX ORDER — SODIUM CHLORIDE 1 G/1
1 TABLET ORAL
Status: DISCONTINUED | OUTPATIENT
Start: 2024-09-02 | End: 2024-09-06 | Stop reason: HOSPADM

## 2024-09-02 RX ORDER — FLUDROCORTISONE ACETATE 0.1 MG/1
0.1 TABLET ORAL DAILY
Status: DISCONTINUED | OUTPATIENT
Start: 2024-09-02 | End: 2024-09-06 | Stop reason: HOSPADM

## 2024-09-02 RX ORDER — SODIUM CHLORIDE 0.9 % (FLUSH) 0.9 %
5-40 SYRINGE (ML) INJECTION PRN
Status: DISCONTINUED | OUTPATIENT
Start: 2024-09-02 | End: 2024-09-06 | Stop reason: HOSPADM

## 2024-09-02 RX ORDER — ONDANSETRON 2 MG/ML
4 INJECTION INTRAMUSCULAR; INTRAVENOUS EVERY 6 HOURS PRN
Status: DISCONTINUED | OUTPATIENT
Start: 2024-09-02 | End: 2024-09-06 | Stop reason: HOSPADM

## 2024-09-02 RX ORDER — ACETAMINOPHEN 325 MG/1
650 TABLET ORAL
Status: DISCONTINUED | OUTPATIENT
Start: 2024-09-02 | End: 2024-09-06 | Stop reason: HOSPADM

## 2024-09-02 RX ORDER — SODIUM CHLORIDE 0.9 % (FLUSH) 0.9 %
5-40 SYRINGE (ML) INJECTION EVERY 12 HOURS SCHEDULED
Status: DISCONTINUED | OUTPATIENT
Start: 2024-09-02 | End: 2024-09-06 | Stop reason: HOSPADM

## 2024-09-02 RX ORDER — ACETAMINOPHEN 650 MG/1
650 SUPPOSITORY RECTAL EVERY 6 HOURS PRN
Status: DISCONTINUED | OUTPATIENT
Start: 2024-09-02 | End: 2024-09-06 | Stop reason: HOSPADM

## 2024-09-02 RX ORDER — PANTOPRAZOLE SODIUM 40 MG/1
40 TABLET, DELAYED RELEASE ORAL
Status: DISCONTINUED | OUTPATIENT
Start: 2024-09-03 | End: 2024-09-06 | Stop reason: HOSPADM

## 2024-09-02 RX ORDER — TRAZODONE HYDROCHLORIDE 100 MG/1
100 TABLET ORAL NIGHTLY
Status: DISCONTINUED | OUTPATIENT
Start: 2024-09-02 | End: 2024-09-06 | Stop reason: HOSPADM

## 2024-09-02 RX ORDER — ESCITALOPRAM OXALATE 10 MG/1
10 TABLET ORAL DAILY
Status: DISCONTINUED | OUTPATIENT
Start: 2024-09-02 | End: 2024-09-06 | Stop reason: HOSPADM

## 2024-09-02 RX ORDER — MECOBALAMIN 5000 MCG
5 TABLET,DISINTEGRATING ORAL NIGHTLY
Status: DISCONTINUED | OUTPATIENT
Start: 2024-09-02 | End: 2024-09-03 | Stop reason: CLARIF

## 2024-09-02 RX ORDER — ALBUTEROL SULFATE 90 UG/1
2 AEROSOL, METERED RESPIRATORY (INHALATION) 4 TIMES DAILY PRN
Status: DISCONTINUED | OUTPATIENT
Start: 2024-09-02 | End: 2024-09-06 | Stop reason: HOSPADM

## 2024-09-02 RX ORDER — POLYETHYLENE GLYCOL 3350 17 G/17G
17 POWDER, FOR SOLUTION ORAL DAILY PRN
Status: DISCONTINUED | OUTPATIENT
Start: 2024-09-02 | End: 2024-09-06 | Stop reason: HOSPADM

## 2024-09-02 RX ORDER — POTASSIUM CHLORIDE 750 MG/1
20 TABLET, EXTENDED RELEASE ORAL DAILY
Status: DISCONTINUED | OUTPATIENT
Start: 2024-09-02 | End: 2024-09-05

## 2024-09-02 RX ORDER — ACETAMINOPHEN 325 MG/1
650 TABLET ORAL EVERY 6 HOURS PRN
Status: DISCONTINUED | OUTPATIENT
Start: 2024-09-02 | End: 2024-09-06 | Stop reason: HOSPADM

## 2024-09-02 RX ADMIN — ACETAMINOPHEN 650 MG: 325 TABLET ORAL at 21:22

## 2024-09-02 RX ADMIN — BUSPIRONE HYDROCHLORIDE 10 MG: 10 TABLET ORAL at 17:31

## 2024-09-02 RX ADMIN — SODIUM CHLORIDE 1 G: 1 TABLET ORAL at 17:31

## 2024-09-02 RX ADMIN — TRAZODONE HYDROCHLORIDE 100 MG: 100 TABLET ORAL at 21:24

## 2024-09-02 RX ADMIN — DIVALPROEX SODIUM 250 MG: 125 CAPSULE ORAL at 21:24

## 2024-09-02 RX ADMIN — ATORVASTATIN CALCIUM 40 MG: 40 TABLET, FILM COATED ORAL at 17:31

## 2024-09-02 RX ADMIN — TROSPIUM CHLORIDE 20 MG: 20 TABLET, FILM COATED ORAL at 17:34

## 2024-09-02 RX ADMIN — ASPIRIN 81 MG: 81 TABLET, COATED ORAL at 17:31

## 2024-09-02 RX ADMIN — BUSPIRONE HYDROCHLORIDE 10 MG: 5 TABLET ORAL at 14:59

## 2024-09-02 RX ADMIN — FLUDROCORTISONE ACETATE 0.1 MG: 0.1 TABLET ORAL at 17:32

## 2024-09-02 RX ADMIN — IOPAMIDOL 100 ML: 755 INJECTION, SOLUTION INTRAVENOUS at 12:14

## 2024-09-02 RX ADMIN — SODIUM CHLORIDE: 9 INJECTION, SOLUTION INTRAVENOUS at 16:31

## 2024-09-02 RX ADMIN — ESCITALOPRAM OXALATE 10 MG: 10 TABLET ORAL at 17:31

## 2024-09-02 RX ADMIN — MIDODRINE HYDROCHLORIDE 10 MG: 5 TABLET ORAL at 17:32

## 2024-09-02 RX ADMIN — FINASTERIDE 5 MG: 5 TABLET, FILM COATED ORAL at 17:32

## 2024-09-02 RX ADMIN — POTASSIUM CHLORIDE 20 MEQ: 750 TABLET, FILM COATED, EXTENDED RELEASE ORAL at 17:34

## 2024-09-02 ASSESSMENT — PAIN SCALES - GENERAL
PAINLEVEL_OUTOF10: 0
PAINLEVEL_OUTOF10: 0

## 2024-09-02 ASSESSMENT — PAIN DESCRIPTION - ORIENTATION: ORIENTATION: LEFT

## 2024-09-02 ASSESSMENT — PAIN DESCRIPTION - LOCATION: LOCATION: HAND

## 2024-09-02 ASSESSMENT — PAIN - FUNCTIONAL ASSESSMENT: PAIN_FUNCTIONAL_ASSESSMENT: 0-10

## 2024-09-02 NOTE — PROGRESS NOTES
Patient requesting to see his wife, unable to keep Telemetry box on patient swinging at the nurses yelling I need to see my wife. Got patient in a wheelchair and wheeled down to room 142 to his wife's room. Patient is Covid positive. Unable to complete admission data base. Patient in bed resting quietly at this time.

## 2024-09-02 NOTE — ED NOTES
Pt is anxious right now about seeing his wife, plan of care. Attempts made to explain pt will be admitted, but steps need to be taken care of first and it is not an immediate thing. Pt verbalized understanding at this time.

## 2024-09-02 NOTE — ED TRIAGE NOTES
Pt arrived by EMS for syncopal episode   per EMS pt has home health aides and they had reported to EMS that pt had a syncopal episode and was agitated, and law enforcement was called.  EMS found patient sitting in his W/C and awake and alert  calm and cooperative.  Pts wife was admitted yesterday for Covid.  Pt arrived awake and alert, pt educated on ER flow

## 2024-09-02 NOTE — ED NOTES
Meal tray set up at bedside for pt at this time, cleared by ED Provider. Pt reports he has not had anything to eat yet today.

## 2024-09-02 NOTE — ED NOTES
Ems arrived and directed to room 1 and educated that once staff is available they will be in to get report, EMS educated on the acuity of the unit

## 2024-09-02 NOTE — ED NOTES
Admission SBAR Note  Situation/Background: pt had a fall at home with caregivers present, hx of falls and dysautonomia and cognitive decline. Pt brought by EMS, no LOC. Elevated Bps on arrival however pt has hx of issues with low BP which he has prn medication for. Pt wife unavailable to take care of pt at this time as she is currently admitted for other health reasons and pt has no available 24/7 caregivers at home because they have all called out due to COVID per pt wife who has spoken to ED Provider after he visited her inpatient.    All tests negative, CTA negative, head CT negative for any acute findings.    Patient is being transferred to [facility] ([facility] Room# 130    Patient's Chief Complaint was Fall and is admitted for Social Admit/Cannot Safely be sent home.    CODE STATUS: Full  CSSRS: 0 - No Risk    ISOLATION/PRECAUTIONS: No  ISOLATION TYPE: n/a    Is this a behavioral health patient? No  Has wanding been completed No  Are belongings secure? No    Called outstanding consults: Yes    STAT labs collected: Yes    Repeat Lactic Acid DUE? No  TIME DUE: n/a    All STAT orders are complete: Yes    The following personal items will be sent with the patient during transfer to the floor:     All valuables: clothing with patient      ASSESSMENT:    NEURO:   NIH SCORE: 0,1-4,5-15,15-20,21-42: 0   KIRAN SWALLOW SCREEN COMPLETE: Yes  ORIENTATION LEVEL: ORIENTATION LEVEL: Person, Place, and Situation  Cognition: decreased attention/concentration, following commands  follows multi-step simple commands/direction, impulsive, and memory loss  Speech: shows no evidence of impairment    Is patient impulsive? Yes  Is patient oriented? Yes  Do they follow commands? Yes  Is the patient ambulatory? Yes    FALL RISK? Yes  Interventions: Implemented/recommended use of non-skid footwear    RESPIRATORY:   Is patient on oxygen? No  Oxygen therapy: room air  O2 rate:  Gravity, UA 1.015 1.003 - 1.030      pH, Urine 6.5 5.0 - 8.0      Protein, UA Negative NEG mg/dL    Glucose, Ur 250 (A) NEG mg/dL    Ketones, Urine Negative NEG mg/dL    Bilirubin, Urine Negative NEG      Blood, Urine Negative NEG      Urobilinogen, Urine 0.2 0.2 - 1.0 EU/dL    Nitrite, Urine Negative NEG      Leukocyte Esterase, Urine Negative NEG      WBC, UA 0-4 0 - 4 /hpf    RBC, UA 0-5 0 - 5 /hpf    Epithelial Cells, UA FEW FEW /lpf    BACTERIA, URINE Negative NEG /hpf         CT HEAD WO CONTRAST   Final Result   No acute intracranial abnormality.      Electronically signed by Olman Foote      CTA CHEST W WO CONTRAST   Final Result      1. No evidence of pulmonary embolism.   2. Severe centrilobular emphysema. No acute airspace disease.   3. Chronic left upper lobe pulmonary nodule measuring 12 mm, not significantly   changed.         Electronically signed by Naeem Romero MD      XR CHEST PORTABLE   Final Result   No findings of an acute cardiopulmonary process.      Electronically signed by Jere Dorsey            REVIEW: Pt is impulsive, has been known to be combative at home, is a fall risk, has unsteady Bps that rise or drop spontaneously, needs to be supervised if he is to be up from bed.    IP UNIT CALLED NOTE IS READY: yes  IF THERE ARE QUESTIONS, CALL [phone] AT PHONE # Irais

## 2024-09-02 NOTE — H&P
Alert, agitated,. Confused. Heavy breathing.     HEENT: Atraumatic, anicteric sclerae, pink conjunctivae     No oral ulcers, mucosa dry, throat clear, dentition fair  Neck:  Supple, symmetrical;   thyroid non tender  Lungs:   Clear to auscultation bilaterally.  Heavy breathing effort. No wheezing or rhonchi. No rales.  Chest wall:  No tenderness.  No accessory muscle use.  Heart:   Regular rhythm.  No  murmur.  No edema  Abdomen:   Soft, non-tender. Not distended.  Bowel sounds normal  Extremities: No cyanosis.  No clubbing      Capillary refill normal,  Radial pulse 2+,  DP 1+  Skin:     Not pale.  Not jaundiced.  No rashes   Psych:  Not depressed.  Moderately anxious / agitated.  Neurologic: EOMs intact. No facial asymmetry. No aphasia or slurred speech.    Symmetrical strength, Sensation grossly intact. Alert and oriented X 2    Lab Data Reviewed:    Recent Results (from the past 24 hour(s))   CBC with Auto Differential    Collection Time: 09/02/24 10:20 AM   Result Value Ref Range    WBC 3.9 (L) 4.1 - 11.1 K/uL    RBC 3.68 (L) 4.10 - 5.70 M/uL    Hemoglobin 11.7 (L) 12.1 - 17.0 g/dL    Hematocrit 34.0 (L) 36.6 - 50.3 %    MCV 92.4 80.0 - 99.0 FL    MCH 31.8 26.0 - 34.0 PG    MCHC 34.4 30.0 - 36.5 g/dL    RDW 13.9 11.5 - 14.5 %    Platelets 188 150 - 400 K/uL    MPV 9.9 8.9 - 12.9 FL    Nucleated RBCs 0.0 0  WBC    nRBC 0.00 0.00 - 0.01 K/uL    Neutrophils % 59 32 - 75 %    Lymphocytes % 29 12 - 49 %    Monocytes % 9 5 - 13 %    Eosinophils % 1 0 - 7 %    Basophils % 1 0 - 1 %    Immature Granulocytes % 1 (H) 0.0 - 0.5 %    Neutrophils Absolute 2.3 1.8 - 8.0 K/UL    Lymphocytes Absolute 1.1 0.8 - 3.5 K/UL    Monocytes Absolute 0.3 0.0 - 1.0 K/UL    Eosinophils Absolute 0.0 0.0 - 0.4 K/UL    Basophils Absolute 0.0 0.0 - 0.1 K/UL    Immature Granulocytes Absolute 0.0 0.00 - 0.04 K/UL    Differential Type AUTOMATED     Comprehensive Metabolic Panel    Collection Time: 09/02/24 10:20 AM   Result Value Ref  mg/dL    Glucose, Ur 250 (A) NEG mg/dL    Ketones, Urine Negative NEG mg/dL    Bilirubin, Urine Negative NEG      Blood, Urine Negative NEG      Urobilinogen, Urine 0.2 0.2 - 1.0 EU/dL    Nitrite, Urine Negative NEG      Leukocyte Esterase, Urine Negative NEG      WBC, UA 0-4 0 - 4 /hpf    RBC, UA 0-5 0 - 5 /hpf    Epithelial Cells, UA FEW FEW /lpf    BACTERIA, URINE Negative NEG /hpf   COVID-19 & Influenza Combo    Collection Time: 09/02/24  2:30 PM    Specimen: Nasopharyngeal   Result Value Ref Range    Source Nasopharyngeal      SARS-CoV-2, PCR Detected (A) NOTD      Rapid Influenza A By PCR Not detected NOTD      Rapid Influenza B By PCR Not detected NOTD         EKG: Atrial paced at 80 bpm, Prolonged QT, NSST abnormality, NSC    Radiology:  CT HEAD WO CONTRAST   Final Result   No acute intracranial abnormality.      Electronically signed by Olman Foote      CTA CHEST W WO CONTRAST   Final Result      1. No evidence of pulmonary embolism.   2. Severe centrilobular emphysema. No acute airspace disease.   3. Chronic left upper lobe pulmonary nodule measuring 12 mm, not significantly   changed.         Electronically signed by Naeem Romero MD      XR CHEST PORTABLE   Final Result   No findings of an acute cardiopulmonary process.      Electronically signed by Jere Dorsey          Care Plan discussed with:   Patient x    Family wife    RN x         Consultant      Expected  Disposition:   Home with Family x   HH/PT/OT/RN    SNF/LTC    CARMELA      TOTAL TIME:  75 Minutes      Comments    x Reviewed previous records   >50% of visit spent in counseling and coordination of care x Discussion with patient and/or family and questions answered       _______________________________________________________  Given the patient's current clinical presentation, I have a high level of concern for decompensation if discharged from the emergency department.  Complex decision making was performed, which includes

## 2024-09-02 NOTE — ED NOTES
Older appearing skin tear on lateral left hand dressed at this time with mepitel, non-adherent pad and cling gauze.

## 2024-09-02 NOTE — ED NOTES
Pt attempting to get out of bed at this time, wants to see his wife. Wife is not available due to personal illness, admission to this hospital. ED Provider to speak with pt wife about pt as he is being considered for discharge. Pt does have caretakers at home per ED Provider.

## 2024-09-02 NOTE — ED NOTES
Per ED Provider who has spoken to pt wife, pt cannot go home as all of his caregivers have come down with COVID and have called out sick. Pt would have no care at home were he to be discharged at this time.

## 2024-09-02 NOTE — ED NOTES
Pt having difficulty keeping monitoring on at this time, moving around in the bed. Pt complaining about wanting to get up and go see his wife in the inpatient unit. Pt does not have a caretaker with him in the ED. Pt normally has 24 hour care at home.

## 2024-09-02 NOTE — ED PROVIDER NOTES
EMERGENCY DEPARTMENT HISTORY AND PHYSICAL EXAM      Date: 9/2/2024  Patient Name: Immanuel Patel    History of Presenting Illness     Chief Complaint   Patient presents with    Loss of Consciousness       History Provided By: Patient    HPI: Immanuel Patel, 79 y.o. male with PMHx as noted below presents the emergency department for evaluation of possible syncopal spells.  Per report patient has been having frequent episodes similar to this for a while now, recent ED visit for the same.  Per report patient was more agitated this morning prompting his aides to call EMS.  On EMS arrival they found the patient to be calm, alert and cooperative.    Pt denies any other alleviating or exacerbating factors. Additionally, pt specifically denies any recent fever, chills, headache, nausea, vomiting, abdominal pain, CP, ightheadedness, dizziness, numbness, weakness, BLE swelling, heart palpitations, urinary sxs, diarrhea, constipation, melena, hematochezia, cough, or congestion.    PCP: No primary care provider on file.    No current facility-administered medications for this encounter.       Past History     Past Medical History:  Past Medical History:   Diagnosis Date    Altered mental status, unspecified 11/9/2018    Anxiety     Ataxia 11/9/2018    Back pain     Blurred vision     Bradycardia 10/2/2018    Chest pain     Depression     Dizziness     Dizziness and giddiness 10/17/2019    Dysautonomia (HCC)     Fast heart beat     Fever 11/9/2018    Frequent headaches     Frequent urination     Hip dysplasia, congenital     Hx of syncope 10/24/2018    Hypotension     Ingrown left big toenail 3/31/2017    Joint pain     Joint swelling     Lumbar spinal stenosis     Memory deficit 9/11/2018    Muscle pain     Neuropathy     Orthostatic hypotension     asymptomatic    Pacemaker     new pacemaker July 2019    Recent change in weight     Risk for falls 12/13/2016    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

## 2024-09-02 NOTE — ED NOTES
Pt taking off monitoring at this time, attempting to get out of bed at this time. Pt moved back further in bed with this RN, PCT and additional RN assisting. Pt repetitively stating 'Please let me go' 'I need to go take care of my sick wife'. Pt has been made aware that his wife is receiving care at this time and that he should be patient as we wait to get him to his room. Per Medsurg, they require an additonal 20 minutes at this time.

## 2024-09-03 LAB
ANION GAP SERPL CALC-SCNC: 8 MMOL/L (ref 5–15)
BASOPHILS # BLD: 0 K/UL (ref 0–0.1)
BASOPHILS NFR BLD: 1 % (ref 0–1)
BUN SERPL-MCNC: 19 MG/DL (ref 6–20)
BUN/CREAT SERPL: 16 (ref 12–20)
CALCIUM SERPL-MCNC: 8.2 MG/DL (ref 8.5–10.1)
CHLORIDE SERPL-SCNC: 108 MMOL/L (ref 97–108)
CO2 SERPL-SCNC: 26 MMOL/L (ref 21–32)
CREAT SERPL-MCNC: 1.21 MG/DL (ref 0.7–1.3)
DIFFERENTIAL METHOD BLD: ABNORMAL
EOSINOPHIL # BLD: 0.1 K/UL (ref 0–0.4)
EOSINOPHIL NFR BLD: 2 % (ref 0–7)
ERYTHROCYTE [DISTWIDTH] IN BLOOD BY AUTOMATED COUNT: 13.9 % (ref 11.5–14.5)
GLUCOSE SERPL-MCNC: 112 MG/DL (ref 65–100)
HCT VFR BLD AUTO: 31.7 % (ref 36.6–50.3)
HGB BLD-MCNC: 10.7 G/DL (ref 12.1–17)
IMM GRANULOCYTES # BLD AUTO: 0 K/UL (ref 0–0.04)
IMM GRANULOCYTES NFR BLD AUTO: 1 % (ref 0–0.5)
LYMPHOCYTES # BLD: 1.7 K/UL (ref 0.8–3.5)
LYMPHOCYTES NFR BLD: 44 % (ref 12–49)
MAGNESIUM SERPL-MCNC: 2.2 MG/DL (ref 1.6–2.4)
MCH RBC QN AUTO: 32 PG (ref 26–34)
MCHC RBC AUTO-ENTMCNC: 33.8 G/DL (ref 30–36.5)
MCV RBC AUTO: 94.9 FL (ref 80–99)
MONOCYTES # BLD: 0.4 K/UL (ref 0–1)
MONOCYTES NFR BLD: 10 % (ref 5–13)
NEUTS SEG # BLD: 1.6 K/UL (ref 1.8–8)
NEUTS SEG NFR BLD: 42 % (ref 32–75)
NRBC # BLD: 0 K/UL (ref 0–0.01)
NRBC BLD-RTO: 0 PER 100 WBC
PLATELET # BLD AUTO: 156 K/UL (ref 150–400)
PMV BLD AUTO: 9.7 FL (ref 8.9–12.9)
POTASSIUM SERPL-SCNC: 3.3 MMOL/L (ref 3.5–5.1)
RBC # BLD AUTO: 3.34 M/UL (ref 4.1–5.7)
SODIUM SERPL-SCNC: 142 MMOL/L (ref 136–145)
WBC # BLD AUTO: 3.8 K/UL (ref 4.1–11.1)

## 2024-09-03 PROCEDURE — 51701 INSERT BLADDER CATHETER: CPT

## 2024-09-03 PROCEDURE — 94760 N-INVAS EAR/PLS OXIMETRY 1: CPT

## 2024-09-03 PROCEDURE — 6370000000 HC RX 637 (ALT 250 FOR IP): Performed by: INTERNAL MEDICINE

## 2024-09-03 PROCEDURE — 51798 US URINE CAPACITY MEASURE: CPT

## 2024-09-03 PROCEDURE — G0378 HOSPITAL OBSERVATION PER HR: HCPCS

## 2024-09-03 PROCEDURE — 36415 COLL VENOUS BLD VENIPUNCTURE: CPT

## 2024-09-03 PROCEDURE — 2580000003 HC RX 258: Performed by: INTERNAL MEDICINE

## 2024-09-03 PROCEDURE — 83735 ASSAY OF MAGNESIUM: CPT

## 2024-09-03 PROCEDURE — 85025 COMPLETE CBC W/AUTO DIFF WBC: CPT

## 2024-09-03 PROCEDURE — 80048 BASIC METABOLIC PNL TOTAL CA: CPT

## 2024-09-03 RX ORDER — MIDODRINE HYDROCHLORIDE 5 MG/1
5 TABLET ORAL
Status: DISCONTINUED | OUTPATIENT
Start: 2024-09-04 | End: 2024-09-06 | Stop reason: HOSPADM

## 2024-09-03 RX ADMIN — SODIUM CHLORIDE, PRESERVATIVE FREE 10 ML: 5 INJECTION INTRAVENOUS at 21:09

## 2024-09-03 RX ADMIN — ASPIRIN 81 MG: 81 TABLET, COATED ORAL at 09:47

## 2024-09-03 RX ADMIN — SODIUM CHLORIDE 1 G: 1 TABLET ORAL at 12:34

## 2024-09-03 RX ADMIN — BUSPIRONE HYDROCHLORIDE 10 MG: 10 TABLET ORAL at 18:30

## 2024-09-03 RX ADMIN — ESCITALOPRAM OXALATE 10 MG: 10 TABLET ORAL at 09:47

## 2024-09-03 RX ADMIN — BUSPIRONE HYDROCHLORIDE 10 MG: 10 TABLET ORAL at 09:47

## 2024-09-03 RX ADMIN — DIVALPROEX SODIUM 250 MG: 125 CAPSULE ORAL at 21:08

## 2024-09-03 RX ADMIN — MIDODRINE HYDROCHLORIDE 10 MG: 5 TABLET ORAL at 12:34

## 2024-09-03 RX ADMIN — SODIUM CHLORIDE 1 G: 1 TABLET ORAL at 09:47

## 2024-09-03 RX ADMIN — TRAZODONE HYDROCHLORIDE 100 MG: 100 TABLET ORAL at 21:08

## 2024-09-03 RX ADMIN — FINASTERIDE 5 MG: 5 TABLET, FILM COATED ORAL at 09:48

## 2024-09-03 RX ADMIN — PANTOPRAZOLE SODIUM 40 MG: 40 TABLET, DELAYED RELEASE ORAL at 07:17

## 2024-09-03 RX ADMIN — TROSPIUM CHLORIDE 20 MG: 20 TABLET, FILM COATED ORAL at 07:17

## 2024-09-03 RX ADMIN — Medication 5 MG: at 21:08

## 2024-09-03 RX ADMIN — ATORVASTATIN CALCIUM 40 MG: 40 TABLET, FILM COATED ORAL at 09:47

## 2024-09-03 RX ADMIN — SODIUM CHLORIDE 1 G: 1 TABLET ORAL at 17:30

## 2024-09-03 RX ADMIN — FLUDROCORTISONE ACETATE 0.1 MG: 0.1 TABLET ORAL at 09:47

## 2024-09-03 RX ADMIN — MIDODRINE HYDROCHLORIDE 10 MG: 5 TABLET ORAL at 09:47

## 2024-09-03 RX ADMIN — ACETAMINOPHEN 650 MG: 325 TABLET ORAL at 21:08

## 2024-09-03 RX ADMIN — DIVALPROEX SODIUM 250 MG: 125 CAPSULE ORAL at 09:47

## 2024-09-03 RX ADMIN — POTASSIUM CHLORIDE 20 MEQ: 750 TABLET, FILM COATED, EXTENDED RELEASE ORAL at 09:46

## 2024-09-03 RX ADMIN — SODIUM CHLORIDE 75 ML/HR: 9 INJECTION, SOLUTION INTRAVENOUS at 07:17

## 2024-09-03 ASSESSMENT — PAIN SCALES - GENERAL: PAINLEVEL_OUTOF10: 0

## 2024-09-03 NOTE — ACP (ADVANCE CARE PLANNING)
Advance Care Planning     General Advance Care Planning (ACP) Conversation    Date of Conversation: 9/3/2024  Conducted with: Healthcare Decision Maker  Other persons present: Spouse Spring Kapoor     Healthcare Decision Maker:   Primary Decision Maker: Spring Kapoor - Spouse - [phone]    Secondary Decision Maker: Clement Patel - Brother/Sister - [phone]     Today we documented Decision Maker(s) consistent with ACP documents on file.  Content/Action Overview:  Has ACP document(s) on file - reflects the patient's care preferences  treatment goals  N/a    Length of Voluntary ACP Conversation in minutes:  <16 minutes (Non-Billable)    Macy Bernal

## 2024-09-03 NOTE — PROGRESS NOTES
Physical Therapy:    Orders received and chart reviewed. Per MD defer eval today as [facility]. Will continue to follow and progress as appropriate. Thank you.     LYLY LOWET

## 2024-09-03 NOTE — CARE COORDINATION
Care Management Initial Assessment       RUR: n/a obs   Readmission? No  1st IM letter given? No OBS   1st  letter given: No     09/03/24 3537   Service Assessment   Patient Orientation Person   Cognition Dementia / Early Alzheimer's   History Provided By Significant Other  (Spring Kapoor)   Primary Caregiver Private caregiver   Support Systems Home Care Staff;Other (Comment);Spouse/Significant Other  ([facility])   Patient's Healthcare Decision Maker is: Named in Scanned ACP Document   PCP Verified by CM Yes  (Isrrael Dao)   Last Visit to PCP Within last 3 months   Prior Functional Level Assistance with the following:;Bathing;Dressing;Toileting;Cooking;Housework;Shopping;Mobility   Current Functional Level Assistance with the following:;Bathing;Dressing;Toileting;Cooking;Housework;Shopping;Mobility   Can patient return to prior living arrangement No   Ability to make needs known: Unable   Family able to assist with home care needs: No  (Requests transfer to [facility] to [facility] SN[facility]TC)   Would you like for me to discuss the discharge plan with any other family members/significant others, and if so, who? Yes  (Wife Spring Kapoor)   Financial Resources Medicare; ([facility])   Social/Functional History   Lives With Spouse   Type of Home House   Home Equipment Walker - Standard;Cane;Oxygen   Receives Help From Family;Personal care attendant   Active  No   Patient's  Info Wife and personal carea aides   Discharge Planning   Type of Residence House   Living Arrangements Spouse/Significant Other   Current Services Prior To Admission Private Duty Homecare   Potential Assistance Needed Skilled Nursing Facility;Intermediate Care   [facility] Services [facility]   Patient expects to be discharged to: Other (comment)  ([facility])     Mr. Patel lives at home with his wife Spring Kapoor who is also his MPOA. Mr. Patel requires assistance with adls/iadls. He has personal care aides 5 days a week in the home that the  with patient's PCP's nurse Charlene Tucker who said there were beds available. Waiting to hear back.     1354: Spoke with Dede from [facility]. [facility] for [facility]. Stopped accepting patients a while ago. Asked her if they partner with any local SNFs/LTCs. She said they do. The closest one to the patient's home in [place] is [facility] located in [place]. Will check with Ms Kapoor before sending referral. Dede will need ED notes, Chest xray and PT/OT notes to establish a baseline. After they receive this info they will decide to approve or not. She said I can send referral to [facility] in the meantime. Again will check with Ms. Kapoor before proceeding.     1405: Spoke with Anastacio from [facility]. She will speak with Ms. Kapoor about SNFs/LTC. Also, sent all note to Samia for nursing home placement. Will send therapy notes when available as requested.       1500: Received voicemail from [facility]. Wanting to update me but she didn't leave me a call back number.     1512: Spoke with Doreen from the [facility]. She said she spoke with patient's primary  Anastacio and said they might be interested in hospice. Told Doreen that I have NOT heard that at all. Told her last I knew of was the plan was possible transfer to [facility] if wife was okay with that (Anastacio was to check). Doreen said she spoke with Anastacio and apparently wife did NOT want [facility] because of bad reviews. Doreen is going to contact Anastacio again and then call me back.       1525: Spoke with Doreen again. She asked me to fax clinical info over to her. Info sent. She said it sounds like he is too high functioning for hospice unit but is happy to look it over. She will let me know tomorrow morning. Will also talk to Ms. Kapoor tomorrow. She was just discharged herself from the hospital. Will see what she is thinking tomorrow morning.

## 2024-09-03 NOTE — PROGRESS NOTES
[facility]  Hospitalist Progress Note    NAME: Immanuel Patel   :  1945   MRN:  [id number]     Total duration of encounter: 1 day      Interim Hospital Summary: 79 y.o. male who presented on 2024 with Syncope and collapse. He has a past medical history of Altered mental status, unspecified, Anxiety, Ataxia, Back pain, Blurred vision, Bradycardia, Chest pain, Depression, Dizziness, Dizziness and giddiness, Dysautonomia (HCC), Fast heart beat, Fever, Frequent headaches, Frequent urination, Hip dysplasia, congenital, Hx of syncope, Hypotension, Ingrown left big toenail, Joint pain, Joint swelling, Lumbar spinal stenosis, Memory deficit, Muscle pain, Neuropathy, Orthostatic hypotension, Pacemaker, Recent change in weight, Risk for falls, Sensory ataxia, Sinus problem, Skin disease, Smoker, SOB (shortness of breath), Sore throat, SSS (sick sinus syndrome) (HCC), Stiffness of joints, multiple sites, Stress, Stumbling gait, Syncope, Tiredness, Trouble in sleeping, and Weakness..       Pt presents with h/o Pick's Dementia requiring 1 on 1 observation now with worsening weakness  and fall risk with new diagnosis of Covid 19.  Wife and Caregivers has also been diagnosed and are too ill to work.  Pt is 100% Service Connected and [facility], and has goal for LTC in Memory Unit .     Subjective:     Chief Complaint / Reason for Physician Visit  \"OK\".  Discussed with RN   Frequent voiding with small 100cc aliquots  Mild congestion and SOB  Eating fair     Review of Systems:  Symptom Y/N Comments  Symptom Y/N Comments   Fever/Chills n   Chest Pain n    Poor Appetite n   Edema n    Cough n   Abdominal Pain n    Sputum n   Joint Pain n    SOB/ZHAO y   Pruritis/Rash n    Nausea/vomit n   Tolerating PT/OT y    Diarrhea n   Tolerating Diet y    Constipation n   Other         Current Facility-Administered Medications:     melatonin tablet 5 mg, 5 mg, Oral, Nightly, Rolando Ordonez MD

## 2024-09-04 LAB
EKG ATRIAL RATE: 80 BPM
EKG DIAGNOSIS: NORMAL
EKG P-R INTERVAL: 196 MS
EKG Q-T INTERVAL: 424 MS
EKG QRS DURATION: 74 MS
EKG QTC CALCULATION (BAZETT): 489 MS
EKG R AXIS: 58 DEGREES
EKG T AXIS: 71 DEGREES
EKG VENTRICULAR RATE: 80 BPM

## 2024-09-04 PROCEDURE — 97110 THERAPEUTIC EXERCISES: CPT

## 2024-09-04 PROCEDURE — 6370000000 HC RX 637 (ALT 250 FOR IP): Performed by: INTERNAL MEDICINE

## 2024-09-04 PROCEDURE — 51798 US URINE CAPACITY MEASURE: CPT

## 2024-09-04 PROCEDURE — 97166 OT EVAL MOD COMPLEX 45 MIN: CPT

## 2024-09-04 PROCEDURE — 2580000003 HC RX 258: Performed by: INTERNAL MEDICINE

## 2024-09-04 PROCEDURE — G0378 HOSPITAL OBSERVATION PER HR: HCPCS

## 2024-09-04 PROCEDURE — 97161 PT EVAL LOW COMPLEX 20 MIN: CPT

## 2024-09-04 RX ADMIN — TRAZODONE HYDROCHLORIDE 100 MG: 100 TABLET ORAL at 21:27

## 2024-09-04 RX ADMIN — DIVALPROEX SODIUM 250 MG: 125 CAPSULE ORAL at 09:48

## 2024-09-04 RX ADMIN — SODIUM CHLORIDE 1 G: 1 TABLET ORAL at 16:53

## 2024-09-04 RX ADMIN — FINASTERIDE 5 MG: 5 TABLET, FILM COATED ORAL at 09:48

## 2024-09-04 RX ADMIN — BUSPIRONE HYDROCHLORIDE 10 MG: 10 TABLET ORAL at 16:53

## 2024-09-04 RX ADMIN — BUSPIRONE HYDROCHLORIDE 10 MG: 10 TABLET ORAL at 09:48

## 2024-09-04 RX ADMIN — SODIUM CHLORIDE, PRESERVATIVE FREE 10 ML: 5 INJECTION INTRAVENOUS at 09:49

## 2024-09-04 RX ADMIN — DIVALPROEX SODIUM 250 MG: 125 CAPSULE ORAL at 21:27

## 2024-09-04 RX ADMIN — SODIUM CHLORIDE 1 G: 1 TABLET ORAL at 09:48

## 2024-09-04 RX ADMIN — Medication 5 MG: at 21:27

## 2024-09-04 RX ADMIN — SODIUM CHLORIDE 1 G: 1 TABLET ORAL at 12:39

## 2024-09-04 RX ADMIN — POTASSIUM CHLORIDE 20 MEQ: 750 TABLET, FILM COATED, EXTENDED RELEASE ORAL at 09:48

## 2024-09-04 RX ADMIN — FLUDROCORTISONE ACETATE 0.1 MG: 0.1 TABLET ORAL at 09:48

## 2024-09-04 RX ADMIN — MIDODRINE HYDROCHLORIDE 5 MG: 5 TABLET ORAL at 09:48

## 2024-09-04 RX ADMIN — ASPIRIN 81 MG: 81 TABLET, COATED ORAL at 09:48

## 2024-09-04 RX ADMIN — PANTOPRAZOLE SODIUM 40 MG: 40 TABLET, DELAYED RELEASE ORAL at 06:54

## 2024-09-04 RX ADMIN — ATORVASTATIN CALCIUM 40 MG: 40 TABLET, FILM COATED ORAL at 09:48

## 2024-09-04 RX ADMIN — SODIUM CHLORIDE, PRESERVATIVE FREE 5 ML: 5 INJECTION INTRAVENOUS at 21:30

## 2024-09-04 RX ADMIN — MIDODRINE HYDROCHLORIDE 5 MG: 5 TABLET ORAL at 16:53

## 2024-09-04 RX ADMIN — ESCITALOPRAM OXALATE 10 MG: 10 TABLET ORAL at 09:48

## 2024-09-04 RX ADMIN — ACETAMINOPHEN 650 MG: 325 TABLET ORAL at 21:27

## 2024-09-04 RX ADMIN — MIDODRINE HYDROCHLORIDE 5 MG: 5 TABLET ORAL at 12:39

## 2024-09-04 ASSESSMENT — PAIN SCALES - GENERAL
PAINLEVEL_OUTOF10: 0

## 2024-09-04 NOTE — PROGRESS NOTES
Pt experienced the need to void frequently, the output as documented.        09/03/24 1124 09/03/24 1236 09/03/24 1459   Output (mL)   Urine 100 mL 150 mL 110 mL      09/03/24 1600 09/03/24 1637   Output (mL)   Urine 200 mL 150 mL       Notified MD. Doctor ordered bladder scan.     Bladder scan showed max of 697ml (as documented)  MD notified. One time straight cath was ordered.     Upon attempting to straight cath, assistance was needed by four total staff members, to keep patient calm and relaxed, as pt did not seem to tolerate well. A coude catheter was used to straight cath the patient.     700ml was the total output of straight cath. Urine was yellow/clear/ no odor (as documented).  MD notified.

## 2024-09-04 NOTE — PLAN OF CARE
Problem: Pain  Goal: Verbalizes/displays adequate comfort level or baseline comfort level  9/3/2024 2052 by Kellee Ricks, RN  Outcome: Progressing  9/3/2024 0955 by Hilda Cordero, RN  Outcome: Progressing     Problem: Safety - Adult  Goal: Free from fall injury  9/3/2024 2052 by Kellee Ricks, RN  Outcome: Progressing  9/3/2024 0955 by Hilda Cordero, RN  Outcome: Progressing

## 2024-09-04 NOTE — PLAN OF CARE
Ramped entrance  Bathroom Shower/Tub: Tub/Shower unit  Home Equipment: Wheelchair - Manual, Walker - Rolling  Has the patient had two or more falls in the past year or any fall with injury in the past year?: Yes  Receives Help From: Family, Personal care attendant  ADL Assistance: Needs assistance  Bath: Maximum assistance  Dressing: Maximum assistance  Grooming: Moderate assistance  Feeding: Supervision  Toileting: Needs assistance  Homemaking Assistance:  (did not perform)  Homemaking Responsibilities: No  Ambulation Assistance: Needs assistance  Transfer Assistance: Needs assistance  Active : No  Patient's  Info: Wife and personal care aides      Hand Dominance: right     EXAMINATION OF PERFORMANCE DEFICITS:    Cognitive/Behavioral Status:  Orientation  Overall Orientation Status: Impaired  Orientation Level: Disoriented to place;Oriented to place;Oriented to person;Disoriented to situation;Disoriented to time  Cognition  Overall Cognitive Status: Exceptions  Arousal/Alertness: Appears intact  Following Commands: Follows one step commands with repetition;Follows one step commands with increased time  Attention Span: Impaired  Memory: Impaired;Decreased short term memory  Safety Judgement: Impaired  Problem Solving: Impaired  Insights: Impaired  Initiation: Impaired  Sequencing: Impaired    Skin: intact     Edema: none noted     Hearing:   Hearing  Hearing: Within functional limits    Vision/Perceptual:    Vision - Basic Assessment  Prior Vision: Wears glasses only for reading  Vision Comments: noted decreased attention to left     Vision  Vision: Within Functional Limits  Vision Exceptions: Wears glasses for reading  Perception  Overall Perceptual Status: WFL    Range of Motion:   AROM: Generally decreased, functional  PROM: Generally decreased, functional      Strength:  Strength: Generally decreased, functional      Coordination:  Coordination: Generally decreased, functional     Coordination:  Generally decreased, functional      Tone & Sensation:   Tone: Normal  Sensation: Intact      Functional Mobility and Transfers for ADLs:    Bed Mobility:  Bed mobility  Supine to Sit: Contact guard assistance  Sit to Supine: Contact guard assistance  Scooting: Contact guard assistance  Bed Mobility Training  Bed Mobility Training: Yes  Overall Level of Assistance: Stand-by assistance;Adaptive equipment;Additional time;Assist X1  Interventions: Safety awareness training;Verbal cues  Supine to Sit: Stand-by assistance;Adaptive equipment;Additional time;Assist X1  Sit to Supine: Stand-by assistance;Adaptive equipment;Additional time;Assist X1  Scooting: Stand-by assistance;Additional time;Assist X1    Transfers:      Transfer Training  Transfer Training: Yes  Overall Level of Assistance: Contact-guard assistance;Assist X1  Interventions: Safety awareness training;Verbal cues  Sit to Stand: Contact-guard assistance  Stand to Sit: Contact-guard assistance          Functional Mobility: Contact guard assistance;Minimal assistance          Balance:      Balance  Sitting: Intact;With support  Standing: Impaired;With support  Standing - Static: Constant support;Fair  Standing - Dynamic: Constant support;Occasional;Fair      ADL Assessment:          Feeding: Setup  Feeding Skilled Clinical Factors: needs encouragement    Grooming: Independent;Based on clinical judgement       UE Bathing: Minimal assistance;Moderate assistance;Based on clinical judgement        LE Bathing: Minimal assistance;Moderate assistance;Based on clinical judgement       UE Dressing: Minimal assistance;Moderate assistance;Based on clinical judgement       LE Dressing: Minimal assistance;Moderate assistance;Based on clinical judgement       Toileting: Moderate assistance;Based on clinical judgement       Functional Mobility: Contact guard assistance;Minimal assistance        ADL Intervention and task modifications:    Pain Ratin/10 -denies and no

## 2024-09-04 NOTE — CARE COORDINATION
09/04/24 1653   Avoidable Days   Patient/Family       Disagreement or change in Discharge plan causes delay     Now wants to go home w/hospice. Waiting on personal care set up along with hospice. Plan for Friday 9/6

## 2024-09-04 NOTE — CARE COORDINATION
Transition of Care Plan:    RUR: n/a obs   Prior Level of Functioning: Requires assistance with adls/I/adls.   Disposition: Home w/personal care--both funded by [facility]   If SNF or IPR: Date FOC offered:   Date FOC received:   Accepting facility:   Date authorization started with reference number:   Date authorization received and expires:   Follow up appointments:   DME needed:   Transportation at discharge:   IM/IMM Medicare/ letter given:   Is patient a [place] and connected with [facility]?    If yes, was [facility] transfer form completed and [facility] notified?   Caregiver Contact:   Discharge Caregiver contacted prior to discharge?   Care Conference needed?   Barriers to discharge: Hospice set up and personal care set up     1213: Received a voicemail from Doreen from [facility] at the [facility]. She said doctor there looked it over and said patient does NOT meet in hospice criteria.     1450--1524: Made aware by tele tech Mendoza that patient's wife Ms. Kapoor would like me to call her. She does have CM info letter. No messages on my voicemail. Called Ms. Kapoor. She said the request for yesterday was request to transfer medically. Told her Tala form [facility] said they would NOT take him medically and Samia from [facility] said they could NOT take him LTC but they partner with [facility]. They did NOT want [facility]. She said they had no caregivers till Monday. Told her don't think we can really keep him that long. He is in obs status. She also has refused going to any kind of SNF. Didn't like reviews for [facility]. She stated she did NOT want me to investigate if he could go to [facility]. She said she heard bad reviews about them too. Also, I spoke with P[facility] yesterday and they are NOT connected to [facility]. Ms. Kapoor would like patient to go back home with caregivers because she does NOT like SNFs. Also spoke with her about hospice. She is open to hospice especially for the caregiver support. They also might have more aides to help  with him. She said the list I have given her in the past didn't work out for her. Referral sent to [facility]. MD made aware of the above. Spoke with Cammy from [facility]. She will call patient's wife Ms. Kapoor.

## 2024-09-04 NOTE — PLAN OF CARE
Problem: Physical Therapy - Adult  Goal: By Discharge: Performs mobility at highest level of function for planned discharge setting.  See evaluation for individualized goals.  Description: FUNCTIONAL STATUS PRIOR TO ADMISSION: At baseline pt is a limited household ambulator w/ 2WW and assistance due to known autonomic dysfunction w/ (+) orthostatic BP drops and hx of falling; subsequently, manual w/c appears to be primary mode of mobility/transportation; pt has dementia and is a limited and questionable historian    HOME SUPPORT PRIOR TO ADMISSION: The patient lived with spouse Spring and has PCG 5 days/week to assist w/ everything from mobility to ADLs to IADLs PRN; pt does not drive; he was a  in [place] for 26 years per his report.    Physical Therapy Goals  Initiated 9/4/2024  1.  Patient will move from supine to sit and sit to supine in bed with modified independence within 7 day(s).    2.  Patient will perform sit to stand with SBA within 7 day(s).  3.  Patient will transfer from bed to chair and chair to bed with SBA using the least restrictive device within 7 day(s).  4.  Patient will ambulate with CGA for 50 feet with the least restrictive device within 7 day(s).     Outcome: Progressing   PHYSICAL THERAPY EVALUATION    Patient: Immanuel Patel (79 y.o. male)  Date: 9/4/2024  Primary Diagnosis: Syncope and collapse [R55]  COVID-19 [U07.1]       Precautions: Restrictions/Precautions: Isolation, Fall Risk, Contact Precautions, Bed Alarm  Implants present? : Pacemaker; known autonomic dysfunction w/ (+) orthostatic BP drops                      ASSESSMENT :   DEFICITS/IMPAIRMENTS:   The patient is limited by decreased functional mobility, independence in ADLs, ROM, strength, sensation, body mechanics, activity tolerance, endurance, safety awareness, cognition, command following, attention/concentration, coordination, balance, proprioception, posture, orthostatic hypotension     Based on the impairments

## 2024-09-05 LAB
ANION GAP SERPL CALC-SCNC: 11 MMOL/L (ref 2–12)
BUN SERPL-MCNC: 15 MG/DL (ref 6–20)
BUN/CREAT SERPL: 16 (ref 12–20)
CALCIUM SERPL-MCNC: 8.5 MG/DL (ref 8.5–10.1)
CHLORIDE SERPL-SCNC: 107 MMOL/L (ref 97–108)
CO2 SERPL-SCNC: 26 MMOL/L (ref 21–32)
CREAT SERPL-MCNC: 0.96 MG/DL (ref 0.7–1.3)
ERYTHROCYTE [DISTWIDTH] IN BLOOD BY AUTOMATED COUNT: 13.4 % (ref 11.5–14.5)
GLUCOSE SERPL-MCNC: 110 MG/DL (ref 65–100)
HCT VFR BLD AUTO: 31.3 % (ref 36.6–50.3)
HGB BLD-MCNC: 10.6 G/DL (ref 12.1–17)
MCH RBC QN AUTO: 31.5 PG (ref 26–34)
MCHC RBC AUTO-ENTMCNC: 33.9 G/DL (ref 30–36.5)
MCV RBC AUTO: 92.9 FL (ref 80–99)
NRBC # BLD: 0 K/UL (ref 0–0.01)
NRBC BLD-RTO: 0 PER 100 WBC
PLATELET # BLD AUTO: 146 K/UL (ref 150–400)
PMV BLD AUTO: 10.2 FL (ref 8.9–12.9)
POTASSIUM SERPL-SCNC: 3.2 MMOL/L (ref 3.5–5.1)
RBC # BLD AUTO: 3.37 M/UL (ref 4.1–5.7)
SODIUM SERPL-SCNC: 144 MMOL/L (ref 136–145)
WBC # BLD AUTO: 4.6 K/UL (ref 4.1–11.1)

## 2024-09-05 PROCEDURE — 80048 BASIC METABOLIC PNL TOTAL CA: CPT

## 2024-09-05 PROCEDURE — 85027 COMPLETE CBC AUTOMATED: CPT

## 2024-09-05 PROCEDURE — G0378 HOSPITAL OBSERVATION PER HR: HCPCS

## 2024-09-05 PROCEDURE — 2580000003 HC RX 258: Performed by: INTERNAL MEDICINE

## 2024-09-05 PROCEDURE — 6370000000 HC RX 637 (ALT 250 FOR IP): Performed by: INTERNAL MEDICINE

## 2024-09-05 PROCEDURE — 36415 COLL VENOUS BLD VENIPUNCTURE: CPT

## 2024-09-05 PROCEDURE — 97535 SELF CARE MNGMENT TRAINING: CPT

## 2024-09-05 PROCEDURE — 94760 N-INVAS EAR/PLS OXIMETRY 1: CPT

## 2024-09-05 RX ORDER — POTASSIUM CHLORIDE 750 MG/1
20 TABLET, EXTENDED RELEASE ORAL
Status: DISCONTINUED | OUTPATIENT
Start: 2024-09-05 | End: 2024-09-06 | Stop reason: HOSPADM

## 2024-09-05 RX ORDER — BACITRACIN ZINC 500 [USP'U]/G
OINTMENT TOPICAL 2 TIMES DAILY
Status: DISCONTINUED | OUTPATIENT
Start: 2024-09-05 | End: 2024-09-06 | Stop reason: HOSPADM

## 2024-09-05 RX ADMIN — MIDODRINE HYDROCHLORIDE 5 MG: 5 TABLET ORAL at 11:47

## 2024-09-05 RX ADMIN — Medication 5 MG: at 20:09

## 2024-09-05 RX ADMIN — DIVALPROEX SODIUM 250 MG: 125 CAPSULE ORAL at 08:57

## 2024-09-05 RX ADMIN — ASPIRIN 81 MG: 81 TABLET, COATED ORAL at 08:56

## 2024-09-05 RX ADMIN — ATORVASTATIN CALCIUM 40 MG: 40 TABLET, FILM COATED ORAL at 08:57

## 2024-09-05 RX ADMIN — PANTOPRAZOLE SODIUM 40 MG: 40 TABLET, DELAYED RELEASE ORAL at 08:21

## 2024-09-05 RX ADMIN — POTASSIUM CHLORIDE 20 MEQ: 750 TABLET, EXTENDED RELEASE ORAL at 20:09

## 2024-09-05 RX ADMIN — ACETAMINOPHEN 650 MG: 325 TABLET ORAL at 20:09

## 2024-09-05 RX ADMIN — FINASTERIDE 5 MG: 5 TABLET, FILM COATED ORAL at 08:57

## 2024-09-05 RX ADMIN — DIVALPROEX SODIUM 250 MG: 125 CAPSULE ORAL at 20:08

## 2024-09-05 RX ADMIN — BUSPIRONE HYDROCHLORIDE 10 MG: 10 TABLET ORAL at 17:09

## 2024-09-05 RX ADMIN — BACITRACIN ZINC: 500 OINTMENT TOPICAL at 20:08

## 2024-09-05 RX ADMIN — BUSPIRONE HYDROCHLORIDE 10 MG: 10 TABLET ORAL at 00:10

## 2024-09-05 RX ADMIN — SODIUM CHLORIDE 1 G: 1 TABLET ORAL at 11:47

## 2024-09-05 RX ADMIN — FLUDROCORTISONE ACETATE 0.1 MG: 0.1 TABLET ORAL at 08:57

## 2024-09-05 RX ADMIN — SODIUM CHLORIDE 1 G: 1 TABLET ORAL at 17:09

## 2024-09-05 RX ADMIN — POLYETHYLENE GLYCOL 3350 17 G: 17 POWDER, FOR SOLUTION ORAL at 08:21

## 2024-09-05 RX ADMIN — MIDODRINE HYDROCHLORIDE 5 MG: 5 TABLET ORAL at 08:57

## 2024-09-05 RX ADMIN — SODIUM CHLORIDE 1 G: 1 TABLET ORAL at 08:57

## 2024-09-05 RX ADMIN — MIDODRINE HYDROCHLORIDE 5 MG: 5 TABLET ORAL at 17:09

## 2024-09-05 RX ADMIN — SODIUM CHLORIDE, PRESERVATIVE FREE 10 ML: 5 INJECTION INTRAVENOUS at 08:57

## 2024-09-05 RX ADMIN — TRAZODONE HYDROCHLORIDE 100 MG: 100 TABLET ORAL at 20:08

## 2024-09-05 RX ADMIN — ESCITALOPRAM OXALATE 10 MG: 10 TABLET ORAL at 08:56

## 2024-09-05 RX ADMIN — SODIUM CHLORIDE, PRESERVATIVE FREE 10 ML: 5 INJECTION INTRAVENOUS at 20:14

## 2024-09-05 RX ADMIN — POTASSIUM CHLORIDE 20 MEQ: 750 TABLET, FILM COATED, EXTENDED RELEASE ORAL at 08:56

## 2024-09-05 RX ADMIN — BUSPIRONE HYDROCHLORIDE 10 MG: 10 TABLET ORAL at 08:57

## 2024-09-05 ASSESSMENT — PAIN DESCRIPTION - LOCATION: LOCATION: ARM

## 2024-09-05 ASSESSMENT — PAIN SCALES - GENERAL: PAINLEVEL_OUTOF10: 4

## 2024-09-05 ASSESSMENT — PAIN DESCRIPTION - ORIENTATION: ORIENTATION: LEFT

## 2024-09-05 NOTE — SIGNIFICANT EVENT
At 00:15  Patient requested bathroom. We 2 person assist him to bathroom with walker and gait belt, While in bathroom patient became confused and weak. We put him in wheelchair to get him back to bed and once in the WC his head was deviated to the right with eyes up and not responding for about 45 seconds. We got him to bed and he immediately came around. He was able to state name, birthdate, location (not oriented to place beginning of shift), identify pillow and the color of the pillow case.  Patient had a period of restless and impulsiveness for about an hour.  Remaining of the night slept without any difficulty.  His BP post event 141/75 (97) HR 86.  Dr. Shayne Hall notified via secure messaging at 0047; no new orders.

## 2024-09-05 NOTE — PLAN OF CARE
Problem: Occupational Therapy - Adult  Goal: By Discharge: Performs self-care activities at highest level of function for planned discharge setting.  See evaluation for individualized goals.  9/5/2024 1432 by Hoa Lovell, OT  Outcome: Progressing  Note:   Occupational Therapy Goals:  Initiated 9/4/2024  1.  Patient will perform upper body dressing with Minimal Assist within 7 day(s).  2.  Patient will perform lower body dressing with Minimal Assist within 7 day(s).  3.  Patient will perform toileting with Minimal Assist within 7 day(s).  4.  Patient will perform toilet transfers with Contact Guard Assist-close SBA within 7 day(s).  5.  Patient will perform all aspects of toileting with Minimal Assist within 7 day(s).  ..OCCUPATIONAL THERAPY TREATMENT  Patient: Immanuel Patel (79 y.o. male)  Date: 9/5/2024  Primary Diagnosis: Syncope and collapse [R55]  COVID-19 [U07.1]       Precautions: Isolation, Up as Tolerated, Fall Risk, General Precautions                Chart, occupational therapy assessment, plan of care, and goals were reviewed.    ASSESSMENT  Patient continues to benefit from skilled OT services and is slowly progressing towards goals. Pt recd in bed, Cleared for OT. Noted wife has called and inquired if pt Is able to utilize a urinal independently at home as he is urinating all over the home. Discussed pts cognition and questionable ability to safely carryover this task at home.   Pt recd in bed. Pt attempting to get OOB. Pt pleasantly confused. +bed alarm. Vitals assessed and recorded to flowsheet. Pt requires CGA supine to EOB. EOB sitting balance is fair. Pt reports needing to urinate. Pt stands with CGA and requires BUE support on RW to utilize urinal. Pt requires total assist to manage urinal as he is unable to maintain unsupported standing without UE support. Brief attempt at managing urinal results in posterior LOB and pt unable to correct without therapist support.  Pt will need continued

## 2024-09-05 NOTE — PROGRESS NOTES
[facility]  Hospitalist Progress Note    NAME: Immanuel Patel   :  1945   MRN:  [id number]     Total duration of encounter: 2 days      Interim Hospital Summary: 79 y.o. male who presented on 2024 with Syncope and collapse. He has a past medical history of Altered mental status, unspecified, Anxiety, Ataxia, Back pain, Blurred vision, Bradycardia, Chest pain, Depression, Dizziness, Dizziness and giddiness, Dysautonomia (HCC), Fast heart beat, Fever, Frequent headaches, Frequent urination, Hip dysplasia, congenital, Hx of syncope, Hypotension, Ingrown left big toenail, Joint pain, Joint swelling, Lumbar spinal stenosis, Memory deficit, Muscle pain, Neuropathy, Orthostatic hypotension, Pacemaker, Recent change in weight, Risk for falls, Sensory ataxia, Sinus problem, Skin disease, Smoker, SOB (shortness of breath), Sore throat, SSS (sick sinus syndrome) (HCC), Stiffness of joints, multiple sites, Stress, Stumbling gait, Syncope, Tiredness, Trouble in sleeping, and Weakness..       Pt presents with h/o Pick's Dementia requiring 1 on 1 observation now with worsening weakness  and fall risk with new diagnosis of Covid 19.  Wife and Caregivers has also been diagnosed and are too ill to work.  Pt is 100% Service Connected [facility] and [facility] in [facility] Unit .  Pt was not accepted to [facility] as transfer to acute care.       Subjective:     Chief Complaint / Reason for Physician Visit  \"No SOB\".  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)    Mild congestion, Denies SOB  Eating fair     Review of Systems:  Symptom Y/N Comments  Symptom Y/N Comments   Fever/Chills n   Chest Pain n    Poor Appetite n   Edema n    Cough n   Abdominal Pain n    Sputum n   Joint Pain n    SOB/ZHAO y  mild  Pruritis/Rash n    Nausea/vomit n   Tolerating PT/OT y    Diarrhea n    potentially having adverse effect       GERD (gastroesophageal reflux disease)  Cont PTA PPI       ABRASION  Wife noted injury to L hand from home W/C PTA  Noted 4cm skin tear with opening to SQ with purulent drainage  Apply H2O2 and dress with Bacitracin daily,  follow     SAFETY:   Code Status:DNR  DVT prophylaxis:None due to fall risk  Stress Ulcer prophylaxis: Protonix  Bladder catheter:No  Family Contact Info:  Primary Emergency Contact: AntwonSpring cline  Bedded: [facility] Room 130/01  Goals of Care: *  Disposition: Home vs SNF  Admission status:  Observation    Reviewed most current lab test results and cultures  YES  Reviewed most current radiology test results   YES  Review and summation of old records today    NO  Reviewed patient's current orders and MAR    YES  PMH/SH reviewed - no change compared to H&P    Care Plan discussed with:                                   Comments  Patient x     Family      RN x     Care Manager  x     Consultant                          x Multidiciplinary team rounds were held today with , nursing, pharmacist and clinical coordinator.  Patient's plan of care was discussed; medications were reviewed and discharge planning was addressed.        ____________________________________________    Total NON Critical Care TIME:  35   Minutes        Comments   >50% of visit spent in counseling and coordination of care   x      Signed: Rolando Ordonez MD  [facility] Hospitalist  [phone]

## 2024-09-05 NOTE — PLAN OF CARE
Problem: Pain  Goal: Verbalizes/displays adequate comfort level or baseline comfort level  Outcome: Progressing     Problem: Safety - Adult  Goal: Free from fall injury  Outcome: Progressing     Problem: Skin/Tissue Integrity  Goal: Absence of new skin breakdown  Description: 1.  Monitor for areas of redness and/or skin breakdown  2.  Assess vascular access sites hourly  3.  Every 4-6 hours minimum:  Change oxygen saturation probe site  4.  Every 4-6 hours:  If on nasal continuous positive airway pressure, respiratory therapy assess nares and determine need for appliance change or resting period.  Outcome: Progressing    Problem: ABCDS Injury Assessment  Goal: Absence of physical injury  Outcome: Progressing

## 2024-09-06 ENCOUNTER — TELEPHONE (OUTPATIENT)
Dept: FAMILY MEDICINE CLINIC | Age: 79
End: 2024-09-06

## 2024-09-06 VITALS
SYSTOLIC BLOOD PRESSURE: 175 MMHG | TEMPERATURE: 98.1 F | DIASTOLIC BLOOD PRESSURE: 93 MMHG | RESPIRATION RATE: 24 BRPM | HEIGHT: 72 IN | WEIGHT: 158.29 LBS | HEART RATE: 87 BPM | BODY MASS INDEX: 21.44 KG/M2 | OXYGEN SATURATION: 99 %

## 2024-09-06 PROCEDURE — 94760 N-INVAS EAR/PLS OXIMETRY 1: CPT

## 2024-09-06 PROCEDURE — 2580000003 HC RX 258: Performed by: INTERNAL MEDICINE

## 2024-09-06 PROCEDURE — 6370000000 HC RX 637 (ALT 250 FOR IP): Performed by: INTERNAL MEDICINE

## 2024-09-06 PROCEDURE — G0378 HOSPITAL OBSERVATION PER HR: HCPCS

## 2024-09-06 RX ORDER — ESCITALOPRAM OXALATE 10 MG/1
10 TABLET ORAL DAILY
Qty: 30 TABLET | Refills: 3 | Status: SHIPPED | OUTPATIENT
Start: 2024-09-07

## 2024-09-06 RX ORDER — POTASSIUM CHLORIDE 1500 MG/1
20 TABLET, EXTENDED RELEASE ORAL 2 TIMES DAILY
Qty: 60 TABLET | Refills: 3 | Status: SHIPPED | OUTPATIENT
Start: 2024-09-06

## 2024-09-06 RX ORDER — SODIUM CHLORIDE 1 G/1
1 TABLET ORAL
Qty: 90 TABLET | Refills: 3 | Status: SHIPPED | OUTPATIENT
Start: 2024-09-06

## 2024-09-06 RX ORDER — BACITRACIN ZINC 500 [USP'U]/G
OINTMENT TOPICAL
Qty: 30 G | Refills: 1 | Status: SHIPPED | OUTPATIENT
Start: 2024-09-06 | End: 2024-09-16

## 2024-09-06 RX ORDER — TRAZODONE HYDROCHLORIDE 100 MG/1
100 TABLET ORAL NIGHTLY
Qty: 30 TABLET | Refills: 1 | Status: SHIPPED | OUTPATIENT
Start: 2024-09-06

## 2024-09-06 RX ORDER — ASPIRIN 81 MG/1
81 TABLET ORAL DAILY
Qty: 30 TABLET | Refills: 3 | Status: SHIPPED | OUTPATIENT
Start: 2024-09-07

## 2024-09-06 RX ADMIN — BACITRACIN ZINC: 500 OINTMENT TOPICAL at 08:35

## 2024-09-06 RX ADMIN — ESCITALOPRAM OXALATE 10 MG: 10 TABLET ORAL at 08:36

## 2024-09-06 RX ADMIN — POTASSIUM CHLORIDE 20 MEQ: 750 TABLET, EXTENDED RELEASE ORAL at 08:36

## 2024-09-06 RX ADMIN — SODIUM CHLORIDE 1 G: 1 TABLET ORAL at 11:10

## 2024-09-06 RX ADMIN — MIDODRINE HYDROCHLORIDE 5 MG: 5 TABLET ORAL at 08:36

## 2024-09-06 RX ADMIN — ASPIRIN 81 MG: 81 TABLET, COATED ORAL at 08:36

## 2024-09-06 RX ADMIN — SODIUM CHLORIDE 1 G: 1 TABLET ORAL at 08:36

## 2024-09-06 RX ADMIN — BUSPIRONE HYDROCHLORIDE 10 MG: 10 TABLET ORAL at 00:56

## 2024-09-06 RX ADMIN — DIVALPROEX SODIUM 250 MG: 125 CAPSULE ORAL at 08:36

## 2024-09-06 RX ADMIN — PANTOPRAZOLE SODIUM 40 MG: 40 TABLET, DELAYED RELEASE ORAL at 05:33

## 2024-09-06 RX ADMIN — SODIUM CHLORIDE, PRESERVATIVE FREE 10 ML: 5 INJECTION INTRAVENOUS at 08:36

## 2024-09-06 RX ADMIN — BUSPIRONE HYDROCHLORIDE 10 MG: 10 TABLET ORAL at 08:36

## 2024-09-06 RX ADMIN — FINASTERIDE 5 MG: 5 TABLET, FILM COATED ORAL at 08:36

## 2024-09-06 RX ADMIN — MIDODRINE HYDROCHLORIDE 5 MG: 5 TABLET ORAL at 11:10

## 2024-09-06 RX ADMIN — ATORVASTATIN CALCIUM 40 MG: 40 TABLET, FILM COATED ORAL at 08:36

## 2024-09-06 RX ADMIN — FLUDROCORTISONE ACETATE 0.1 MG: 0.1 TABLET ORAL at 08:36

## 2024-09-06 NOTE — DISCHARGE SUMMARY
Discharge Summary    Name: Immanuel Patel  [id number]  YOB: 1945 (Age: 79 y.o.)   Date of Admission: 9/2/2024  Date of Discharge: 9/6/2024  Attending Physician: Rolando Ordonez MD    Discharge Diagnosis:     Consultations:  IP CONSULT TO HOSPICE      Brief Admission History/Reason for Admission Per Rolando Ordonez MD:    9/2 - Admission H&P   79 y.o. male who presented on 9/2/2024 with Syncope and collapse. He has a past medical history of Altered mental status, unspecified, Anxiety, Ataxia, Back pain, Blurred vision, Bradycardia, Chest pain, Depression, Dizziness, Dizziness and giddiness, Dysautonomia (HCC), Fast heart beat, Fever, Frequent headaches, Frequent urination, Hip dysplasia, congenital, Hx of syncope, Hypotension, Ingrown left big toenail, Joint pain, Joint swelling, Lumbar spinal stenosis, Memory deficit, Muscle pain, Neuropathy, Orthostatic hypotension, Pacemaker, Recent change in weight, Risk for falls, Sensory ataxia, Sinus problem, Skin disease, Smoker, SOB (shortness of breath), Sore throat, SSS (sick sinus syndrome) (HCC), Stiffness of joints, multiple sites, Stress, Stumbling gait, Syncope, Tiredness, Trouble in sleeping, and Weakness.     Pt presents with h/o Pick's Dementia requiring 1 on 1 observation now with worsening weakness  and fall risk with new diagnosis of Covid 19.  Wife and Caregivers has also been diagnosed and are too ill to work.  Pt is 100% Service Connected and [facility], and has goal for LTC in Memory Unit .  Pt was not accepted to [facility] as transfer to acute care.  [facility] also denies and SNF or LTC    ===========================================================      Brief Hospital Course by Main Problems:     Syncope and collapse  COVID-19  Underlying chronic problems with progressive functional and cognitive decline over past 3 years  [facility] - Palliative Care has been discussed  Family  nightly     albuterol sulfate  (90 Base) MCG/ACT inhaler  Commonly known as: Ventolin HFA  Inhale 2 puffs into the lungs 4 times daily as needed for Wheezing     atorvastatin 40 MG tablet  Commonly known as: LIPITOR     busPIRone 10 MG tablet  Commonly known as: BUSPAR     ergocalciferol 1.25 MG (13110 UT) capsule  Commonly known as: ERGOCALCIFEROL     finasteride 5 MG tablet  Commonly known as: PROSCAR     fludrocortisone 0.1 MG tablet  Commonly known as: FLORINEF     furosemide 20 MG tablet  Commonly known as: LASIX  Take 1 tablet by mouth daily     melatonin 5 MG Tbdp disintegrating tablet  Take 1 tablet by mouth nightly     midodrine 10 MG tablet  Commonly known as: PROAMATINE  Take 1 tablet by mouth 3 times daily (with meals)     Myrbetriq 25 MG Tb24  Generic drug: mirabegron     Trelegy Ellipta 200-62.5-25 MCG/ACT Aepb inhaler  Generic drug: fluticasone-umeclidin-vilant  Inhale 1 puff into the lungs daily               Where to Get Your Medications        These medications were sent to [id number] - [place] [place] - [address] [phone] -  [phone]  [address] [place] [id number]      Phone: [phone]   aspirin 81 MG EC tablet  bacitracin zinc 500 UNIT/GM ointment  escitalopram 10 MG tablet  potassium chloride 20 MEQ Tbcr extended release tablet  sodium chloride 1 g tablet  traZODone 100 MG tablet           DISPOSITION:    Home with Family:       Home with HH/PT/OT/RN:    SNF/LTC:    CARMELA:    OTHER: Home Hospice       Code status:   DNR/DNI  Recommended diet:  Comfort  Recommended activity: activity as tolerated      Follow up with:   PCP : No primary care provider on file.    Christiane Bentley MD  [address] [facility] [address] [phone]    Follow up in 2 week(s)  --Follow up scheduled for Friday, September 13 @10:00a.m.      Total time in minutes spent coordinating this discharge (includes going over instructions, follow-up,

## 2024-09-06 NOTE — PROGRESS NOTES
[facility]  Hospitalist Progress Note    NAME: Immanuel Patle   :  1945   MRN:  [id number]     Total duration of encounter: 3 days      Interim Hospital Summary: 79 y.o. male who presented on 2024 with Syncope and collapse. He has a past medical history of Altered mental status, unspecified, Anxiety, Ataxia, Back pain, Blurred vision, Bradycardia, Chest pain, Depression, Dizziness, Dizziness and giddiness, Dysautonomia (HCC), Fast heart beat, Fever, Frequent headaches, Frequent urination, Hip dysplasia, congenital, Hx of syncope, Hypotension, Ingrown left big toenail, Joint pain, Joint swelling, Lumbar spinal stenosis, Memory deficit, Muscle pain, Neuropathy, Orthostatic hypotension, Pacemaker, Recent change in weight, Risk for falls, Sensory ataxia, Sinus problem, Skin disease, Smoker, SOB (shortness of breath), Sore throat, SSS (sick sinus syndrome) (HCC), Stiffness of joints, multiple sites, Stress, Stumbling gait, Syncope, Tiredness, Trouble in sleeping, and Weakness..       Pt presents with h/o Pick's Dementia requiring 1 on 1 observation now with worsening weakness  and fall risk with new diagnosis of Covid 19.  Wife and Caregivers has also been diagnosed and are too ill to work.  Pt is 100% Service Connected and [facility], and has goal for LTC in Memory Unit .  Pt was not accepted to [facility] as transfer to acute care.  [facility] also denies and SNF or LTC     Subjective:     Chief Complaint / Reason for Physician Visit  \"No SOB\".  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

## 2024-09-06 NOTE — PROGRESS NOTES
Discharge Summary    Immanuel Paetl  :  1945  MRN:  [id number]    ADMIT DATE:  2024  DISCHARGE DATE:  2024      Discharge instruction reviewed with patient and spouse, pia.     Home med's returned No    Personal belongings returned Yes    Patient Wheeled to front entrance via wheelchair with Nurse    All questions/concerns addressed. All LDAs removed. All personal belongings, including smart watch sent home with patient. Discharge to home with planned hospice.    SIGNED:    Maximilian Dias RN

## 2024-09-06 NOTE — CARE COORDINATION
Transition of Care Plan:     RUR: n/a obs   Prior Level of Functioning: Requires assistance with adls/I/adls.   Disposition: Home w/personal care--both funded by [facility]   If SNF or IPR: Date FOC offered:   Date FOC received:   Accepting facility:   Date authorization started with reference number:   Date authorization received and expires:   Follow up appointments:   DME needed:   Transportation at discharge:   IM/IMM Medicare/ letter given: n/a obs   Is patient a  and connected with VA?               If yes, was  transfer form completed and VA notified?   Caregiver Contact:   Discharge Caregiver contacted prior to discharge?   Care Conference needed?   Barriers to discharge: None           1041: Spoke with Cammy from [facility]. She said that Ms. Kapoor (spouse) is putting hospice on hold for right now. Apparently she is ultimately upset that he is leaving hospital but has refused him to go to a SNF/LTC. Offered her assistance in obtaining further caregivers but she again refused. Called Ms. Kapoor again to see if she would like home health for her . No answer. Left her a message.       1455: Ms. Kapoor left me a message for her to call back. Patient has already left the hospital. Called her back. She didn't answer. Left her a message. Briefly explained HH vs Hospice on voicemail.

## 2024-09-06 NOTE — PROGRESS NOTES
-[facility] called [facility] spoke with Norma. Scheduled PCP follow up appointment for Friday, September 13 @10:00a.m.

## 2024-10-24 ENCOUNTER — APPOINTMENT (OUTPATIENT)
Facility: HOSPITAL | Age: 79
End: 2024-10-24
Payer: OTHER GOVERNMENT

## 2024-10-24 ENCOUNTER — HOSPITAL ENCOUNTER (EMERGENCY)
Facility: HOSPITAL | Age: 79
Discharge: HOME | End: 2024-10-24
Attending: EMERGENCY MEDICINE
Payer: OTHER GOVERNMENT

## 2024-10-24 VITALS
HEIGHT: 72 IN | WEIGHT: 158 LBS | TEMPERATURE: 97.9 F | BODY MASS INDEX: 21.4 KG/M2 | SYSTOLIC BLOOD PRESSURE: 120 MMHG | DIASTOLIC BLOOD PRESSURE: 66 MMHG | HEART RATE: 87 BPM | OXYGEN SATURATION: 99 % | RESPIRATION RATE: 17 BRPM

## 2024-10-24 DIAGNOSIS — M79.89 HAND SWELLING: Primary | ICD-10-CM

## 2024-10-24 LAB — ECHO BSA: 1.91 M2

## 2024-10-24 PROCEDURE — 73110 X-RAY EXAM OF WRIST: CPT

## 2024-10-24 PROCEDURE — 99284 EMERGENCY DEPT VISIT MOD MDM: CPT

## 2024-10-24 PROCEDURE — 93971 EXTREMITY STUDY: CPT

## 2024-10-24 PROCEDURE — 73130 X-RAY EXAM OF HAND: CPT

## 2024-10-24 ASSESSMENT — LIFESTYLE VARIABLES: HOW OFTEN DO YOU HAVE A DRINK CONTAINING ALCOHOL: NEVER

## 2024-10-24 ASSESSMENT — PAIN DESCRIPTION - LOCATION: LOCATION: HAND

## 2024-10-24 ASSESSMENT — PAIN SCALES - GENERAL: PAINLEVEL_OUTOF10: 8

## 2024-10-24 ASSESSMENT — PAIN - FUNCTIONAL ASSESSMENT: PAIN_FUNCTIONAL_ASSESSMENT: 0-10

## 2024-10-24 NOTE — ED TRIAGE NOTES
Patient presents to the ED with a complaint of left hand swelling.  Patient states he woke up with his hand swollen.  + sensation.  + movement.

## 2024-10-24 NOTE — ED NOTES
Pt has been moved from d-chairs to ED Room 5 for his ultrasound, tech in ED to perform test. Pt aide accompanying pt who walks with walker at baseline. Has assisted pt with restroom before moving to ED bed 5 for test.

## 2024-10-24 NOTE — DISCHARGE INSTRUCTIONS
Please make an appointment to see the orthopedic surgeon if the swelling continues.    Thank you for allowing us to provide you with medical care today.  We realize that you have many choices for your emergency care needs.  We thank you for choosing [facility].  Please choose us in the future for any continued health care needs.     The exam and treatment you received in the Emergency Department were for an emergent problem and are not intended as complete care. It is important that you follow up with a doctor, nurse practitioner, or physician assistant for ongoing care. If your symptoms worsen or you do not improve as expected and you are unable to reach your usual health care provider, you should return to the Emergency Department. We are available 24 hours a day.     Please make an appointment with your healthcare provider(s) for follow up of your Emergency Department visit.  Take this sheet with you when you go to your follow-up visit.

## 2024-10-24 NOTE — ED PROVIDER NOTES
[facility] EMERGENCY DEP  EMERGENCY DEPARTMENT ENCOUNTER      Patient Name: Immanuel Patel  MRN: [id number]  Birthdate 1945  Date of Evaluation: 10/24/2024  Physician: Shin Iglesias MD    CHIEF COMPLAINT       Chief Complaint   Patient presents with    hand swelling       HISTORY OF PRESENT ILLNESS   (Location/Symptom, Timing/Onset, Context/Setting, Quality, Duration, Modifying Factors, Severity)   Immanuel Patel, 79 y.o., male     79-year-old male with a history of dementia presents with left hand swelling.  Patient's wife reportedly noticed it today.  Patient presents with a caretaker who is unsure of whether there was any trauma.  Patient complains of no pain          Nursing Notes were reviewed.    REVIEW OF SYSTEMS    (Not required)   Review of Systems    Except as noted above the remainder of the review of systems was reviewed and negative.     PAST MEDICAL HISTORY     Past Medical History:   Diagnosis Date    Altered mental status, unspecified 11/9/2018    Anxiety     Ataxia 11/9/2018    Back pain     Blurred vision     Bradycardia 10/2/2018    Chest pain     Depression     Dizziness     Dizziness and giddiness 10/17/2019    Dysautonomia (HCC)     Fast heart beat     Fever 11/9/2018    Frequent headaches     Frequent urination     Hip dysplasia, congenital     Hx of syncope 10/24/2018    Hypotension     Ingrown left big toenail 3/31/2017    Joint pain     Joint swelling     Lumbar spinal stenosis     Memory deficit 9/11/2018    Muscle pain     Neuropathy     Orthostatic hypotension     asymptomatic    Pacemaker     new pacemaker July 2019    Recent change in weight     Risk for falls 12/13/2016    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 11/8/2018    Syncope     Tiredness     Trouble in sleeping     Weakness        SURGICAL

## 2025-01-24 ENCOUNTER — HOSPITAL ENCOUNTER (EMERGENCY)
Facility: HOSPITAL | Age: 80
Discharge: HOME | End: 2025-01-24
Attending: FAMILY MEDICINE | Admitting: FAMILY MEDICINE
Payer: OTHER GOVERNMENT

## 2025-01-24 ENCOUNTER — APPOINTMENT (OUTPATIENT)
Facility: HOSPITAL | Age: 80
End: 2025-01-24
Payer: OTHER GOVERNMENT

## 2025-01-24 VITALS
RESPIRATION RATE: 17 BRPM | WEIGHT: 198 LBS | SYSTOLIC BLOOD PRESSURE: 139 MMHG | BODY MASS INDEX: 26.82 KG/M2 | DIASTOLIC BLOOD PRESSURE: 69 MMHG | HEART RATE: 75 BPM | OXYGEN SATURATION: 94 % | TEMPERATURE: 98.8 F | HEIGHT: 72 IN
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
## 2025-01-24 LAB
APPEARANCE UR: CLEAR
BACTERIA URNS QL MICRO: NEGATIVE /HPF
BILIRUB UR QL: NEGATIVE
COLOR UR: ABNORMAL
EPITH CASTS URNS QL MICRO: ABNORMAL /LPF
GLUCOSE UR STRIP.AUTO-MCNC: NEGATIVE MG/DL
HGB UR QL STRIP: NEGATIVE
KETONES UR QL STRIP.AUTO: ABNORMAL MG/DL
LEUKOCYTE ESTERASE UR QL STRIP.AUTO: NEGATIVE
NITRITE UR QL STRIP.AUTO: NEGATIVE
PH UR STRIP: 6 (ref 5–8)
PROT UR STRIP-MCNC: NEGATIVE MG/DL
RBC #/AREA URNS HPF: ABNORMAL /HPF (ref 0–5)
SP GR UR REFRACTOMETRY: 1.02 (ref 1–1.03)
URINE CULTURE IF INDICATED: ABNORMAL
UROBILINOGEN UR QL STRIP.AUTO: 0.2 EU/DL (ref 0.2–1)
WBC URNS QL MICRO: ABNORMAL /HPF (ref 0–4)

## 2025-01-24 PROCEDURE — 99284 EMERGENCY DEPT VISIT MOD MDM: CPT

## 2025-01-24 PROCEDURE — 6370000000 HC RX 637 (ALT 250 FOR IP): Performed by: FAMILY MEDICINE

## 2025-01-24 PROCEDURE — 73502 X-RAY EXAM HIP UNI 2-3 VIEWS: CPT

## 2025-01-24 PROCEDURE — 6370000000 HC RX 637 (ALT 250 FOR IP): Performed by: EMERGENCY MEDICINE

## 2025-01-24 PROCEDURE — 81001 URINALYSIS AUTO W/SCOPE: CPT

## 2025-01-24 RX ORDER — ACETAMINOPHEN 500 MG
1000 TABLET ORAL
Status: COMPLETED | OUTPATIENT
Start: 2025-01-24 | End: 2025-01-24

## 2025-01-24 RX ORDER — BUSPIRONE HYDROCHLORIDE 5 MG/1
5 TABLET ORAL
Status: COMPLETED | OUTPATIENT
Start: 2025-01-24 | End: 2025-01-24

## 2025-01-24 RX ORDER — BUSPIRONE HYDROCHLORIDE 5 MG/1
10 TABLET ORAL
Status: DISCONTINUED | OUTPATIENT
Start: 2025-01-24 | End: 2025-01-24

## 2025-01-24 RX ADMIN — BUSPIRONE HYDROCHLORIDE 5 MG: 5 TABLET ORAL at 08:46

## 2025-01-24 RX ADMIN — ACETAMINOPHEN 1000 MG: 500 TABLET, FILM COATED ORAL at 06:18

## 2025-01-24 ASSESSMENT — PAIN DESCRIPTION - ORIENTATION: ORIENTATION: RIGHT;LEFT

## 2025-01-24 ASSESSMENT — PAIN - FUNCTIONAL ASSESSMENT: PAIN_FUNCTIONAL_ASSESSMENT: ACTIVITIES ARE NOT PREVENTED

## 2025-01-24 ASSESSMENT — PAIN DESCRIPTION - DESCRIPTORS: DESCRIPTORS: ACHING

## 2025-01-24 ASSESSMENT — PAIN SCALES - GENERAL
PAINLEVEL_OUTOF10: 6
PAINLEVEL_OUTOF10: 0

## 2025-01-24 ASSESSMENT — PAIN DESCRIPTION - LOCATION: LOCATION: HIP

## 2025-01-24 NOTE — ED NOTES
Discharge instructions provided and reviewed with pt and pt's spouse. Opportunity provided for discussion and pt/spouse have no further questions at this time.

## 2025-01-24 NOTE — ED TRIAGE NOTES
Pt arrived via EMS from home with report of 3 falls since yesterday. EMS reports that the pt's caregiver was sleeping when pt got up OOB and fell 2 times during the day. The last fall was at 0330 -family heard pt fall and went to check on him and found him on the floor. Pt with a skin tear to his left  thumb. Pt with history of dementia per EMS. Pt alert and oriented to name, . Pt denies neck or back pain, or generalized pain. Bleeding controlled to left thumb ST.

## 2025-01-26 NOTE — ED PROVIDER NOTES
Differential Dx: Hip fx vs UTI vs progressive dementia    Pt is a 78 yo man with Lewy body dementia who comes to the ED by EMS because of a fall. Wife reports that she has 24 hour care for him, and tonight she had a new person. Apparently the patient got up while his aid was sleeping, and he fell. He is not able to walk without someone immediately helping him to use his walker. Tonight, he had pain in his hips, and his wife was concerned that he might have a UTI. His mental status is at baseline. His thumb was cleaned off, a bandage placed, and Xrays/UA ordered. Both were negative, and he was discharged home.       ED Course as of 01/26/25 0300   Fri Jan 24, 2025   0728 Xray of hips, UA negative. Will sign out to Dr. Rodriguez for possible discharge vs pelvis CT. [VG]   0814 Per RN, patient does not ambulate much at baseline. Has a walker but will push it away and then fall. Typically requires someone to be with him to assist him even when using the walker. Has WC at home as well    Will dc as per Dr. Camacho's plan [BABAK]      ED Course User Index  [BABAK] Kanika Rodriguez MD  [VG] Barbara Camacho MD       Disposition Considerations (Tests not done, Shared Decision Making, Pt Expectation of Test or Tx.):      FINAL IMPRESSION     1. Ground-level fall          DISPOSITION/PLAN   DISPOSITION Decision To Discharge 01/24/2025 08:15:32 AM   DISPOSITION CONDITION Stable           Discharge Note: The patient is stable for discharge home. The signs, symptoms, diagnosis, and discharge instructions have been discussed, understanding conveyed, and agreed upon. The patient is to follow up as recommended or return to ER should their symptoms worsen.      PATIENT REFERRED TO:  your PCP    Schedule an appointment as soon as possible for a visit            DISCHARGE MEDICATIONS:     Medication List        ASK your doctor about these medications      acetaminophen 650 MG extended release tablet  Commonly known as: TYLENOL  Take 2

## 2025-01-29 NOTE — PROGRESS NOTES
Follow up visit jaz patient in Rm  130  Provided empathic listening and spiritual support  Advised of  Availability    [address] [place] [address] Discharged

## 2025-03-21 ENCOUNTER — HOSPITAL ENCOUNTER (OUTPATIENT)
Facility: HOSPITAL | Age: 80
Setting detail: SPECIMEN
Discharge: HOME | End: 2025-03-24

## 2025-03-28 ENCOUNTER — HOSPITAL ENCOUNTER (EMERGENCY)
Facility: HOSPITAL | Age: 80
Discharge: HOME | End: 2025-03-28
Attending: EMERGENCY MEDICINE
Payer: OTHER GOVERNMENT

## 2025-03-28 ENCOUNTER — APPOINTMENT (OUTPATIENT)
Facility: HOSPITAL | Age: 80
End: 2025-03-28
Payer: OTHER GOVERNMENT

## 2025-03-28 VITALS
DIASTOLIC BLOOD PRESSURE: 98 MMHG | BODY MASS INDEX: 28.84 KG/M2 | WEIGHT: 206 LBS | RESPIRATION RATE: 25 BRPM | HEIGHT: 71 IN | HEART RATE: 80 BPM | SYSTOLIC BLOOD PRESSURE: 158 MMHG | TEMPERATURE: 97.1 F | OXYGEN SATURATION: 94 %

## 2025-03-28 DIAGNOSIS — J40 BRONCHITIS: Primary | ICD-10-CM

## 2025-03-28 LAB
ALBUMIN SERPL-MCNC: 3.2 G/DL (ref 3.5–5)
ALBUMIN/GLOB SERPL: 0.9 (ref 1.1–2.2)
ALP SERPL-CCNC: 87 U/L (ref 45–117)
ALT SERPL-CCNC: 11 U/L (ref 12–78)
ANION GAP SERPL CALC-SCNC: 9 MMOL/L (ref 2–12)
APPEARANCE UR: CLEAR
AST SERPL-CCNC: 14 U/L (ref 15–37)
BACTERIA URNS QL MICRO: NEGATIVE /HPF
BASOPHILS # BLD: 0.03 K/UL (ref 0–0.1)
BASOPHILS NFR BLD: 0.5 % (ref 0–1)
BILIRUB SERPL-MCNC: 0.5 MG/DL (ref 0.2–1)
BILIRUB UR QL: NEGATIVE
BUN SERPL-MCNC: 23 MG/DL (ref 6–20)
BUN/CREAT SERPL: 17 (ref 12–20)
CALCIUM SERPL-MCNC: 8.4 MG/DL (ref 8.5–10.1)
CHLORIDE SERPL-SCNC: 106 MMOL/L (ref 97–108)
CO2 SERPL-SCNC: 28 MMOL/L (ref 21–32)
COLOR UR: NORMAL
CREAT SERPL-MCNC: 1.33 MG/DL (ref 0.7–1.3)
DIFFERENTIAL METHOD BLD: ABNORMAL
EKG ATRIAL RATE: 84 BPM
EKG DIAGNOSIS: NORMAL
EKG P AXIS: 94 DEGREES
EKG P-R INTERVAL: 212 MS
EKG Q-T INTERVAL: 374 MS
EKG QRS DURATION: 78 MS
EKG QTC CALCULATION (BAZETT): 441 MS
EKG R AXIS: 56 DEGREES
EKG T AXIS: 67 DEGREES
EKG VENTRICULAR RATE: 84 BPM
EOSINOPHIL # BLD: 0.14 K/UL (ref 0–0.4)
EOSINOPHIL NFR BLD: 2.5 % (ref 0–7)
EPITH CASTS URNS QL MICRO: NORMAL /LPF
ERYTHROCYTE [DISTWIDTH] IN BLOOD BY AUTOMATED COUNT: 13.2 % (ref 11.5–14.5)
FLUAV RNA SPEC QL NAA+PROBE: NOT DETECTED
FLUBV RNA SPEC QL NAA+PROBE: NOT DETECTED
GLOBULIN SER CALC-MCNC: 3.5 G/DL (ref 2–4)
GLUCOSE SERPL-MCNC: 102 MG/DL (ref 65–100)
GLUCOSE UR STRIP.AUTO-MCNC: NEGATIVE MG/DL
HCT VFR BLD AUTO: 38.9 % (ref 36.6–50.3)
HGB BLD-MCNC: 12.7 G/DL (ref 12.1–17)
HGB UR QL STRIP: NEGATIVE
IMM GRANULOCYTES # BLD AUTO: 0.06 K/UL (ref 0–0.04)
IMM GRANULOCYTES NFR BLD AUTO: 1.1 % (ref 0–0.5)
KETONES UR QL STRIP.AUTO: NEGATIVE MG/DL
LACTATE SERPL-SCNC: 1.5 MMOL/L (ref 0.4–2)
LACTATE SERPL-SCNC: 2.5 MMOL/L (ref 0.4–2)
LEUKOCYTE ESTERASE UR QL STRIP.AUTO: NEGATIVE
LYMPHOCYTES # BLD: 1.49 K/UL (ref 0.8–3.5)
LYMPHOCYTES NFR BLD: 26.7 % (ref 12–49)
MCH RBC QN AUTO: 32.4 PG (ref 26–34)
MCHC RBC AUTO-ENTMCNC: 32.6 G/DL (ref 30–36.5)
MCV RBC AUTO: 99.2 FL (ref 80–99)
MONOCYTES # BLD: 0.46 K/UL (ref 0–1)
MONOCYTES NFR BLD: 8.2 % (ref 5–13)
NEUTS SEG # BLD: 3.41 K/UL (ref 1.8–8)
NEUTS SEG NFR BLD: 61 % (ref 32–75)
NITRITE UR QL STRIP.AUTO: NEGATIVE
NRBC # BLD: 0 K/UL (ref 0–0.01)
NRBC BLD-RTO: 0 PER 100 WBC
PH UR STRIP: 7 (ref 5–8)
PLATELET # BLD AUTO: 187 K/UL (ref 150–400)
PMV BLD AUTO: 9.5 FL (ref 8.9–12.9)
POTASSIUM SERPL-SCNC: 4.3 MMOL/L (ref 3.5–5.1)
PROCALCITONIN SERPL-MCNC: <0.05 NG/ML
PROT SERPL-MCNC: 6.7 G/DL (ref 6.4–8.2)
PROT UR STRIP-MCNC: NEGATIVE MG/DL
RBC # BLD AUTO: 3.92 M/UL (ref 4.1–5.7)
RBC #/AREA URNS HPF: NORMAL /HPF (ref 0–5)
SARS-COV-2 RNA RESP QL NAA+PROBE: NOT DETECTED
SODIUM SERPL-SCNC: 143 MMOL/L (ref 136–145)
SOURCE: NORMAL
SP GR UR REFRACTOMETRY: 1.01 (ref 1–1.03)
TROPONIN I SERPL HS-MCNC: 6 NG/L (ref 0–76)
URINE CULTURE IF INDICATED: NORMAL
UROBILINOGEN UR QL STRIP.AUTO: 0.2 EU/DL (ref 0.2–1)
WBC # BLD AUTO: 5.6 K/UL (ref 4.1–11.1)
WBC URNS QL MICRO: NORMAL /HPF (ref 0–4)

## 2025-03-28 PROCEDURE — 81001 URINALYSIS AUTO W/SCOPE: CPT

## 2025-03-28 PROCEDURE — 6360000002 HC RX W HCPCS: Performed by: EMERGENCY MEDICINE

## 2025-03-28 PROCEDURE — 96366 THER/PROPH/DIAG IV INF ADDON: CPT

## 2025-03-28 PROCEDURE — 87040 BLOOD CULTURE FOR BACTERIA: CPT

## 2025-03-28 PROCEDURE — 93005 ELECTROCARDIOGRAM TRACING: CPT | Performed by: EMERGENCY MEDICINE

## 2025-03-28 PROCEDURE — 36415 COLL VENOUS BLD VENIPUNCTURE: CPT

## 2025-03-28 PROCEDURE — 85025 COMPLETE CBC W/AUTO DIFF WBC: CPT

## 2025-03-28 PROCEDURE — 2580000003 HC RX 258: Performed by: EMERGENCY MEDICINE

## 2025-03-28 PROCEDURE — 83605 ASSAY OF LACTIC ACID: CPT

## 2025-03-28 PROCEDURE — 80053 COMPREHEN METABOLIC PANEL: CPT

## 2025-03-28 PROCEDURE — 96365 THER/PROPH/DIAG IV INF INIT: CPT

## 2025-03-28 PROCEDURE — 87636 SARSCOV2 & INF A&B AMP PRB: CPT

## 2025-03-28 PROCEDURE — 96367 TX/PROPH/DG ADDL SEQ IV INF: CPT

## 2025-03-28 PROCEDURE — 84484 ASSAY OF TROPONIN QUANT: CPT

## 2025-03-28 PROCEDURE — 71045 X-RAY EXAM CHEST 1 VIEW: CPT

## 2025-03-28 PROCEDURE — 84145 PROCALCITONIN (PCT): CPT

## 2025-03-28 PROCEDURE — 99285 EMERGENCY DEPT VISIT HI MDM: CPT

## 2025-03-28 RX ORDER — 0.9 % SODIUM CHLORIDE 0.9 %
30 INTRAVENOUS SOLUTION INTRAVENOUS ONCE
Status: COMPLETED | OUTPATIENT
Start: 2025-03-28 | End: 2025-03-28

## 2025-03-28 RX ORDER — CARBIDOPA AND LEVODOPA 25; 100 MG/1; MG/1
0.5 TABLET ORAL 2 TIMES DAILY
COMMUNITY
Start: 2025-02-28

## 2025-03-28 RX ORDER — AZITHROMYCIN 250 MG/1
TABLET, FILM COATED ORAL
Qty: 1 PACKET | Refills: 0 | Status: SHIPPED | OUTPATIENT
Start: 2025-03-28 | End: 2025-04-01

## 2025-03-28 RX ADMIN — VANCOMYCIN HYDROCHLORIDE 2250 MG: 1.25 INJECTION, POWDER, LYOPHILIZED, FOR SOLUTION INTRAVENOUS at 14:12

## 2025-03-28 RX ADMIN — PIPERACILLIN AND TAZOBACTAM 4500 MG: 4; .5 INJECTION, POWDER, LYOPHILIZED, FOR SOLUTION INTRAVENOUS at 13:35

## 2025-03-28 RX ADMIN — SODIUM CHLORIDE 2571 ML: 0.9 INJECTION, SOLUTION INTRAVENOUS at 13:05

## 2025-03-28 ASSESSMENT — PAIN - FUNCTIONAL ASSESSMENT: PAIN_FUNCTIONAL_ASSESSMENT: NONE - DENIES PAIN

## 2025-03-28 NOTE — DISCHARGE INSTRUCTIONS
Please start taking the azithromycin tomorrow.  Take 2 pills tomorrow and then 1 pill for the next 4 days.  Come back to the emergency department immediately for any new or worsening symptoms.

## 2025-03-29 NOTE — ED PROVIDER NOTES
[facility] EMERGENCY DEPARTMENT  EMERGENCY DEPARTMENT ENCOUNTER       Pt Name: Immanuel Patel  MRN: [id number]  Birthdate 1945  Date of evaluation: 3/28/2025  Provider: Ann Marie Nguyen MD   PCP: None, None  Note Started: 8:00 AM EDT 3/29/25     CHIEF COMPLAINT       Chief Complaint   Patient presents with    Chest Congestion        HISTORY OF PRESENT ILLNESS: 1 or more elements      History From: Patient, wife, caregiver, History limited by: Dementia (patient)     Immanuel Patel is a 79 y.o. male presents the emergency department for 1 week of cough.       Please See MDM for Additional Details of the HPI/PMH  Nursing Notes were all reviewed and agreed with or any disagreements were addressed in the HPI.     REVIEW OF SYSTEMS        Positives and Pertinent negatives as per HPI.    PAST HISTORY     Past Medical History:  Past Medical History:   Diagnosis Date    Altered mental status, unspecified 11/9/2018    Anxiety     Ataxia 11/9/2018    Back pain     Blurred vision     Bradycardia 10/2/2018    Chest pain     Depression     Dizziness     Dizziness and giddiness 10/17/2019    Dysautonomia (HCC)     Fast heart beat     Fever 11/9/2018    Frequent headaches     Frequent urination     Hip dysplasia, congenital     Hx of syncope 10/24/2018    Hypotension     Ingrown left big toenail 3/31/2017    Joint pain     Joint swelling     LBD (Lewy body dementia) ([facility])     Lumbar spinal stenosis     Memory deficit 9/11/2018    Muscle pain     Neuropathy     Orthostatic hypotension     asymptomatic    Pacemaker     new pacemaker July 2019    Recent change in weight     Risk for falls 12/13/2016    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

## 2025-03-31 LAB
EKG ATRIAL RATE: 84 BPM
EKG DIAGNOSIS: NORMAL
EKG P AXIS: 94 DEGREES
EKG P-R INTERVAL: 212 MS
EKG Q-T INTERVAL: 374 MS
EKG QRS DURATION: 78 MS
EKG QTC CALCULATION (BAZETT): 441 MS
EKG R AXIS: 56 DEGREES
EKG T AXIS: 67 DEGREES
EKG VENTRICULAR RATE: 84 BPM

## 2025-04-17 LAB
BACTERIA SPEC CULT: NORMAL
BACTERIA SPEC CULT: NORMAL
SERVICE CMNT-IMP: NORMAL
SERVICE CMNT-IMP: NORMAL

## 2025-05-28 ENCOUNTER — APPOINTMENT (OUTPATIENT)
Facility: HOSPITAL | Age: 80
End: 2025-05-28
Payer: OTHER GOVERNMENT

## 2025-05-28 ENCOUNTER — HOSPITAL ENCOUNTER (EMERGENCY)
Facility: HOSPITAL | Age: 80
Discharge: HOME | End: 2025-05-28
Attending: FAMILY MEDICINE | Admitting: FAMILY MEDICINE
Payer: OTHER GOVERNMENT

## 2025-05-28 VITALS
HEART RATE: 84 BPM | BODY MASS INDEX: 26.6 KG/M2 | WEIGHT: 190 LBS | RESPIRATION RATE: 16 BRPM | DIASTOLIC BLOOD PRESSURE: 74 MMHG | HEIGHT: 71 IN | SYSTOLIC BLOOD PRESSURE: 130 MMHG | TEMPERATURE: 97.9 F | OXYGEN SATURATION: 94 %

## 2025-05-28 DIAGNOSIS — T14.8XXA SKIN AVULSION: ICD-10-CM

## 2025-05-28 DIAGNOSIS — S32.592A CLOSED FRACTURE OF RAMUS OF LEFT PUBIS, INITIAL ENCOUNTER (HCC): Primary | ICD-10-CM

## 2025-05-28 DIAGNOSIS — S50.02XA CONTUSION OF LEFT ELBOW, INITIAL ENCOUNTER: ICD-10-CM

## 2025-05-28 PROCEDURE — 73070 X-RAY EXAM OF ELBOW: CPT

## 2025-05-28 PROCEDURE — 73502 X-RAY EXAM HIP UNI 2-3 VIEWS: CPT

## 2025-05-28 PROCEDURE — 99283 EMERGENCY DEPT VISIT LOW MDM: CPT

## 2025-05-28 ASSESSMENT — PAIN - FUNCTIONAL ASSESSMENT: PAIN_FUNCTIONAL_ASSESSMENT: 0-10

## 2025-05-28 ASSESSMENT — PAIN DESCRIPTION - ORIENTATION: ORIENTATION: LEFT

## 2025-05-28 ASSESSMENT — PAIN DESCRIPTION - PAIN TYPE: TYPE: ACUTE PAIN

## 2025-05-28 ASSESSMENT — PAIN DESCRIPTION - LOCATION: LOCATION: HIP

## 2025-05-28 ASSESSMENT — PAIN SCALES - GENERAL: PAINLEVEL_OUTOF10: 5

## 2025-05-29 NOTE — ED TRIAGE NOTES
Patient came in with L hip pain from a ground level fall. Patient was in his garage when he fell this morning. He denies any LOC. He also has a laceration to his L elbow and L pinky finger.

## 2025-05-29 NOTE — ED NOTES
A non adherent bandage was placed over the wound of his L elbow. The bandage was held on by medi pore tape and and coban.

## 2025-05-29 NOTE — DISCHARGE INSTRUCTIONS
--Tylenol 1000 mg every 6 hours as needed for pain.  --Wash elbow with soap and water twice daily, then place a non-stick bandage on it.  --Follow up with his pcp this week, or return to the ED if you have problems or concerns.

## 2025-05-29 NOTE — ED PROVIDER NOTES
[facility] EMERGENCY DEPARTMENT  EMERGENCY DEPARTMENT ENCOUNTER       Pt Name: Immanuel Patel  MRN: [id number]  Birthdate 1945  Date of evaluation: 5/28/2025  Provider: Barbara Camacho MD   PCP: None, None  Note Started: 12:24 AM EDT 5/29/25     CHIEF COMPLAINT       Chief Complaint   Patient presents with    Hip Pain     Left hip          HISTORY OF PRESENT ILLNESS: 1 or more elements      History From: Patient  HPI Limitations: None     Immanuel Patel is a 79 y.o. male who was brought to the ED by his aid because of a fall that he had earlier today.     Nursing Notes were all reviewed and agreed with or any disagreements were addressed in the HPI.     REVIEW OF SYSTEMS      Review of Systems     Positives and Pertinent negatives as per HPI.    PAST HISTORY     Past Medical History:  Past Medical History:   Diagnosis Date    Altered mental status, unspecified 11/9/2018    Anxiety     Ataxia 11/9/2018    Back pain     Blurred vision     Bradycardia 10/2/2018    Chest pain     Depression     Dizziness     Dizziness and giddiness 10/17/2019    Dysautonomia (HCC)     Fast heart beat     Fever 11/9/2018    Frequent headaches     Frequent urination     Hip dysplasia, congenital     Hx of syncope 10/24/2018    Hypotension     Ingrown left big toenail 3/31/2017    Joint pain     Joint swelling     LBD (Lewy body dementia) ([facility])     Lumbar spinal stenosis     Memory deficit 9/11/2018    Muscle pain     Neuropathy     Orthostatic hypotension     asymptomatic    Pacemaker     new pacemaker July 2019    Recent change in weight     Risk for falls 12/13/2016    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 1/18/2018    SOB (shortness of breath)     Sore throat     SSS (sick sinus syndrome) ([facility])     Stiffness of joints, multiple sites     Stress     Stumbling gait 11/8/2018    Syncope     Tiredness     Trouble in sleeping     Weakness          Past Surgical History:  Past Surgical History:  Medical Records, Nursing Notes, Prior ED/Hospital Visits    CC/HPI Summary, DDx, ED Course, and Reassessment:   Differential Dx: Left hip vs Pelvic fx vs Elbow fx    Patient with a h/o dysautonomia and Lewey Body dementia comes to the ED brought by his aid. She was concerned because he had a fall this morning, which is not unusual in itself, but she is concerned that he is not bearing weight on the left hip as well as he normally does. She gave him 500 mg of acetaminophen at 5 pm tonight, about 2 hours pta. He denies pain. He does have several small skin avulsions/abrasions on his left elbow, but the Xray was normal. A dressing was placed on the elbow, as well as on the small finger, and he was discharged to home with his aid.      Disposition Considerations (Tests not done, Shared Decision Making, Pt Expectation of Test or Tx.): none     FINAL IMPRESSION     1. Closed fracture of ramus of left pubis, initial encounter ([facility])    2. Skin avulsion    3. Contusion of left elbow, initial encounter          DISPOSITION/PLAN   DISPOSITION Decision To Discharge 05/28/2025 08:35:26 PM   DISPOSITION CONDITION Stable           Discharge Note: The patient is stable for discharge home. The signs, symptoms, diagnosis, and discharge instructions have been discussed, understanding conveyed, and agreed upon. The patient is to follow up as recommended or return to ER should their symptoms worsen.      PATIENT REFERRED TO:  No follow-up provider specified.       DISCHARGE MEDICATIONS:     Medication List        ASK your doctor about these medications      acetaminophen 650 MG extended release tablet  Commonly known as: TYLENOL  Take 2 tablets by mouth nightly     albuterol sulfate  (90 Base) MCG/ACT inhaler  Commonly known as: Ventolin HFA  Inhale 2 puffs into the lungs 4 times daily as needed for Wheezing     aspirin 81 MG EC tablet  Take 1 tablet by mouth daily     atorvastatin 40 MG tablet  Commonly known as: LIPITOR

## 2025-06-28 ENCOUNTER — APPOINTMENT (OUTPATIENT)
Facility: HOSPITAL | Age: 80
End: 2025-06-28
Payer: OTHER GOVERNMENT

## 2025-06-28 ENCOUNTER — HOSPITAL ENCOUNTER (EMERGENCY)
Facility: HOSPITAL | Age: 80
Discharge: HOME | End: 2025-06-28
Attending: EMERGENCY MEDICINE
Payer: OTHER GOVERNMENT

## 2025-06-28 VITALS
BODY MASS INDEX: 25.2 KG/M2 | DIASTOLIC BLOOD PRESSURE: 106 MMHG | SYSTOLIC BLOOD PRESSURE: 146 MMHG | HEART RATE: 82 BPM | OXYGEN SATURATION: 92 % | TEMPERATURE: 98.2 F | RESPIRATION RATE: 18 BRPM | HEIGHT: 71 IN | WEIGHT: 180 LBS

## 2025-06-28 DIAGNOSIS — S09.90XA INJURY OF HEAD, INITIAL ENCOUNTER: Primary | ICD-10-CM
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
## 2025-06-28 DIAGNOSIS — S50.312A ABRASION OF LEFT ELBOW, INITIAL ENCOUNTER: ICD-10-CM

## 2025-06-28 LAB
APPEARANCE UR: CLEAR
BACTERIA URNS QL MICRO: NEGATIVE /HPF
BILIRUB UR QL: NEGATIVE
COLOR UR: ABNORMAL
EPITH CASTS URNS QL MICRO: ABNORMAL /LPF
GLUCOSE UR STRIP.AUTO-MCNC: 100 MG/DL
HGB UR QL STRIP: NEGATIVE
KETONES UR QL STRIP.AUTO: NEGATIVE MG/DL
LEUKOCYTE ESTERASE UR QL STRIP.AUTO: NEGATIVE
NITRITE UR QL STRIP.AUTO: NEGATIVE
PH UR STRIP: 6 (ref 5–8)
PROT UR STRIP-MCNC: NEGATIVE MG/DL
RBC #/AREA URNS HPF: ABNORMAL /HPF (ref 0–5)
SP GR UR REFRACTOMETRY: 1.02 (ref 1–1.03)
URINE CULTURE IF INDICATED: ABNORMAL
UROBILINOGEN UR QL STRIP.AUTO: 0.2 EU/DL (ref 0.2–1)
WBC URNS QL MICRO: ABNORMAL /HPF (ref 0–4)

## 2025-06-28 PROCEDURE — 90471 IMMUNIZATION ADMIN: CPT | Performed by: EMERGENCY MEDICINE

## 2025-06-28 PROCEDURE — 6370000000 HC RX 637 (ALT 250 FOR IP): Performed by: FAMILY MEDICINE

## 2025-06-28 PROCEDURE — 99284 EMERGENCY DEPT VISIT MOD MDM: CPT

## 2025-06-28 PROCEDURE — 93005 ELECTROCARDIOGRAM TRACING: CPT | Performed by: EMERGENCY MEDICINE

## 2025-06-28 PROCEDURE — 90715 TDAP VACCINE 7 YRS/> IM: CPT | Performed by: EMERGENCY MEDICINE

## 2025-06-28 PROCEDURE — 6360000002 HC RX W HCPCS: Performed by: EMERGENCY MEDICINE

## 2025-06-28 PROCEDURE — 6370000000 HC RX 637 (ALT 250 FOR IP): Performed by: EMERGENCY MEDICINE

## 2025-06-28 PROCEDURE — 70450 CT HEAD/BRAIN W/O DYE: CPT

## 2025-06-28 PROCEDURE — 81001 URINALYSIS AUTO W/SCOPE: CPT

## 2025-06-28 RX ORDER — BACITRACIN ZINC 500 [USP'U]/G
OINTMENT TOPICAL 2 TIMES DAILY
Status: DISCONTINUED | OUTPATIENT
Start: 2025-06-28 | End: 2025-06-28 | Stop reason: HOSPADM

## 2025-06-28 RX ORDER — BACITRACIN ZINC 500 [USP'U]/G
OINTMENT TOPICAL 2 TIMES DAILY
Status: DISCONTINUED | OUTPATIENT
Start: 2025-06-28 | End: 2025-06-28

## 2025-06-28 RX ORDER — BUSPIRONE HYDROCHLORIDE 10 MG/1
10 TABLET ORAL
Status: COMPLETED | OUTPATIENT
Start: 2025-06-28 | End: 2025-06-28

## 2025-06-28 RX ADMIN — BACITRACIN ZINC: 500 OINTMENT TOPICAL at 18:11

## 2025-06-28 RX ADMIN — BUSPIRONE HYDROCHLORIDE 10 MG: 10 TABLET ORAL at 20:35

## 2025-06-28 RX ADMIN — TETANUS TOXOID, REDUCED DIPHTHERIA TOXOID AND ACELLULAR PERTUSSIS VACCINE, ADSORBED 0.5 ML: 5; 2.5; 8; 8; 2.5 SUSPENSION INTRAMUSCULAR at 18:08

## 2025-06-28 ASSESSMENT — PAIN SCALES - PAIN ASSESSMENT IN ADVANCED DEMENTIA (PAINAD)
BODYLANGUAGE: RELAXED
CONSOLABILITY: NO NEED TO CONSOLE
FACIALEXPRESSION: SMILING OR INEXPRESSIVE
TOTALSCORE: 0
BREATHING: NORMAL

## 2025-06-28 ASSESSMENT — PAIN SCALES - GENERAL: PAINLEVEL_OUTOF10: 0

## 2025-06-28 ASSESSMENT — PAIN - FUNCTIONAL ASSESSMENT
PAIN_FUNCTIONAL_ASSESSMENT: 0-10
PAIN_FUNCTIONAL_ASSESSMENT: PAIN ASSESSMENT IN ADVANCED DEMENTIA (PAINAD)

## 2025-06-28 NOTE — ED PROVIDER NOTES
(Bazett) 426 ms    P Axis 97 degrees    R Axis 22 degrees    T Axis 56 degrees    Diagnosis       Atrial-paced rhythm with prolonged AV conduction  Abnormal ECG  When compared with ECG of 28-MAR-2025 13:00,  No significant change was found  <<This EKG report interpreted by Sigifredo Peña MD>>  Confirmed by Sigifredo Peña MD ([id number]) on 6/29/2025 7:32:24 AM                  RADIOLOGY:       Interpretation per the Radiologist below, if available at the time of this note:     CT HEAD WO CONTRAST   Final Result   No evidence of acute intracranial hemorrhage or infarct. Small   component of the right frontal lobe and right frontal calvarium were excluded   from the exam. Repeat CT for further evaluation.            Electronically signed by Niall Adams MD              EMERGENCY DEPARTMENT COURSE and DIFFERENTIAL DIAGNOSIS/MDM   Vitals:    Vitals:    06/28/25 1745 06/28/25 1800 06/28/25 1807 06/28/25 1842   BP: (!) 173/66 (!) 181/83  (!) 138/125   Pulse:       Resp:       Temp:       TempSrc:       SpO2: 98% 95% 97% 93%   Weight:       Height:                Patient was given the following medications:  Medications   bacitracin zinc ointment ( Topical Given 6/28/25 1811)   tetanus-diphth-acell pertussis (BOOSTRIX) injection 0.5 mL (0.5 mLs IntraMUSCular Given 6/28/25 1808)           Chronic Conditions:   Past Medical History:   Diagnosis Date    Altered mental status, unspecified 11/9/2018    Anxiety     Ataxia 11/9/2018    Back pain     Blurred vision     Bradycardia 10/2/2018    Chest pain     Depression     Dizziness     Dizziness and giddiness 10/17/2019    Dysautonomia (HCC)     Fast heart beat     Fever 11/9/2018    Frequent headaches     Frequent urination     Hip dysplasia, congenital     Hx of syncope 10/24/2018    Hypotension     Ingrown left big toenail 3/31/2017    Joint pain     Joint swelling     LBD (Lewy body dementia) (HCC)     Lumbar spinal stenosis     Memory deficit 9/11/2018    Muscle pain      FLORINEF     furosemide 20 MG tablet  Commonly known as: LASIX  Take 1 tablet by mouth daily     melatonin 5 MG Tbdp disintegrating tablet  Take 1 tablet by mouth nightly     midodrine 10 MG tablet  Commonly known as: PROAMATINE  Take 1 tablet by mouth 3 times daily (with meals)     Myrbetriq 25 MG Tb24  Generic drug: mirabegron     potassium chloride 20 MEQ Tbcr extended release tablet  Commonly known as: K-TAB  Take 1 tablet by mouth 2 times daily     sodium chloride 1 g tablet  Take 1 tablet by mouth 3 times daily (with meals)     traZODone 100 MG tablet  Commonly known as: DESYREL  Take 1 tablet by mouth nightly     Trelegy Ellipta 200-62.5-25 MCG/ACT Aepb inhaler  Generic drug: fluticasone-umeclidin-vilant  Inhale 1 puff into the lungs daily                DISCONTINUED MEDICATIONS:  Current Discharge Medication List          I am the Primary Clinician of Record.   Solange Keenan MD (electronically signed)      (Please note that parts of this dictation were completed with voice recognition software. Quite often unanticipated grammatical, syntax, homophones, and other interpretive errors are inadvertently transcribed by the computer software. Please disregards these errors. Please excuse any errors that have escaped final proofreading.)

## 2025-06-28 NOTE — ED NOTES
Patient is more agitated at this time, he is easily distracted with conversation. Family at bedside.

## 2025-06-28 NOTE — ED TRIAGE NOTES
Pt arrived with complaint of a fall.  Per pts wife and caregiver pt had a fall about an hour ago hitting his head on the cabinet and also causes a skin tear to his left elbow.  Pt had told family he had a headache but no vomiting.  Pt is not on blood thinners and pt denies pain at this time. Pt with history of dementia and parkinson's disease.  Pt to room 1 via wheelchair.  Two person assisted to get pt out of wheelchair and onto stretcher.  Pt and family educated on ER flow

## 2025-06-29 LAB
EKG ATRIAL RATE: 80 BPM
EKG DIAGNOSIS: NORMAL
EKG P AXIS: 97 DEGREES
EKG P-R INTERVAL: 210 MS
EKG Q-T INTERVAL: 370 MS
EKG QRS DURATION: 82 MS
EKG QTC CALCULATION (BAZETT): 426 MS
EKG R AXIS: 22 DEGREES
EKG T AXIS: 56 DEGREES
EKG VENTRICULAR RATE: 80 BPM

## 2025-06-29 NOTE — ED NOTES
Patient's family requested to speak with MD about taking patient home now that he is calm. MD to cancel cardiac orders. According to patient's family he had an episode of agitation lasting 10-15 minutes. However patient was redirected and calmed. Patient is currently alert and pleasant in temperament. Patient is denying any pain at this time. Wife stated that he has been having episodes of increasing agitation.

## 2025-08-01 ENCOUNTER — HOSPITAL ENCOUNTER (EMERGENCY)
Facility: HOSPITAL | Age: 80
Discharge: HOME | End: 2025-08-01
Attending: EMERGENCY MEDICINE
Payer: OTHER GOVERNMENT

## 2025-08-01 VITALS
RESPIRATION RATE: 20 BRPM | WEIGHT: 180 LBS | TEMPERATURE: 98 F | BODY MASS INDEX: 25.2 KG/M2 | HEART RATE: 82 BPM | HEIGHT: 71 IN | SYSTOLIC BLOOD PRESSURE: 117 MMHG | OXYGEN SATURATION: 99 % | DIASTOLIC BLOOD PRESSURE: 54 MMHG

## 2025-08-01 DIAGNOSIS — G20.A1 PARKINSON'S DISEASE, UNSPECIFIED WHETHER DYSKINESIA PRESENT, UNSPECIFIED WHETHER MANIFESTATIONS FLUCTUATE (HCC): ICD-10-CM

## 2025-08-01 DIAGNOSIS — R25.1 EPISODE OF SHAKING: Primary | ICD-10-CM

## 2025-08-01 DIAGNOSIS — F03.911 AGITATION DUE TO DEMENTIA (HCC): ICD-10-CM

## 2025-08-01 LAB
ALBUMIN SERPL-MCNC: 3.1 G/DL (ref 3.5–5)
ALBUMIN/GLOB SERPL: 0.9 (ref 1.1–2.2)
ALP SERPL-CCNC: 75 U/L (ref 45–117)
ALT SERPL-CCNC: 15 U/L (ref 12–78)
ANION GAP SERPL CALC-SCNC: 12 MMOL/L (ref 2–12)
AST SERPL-CCNC: 17 U/L (ref 15–37)
BASOPHILS # BLD: 0 K/UL (ref 0–0.1)
BASOPHILS NFR BLD: 0 % (ref 0–1)
BILIRUB SERPL-MCNC: 0.5 MG/DL (ref 0.2–1)
BUN SERPL-MCNC: 26 MG/DL (ref 6–20)
BUN/CREAT SERPL: 20 (ref 12–20)
CALCIUM SERPL-MCNC: 8.5 MG/DL (ref 8.5–10.1)
CHLORIDE SERPL-SCNC: 106 MMOL/L (ref 97–108)
CO2 SERPL-SCNC: 22 MMOL/L (ref 21–32)
CREAT SERPL-MCNC: 1.27 MG/DL (ref 0.7–1.3)
DIFFERENTIAL METHOD BLD: ABNORMAL
EKG ATRIAL RATE: 80 BPM
EKG DIAGNOSIS: NORMAL
EKG P-R INTERVAL: 200 MS
EKG Q-T INTERVAL: 376 MS
EKG QRS DURATION: 74 MS
EKG QTC CALCULATION (BAZETT): 433 MS
EKG R AXIS: 9 DEGREES
EKG T AXIS: 38 DEGREES
EKG VENTRICULAR RATE: 80 BPM
EOSINOPHIL # BLD: 0.06 K/UL (ref 0–0.4)
EOSINOPHIL NFR BLD: 1 % (ref 0–7)
ERYTHROCYTE [DISTWIDTH] IN BLOOD BY AUTOMATED COUNT: 12.8 % (ref 11.5–14.5)
GLOBULIN SER CALC-MCNC: 3.5 G/DL (ref 2–4)
GLUCOSE SERPL-MCNC: 167 MG/DL (ref 65–100)
HCT VFR BLD AUTO: 36.9 % (ref 36.6–50.3)
HGB BLD-MCNC: 12.3 G/DL (ref 12.1–17)
IMM GRANULOCYTES # BLD AUTO: 0 K/UL (ref 0–0.04)
IMM GRANULOCYTES NFR BLD AUTO: 0 % (ref 0–0.5)
LYMPHOCYTES # BLD: 0.69 K/UL (ref 0.8–3.5)
LYMPHOCYTES NFR BLD: 11 % (ref 12–49)
MCH RBC QN AUTO: 33 PG (ref 26–34)
MCHC RBC AUTO-ENTMCNC: 33.3 G/DL (ref 30–36.5)
MCV RBC AUTO: 98.9 FL (ref 80–99)
MONOCYTES # BLD: 0.69 K/UL (ref 0–1)
MONOCYTES NFR BLD: 11 % (ref 5–13)
NEUTS SEG # BLD: 4.86 K/UL (ref 1.8–8)
NEUTS SEG NFR BLD: 77 % (ref 32–75)
NRBC # BLD: 0 K/UL (ref 0–0.01)
NRBC BLD-RTO: 0 PER 100 WBC
PLATELET # BLD AUTO: 174 K/UL (ref 150–400)
PLATELET COMMENT: ABNORMAL
PMV BLD AUTO: 9.5 FL (ref 8.9–12.9)
POTASSIUM SERPL-SCNC: 4 MMOL/L (ref 3.5–5.1)
PROT SERPL-MCNC: 6.6 G/DL (ref 6.4–8.2)
RBC # BLD AUTO: 3.73 M/UL (ref 4.1–5.7)
RBC MORPH BLD: ABNORMAL
SODIUM SERPL-SCNC: 140 MMOL/L (ref 136–145)
WBC # BLD AUTO: 6.3 K/UL (ref 4.1–11.1)

## 2025-08-01 PROCEDURE — 80053 COMPREHEN METABOLIC PANEL: CPT

## 2025-08-01 PROCEDURE — 36415 COLL VENOUS BLD VENIPUNCTURE: CPT

## 2025-08-01 PROCEDURE — 93005 ELECTROCARDIOGRAM TRACING: CPT | Performed by: EMERGENCY MEDICINE

## 2025-08-01 PROCEDURE — 99284 EMERGENCY DEPT VISIT MOD MDM: CPT

## 2025-08-01 PROCEDURE — 80164 ASSAY DIPROPYLACETIC ACD TOT: CPT

## 2025-08-01 PROCEDURE — 85025 COMPLETE CBC W/AUTO DIFF WBC: CPT

## 2025-08-01 RX ORDER — QUETIAPINE FUMARATE 100 MG/1
100 TABLET, FILM COATED ORAL
COMMUNITY
Start: 2025-08-01

## 2025-08-01 RX ORDER — QUETIAPINE FUMARATE 50 MG/1
50 TABLET, FILM COATED ORAL 2 TIMES DAILY
COMMUNITY
Start: 2025-08-01

## 2025-08-01 ASSESSMENT — PAIN - FUNCTIONAL ASSESSMENT: PAIN_FUNCTIONAL_ASSESSMENT: PAIN ASSESSMENT IN ADVANCED DEMENTIA (PAINAD)

## 2025-08-01 ASSESSMENT — PAIN SCALES - PAIN ASSESSMENT IN ADVANCED DEMENTIA (PAINAD)
BODYLANGUAGE: RELAXED
FACIALEXPRESSION: SMILING OR INEXPRESSIVE
BREATHING: NORMAL
CONSOLABILITY: NO NEED TO CONSOLE
TOTALSCORE: 0

## 2025-08-01 NOTE — ED NOTES
Pt reports he was aware of when he became shaky and states that afterwards he felt SOB and that this continues at this time. Pt does have hx of dementia, is Axox2 (self, place) pt not as sure on situation, or time. Vitals WNL, no complaints of pain, no n/v/d. Seizure precautions pads placed on both sides of bed.    This RN able to obtain peripheral access after x 3 attempts, unable to draw labs. 22g diffusics in right forearm. Pt has multiple bruises on both arms from recent stay at [facility].

## 2025-08-01 NOTE — ED TRIAGE NOTES
Patient presented to ED via EMS for possible seizure, patient's only complaint is shortness of breath. According to EMS patient is at baseline A&Ox2. Per EMS patient arrived home from [facility] about 30 minutes before this episode and patient became rigid with whole body tremors, and shortness of breath after episode lasting approx. 1 minute. According to EMS patient recently started Sequel.

## 2025-08-02 LAB — VALPROATE SERPL-MCNC: 58 UG/ML (ref 50–100)

## 2025-08-02 NOTE — DISCHARGE INSTRUCTIONS
You were seen in the emergency department for episodes of shaking when you were moved to your wheelchair.  It is unclear whether these episodes were seizures or had a different cause.  Your lab work was unremarkable.  A valproic acid level was sent but did not result while you were in the emergency department.  Continue the medications and [facility]  Return to the emergency department if you have any worsening symptoms.

## 2025-08-02 NOTE — ED NOTES
2100 - 2130 : This RN assisting pt to vehicle with pt daughter, wife, unable to get into car with assistance. EMS squad on scene able to assist pt into vehicle. 4 people able to assist pt into vehicle with difficulty. Pt wife has called non-emergency to meet them at home to get pt into the house.

## 2025-08-02 NOTE — ED NOTES
Pt bladder scanned, 88ml onboard. ED Provider aware. Pt family concerned as pt appears more agitated, pt family reporting that pt is hallucinating right now. Pt was given his nightly dose of seroquel 100mg before arrival and this is a new medication.

## 2025-08-02 NOTE — ED PROVIDER NOTES
[facility] EMERGENCY DEPARTMENT  EMERGENCY DEPARTMENT ENCOUNTER       Pt Name: Immanuel Patel  MRN: [id number]  Birthdate 1945  Date of evaluation: 8/1/2025  Provider: Solange Keenan MD   PCP: None, None  Note Started: 8:40 PM EDT 8/1/25     CHIEF COMPLAINT       Chief Complaint   Patient presents with    Seizures     Rigidity lasting x 1 minute, with shortness of breath after tremors    Shortness of Breath        HISTORY OF PRESENT ILLNESS: 1 or more elements      History From: {SAHPI:70198}  HPI Limitations: {HPI Limitations (Optional):05524}     Immanuel Patel is a 80 y.o. male who presents ***     Nursing Notes were all reviewed and agreed with or any disagreements were addressed in the HPI.     REVIEW OF SYSTEMS      Review of Systems     Positives and Pertinent negatives as per HPI.    PAST HISTORY     Past Medical History:  Past Medical History:   Diagnosis Date    Altered mental status, unspecified 11/9/2018    Anxiety     Ataxia 11/9/2018    Back pain     Blurred vision     Bradycardia 10/2/2018    Chest pain     Depression     Dizziness     Dizziness and giddiness 10/17/2019    Dysautonomia (HCC)     Fast heart beat     Fever 11/9/2018    Frequent headaches     Frequent urination     Hip dysplasia, congenital     Hx of syncope 10/24/2018    Hypotension     Ingrown left big toenail 3/31/2017    Joint pain     Joint swelling     LBD (Lewy body dementia) ([facility])     Lumbar spinal stenosis     Memory deficit 9/11/2018    Muscle pain     Neuropathy     Orthostatic hypotension     asymptomatic    Pacemaker     new pacemaker July 2019    Recent change in weight     Risk for falls 12/13/2016    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

## 2025-08-02 NOTE — ED NOTES
I have reviewed discharge instructions with the patient, spouse, and caregiver. The patient, spouse, and caregiver verbalized understanding. Discharge medications discussed with patient. No questions at this time. Moved to wheelchair, dressed with assistance by this RN and family. Pt very agitated and gripping this RN painfully and resisting.

## 2025-08-04 LAB
EKG ATRIAL RATE: 80 BPM
EKG DIAGNOSIS: NORMAL
EKG P-R INTERVAL: 200 MS
EKG Q-T INTERVAL: 376 MS
EKG QRS DURATION: 74 MS
EKG QTC CALCULATION (BAZETT): 433 MS
EKG R AXIS: 9 DEGREES
EKG T AXIS: 38 DEGREES
EKG VENTRICULAR RATE: 80 BPM

## (undated) DEVICE — ZIP 8I SURGICAL SKIN CLOSURE DEVICE: Brand: ZIP 8I SURGICAL SKIN CLOSURE DEVICE

## (undated) DEVICE — IRRIGATION KT PIST SYR 60ML -- CONVERT TO ITEM 116415

## (undated) DEVICE — 3M™ TEGADERM™ TRANSPARENT FILM DRESSING FRAME STYLE, 1626W, 4 IN X 4-3/4 IN (10 CM X 12 CM), 50/CT 4CT/CASE: Brand: 3M™ TEGADERM™

## (undated) DEVICE — AIRLIFE™ ADULT CUSHION NASAL CANNULA 14 FOOT (4.3) CRUSH-RESISTANT OXYGEN TUBING, AND U/CONNECT-IT ADAPTER: Brand: AIRLIFE™

## (undated) DEVICE — COVER LT HNDL BLU PLAS

## (undated) DEVICE — LIMB HOLDER, WRIST/ANKLE: Brand: DEROYAL

## (undated) DEVICE — INTRO SHTH 9FR 13X20CM -- USE ITEM# 341577

## (undated) DEVICE — GUIDEWIRE VASC L40CM DIA0018IN NDL 21GA L7CM Z S STL MAK

## (undated) DEVICE — REM POLYHESIVE ADULT PATIENT RETURN ELECTRODE: Brand: VALLEYLAB

## (undated) DEVICE — PAD NON-ADHERENT 3X4 STRL LF --

## (undated) DEVICE — PACEMAKER PACK: Brand: MEDLINE INDUSTRIES, INC.

## (undated) DEVICE — SUT SLK 0 30IN SH BLK --

## (undated) DEVICE — MEDI-TRACE CADENCE ADULT, DEFIBRILLATION ELECTRODE -RTS  (10 PR/PK) - PHYSIO-CONTROL: Brand: MEDI-TRACE CADENCE

## (undated) DEVICE — SUTURE ABSORBABLE BRAIDED 2-0 CT-1 27 IN UD VICRYL J259H

## (undated) DEVICE — SUTURE VCRL SZ 2-0 L36IN ABSRB UD L40MM CT 1/2 CIR J957H

## (undated) DEVICE — KIT ACCS INTRO 4FR L10CM NDL 21GA L7CM GWIRE L40CM

## (undated) DEVICE — 3M™ IOBAN™ 2 ANTIMICROBIAL INCISE DRAPE 6650EZ: Brand: IOBAN™ 2

## (undated) DEVICE — INTRO SHTH 7FR 13X20CM -- TEARAWAY